# Patient Record
Sex: MALE | Race: WHITE | NOT HISPANIC OR LATINO | Employment: OTHER | URBAN - METROPOLITAN AREA
[De-identification: names, ages, dates, MRNs, and addresses within clinical notes are randomized per-mention and may not be internally consistent; named-entity substitution may affect disease eponyms.]

---

## 2017-07-24 ENCOUNTER — LAB (OUTPATIENT)
Dept: LAB | Facility: HOSPITAL | Age: 57
End: 2017-07-24
Attending: ORTHOPAEDIC SURGERY

## 2017-07-24 ENCOUNTER — TRANSCRIBE ORDERS (OUTPATIENT)
Dept: ADMINISTRATIVE | Facility: HOSPITAL | Age: 57
End: 2017-07-24

## 2017-07-24 ENCOUNTER — HOSPITAL ENCOUNTER (OUTPATIENT)
Dept: CARDIOLOGY | Facility: HOSPITAL | Age: 57
Discharge: HOME | End: 2017-07-24
Attending: ORTHOPAEDIC SURGERY | Admitting: ORTHOPAEDIC SURGERY

## 2017-07-24 ENCOUNTER — HOSPITAL ENCOUNTER (OUTPATIENT)
Dept: GENERAL RADIOLOGY | Facility: HOSPITAL | Age: 57
Discharge: HOME | End: 2017-07-24
Attending: ORTHOPAEDIC SURGERY

## 2017-07-24 DIAGNOSIS — Z01.818 PRE-OP TESTING: ICD-10-CM

## 2017-07-24 DIAGNOSIS — Z01.811 PRE-OP CHEST EXAM: ICD-10-CM

## 2017-07-24 DIAGNOSIS — Z01.818 PRE-OP TESTING: Primary | ICD-10-CM

## 2017-07-24 LAB
ALBUMIN SERPL-MCNC: 3.8 G/DL (ref 3.5–5.2)
ALBUMIN/GLOB SERPL: 1.2 G/DL
ALP SERPL-CCNC: 137 U/L (ref 39–117)
ALT SERPL W P-5'-P-CCNC: 28 U/L (ref 1–41)
ANION GAP SERPL CALCULATED.3IONS-SCNC: 11.4 MMOL/L
AST SERPL-CCNC: 20 U/L (ref 1–40)
BASOPHILS # BLD AUTO: 0.03 10*3/MM3 (ref 0–0.2)
BASOPHILS NFR BLD AUTO: 0.4 % (ref 0–1.5)
BILIRUB SERPL-MCNC: 0.3 MG/DL (ref 0.1–1.2)
BILIRUB UR QL STRIP: NEGATIVE
BUN BLD-MCNC: 24 MG/DL (ref 6–20)
BUN/CREAT SERPL: 22.6 (ref 7–25)
CALCIUM SPEC-SCNC: 9.4 MG/DL (ref 8.6–10.5)
CHLORIDE SERPL-SCNC: 103 MMOL/L (ref 98–107)
CLARITY UR: CLEAR
CO2 SERPL-SCNC: 25.6 MMOL/L (ref 22–29)
COLOR UR: YELLOW
CREAT BLD-MCNC: 1.06 MG/DL (ref 0.76–1.27)
DEPRECATED RDW RBC AUTO: 45.6 FL (ref 37–54)
EOSINOPHIL # BLD AUTO: 0.36 10*3/MM3 (ref 0–0.7)
EOSINOPHIL NFR BLD AUTO: 5 % (ref 0.3–6.2)
ERYTHROCYTE [DISTWIDTH] IN BLOOD BY AUTOMATED COUNT: 13.1 % (ref 11.5–14.5)
GFR SERPL CREATININE-BSD FRML MDRD: 73 ML/MIN/1.73
GLOBULIN UR ELPH-MCNC: 3.1 GM/DL
GLUCOSE BLD-MCNC: 182 MG/DL (ref 65–99)
GLUCOSE UR STRIP-MCNC: NEGATIVE MG/DL
HCT VFR BLD AUTO: 41.2 % (ref 40.4–52.2)
HGB BLD-MCNC: 13.9 G/DL (ref 13.7–17.6)
HGB UR QL STRIP.AUTO: NEGATIVE
IMM GRANULOCYTES # BLD: 0 10*3/MM3 (ref 0–0.03)
IMM GRANULOCYTES NFR BLD: 0 % (ref 0–0.5)
KETONES UR QL STRIP: NEGATIVE
LEUKOCYTE ESTERASE UR QL STRIP.AUTO: NEGATIVE
LYMPHOCYTES # BLD AUTO: 1.55 10*3/MM3 (ref 0.9–4.8)
LYMPHOCYTES NFR BLD AUTO: 21.7 % (ref 19.6–45.3)
MCH RBC QN AUTO: 32.6 PG (ref 27–32.7)
MCHC RBC AUTO-ENTMCNC: 33.7 G/DL (ref 32.6–36.4)
MCV RBC AUTO: 96.7 FL (ref 79.8–96.2)
MONOCYTES # BLD AUTO: 0.56 10*3/MM3 (ref 0.2–1.2)
MONOCYTES NFR BLD AUTO: 7.8 % (ref 5–12)
NEUTROPHILS # BLD AUTO: 4.65 10*3/MM3 (ref 1.9–8.1)
NEUTROPHILS NFR BLD AUTO: 65.1 % (ref 42.7–76)
NITRITE UR QL STRIP: NEGATIVE
PH UR STRIP.AUTO: 6.5 [PH] (ref 5–8)
PLATELET # BLD AUTO: 182 10*3/MM3 (ref 140–500)
PMV BLD AUTO: 10.4 FL (ref 6–12)
POTASSIUM BLD-SCNC: 4.4 MMOL/L (ref 3.5–5.2)
PROT SERPL-MCNC: 6.9 G/DL (ref 6–8.5)
PROT UR QL STRIP: ABNORMAL
RBC # BLD AUTO: 4.26 10*6/MM3 (ref 4.6–6)
SODIUM BLD-SCNC: 140 MMOL/L (ref 136–145)
SP GR UR STRIP: 1.01 (ref 1–1.03)
UROBILINOGEN UR QL STRIP: ABNORMAL
WBC NRBC COR # BLD: 7.15 10*3/MM3 (ref 4.5–10.7)

## 2017-07-24 PROCEDURE — 80053 COMPREHEN METABOLIC PANEL: CPT

## 2017-07-24 PROCEDURE — 93005 ELECTROCARDIOGRAM TRACING: CPT | Performed by: ORTHOPAEDIC SURGERY

## 2017-07-24 PROCEDURE — 93010 ELECTROCARDIOGRAM REPORT: CPT | Performed by: INTERNAL MEDICINE

## 2017-07-24 PROCEDURE — 81003 URINALYSIS AUTO W/O SCOPE: CPT

## 2017-07-24 PROCEDURE — 36415 COLL VENOUS BLD VENIPUNCTURE: CPT

## 2017-07-24 PROCEDURE — 85025 COMPLETE CBC W/AUTO DIFF WBC: CPT

## 2017-07-24 PROCEDURE — 71020 HC CHEST PA AND LATERAL: CPT

## 2018-02-27 ENCOUNTER — OFFICE VISIT CONVERTED (OUTPATIENT)
Dept: SURGERY | Facility: CLINIC | Age: 58
End: 2018-02-27
Attending: UROLOGY

## 2018-05-29 ENCOUNTER — OFFICE VISIT CONVERTED (OUTPATIENT)
Dept: SURGERY | Facility: CLINIC | Age: 58
End: 2018-05-29
Attending: UROLOGY

## 2018-07-06 ENCOUNTER — OFFICE VISIT CONVERTED (OUTPATIENT)
Dept: SURGERY | Facility: CLINIC | Age: 58
End: 2018-07-06
Attending: UROLOGY

## 2018-09-11 ENCOUNTER — CONVERSION ENCOUNTER (OUTPATIENT)
Dept: SURGERY | Facility: CLINIC | Age: 58
End: 2018-09-11

## 2018-09-11 ENCOUNTER — OFFICE VISIT CONVERTED (OUTPATIENT)
Dept: SURGERY | Facility: CLINIC | Age: 58
End: 2018-09-11
Attending: UROLOGY

## 2019-05-20 ENCOUNTER — OFFICE VISIT CONVERTED (OUTPATIENT)
Dept: GASTROENTEROLOGY | Facility: CLINIC | Age: 59
End: 2019-05-20
Attending: NURSE PRACTITIONER

## 2019-06-06 ENCOUNTER — HOSPITAL ENCOUNTER (OUTPATIENT)
Dept: GASTROENTEROLOGY | Facility: HOSPITAL | Age: 59
Setting detail: HOSPITAL OUTPATIENT SURGERY
Discharge: HOME | End: 2019-06-06
Attending: INTERNAL MEDICINE

## 2019-06-06 LAB — GLUCOSE BLD-MCNC: 154 MG/DL (ref 70–99)

## 2019-09-11 ENCOUNTER — HOSPITAL ENCOUNTER (OUTPATIENT)
Dept: OTHER | Facility: HOSPITAL | Age: 59
Discharge: HOME | End: 2019-09-11
Attending: NURSE PRACTITIONER

## 2019-12-31 ENCOUNTER — HOSPITAL ENCOUNTER (OUTPATIENT)
Dept: OTHER | Facility: HOSPITAL | Age: 59
Discharge: HOME | End: 2019-12-31
Attending: NURSE PRACTITIONER

## 2019-12-31 LAB
CREAT BLD-MCNC: 1.7 MG/DL (ref 0.6–1.4)
GFR SERPLBLD BASED ON 1.73 SQ M-ARVRAT: 43 ML/MIN/{1.73_M2}

## 2020-08-28 ENCOUNTER — HOSPITAL ENCOUNTER (OUTPATIENT)
Dept: OTHER | Facility: HOSPITAL | Age: 60
Discharge: HOME | End: 2020-08-28
Attending: NURSE PRACTITIONER

## 2020-09-11 ENCOUNTER — OFFICE VISIT CONVERTED (OUTPATIENT)
Dept: ORTHOPEDIC SURGERY | Facility: CLINIC | Age: 60
End: 2020-09-11
Attending: ORTHOPAEDIC SURGERY

## 2020-11-06 ENCOUNTER — OFFICE VISIT CONVERTED (OUTPATIENT)
Dept: ORTHOPEDIC SURGERY | Facility: CLINIC | Age: 60
End: 2020-11-06
Attending: PHYSICIAN ASSISTANT

## 2020-11-09 ENCOUNTER — HOSPITAL ENCOUNTER (OUTPATIENT)
Dept: LAB | Facility: HOSPITAL | Age: 60
Discharge: HOME | End: 2020-11-09
Attending: INTERNAL MEDICINE

## 2020-11-09 LAB
ANION GAP SERPL CALC-SCNC: 16 MMOL/L (ref 8–19)
APPEARANCE UR: CLEAR
BASOPHILS # BLD AUTO: 0.05 10*3/UL (ref 0–0.2)
BASOPHILS NFR BLD AUTO: 0.7 % (ref 0–3)
BILIRUB UR QL: NEGATIVE
BUN SERPL-MCNC: 32 MG/DL (ref 5–25)
BUN/CREAT SERPL: 16 {RATIO} (ref 6–20)
CALCIUM SERPL-MCNC: 9 MG/DL (ref 8.7–10.4)
CHLORIDE SERPL-SCNC: 102 MMOL/L (ref 99–111)
COLOR UR: YELLOW
CONV ABS IMM GRAN: 0.05 10*3/UL (ref 0–0.2)
CONV BACTERIA: NEGATIVE
CONV CO2: 23 MMOL/L (ref 22–32)
CONV COLLECTION SOURCE (UA): ABNORMAL
CONV CREATININE URINE, RANDOM: 74.1 MG/DL (ref 10–300)
CONV IMMATURE GRAN: 0.7 % (ref 0–1.8)
CONV UROBILINOGEN IN URINE BY AUTOMATED TEST STRIP: 0.2 {EHRLICHU}/DL (ref 0.1–1)
CREAT UR-MCNC: 2.06 MG/DL (ref 0.7–1.2)
DEPRECATED RDW RBC AUTO: 45.9 FL (ref 35.1–43.9)
EOSINOPHIL # BLD AUTO: 0.26 10*3/UL (ref 0–0.7)
EOSINOPHIL # BLD AUTO: 3.6 % (ref 0–7)
ERYTHROCYTE [DISTWIDTH] IN BLOOD BY AUTOMATED COUNT: 13.2 % (ref 11.6–14.4)
GFR SERPLBLD BASED ON 1.73 SQ M-ARVRAT: 34 ML/MIN/{1.73_M2}
GLUCOSE SERPL-MCNC: 204 MG/DL (ref 70–99)
GLUCOSE UR QL: NEGATIVE MG/DL
HCT VFR BLD AUTO: 39.9 % (ref 42–52)
HGB BLD-MCNC: 13.3 G/DL (ref 14–18)
HGB UR QL STRIP: NEGATIVE
KETONES UR QL STRIP: NEGATIVE MG/DL
LEUKOCYTE ESTERASE UR QL STRIP: NEGATIVE
LYMPHOCYTES # BLD AUTO: 1.59 10*3/UL (ref 1–5)
LYMPHOCYTES NFR BLD AUTO: 21.9 % (ref 20–45)
MCH RBC QN AUTO: 31.5 PG (ref 27–31)
MCHC RBC AUTO-ENTMCNC: 33.3 G/DL (ref 33–37)
MCV RBC AUTO: 94.5 FL (ref 80–96)
MONOCYTES # BLD AUTO: 0.77 10*3/UL (ref 0.2–1.2)
MONOCYTES NFR BLD AUTO: 10.6 % (ref 3–10)
NEUTROPHILS # BLD AUTO: 4.53 10*3/UL (ref 2–8)
NEUTROPHILS NFR BLD AUTO: 62.5 % (ref 30–85)
NITRITE UR QL STRIP: NEGATIVE
NRBC CBCN: 0 % (ref 0–0.7)
OSMOLALITY SERPL CALC.SUM OF ELEC: 295 MOSM/KG (ref 273–304)
PH UR STRIP.AUTO: 6 [PH] (ref 5–8)
PLATELET # BLD AUTO: 194 10*3/UL (ref 130–400)
PMV BLD AUTO: 10.5 FL (ref 9.4–12.4)
POTASSIUM SERPL-SCNC: 5 MMOL/L (ref 3.5–5.3)
PROT UR QL: 100 MG/DL
PROT UR-MCNC: 55.6 MG/DL
RBC # BLD AUTO: 4.22 10*6/UL (ref 4.7–6.1)
RBC #/AREA URNS HPF: ABNORMAL /[HPF]
SODIUM SERPL-SCNC: 136 MMOL/L (ref 135–147)
SP GR UR: 1.01 (ref 1–1.03)
WBC # BLD AUTO: 7.25 10*3/UL (ref 4.8–10.8)
WBC #/AREA URNS HPF: ABNORMAL /[HPF]

## 2020-11-10 ENCOUNTER — HOSPITAL ENCOUNTER (OUTPATIENT)
Dept: PREADMISSION TESTING | Facility: HOSPITAL | Age: 60
Discharge: HOME | End: 2020-11-10
Attending: ORTHOPAEDIC SURGERY

## 2020-11-10 LAB
ALBUMIN SERPL-MCNC: 4 G/DL (ref 3.5–5)
ALBUMIN/GLOB SERPL: 1.7 {RATIO} (ref 1.4–2.6)
ALP SERPL-CCNC: 165 U/L (ref 56–119)
ALT SERPL-CCNC: 25 U/L (ref 10–40)
ANION GAP SERPL CALC-SCNC: 14 MMOL/L (ref 8–19)
APTT BLD: 22.8 S (ref 22.2–34.2)
AST SERPL-CCNC: 20 U/L (ref 15–50)
BASOPHILS # BLD AUTO: 0.07 10*3/UL (ref 0–0.2)
BASOPHILS NFR BLD AUTO: 0.9 % (ref 0–3)
BILIRUB SERPL-MCNC: 0.23 MG/DL (ref 0.2–1.3)
BUN SERPL-MCNC: 43 MG/DL (ref 5–25)
BUN/CREAT SERPL: 20 {RATIO} (ref 6–20)
CALCIUM SERPL-MCNC: 9.6 MG/DL (ref 8.7–10.4)
CHLORIDE SERPL-SCNC: 103 MMOL/L (ref 99–111)
CONV ABS IMM GRAN: 0.05 10*3/UL (ref 0–0.2)
CONV CO2: 24 MMOL/L (ref 22–32)
CONV IMMATURE GRAN: 0.6 % (ref 0–1.8)
CONV TOTAL PROTEIN: 6.4 G/DL (ref 6.3–8.2)
CREAT UR-MCNC: 2.13 MG/DL (ref 0.7–1.2)
DEPRECATED RDW RBC AUTO: 45.4 FL (ref 35.1–43.9)
EOSINOPHIL # BLD AUTO: 0.3 10*3/UL (ref 0–0.7)
EOSINOPHIL # BLD AUTO: 3.7 % (ref 0–7)
ERYTHROCYTE [DISTWIDTH] IN BLOOD BY AUTOMATED COUNT: 13.3 % (ref 11.6–14.4)
EST. AVERAGE GLUCOSE BLD GHB EST-MCNC: 192 MG/DL
GFR SERPLBLD BASED ON 1.73 SQ M-ARVRAT: 33 ML/MIN/{1.73_M2}
GLOBULIN UR ELPH-MCNC: 2.4 G/DL (ref 2–3.5)
GLUCOSE SERPL-MCNC: 155 MG/DL (ref 70–99)
HBA1C MFR BLD: 8.3 % (ref 3.5–5.7)
HCT VFR BLD AUTO: 38.8 % (ref 42–52)
HGB BLD-MCNC: 13 G/DL (ref 14–18)
INR PPP: 0.92 (ref 2–3)
LYMPHOCYTES # BLD AUTO: 1.52 10*3/UL (ref 1–5)
LYMPHOCYTES NFR BLD AUTO: 18.9 % (ref 20–45)
MCH RBC QN AUTO: 31.6 PG (ref 27–31)
MCHC RBC AUTO-ENTMCNC: 33.5 G/DL (ref 33–37)
MCV RBC AUTO: 94.4 FL (ref 80–96)
MONOCYTES # BLD AUTO: 0.9 10*3/UL (ref 0.2–1.2)
MONOCYTES NFR BLD AUTO: 11.2 % (ref 3–10)
NEUTROPHILS # BLD AUTO: 5.19 10*3/UL (ref 2–8)
NEUTROPHILS NFR BLD AUTO: 64.7 % (ref 30–85)
NRBC CBCN: 0 % (ref 0–0.7)
OSMOLALITY SERPL CALC.SUM OF ELEC: 296 MOSM/KG (ref 273–304)
PLATELET # BLD AUTO: 192 10*3/UL (ref 130–400)
PMV BLD AUTO: 10.1 FL (ref 9.4–12.4)
POTASSIUM SERPL-SCNC: 5.2 MMOL/L (ref 3.5–5.3)
PROTHROMBIN TIME: 10.1 S (ref 9.4–12)
RBC # BLD AUTO: 4.11 10*6/UL (ref 4.7–6.1)
SODIUM SERPL-SCNC: 136 MMOL/L (ref 135–147)
WBC # BLD AUTO: 8.03 10*3/UL (ref 4.8–10.8)

## 2020-11-13 ENCOUNTER — HOSPITAL ENCOUNTER (OUTPATIENT)
Dept: PREADMISSION TESTING | Facility: HOSPITAL | Age: 60
Discharge: HOME | End: 2020-11-13
Attending: ORTHOPAEDIC SURGERY

## 2020-11-15 LAB — SARS-COV-2 RNA SPEC QL NAA+PROBE: NOT DETECTED

## 2020-12-03 ENCOUNTER — OFFICE VISIT CONVERTED (OUTPATIENT)
Dept: ORTHOPEDIC SURGERY | Facility: CLINIC | Age: 60
End: 2020-12-03
Attending: ORTHOPAEDIC SURGERY

## 2021-01-05 ENCOUNTER — OFFICE VISIT CONVERTED (OUTPATIENT)
Dept: ORTHOPEDIC SURGERY | Facility: CLINIC | Age: 61
End: 2021-01-05
Attending: ORTHOPAEDIC SURGERY

## 2021-01-05 ENCOUNTER — CONVERSION ENCOUNTER (OUTPATIENT)
Dept: ORTHOPEDIC SURGERY | Facility: CLINIC | Age: 61
End: 2021-01-05

## 2021-02-18 ENCOUNTER — OFFICE VISIT CONVERTED (OUTPATIENT)
Dept: ORTHOPEDIC SURGERY | Facility: CLINIC | Age: 61
End: 2021-02-18
Attending: ORTHOPAEDIC SURGERY

## 2021-03-12 ENCOUNTER — HOSPITAL ENCOUNTER (OUTPATIENT)
Dept: OTHER | Facility: HOSPITAL | Age: 61
Discharge: HOME | End: 2021-03-12
Attending: NURSE PRACTITIONER

## 2021-03-15 ENCOUNTER — OFFICE VISIT CONVERTED (OUTPATIENT)
Dept: NEUROLOGY | Facility: CLINIC | Age: 61
End: 2021-03-15
Attending: NURSE PRACTITIONER

## 2021-03-24 ENCOUNTER — HOSPITAL ENCOUNTER (OUTPATIENT)
Dept: OTHER | Facility: HOSPITAL | Age: 61
Discharge: HOME | End: 2021-03-24
Attending: NURSE PRACTITIONER

## 2021-05-10 NOTE — H&P
History and Physical      Patient Name: Carlos Murphy Jr.   Patient ID: [id number]   Sex: Male   YOB: 1960    Primary Care Provider: Felix Atkinson MD   Referring Provider: Tiana VALERIO    Visit Date: September 11, 2020    Provider: Tha Parry MD   Location: [facility]   Location Address: [address] [place] [address]  [id number]   Location Phone: [phone]          Chief Complaint  · Right knee pain      History Of Present Illness  Carlos Murphy Jr. is a 59 year old /White male who presents today to [facility].      The patient presents here today for evaluation of right knee pain. The Patient is here today wearing a brace on the right knee.  He reports he has been having pain since May 2020, with no known injury.  He reports the brace does help with stability. Patient is a diabetic. He recently had a MRI of the knee at [facility].       Past Medical History  Arthritis; Bladder Disorder; Bladder Neck Obstruction; BPH; Chest pain; Chronic Obstructive Pulmonary Disease; Diabetes; Erectile dysfunction; GERD; High blood pressure; High cholesterol; Hypothyroidism; Incomplete bladder emptying; Limb Pain; Limb Swelling; Mood disorder; Prostate cancer screening; Reflux; Shortness Of Air; Urinary Retention         Past Surgical History  Back; Colonoscopy; Cystoscopy; Cystoscopy with transurethral resection of prostate (TURP) using button electrode; Gallbladder; Rotator Cuff repair         Medication List  Ambien 10 mg oral tablet; amlodipine oral; aspirin 81 mg oral tablet,delayed release (DR/EC); atenolol 25 mg oral tablet; Breo Ellipta 200-25 mcg/dose inhalation blister with device; Lamictal 200 mg oral tablet; Lipitor 40 mg oral tablet; lisinopril 2.5 mg oral tablet; Lyrica 100 mg oral capsule; Novolin 70/30 100 unit/mL (70-30) subcutaneous suspension; Novolog Flexpen U-100 Insulin 100 unit/mL subcutaneous insulin pen; Ozempic 0.25 mg or 0.5 mg(2 mg/1.5  mL) subcutaneous pen injector; Protonix 40 mg oral granules DR for susp in packet; Prozac 40 mg oral capsule; Seroquel 400 mg oral tablet         Allergy List  NO KNOWN DRUG ALLERGIES       Allergies Reconciled  Family Medical History  Stroke; Family history of colon cancer; -Father's Family History Unknown; -Mother's Family History Unknown; Bladder calculus; Family history of Arthritis         Social History  Alcohol (Current some day); Alcohol Use (Current some day); Caffeine (Current every day); lives with other; Recreational Drug Use (Never); Retired.; Second hand smoke exposure (Current some day); Tobacco (Former)         Review of Systems  · Constitutional  o Denies  o : fever, chills, weight loss  · Cardiovascular  o Denies  o : chest pain, shortness of breath  · Gastrointestinal  o Denies  o : liver disease, heartburn, nausea, blood in stools  · Genitourinary  o Denies  o : painful urination, blood in urine  · Integument  o Denies  o : rash, itching  · Neurologic  o Denies  o : headache, weakness, loss of consciousness  · Musculoskeletal  o Denies  o : painful, swollen joints  · Psychiatric  o Denies  o : drug/alcohol addiction, anxiety, depression      Vitals  Date Time BP Position Site L\R Cuff Size HR RR TEMP (F) WT  HT  BMI kg/m2 BSA m2 O2 Sat        09/11/2020 10:57 AM         285lbs 0oz 6'   38.65 2.56           Physical Examination  · Constitutional  o Appearance  o : well developed, well-nourished, no obvious deformities present  · Head and Face  o Head  o :   § Inspection  § : normocephalic  o Face  o :   § Inspection  § : no facial lesions  · Eyes  o Conjunctivae  o : conjunctivae normal  o Sclerae  o : sclerae white  · Ears, Nose, Mouth and Throat  o Ears  o :   § External Ears  § : appearance within normal limits  § Hearing  § : intact  o Nose  o :   § External Nose  § : appearance normal  · Neck  o Inspection/Palpation  o : normal appearance  o Range of Motion  o : full range of  motion  · Respiratory  o Respiratory Effort  o : breathing unlabored  o Inspection of Chest  o : normal appearance  o Auscultation of Lungs  o : no audible wheezing or rales  · Cardiovascular  o Heart  o : regular rate  · Gastrointestinal  o Abdominal Examination  o : soft and non-tender  · Skin and Subcutaneous Tissue  o General Inspection  o : intact, no rashes  · Psychiatric  o General  o : Alert and oriented x3  o Judgement and Insight  o : judgment and insight intact  o Mood and Affect  o : mood normal, affect appropriate  · Right Knee  o Inspection  o : 15 valgus angulation to the knee. Flexion 90; Extension -5; Stable to varus and valgus stress. 1+ effusion stable to anterior and posterior drawer.   · Injection Note/Aspiration Note  o Site  o : Right knee  o Procedure  o : Procedure: After educating the patient, patient gave consent for procedure. After using Chloraprep, the joint space was injected. The patient tolerated the procedure well.  o Medication  o : right knee aspirated prior to injecting 32mg of Zilretta with 5ccs of 1% Lidocaine  · In Office Procedures  o View  o : LAT/SUNRISE/STANDING  o Site  o : right, knee  o Indication  o : right knee pain  o Study  o : X-rays ordered, taken in the office, and reviewed today.  o Xray  o : advance degenerative changes to lateral joint with loss of joint space. Positive joint effusion.   o Comparative Data  o : No comparative data found  · Imaging  o Imaging  o : MRI [facility] 08/2020: 1. Tricompartmental osteoarthritis, severe within the lateral compartment with a moderate amount of surrounding reactive edema. An osteochondral defect versus subchondral cyst is present within the lateral tibial plateau with additional subchondral cystic changes and cortical irregularity present within the lateral femoral condyle. Early insufficiency fractures cannot be excluded. 2. Complex tear of the entire lateral meniscus with significant extrusion in a macerated appearance. 3.  Horizontal tear of the body of the medial meniscus. 4. Moderate to large sized joint effusion with a moderate to large sized partially ruptured Baker's cyst..           Assessment  · Primary osteoarthritis of right knee     715.16/M17.11  · Right knee pain, unspecified chronicity     719.46/M25.561  · Medial meniscus tear     836.0/S83.249A      Plan  · Orders  o Zilretta- 1 mg. () () - 715.16/M17.11 - 09/11/2020   Lot [id number] Exp 06 2021 Flexion Administered by DORY LANCE MD  o Knee Intra-articular Injection without US Guidance [facility] (82311) - 715.16/M17.11 - 09/11/2020   Lot [id number] Exp 08 01 2021 Hospira Administered by DORY LANCE MD  o Knee (Right) [facility] Preferred View ([id number]-DS) - 719.46/M25.561 - 09/11/2020  · Medications  o Medications have been Reconciled  o Transition of Care or Provider Policy  · Instructions  o MRI reviewed by Dr. Lance.  o Reviewed the patient's Past Medical, Social, and Family history as well as the ROS at today's visit, no changes.  o Call or return if worsening symptoms.  o X-ray ordered, taken and reviewed at this visit.  o Follow up in 6 weeks.  o The above service was scribed by Samantha Head on my behalf and I attest to the accuracy of the note. jsb  o Discussed operative vs nonoperative treatment with the patient. Conservative treatment consists of aspiration and steroid injections, Physical Therapy and home exercises. Discussed the risks and benefits of an aspiration and Zilretta injection with the patient. The patient expressed understanding and wished to proceed with aspiration and Zilretta injection in the right knee. The patient tolerated this well. Plan to follow up in 6-8 weeks.   o Electronically Identified Patient Education Materials Provided Electronically            Electronically Signed by: Samantha Head MA -Author on September 14, 2020 01:24:25 PM  Electronically Co-signed by: Dory Lance MD -Reviewer on September  14, 2020 09:33:49 PM

## 2021-05-10 NOTE — H&P
History and Physical      Patient Name: Carlos Murphy Jr.   Patient ID: [id number]   Sex: Male   YOB: 1960    Primary Care Provider: Felix Atkinson MD   Referring Provider: Samia Rapp    Visit Date: March 15, 2021    Provider: IMAN Alvarado   Location: [facility]   Location Address: [address] [place] [address]  [id number]   Location Phone: [phone]          Chief Complaint     Memory issues.       History Of Present Illness  Carlos Murphy Jr. is a 60 year old /White male who presents today to [facility] today referred by Samia Rapp for evaluation of memory. States that memory loss was first noticed about 30 years ago. Family has noticed any memory difficulty. Is having difficulty with short term memory. Endorses difficulty remembering appointments. Endorses difficulty handling financial affairs, such as balancing checkbook and paying bills timely. Does not feel as if he could learn a new tool, appliance or gadget. Endorses decreased interest in hobbies or activities. Endorses depression. Denies homicidal ideation and suicidal ideation. Endorses anxiety. Does experience repetitive questioning. Endorses difficulty with judgment.   Does live alone. Does drive. Denies getting lost while driving, motor vehicle accidents, and traffic tickets.   Denies family history of Alzheimer's Disease.   Independently Reviewed: Prior PCP records      CT head: normal no atrophy    MoCA 17/30       Past Medical History  Arthritis; Bladder disorder; Bladder Neck Obstruction; BPH; Chest pain; Chronic Obstructive Pulmonary Disease; Diabetes; Erectile dysfunction; GERD; High blood pressure; High cholesterol; Hypothyroidism; Incomplete bladder emptying; Limb Pain; Limb Swelling; Mood disorder; Prostate cancer screening; Reflux; Shortness Of Air; Urinary retention         Past Surgical History  Back; Colonoscopy; Cystoscopy; Cystoscopy with  transurethral resection of prostate (TURP) using button electrode; EGD (Endoscopy); Gallbladder; Rotator Cuff repair         Medication List  Ambien 10 mg oral tablet; aspirin 81 mg oral tablet,delayed release (DR/EC); atenolol 25 mg oral tablet; atorvastatin 40 mg oral tablet; Breo Ellipta 200-25 mcg/dose inhalation blister with device; fluoxetine 40 mg oral capsule; glipizide 5 mg oral tablet; hydroxyzine HCl 50 mg oral tablet; Lamictal 200 mg oral tablet; Lipitor 40 mg oral tablet; lisinopril 2.5 mg oral tablet; Lyrica 100 mg oral capsule; Novolog Flexpen U-100 Insulin 100 unit/mL subcutaneous insulin pen; Ozempic 0.25 mg or 0.5 mg(2 mg/1.5 mL) subcutaneous pen injector; pantoprazole 40 mg oral tablet,delayed release (DR/EC); Protonix 40 mg oral granules DR for susp in packet; Prozac 40 mg oral capsule; quetiapine 400 mg oral tablet; Seroquel 400 mg oral tablet; zolpidem 10 mg oral tablet         Allergy List  NO KNOWN DRUG ALLERGIES         Family Medical History  Stroke; Family history of colon cancer; -Father's Family History Unknown; -Mother's Family History Unknown; Bladder calculus; Family history of Arthritis         Social History  Alcohol (Current some day); Alcohol Use (Current some day); Caffeine (Current every day); lives with other; Recreational Drug Use (Never); Retired.; Second hand smoke exposure (Current some day); Tobacco (Former)         Review of Systems  · Constitutional  o Denies  o : chills, excessive sweating, fatigue, fever, sycope/passing out, weight gain, weight loss  · Eyes  o Denies  o : changes in vision, blurry vision, double vision  · HENT  o Denies  o : loss of hearing, ringing in the ears, ear aches, sore throat, nasal congestion, sinus pain, nose bleeds, seasonal allergies  · Cardiovascular  o Denies  o : blood clots, swollen legs, anemia, easy burising or bleeding, transfusions  · Respiratory  o Denies  o : shortness of breath, dry cough, productive cough, pneumonia,  COPD  · Gastrointestinal  o Denies  o : difficulty swallowing, reflux  · Genitourinary  o Denies  o : incontinence  · Neurologic  o Denies  o : headache, seizure, stroke, tremor, loss of balance, falls, dizziness/vertigo, difficulty with sleep, numbness/tingling/paresthesia , difficulty with coordination, difficulty with dexterity, weakness  · Musculoskeletal  o Denies  o : neck stiffness/pain, swollen lymph nodes, muscle aches, joint pain, weakness, spasms, sciatica, pain radiating in arm, pain radiating in leg, low back pain  · Endocrine  o Denies  o : diabetes, thyroid disorder  · Psychiatric  o Denies  o : anxiety, depression      Vitals  Date Time BP Position Site L\R Cuff Size HR RR TEMP (F) WT  HT  BMI kg/m2 BSA m2 O2 Sat FR L/min FiO2 HC       03/15/2021 10:53 /74 Sitting    70 - R   296lbs 16oz 6'   40.28 2.62             Physical Examination  · Constitutional  o Appearance  o : well-nourished, well groomed, in no apparent distress  · Eyes  o Pupils and Irises  o : Pupils equal, round, and reactive to light and accommodation bilaterally  · Respiratory  o Auscultation of Lungs  o : Lungs were clear to ascultation bilaterally. No wheezes, rhonchi or rales were appreciated.  · Cardiovascular  o Heart  o :   § Auscultation of Heart  § : Regular rate and rhythm, no murmurs, gallops or rubs were appreciated.  o Peripheral Vascular System  o :   § Extremities  § : No peripheral edema was appreciated  · Musculoskeletal  o General  o : Normal bulk and normal tone.  · Neurologic  o Mental Status Examination  o :   § Orientation  § : Alert and oriented to person, place, and time,  § Speech/Language  § : Intact naming, comprehension, and repetition. No dysarthria.  § Memory  § : memory impaired   § Fund of Knowledge  § : Adequate fund of knowledge  o Cranial Nerves  o : Pupils are equal, round and reactive to light. Extraocular movements are intact. Visual fields are full. Fundoscopic examination reveals sharp  disc bilaterally. Sensation in the V1-V3 distribution is intact and symmetric. Muscles of mastication are strong and symmetric. Muscles of facial expression are strong and symmetric. Hearing is intact. Palatal raise is intact and symmetric. Uvula is midline. Shoulder shrug is strong. Tongue protrudes in the midline.  o Motor Examination  o :   § RUE Strength  § : strength normal  § LUE Strength  § : strength normal  § RLE Strength  § : strength normal  § LLE Strength  § : strength normal  o Reflexes  o : 2+ reflexes throughout and symmetric. Negative Tolliver. Negative Babinski.   o Sensation  o : Intact sensation to light touch, pinprick, vibration and proprioception throughout.  o Gait and Station  o :   § Gait Screening  § : Normal, narrow based gait, with a normal arm swing.  o Coordination  o : Intact finger to nose and heel to shin. Rapid alternating movements are intact in the upper and lower extremities.          Assessment  · Memory deficit     780.93/R41.3  Will order labs, neuropsych consult and MRI brain for further evaluation of memory loss.   · Polypharmacy     V58.69/Z79.899  Discussed that many of the medications he's on, most in high doses, can lead to memory loss. Such as ambien, fluoxetine, hydroxyzine, Lamictal, Lyrica, Prozac, Seroquel. Many of these carry memory loss as a side effect, or have a high anticholinergic burden.       Plan  · Orders  o MRI brain wo contrast (48736) - 780.93/R41.3, V58.69/Z79.899 - 03/15/2021  o CBC with Auto Diff [facility] (50583) - 780.93/R41.3, V58.69/Z79.899 - 03/15/2021  o CMP [facility] (69612) - 780.93/R41.3, V58.69/Z79.899 - 03/15/2021  o Folate level (98317) - 780.93/R41.3, V58.69/Z79.899 - 03/15/2021  o Homocysteine (14746) - 780.93/R41.3, V58.69/Z79.899 - 03/15/2021  o Methylmalonic acid (23700) - 780.93/R41.3, V58.69/Z79.899 - 03/15/2021  o Vitamin B1 level (40612) - 780.93/R41.3, V58.69/Z79.899 - 03/15/2021  o Vitamin B12 level (40636) - 780.93/R41.3, V58.69/Z79.899 -  03/15/2021  o Neuropsychological testing (70455) - - 03/15/2021  · Medications  o Medications have been Reconciled  o Transition of Care or Provider Policy  · Instructions  o Encouraged to follow-up with Primary Care Provider for preventative care.  o Call or Return if symptoms worsen or persist.   o Follow Up in 3 months.   o Total time spent with patient and coordinating patient care was 45 minutes  · Disposition  o Call or Return if symptoms worsen or persist.            Electronically Signed by: IMAN Alvarado -Author on March 18, 2021 08:43:31 AM

## 2021-05-13 NOTE — PROGRESS NOTES
Progress Note      Patient Name: Carlos Murphy Jr.   Patient ID: [id number]   Sex: Male   YOB: 1960    Primary Care Provider: Felix Atkinson MD   Referring Provider: Samia Rapp    Visit Date: December 3, 2020    Provider: Tha Parry MD   Location: [facility]   Location Address: [address] [place] [address]  [id number]   Location Phone: [phone]          Chief Complaint  · Right knee pain       History Of Present Illness  Carlos Murphy Jr. is a 60 year old /White male who presents today to [facility].      The patient presents today for follow-up of right knee replacement performed November 18th, 2020. He is here today for his two week postoperative appointment. He is using a walker today and wearing the compression stockings. He reports the knee feels sort-of loose and we explained this is normal. He reports the pain has been an issue but received a refill of pain medication on Tuesday. Patient has been in therapy and doing well.       Past Medical History  Arthritis; Bladder Disorder; Bladder Neck Obstruction; BPH; Chest pain; Chronic Obstructive Pulmonary Disease; Diabetes; Erectile dysfunction; GERD; High blood pressure; High cholesterol; Hypothyroidism; Incomplete bladder emptying; Limb Pain; Limb Swelling; Mood disorder; Prostate cancer screening; Reflux; Shortness Of Air; Urinary retention         Past Surgical History  Back; Colonoscopy; Cystoscopy; Cystoscopy with transurethral resection of prostate (TURP) using button electrode; Gallbladder; Rotator Cuff repair         Medication List  Ambien 10 mg oral tablet; amlodipine oral; aspirin 81 mg oral tablet,delayed release (DR/EC); atenolol 25 mg oral tablet; Breo Ellipta 200-25 mcg/dose inhalation blister with device; cyclobenzaprine 10 mg oral tablet; Lamictal 200 mg oral tablet; Lipitor 40 mg oral tablet; lisinopril 2.5 mg oral tablet; Lyrica 100 mg oral capsule; Novolin 70/30 100  unit/mL (70-30) subcutaneous suspension; Novolog Flexpen U-100 Insulin 100 unit/mL subcutaneous insulin pen; Ozempic 0.25 mg or 0.5 mg(2 mg/1.5 mL) subcutaneous pen injector; Percocet 7.5-325 mg oral tablet; Protonix 40 mg oral granules DR for susp in packet; Prozac 40 mg oral capsule; Seroquel 400 mg oral tablet         Allergy List  NO KNOWN DRUG ALLERGIES       Allergies Reconciled  Family Medical History  Stroke; Family history of colon cancer; -Father's Family History Unknown; -Mother's Family History Unknown; Bladder calculus; Family history of Arthritis         Social History  Alcohol (Current some day); Alcohol Use (Current some day); Caffeine (Current every day); lives with other; Recreational Drug Use (Never); Retired.; Second hand smoke exposure (Current some day); Tobacco (Former)         Review of Systems  · Constitutional  o Denies  o : fever, chills, weight loss  · Cardiovascular  o Denies  o : chest pain, shortness of breath  · Gastrointestinal  o Denies  o : liver disease, heartburn, nausea, blood in stools  · Genitourinary  o Denies  o : painful urination, blood in urine  · Integument  o Denies  o : rash, itching  · Neurologic  o Denies  o : headache, weakness, loss of consciousness  · Musculoskeletal  o Denies  o : painful, swollen joints  · Psychiatric  o Denies  o : drug/alcohol addiction, anxiety, depression      Vitals  Date Time BP Position Site L\R Cuff Size HR RR TEMP (F) WT  HT  BMI kg/m2 BSA m2 O2 Sat FR L/min FiO2        12/03/2020 09:12 AM      78 - R   287lbs 0oz 6'   38.92 2.57 98 %            Physical Examination  · Constitutional  o Appearance  o : well developed, well-nourished, no obvious deformities present  · Head and Face  o Head  o :   § Inspection  § : normocephalic  o Face  o :   § Inspection  § : no facial lesions  · Eyes  o Conjunctivae  o : conjunctivae normal  o Sclerae  o : sclerae white  · Ears, Nose, Mouth and Throat  o Ears  o :   § External Ears  § : appearance  within normal limits  § Hearing  § : intact  o Nose  o :   § External Nose  § : appearance normal  · Neck  o Inspection/Palpation  o : normal appearance  o Range of Motion  o : full range of motion  · Respiratory  o Respiratory Effort  o : breathing unlabored  o Inspection of Chest  o : normal appearance  o Auscultation of Lungs  o : no audible wheezing or rales  · Cardiovascular  o Heart  o : regular rate  · Gastrointestinal  o Abdominal Examination  o : soft and non-tender  · Skin and Subcutaneous Tissue  o General Inspection  o : intact, no rashes  · Psychiatric  o General  o : Alert and oriented x3  o Judgement and Insight  o : judgment and insight intact  o Mood and Affect  o : mood normal, affect appropriate  · Right Knee  o Inspection  o : -7 to 93 degrees of extension, Neurovascularly intact, sensation intact, incision well-healing, no signs of infection, no drainage, stable to varus and valgus stress, stable to anterior and posterior drawer, staples removed  · In Office Procedures  o View  o : AP/LATERAL/SUNRISE  o Site  o : right, knee  o Indication  o : Right knee pain  o Study  o : X-rays ordered, taken in the office, and reviewed today.  o Xray  o : reveals an intact appearing right knee replacement without signs of loosening, subsidence or periprosthetic fracture  o Comparative Data  o : No comparative data found          Assessment  · Aftercare;following joint replacement     V54.81/Z47.1  · Right knee pain, unspecified chronicity     719.46/M25.561      Plan  · Orders  o Knee (Right) [facility] Preferred View ([id number]) - 719.46/M25.561 - 12/03/2020  · Medications  o Medications have been Reconciled  o Transition of Care or Provider Policy  · Instructions  o Reviewed the patient's Past Medical, Social, and Family history as well as the ROS at today's visit, no changes.  o Call or return if worsening symptoms.  o X-ray ordered, taken and reviewed at this visit.  o The above service was scribed by Mickie Christianson  on my behalf and I attest to the accuracy of the note. jsb  o Patient will continue with therapy and per their guidance discontinue the walker. He will continue with pain medication and DVT prophylactic. He will follow-up in 4 weeks.             Electronically Signed by: Mickie Christianson-, Other -Author on December 3, 2020 09:45:27 AM  Electronically Co-signed by: Tha Parry MD -Reviewer on December 3, 2020 08:53:34 PM

## 2021-05-13 NOTE — PROGRESS NOTES
Progress Note      Patient Name: Carlos Murphy Jr.   Patient ID: [id number]   Sex: Male   YOB: 1960    Primary Care Provider: Felix Atkinson MD   Referring Provider: Samia Rapp    Visit Date: November 6, 2020    Provider: Mark Kulkarni PA-C   Location: [facility]   Location Address: [address] [place] [address]  [id number]   Location Phone: [phone]          Chief Complaint  · Right knee pain      History Of Present Illness  Carlos Murphy Jr. is a 60 year old /White male who presents today to [facility]. Patient presents for follow-up evaluation of right knee pain/osteoarthritis/medial meniscus tear. Patient was first evaluated by Dr. Parry on 9/11/2020. Patient states he has a history of knee pain since spring 2020, denies injury. He has been using a knee brace with some relief with stability, patient was evaluated by his primary care physician with MRI, patient states he is diabetic. At last visit patient received a aspiration and steroid injection which he states helped decrease some of the swelling but he states he still has pain worse on the lateral side of his knee, worse with range of motion, weightbearing ambulating and going up and down stairs. Patient states he has a history of COPD, diabetes and stage III kidney disease. Patient states he would like to discuss right knee replacement with Dr. Parry.       Past Medical History  Arthritis; Bladder Disorder; Bladder Neck Obstruction; BPH; Chest pain; Chronic Obstructive Pulmonary Disease; Diabetes; Erectile dysfunction; GERD; High blood pressure; High cholesterol; Hypothyroidism; Incomplete bladder emptying; Limb Pain; Limb Swelling; Mood disorder; Prostate cancer screening; Reflux; Shortness Of Air; Urinary retention         Past Surgical History  Back; Colonoscopy; Cystoscopy; Cystoscopy with transurethral resection of prostate (TURP) using button electrode; Gallbladder;  Rotator Cuff repair         Medication List  Ambien 10 mg oral tablet; amlodipine oral; aspirin 81 mg oral tablet,delayed release (DR/EC); atenolol 25 mg oral tablet; Breo Ellipta 200-25 mcg/dose inhalation blister with device; Lamictal 200 mg oral tablet; Lipitor 40 mg oral tablet; lisinopril 2.5 mg oral tablet; Lyrica 100 mg oral capsule; Novolin 70/30 100 unit/mL (70-30) subcutaneous suspension; Novolog Flexpen U-100 Insulin 100 unit/mL subcutaneous insulin pen; Ozempic 0.25 mg or 0.5 mg(2 mg/1.5 mL) subcutaneous pen injector; Protonix 40 mg oral granules DR for susp in packet; Prozac 40 mg oral capsule; Seroquel 400 mg oral tablet         Allergy List  NO KNOWN DRUG ALLERGIES       Allergies Reconciled  Family Medical History  Stroke; Family history of colon cancer; -Father's Family History Unknown; -Mother's Family History Unknown; Bladder calculus; Family history of Arthritis         Social History  Alcohol (Current some day); Alcohol Use (Current some day); Caffeine (Current every day); lives with other; Recreational Drug Use (Never); Retired.; Second hand smoke exposure (Current some day); Tobacco (Former)         Review of Systems  · Constitutional  o Denies  o : fever, chills, weight loss  · Cardiovascular  o Denies  o : chest pain, shortness of breath  · Gastrointestinal  o Denies  o : liver disease, heartburn, nausea, blood in stools  · Genitourinary  o Denies  o : painful urination, blood in urine  · Integument  o Denies  o : rash, itching  · Neurologic  o Denies  o : headache, weakness, loss of consciousness  · Musculoskeletal  o Denies  o : painful, swollen joints  · Psychiatric  o Denies  o : drug/alcohol addiction, anxiety, depression      Vitals  Date Time BP Position Site L\R Cuff Size HR RR TEMP (F) WT  HT  BMI kg/m2 BSA m2 O2 Sat FR L/min FiO2        11/06/2020 10:27 AM      76 - R   285lbs 0oz 6'   38.65 2.56 96 %            Physical Examination  · Constitutional  o Appearance  o : well  developed, well-nourished, no obvious deformities present  · Head and Face  o Head  o :   § Inspection  § : normocephalic  o Face  o :   § Inspection  § : no facial lesions  · Eyes  o Conjunctivae  o : conjunctivae normal  o Sclerae  o : sclerae white  · Ears, Nose, Mouth and Throat  o Ears  o :   § External Ears  § : appearance within normal limits  § Hearing  § : intact  o Nose  o :   § External Nose  § : appearance normal  · Neck  o Inspection/Palpation  o : normal appearance  o Range of Motion  o : full range of motion  · Respiratory  o Respiratory Effort  o : breathing unlabored  o Inspection of Chest  o : normal appearance  o Auscultation of Lungs  o : no audible wheezing or rales  · Cardiovascular  o Heart  o : regular rate  · Gastrointestinal  o Abdominal Examination  o : soft and non-tender  · Skin and Subcutaneous Tissue  o General Inspection  o : intact, no rashes  · Psychiatric  o General  o : Alert and oriented x3  o Judgement and Insight  o : judgment and insight intact  o Mood and Affect  o : mood normal, affect appropriate  · Right Knee  o Inspection  o : 15 valgus angulation to the knee. Flexion 90; Extension -5; Stable to varus and valgus stress. 1+ effusion stable to anterior and posterior drawer.   · Imaging  o Imaging  o : In Office ProceduresView : LAT/SUNRISE/STANDING Site : right, knee Indication : right knee pain Study : X-rays ordered, taken in the office, and reviewed today. Xray : advance degenerative changes to lateral joint with loss of joint space. Positive joint effusion. Comparative Data : No comparative data found MRI [facility] 08/2020: 1. Tricompartmental osteoarthritis, severe within the lateral compartment with a moderate amount of surrounding reactive edema. An osteochondral defect versus subchondral cyst is present within the lateral tibial plateau with additional subchondral cystic changes and cortical irregularity present within the lateral femoral condyle. Early insufficiency  fractures cannot be excluded. 2. Complex tear of the entire lateral meniscus with significant extrusion in a macerated appearance. 3. Horizontal tear of the body of the medial meniscus. 4. Moderate to large sized joint effusion with a moderate to large sized partially ruptured Baker's cyst..           Assessment  · Primary osteoarthritis of right knee     715.16/M17.11  · Right MMT (medial meniscus tear)     836.0/S83.249A  · Right knee pain, unspecified chronicity     719.46/M25.561      Plan  · Medications  o Medications have been Reconciled  o Transition of Care or Provider Policy  · Instructions  o Reviewed the patient's Past Medical, Social, and Family history as well as the ROS at today's visit, no changes.  o Call or return if worsening symptoms.  o Discussed surgery.  o Risks/benefits discussed with patient including, but not limited to: infection, bleeding, neurovascular damage, malunion, nonunion, aesthetic deformity, need for further surgery, and death.  o Discussed with patient the implant type being used during surgery and patient understands and desires to proceed.  o Surgery pamphlet given.  o Dr. Lance met with the patient, discussed diagnosis and treatment options, that the patient is candidate for right total knee arthroplasty, Dr. Lance reviewed risks, benefits, procedure and recovery with the patient and patient agreed. Patient was advised to get nephrology clearance, cardiac clearance. Follow-up 2 weeks postop. USE ANTIBIOTIC CEMENT IN SURGERY PER DR. LANCE REQUEST.  o Electronically Identified Patient Education Materials Provided Electronically            Electronically Signed by: SHYANNE Alarcon-C -Author on November 6, 2020 12:09:49 PM  Electronically Co-signed by: Tha Lance MD -Reviewer on November 8, 2020 07:24:14 PM

## 2021-05-14 VITALS — BODY MASS INDEX: 38.87 KG/M2 | WEIGHT: 287 LBS | HEART RATE: 78 BPM | OXYGEN SATURATION: 98 % | HEIGHT: 72 IN

## 2021-05-14 VITALS — WEIGHT: 293.31 LBS | HEIGHT: 72 IN | BODY MASS INDEX: 39.73 KG/M2

## 2021-05-14 VITALS — HEART RATE: 76 BPM | WEIGHT: 285 LBS | HEIGHT: 72 IN | OXYGEN SATURATION: 96 % | BODY MASS INDEX: 38.6 KG/M2

## 2021-05-14 VITALS — WEIGHT: 297.25 LBS | HEIGHT: 72 IN | BODY MASS INDEX: 40.26 KG/M2 | HEART RATE: 78 BPM | OXYGEN SATURATION: 98 %

## 2021-05-14 VITALS
WEIGHT: 297 LBS | HEIGHT: 72 IN | SYSTOLIC BLOOD PRESSURE: 179 MMHG | HEART RATE: 70 BPM | DIASTOLIC BLOOD PRESSURE: 74 MMHG | BODY MASS INDEX: 40.23 KG/M2

## 2021-05-14 VITALS — BODY MASS INDEX: 38.6 KG/M2 | HEIGHT: 72 IN | WEIGHT: 285 LBS

## 2021-05-14 NOTE — PROGRESS NOTES
"   Progress Note      Patient Name: Carlos Murphy Jr.   Patient ID: [id number]   Sex: Male   YOB: 1960    Primary Care Provider: Felix Atkinson MD   Referring Provider: Samia Rapp    Visit Date: January 5, 2021    Provider: Tha Parry MD   Location: [facility]   Location Address: [address] [place] [address]  [id number]   Location Phone: [phone]          Chief Complaint  · Right knee pain       History Of Present Illness  Carlos Murphy Jr. is a 60 year old /White male who presents today to [facility]. Patient presents for follow up evaluation of right total knee arthroplasty, 11/18/20. Patient states he has been doing physical therapy 3 times per week, he states it is going well. Patient states that he was stepping down a step a few days ago and had a \"pull/snap\" sensation of the lateral thigh along the IT band. Patient states that he feels a knot in that area with pain. Patient states the pain in the thigh is worse than the knee. Patient denies swelling, bruising or difficulty with range of motion of the knee or thigh. Patient continues to take pain medication with some relief. Patient states his knee is doing well, he continues to gain strength and range of motion.       Past Medical History  Arthritis; Bladder Disorder; Bladder Neck Obstruction; BPH; Chest pain; Chronic Obstructive Pulmonary Disease; Diabetes; Erectile dysfunction; GERD; High blood pressure; High cholesterol; Hypothyroidism; Incomplete bladder emptying; Limb Pain; Limb Swelling; Mood disorder; Prostate cancer screening; Reflux; Shortness Of Air; Urinary retention         Past Surgical History  Back; Colonoscopy; Cystoscopy; Cystoscopy with transurethral resection of prostate (TURP) using button electrode; Gallbladder; Rotator Cuff repair         Medication List  Ambien 10 mg oral tablet; amlodipine oral; aspirin 81 mg oral tablet,delayed release (DR/EC); atenolol 25 mg oral " tablet; Breo Ellipta 200-25 mcg/dose inhalation blister with device; cyclobenzaprine 10 mg oral tablet; diclofenac sodium 75 mg oral tablet,delayed release (DR/EC); Lamictal 200 mg oral tablet; Lipitor 40 mg oral tablet; lisinopril 2.5 mg oral tablet; Lyrica 100 mg oral capsule; Norco 7.5-325 mg oral tablet; Novolin 70/30 100 unit/mL (70-30) subcutaneous suspension; Novolog Flexpen U-100 Insulin 100 unit/mL subcutaneous insulin pen; Ozempic 0.25 mg or 0.5 mg(2 mg/1.5 mL) subcutaneous pen injector; Protonix 40 mg oral granules DR for susp in packet; Prozac 40 mg oral capsule; Seroquel 400 mg oral tablet         Allergy List  NO KNOWN DRUG ALLERGIES       Allergies Reconciled  Family Medical History  Stroke; Family history of colon cancer; -Father's Family History Unknown; -Mother's Family History Unknown; Bladder calculus; Family history of Arthritis         Social History  Alcohol (Current some day); Alcohol Use (Current some day); Caffeine (Current every day); lives with other; Recreational Drug Use (Never); Retired.; Second hand smoke exposure (Current some day); Tobacco (Former)         Review of Systems  · Constitutional  o Denies  o : fever, chills, weight loss  · Cardiovascular  o Denies  o : chest pain, shortness of breath  · Gastrointestinal  o Denies  o : liver disease, heartburn, nausea, blood in stools  · Genitourinary  o Denies  o : painful urination, blood in urine  · Integument  o Denies  o : rash, itching  · Neurologic  o Denies  o : headache, weakness, loss of consciousness  · Musculoskeletal  o Denies  o : painful, swollen joints  · Psychiatric  o Denies  o : drug/alcohol addiction, anxiety, depression      Vitals  Date Time BP Position Site L\R Cuff Size HR RR TEMP (F) WT  HT  BMI kg/m2 BSA m2 O2 Sat FR L/min FiO2 HC       01/05/2021 09:04 AM      78 - R   297lbs 4oz 6'   40.31 2.62 98 %            Physical Examination  · Constitutional  o Appearance  o : well developed, well-nourished, no obvious  deformities present  · Head and Face  o Head  o :   § Inspection  § : normocephalic  o Face  o :   § Inspection  § : no facial lesions  · Eyes  o Conjunctivae  o : conjunctivae normal  o Sclerae  o : sclerae white  · Ears, Nose, Mouth and Throat  o Ears  o :   § External Ears  § : appearance within normal limits  § Hearing  § : intact  o Nose  o :   § External Nose  § : appearance normal  · Neck  o Inspection/Palpation  o : normal appearance  o Range of Motion  o : full range of motion  · Respiratory  o Respiratory Effort  o : breathing unlabored  o Inspection of Chest  o : normal appearance  o Auscultation of Lungs  o : no audible wheezing or rales  · Cardiovascular  o Heart  o : regular rate  · Gastrointestinal  o Abdominal Examination  o : soft and non-tender  · Skin and Subcutaneous Tissue  o General Inspection  o : intact, no rashes  · Psychiatric  o General  o : Alert and oriented x3  o Judgement and Insight  o : judgment and insight intact  o Mood and Affect  o : mood normal, affect appropriate  · Right Knee  o Inspection  o : Incision is well healed, no erythema, no ecchymosis, no effusion, no signs of infection. Flexion 120, Extension -1, stable AtoP, stable varus/valgus stress. Tender to palpation of IT band. Patient able to hold straight leg raise. 5/5 strength.           Assessment  · Aftercare following surgery of right TKA, 11/18/20     V54.81  · Right knee pain, unspecified chronicity     719.46/M25.561      Plan  · Medications  o Medications have been Reconciled  o Transition of Care or Provider Policy  · Instructions  o Reviewed the patient's Past Medical, Social, and Family history as well as the ROS at today's visit, no changes.  o Call or return if worsening symptoms.  o Follow up in 6 weeks.  o The above service was scribed by Tristen Kulkarni PA-C on my behalf and I attest to the accuracy of the note. jsb  o Advised patient to continue physical therapy, patient was advised to take NSAID and  Diclofenac prescription was given. Follow up in 6 weeks with xrays.             Electronically Signed by: Mark Kulkarni PA-C -Author on January 5, 2021 11:26:27 AM  Electronically Co-signed by: Tha Parry MD -Reviewer on January 5, 2021 07:05:31 PM

## 2021-05-14 NOTE — PROGRESS NOTES
Progress Note      Patient Name: Carlos Murphy Jr.   Patient ID: [id number]   Sex: Male   YOB: 1960    Primary Care Provider: Felix Atkinson MD   Referring Provider: Samia Rapp    Visit Date: February 18, 2021    Provider: Tha Parry MD   Location: [facility]   Location Address: [address] [place] [address]  [id number]   Location Phone: [phone]          Chief Complaint  · Right knee pain      History Of Present Illness  Carlos Murphy Jr. is a 60 year old /White male who presents today to [facility].      The patient presents here today for follow up evaluation of his right knee. The patient is S/P Right Total Knee Arthroplasty, 11/18/2020. The patients main complaint of the knee is that it is knocking.  He says it sometimes the knee feels like it wants to give on him. He has had a few falls on the knee. However he has self continues physical therapy because he felt he could do the same therapy at home.       Past Medical History  Arthritis; Bladder Disorder; Bladder Neck Obstruction; BPH; Chest pain; Chronic Obstructive Pulmonary Disease; Diabetes; Erectile dysfunction; GERD; High blood pressure; High cholesterol; Hypothyroidism; Incomplete bladder emptying; Limb Pain; Limb Swelling; Mood disorder; Prostate cancer screening; Reflux; Shortness Of Air; Urinary retention         Past Surgical History  Back; Colonoscopy; Cystoscopy; Cystoscopy with transurethral resection of prostate (TURP) using button electrode; EGD (Endoscopy); Gallbladder; Rotator Cuff repair         Medication List  Ambien 10 mg oral tablet; amlodipine oral; aspirin 81 mg oral tablet,delayed release (DR/EC); atenolol 25 mg oral tablet; Breo Ellipta 200-25 mcg/dose inhalation blister with device; cyclobenzaprine 10 mg oral tablet; diclofenac sodium 75 mg oral tablet,delayed release (DR/EC); Lamictal 200 mg oral tablet; Lipitor 40 mg oral tablet; lisinopril 2.5 mg oral  tablet; Lyrica 100 mg oral capsule; Novolog Flexpen U-100 Insulin 100 unit/mL subcutaneous insulin pen; Ozempic 0.25 mg or 0.5 mg(2 mg/1.5 mL) subcutaneous pen injector; Protonix 40 mg oral granules DR for susp in packet; Prozac 40 mg oral capsule; Seroquel 400 mg oral tablet; Suprep Bowel Prep Kit 17.5-3.13-1.6 gram oral recon soln         Allergy List  NO KNOWN DRUG ALLERGIES       Allergies Reconciled  Family Medical History  Stroke; Family history of colon cancer; -Father's Family History Unknown; -Mother's Family History Unknown; Bladder calculus; Family history of Arthritis         Social History  Alcohol (Current some day); Alcohol Use (Current some day); Caffeine (Current every day); lives with other; Recreational Drug Use (Never); Retired.; Second hand smoke exposure (Current some day); Tobacco (Former)         Review of Systems  · Constitutional  o Denies  o : fever, chills, weight loss  · Cardiovascular  o Denies  o : chest pain, shortness of breath  · Gastrointestinal  o Denies  o : liver disease, heartburn, nausea, blood in stools  · Genitourinary  o Denies  o : painful urination, blood in urine  · Integument  o Denies  o : rash, itching  · Neurologic  o Denies  o : headache, weakness, loss of consciousness  · Musculoskeletal  o Denies  o : painful, swollen joints  · Psychiatric  o Denies  o : drug/alcohol addiction, anxiety, depression      Vitals  Date Time BP Position Site L\R Cuff Size HR RR TEMP (F) WT  HT  BMI kg/m2 BSA m2 O2 Sat FR L/min FiO2        02/18/2021 10:34 AM         293lbs 5oz 6'   39.78 2.6             Physical Examination  · Constitutional  o Appearance  o : well developed, well-nourished, no obvious deformities present  · Head and Face  o Head  o :   § Inspection  § : normocephalic  o Face  o :   § Inspection  § : no facial lesions  · Eyes  o Conjunctivae  o : conjunctivae normal  o Sclerae  o : sclerae white  · Ears, Nose, Mouth and Throat  o Ears  o :   § External Ears  § :  appearance within normal limits  § Hearing  § : intact  o Nose  o :   § External Nose  § : appearance normal  · Neck  o Inspection/Palpation  o : normal appearance  o Range of Motion  o : full range of motion  · Respiratory  o Respiratory Effort  o : breathing unlabored  o Inspection of Chest  o : normal appearance  o Auscultation of Lungs  o : no audible wheezing or rales  · Cardiovascular  o Heart  o : regular rate  · Gastrointestinal  o Abdominal Examination  o : soft and non-tender  · Skin and Subcutaneous Tissue  o General Inspection  o : intact, no rashes  · Psychiatric  o General  o : Alert and oriented x3  o Judgement and Insight  o : judgment and insight intact  o Mood and Affect  o : mood normal, affect appropriate  · Right Knee  o Inspection  o : Stable to varus and valgus stress. Stable to anterior and posterior drawer. 0 Extension 120 Flexion. Ambulates with mild antalgic gait. Neurovascularly intact. Small abrasion over the anterior knee. Mild swelling. No effusion noted. Scars well healed.   · In Office Procedures  o View  o : AP/LATERAL/SUNRISE  o Site  o : right, knee  o Indication  o : right knee pain  o Study  o : X-rays ordered, taken in the office, and reviewed today.  o Xray  o : well aligned right Total Knee Arthroplasty. No signs of loosening or hardware failure.  o Comparative Data  o : No comparative data found          Assessment  · Aftercare;following right Total Knee Arthroplasty     V54.81/Z47.1  · Right knee pain, unspecified chronicity     719.46/M25.561      Plan  · Orders  o Knee (Right) [facility] Preferred View ([id number]) - 719.46/M25.561 - 02/18/2021  · Medications  o Medications have been Reconciled  o Transition of Care or Provider Policy  · Instructions  o Reviewed the patient's Past Medical, Social, and Family history as well as the ROS at today's visit, no changes.  o Call or return if worsening symptoms.  o This note was transcribed by Samantha Head. italia.  o Discussed the  treatment plan with the patient. He overall has a well appearing knee replacement on x-ray and exam. I recommend continue strengthening lower extremity, knee braces given today for support as needed. Plan to follow up in 3 months to recheck with repeat x-rays.   o Electronically Identified Patient Education Materials Provided Electronically            Electronically Signed by: Samantha Head MA -Author on February 22, 2021 09:43:33 AM  Electronically Co-signed by: Tha Parry MD -Reviewer on February 22, 2021 09:55:09 PM

## 2021-05-15 VITALS
SYSTOLIC BLOOD PRESSURE: 126 MMHG | WEIGHT: 300.25 LBS | BODY MASS INDEX: 40.67 KG/M2 | DIASTOLIC BLOOD PRESSURE: 60 MMHG | HEIGHT: 72 IN

## 2021-05-16 VITALS — HEIGHT: 72 IN | BODY MASS INDEX: 40.63 KG/M2 | RESPIRATION RATE: 16 BRPM | WEIGHT: 300 LBS

## 2021-05-16 VITALS
DIASTOLIC BLOOD PRESSURE: 98 MMHG | WEIGHT: 300 LBS | SYSTOLIC BLOOD PRESSURE: 176 MMHG | BODY MASS INDEX: 40.63 KG/M2 | HEIGHT: 72 IN | RESPIRATION RATE: 18 BRPM

## 2021-05-16 VITALS — BODY MASS INDEX: 41.04 KG/M2 | WEIGHT: 303 LBS | RESPIRATION RATE: 15 BRPM | HEIGHT: 72 IN

## 2021-05-16 VITALS — BODY MASS INDEX: 41.58 KG/M2 | WEIGHT: 307 LBS | RESPIRATION RATE: 14 BRPM | HEIGHT: 72 IN

## 2021-07-08 ENCOUNTER — TELEPHONE (OUTPATIENT)
Dept: PULMONOLOGY | Facility: CLINIC | Age: 61
End: 2021-07-08

## 2021-07-13 ENCOUNTER — OFFICE VISIT (OUTPATIENT)
Dept: WOUND CARE | Facility: HOSPITAL | Age: 61
End: 2021-07-13

## 2021-07-13 VITALS
BODY MASS INDEX: 39.28 KG/M2 | WEIGHT: 290 LBS | TEMPERATURE: 98.7 F | OXYGEN SATURATION: 96 % | SYSTOLIC BLOOD PRESSURE: 128 MMHG | HEIGHT: 72 IN | DIASTOLIC BLOOD PRESSURE: 86 MMHG | HEART RATE: 60 BPM | RESPIRATION RATE: 18 BRPM

## 2021-07-13 DIAGNOSIS — E11.42 DIABETIC PERIPHERAL NEUROPATHY (HCC): ICD-10-CM

## 2021-07-13 DIAGNOSIS — E11.621 TYPE 2 DIABETES MELLITUS WITH FOOT ULCER, WITH LONG-TERM CURRENT USE OF INSULIN (HCC): ICD-10-CM

## 2021-07-13 DIAGNOSIS — L97.509 TYPE 2 DIABETES MELLITUS WITH FOOT ULCER, WITH LONG-TERM CURRENT USE OF INSULIN (HCC): ICD-10-CM

## 2021-07-13 DIAGNOSIS — E11.621 ULCER OF TOE DUE TO TYPE 2 DIABETES MELLITUS (HCC): Primary | ICD-10-CM

## 2021-07-13 DIAGNOSIS — Z79.4 TYPE 2 DIABETES MELLITUS WITH FOOT ULCER, WITH LONG-TERM CURRENT USE OF INSULIN (HCC): ICD-10-CM

## 2021-07-13 DIAGNOSIS — L97.509 ULCER OF TOE DUE TO TYPE 2 DIABETES MELLITUS (HCC): Primary | ICD-10-CM

## 2021-07-13 PROCEDURE — G0463 HOSPITAL OUTPT CLINIC VISIT: HCPCS | Performed by: EMERGENCY MEDICINE

## 2021-07-13 PROCEDURE — 99203 OFFICE O/P NEW LOW 30 MIN: CPT | Performed by: EMERGENCY MEDICINE

## 2021-07-13 RX ORDER — SEMAGLUTIDE 1.34 MG/ML
5 INJECTION, SOLUTION SUBCUTANEOUS WEEKLY
COMMUNITY

## 2021-07-13 RX ORDER — HYDROXYZINE 50 MG/1
50 TABLET, FILM COATED ORAL 3 TIMES DAILY
COMMUNITY
Start: 2021-06-13

## 2021-07-13 RX ORDER — ZOLPIDEM TARTRATE 10 MG/1
10 TABLET ORAL NIGHTLY
COMMUNITY
Start: 2021-06-18

## 2021-07-13 RX ORDER — GLIPIZIDE 5 MG/1
5 TABLET, FILM COATED, EXTENDED RELEASE ORAL 2 TIMES DAILY
COMMUNITY
Start: 2021-06-24

## 2021-07-13 RX ORDER — ATENOLOL 25 MG/1
25 TABLET ORAL DAILY
COMMUNITY

## 2021-07-13 RX ORDER — FLUOXETINE HYDROCHLORIDE 40 MG/1
30 CAPSULE ORAL 2 TIMES DAILY
COMMUNITY
Start: 2021-06-24 | End: 2022-04-13 | Stop reason: SDUPTHER

## 2021-07-13 RX ORDER — LEVETIRACETAM 500 MG/1
500 TABLET ORAL 2 TIMES DAILY
COMMUNITY
End: 2022-04-22

## 2021-07-13 RX ORDER — QUETIAPINE FUMARATE 300 MG/1
600 TABLET, FILM COATED ORAL NIGHTLY
COMMUNITY
Start: 2021-06-24

## 2021-07-13 RX ORDER — FUROSEMIDE 20 MG/1
20 TABLET ORAL DAILY
COMMUNITY
Start: 2021-06-01

## 2021-07-13 RX ORDER — CEPHALEXIN 500 MG/1
CAPSULE ORAL
COMMUNITY
Start: 2021-07-07 | End: 2022-04-22

## 2021-07-13 RX ORDER — LISINOPRIL 2.5 MG/1
30 TABLET ORAL DAILY
COMMUNITY
End: 2022-04-13 | Stop reason: SDUPTHER

## 2021-07-13 RX ORDER — INSULIN ASPART 100 [IU]/ML
INJECTION, SOLUTION INTRAVENOUS; SUBCUTANEOUS
COMMUNITY
End: 2022-05-31

## 2021-07-13 RX ORDER — PANTOPRAZOLE SODIUM 40 MG/1
40 TABLET, DELAYED RELEASE ORAL 2 TIMES DAILY
COMMUNITY

## 2021-07-13 RX ORDER — PREGABALIN 100 MG/1
100 CAPSULE ORAL 3 TIMES DAILY
COMMUNITY
Start: 2021-07-02

## 2021-07-13 RX ORDER — LAMOTRIGINE 200 MG/1
100 TABLET ORAL 2 TIMES DAILY
COMMUNITY
Start: 2021-06-24 | End: 2022-04-22

## 2021-07-13 RX ORDER — ASPIRIN 81 MG/1
81 TABLET ORAL DAILY
COMMUNITY
End: 2022-04-25 | Stop reason: HOSPADM

## 2021-07-13 RX ORDER — ATORVASTATIN CALCIUM 40 MG/1
40 TABLET, FILM COATED ORAL NIGHTLY
COMMUNITY

## 2021-07-13 NOTE — SIGNIFICANT NOTE
Epithelial %: 1-25%    Exposed Bone: no    Exposed Tendon: no    Impression:  FIRST VISIT    Short Term Goals: INCREASE GRANULATION, DECREASE SIZE

## 2021-07-13 NOTE — PROGRESS NOTES
Chief Complaint  Wound Check (LEFT GREAT TOE DM ULCER,  THIS AM)    Subjective        History of Present Illness    Patient is a very pleasant 60-year-old gentleman with a history of diabetes, COPD, seizures, bipolar disorder, and schizophrenia.  He presents to our office for evaluation of a wound on the distal left great toe.  He has type 2 diabetes that is controlled with both oral medication and insulin.  He says he has a lot of problems with his feet and just saw a podiatrist yesterday for the first time (Dr. Cox).     He states that about 16 days ago he was weed eating and wearing his boots.  He developed a blister on the distal end of his left great toe.  He says he did not realize it was there for couple days and then the blister ruptured and developed an open sore.  He does have neuropathy in his feet but says that this area is still painful as he has some sensation in certain areas.  He has been using triple antibiotic ointment and a gauze dressing over it and says he does this twice a day.  He denies any fevers, chills, sweats, or body aches.  He is scheduled to see podiatry again next week.    Patient has no known allergies.      Current Outpatient Medications:   •  levETIRAcetam (KEPPRA) 500 MG tablet, Take 500 mg by mouth 2 (Two) Times a Day., Disp: , Rfl:   •  aspirin (aspirin) 81 MG EC tablet, Take 81 mg by mouth Daily., Disp: , Rfl:   •  atenolol (TENORMIN) 25 MG tablet, Take 25 mg by mouth Daily., Disp: , Rfl:   •  atorvastatin (LIPITOR) 40 MG tablet, Take 40 mg by mouth Daily., Disp: , Rfl:   •  cephalexin (KEFLEX) 500 MG capsule, , Disp: , Rfl:   •  FLUoxetine (PROzac) 40 MG capsule, Take 30 mg by mouth 2 (two) times a day., Disp: , Rfl:   •  furosemide (LASIX) 20 MG tablet, Take 20 mg by mouth Daily., Disp: , Rfl:   •  glipizide (GLUCOTROL XL) 5 MG ER tablet, Take 5 mg by mouth Daily., Disp: , Rfl:   •  hydrOXYzine (ATARAX) 50 MG tablet, Take 50 mg by mouth 3 (Three) Times a Day., Disp: ,  Rfl:   •  insulin aspart (NovoLOG FlexPen) 100 UNIT/ML solution pen-injector sc pen, Novolog Flexpen U-100 Insulin 100 unit/mL subcutaneous insulin pen inject by subcutaneous route per prescriber's instructions. Insulin dosing requires individualization.   Active, Disp: , Rfl:   •  lamoTRIgine (LaMICtal) 200 MG tablet, Take 100 mg by mouth 2 (two) times a day., Disp: , Rfl:   •  Levalbuterol Tartrate (XOPENEX HFA IN), , Disp: , Rfl:   •  lisinopril (PRINIVIL,ZESTRIL) 2.5 MG tablet, Take 30 mg by mouth Daily., Disp: , Rfl:   •  pantoprazole (PROTONIX) 40 MG EC tablet, Take 40 mg by mouth Daily., Disp: , Rfl:   •  pregabalin (LYRICA) 100 MG capsule, , Disp: , Rfl:   •  QUEtiapine (SEROquel) 300 MG tablet, Take 600 mg by mouth Every Night., Disp: , Rfl:   •  Semaglutide,0.25 or 0.5MG/DOS, (Ozempic, 0.25 or 0.5 MG/DOSE,) 2 MG/1.5ML solution pen-injector, Inject 5 mg under the skin into the appropriate area as directed 1 (One) Time Per Week., Disp: , Rfl:   •  silver sulfadiazine (Silvadene) 1 % cream, Apply  topically to the appropriate area as directed 2 (Two) Times a Day., Disp: 400 g, Rfl: 3  •  zolpidem (AMBIEN) 10 MG tablet, Take 10 mg by mouth Every Night., Disp: , Rfl:     Past Medical History:   Diagnosis Date   • Bipolar disorder (CMS/HCC)    • COPD (chronic obstructive pulmonary disease) (CMS/HCC)    • Diabetes (CMS/HCC)    • Hx of schizophrenia    • Hyperlipidemia    • Hypertension    • Neuropathy    • Seizures (CMS/HCC)     3 MONTHS LAST SEIZURE (2021)   • Varicose vein of leg      Past Surgical History:   Procedure Laterality Date   • CHOLECYSTECTOMY     • LUMBAR DISC SURGERY       Social History     Socioeconomic History   • Marital status:      Spouse name: Not on file   • Number of children: Not on file   • Years of education: Not on file   • Highest education level: Not on file   Tobacco Use   • Smoking status: Former Smoker     Quit date:      Years since quittin.5   • Smokeless  "tobacco: Never Used   Substance and Sexual Activity   • Alcohol use: Yes     Comment: OCCASIONAL   • Drug use: Never     Family History   Problem Relation Age of Onset   • Kidney disease Mother    • Heart disease Father        Objective     Vitals:    07/13/21 1334   BP: 128/86   BP Location: Right arm   Patient Position: Sitting   Pulse: 60   Resp: 18   Temp: 98.7 °F (37.1 °C)   TempSrc: Temporal   SpO2: 96%   Weight: 132 kg (290 lb)   Height: 182.9 cm (72\")   PainSc:   6   PainLoc: Toe     Body mass index is 39.33 kg/m².    STEADI Fall Risk Assessment has not been completed.       Physical Exam     NAD, overweight, Vitals WNL  No respiratory distress  Pulse RRR, good palpable pulses BLE with brisk cap refill  Skin: warm, pink, see wound photos  Musculoskeletal: Wound distal left great toe and smaller wound on lateral great toe along nailbed (nail previously removed) suspected to be from pressure from his nail on the second toe, 2nd toenail discolored, likely from trauma at time of initial ulceration on great toe but he is not sure                Result Review :  The following data was reviewed by: Carol Kang MD on 07/13/2021:    Wound Avatar Documentation    Active Wound LDAs             Wound 07/13/21 1339 Left anterior great toe    Placement date:   07/13/21   - 07/13/21 1339       Placement time:   1339   - 07/13/21 1339 Days:  less than 1    Present on Hospital Admission:   Y   - 07/13/21 1339 Side:   Left   - 07/13/21 1339   Orientation:   anterior   - 07/13/21 1339 Location:   great toe   - 07/13/21 1339   Stage, Pressure Injury :   Stage 2   - 07/13/21 1339          Assessments         07/13/21 1400   07/13/21 1348                   (Flowsheet Note)         Wound Image   --   Images linked: 1   - 07/13/21 1351            Dressing Appearance   --    moist drainage;intact   - 07/13/21 1351         Closure   --    --          Base   --    --          Black (%), Wound Tissue Color   --    " --          Red (%), Wound Tissue Color   --    100   - 07/13/21 1351         Yellow (%), Wound Tissue Color   --    --          Periwound   --    --          Periwound Temperature   --    warm   - 07/13/21 1351         Periwound Skin Turgor   --    soft   - 07/13/21 1351         Edges   --    open   - 07/13/21 1351         Wound Length (cm)   --    1.3 cm   - 07/13/21 1351         Wound Width (cm)   --    1.4 cm   - 07/13/21 1351         Wound Depth (cm)   --    0.1 cm   - 07/13/21 1351         Tunneling [Depth (cm)/Location]   --    --          Undermining [Depth (cm)/Location]   --    --          Drainage Characteristics/Odor   --    serous   - 07/13/21 1351         Drainage Amount   --    small   - 07/13/21 1351         Care, Wound   --    cleansed with;sterile normal saline   - 07/13/21 1351         Dressing Care   dressing applied;antimicrobial agent applied;gauze   - 07/13/21 1418   --          Periwound Care   --    --          Adhesive Closure Strips   --    --          Number of Adhesive Closure Strips   --    --          Sutures Removed Intact   --    --          Number of Sutures Removed   --    --          Staples Removed Intact   --    --          Number of Staples Removed   --    --          Wound Output (mL)   --    --                            Wound 07/13/21 1347 Left lateral great toe    Placement date:   07/13/21   - 07/13/21 1347       Placement time:   1347   - 07/13/21 1347 Days:  less than 1    Present on Hospital Admission:   Y   - 07/13/21 1347 Side:   Left   - 07/13/21 1347   Orientation:   lateral   - 07/13/21 1347 Location:   great toe   - 07/13/21 1347       Assessments         07/13/21 1400   07/13/21 1351                   (Flowsheet Note)         Wound Image   --   Images linked: 1   - 07/13/21 1352            Dressing Appearance   --    intact;moist drainage   - 07/13/21 1352         Closure   --    --          Base   --    --          Black  (%), Wound Tissue Color   --    --          Red (%), Wound Tissue Color   --    100   - 07/13/21 1352         Yellow (%), Wound Tissue Color   --    --          Periwound   --    --          Periwound Temperature   --    warm   - 07/13/21 1352         Periwound Skin Turgor   --    soft   - 07/13/21 1352         Edges   --    open   - 07/13/21 1352         Wound Length (cm)   --    0.6 cm   - 07/13/21 1352         Wound Width (cm)   --    0.3 cm   - 07/13/21 1352         Wound Depth (cm)   --    0.1 cm   - 07/13/21 1352         Tunneling [Depth (cm)/Location]   --    --          Undermining [Depth (cm)/Location]   --    --          Drainage Characteristics/Odor   --    serous   - 07/13/21 1352         Drainage Amount   --    small   - 07/13/21 1352         Care, Wound   --    cleansed with;sterile normal saline   - 07/13/21 1352         Dressing Care   dressing applied;antimicrobial agent applied;gauze   - 07/13/21 1418   --          Periwound Care   --    --          Adhesive Closure Strips   --    --          Number of Adhesive Closure Strips   --    --          Sutures Removed Intact   --    --          Number of Sutures Removed   --    --          Staples Removed Intact   --    --          Number of Staples Removed   --    --          Wound Output (mL)   --    --                         User Key  (r) = Recorded By, (t) = Taken By, (c) = Cosigned By    Initials Name Effective Dates Provider Type Discipline    Shelly Will, RN 06/09/21 -  Registered Nurse --    Rubi Turner RN 06/09/21 -  Registered Nurse --                         Assessment and Plan   Diagnoses and all orders for this visit:    1. Ulcer of toe due to type 2 diabetes mellitus (CMS/[facility]) (Primary)  -     silver sulfadiazine (Silvadene) 1 % cream; Apply  topically to the appropriate area as directed 2 (Two) Times a Day.  Dispense: 400 g; Refill: 3    2. Type 2 diabetes mellitus with foot ulcer, with long-term current  use of insulin (CMS/HCC)    3. Diabetic peripheral neuropathy (CMS/HCC)    Due to the patient's wound and foot deformities I discussed the possibility of prescription diabetic footwear but he is not amenable to this at this time due to having to pay out of his pocket.  Insurance will not cover it for him.  Wound care as above and he is to follow-up with podiatry next week as scheduled and return here for a recheck in 2 weeks.  He should call our office for any new, returning, or worsening symptoms or concerns.        Follow Up   Return in about 2 weeks (around 7/27/2021) for Recheck.      Carol Kang MD

## 2021-07-13 NOTE — SIGNIFICANT NOTE
Epithelial %: 0%    Exposed Bone: no    Exposed Tendon: no    Impression:  FIRST VISIT    Short Term Goals: INCREASE GRANULATION, DECREASE SIZE    WOUND STARTED APPROXIMATELY 16 DAYS AGO, HAS SEEN DR. LAWRENCE (PODIATRY). CURRENTLY CLEANSING WITH PEROXIDE AND USING TOPICAL TRIPLE ANTIBIOTIC OINTMENT.

## 2021-11-24 ENCOUNTER — TREATMENT (OUTPATIENT)
Dept: PHYSICAL THERAPY | Facility: CLINIC | Age: 61
End: 2021-11-24

## 2021-11-24 ENCOUNTER — TRANSCRIBE ORDERS (OUTPATIENT)
Dept: PHYSICAL THERAPY | Facility: CLINIC | Age: 61
End: 2021-11-24

## 2021-11-24 DIAGNOSIS — M14.672 CHARCOT ANKLE, LEFT: Primary | ICD-10-CM

## 2021-11-24 DIAGNOSIS — M25.572 CHRONIC PAIN OF LEFT ANKLE: ICD-10-CM

## 2021-11-24 DIAGNOSIS — G89.29 CHRONIC PAIN OF LEFT ANKLE: ICD-10-CM

## 2021-11-24 DIAGNOSIS — E11.610 CHARCOT FOOT DUE TO DIABETES MELLITUS (HCC): Primary | ICD-10-CM

## 2021-11-24 PROCEDURE — 97161 PT EVAL LOW COMPLEX 20 MIN: CPT | Performed by: PHYSICAL THERAPIST

## 2021-11-24 PROCEDURE — 97110 THERAPEUTIC EXERCISES: CPT | Performed by: PHYSICAL THERAPIST

## 2021-11-24 NOTE — PROGRESS NOTES
Physical Therapy Initial Evaluation and Plan of Care    Patient: Carlos Murphy Jr.   : 1960  Diagnosis/ICD-10 Code:  Charcot ankle, left [M14.672]  Referring practitioner: Alvaro Pimentel MD  Date of Initial Visit: 2021  Today's Date: 2021  Patient seen for 1 sessions           Subjective Questionnaire: LEFS: 15/80 = 19%      Subjective Evaluation    History of Present Illness  Mechanism of injury: Pt reports he fell in 2021 and broke 4 bones in his foot, unsure of which bones he broke. States due to insurance issues, he was unable to get medical help therefore his bones healed in misalignment. Pt reports since then he has had difficulty walking due to pain.       Patient Occupation: Disabled Quality of life: excellent    Pain  Current pain ratin  At best pain rating: 10 (Walking)  At worst pain ratin (Sleeping)  Location: L foot  Quality: dull ache  Relieving factors: rest  Aggravating factors: ambulation and stairs    Patient Goals  Patient goals for therapy: decreased pain, improved balance, increased motion and increased strength             Objective          Palpation     Additional Palpation Details  Pt is tender to all palpation on L foot.    Joint Play     Additional Joint Play Details  Unable to assess joint play due to patient's sensitivity to light movement of L foot    Strength/Myotome Testing     Left Ankle/Foot   Dorsiflexion: 3  Plantar flexion: 3  Inversion: 3  Eversion: 3    Right Ankle/Foot   Dorsiflexion: 5  Plantar flexion: 5  Inversion: 5  Eversion: 5    Additional Strength Details  Unable to provide resistance to L foot due to pain    Tests     Additional Tests Details  Unable to perform special tests due to pain    Ambulation     Observational Gait   Decreased left stance time and left step length.   Base of support: increased    Additional Observational Gait Details  Pt has charcot foot, therefore patient has bilateral ER with ambulation. Pt has  no heel strike with gait bilaterally, decrease stance time on L LE, and bilateral inversion.    Functional Assessment     Single Leg Stance   Left: 0 seconds     General Comments     Ankle/Foot Comments   Pt is hypersensitive to L foot and unable to perform several special tests or palpation due to pain.       See Exercise, Manual, and Modality Logs for complete treatment.       Assessment & Plan     Assessment  Impairments: abnormal gait, abnormal or restricted ROM, activity intolerance, impaired balance, impaired physical strength, lacks appropriate home exercise program and pain with function  Functional Limitations: carrying objects, walking, uncomfortable because of pain and standing  Barriers to therapy: Pain, limited ROM, limited strength  Prognosis: good    Goals  Plan Goals: 1. The patient has limited ROM for the left ankle.    LTG 1: 12 weeks: In order to allow the patient greater ease with forward, lateral, and diagonal mobility the patient will demonstrate improved ROM of the left ankle as follows: 5 degrees of dorsiflexion, 45 degrees of plantarflexion, and 15 degrees of inversion.    STG 1a: 6 weeks: The patient will demonstrate improved ROM of the left ankle as follows: 3 degrees of dorsiflexion, 35 degrees of plantarflexion, and 10 degrees of inversion.  STATUS: New    2. The patient has limited strength of the left ankle.    LTG 2: 12 weeks: In order to provide greater joint stability of the left ankle the patient will demonstrate improved strength of the left ankle as follows: 5/5 dorsiflexion, 5/5 inversion, 5/5 eversion, and 5/5 plantar flexion.      STG 2a: 6 weeks: The patient will demonstrate improved strength of the left ankle as follows: 4/5 dorsiflexion, 4/5 inversion, 4/5 eversion, and 4/5 plantar flexion.    3. The patient has gait dysfunction.    LTG 3: 12 weeks: The patient will ambulate without assistive device, independently, for community distances with minimal limp and no loss of  balance to the left lower extremity in order to improve mobility and allow patient to perform activities such as grocery shopping with greater ease.    4. The patient complains of left ankle pain.    LTG 4: 12 weeks: The patient will report a pain rating of 4/10 or better in order to improve tolerance to activities of daily living and improve sleep quality.      STG 4a: 6 weeks: The patient will report a pain rating of 7/10 or better.        LTG 5b: 12 weeks: The patient will demonstrate 30% limitation on Lower Extremity Functional Scale.       STG 5a: 6 weeks: The patient will demonstrate 50% limitation on the Lower Extremity Functional Scale.      Plan  Therapy options: will be seen for skilled therapy services  Planned modality interventions: cryotherapy and TENS  Planned therapy interventions: manual therapy, ADL retraining, balance/weight-bearing training, neuromuscular re-education, therapeutic activities, stretching, strengthening, soft tissue mobilization, flexibility, functional ROM exercises, gait training, home exercise program, IADL retraining and joint mobilization  Frequency: 3x week  Duration in weeks: 12  Treatment plan discussed with: patient  Plan details: Will address patient's limited ROM, strength, balance deficits in order to improve patient's pain.        History # of Personal Factors and/or Comorbidities: LOW (0)  Examination of Body System(s): # of elements: LOW (1-2)  Clinical Presentation: STABLE   Clinical Decision Making: LOW       Timed:         Manual Therapy:    0     mins  46626;     Therapeutic Exercise:    8    mins  22317;     Neuromuscular Hallie:    0    mins  25811;    Therapeutic Activity:     0     mins  61409;     Gait Trainin     mins  26070;     Ultrasound:     0     mins  17865;    Ionto                               0    mins   74548  Self pay                         0     mins [email or web address]    Un-Timed:  Electrical Stimulation:    0     mins  80552 (  )  Canalith Repos    0     mins 19798  Dry Needling     0     mins self-pay  Traction     0     mins 57325  Low Eval     0     Mins  79133  Mod Eval     0     Mins  05075  High Eval                       0     Mins  85359  Self Pay Eval                 0     PTSP1   Re-Eval                           0    mins  15171        Timed Treatment:   8   mins   Total Treatment:     40   mins    PT SIGNATURE: Electronically signed by Kavita Gonzales, MINE  [place] LICENSE: [id number]    DATE TREATMENT INITIATED: 11/24/2021    Initial Certification  Certification Period: 11/24/2021 thru 2/21/2022  I certify that the therapy services are furnished while this patient is under my care.  The services outlined above are required by this patient, and will be reviewed every 90 days.     PHYSICIAN: Alvaro Pimentel MD      DATE:     Please sign and return via fax to [phone] Thank you, [facility].

## 2021-12-06 ENCOUNTER — TREATMENT (OUTPATIENT)
Dept: PHYSICAL THERAPY | Facility: CLINIC | Age: 61
End: 2021-12-06

## 2021-12-06 DIAGNOSIS — M14.672 CHARCOT ANKLE, LEFT: Primary | ICD-10-CM

## 2021-12-06 DIAGNOSIS — G89.29 CHRONIC PAIN OF LEFT ANKLE: ICD-10-CM

## 2021-12-06 DIAGNOSIS — M25.572 CHRONIC PAIN OF LEFT ANKLE: ICD-10-CM

## 2021-12-06 PROCEDURE — 97110 THERAPEUTIC EXERCISES: CPT | Performed by: PHYSICAL THERAPIST

## 2021-12-06 PROCEDURE — 97140 MANUAL THERAPY 1/> REGIONS: CPT | Performed by: PHYSICAL THERAPIST

## 2021-12-06 NOTE — PROGRESS NOTES
Physical Therapy Daily Treatment Note      Patient: Carlos Murphy Jr.   : 1960  Referring practitioner: Alvaro Pimetnel MD  Date of Initial Visit: Type: THERAPY  Noted: 2021  Today's Date: 2021  Patient seen for 2 sessions           Subjective.  Pt presents to clinic with antalgic gait and L foot eversion.  Pt reported L foot pain 9/10 and if he could afford it he would go to the hospital.           Objective   See Exercise, Manual, and Modality Logs for complete treatment.       Assessment & Plan     Assessment    Assessment details: Pt was observed performing HEP and has minimal ankle motion and was unable to perform towel scrunches.  Pt was encouraged to continue with HEP.  Pt is able to perform windshield wiper eversion to neutral.  Pt c/o pain with all inversion past current range.  P        Visit Diagnoses:    ICD-10-CM ICD-9-CM   1. Charcot ankle, left  M14.672 094.0     713.5   2. Chronic pain of left ankle  M25.572 719.47    G89.29 338.29       Progress per Plan of Care and Progress strengthening /stabilization /functional activity           Timed:  Manual Therapy:    10     mins  45617;  Therapeutic Exercise:    18     mins  18818;     Neuromuscular Hallie:        mins  75438;    Therapeutic Activity:          mins  16923;     Gait Training:           mins  25664;     Ultrasound:          mins  85506;    Electrical Stimulation:         mins  73934 ( );  Aquatic Therapy          mins  18581    Untimed:  Electrical Stimulation:         mins  19698 ( );  Mechanical Traction:         mins  43365;     Timed Treatment:   28   mins   Total Treatment:     28   mins    Electronically signed    Tamika Mcwilliams PTA  Physical Therapist Assistant    [place] license: [id number]

## 2021-12-13 ENCOUNTER — TREATMENT (OUTPATIENT)
Dept: PHYSICAL THERAPY | Facility: CLINIC | Age: 61
End: 2021-12-13

## 2021-12-13 DIAGNOSIS — M14.672 CHARCOT ANKLE, LEFT: Primary | ICD-10-CM

## 2021-12-13 DIAGNOSIS — M25.572 CHRONIC PAIN OF LEFT ANKLE: ICD-10-CM

## 2021-12-13 DIAGNOSIS — G89.29 CHRONIC PAIN OF LEFT ANKLE: ICD-10-CM

## 2021-12-13 PROCEDURE — 97110 THERAPEUTIC EXERCISES: CPT | Performed by: PHYSICAL THERAPIST

## 2021-12-13 PROCEDURE — 97140 MANUAL THERAPY 1/> REGIONS: CPT | Performed by: PHYSICAL THERAPIST

## 2021-12-13 NOTE — PROGRESS NOTES
Physical Therapy Daily Progress Note    VISIT#: 3    Subjective   Carlos Murphy reports 9/10 pain in L foot. Pt reports he was sore after last therapy session in the bottom of his foot.       Objective     See Exercise, Manual, and Modality Logs for complete treatment.     Assessment/Plan     Initiated ankle 4-way and patient able to perform but reported pain in the bottom of his foot with exercise. Performed gentle PROM of ankle and patient had difficulty tolerating PROM of ankle. Added tandem balance and patient tolerated well.      Progress strengthening /stabilization /functional activity            Timed:                 Manual Therapy:    12     mins  89914;     Therapeutic Exercise:    18     mins  44599;     Neuromuscular Hallie:    3    mins  87686;    Therapeutic Activity:     0     mins  76844;     Gait Trainin     mins  34375;     Ultrasound:     0     mins  81527;    Ionto                               0    mins   49058  Self pay                         0     mins [email or web address]    Un-Timed:  Electrical Stimulation:    0     mins  65386 ( )  Canalith Repos    0     mins 47079  Dry Needling     0     mins self-pay  Traction     0     mins 04726    Timed Treatment:   33   mins   Total Treatment:     33   mins    Kavita Gonzales PT  Physical Therapist    PT SIGNATURE: Electronically signed by Kavita Gonzales PT  [place] LICENSE: [id number]

## 2021-12-20 ENCOUNTER — TELEPHONE (OUTPATIENT)
Dept: SURGERY | Facility: CLINIC | Age: 61
End: 2021-12-20

## 2021-12-20 ENCOUNTER — TREATMENT (OUTPATIENT)
Dept: PHYSICAL THERAPY | Facility: CLINIC | Age: 61
End: 2021-12-20

## 2021-12-20 DIAGNOSIS — M14.672 CHARCOT ANKLE, LEFT: Primary | ICD-10-CM

## 2021-12-20 DIAGNOSIS — M25.572 CHRONIC PAIN OF LEFT ANKLE: ICD-10-CM

## 2021-12-20 DIAGNOSIS — G89.29 CHRONIC PAIN OF LEFT ANKLE: ICD-10-CM

## 2021-12-20 PROCEDURE — 97112 NEUROMUSCULAR REEDUCATION: CPT | Performed by: PHYSICAL THERAPIST

## 2021-12-20 PROCEDURE — 97110 THERAPEUTIC EXERCISES: CPT | Performed by: PHYSICAL THERAPIST

## 2021-12-20 NOTE — TELEPHONE ENCOUNTER
Attempted to call pt but call was declined with no option to leave a message.    Attempted to call [facility] office but got a busy tone multiple times

## 2022-04-08 ENCOUNTER — TRANSCRIBE ORDERS (OUTPATIENT)
Dept: ADMINISTRATIVE | Facility: HOSPITAL | Age: 62
End: 2022-04-08

## 2022-04-08 DIAGNOSIS — C34.11 MALIGNANT NEOPLASM OF UPPER LOBE OF RIGHT LUNG: Primary | ICD-10-CM

## 2022-04-13 ENCOUNTER — OFFICE VISIT (OUTPATIENT)
Dept: PULMONOLOGY | Facility: CLINIC | Age: 62
End: 2022-04-13

## 2022-04-13 VITALS
HEIGHT: 72 IN | TEMPERATURE: 97.7 F | OXYGEN SATURATION: 94 % | RESPIRATION RATE: 16 BRPM | SYSTOLIC BLOOD PRESSURE: 100 MMHG | WEIGHT: 276 LBS | HEART RATE: 71 BPM | DIASTOLIC BLOOD PRESSURE: 60 MMHG | BODY MASS INDEX: 37.38 KG/M2

## 2022-04-13 DIAGNOSIS — E66.9 OBESITY (BMI 30-39.9): ICD-10-CM

## 2022-04-13 DIAGNOSIS — F17.201 TOBACCO ABUSE, IN REMISSION: ICD-10-CM

## 2022-04-13 DIAGNOSIS — J43.2 CENTRILOBULAR EMPHYSEMA: Primary | ICD-10-CM

## 2022-04-13 DIAGNOSIS — R09.1 PLEURISY: ICD-10-CM

## 2022-04-13 DIAGNOSIS — R06.09 DOE (DYSPNEA ON EXERTION): ICD-10-CM

## 2022-04-13 LAB — EXHALED NITROUS OXIDE: 58

## 2022-04-13 PROCEDURE — 94664 DEMO&/EVAL PT USE INHALER: CPT | Performed by: INTERNAL MEDICINE

## 2022-04-13 PROCEDURE — 95012 NITRIC OXIDE EXP GAS DETER: CPT | Performed by: INTERNAL MEDICINE

## 2022-04-13 PROCEDURE — 99204 OFFICE O/P NEW MOD 45 MIN: CPT | Performed by: INTERNAL MEDICINE

## 2022-04-13 RX ORDER — PREDNISONE 10 MG/1
TABLET ORAL
Qty: 31 TABLET | Refills: 0 | Status: SHIPPED | OUTPATIENT
Start: 2022-04-13 | End: 2022-04-22

## 2022-04-13 RX ORDER — ASPIRIN 81 MG/1
TABLET ORAL
COMMUNITY
End: 2022-04-13 | Stop reason: SDUPTHER

## 2022-04-13 RX ORDER — AMLODIPINE BESYLATE 5 MG/1
5 TABLET ORAL DAILY
COMMUNITY

## 2022-04-13 RX ORDER — LISINOPRIL 40 MG/1
40 TABLET ORAL DAILY
COMMUNITY
Start: 2022-01-20

## 2022-04-13 RX ORDER — ALBUTEROL SULFATE 90 UG/1
2 AEROSOL, METERED RESPIRATORY (INHALATION) EVERY 4 HOURS PRN
COMMUNITY
Start: 2022-04-01

## 2022-04-13 RX ORDER — ESCITALOPRAM OXALATE 20 MG/1
20 TABLET ORAL DAILY
COMMUNITY
Start: 2022-03-29

## 2022-04-13 RX ORDER — BUDESONIDE AND FORMOTEROL FUMARATE DIHYDRATE 160; 4.5 UG/1; UG/1
2 AEROSOL RESPIRATORY (INHALATION)
Qty: 1 EACH | Refills: 11 | Status: SHIPPED | OUTPATIENT
Start: 2022-04-13

## 2022-04-13 RX ORDER — INSULIN ASPART 100 [IU]/ML
INJECTION, SUSPENSION SUBCUTANEOUS
COMMUNITY
Start: 2022-02-24

## 2022-04-13 NOTE — PROGRESS NOTES
Primary Care Provider  Felix Atkinson MD   Referring Provider  No ref. provider found      Patient Complaint  Establish Care (New Patient - COPD)      Subjective          Carlos Murphy Jr. presents to [facility]      History of Presenting Illness  Carlos Murphy Jr. is a 61 y.o. male here to establish care for dyspnea.  He is a former cigarette smoker and has previously been told he has COPD.  He states over the last 6 to 8 months he is a progressive worsening shortness of breath, hacking cough and wheezing.  No sputum production is noted.  He was started on albuterol by his primary care with no real benefit.  He gets short of breath very easily.  He can only walk about 5 to 600 feet without getting short of breath.  His dyspnea is moderate severity, worse with activity and relieved with rest.  He had a chest x-ray last month which showed possible pneumonia and received antibiotics without benefit.  He has a chest CT scheduled 2 weeks from now.  He is on diuretics and sees a cardiologist.  He denies any recent leg swelling, orthopnea or PND..  He denies any seasonal allergies including itchy/scratchy throat, watery eyes or nasal congestion.  He denies headaches, chest pain, weight loss or hemoptysis. Denies fevers, chills and night sweats.  He is able to perform ADLs without difficulties and denies any swollen glands/lymph nodes in the head or neck.    I have personally reviewed the review of systems, past family, social, medical and surgical histories; and agree with their findings.        Review of Systems  Constitutional symptoms:  Denied complaints   Ear, nose, throat: Denied complaints  Cardiovascular:  Denied complaints  Respiratory: Dyspnea, cough, wheeze, otherwise denied complaints  Gastrointestinal: Denied complaints  Musculoskeletal: Denied complaints  Genitourinary: Denied complaints  Allergy / Immunology: Denied complaints  Hematologic: Denied  complaints  Neurologic: Denied complaints  Skin: Denied complaints  Endocrine: Denied complaints  Psychiatric: Denied complaints      Family History   Problem Relation Age of Onset   • Kidney disease Mother    • COPD Mother    • COPD Father    • Heart disease Father         Social History     Socioeconomic History   • Marital status:    Tobacco Use   • Smoking status: Former Smoker     Packs/day: 0.50     Years: 35.00     Pack years: 17.50     Quit date: 2007     Years since quitting: 15.2   • Smokeless tobacco: Never Used   Vaping Use   • Vaping Use: Never used   Substance and Sexual Activity   • Alcohol use: Yes     Comment: OCCASIONAL   • Drug use: Never   • Sexual activity: Defer        Past Medical History:   Diagnosis Date   • Bipolar disorder (HCC)    • COPD (chronic obstructive pulmonary disease) (HCC)    • Diabetes (HCC)    • Hx of schizophrenia    • Hyperlipidemia    • Hypertension    • Neuropathy    • Seizures ([facility])     3 MONTHS LAST SEIZURE (2/2021)   • Varicose vein of leg         Immunization History   Administered Date(s) Administered   • COVID-19 (PFIZER) PURPLE CAP 01/13/2022         No Known Allergies       Current Outpatient Medications:   •  albuterol sulfate  (90 Base) MCG/ACT inhaler, , Disp: , Rfl:   •  amLODIPine (NORVASC) 5 MG tablet, amlodipine 5 mg tablet, Disp: , Rfl:   •  aspirin 81 MG EC tablet, Take 81 mg by mouth Daily., Disp: , Rfl:   •  atenolol (TENORMIN) 25 MG tablet, Take 25 mg by mouth Daily., Disp: , Rfl:   •  atorvastatin (LIPITOR) 40 MG tablet, Take 40 mg by mouth Daily., Disp: , Rfl:   •  cephalexin (KEFLEX) 500 MG capsule, , Disp: , Rfl:   •  escitalopram (LEXAPRO) 20 MG tablet, , Disp: , Rfl:   •  furosemide (LASIX) 20 MG tablet, Take 20 mg by mouth Daily., Disp: , Rfl:   •  glipizide (GLUCOTROL XL) 5 MG ER tablet, Take 5 mg by mouth Daily., Disp: , Rfl:   •  hydrOXYzine (ATARAX) 50 MG tablet, Take 50 mg by mouth 3 (Three) Times a Day., Disp: , Rfl:   •   "insulin aspart (NovoLOG FlexPen) 100 UNIT/ML solution pen-injector sc pen, Novolog Flexpen U-100 Insulin 100 unit/mL subcutaneous insulin pen inject by subcutaneous route per prescriber's instructions. Insulin dosing requires individualization.   Active, Disp: , Rfl:   •  lamoTRIgine (LaMICtal) 200 MG tablet, Take 100 mg by mouth 2 (two) times a day., Disp: , Rfl:   •  Levalbuterol Tartrate (XOPENEX HFA IN), , Disp: , Rfl:   •  levETIRAcetam (KEPPRA) 500 MG tablet, Take 500 mg by mouth 2 (Two) Times a Day., Disp: , Rfl:   •  lisinopril (PRINIVIL,ZESTRIL) 40 MG tablet, , Disp: , Rfl:   •  NovoLOG Mix 70/30 FlexPen (70-30) 100 UNIT/ML suspension pen-injector injection, , Disp: , Rfl:   •  pantoprazole (PROTONIX) 40 MG EC tablet, Take 40 mg by mouth Daily., Disp: , Rfl:   •  pregabalin (LYRICA) 100 MG capsule, , Disp: , Rfl:   •  QUEtiapine (SEROquel) 300 MG tablet, Take 600 mg by mouth Every Night., Disp: , Rfl:   •  Semaglutide,0.25 or 0.5MG/DOS, (Ozempic, 0.25 or 0.5 MG/DOSE,) 2 MG/1.5ML solution pen-injector, Inject 5 mg under the skin into the appropriate area as directed 1 (One) Time Per Week., Disp: , Rfl:   •  silver sulfadiazine (Silvadene) 1 % cream, Apply  topically to the appropriate area as directed 2 (Two) Times a Day., Disp: 400 g, Rfl: 3  •  zolpidem (AMBIEN) 10 MG tablet, Take 10 mg by mouth Every Night., Disp: , Rfl:   •  budesonide-formoterol (Symbicort) 160-4.5 MCG/ACT inhaler, Inhale 2 puffs 2 (Two) Times a Day., Disp: 1 each, Rfl: 11  •  predniSONE (DELTASONE) 10 MG tablet, Take 4 tabs daily x 3 days, then take 3 tabs daily x 3 days, then take 2 tabs daily x 3 days, then take 1 tab daily x 3 days, Disp: 31 tablet, Rfl: 0         Objective     Vital Signs:   /60 (BP Location: Right arm, Patient Position: Sitting, Cuff Size: Large Adult)   Pulse 71   Temp 97.7 °F (36.5 °C) (Tympanic)   Resp 16   Ht 182.9 cm (72\")   Wt 125 kg (276 lb)   SpO2 94% Comment: room air  BMI 37.43 kg/m² "     Physical Exam  Vital Signs Reviewed   WDWN, Alert, NAD.    HEENT:  PERRL, EOMI.  OP, nares clear, no sinus tenderness  Neck:  Supple, no JVD, no thyromegaly  Lymph: no axillary, cervical, supraclavicular lymphadenopathy noted bilaterally  Chest:  good aeration, rhonchi and wheezing bilaterally, tympanic to percussion bilaterally, no work of breathing noted  CV: RRR, no MGR, pulses 2+, equal.  Abd:  Soft, NT, ND, + BS, no HSM, obese  EXT:  no clubbing, no cyanosis, trace BLE edema, no joint tenderness  Neuro:  A&Ox3, CN grossly intact, no focal deficits.  Skin: No rashes or lesions noted       Result Review :   I have personally reviewed the office notes from primary care and cardiology.  Last echocardiogram did show some mild diastolic dysfunction.  Chest CT from 2021 did show some groundglass infiltrates and a pulmonary edema pattern.  Most recent chest x-ray showed possible airspace disease consistent with pneumonia.  Last CBC in our system does show peripheral eosinophilia.  There is no evidence of chronic hypercapnic respiratory failure.           Assessment and Plan      Patient Active Problem List   Diagnosis   • Ulcer of toe due to type 2 diabetes mellitus (HCC)   • Centrilobular emphysema (HCC)   • Tobacco abuse, in remission   • Obesity (BMI 30-39.9)       Impression:  Chronic dyspnea   chronic cough  Chronic wheeze  Emphysema incidentally seen on chest CT last year.  Question whether or not patient has component of asthma/COPD overlap syndrome  Pleurisy  Patient community-acquired pneumonia status post antibiotics  Tobacco use of cigarettes in remission  Obesity with BMI 37.4    Plan:  I did perform exhaled nitric oxide testing in the office today.  Level is 59 indicative of a significantly elevated degree of eosinophilic airway inflammation.  I do question if he has asthma/COPD overlap syndrome or just asthma alone  Will give 2-week prednisone taper  Start Symbicort 160/4.5, 2 puffs twice a day.   Inhaler education provided today.  Continue albuterol as needed  Encourage patient to have his chest CT done as already ordered in 2 weeks.  We will follow up results with him at his next visit  Check full pulmonary function test and 6-minute walk test to assess for airflow obstruction, bronchodilator response and exertional hypoxemia  Check CBC and IgE  Diet and exercise counseling provided today.  Recommended 30 minutes daily exercise well is an 1800 -calorie/day diet.  Patient verbalized understanding.  Total time counseling the patient today was 4 minutes  Smoking status: Former smoker.  Quit in 2017.  Outside of window for lung cancer screening  Vaccination status: Up-to-date with flu, Pneumovax and Covid vaccination.  We will give Prevnar vaccine if patient has emphysema or COPD is considered  Medications personally reviewed.      Follow Up   Return in about 4 weeks (around 5/11/2022).  Patient was given instructions and counseling regarding his condition or for health maintenance advice. Please see specific information pulled into the AVS if appropriate.     Electronically signed by Phillip Yanez MD, 04/13/22, 2:26 PM EDT.

## 2022-04-21 ENCOUNTER — APPOINTMENT (OUTPATIENT)
Dept: GENERAL RADIOLOGY | Facility: HOSPITAL | Age: 62
End: 2022-04-21

## 2022-04-21 ENCOUNTER — HOSPITAL ENCOUNTER (EMERGENCY)
Facility: HOSPITAL | Age: 62
Discharge: HOME | End: 2022-04-21
Attending: EMERGENCY MEDICINE | Admitting: EMERGENCY MEDICINE

## 2022-04-21 ENCOUNTER — TELEPHONE (OUTPATIENT)
Dept: ORTHOPEDIC SURGERY | Facility: CLINIC | Age: 62
End: 2022-04-21

## 2022-04-21 VITALS
OXYGEN SATURATION: 94 % | TEMPERATURE: 97.9 F | HEIGHT: 72 IN | SYSTOLIC BLOOD PRESSURE: 97 MMHG | DIASTOLIC BLOOD PRESSURE: 53 MMHG | BODY MASS INDEX: 38.55 KG/M2 | RESPIRATION RATE: 20 BRPM | WEIGHT: 284.61 LBS | HEART RATE: 61 BPM

## 2022-04-21 DIAGNOSIS — S82.432A CLOSED DISPLACED OBLIQUE FRACTURE OF SHAFT OF LEFT FIBULA, INITIAL ENCOUNTER: Primary | ICD-10-CM

## 2022-04-21 DIAGNOSIS — S82.55XA NONDISPLACED FRACTURE OF MEDIAL MALLEOLUS OF LEFT TIBIA, INITIAL ENCOUNTER FOR CLOSED FRACTURE: ICD-10-CM

## 2022-04-21 DIAGNOSIS — J44.9 CHRONIC OBSTRUCTIVE PULMONARY DISEASE, UNSPECIFIED COPD TYPE: ICD-10-CM

## 2022-04-21 PROCEDURE — 96375 TX/PRO/DX INJ NEW DRUG ADDON: CPT

## 2022-04-21 PROCEDURE — 25010000002 HYDROMORPHONE 1 MG/ML SOLUTION: Performed by: EMERGENCY MEDICINE

## 2022-04-21 PROCEDURE — 73610 X-RAY EXAM OF ANKLE: CPT

## 2022-04-21 PROCEDURE — 96374 THER/PROPH/DIAG INJ IV PUSH: CPT

## 2022-04-21 PROCEDURE — 25010000002 MORPHINE PER 10 MG: Performed by: EMERGENCY MEDICINE

## 2022-04-21 PROCEDURE — 73562 X-RAY EXAM OF KNEE 3: CPT

## 2022-04-21 PROCEDURE — 73590 X-RAY EXAM OF LOWER LEG: CPT

## 2022-04-21 PROCEDURE — 99283 EMERGENCY DEPT VISIT LOW MDM: CPT

## 2022-04-21 RX ORDER — HYDROCODONE BITARTRATE AND ACETAMINOPHEN 5; 325 MG/1; MG/1
1 TABLET ORAL EVERY 6 HOURS PRN
Qty: 12 TABLET | Refills: 0 | Status: SHIPPED | OUTPATIENT
Start: 2022-04-21 | End: 2022-04-22

## 2022-04-21 RX ORDER — LEVETIRACETAM 500 MG/1
500 TABLET ORAL 2 TIMES DAILY
Status: DISCONTINUED | OUTPATIENT
Start: 2022-04-21 | End: 2022-04-21 | Stop reason: HOSPADM

## 2022-04-21 RX ADMIN — MORPHINE SULFATE 4 MG: 4 INJECTION INTRAVENOUS at 09:57

## 2022-04-21 RX ADMIN — LEVETIRACETAM 500 MG: 500 TABLET, FILM COATED ORAL at 10:04

## 2022-04-21 RX ADMIN — HYDROMORPHONE HYDROCHLORIDE 1 MG: 1 INJECTION, SOLUTION INTRAMUSCULAR; INTRAVENOUS; SUBCUTANEOUS at 10:31

## 2022-04-21 NOTE — DISCHARGE INSTRUCTIONS
Call Dr. Parry and schedule appointment in the office for further fracture care.  Do not bear weight on the left leg.  Keep splint clean and dry.  Take pain medication as needed for moderate pain.

## 2022-04-21 NOTE — SIGNIFICANT NOTE
04/21/22 1415   Plan   Final Note JUAN notified by provider that pt was in need of a wheelchair. JUAN contacted [facility] to inquire about process to obtain wheelchair for pt. [facility] confirmed pt would qualify for wheelchair and JUAN updated provider regarding orders and notes needed. Pt was discharged in the interim of JUAN working  on obtaining the wheelchair. JUAN contacted pt via phone to provide phone number and address to [facility] to  wheelchair.

## 2022-04-21 NOTE — TELEPHONE ENCOUNTER
"Caller: Carlos Murphy Jr.    Relationship to patient: Self    Best call back number: [phone]     Patient is needing: CALLBACK; EST PATIENT OF DR LANCE SEEN [facility] E/R TODAY FOR Closed displaced oblique fracture of shaft of left fibula, initial encounter   EARLIEST APPT (FOR HUB) IS TUES,4.26    PATIENT WANTS TO BE SEEN ASAP, STATING THAT IT \"HURTS LIKE HE**\"      UNABLE TO WARM TRANSFER  "

## 2022-04-21 NOTE — ED PROVIDER NOTES
Subjective   Patient presents today complaining of left leg pain after seizure this morning.  Patient states he has a history of seizures, his last seizure was approximately 2 months ago.  Patient states he does not recall exactly what happened from the seizure this morning and he lives alone so was not witnessed.  Patient states after the seizure he recalls having to crawl to the bed to get up off the floor and called his daughter for assistance.  Patient is now complaining of left leg pain from the knee down to the ankle.  Patient states his pain is currently 8 out of 10 but if he tries to bear weight the pain increases.      History provided by:  Patient   used: No    Leg Injury  Location:  Left lower leg  Quality:  Sharp, aching  Severity:  Severe (8/10)  Onset quality:  Sudden  Timing:  Constant  Progression:  Waxing and waning  Chronicity:  New  Context:  Patient states he had a seizure at home this morning and since the seizure occurred he has had left lower leg pain  Associated symptoms: no chest pain, no congestion, no cough, no fatigue, no fever, no headaches, no nausea, no shortness of breath, no vomiting and no wheezing        Review of Systems   Constitutional: Negative for fatigue and fever.   HENT: Negative for congestion.    Respiratory: Negative for cough, shortness of breath and wheezing.    Cardiovascular: Negative for chest pain.   Gastrointestinal: Negative for nausea and vomiting.   Musculoskeletal:        Patient presenting with left lower leg pain after seizure this morning.  Patient states he cannot recall the scenario of the seizure and he does live alone so it was not witnessed.  Patient states the pain is located from the knee down to the ankle and is currently rated 8 out of 10.  Patient states she cannot bear weight.   Neurological: Positive for seizures. Negative for headaches.        Patient states he has a history of seizures as last seizure was approximately 2  months ago.  Patient states he cannot recall exactly his last dose of Keppra was.       Past Medical History:   Diagnosis Date   • Bipolar disorder (HCC)    • COPD (chronic obstructive pulmonary disease) (HCC)    • Diabetes (HCC)    • Hx of schizophrenia    • Hyperlipidemia    • Hypertension    • Neuropathy    • Seizures (HCC)     3 MONTHS LAST SEIZURE (2/2021)   • Varicose vein of leg        No Known Allergies    Past Surgical History:   Procedure Laterality Date   • CHOLECYSTECTOMY     • LUMBAR DISC SURGERY         Family History   Problem Relation Age of Onset   • Kidney disease Mother    • COPD Mother    • COPD Father    • Heart disease Father        Social History     Socioeconomic History   • Marital status:    Tobacco Use   • Smoking status: Former Smoker     Packs/day: 0.50     Years: 35.00     Pack years: 17.50     Quit date: 2007     Years since quitting: 15.3   • Smokeless tobacco: Never Used   Vaping Use   • Vaping Use: Never used   Substance and Sexual Activity   • Alcohol use: Yes     Comment: OCCASIONAL   • Drug use: Never   • Sexual activity: Defer           Objective   Physical Exam  Vitals and nursing note reviewed.   Constitutional:       Appearance: Normal appearance. He is obese.   HENT:      Head: Normocephalic and atraumatic.      Nose: Nose normal.   Eyes:      Extraocular Movements:      Right eye: Nystagmus present.      Left eye: Nystagmus present.      Conjunctiva/sclera: Conjunctivae normal.      Pupils: Pupils are equal, round, and reactive to light.      Comments: Patient has bilateral horizontal nystagmus.  Patient states this is his normal as he has astigmatism.   Cardiovascular:      Rate and Rhythm: Normal rate and regular rhythm.      Heart sounds: Normal heart sounds.   Pulmonary:      Effort: Pulmonary effort is normal.      Breath sounds: Normal breath sounds.   Abdominal:      General: Abdomen is flat.      Palpations: Abdomen is soft.   Musculoskeletal:      Cervical  back: Normal range of motion and neck supple.        Legs:       Comments: No obvious deformity noted of the left lower leg or the left ankle.   Skin:     General: Skin is warm and dry.   Neurological:      General: No focal deficit present.      Mental Status: He is alert and oriented to person, place, and time.   Psychiatric:         Mood and Affect: Mood normal.         Behavior: Behavior normal.         Thought Content: Thought content normal.         Judgment: Judgment normal.         Procedures           ED Course  ED Course as of 04/21/22 1410   Thu Apr 21, 2022   1052 Spoke with Dr. Parry and he suggested posterior splint with stirrup and follow-up in the office. [MD]   5925 Patient's mobility limitation impairs ability to precipitate in all ADLs.  Mobility limitation cannot be resolved by cane or walker.  Patient can safely and independently maneuver the wheelchair.  Caregiver is available patient does not have the strength to self propel wheelchair. [MD]      ED Course User Index  [MD] Jerome Wild PA-C                                                 MDM  Number of Diagnoses or Management Options  Diagnosis management comments: I have spoken with patient. I have explained the patient´s condition, diagnoses and treatment plan based on the information available to me at this time. I have answered the patient's questions and addressed any concerns. The patient has a good  understanding of the patient´s diagnosis, condition, and treatment plan as can be expected at this point. The vital signs have been stable. The patient´s condition is stable and appropriate for discharge from the emergency department.      The patient will pursue further outpatient evaluation with the primary care physician or other designated or consulting physician as outlined in the discharge instructions. They are agreeable to this plan of care and follow-up instructions have been explained in detail. The patient has received  these instructions in written format and have expressed an understanding of the discharge instructions. The patient is aware that any significant change in condition or worsening of symptoms should prompt an immediate return to this or the closest emergency department or call to 911.       Amount and/or Complexity of Data Reviewed  Tests in the radiology section of CPT®: reviewed and ordered    Risk of Complications, Morbidity, and/or Mortality  Presenting problems: moderate  Diagnostic procedures: low  Management options: low    Patient Progress  Patient progress: stable      Final diagnoses:   Closed displaced oblique fracture of shaft of left fibula, initial encounter   Nondisplaced fracture of medial malleolus of left tibia, initial encounter for closed fracture   Chronic obstructive pulmonary disease, unspecified COPD type ([facility])       ED Disposition  ED Disposition     ED Disposition   Discharge    Condition   Stable    Comment   --             Felix Atkinson MD  [address] [place] [address] [phone]          Tha Parry MD  [address] [facility] [place] [id number] [phone]    Call   Call and schedule an appointment with Dr. Parry for further fracture care.         Medication List      New Prescriptions    HYDROcodone-acetaminophen 5-325 MG per tablet  Commonly known as: NORCO  Take 1 tablet by mouth Every 6 (Six) Hours As Needed for Moderate Pain .           Where to Get Your Medications      These medications were sent to [address], [place] - [address] AT [facility] [address] - [phone] [facility] [phone]   [address] [facility] [address]    Phone: [phone]   · HYDROcodone-acetaminophen 5-325 MG per tablet          Jerome Wild PA-C  04/21/22 1220       Jerome Wild PA-C  04/21/22 1411

## 2022-04-22 ENCOUNTER — OFFICE VISIT (OUTPATIENT)
Dept: ORTHOPEDIC SURGERY | Facility: CLINIC | Age: 62
End: 2022-04-22

## 2022-04-22 ENCOUNTER — PREP FOR SURGERY (OUTPATIENT)
Dept: OTHER | Facility: HOSPITAL | Age: 62
End: 2022-04-22

## 2022-04-22 VITALS — HEIGHT: 72 IN | BODY MASS INDEX: 38.47 KG/M2 | WEIGHT: 284 LBS

## 2022-04-22 DIAGNOSIS — S82.55XA CLOSED NONDISPLACED FRACTURE OF MEDIAL MALLEOLUS OF LEFT TIBIA, INITIAL ENCOUNTER: Primary | ICD-10-CM

## 2022-04-22 DIAGNOSIS — S82.55XA CLOSED NONDISPLACED FRACTURE OF MEDIAL MALLEOLUS OF LEFT TIBIA, INITIAL ENCOUNTER: ICD-10-CM

## 2022-04-22 DIAGNOSIS — S82.402A CLOSED FRACTURE OF SHAFT OF LEFT FIBULA, UNSPECIFIED FRACTURE MORPHOLOGY, INITIAL ENCOUNTER: Primary | ICD-10-CM

## 2022-04-22 PROCEDURE — 99214 OFFICE O/P EST MOD 30 MIN: CPT | Performed by: ORTHOPAEDIC SURGERY

## 2022-04-22 RX ORDER — HYDROCODONE BITARTRATE AND ACETAMINOPHEN 7.5; 325 MG/1; MG/1
1 TABLET ORAL EVERY 4 HOURS PRN
Qty: 30 TABLET | Refills: 0 | Status: SHIPPED | OUTPATIENT
Start: 2022-04-22 | End: 2022-04-25 | Stop reason: HOSPADM

## 2022-04-22 RX ORDER — LAMOTRIGINE 200 MG/1
200 TABLET ORAL 2 TIMES DAILY
COMMUNITY

## 2022-04-22 RX ORDER — CEFAZOLIN SODIUM IN 0.9 % NACL 3 G/100 ML
3 INTRAVENOUS SOLUTION, PIGGYBACK (ML) INTRAVENOUS ONCE
Status: CANCELLED | OUTPATIENT
Start: 2022-04-22 | End: 2022-04-22

## 2022-04-22 NOTE — PROGRESS NOTES
"Chief Complaint  Initial Evaluation of the Left Ankle     Subjective      Carlos Murphy Jr. presents to [facility] for evaluation of the left ankle. The patient reports he had a seizure causing a fall to injure is left ankle. He was seen and evaluated with x-rays at the ER. He was placed into a splint. He has no other complaints.     No Known Allergies     Social History     Socioeconomic History   • Marital status:    Tobacco Use   • Smoking status: Former Smoker     Packs/day: 0.50     Years: 35.00     Pack years: 17.50     Quit date: 2007     Years since quitting: 15.3   • Smokeless tobacco: Never Used   Vaping Use   • Vaping Use: Never used   Substance and Sexual Activity   • Alcohol use: Yes     Comment: OCCASIONAL   • Drug use: Never   • Sexual activity: Defer        Review of Systems     Objective   Vital Signs:   Ht 182.9 cm (72\")   Wt 129 kg (284 lb)   BMI 38.52 kg/m²       Physical Exam  Constitutional:       Appearance: Normal appearance. The patient is well-developed and normal weight.   HENT:      Head: Normocephalic.      Right Ear: Hearing and external ear normal.      Left Ear: Hearing and external ear normal.      Nose: Nose normal.   Eyes:      Conjunctiva/sclera: Conjunctivae normal.   Cardiovascular:      Rate and Rhythm: Normal rate.   Pulmonary:      Effort: Pulmonary effort is normal.      Breath sounds: No wheezing or rales.   Abdominal:      Palpations: Abdomen is soft.      Tenderness: There is no abdominal tenderness.   Musculoskeletal:      Cervical back: Normal range of motion.   Skin:     Findings: No rash.   Neurological:      Mental Status: The patient is alert and oriented to person, place, and time.   Psychiatric:         Mood and Affect: Mood and affect normal.         Judgment: Judgment normal.       Ortho Exam      Left lower extremity- splint intact. Good capillary refill. Neurovascularly intact. Positive EHL, FHL, GS and TA. Sensation " intact to all 5 nerves of the foot. Positive pulses. Sensation grossly intact.     Procedures    Imaging Results (Most Recent)     None           Result Review :{Labs  Result Review  Imaging  Med Tab  Media  Procedures :23}       XR Knee 3 View Left    Result Date: 4/21/2022  Narrative: PROCEDURE: XR KNEE 3 VW LEFT  COMPARISON: [facility], CR, XR ANKLE 3+ VW LEFT, 4/21/2022, 9:01.  [facility], CR, KNEE 3 VIEWS LT, 4/07/2011, 19:55.  INDICATIONS: left leg pain after fall  FINDINGS:  Partially imaged mildly displaced comminuted fracture of the proximal to mid fibula.  Mild DJD at the patellofemoral compartment with a small subchondral lucency at the patella that likely represents degenerative cyst.  The medial and lateral compartment joint spaces are well maintained.  No significant joint effusion.  Mild vascular calcifications.      Impression:   1. Partially imaged mildly displaced comminuted fracture of the proximal to mid fibular diaphysis. 2. Mild DJD in the patellofemoral compartment.      GENA JOEL MD       Electronically Signed and Approved By: GENA JOEL MD on 4/21/2022 at 9:21             XR Tibia Fibula 2 View Left    Result Date: 4/21/2022  Narrative: PROCEDURE: XR TIBIA FIBULA 2 VW LEFT  COMPARISON: None  INDICATIONS: LEFT LOWER LEG PAIN POST FALL TODAY. LIMITED RANGE OF MOTION.  FINDINGS:  There is an oblique fracture of the mid fibular shaft with 0.5 cm medial and 0.7 cm anterior displacement.  There is a fracture of the medial malleolus which is in near anatomic alignment.  Calf and ankle soft tissue swelling is noted.  Mild to moderate knee and tibiotalar joint osteoarthritis is noted.      Impression:   1. Fractures of the medial malleolus and mid fibular shaft, as above      Murphy Oconnell M.D.       Electronically Signed and Approved By: Murphy Oconnell M.D. on 4/21/2022 at 10:38             XR Ankle 3+ View Left    Addendum Date: 4/21/2022 Addendum:   ADDENDUM:   Mentioned on the concurrent knee radiographs is a partially imaged fracture of the mid fibular diaphysis.    GENA JOEL MD       Electronically Signed and Approved By: GENA JOEL MD on 4/21/2022 at 9:22             Result Date: 4/21/2022  Narrative: PROCEDURE: XR ANKLE 3+ VW LEFT  COMPARISON: None available.  INDICATIONS: left ankle injury, inability to move left legs  FINDINGS:  The study is mildly limited by limitations with patient positioning.  Nondisplaced fracture through the base of the medial malleolus.  Mild DJD at the tibiotalar joint.  The ankle mortise is grossly intact.  The subtalar joint spaces are not well evaluated.  Hypertrophic changes at the lateral malleolus suggests hindfoot valgus and sequela of lateral hindfoot impingement.  Partially imaged DJD at the midfoot.  Diffuse soft tissue swelling.  Mild vascular calcifications.      Impression:   1. Nondisplaced fracture through the base of the medial malleolus. 2. Mild DJD at the tibiotalar joint. 3. Findings suggesting hindfoot valgus and sequela lateral hindfoot impingement.      GENA JOEL MD       Electronically Signed and Approved By: GENA JOEL MD on 4/21/2022 at 9:16                      Assessment and Plan     DX: Left medial malleolus fracture. Left fibula fracture.     Discussed the treatment options with the patient, operative vs non-operative. Discussed the risks and benefits of an ORIF Left ankle fracture. The patient expressed understanding and wished to proceed. Prescription for Norco given today.     Discussed surgery., Risks/benefits discussed with patient including, but not limited to: infection, bleeding, neurovascular damage, malunion, nonunion, aesthetic deformity, need for further surgery, and death., Discussed with patient the implant type being used during surgery and patient understands and desires to proceed., Surgery pamphlet given. and Call or return if worsening symptoms.    Follow Up     2 weeks  postoperatively.        Patient was given instructions and counseling regarding his condition or for health maintenance advice. Please see specific information pulled into the AVS if appropriate.     Scribed for Tha Parry MD by Samantha Head.  04/22/22   09:47 EDT    I have personally performed the services described in this document as scribed by the above individual and it is both accurate and complete. Tha Parry MD 04/22/22

## 2022-04-25 ENCOUNTER — HOSPITAL ENCOUNTER (OUTPATIENT)
Dept: GENERAL RADIOLOGY | Facility: HOSPITAL | Age: 62
Discharge: HOME | End: 2022-04-25

## 2022-04-25 ENCOUNTER — ANESTHESIA (OUTPATIENT)
Dept: PERIOP | Facility: HOSPITAL | Age: 62
End: 2022-04-25

## 2022-04-25 ENCOUNTER — HOSPITAL ENCOUNTER (OUTPATIENT)
Facility: HOSPITAL | Age: 62
Setting detail: HOSPITAL OUTPATIENT SURGERY
Discharge: HOME | End: 2022-04-25
Attending: ORTHOPAEDIC SURGERY | Admitting: ORTHOPAEDIC SURGERY

## 2022-04-25 ENCOUNTER — ANESTHESIA EVENT (OUTPATIENT)
Dept: PERIOP | Facility: HOSPITAL | Age: 62
End: 2022-04-25

## 2022-04-25 VITALS
HEART RATE: 75 BPM | OXYGEN SATURATION: 90 % | RESPIRATION RATE: 17 BRPM | BODY MASS INDEX: 34.78 KG/M2 | TEMPERATURE: 97.1 F | HEIGHT: 74 IN | DIASTOLIC BLOOD PRESSURE: 43 MMHG | SYSTOLIC BLOOD PRESSURE: 96 MMHG | WEIGHT: 271 LBS

## 2022-04-25 DIAGNOSIS — Z87.81 HISTORY OF ANKLE FRACTURE: ICD-10-CM

## 2022-04-25 DIAGNOSIS — S82.55XA CLOSED NONDISPLACED FRACTURE OF MEDIAL MALLEOLUS OF LEFT TIBIA, INITIAL ENCOUNTER: ICD-10-CM

## 2022-04-25 LAB
ANION GAP SERPL CALCULATED.3IONS-SCNC: 11.7 MMOL/L (ref 5–15)
BUN SERPL-MCNC: 60 MG/DL (ref 8–23)
BUN/CREAT SERPL: 21 (ref 7–25)
CALCIUM SPEC-SCNC: 9.4 MG/DL (ref 8.6–10.5)
CHLORIDE SERPL-SCNC: 98 MMOL/L (ref 98–107)
CO2 SERPL-SCNC: 22.3 MMOL/L (ref 22–29)
CREAT SERPL-MCNC: 2.86 MG/DL (ref 0.76–1.27)
EGFRCR SERPLBLD CKD-EPI 2021: 24.4 ML/MIN/1.73
GLUCOSE BLDC GLUCOMTR-MCNC: 74 MG/DL (ref 70–99)
GLUCOSE BLDC GLUCOMTR-MCNC: 96 MG/DL (ref 70–99)
GLUCOSE SERPL-MCNC: 79 MG/DL (ref 65–99)
POTASSIUM SERPL-SCNC: 5.2 MMOL/L (ref 3.5–5.2)
QT INTERVAL: 385 MS
SODIUM SERPL-SCNC: 132 MMOL/L (ref 136–145)

## 2022-04-25 PROCEDURE — C1713 ANCHOR/SCREW BN/BN,TIS/BN: HCPCS | Performed by: ORTHOPAEDIC SURGERY

## 2022-04-25 PROCEDURE — 25010000002 MIDAZOLAM PER 1 MG: Performed by: ANESTHESIOLOGY

## 2022-04-25 PROCEDURE — 73600 X-RAY EXAM OF ANKLE: CPT

## 2022-04-25 PROCEDURE — 80048 BASIC METABOLIC PNL TOTAL CA: CPT | Performed by: ANESTHESIOLOGY

## 2022-04-25 PROCEDURE — 93010 ELECTROCARDIOGRAM REPORT: CPT | Performed by: INTERNAL MEDICINE

## 2022-04-25 PROCEDURE — 25010000002 ONDANSETRON PER 1 MG

## 2022-04-25 PROCEDURE — 25010000002 CEFAZOLIN PER 500 MG: Performed by: ORTHOPAEDIC SURGERY

## 2022-04-25 PROCEDURE — 93005 ELECTROCARDIOGRAM TRACING: CPT | Performed by: ANESTHESIOLOGY

## 2022-04-25 PROCEDURE — 76000 FLUOROSCOPY <1 HR PHYS/QHP: CPT

## 2022-04-25 PROCEDURE — 25010000002 PROPOFOL 10 MG/ML EMULSION

## 2022-04-25 PROCEDURE — 82962 GLUCOSE BLOOD TEST: CPT

## 2022-04-25 PROCEDURE — 27766 OPTX MEDIAL ANKLE FX: CPT | Performed by: ORTHOPAEDIC SURGERY

## 2022-04-25 DEVICE — SCRW CANN SHRT THRD 1/3 4X40MM
Type: IMPLANTABLE DEVICE | Site: ANKLE | Status: NON-FUNCTIONAL
Removed: 2022-06-01

## 2022-04-25 DEVICE — SCRW CANN SHRT THRD 1/3 4X38MM: Type: IMPLANTABLE DEVICE | Site: ANKLE | Status: FUNCTIONAL

## 2022-04-25 RX ORDER — SODIUM CHLORIDE 9 MG/ML
50 INJECTION, SOLUTION INTRAVENOUS CONTINUOUS
Status: DISCONTINUED | OUTPATIENT
Start: 2022-04-25 | End: 2022-04-25 | Stop reason: HOSPADM

## 2022-04-25 RX ORDER — ONDANSETRON 2 MG/ML
4 INJECTION INTRAMUSCULAR; INTRAVENOUS ONCE AS NEEDED
Status: DISCONTINUED | OUTPATIENT
Start: 2022-04-25 | End: 2022-04-25 | Stop reason: HOSPADM

## 2022-04-25 RX ORDER — CEFAZOLIN SODIUM IN 0.9 % NACL 3 G/100 ML
3 INTRAVENOUS SOLUTION, PIGGYBACK (ML) INTRAVENOUS ONCE
Status: COMPLETED | OUTPATIENT
Start: 2022-04-25 | End: 2022-04-25

## 2022-04-25 RX ORDER — OXYCODONE HYDROCHLORIDE 5 MG/1
5 TABLET ORAL
Status: DISCONTINUED | OUTPATIENT
Start: 2022-04-25 | End: 2022-04-25 | Stop reason: HOSPADM

## 2022-04-25 RX ORDER — HYDROCODONE BITARTRATE AND ACETAMINOPHEN 7.5; 325 MG/1; MG/1
1-2 TABLET ORAL EVERY 4 HOURS PRN
Qty: 36 TABLET | Refills: 0 | Status: SHIPPED | OUTPATIENT
Start: 2022-04-25 | End: 2022-05-10 | Stop reason: SDUPTHER

## 2022-04-25 RX ORDER — ACETAMINOPHEN 500 MG
1000 TABLET ORAL ONCE
Status: COMPLETED | OUTPATIENT
Start: 2022-04-25 | End: 2022-04-25

## 2022-04-25 RX ORDER — EPHEDRINE SULFATE 50 MG/ML
INJECTION, SOLUTION INTRAVENOUS AS NEEDED
Status: DISCONTINUED | OUTPATIENT
Start: 2022-04-25 | End: 2022-04-25 | Stop reason: SURG

## 2022-04-25 RX ORDER — CEPHALEXIN 500 MG/1
500 CAPSULE ORAL EVERY 12 HOURS
Qty: 10 CAPSULE | Refills: 0 | Status: SHIPPED | OUTPATIENT
Start: 2022-04-25 | End: 2022-05-31

## 2022-04-25 RX ORDER — PROPOFOL 10 MG/ML
VIAL (ML) INTRAVENOUS AS NEEDED
Status: DISCONTINUED | OUTPATIENT
Start: 2022-04-25 | End: 2022-04-25 | Stop reason: SURG

## 2022-04-25 RX ORDER — MAGNESIUM HYDROXIDE 1200 MG/15ML
LIQUID ORAL AS NEEDED
Status: DISCONTINUED | OUTPATIENT
Start: 2022-04-25 | End: 2022-04-25 | Stop reason: HOSPADM

## 2022-04-25 RX ORDER — BUPIVACAINE HYDROCHLORIDE AND EPINEPHRINE 5; 5 MG/ML; UG/ML
INJECTION, SOLUTION EPIDURAL; INTRACAUDAL; PERINEURAL
Status: COMPLETED | OUTPATIENT
Start: 2022-04-25 | End: 2022-04-25

## 2022-04-25 RX ORDER — ROCURONIUM BROMIDE 10 MG/ML
INJECTION, SOLUTION INTRAVENOUS AS NEEDED
Status: DISCONTINUED | OUTPATIENT
Start: 2022-04-25 | End: 2022-04-25 | Stop reason: SURG

## 2022-04-25 RX ORDER — VASOPRESSIN 20 U/ML
INJECTION PARENTERAL AS NEEDED
Status: DISCONTINUED | OUTPATIENT
Start: 2022-04-25 | End: 2022-04-25 | Stop reason: SURG

## 2022-04-25 RX ORDER — ONDANSETRON 2 MG/ML
INJECTION INTRAMUSCULAR; INTRAVENOUS AS NEEDED
Status: DISCONTINUED | OUTPATIENT
Start: 2022-04-25 | End: 2022-04-25 | Stop reason: SURG

## 2022-04-25 RX ORDER — ASPIRIN 325 MG
325 TABLET, DELAYED RELEASE (ENTERIC COATED) ORAL DAILY
Qty: 14 TABLET | Refills: 0 | Status: ON HOLD | OUTPATIENT
Start: 2022-04-25 | End: 2022-06-01 | Stop reason: SDUPTHER

## 2022-04-25 RX ORDER — SODIUM CHLORIDE, SODIUM LACTATE, POTASSIUM CHLORIDE, CALCIUM CHLORIDE 600; 310; 30; 20 MG/100ML; MG/100ML; MG/100ML; MG/100ML
9 INJECTION, SOLUTION INTRAVENOUS CONTINUOUS PRN
Status: DISCONTINUED | OUTPATIENT
Start: 2022-04-25 | End: 2022-04-25

## 2022-04-25 RX ORDER — LIDOCAINE HYDROCHLORIDE 20 MG/ML
INJECTION, SOLUTION EPIDURAL; INFILTRATION; INTRACAUDAL; PERINEURAL AS NEEDED
Status: DISCONTINUED | OUTPATIENT
Start: 2022-04-25 | End: 2022-04-25 | Stop reason: SURG

## 2022-04-25 RX ORDER — MIDAZOLAM HYDROCHLORIDE 1 MG/ML
2 INJECTION INTRAMUSCULAR; INTRAVENOUS ONCE
Status: COMPLETED | OUTPATIENT
Start: 2022-04-25 | End: 2022-04-25

## 2022-04-25 RX ORDER — PHENYLEPHRINE HCL IN 0.9% NACL 1 MG/10 ML
SYRINGE (ML) INTRAVENOUS AS NEEDED
Status: DISCONTINUED | OUTPATIENT
Start: 2022-04-25 | End: 2022-04-25 | Stop reason: SURG

## 2022-04-25 RX ADMIN — EPHEDRINE SULFATE 10 MG: 50 INJECTION INTRAVENOUS at 10:34

## 2022-04-25 RX ADMIN — EPHEDRINE SULFATE 10 MG: 50 INJECTION INTRAVENOUS at 10:52

## 2022-04-25 RX ADMIN — Medication 100 MCG: at 10:42

## 2022-04-25 RX ADMIN — EPHEDRINE SULFATE 10 MG: 50 INJECTION INTRAVENOUS at 10:45

## 2022-04-25 RX ADMIN — Medication 100 MCG: at 10:34

## 2022-04-25 RX ADMIN — MIDAZOLAM HYDROCHLORIDE 2 MG: 1 INJECTION, SOLUTION INTRAMUSCULAR; INTRAVENOUS at 10:04

## 2022-04-25 RX ADMIN — Medication 100 MCG: at 10:47

## 2022-04-25 RX ADMIN — ONDANSETRON 4 MG: 2 INJECTION INTRAMUSCULAR; INTRAVENOUS at 11:01

## 2022-04-25 RX ADMIN — PROPOFOL 130 MG: 10 INJECTION, EMULSION INTRAVENOUS at 10:19

## 2022-04-25 RX ADMIN — Medication 3 G: at 10:25

## 2022-04-25 RX ADMIN — LIDOCAINE HYDROCHLORIDE 100 MG: 20 INJECTION, SOLUTION EPIDURAL; INFILTRATION; INTRACAUDAL; PERINEURAL at 10:19

## 2022-04-25 RX ADMIN — VASOPRESSIN 1 UNITS: 20 INJECTION INTRAVENOUS at 11:16

## 2022-04-25 RX ADMIN — Medication 200 MCG: at 10:52

## 2022-04-25 RX ADMIN — EPHEDRINE SULFATE 10 MG: 50 INJECTION INTRAVENOUS at 10:42

## 2022-04-25 RX ADMIN — SUGAMMADEX 200 MG: 100 INJECTION, SOLUTION INTRAVENOUS at 11:01

## 2022-04-25 RX ADMIN — ONDANSETRON 4 MG: 2 INJECTION INTRAMUSCULAR; INTRAVENOUS at 12:18

## 2022-04-25 RX ADMIN — ACETAMINOPHEN 1000 MG: 500 TABLET ORAL at 09:07

## 2022-04-25 RX ADMIN — SODIUM CHLORIDE: 9 INJECTION, SOLUTION INTRAVENOUS at 10:15

## 2022-04-25 RX ADMIN — Medication 100 MCG: at 10:36

## 2022-04-25 RX ADMIN — VASOPRESSIN 1 UNITS: 20 INJECTION INTRAVENOUS at 10:53

## 2022-04-25 RX ADMIN — Medication 200 MCG: at 10:38

## 2022-04-25 RX ADMIN — EPHEDRINE SULFATE 10 MG: 50 INJECTION INTRAVENOUS at 10:48

## 2022-04-25 RX ADMIN — ROCURONIUM BROMIDE 50 MG: 10 INJECTION INTRAVENOUS at 10:19

## 2022-04-25 RX ADMIN — OXYCODONE HYDROCHLORIDE 5 MG: 5 TABLET ORAL at 12:18

## 2022-04-25 RX ADMIN — Medication 100 MCG: at 10:32

## 2022-04-25 RX ADMIN — SODIUM CHLORIDE: 9 INJECTION, SOLUTION INTRAVENOUS at 10:40

## 2022-04-25 RX ADMIN — BUPIVACAINE HYDROCHLORIDE AND EPINEPHRINE BITARTRATE 30 ML: 5; .005 INJECTION, SOLUTION EPIDURAL; INTRACAUDAL; PERINEURAL at 10:11

## 2022-04-25 RX ADMIN — SODIUM CHLORIDE, POTASSIUM CHLORIDE, SODIUM LACTATE AND CALCIUM CHLORIDE 9 ML/HR: 600; 310; 30; 20 INJECTION, SOLUTION INTRAVENOUS at 09:07

## 2022-04-25 NOTE — ANESTHESIA POSTPROCEDURE EVALUATION
Patient: Carlos Murphy Jr.    Procedure Summary     Date: 04/25/22 Room / Location: [facility] OR  / [facility]    Anesthesia Start: 1015 Anesthesia Stop: 1120    Procedure: LEFT OPEN REDUCTION INTERNAL FIXATION DISTAL TIBIA FRACTURE  (Left Ankle) Diagnosis:       Closed nondisplaced fracture of medial malleolus of left tibia, initial encounter      (Closed nondisplaced fracture of medial malleolus of left tibia, initial encounter [S82.55XA])    Surgeons: Tha Parry MD Provider: Phillip Painter MD    Anesthesia Type: general with block ASA Status: 3          Anesthesia Type: general with block    Vitals  Vitals Value Taken Time   BP 94/42 04/25/22 1146   Temp     Pulse 73 04/25/22 1148   Resp     SpO2 95 % 04/25/22 1148   Vitals shown include unvalidated device data.        Post Anesthesia Care and Evaluation    Patient location during evaluation: bedside  Patient participation: complete - patient participated  Level of consciousness: awake and alert  Pain management: adequate  Airway patency: patent  Anesthetic complications: No anesthetic complications  PONV Status: none  Cardiovascular status: acceptable  Respiratory status: acceptable  Hydration status: acceptable    Comments: An Anesthesiologist personally participated in the most demanding procedures (including induction and emergence if applicable) in the anesthesia plan, monitored the course of anesthesia administration at frequent intervals and remained physically present and available for immediate diagnosis and treatment of emergencies.

## 2022-04-25 NOTE — ANESTHESIA PREPROCEDURE EVALUATION
Anesthesia Evaluation     Patient summary reviewed and Nursing notes reviewed   no history of anesthetic complications:  NPO Solid Status: > 8 hours  NPO Liquid Status: > 2 hours           Airway   Mallampati: II  TM distance: >3 FB  Neck ROM: full  No difficulty expected and Large neck circumference  Dental      Pulmonary - normal exam    breath sounds clear to auscultation  (+) COPD mild, sleep apnea on CPAP,   Cardiovascular - normal exam  Exercise tolerance: good (4-7 METS)    Rhythm: regular  Rate: normal    (+) hypertension, hyperlipidemia,       Neuro/Psych  (+) seizures,    GI/Hepatic/Renal/Endo    (+) morbid obesity,  diabetes mellitus,     Musculoskeletal     Abdominal    Substance History - negative use     OB/GYN negative ob/gyn ROS         Other   arthritis,                    Anesthesia Plan    ASA 3     general with block   (Patient understands anesthesia not responsible for dental damage.)  intravenous induction     Anesthetic plan, all risks, benefits, and alternatives have been provided, discussed and informed consent has been obtained with: patient.    Plan discussed with CRNA.        CODE STATUS:

## 2022-04-25 NOTE — ANESTHESIA PROCEDURE NOTES
Peripheral Block      Patient reassessed immediately prior to procedure    Patient location during procedure: pre-op  Reason for block: at surgeon's request and post-op pain management  Preanesthetic Checklist  Completed: patient identified, IV checked, site marked, risks and benefits discussed, surgical consent, monitors and equipment checked, pre-op evaluation and timeout performed  Prep:  Pt Position: supine  Sterile barriers:alcohol skin prep, partial drape, cap, washed/disinfected hands, mask and gloves  Prep: ChloraPrep  Patient monitoring: blood pressure monitoring, continuous pulse oximetry and EKG  Procedure    Sedation: yes  Performed under: local infiltration  Guidance:ultrasound guided and nerve stimulator    ULTRASOUND INTERPRETATION. Using ultrasound guidance a 20 G gauge needle was placed in close proximity to the nerve, at which point, under ultrasound guidance anesthetic was injected in the area of the nerve and spread of the anesthesia was seen on ultrasound in close proximity thereto.  There were no abnormalities seen on ultrasound; a digital image was taken; and the patient tolerated the procedure with no complications. Images:still images obtained, printed/placed on chart    Laterality:left  Block Type:adductor canal block  Injection Technique:single-shot  Needle Type:echogenic  Needle Gauge:20 G (4in)  Resistance on Injection: none    Medications Used: bupivacaine-EPINEPHrine PF (MARCAINE w/EPI) 0.5% -[id number] injection, 30 mL      Post Assessment  Injection Assessment: negative aspiration for heme, no paresthesia on injection and incremental injection  Patient Tolerance:comfortable throughout block  Complications:no  Additional Notes  The block or continuous infusion is requested by the referring physician for management of postoperative pain, or pain related to a procedure. Ultrasound guidance (deemed medically necessary). Painless injection, pt was awake and conversant during the procedure  without complications. Needle and surrounding structures visualized throughout procedure. No adverse reactions or complications seen during this period. Post-procedure image showed no signs of complication, and anatomy was consistent with an uncomplicated nerve blockade.

## 2022-04-26 ENCOUNTER — TELEPHONE (OUTPATIENT)
Dept: ORTHOPEDIC SURGERY | Facility: CLINIC | Age: 62
End: 2022-04-26

## 2022-04-26 DIAGNOSIS — S82.402A CLOSED FRACTURE OF SHAFT OF LEFT FIBULA, UNSPECIFIED FRACTURE MORPHOLOGY, INITIAL ENCOUNTER: Primary | ICD-10-CM

## 2022-04-26 RX ORDER — OXYCODONE AND ACETAMINOPHEN 7.5; 325 MG/1; MG/1
TABLET ORAL
Qty: 20 TABLET | Refills: 0 | Status: SHIPPED | OUTPATIENT
Start: 2022-04-26 | End: 2022-05-31

## 2022-04-26 NOTE — TELEPHONE ENCOUNTER
PATIENT CALLED AND STATES HE IS GETTING NO RELIEF FROM PAIN MEDS.  HE IS REQUESTING SOMETHING STRONGER --STATES THAT HYDROCODONE HAS NEVER WORKED FOR HIM.  JOSHUA AT [facility]

## 2022-04-26 NOTE — TELEPHONE ENCOUNTER
Patient called and I spoke to him regarding pain medicine. Informed him that we can send in a one time fill for percocet however, his insurance will not cover since he just picked up norco yesterday. Informed him that we will not refill this and that he will go back to [facility] once this is gone. Patient expressed understanding.

## 2022-04-28 ENCOUNTER — APPOINTMENT (OUTPATIENT)
Dept: CT IMAGING | Facility: HOSPITAL | Age: 62
End: 2022-04-28

## 2022-05-09 PROBLEM — Z47.89 AFTERCARE FOLLOWING SURGERY OF THE MUSCULOSKELETAL SYSTEM: Status: ACTIVE | Noted: 2022-05-09

## 2022-05-10 ENCOUNTER — OFFICE VISIT (OUTPATIENT)
Dept: ORTHOPEDIC SURGERY | Facility: CLINIC | Age: 62
End: 2022-05-10

## 2022-05-10 VITALS — HEIGHT: 72 IN | OXYGEN SATURATION: 95 % | BODY MASS INDEX: 39.14 KG/M2 | HEART RATE: 64 BPM | WEIGHT: 289 LBS

## 2022-05-10 DIAGNOSIS — S82.402A CLOSED FRACTURE OF SHAFT OF LEFT FIBULA, UNSPECIFIED FRACTURE MORPHOLOGY, INITIAL ENCOUNTER: ICD-10-CM

## 2022-05-10 DIAGNOSIS — Z47.89 AFTERCARE FOLLOWING SURGERY OF THE MUSCULOSKELETAL SYSTEM: ICD-10-CM

## 2022-05-10 DIAGNOSIS — S82.55XA CLOSED NONDISPLACED FRACTURE OF MEDIAL MALLEOLUS OF LEFT TIBIA, INITIAL ENCOUNTER: ICD-10-CM

## 2022-05-10 DIAGNOSIS — Z47.89 AFTERCARE FOLLOWING SURGERY OF THE MUSCULOSKELETAL SYSTEM: Primary | ICD-10-CM

## 2022-05-10 PROCEDURE — 99024 POSTOP FOLLOW-UP VISIT: CPT | Performed by: PHYSICIAN ASSISTANT

## 2022-05-10 PROCEDURE — 29345 APPLICATION OF LONG LEG CAST: CPT | Performed by: PHYSICIAN ASSISTANT

## 2022-05-10 RX ORDER — HYDROCODONE BITARTRATE AND ACETAMINOPHEN 7.5; 325 MG/1; MG/1
1-2 TABLET ORAL EVERY 4 HOURS PRN
Qty: 36 TABLET | Refills: 0 | Status: SHIPPED | OUTPATIENT
Start: 2022-05-10 | End: 2022-05-16 | Stop reason: SDUPTHER

## 2022-05-10 RX ORDER — OXYCODONE AND ACETAMINOPHEN 7.5; 325 MG/1; MG/1
TABLET ORAL
Qty: 36 TABLET | Refills: 0 | Status: CANCELLED | OUTPATIENT
Start: 2022-05-10

## 2022-05-10 NOTE — PROGRESS NOTES
"Chief Complaint  Pain and Follow-up of the Left Ankle and Suture / Staple Removal    Subjective          Carlos Murphy Jr. is a 61 y.o. male  presents to [facility] for   History of Present Illness      Patient presents for 2-week postoperative evaluation of ORIF left distal tibia fracture, 4/25/2022.  Patient states that he has been doing well he states he is taking the pain medication, they help he is requesting a refill.  Patient presented in his splint he has been nonweightbearing he presents in a wheelchair which he states he rented.  Patient states he has a history of neuropathy to the left lower extremity, he states he has previous injuries/fractures to his foot/ankle.  Patient states his pain is worse than his proximal fibula fracture, he states the ankle pain is less compared to the proximal fibula.  He denies calf pain.      No Known Allergies     Social History     Socioeconomic History   • Marital status:    Tobacco Use   • Smoking status: Former Smoker     Packs/day: 0.50     Years: 35.00     Pack years: 17.50     Quit date: 2007     Years since quitting: 15.3   • Smokeless tobacco: Never Used   Vaping Use   • Vaping Use: Never used   Substance and Sexual Activity   • Alcohol use: Yes     Comment: OCCASIONAL   • Drug use: Never   • Sexual activity: Defer        REVIEW OF SYSTEMS    Constitutional: Denies fevers, chills, weight loss  Cardiovascular: Denies chest pain, shortness of breath  Skin: Denies rashes, acute skin changes  Neurologic: Denies headache, loss of consciousness  MSK: Left lower extremity pain      Objective   Vital Signs:   Pulse 64   Ht 182.9 cm (72\")   Wt 131 kg (289 lb)   SpO2 95%   BMI 39.20 kg/m²     Body mass index is 39.2 kg/m².    Physical Exam    Left lower extremity: Nontender calf, mild tenderness palpation of proximal fibula fracture site.  Mild generalized swelling to the ankle, incisions are healing well, no erythema, no drainage.  " Patient will wiggle toes, range of motion of the ankle limited, sensation intact light touch.    Orthopedic Injury Treatment    Date/Time: 5/10/2022 2:36 PM  Performed by: Mark Kulkarni PA  Authorized by: Mark Kulkarni PA   Injury location: lower leg  Location details: left lower leg  Pre-procedure neurovascular assessment: neurovascularly intact    Anesthesia:  Local anesthesia used: no    Sedation:  Patient sedated: no    Immobilization: cast  Supplies used: cotton padding (FIBERGLASS)  Post-procedure neurovascular assessment: post-procedure neurovascularly intact  Patient tolerance: patient tolerated the procedure well with no immediate complications  Comments: Patient was placed in fiberglass cast today.  The patient tolerated the procedure without any complications. APPLIED BY ETHEL CHAPIN MA            Imaging Results (Most Recent)     Procedure Component Value Units Date/Time    XR Ankle 2 View Left [[id number]] Resulted: 05/10/22 1415     Updated: 05/10/22 1415    Narrative:      • View:AP and Lateral view(s)  • Site: Left ankle  • Indication: Left ankle pain  • Study: X-rays ordered, taken in the office, and reviewed today  • X-ray: Near anatomic alignment of medial malleolar fracture with intact   screws, no signs of hardware failure or loosening, ankle mortise is   intact.  Good healing of proximal fibula fracture, no increased   displacement or angulation  • Comparative data: No comparative data found             Result Review :   The following data was reviewed by: SHYANNE Dumont on 05/10/2022:  Data reviewed: Radiologic studies Reviewed by me with the patient             Assessment and Plan    Diagnoses and all orders for this visit:    1. Aftercare following surgery of ORIF left distal tibia fracture 4/25/2022 (Primary)  -     XR Ankle 2 View Left  -     Cancel: Ambulatory Referral to Home Health    2. Aftercare following surgery of the musculoskeletal system  -      XR Ankle 2 View Left  -     Cancel: Ambulatory Referral to Home Health    Other orders  -     Orthopedic Injury Treatment        Discussed diagnosis and treatment options with the patient, patient leg is not able to fit into a tall boot he was placed into a tall cast, cast care instructions were discussed, remain nonweightbearing, he was given a pain medication refill, follow-up in 2 weeks for recheck.    Call or return if worsening symptoms.    Follow Up   Return in about 2 weeks (around 5/24/2022) for Recheck.  Patient was given instructions and counseling regarding his condition or for health maintenance advice. Please see specific information pulled into the AVS if appropriate.

## 2022-05-16 ENCOUNTER — TELEPHONE (OUTPATIENT)
Dept: ORTHOPEDIC SURGERY | Facility: CLINIC | Age: 62
End: 2022-05-16

## 2022-05-16 DIAGNOSIS — S82.55XA CLOSED NONDISPLACED FRACTURE OF MEDIAL MALLEOLUS OF LEFT TIBIA, INITIAL ENCOUNTER: ICD-10-CM

## 2022-05-16 RX ORDER — HYDROCODONE BITARTRATE AND ACETAMINOPHEN 7.5; 325 MG/1; MG/1
1 TABLET ORAL EVERY 4 HOURS PRN
Qty: 30 TABLET | Refills: 0 | Status: SHIPPED | OUTPATIENT
Start: 2022-05-16 | End: 2022-05-24 | Stop reason: SDUPTHER

## 2022-05-24 ENCOUNTER — OFFICE VISIT (OUTPATIENT)
Dept: ORTHOPEDIC SURGERY | Facility: CLINIC | Age: 62
End: 2022-05-24

## 2022-05-24 VITALS — WEIGHT: 289 LBS | HEIGHT: 72 IN | HEART RATE: 58 BPM | OXYGEN SATURATION: 96 % | BODY MASS INDEX: 39.14 KG/M2

## 2022-05-24 DIAGNOSIS — Z47.89 AFTERCARE FOLLOWING SURGERY OF THE MUSCULOSKELETAL SYSTEM: ICD-10-CM

## 2022-05-24 DIAGNOSIS — Z47.89 AFTERCARE FOLLOWING SURGERY OF THE MUSCULOSKELETAL SYSTEM: Primary | ICD-10-CM

## 2022-05-24 DIAGNOSIS — S82.55XA CLOSED NONDISPLACED FRACTURE OF MEDIAL MALLEOLUS OF LEFT TIBIA, INITIAL ENCOUNTER: ICD-10-CM

## 2022-05-24 PROCEDURE — 99024 POSTOP FOLLOW-UP VISIT: CPT | Performed by: PHYSICIAN ASSISTANT

## 2022-05-24 RX ORDER — HYDROCODONE BITARTRATE AND ACETAMINOPHEN 7.5; 325 MG/1; MG/1
TABLET ORAL
Qty: 36 TABLET | Refills: 0 | Status: SHIPPED | OUTPATIENT
Start: 2022-05-24 | End: 2022-06-14 | Stop reason: SDUPTHER

## 2022-05-26 NOTE — PROGRESS NOTES
"Chief Complaint  Pain of the Left Ankle    Subjective          Carlos Murphy Jr. is a 61 y.o. male  presents to [facility] for   History of Present Illness      Patient presents for 4-week postoperative evaluation of ORIF left distal tibia fracture, 4/25/2022.  At his 2-week postop visit patient was unable to fit into a boot, he had cast placed and was advised to be nonweightbearing.  Patient presents today in a wheelchair he states that there has been some times where he has had to walk on his foot/ankle in the cast, he admits to some cracking of the cast.  Patient has a history of neuropathy to the left lower extremity he has a history of prior injuries/fractures to his foot/ankle.  He states he has had pain in his proximal fibular fracture site and ankle pain in the cast.  Patient has been taking pain medication which he states has helped his pain.  He is requesting a refill of pain medication.      No Known Allergies     Social History     Socioeconomic History   • Marital status:    Tobacco Use   • Smoking status: Former Smoker     Packs/day: 0.50     Years: 35.00     Pack years: 17.50     Quit date: 2007     Years since quitting: 15.4   • Smokeless tobacco: Never Used   Vaping Use   • Vaping Use: Never used   Substance and Sexual Activity   • Alcohol use: Yes     Comment: OCCASIONAL   • Drug use: Never   • Sexual activity: Defer        REVIEW OF SYSTEMS    Constitutional: Denies fevers, chills, weight loss  Cardiovascular: Denies chest pain, shortness of breath  Skin: Denies rashes, acute skin changes  Neurologic: Denies headache, loss of consciousness  MSK: Left ankle pain      Objective   Vital Signs:   Pulse 58   Ht 182.9 cm (72\")   Wt 131 kg (289 lb)   SpO2 96%   BMI 39.20 kg/m²     Body mass index is 39.2 kg/m².    Physical Exam    Left ankle: On initial exam cast was examined and found to have wearing/cracking of the plantar surface of the cast in the heel and " toes specifically.  Otherwise the remaining parts of the cast were intact/no cracking.  On examination of patient x-rays, cast was removed and on exam patient has mild generalized swelling to the foot and ankle, incisions are well-healed, no erythema, no drainage, no signs of infection.  Tenderness to palpation of site of fibular shaft fracture.  Ankle range of motion limited secondary to stiffness/pain.  Well-perfused foot, 3+ dorsalis pedis, posterior tibialis pulses,.    Procedures    Imaging Results (Most Recent)     Procedure Component Value Units Date/Time    XR Tibia Fibula 2 View Left [[id number]] Resulted: 05/26/22 1647     Updated: 05/26/22 1654    Narrative:      • View:AP and Lateral view(s)  • Site: Left tib-fib  • Indication: Left tib-fib pain  • Study: X-rays ordered, taken in the office, and reviewed today  • X-ray: Healing, oblique mid fibular shaft fracture, healing medial   malleoli fracture with screw placement, lateral screw on the AP view   appears to be coming out.  • Comparative data: No comparative data found             Result Review :   The following data was reviewed by: SHYANNE Dumont on 05/24/2022:  Data reviewed: Radiologic studies Reviewed by me with the patient, Dr. Parry also reviewed remotely             Assessment and Plan    Diagnoses and all orders for this visit:    1. Aftercare following surgery of ORIF left distal tibia fracture 4/25/2022 (Primary)  -     Cancel: XR Ankle 3+ View Left  -     XR Tibia Fibula 2 View Left    2. Aftercare following surgery of the musculoskeletal system  -     Cancel: XR Ankle 3+ View Left  -     XR Tibia Fibula 2 View Left        Reviewed x-rays with the patient, discussed diagnosis and treatment options, based on x-ray findings consistent with screw loosening patient was advised we recommend following up with Dr. Parry this Friday for further evaluation/consultation, patient agreed.  He was placed into a tall walking  boot, advised to avoid weightbearing, given a pain medication refill.    Call or return if worsening symptoms.    Follow Up   Return in about 3 days (around 5/27/2022) for Recheck.  Patient was given instructions and counseling regarding his condition or for health maintenance advice. Please see specific information pulled into the AVS if appropriate.

## 2022-05-27 ENCOUNTER — PREP FOR SURGERY (OUTPATIENT)
Dept: OTHER | Facility: HOSPITAL | Age: 62
End: 2022-05-27

## 2022-05-27 ENCOUNTER — OFFICE VISIT (OUTPATIENT)
Dept: ORTHOPEDIC SURGERY | Facility: CLINIC | Age: 62
End: 2022-05-27

## 2022-05-27 VITALS — HEIGHT: 72 IN | BODY MASS INDEX: 39.28 KG/M2 | WEIGHT: 290 LBS

## 2022-05-27 DIAGNOSIS — Z47.89 AFTERCARE FOLLOWING SURGERY OF THE MUSCULOSKELETAL SYSTEM: Primary | ICD-10-CM

## 2022-05-27 DIAGNOSIS — S82.402A CLOSED FRACTURE OF SHAFT OF LEFT FIBULA, UNSPECIFIED FRACTURE MORPHOLOGY, INITIAL ENCOUNTER: ICD-10-CM

## 2022-05-27 PROCEDURE — 99024 POSTOP FOLLOW-UP VISIT: CPT | Performed by: ORTHOPAEDIC SURGERY

## 2022-05-27 RX ORDER — CEFAZOLIN SODIUM 2 G/100ML
2 INJECTION, SOLUTION INTRAVENOUS ONCE
Status: CANCELLED | OUTPATIENT
Start: 2022-05-27 | End: 2022-05-27

## 2022-05-27 NOTE — PROGRESS NOTES
"Chief Complaint  Follow-up of the Left Lower Leg     Subjective      Carlos Murphy JrAngel presents to [facility] for follow up evaluation of the left lower leg. He is S/P ORIF left distal tibia fracture, 4/25/2022. The patient has had to walk on his left ankle. He seen my PA-C earlier this week and his cast was removed and placed into a CAM walker boot. He has some loosening of a screw on x-rays.     No Known Allergies     Social History     Socioeconomic History   • Marital status:    Tobacco Use   • Smoking status: Former Smoker     Packs/day: 0.50     Years: 35.00     Pack years: 17.50     Quit date: 2007     Years since quitting: 15.4   • Smokeless tobacco: Never Used   Vaping Use   • Vaping Use: Never used   Substance and Sexual Activity   • Alcohol use: Yes     Comment: OCCASIONAL   • Drug use: Never   • Sexual activity: Defer        Review of Systems     Objective   Vital Signs:   Ht 182.9 cm (72\")   Wt 132 kg (290 lb)   BMI 39.33 kg/m²       Physical Exam  Constitutional:       Appearance: Normal appearance. The patient is well-developed and normal weight.   HENT:      Head: Normocephalic.      Right Ear: Hearing and external ear normal.      Left Ear: Hearing and external ear normal.      Nose: Nose normal.   Eyes:      Conjunctiva/sclera: Conjunctivae normal.   Cardiovascular:      Rate and Rhythm: Normal rate.   Pulmonary:      Effort: Pulmonary effort is normal.      Breath sounds: No wheezing or rales.   Abdominal:      Palpations: Abdomen is soft.      Tenderness: There is no abdominal tenderness.   Musculoskeletal:      Cervical back: Normal range of motion.   Skin:     Findings: No rash.   Neurological:      Mental Status: The patient is alert and oriented to person, place, and time.   Psychiatric:         Mood and Affect: Mood and affect normal.         Judgment: Judgment normal.       Ortho Exam      Left lower leg- patient has mild generalized swelling to the " foot and ankle, incisions are well-healed, no erythema, no drainage, no signs of infection.  Tenderness to palpation of site of fibular shaft fracture.  Ankle range of motion limited secondary to stiffness/pain.  Well-perfused foot, 3+ dorsalis pedis, posterior tibialis pulses,.    Procedures    Imaging Results (Most Recent)     None           Result Review :       XR Tibia Fibula 2 View Left    Result Date: 5/27/2022  Narrative: • View:AP and Lateral view(s) • Site: Left tib-fib • Indication: Left tib-fib pain • Study: X-rays ordered, taken in the office, and reviewed today • X-ray: Healing, oblique mid fibular shaft fracture, healing medial malleoli fracture with screw placement, lateral screw on the AP view appears to be coming out. • Comparative data: No comparative data found     XR Ankle 2 View Left    Result Date: 5/12/2022  Narrative: • View:AP and Lateral view(s) • Site: Left ankle • Indication: Left ankle pain • Study: X-rays ordered, taken in the office, and reviewed today • X-ray: Near anatomic alignment of medial malleolar fracture with intact screws, no signs of hardware failure or loosening, ankle mortise is intact.  Good healing of proximal fibula fracture, no increased displacement or angulation • Comparative data: No comparative data found              Assessment and Plan     There are no diagnoses linked to this encounter.    Discussed the treatment options with the patient, operative vs non-operative. Discussed the risks and benefits of a revision ORIF left distal tibia fracture. The patient expressed understanding and wished to proceed.     Call or return if worsening symptoms.    Follow Up     2 weeks postoperatively.        Patient was given instructions and counseling regarding his condition or for health maintenance advice. Please see specific information pulled into the AVS if appropriate.     Scribed for Tha Parry MD by Samantha Head.  05/27/22   10:04 EDT    I have  personally performed the services described in this document as scribed by the above individual and it is both accurate and complete. Tha Parry MD 05/29/22

## 2022-05-31 ENCOUNTER — ANESTHESIA EVENT (OUTPATIENT)
Dept: PERIOP | Facility: HOSPITAL | Age: 62
End: 2022-05-31

## 2022-06-01 ENCOUNTER — APPOINTMENT (OUTPATIENT)
Dept: GENERAL RADIOLOGY | Facility: HOSPITAL | Age: 62
End: 2022-06-01

## 2022-06-01 ENCOUNTER — HOSPITAL ENCOUNTER (OUTPATIENT)
Facility: HOSPITAL | Age: 62
Setting detail: HOSPITAL OUTPATIENT SURGERY
Discharge: HOME | End: 2022-06-01
Attending: ORTHOPAEDIC SURGERY | Admitting: ORTHOPAEDIC SURGERY

## 2022-06-01 ENCOUNTER — ANESTHESIA (OUTPATIENT)
Dept: PERIOP | Facility: HOSPITAL | Age: 62
End: 2022-06-01

## 2022-06-01 VITALS
OXYGEN SATURATION: 96 % | RESPIRATION RATE: 16 BRPM | WEIGHT: 299.16 LBS | HEIGHT: 72 IN | SYSTOLIC BLOOD PRESSURE: 151 MMHG | HEART RATE: 71 BPM | TEMPERATURE: 98.1 F | BODY MASS INDEX: 40.52 KG/M2 | DIASTOLIC BLOOD PRESSURE: 71 MMHG

## 2022-06-01 DIAGNOSIS — Z47.89 AFTERCARE FOLLOWING SURGERY OF THE MUSCULOSKELETAL SYSTEM: ICD-10-CM

## 2022-06-01 DIAGNOSIS — Z47.89 AFTERCARE FOLLOWING SURGERY OF THE MUSCULOSKELETAL SYSTEM: Primary | ICD-10-CM

## 2022-06-01 LAB
ANION GAP SERPL CALCULATED.3IONS-SCNC: 10.1 MMOL/L (ref 5–15)
BUN SERPL-MCNC: 29 MG/DL (ref 8–23)
BUN/CREAT SERPL: 14.1 (ref 7–25)
CALCIUM SPEC-SCNC: 9.5 MG/DL (ref 8.6–10.5)
CHLORIDE SERPL-SCNC: 107 MMOL/L (ref 98–107)
CO2 SERPL-SCNC: 22.9 MMOL/L (ref 22–29)
CREAT SERPL-MCNC: 2.05 MG/DL (ref 0.76–1.27)
EGFRCR SERPLBLD CKD-EPI 2021: 36.4 ML/MIN/1.73
GLUCOSE BLDC GLUCOMTR-MCNC: 174 MG/DL (ref 70–99)
GLUCOSE SERPL-MCNC: 165 MG/DL (ref 65–99)
POTASSIUM SERPL-SCNC: 5.2 MMOL/L (ref 3.5–5.2)
SODIUM SERPL-SCNC: 140 MMOL/L (ref 136–145)

## 2022-06-01 PROCEDURE — C1713 ANCHOR/SCREW BN/BN,TIS/BN: HCPCS | Performed by: ORTHOPAEDIC SURGERY

## 2022-06-01 PROCEDURE — 25010000002 CEFAZOLIN IN DEXTROSE 2-4 GM/100ML-% SOLUTION: Performed by: ORTHOPAEDIC SURGERY

## 2022-06-01 PROCEDURE — 80048 BASIC METABOLIC PNL TOTAL CA: CPT | Performed by: STUDENT IN AN ORGANIZED HEALTH CARE EDUCATION/TRAINING PROGRAM

## 2022-06-01 PROCEDURE — 25010000002 FENTANYL CITRATE (PF) 50 MCG/ML SOLUTION: Performed by: NURSE ANESTHETIST, CERTIFIED REGISTERED

## 2022-06-01 PROCEDURE — 25010000002 HYDROMORPHONE 1 MG/ML SOLUTION: Performed by: NURSE ANESTHETIST, CERTIFIED REGISTERED

## 2022-06-01 PROCEDURE — 87070 CULTURE OTHR SPECIMN AEROBIC: CPT | Performed by: ORTHOPAEDIC SURGERY

## 2022-06-01 PROCEDURE — 25010000002 MIDAZOLAM PER 1 MG: Performed by: ANESTHESIOLOGY

## 2022-06-01 PROCEDURE — 27829 TREAT LOWER LEG JOINT: CPT | Performed by: ORTHOPAEDIC SURGERY

## 2022-06-01 PROCEDURE — 76000 FLUOROSCOPY <1 HR PHYS/QHP: CPT

## 2022-06-01 PROCEDURE — 73600 X-RAY EXAM OF ANKLE: CPT

## 2022-06-01 PROCEDURE — 25010000002 PROPOFOL 10 MG/ML EMULSION: Performed by: NURSE ANESTHETIST, CERTIFIED REGISTERED

## 2022-06-01 PROCEDURE — 25010000002 ONDANSETRON PER 1 MG: Performed by: NURSE ANESTHETIST, CERTIFIED REGISTERED

## 2022-06-01 PROCEDURE — 25010000002 DEXAMETHASONE PER 1 MG: Performed by: NURSE ANESTHETIST, CERTIFIED REGISTERED

## 2022-06-01 PROCEDURE — 82962 GLUCOSE BLOOD TEST: CPT

## 2022-06-01 PROCEDURE — 87205 SMEAR GRAM STAIN: CPT | Performed by: ORTHOPAEDIC SURGERY

## 2022-06-01 PROCEDURE — 25010000002 ROPIVACAINE PER 1 MG: Performed by: STUDENT IN AN ORGANIZED HEALTH CARE EDUCATION/TRAINING PROGRAM

## 2022-06-01 PROCEDURE — 27766 OPTX MEDIAL ANKLE FX: CPT | Performed by: ORTHOPAEDIC SURGERY

## 2022-06-01 PROCEDURE — 76942 ECHO GUIDE FOR BIOPSY: CPT | Performed by: ORTHOPAEDIC SURGERY

## 2022-06-01 DEVICE — GLL ARTHROSCOPIC REAMER
Type: IMPLANTABLE DEVICE | Site: ANKLE | Status: FUNCTIONAL
Brand: GII

## 2022-06-01 DEVICE — SCRW CORT S/TAP 3.5X60MM: Type: IMPLANTABLE DEVICE | Site: ANKLE | Status: FUNCTIONAL

## 2022-06-01 DEVICE — KWIRE TROC/TP SS 1.25X150MM NS: Type: IMPLANTABLE DEVICE | Site: ANKLE | Status: FUNCTIONAL

## 2022-06-01 DEVICE — SCRW CORT S/TAP 3.5X55MM: Type: IMPLANTABLE DEVICE | Site: ANKLE | Status: FUNCTIONAL

## 2022-06-01 RX ORDER — SUCCINYLCHOLINE/SOD CL,ISO/PF 100 MG/5ML
SYRINGE (ML) INTRAVENOUS AS NEEDED
Status: DISCONTINUED | OUTPATIENT
Start: 2022-06-01 | End: 2022-06-01 | Stop reason: SURG

## 2022-06-01 RX ORDER — MIDAZOLAM HYDROCHLORIDE 1 MG/ML
2 INJECTION INTRAMUSCULAR; INTRAVENOUS ONCE
Status: COMPLETED | OUTPATIENT
Start: 2022-06-01 | End: 2022-06-01

## 2022-06-01 RX ORDER — ONDANSETRON 2 MG/ML
4 INJECTION INTRAMUSCULAR; INTRAVENOUS ONCE AS NEEDED
Status: DISCONTINUED | OUTPATIENT
Start: 2022-06-01 | End: 2022-06-01 | Stop reason: HOSPADM

## 2022-06-01 RX ORDER — ONDANSETRON 2 MG/ML
INJECTION INTRAMUSCULAR; INTRAVENOUS AS NEEDED
Status: DISCONTINUED | OUTPATIENT
Start: 2022-06-01 | End: 2022-06-01 | Stop reason: SURG

## 2022-06-01 RX ORDER — DEXAMETHASONE SODIUM PHOSPHATE 4 MG/ML
INJECTION, SOLUTION INTRA-ARTICULAR; INTRALESIONAL; INTRAMUSCULAR; INTRAVENOUS; SOFT TISSUE AS NEEDED
Status: DISCONTINUED | OUTPATIENT
Start: 2022-06-01 | End: 2022-06-01 | Stop reason: SURG

## 2022-06-01 RX ORDER — ASPIRIN 325 MG
325 TABLET, DELAYED RELEASE (ENTERIC COATED) ORAL DAILY
Qty: 14 TABLET | Refills: 0 | Status: SHIPPED | OUTPATIENT
Start: 2022-06-01

## 2022-06-01 RX ORDER — FENTANYL CITRATE 50 UG/ML
INJECTION, SOLUTION INTRAMUSCULAR; INTRAVENOUS AS NEEDED
Status: DISCONTINUED | OUTPATIENT
Start: 2022-06-01 | End: 2022-06-01 | Stop reason: SURG

## 2022-06-01 RX ORDER — SODIUM CHLORIDE, SODIUM LACTATE, POTASSIUM CHLORIDE, CALCIUM CHLORIDE 600; 310; 30; 20 MG/100ML; MG/100ML; MG/100ML; MG/100ML
9 INJECTION, SOLUTION INTRAVENOUS CONTINUOUS PRN
Status: DISCONTINUED | OUTPATIENT
Start: 2022-06-01 | End: 2022-06-01 | Stop reason: HOSPADM

## 2022-06-01 RX ORDER — GLYCOPYRROLATE 0.2 MG/ML
0.2 INJECTION INTRAMUSCULAR; INTRAVENOUS
Status: COMPLETED | OUTPATIENT
Start: 2022-06-01 | End: 2022-06-01

## 2022-06-01 RX ORDER — PROPOFOL 10 MG/ML
VIAL (ML) INTRAVENOUS AS NEEDED
Status: DISCONTINUED | OUTPATIENT
Start: 2022-06-01 | End: 2022-06-01 | Stop reason: SURG

## 2022-06-01 RX ORDER — MAGNESIUM HYDROXIDE 1200 MG/15ML
LIQUID ORAL AS NEEDED
Status: DISCONTINUED | OUTPATIENT
Start: 2022-06-01 | End: 2022-06-01 | Stop reason: HOSPADM

## 2022-06-01 RX ORDER — ROPIVACAINE HYDROCHLORIDE 5 MG/ML
INJECTION, SOLUTION EPIDURAL; INFILTRATION; PERINEURAL
Status: COMPLETED | OUTPATIENT
Start: 2022-06-01 | End: 2022-06-01

## 2022-06-01 RX ORDER — CEFAZOLIN SODIUM 2 G/100ML
2 INJECTION, SOLUTION INTRAVENOUS ONCE
Status: COMPLETED | OUTPATIENT
Start: 2022-06-01 | End: 2022-06-01

## 2022-06-01 RX ORDER — LIDOCAINE HYDROCHLORIDE 20 MG/ML
INJECTION, SOLUTION EPIDURAL; INFILTRATION; INTRACAUDAL; PERINEURAL AS NEEDED
Status: DISCONTINUED | OUTPATIENT
Start: 2022-06-01 | End: 2022-06-01 | Stop reason: SURG

## 2022-06-01 RX ORDER — CEPHALEXIN 500 MG/1
500 CAPSULE ORAL EVERY 12 HOURS
Qty: 10 CAPSULE | Refills: 0 | Status: SHIPPED | OUTPATIENT
Start: 2022-06-01 | End: 2023-03-23

## 2022-06-01 RX ORDER — ACETAMINOPHEN 500 MG
1000 TABLET ORAL ONCE
Status: COMPLETED | OUTPATIENT
Start: 2022-06-01 | End: 2022-06-01

## 2022-06-01 RX ORDER — OXYCODONE HYDROCHLORIDE 5 MG/1
5 TABLET ORAL
Status: DISCONTINUED | OUTPATIENT
Start: 2022-06-01 | End: 2022-06-01 | Stop reason: HOSPADM

## 2022-06-01 RX ORDER — PROMETHAZINE HYDROCHLORIDE 25 MG/1
25 SUPPOSITORY RECTAL ONCE AS NEEDED
Status: DISCONTINUED | OUTPATIENT
Start: 2022-06-01 | End: 2022-06-01 | Stop reason: HOSPADM

## 2022-06-01 RX ORDER — OXYCODONE AND ACETAMINOPHEN 7.5; 325 MG/1; MG/1
1-2 TABLET ORAL EVERY 4 HOURS PRN
Qty: 36 TABLET | Refills: 0 | Status: SHIPPED | OUTPATIENT
Start: 2022-06-01

## 2022-06-01 RX ORDER — PROMETHAZINE HYDROCHLORIDE 12.5 MG/1
25 TABLET ORAL ONCE AS NEEDED
Status: DISCONTINUED | OUTPATIENT
Start: 2022-06-01 | End: 2022-06-01 | Stop reason: HOSPADM

## 2022-06-01 RX ORDER — ROCURONIUM BROMIDE 10 MG/ML
INJECTION, SOLUTION INTRAVENOUS AS NEEDED
Status: DISCONTINUED | OUTPATIENT
Start: 2022-06-01 | End: 2022-06-01 | Stop reason: SURG

## 2022-06-01 RX ADMIN — ROCURONIUM BROMIDE 40 MG: 10 INJECTION INTRAVENOUS at 11:42

## 2022-06-01 RX ADMIN — PROPOFOL 200 MG: 10 INJECTION, EMULSION INTRAVENOUS at 11:38

## 2022-06-01 RX ADMIN — LIDOCAINE HYDROCHLORIDE 100 MG: 20 INJECTION, SOLUTION EPIDURAL; INFILTRATION; INTRACAUDAL; PERINEURAL at 11:38

## 2022-06-01 RX ADMIN — HYDROMORPHONE HYDROCHLORIDE 0.5 MG: 1 INJECTION, SOLUTION INTRAMUSCULAR; INTRAVENOUS; SUBCUTANEOUS at 13:15

## 2022-06-01 RX ADMIN — GLYCOPYRROLATE 0.2 MG: 0.2 INJECTION INTRAMUSCULAR; INTRAVENOUS at 10:40

## 2022-06-01 RX ADMIN — CEFAZOLIN SODIUM 2 G: 2 INJECTION, SOLUTION INTRAVENOUS at 11:35

## 2022-06-01 RX ADMIN — FENTANYL CITRATE 50 MCG: 50 INJECTION, SOLUTION INTRAMUSCULAR; INTRAVENOUS at 12:18

## 2022-06-01 RX ADMIN — SODIUM CHLORIDE, POTASSIUM CHLORIDE, SODIUM LACTATE AND CALCIUM CHLORIDE 9 ML/HR: 600; 310; 30; 20 INJECTION, SOLUTION INTRAVENOUS at 10:39

## 2022-06-01 RX ADMIN — FENTANYL CITRATE 50 MCG: 50 INJECTION, SOLUTION INTRAMUSCULAR; INTRAVENOUS at 11:38

## 2022-06-01 RX ADMIN — Medication 100 MG: at 11:38

## 2022-06-01 RX ADMIN — SUGAMMADEX 200 MG: 100 INJECTION, SOLUTION INTRAVENOUS at 12:38

## 2022-06-01 RX ADMIN — ROPIVACAINE HYDROCHLORIDE 40 ML: 5 INJECTION, SOLUTION EPIDURAL; INFILTRATION; PERINEURAL at 11:17

## 2022-06-01 RX ADMIN — HYDROMORPHONE HYDROCHLORIDE 0.5 MG: 1 INJECTION, SOLUTION INTRAMUSCULAR; INTRAVENOUS; SUBCUTANEOUS at 13:41

## 2022-06-01 RX ADMIN — OXYCODONE HYDROCHLORIDE 5 MG: 5 TABLET ORAL at 13:56

## 2022-06-01 RX ADMIN — MIDAZOLAM HYDROCHLORIDE 2 MG: 1 INJECTION, SOLUTION INTRAMUSCULAR; INTRAVENOUS at 10:40

## 2022-06-01 RX ADMIN — DEXAMETHASONE SODIUM PHOSPHATE 4 MG: 4 INJECTION, SOLUTION INTRA-ARTICULAR; INTRALESIONAL; INTRAMUSCULAR; INTRAVENOUS; SOFT TISSUE at 11:42

## 2022-06-01 RX ADMIN — ONDANSETRON 4 MG: 2 INJECTION INTRAMUSCULAR; INTRAVENOUS at 11:42

## 2022-06-01 RX ADMIN — ROCURONIUM BROMIDE 10 MG: 10 INJECTION INTRAVENOUS at 11:38

## 2022-06-01 RX ADMIN — HYDROMORPHONE HYDROCHLORIDE 0.5 MG: 1 INJECTION, SOLUTION INTRAMUSCULAR; INTRAVENOUS; SUBCUTANEOUS at 13:26

## 2022-06-01 RX ADMIN — HYDROMORPHONE HYDROCHLORIDE 0.5 MG: 1 INJECTION, SOLUTION INTRAMUSCULAR; INTRAVENOUS; SUBCUTANEOUS at 13:08

## 2022-06-01 RX ADMIN — ACETAMINOPHEN 1000 MG: 500 TABLET ORAL at 10:40

## 2022-06-01 NOTE — ANESTHESIA PREPROCEDURE EVALUATION
Anesthesia Evaluation     Patient summary reviewed and Nursing notes reviewed   NPO Solid Status: > 6 hours  NPO Liquid Status: > 6 hours           Airway   Mallampati: II  TM distance: >3 FB  Dental      Pulmonary - normal exam   (+) COPD, sleep apnea,   Cardiovascular - normal exam  Exercise tolerance: good (4-7 METS)    ECG reviewed    (+) hypertension, hyperlipidemia,       Neuro/Psych  (+) seizures (recent- not on meds), numbness (pre-existing numbness and tingling top of left foot), psychiatric history,    GI/Hepatic/Renal/Endo    (+) obesity, morbid obesity,  renal disease, diabetes mellitus,     Musculoskeletal     Abdominal   (+) obese,    Substance History      OB/GYN          Other   arthritis,                    Anesthesia Plan    ASA 3     general with block   (Patient understands anesthesia not responsible for dental damage. Regional anesthesia options discussed with patient. Patient understands risk associated with pre-existing nerve damage. Pt accepts regional block. )  intravenous induction     Anesthetic plan, all risks, benefits, and alternatives have been provided, discussed and informed consent has been obtained with: patient.    Plan discussed with CRNA.        CODE STATUS:

## 2022-06-01 NOTE — DISCHARGE INSTRUCTIONS
DISCHARGE INSTRUCTIONS  ORTHOPEDICS      For your surgery you had:  General anesthesia (you may have a sore throat for the first 24 hours)  You may experience dizziness, drowsiness, or light-headedness for several hours following surgery  Do not stay alone today or tonight.  Limit your activity for 24 hours.  Resume your diet slowly.  Follow whatever special dietary instructions you may have been given by the doctor.  You should not drive or operate machinery or drink alcohol for 24 hours or while you are taking pain medication.  You should not sign any legally binding documents.  If you had an axillary or regional block, you will not have control of the involved limb for up to 12 hours.  Protect the arm with a sling or follow your physician's specific instructions.  You may remove dressing:  [] in 24 hours  [] in 48 hours  [x] Other: Do not touch dressing.  You may shower or bathe: Keep dressing dry.  Sleep with the injured part elevated on a pillow.  Follow verbal instructions of your doctor.  Sit with the lower leg propped up on a footstool or chair with pillows.  Exercise toes for 10 minutes every hour while awake.  Strict non-weightbearing on left extremity; use walker or wheelchair. Ice bag to injured area for 72 hours.  Apply 20 minutes on - 20 minutes off.  Never place ice directly on skin or cast.    Avoid getting cast or dressing wet.    In addition to these instructions, follow the discharge instructions on postoperative arthroscopic surgery.    SPECIAL MD INSTRUCTIONS:  Patient to maintain strict nonweightbearing status to the extremity   Walker/wheelchair to ambulate   Call with any problems   Ice and elevate   Keep splint intact   Follow-up 2 weeks  Restart Asprin tomorrow.    Last dose of pain medication was given at:   Oxy 5 mg @ 1:56 pm     NOTIFY THE PHYSICIAN IF YOU EXPERIENCE:  Numbness of fingers or toes.  Inability to move fingers or toes.  Extreme coldness, paleness or blue dis-coloration of  fingers or toes.  Excessive swelling of affected surgical site or swelling that causes the cast to rub or cut into skin.  Pain unrelieved by pain medication  Nausea/vomiting not relieved by prescribed medication  Unable to urinate in 6 hours after surgery  Temperature greater than 101 degree Fahrenheit or chills  If unable to reach your doctor, please go to the closest emergency room

## 2022-06-01 NOTE — ANESTHESIA PROCEDURE NOTES
Peripheral Block      Patient reassessed immediately prior to procedure    Patient location during procedure: pre-op  Start time: 6/1/2022 11:15 AM  Stop time: 6/1/2022 11:30 AM  Reason for block: at surgeon's request and post-op pain management  Performed by  Anesthesiologist: Carly Tran DO  Preanesthetic Checklist  Completed: patient identified, IV checked, site marked, risks and benefits discussed, surgical consent, monitors and equipment checked, pre-op evaluation and timeout performed  Prep:  Pt Position: right lateral decubitus (RLD positioning for popliteal, supine for adductor canal)  Sterile barriers:alcohol skin prep, cap, gloves, mask, partial drape and washed/disinfected hands  Prep: ChloraPrep  Patient monitoring: blood pressure monitoring, continuous pulse oximetry and EKG  Procedure    Sedation: yes  Performed under: local infiltration  Guidance:ultrasound guided and nerve stimulator    ULTRASOUND INTERPRETATION. Using ultrasound guidance a 20 G gauge needle was placed in close proximity to the nerve, at which point, under ultrasound guidance anesthetic was injected in the area of the nerve and spread of the anesthesia was seen on ultrasound in close proximity thereto.  There were no abnormalities seen on ultrasound; a digital image was taken; and the patient tolerated the procedure with no complications. Images:still images obtained, printed/placed on chart    Block Type:popliteal and adductor canal block  Injection Technique:single-shot  Needle Type:echogenic  Needle Gauge:20 G (4in)  Resistance on Injection: none    Medications Used: ropivacaine (NAROPIN) 0.5 % injection, 40 mL  Med administered at 6/1/2022 11:17 AM      Post Assessment  Injection Assessment: negative aspiration for heme, no paresthesia on injection and incremental injection  Patient Tolerance:comfortable throughout block  Complications:no  Additional Notes  The block or continuous infusion is requested by the referring  physician for management of postoperative pain, or pain related to a procedure. Ultrasound guidance (deemed medically necessary). Painless injection, pt was awake and conversant during the procedure without complications. Needle and surrounding structures visualized throughout procedure. No adverse reactions or complications seen during this period. Post-procedure image showed no signs of complication, and anatomy was consistent with an uncomplicated nerve blockade. 20cc 0.5% Ropivicaine injected for popliteal and 20cc 0.5% Ropivicaine injected for adductor canal.

## 2022-06-01 NOTE — OP NOTE
After Visit Summary   11/20/2018    Emanuel Landeros    MRN: [id number]           Patient Information     Date Of Birth          1966        Visit Information        Provider Department      11/20/2018 4:00 PM Hema Weaver MD [facility]        Today's Diagnoses     Great toe pain, right    -  1    Pain of right hand           Follow-ups after your visit        Additional Services     SPORTS MEDICINE REFERRAL       Your provider has referred you to:  [facility]: [facility] [phone]   [email or web address]    Please be aware that coverage of these services is subject to the terms and limitations of your health insurance plan.  Call member services at your health plan with any benefit or coverage questions.      Please bring the following to your appointment:    >>   Any x-rays, CTs or MRIs which have been performed.  Contact the facility where they were done to arrange for  prior to your scheduled appointment.    >>   List of current medications   >>   This referral request   >>   Any documents/labs given to you for this referral                  Who to contact     If you have questions or need follow up information about today's clinic visit or your schedule please contact [facility] directly at [phone].  Normal or non-critical lab and imaging results will be communicated to you by MyChart, letter or phone within 4 business days after the clinic has received the results. If you do not hear from us within 7 days, please contact the clinic through MyChart or phone. If you have a critical or abnormal lab result, we will notify you by phone as soon as possible.  Submit refill requests through [employer] or call your pharmacy and they will forward the refill request to us. Please allow 3 business days for your refill to be completed.          Additional Information About Your Visit        Care EveryWhere ID     This is your  ANKLE OPEN REDUCTION INTERNAL FIXATION  Procedure Report    Patient Name:  Carlos Murphy Jr.  YOB: 1960    Date of Surgery:  6/1/2022     Indications:  Patient sustained an ankle fracture after injury.  He underwent previous internal fixation of medial malleolus fracture.  The patient was noncompliant with weightbearing restrictions and was walking on his lower extremity.  There was backing out of the medial malleolus screw and some widening of the syndesmosis.  The patient wishes to undergo operative treatment. Risks and benefits of operative treatment including bleeding, infection, damage to neurovascular structures, continued pain and disability, maulnion, non-union, need for additional procedures including hardware removal, among others. Informed consent was obtained and they wished to proceed.  We have expressed the importance of maintaining nonweightbearing status after the surgery as the patient is at increased risk for hardware failure if he is noncompliant with weightbearing restrictions.    Pre-op Diagnosis:   Aftercare following surgery of the musculoskeletal system [Z47.89]   Left trimalleolar ankle fracture       Post-Op Diagnosis Codes:     * Aftercare following surgery of the musculoskeletal system [Z47.89]   Left trimalleolar ankle fracture    Procedure/CPT® Codes:      Procedure(s):  LEFT ANKLE OPEN REDUCTION INTERNAL FIXATION WITH SYNDESMOSIS FIXATION, and revision internal fixation of medial malleolus fracture    Staff:  Surgeon(s):  Tha Parry MD         Anesthesia: Choice    Estimated Blood Loss: 10 mL    Implants:    3.5 cortical screws x2 and 4.5 cannulated screw x1    Specimen:          Specimens     ID Source Type Tests Collected By Collected At Frozen?    1 Ankle, Left Hardware / Foreign Body · HARDWARE / FOREIGN BODY CULTURE   Tha Parry MD 6/1/22 5117     Description: LEFT TIBIA  HARDWARE    This specimen was not marked as sent.              Findings:  "Care EveryWhere ID. This could be used by other organizations to access your [place] medical records  [id number]        Your Vitals Were     Pulse Temperature Respirations Height Pulse Oximetry BMI (Body Mass Index)    56 98.6  F (37  C) (Tympanic) 12 5' 8\" (1.727 m) 97% 31.93 kg/m2       Blood Pressure from Last 3 Encounters:   11/20/18 120/72   08/13/18 124/77   05/22/18 129/72    Weight from Last 3 Encounters:   11/20/18 210 lb (95.3 kg)   08/13/18 200 lb (90.7 kg)   05/22/18 200 lb (90.7 kg)              We Performed the Following     Lyme Disease Negin with reflex to WB Serum     SPORTS MEDICINE REFERRAL     TSH     Uric acid          Today's Medication Changes          These changes are accurate as of 11/20/18  4:31 PM.  If you have any questions, ask your nurse or doctor.               Start taking these medicines.        Dose/Directions    naproxen 500 MG tablet   Commonly known as:  NAPROSYN   Used for:  Great toe pain, right, Pain of right hand   Started by:  Hema Weaver MD        Dose:  500 mg   Take 1 tablet (500 mg) by mouth 2 times daily as needed for moderate pain   Quantity:  30 tablet   Refills:  1            Where to get your medicines      These medications were sent to [facility] [address] [place] [address] [address]  [address] [id number]     Phone:  [phone]     naproxen 500 MG tablet                Primary Care Provider Office Phone # Fax #    Hema Weaver [phone]       [address] [facility] [address]        Equal Access to Services     [facility] District[facility]RAD[facility] AH: Hadii matt cox hadasho Soomaali, waaxda luqadaha, qaybta kaalmada adeegyaidania, ella javed. So [facility] [phone].    ATENCIÓN: Si habla español, tiene a green disposición servicios gratuitos de asistencia lingüística. Llame al [phone].    We comply with applicable federal civil rights laws and [place] laws. We do not discriminate on the basis of race, " Ankle fx    Complications: None    Description of Procedure: The operative site was marked the preoperative holding area.  The patient was brought the operating room placed upon on the OR table.  Preoperatively a nerve block was performed.  The patient was positioned supine and all bony prominences were padded.  The affected extremity had a tourniquet placed on the thigh and was placed on an elevated foam ramp.  The down leg had an SCD boot placed and was padded.  Formal timeout was held and preoperative antibiotic was given.  The limb was exsanguinated and tourniquet was inflated.  A stab incision was made over the anterior of the 2 percutaneous medial malleolus screws.  The K wire was then placed through the cannulated screw.  This was the screw that had backed out about a centimeter and a half from the medial malleolus.  We were unable to remove the screw through the percutaneous incision as it was spinning but not able to be removed.  Therefore we enlarged the incision and grabbed the screw with a hemostat and was able to extract the screw.  We then inserted a larger 4.5 partially-threaded cannulated screw that was 2 mm longer at a size 42 mm.  This had good fixation and the medial malleolus fracture.  The medial malleolus fracture remains well aligned.  At this point we turned attention to the syndesmosis.  A percutaneous reduction clamp was placed across the tibia and fibula at the level of the ankle.  There is syndesmosis was reduced with the ankle in dorsiflexion.  We then made percutaneous stab incisions and 2.8 mm drill bit was used to drill 2 parallel holes in the fibula and tibia.  A 3.5 cortical screw was then placed across the fibula and tibia exiting the medial cortex of the tibia.  This had good purchase in the bone on each screw.  The syndesmosis was well reduced.  We then imaged the proximal fibula showing a well aligned proximal fibula fracture.  Final AP and lateral fluoroscopic views were taken.   The wounds were irrigated with Irrisept and saline.  They were closed with 2-0 Vicryl and 3-0 nylon suture.  Xeroform 4 x 4's and ABD pad and a well-padded splint were placed.  The patient woke from anesthesia in stable condition there were no complications.  Patient was stable to recovery.  We expressed the importance of maintaining nonweightbearing status after surgery.          Tha Parry MD     Date: 6/1/2022  Time: 12:49 EDT       color, national origin, age, disability, sex, sexual orientation, or gender identity.            Thank you!     Thank you for choosing [facility]  for your care. Our goal is always to provide you with excellent care. Hearing back from our patients is one way we can continue to improve our services. Please take a few minutes to complete the written survey that you may receive in the mail after your visit with us. Thank you!             Your Updated Medication List - Protect others around you: Learn how to safely use, store and throw away your medicines at www.disposemymeds.org.          This list is accurate as of 11/20/18  4:31 PM.  Always use your most recent med list.                   Brand Name Dispense Instructions for use Diagnosis    fluticasone 50 MCG/ACT spray    FLONASE    1 Bottle    USE ONE SPRAY(S) IN EACH NOSTRIL ONCE DAILY *  NEEDS  OFFICE  VISIT  PRIOR  TO  FURTHER  REFILL  *    Seasonal allergic rhinitis       naproxen 500 MG tablet    NAPROSYN    30 tablet    Take 1 tablet (500 mg) by mouth 2 times daily as needed for moderate pain    Great toe pain, right, Pain of right hand       order for DME     1 each    CPAP: 8 cm H2O  Lifetime need and heated humidity.    TERESA (obstructive sleep apnea)       ZYRTEC PO      OTC AS NEEDED

## 2022-06-01 NOTE — ANESTHESIA POSTPROCEDURE EVALUATION
Patient: Carlos Murphy Jr.    Procedure Summary     Date: 06/01/22 Room / Location: [facility] OR 06 / [facility] MAIN OR    Anesthesia Start: 1135 Anesthesia Stop: 1256    Procedure: LEFT ANKLE OPEN REDUCTION INTERNAL FIXATION WITH SYNDESMOSIS FIXATION (Left Ankle) Diagnosis:       Aftercare following surgery of the musculoskeletal system      (Aftercare following surgery of the musculoskeletal system [Z47.89])    Surgeons: Tha Parry MD Provider: Scott Lawson MD    Anesthesia Type: general with block ASA Status: 3          Anesthesia Type: general with block    Vitals  Vitals Value Taken Time   /64 06/01/22 1300   Temp     Pulse 76 06/01/22 1306   Resp     SpO2 91 % 06/01/22 1306   Vitals shown include unvalidated device data.        Post Anesthesia Care and Evaluation    Patient location during evaluation: bedside  Patient participation: complete - patient participated  Level of consciousness: awake  Pain management: adequate  Airway patency: patent  Anesthetic complications: No anesthetic complications  PONV Status: none  Cardiovascular status: acceptable and stable  Respiratory status: acceptable  Hydration status: acceptable    Comments: An Anesthesiologist personally participated in the most demanding procedures (including induction and emergence if applicable) in the anesthesia plan, monitored the course of anesthesia administration at frequent intervals and remained physically present and available for immediate diagnosis and treatment of emergencies.

## 2022-06-04 LAB
BACTERIA SPEC AEROBE CULT: NORMAL
GRAM STN SPEC: NORMAL

## 2022-06-06 ENCOUNTER — TELEPHONE (OUTPATIENT)
Dept: ORTHOPEDIC SURGERY | Facility: CLINIC | Age: 62
End: 2022-06-06

## 2022-06-06 DIAGNOSIS — Z47.89 AFTERCARE FOLLOWING SURGERY OF THE MUSCULOSKELETAL SYSTEM: Primary | ICD-10-CM

## 2022-06-06 RX ORDER — HYDROCODONE BITARTRATE AND ACETAMINOPHEN 7.5; 325 MG/1; MG/1
1 TABLET ORAL EVERY 6 HOURS PRN
Qty: 30 TABLET | Refills: 0 | Status: SHIPPED | OUTPATIENT
Start: 2022-06-06 | End: 2022-06-14 | Stop reason: SDUPTHER

## 2022-06-06 NOTE — TELEPHONE ENCOUNTER
PATIENT CALLED AND REQUESTING A REFILL OF PAIN MEDS.  JOSHUA CALL [facility]

## 2022-06-13 DIAGNOSIS — Z47.89 AFTERCARE FOLLOWING SURGERY OF THE MUSCULOSKELETAL SYSTEM: ICD-10-CM

## 2022-06-13 RX ORDER — HYDROCODONE BITARTRATE AND ACETAMINOPHEN 7.5; 325 MG/1; MG/1
1 TABLET ORAL EVERY 6 HOURS PRN
Qty: 30 TABLET | Refills: 0 | OUTPATIENT
Start: 2022-06-13

## 2022-06-14 ENCOUNTER — OFFICE VISIT (OUTPATIENT)
Dept: ORTHOPEDIC SURGERY | Facility: CLINIC | Age: 62
End: 2022-06-14

## 2022-06-14 VITALS — OXYGEN SATURATION: 95 % | WEIGHT: 288 LBS | HEIGHT: 72 IN | BODY MASS INDEX: 39.01 KG/M2 | HEART RATE: 70 BPM

## 2022-06-14 DIAGNOSIS — S82.55XA CLOSED NONDISPLACED FRACTURE OF MEDIAL MALLEOLUS OF LEFT TIBIA, INITIAL ENCOUNTER: ICD-10-CM

## 2022-06-14 DIAGNOSIS — Z47.89 AFTERCARE FOLLOWING SURGERY OF THE MUSCULOSKELETAL SYSTEM: Primary | ICD-10-CM

## 2022-06-14 DIAGNOSIS — Z47.89 AFTERCARE FOLLOWING SURGERY OF THE MUSCULOSKELETAL SYSTEM: ICD-10-CM

## 2022-06-14 PROCEDURE — 99024 POSTOP FOLLOW-UP VISIT: CPT | Performed by: PHYSICIAN ASSISTANT

## 2022-06-14 RX ORDER — HYDROCODONE BITARTRATE AND ACETAMINOPHEN 7.5; 325 MG/1; MG/1
TABLET ORAL
Qty: 40 TABLET | Refills: 0 | Status: SHIPPED | OUTPATIENT
Start: 2022-06-14 | End: 2022-06-23 | Stop reason: SDUPTHER

## 2022-06-15 NOTE — PROGRESS NOTES
"Chief Complaint  Pain and Follow-up of the Left Ankle and Suture / Staple Removal    Subjective          Carlos Murphy Jr. is a 61 y.o. male  presents to [facility] for   History of Present Illness      Patient presents for 2-week postoperative evaluation of left ankle open reduction internal fixation with syndesmosis fixation and revision internal fixation of medial malleolus fracture, 6/1/2022.  Patient presents in his splint splint was removed for x-rays and physical exam.  Patient states he has been nonweightbearing he states he has been wiggling his toes for movement but otherwise states he has been compliant with nonweightbearing.  He presents in a wheelchair.  Patient states his daughter is helping get him around to places.  Patient states pain medication is helping his pain he states pain is controlled with pain medication he is requesting a refill.  Patient states he has pain in his ankle at the operative site, states he also has some pain in the shin region of his left lower extremity.  He denies calf pain.      No Known Allergies     Social History     Socioeconomic History   • Marital status:    Tobacco Use   • Smoking status: Former Smoker     Packs/day: 0.50     Years: 35.00     Pack years: 17.50     Types: Cigarettes     Quit date: 2007     Years since quitting: 15.4   • Smokeless tobacco: Never Used   Vaping Use   • Vaping Use: Never used   Substance and Sexual Activity   • Alcohol use: Yes     Comment: OCCASIONAL   • Drug use: Never   • Sexual activity: Defer        REVIEW OF SYSTEMS    Constitutional: Denies fevers, chills, weight loss  Cardiovascular: Denies chest pain, shortness of breath  Skin: Denies rashes, acute skin changes  Neurologic: Denies headache, loss of consciousness  MSK: Left ankle pain/left lower extremity pain      Objective   Vital Signs:   Pulse 70   Ht 182.9 cm (72\")   Wt 131 kg (288 lb)   SpO2 95%   BMI 39.06 kg/m²     Body mass index " is 39.06 kg/m².    Physical Exam    Left lower extremity: Staples were removed today, incisions are healing well, no erythema, no ecchymosis, no swelling, no drainage, no signs of infection, mild tenderness to palpation of the proximal fibula fracture site and tenderness palpation of the shin area of the patient, nontender calf, negative Kalyani testing, capillary fill less than 3 seconds, 3+ dorsalis pedis/posterior tibialis pulses.    Procedures    Imaging Results (Most Recent)     Procedure Component Value Units Date/Time    XR Tibia Fibula 2 View Left [[id number]] Resulted: 06/15/22 1555     Updated: 06/15/22 1558    Narrative:      • View:AP and Lateral view(s)  • Site: Left tib-fib  • Indication: Left tib-fib pain  • Study: X-rays ordered, taken in the office, and reviewed today  • X-ray: Good healing with callus formation of proximal fibular fracture,   intact syndesmosis fixation, healing medial malleolus fracture with   lateral screw backing out in comparison to operative imaging.  • Comparative data: Compared to operative images             Result Review :   The following data was reviewed by: SHYANNE Dumont on 06/14/2022:  Data reviewed: Radiologic studies Reviewed by me with the patient, Dr. Parry also reviewed.             Assessment and Plan    Diagnoses and all orders for this visit:    1. Aftercare following surgery of left ankle ORIF with syndesmosis fixation and revision internal fixation of medial malleolus fracture 6/1/2022 (Primary)  -     XR Tibia Fibula 2 View Left    2. Aftercare following surgery of the musculoskeletal system  -     XR Tibia Fibula 2 View Left        Reviewed x-rays with the patient discussed diagnosis and treatment options with the patient patient was advised of appearance of screw backing out for medial malleolus fracture fixation, Dr. Parry is also aware of this and per Dr. Parry advised we will continue conservative treatment at this time,  patient was placed into a cast, advised to avoid weightbearing, he expresses understanding and states he will do so, follow-up in 2 weeks for close evaluation with x-rays in the cast, if worsening symptoms or worsening appearance on x-rays occur we will discuss future treatment options at this time Dr. Parry and the patient are in agreement that patient will continue healing with cast placement and nonweightbearing, pain medication refill was given, patient was advised to call for another pain medication refill if he needs it.  Call or return if worsening symptoms.    Follow Up   Return in about 2 weeks (around 6/28/2022).  Patient was given instructions and counseling regarding his condition or for health maintenance advice. Please see specific information pulled into the AVS if appropriate.

## 2022-06-23 ENCOUNTER — TELEPHONE (OUTPATIENT)
Dept: ORTHOPEDIC SURGERY | Facility: CLINIC | Age: 62
End: 2022-06-23

## 2022-06-23 DIAGNOSIS — S82.55XA CLOSED NONDISPLACED FRACTURE OF MEDIAL MALLEOLUS OF LEFT TIBIA, INITIAL ENCOUNTER: ICD-10-CM

## 2022-06-23 RX ORDER — HYDROCODONE BITARTRATE AND ACETAMINOPHEN 7.5; 325 MG/1; MG/1
TABLET ORAL
Qty: 40 TABLET | Refills: 0 | Status: SHIPPED | OUTPATIENT
Start: 2022-06-23 | End: 2023-03-23

## 2022-06-30 ENCOUNTER — OFFICE VISIT (OUTPATIENT)
Dept: ORTHOPEDIC SURGERY | Facility: CLINIC | Age: 62
End: 2022-06-30

## 2022-06-30 VITALS — BODY MASS INDEX: 39.01 KG/M2 | OXYGEN SATURATION: 96 % | HEIGHT: 72 IN | HEART RATE: 62 BPM | WEIGHT: 288 LBS

## 2022-06-30 DIAGNOSIS — Z47.89 AFTERCARE FOLLOWING SURGERY OF THE MUSCULOSKELETAL SYSTEM: Primary | ICD-10-CM

## 2022-06-30 DIAGNOSIS — Z47.89 AFTERCARE FOLLOWING SURGERY OF THE MUSCULOSKELETAL SYSTEM: ICD-10-CM

## 2022-06-30 DIAGNOSIS — S82.402A CLOSED FRACTURE OF SHAFT OF LEFT FIBULA, UNSPECIFIED FRACTURE MORPHOLOGY, INITIAL ENCOUNTER: ICD-10-CM

## 2022-06-30 PROCEDURE — 99024 POSTOP FOLLOW-UP VISIT: CPT | Performed by: PHYSICIAN ASSISTANT

## 2022-06-30 RX ORDER — EMPAGLIFLOZIN 10 MG/1
TABLET, FILM COATED ORAL
COMMUNITY
Start: 2022-06-24

## 2022-06-30 NOTE — PROGRESS NOTES
"Chief Complaint  Pain and Follow-up of the Left Ankle    Subjective          Carlos Murphy Jr. is a 61 y.o. male  presents to [facility] for   History of Present Illness      Patient presents with his daughter April for follow-up evaluation of left ankle ORIF with syndesmosis fixation and revision internal fixation medial malleolus fracture, 6/1/2022.  Patient presents in his cast he states he is tolerating the cast well he states he still has some pain in the ankle he points to the anterior ankle on the lateral proximal fibula as his areas of pain.  Patient presents in a wheelchair he states he has remained nonweightbearing his daughter states he has been compliant with avoiding bearing weight.      No Known Allergies     Social History     Socioeconomic History   • Marital status:    Tobacco Use   • Smoking status: Former Smoker     Packs/day: 0.50     Years: 35.00     Pack years: 17.50     Types: Cigarettes     Quit date: 2007     Years since quitting: 15.5   • Smokeless tobacco: Never Used   Vaping Use   • Vaping Use: Never used   Substance and Sexual Activity   • Alcohol use: Yes     Comment: OCCASIONAL   • Drug use: Never   • Sexual activity: Defer        REVIEW OF SYSTEMS    Constitutional: Denies fevers, chills, weight loss  Cardiovascular: Denies chest pain, shortness of breath  Skin: Denies rashes, acute skin changes  Neurologic: Denies headache, loss of consciousness  MSK: Left ankle pain/left tib-fib pain      Objective   Vital Signs:   Pulse 62   Ht 182.9 cm (72\")   Wt 131 kg (288 lb)   SpO2 96%   BMI 39.06 kg/m²     Body mass index is 39.06 kg/m².    Physical Exam    Left lower extremity: Cast is intact, no signs of cracking or loosening, patient able to wiggle toes, sensation intact, capillary refill less than 3 seconds, skin surrounding the cast is intact.  No swelling.    Procedures    Imaging Results (Most Recent)     Procedure Component Value Units " Date/Time    XR Tibia Fibula 2 View Left [[id number]] Resulted: 06/30/22 1657     Updated: 06/30/22 1700    Narrative:      • View:AP and Lateral view(s)  • Site: Left ankle/left tib-fib  • Indication: Left ankle pain/left tib-fib pain  • Study: X-rays ordered, taken in the office, and reviewed today  • X-ray:Good healing of proximal fibula fracture with callus formation and   fracture alignment remains stable.  Intact syndesmosis fixation, healing   medial malleolus fracture, with no change in position of lateral screw   though in comparison lateral screw has appearance of backing out, fracture   alignment remains stable.  • Comparative data: No comparative data found      XR Ankle 2 View Left [[id number]] Resulted: 06/30/22 1657     Updated: 06/30/22 1700    Narrative:      • View:AP and Lateral view(s)  • Site: Left ankle/left tib-fib  • Indication: Left ankle pain/left tib-fib pain  • Study: X-rays ordered, taken in the office, and reviewed today  • X-ray:Good healing of proximal fibula fracture with callus formation and   fracture alignment remains stable.  Intact syndesmosis fixation, healing   medial malleolus fracture, with no change in position of lateral screw   though in comparison lateral screw has appearance of backing out, fracture   alignment remains stable.  • Comparative data: No comparative data found             Result Review :   The following data was reviewed by: SHYANNE Dumont on 06/30/2022:  Data reviewed: Radiologic studies Reviewed by me with the patient             Assessment and Plan    Diagnoses and all orders for this visit:    1. Aftercare following surgery of left ankle ORIF with syndesmosis fixation and revision internal fixation of medial malleolus fracture 6/1/2022 (Primary)  -     XR Ankle 2 View Left    2. Closed fracture of shaft of left fibula, unspecified fracture morphology, initial encounter  -     XR Tibia Fibula 2 View Left    3. Aftercare following surgery of  the musculoskeletal system  -     XR Ankle 2 View Left        Reviewed x-rays with the patient and his daughter, advised him recommend continuing cast use, follow-up in 2 weeks with cast removal and x-rays, if any new or concerning symptoms occur follow-up sooner, remain nonweightbearing.    Call or return if worsening symptoms.    Follow Up   Return in about 2 weeks (around 7/14/2022) for Recheck.  Patient was given instructions and counseling regarding his condition or for health maintenance advice. Please see specific information pulled into the AVS if appropriate.

## 2022-07-14 ENCOUNTER — OFFICE VISIT (OUTPATIENT)
Dept: ORTHOPEDIC SURGERY | Facility: CLINIC | Age: 62
End: 2022-07-14

## 2022-07-14 VITALS — HEART RATE: 60 BPM | BODY MASS INDEX: 39.06 KG/M2 | HEIGHT: 72 IN | OXYGEN SATURATION: 94 %

## 2022-07-14 DIAGNOSIS — Z47.89 AFTERCARE FOLLOWING SURGERY OF THE MUSCULOSKELETAL SYSTEM: ICD-10-CM

## 2022-07-14 DIAGNOSIS — M25.572 LEFT ANKLE PAIN, UNSPECIFIED CHRONICITY: Primary | ICD-10-CM

## 2022-07-14 PROCEDURE — 99024 POSTOP FOLLOW-UP VISIT: CPT | Performed by: ORTHOPAEDIC SURGERY

## 2022-07-14 RX ORDER — DOXYCYCLINE HYCLATE 100 MG/1
100 CAPSULE ORAL 2 TIMES DAILY
Qty: 28 CAPSULE | Refills: 0 | Status: SHIPPED | OUTPATIENT
Start: 2022-07-14 | End: 2022-07-28

## 2022-07-14 RX ORDER — BLOOD-GLUCOSE METER
EACH MISCELLANEOUS
COMMUNITY
Start: 2022-06-15

## 2022-07-14 RX ORDER — LISINOPRIL 30 MG/1
TABLET ORAL
COMMUNITY
Start: 2022-06-24

## 2022-07-14 RX ORDER — LANCETS
EACH MISCELLANEOUS
COMMUNITY
Start: 2022-06-15

## 2022-07-14 RX ORDER — SYRING-NEEDL,DISP,INSUL,0.3 ML 31 GX5/16"
SYRINGE, EMPTY DISPOSABLE MISCELLANEOUS
COMMUNITY
Start: 2022-06-24

## 2022-07-14 NOTE — PROGRESS NOTES
"Chief Complaint  Follow-up of the Left Ankle     Subjective      Carlos Murphy JrAngel presents to [facility] for follow-up evaluation of left ankle ORIF with syndesmosis fixation and revision internal fixation medial malleolus fracture, 6/1/2022. He had his cast removed and put on a CAM walker boot about 5 days ago. He states he fell and busted his toe.     No Known Allergies     Social History     Socioeconomic History   • Marital status:    Tobacco Use   • Smoking status: Former Smoker     Packs/day: 0.50     Years: 35.00     Pack years: 17.50     Types: Cigarettes     Quit date: 2007     Years since quitting: 15.5   • Smokeless tobacco: Never Used   Vaping Use   • Vaping Use: Never used   Substance and Sexual Activity   • Alcohol use: Yes     Comment: OCCASIONAL   • Drug use: Never   • Sexual activity: Defer        Review of Systems     Objective   Vital Signs:   Pulse 60   Ht 182.9 cm (72\")   SpO2 94%   BMI 39.06 kg/m²       Physical Exam  Constitutional:       Appearance: Normal appearance. The patient is well-developed and normal weight.   HENT:      Head: Normocephalic.      Right Ear: Hearing and external ear normal.      Left Ear: Hearing and external ear normal.      Nose: Nose normal.   Eyes:      Conjunctiva/sclera: Conjunctivae normal.   Cardiovascular:      Rate and Rhythm: Normal rate.   Pulmonary:      Effort: Pulmonary effort is normal.      Breath sounds: No wheezing or rales.   Abdominal:      Palpations: Abdomen is soft.      Tenderness: There is no abdominal tenderness.   Musculoskeletal:      Cervical back: Normal range of motion.   Skin:     Findings: No rash.   Neurological:      Mental Status: The patient is alert and oriented to person, place, and time.   Psychiatric:         Mood and Affect: Mood and affect normal.         Judgment: Judgment normal.       Ortho Exam      Left ankle- Left ankle- dorsiflexion neutral. Plantar flexion 30. Plantar valgus " deformity to boot. Healing incisions to lateral ankle with mild redness and tenderness. No current drainage. Well healed incisions to medial foot.     Procedures    X-Ray Report:  Left ankle(s) X-Ray  Indication: Evaluation of left ankle pain  AP and Lateral view(s)  Findings:  Healing midshaft fibula fracture with unchanged alignment. Healing at fracture site. Intact systemotic  screws. Medial distal tibial fracture with hardware in unchanged position.  Prior studies available for comparison: no     Imaging Results (Most Recent)     Procedure Component Value Units Date/Time    XR Tibia Fibula 2 View Left [[id number]] Resulted: 07/14/22 1336     Updated: 07/14/22 1341           Result Review :       XR Ankle 2 View Left, XR Tibia Fibula 2 View Left    Result Date: 7/5/2022  Narrative: • View:AP and Lateral view(s) • Site: Left ankle/left tib-fib • Indication: Left ankle pain/left tib-fib pain • Study: X-rays ordered, taken in the office, and reviewed today • X-ray:Good healing of proximal fibula fracture with callus formation and fracture alignment remains stable.  Intact syndesmosis fixation, healing medial malleolus fracture, with no change in position of lateral screw though in comparison lateral screw has appearance of backing out, fracture alignment remains stable. • Comparative data: No comparative data found              Assessment and Plan     Diagnoses and all orders for this visit:    1. Left ankle pain, unspecified chronicity (Primary)  -     Cancel: XR Ankle 2 View Left  -     XR Tibia Fibula 2 View Left    2. Aftercare following surgery of left ankle ORIF with syndesmosis fixation and revision internal fixation of medial malleolus fracture 6/1/2022        Discussed the treatment plan with the patient.  I reviewed the x-rays that were obtained today with the patient. Prescription for keflex given today. He can remove boot while sitting.     Call or return if worsening symptoms.    Follow Up     4  weeks      Patient was given instructions and counseling regarding his condition or for health maintenance advice. Please see specific information pulled into the AVS if appropriate.     Scribed for Tha Parry MD by Samantha Head.  07/14/22   13:39 EDT    I have personally performed the services described in this document as scribed by the above individual and it is both accurate and complete. Tha Parry MD 07/16/22

## 2022-08-01 ENCOUNTER — APPOINTMENT (OUTPATIENT)
Dept: CARDIAC REHAB | Facility: HOSPITAL | Age: 62
End: 2022-08-01

## 2022-08-18 ENCOUNTER — OFFICE VISIT (OUTPATIENT)
Dept: ORTHOPEDIC SURGERY | Facility: CLINIC | Age: 62
End: 2022-08-18

## 2022-08-18 VITALS — WEIGHT: 290 LBS | HEIGHT: 72 IN | OXYGEN SATURATION: 96 % | HEART RATE: 62 BPM | BODY MASS INDEX: 39.28 KG/M2

## 2022-08-18 DIAGNOSIS — Z47.89 AFTERCARE FOLLOWING SURGERY OF THE MUSCULOSKELETAL SYSTEM: Primary | ICD-10-CM

## 2022-08-18 DIAGNOSIS — Z47.89 AFTERCARE FOLLOWING SURGERY OF THE MUSCULOSKELETAL SYSTEM: ICD-10-CM

## 2022-08-18 PROCEDURE — 99024 POSTOP FOLLOW-UP VISIT: CPT | Performed by: PHYSICIAN ASSISTANT

## 2022-08-18 NOTE — PROGRESS NOTES
"Chief Complaint  Follow-up and Pain of the Left Ankle    Subjective          Carlos Murphy Jr. is a 61 y.o. male  presents to [facility] for   History of Present Illness      Patient presents for follow-up evaluation of left ankle ORIF with syndesmosis fixation and revision internal fixation medial malleolus fracture, 6/1/2022.  At last visit Dr. Parry saw the patient on 7/14/2022 his cast was removed and he was placed into a walking boot.  Dr. Parry evaluated the patient took x-rays, advised the patient to continue boot use, he was placed on Keflex patient states his incisions have healed well, he has mainly been working on gentle range of motion has not started physical therapy.  He states his pain in his shin/in the area of his fibular fracture has been feeling better he states he still has some pain in the ankle.      No Known Allergies     Social History     Socioeconomic History   • Marital status:    Tobacco Use   • Smoking status: Former Smoker     Packs/day: 0.50     Years: 35.00     Pack years: 17.50     Types: Cigarettes     Quit date: 2007     Years since quitting: 15.6   • Smokeless tobacco: Never Used   Vaping Use   • Vaping Use: Never used   Substance and Sexual Activity   • Alcohol use: Yes     Comment: OCCASIONAL   • Drug use: Never   • Sexual activity: Defer        REVIEW OF SYSTEMS    Constitutional: Denies fevers, chills, weight loss  Cardiovascular: Denies chest pain, shortness of breath  Skin: Denies rashes, acute skin changes  Neurologic: Denies headache, loss of consciousness  MSK: Left lower extremity pain      Objective   Vital Signs:   Pulse 62   Ht 182.9 cm (72\")   Wt 132 kg (290 lb)   SpO2 96%   BMI 39.33 kg/m²     Body mass index is 39.33 kg/m².    Physical Exam    Left lower extremity: Left ankle dorsiflexion neutral, plantarflexion 30, plantar valgus deformity, well-healed incisions to the lateral ankle, well-healed incisions to " medial foot.  Nontender fibular fracture site.    Procedures    Imaging Results (Most Recent)     Procedure Component Value Units Date/Time    XR Tibia Fibula 2 View Left [[id number]] Resulted: 08/18/22 1645     Updated: 08/18/22 1647    Narrative:      • View:AP and Lateral view(s)  • Site: Left tib-fib  • Indication: Left tib-fib pain  • Study: X-rays ordered, taken in the office, and reviewed today  • X-ray: Healing midshaft fibula fracture, fracture alignment remains   stable with callus formation.  Ankle reveals intact syndesmotic screws and   medial distal tibial fracture healing with hardware in unchanged position.  • Comparative data: Compared to previous studies.             Result Review :   The following data was reviewed by: SHYANNE Dumont on 08/18/2022:  Data reviewed: Radiologic studies Reviewed by me with the patient, compared to previous studies taken at last visit             Assessment and Plan {CC Problem List  Visit Diagnosis   ROS  Review (Popup)  Health Maintenance  Quality  BestPractice  Medications  SmartSets  SnapShot Encounters  Media :23}   Diagnoses and all orders for this visit:    1. Aftercare following surgery of left ankle ORIF with syndesmosis fixation and revision internal fixation of medial malleolus fracture 6/1/2022 (Primary)  -     XR Tibia Fibula 2 View Left  -     Ambulatory Referral to Home Health    2. Aftercare following surgery of the musculoskeletal system  -     XR Tibia Fibula 2 View Left  -     Ambulatory Referral to Home Health        Reviewed x-rays with the patient advised him recommend continued boot use, start home health physical therapy this was ordered for him, he may start 25 to 50% weightbearing with physical therapy help, a walker use.  Follow-up in 4 weeks with x-rays.    Call or return if worsening symptoms.    Follow Up   Return in about 4 weeks (around 9/15/2022) for Recheck.  Patient was given instructions and counseling  regarding his condition or for health maintenance advice. Please see specific information pulled into the AVS if appropriate.

## 2022-08-23 ENCOUNTER — TELEPHONE (OUTPATIENT)
Dept: ORTHOPEDIC SURGERY | Facility: CLINIC | Age: 62
End: 2022-08-23

## 2022-08-23 NOTE — TELEPHONE ENCOUNTER
RECEIVED A CALL FOR LANDON FROM HOME HEALTH ABOUT PATIENT. HOME HEALTH WILL CONTINUE CARE FOR 2 TIMES FOR 2 WEEKS AND 1 TIME FOR 4 ADDITIONAL WEEKS.  SHE ALSO REPORTS THAT PATIENT IS NOT FOLLOWING WEIGHT BEARING STATUS AS PRESCRIBED AND TAUGHT.

## 2022-08-29 ENCOUNTER — TELEPHONE (OUTPATIENT)
Dept: ORTHOPEDIC SURGERY | Facility: CLINIC | Age: 62
End: 2022-08-29

## 2022-08-29 NOTE — TELEPHONE ENCOUNTER
Caller: Carlos Murphy Jr.    Relationship to patient: Self    Best call back number: [phone]    Patient is needing: CALLBACK; PATIENT NEEDS A NEW BOOT        UNABLE TO WARM TRANSFER

## 2022-08-29 NOTE — TELEPHONE ENCOUNTER
Spoke with patient and he asked for the address for Tasha and gave him the telephone number also. Patient may wait til his appointment, but can come in anytime.

## 2022-09-08 NOTE — PROGRESS NOTES
Progress Assessment        Patient: Carlos Murphy Jr.   : 1960  Diagnosis/ICD-10 Code:  Charcot ankle, left [M14.672]  Referring practitioner: Alvaro Pimentel MD  Date of Initial Visit: Type: THERAPY  Noted: 2021  Today's Date: 2021  Patient seen for 4 sessions      Subjective:   Carlos Murphy reports: 9/10 pain in L foot today  Subjective Questionnaire: LEFS: 20%  Clinical Progress: unchanged  Home Program Compliance: Yes  Treatment has included: therapeutic exercise, neuromuscular re-education, manual therapy, therapeutic activity and gait training    Subjective   Objective   Assessment/Plan  Progress toward previous goals: Partially Met    See Exercise, Manual, and Modality Logs for complete treatment.         Recommendations: Discharge  Prognosis to achieve goals: fair     Plan Goals: 1. The patient has limited ROM for the left ankle.    LTG 1: 12 weeks: In order to allow the patient greater ease with forward, lateral, and diagonal mobility the patient will demonstrate improved ROM of the left ankle as follows: 5 degrees of dorsiflexion, 45 degrees of plantarflexion, and 15 degrees of inversion. PARTIALLY MET    STG 1a: 6 weeks: The patient will demonstrate improved ROM of the left ankle as follows: 3 degrees of dorsiflexion, 35 degrees of plantarflexion, and 10 degrees of inversion. PARTIALLY MET  STATUS: New    2. The patient has limited strength of the left ankle.    LTG 2: 12 weeks: In order to provide greater joint stability of the left ankle the patient will demonstrate improved strength of the left ankle as follows: 5/5 dorsiflexion, 5/5 inversion, 5/5 eversion, and 5/5 plantar flexion. NOT MET      STG 2a: 6 weeks: The patient will demonstrate improved strength of the left ankle as follows: 4/5 dorsiflexion, 4/5 inversion, 4/5 eversion, and 4/5 plantar flexion. NOT MET    3. The patient has gait dysfunction.    LTG 3: 12 weeks: The patient will ambulate without assistive  device, independently, for community distances with minimal limp and no loss of balance to the left lower extremity in order to improve mobility and allow patient to perform activities such as grocery shopping with greater ease. PARTIALLY MET    4. The patient complains of left ankle pain.    LTG 4: 12 weeks: The patient will report a pain rating of 4/10 or better in order to improve tolerance to activities of daily living and improve sleep quality. NOT MET      STG 4a: 6 weeks: The patient will report a pain rating of 7/10 or better. NOT MET        LTG 5b: 12 weeks: The patient will demonstrate 30% limitation on Lower Extremity Functional Scale. NOT MET       STG 5a: 6 weeks: The patient will demonstrate 50% limitation on the Lower Extremity Functional Scale. NOT MET      ASSESSMENT: Pt has 0 degrees of DF, 45 degrees of PF, 15 degrees of inversion and 25 degrees of eversion today on L foot. Pt overall has 3+/5 with MMT in all ankle planes in L foot. Pt is limited with MMT due to pain. Overall, patient has not made any significant progress with PT over the course of the last month. Pt is very limited with therapy due to pain. Due to this lack of progress, patient will be discharged from PT at this time. Educated patient to continue exercises at home.          PT SIGNATURE: Electronically signed by Kavita Gonzales PT  [place] LICENSE: [id number]    Based upon review of the patient's progress and continued therapy plan, it is my medical opinion that Carlos Murphy should be discharged physical therapy treatment at [facility]  [address] [facility] [address]  [place] [address] [phone].      Timed:                 Manual Therapy:    0     mins  34765;     Therapeutic Exercise:    14     mins  13836;     Neuromuscular Hallie:    8    mins  52362;    Therapeutic Activity:     0     mins  40317;     Gait Trainin     mins  40278;     Ultrasound:     0     mins  07801;    Ionto                                0    mins   14949  Self pay                         0     mins [id number]    Un-Timed:  Electrical Stimulation:    0     mins  77895 ( )  Canalith Repos    0     mins 16796  Dry Needling     0     mins self-pay  Traction     0     mins 01329    Timed Treatment:   24   mins   Total Treatment:     30   mins      I certify that the therapy services are furnished while this patient is under my care.  The services outlined above are required by this patient, and will be reviewed every 90 days.          19:28

## 2022-09-19 ENCOUNTER — OFFICE VISIT (OUTPATIENT)
Dept: ORTHOPEDIC SURGERY | Facility: CLINIC | Age: 62
End: 2022-09-19

## 2022-09-19 VITALS — WEIGHT: 299.4 LBS | HEIGHT: 72 IN | OXYGEN SATURATION: 94 % | HEART RATE: 81 BPM | BODY MASS INDEX: 40.55 KG/M2

## 2022-09-19 DIAGNOSIS — Z47.89 AFTERCARE FOLLOWING SURGERY OF THE MUSCULOSKELETAL SYSTEM: ICD-10-CM

## 2022-09-19 DIAGNOSIS — Z47.89 AFTERCARE FOLLOWING SURGERY OF THE MUSCULOSKELETAL SYSTEM: Primary | ICD-10-CM

## 2022-09-19 PROCEDURE — 99213 OFFICE O/P EST LOW 20 MIN: CPT | Performed by: PHYSICIAN ASSISTANT

## 2022-09-19 RX ORDER — BLOOD SUGAR DIAGNOSTIC
STRIP MISCELLANEOUS
COMMUNITY
Start: 2022-09-14

## 2022-09-19 NOTE — PROGRESS NOTES
"Chief Complaint  Pain and Follow-up of the Left Ankle    Subjective          Carlos Murphy Jr. is a 61 y.o. male  presents to [facility] for   History of Present Illness      Patient presents for follow-up evaluation of left ankle ORIF with syndesmosis fixation and revision internal fixation medial malleolus fracture, 6/1/2022.  Patient states he continues to do well he has had home health physical therapy since his last visit he states they are now coming once a week and are about to release him.  Patient states he has no pain in the ankle, he has Charcot foot and presents with a brace that was made for him prior to his ankle fracture, he started wearing the brace this last Thursday and states he has been doing well with ambulating with the brace.  He presents with crutches he use the crutches for support.  He states that he has no pain in the ankle he states everything he is experiencing at this point is due to his foot condition.  He sees Dr. Wing for his foot.  No new complaints of the ankle.  He states incisions have healed well.      No Known Allergies     Social History     Socioeconomic History   • Marital status:    Tobacco Use   • Smoking status: Former Smoker     Packs/day: 0.50     Years: 35.00     Pack years: 17.50     Types: Cigarettes     Quit date: 2007     Years since quitting: 15.7   • Smokeless tobacco: Never Used   Vaping Use   • Vaping Use: Never used   Substance and Sexual Activity   • Alcohol use: Yes     Comment: OCCASIONAL   • Drug use: Never   • Sexual activity: Defer        REVIEW OF SYSTEMS    Constitutional: Denies fevers, chills, weight loss  Cardiovascular: Denies chest pain, shortness of breath  Skin: Denies rashes, acute skin changes  Neurologic: Denies headache, loss of consciousness  MSK: Left ankle pain      Objective   Vital Signs:   Pulse 81   Ht 182.9 cm (72\")   Wt 136 kg (299 lb 6.4 oz)   SpO2 94%   BMI 40.61 kg/m²     Body mass index " is 40.61 kg/m².    Physical Exam    Left ankle: Incisions are well-healed, no erythema, no ecchymosis, no swelling, nontender to palpation of anterior/medial/lateral/posterior ankle, dorsiflexion neutral, plantarflexion 30, plantar valgus deformity, nontender at the proximal fibula fracture site.    Procedures    Imaging Results (Most Recent)     Procedure Component Value Units Date/Time    XR Ankle 2 View Left [[id number]] Resulted: 09/19/22 1608     Updated: 09/19/22 1609    Narrative:      • View:AP and Lateral view(s)  • Site: Left tib-fib  • Indication: Left tib-fib pain  • Study: X-rays ordered, taken in the office, and reviewed today  • X-ray: Well-healed midshaft fibular fracture with good alignment and   callus formation, intact syndesmotic screws and medial distal tibial   fracture healing well with hardware in unchanged position.  • Comparative data: No comparative data found             Result Review :   The following data was reviewed by: SHYANNE Dumont on 09/19/2022:  Data reviewed: Radiologic studies Reviewed by me with the patient             Assessment and Plan    Diagnoses and all orders for this visit:    1. Aftercare following surgery of left ankle ORIF with syndesmosis fixation and revision internal fixation of medial malleolus fracture 6/1/2022 (Primary)  -     XR Ankle 2 View Left    2. Aftercare following surgery of the musculoskeletal system  -     XR Ankle 2 View Left        Reviewed x-rays with the patient discussed diagnosis and treatment options he will continue foot care with Dr. Young and his brace that he is now wearing, weightbearing and activity as tolerated, patient was advised he could start outpatient physical therapy, he states he would like to continue home exercises, he will follow-up as needed per his request.    Call or return if worsening symptoms.    Follow Up   Return if symptoms worsen or fail to improve.  Patient was given instructions and counseling  regarding his condition or for health maintenance advice. Please see specific information pulled into the AVS if appropriate.

## 2023-03-23 ENCOUNTER — PREP FOR SURGERY (OUTPATIENT)
Dept: OTHER | Facility: HOSPITAL | Age: 63
End: 2023-03-23
Payer: MEDICARE

## 2023-03-23 ENCOUNTER — CLINICAL SUPPORT (OUTPATIENT)
Dept: GASTROENTEROLOGY | Facility: CLINIC | Age: 63
End: 2023-03-23
Payer: MEDICARE

## 2023-03-23 DIAGNOSIS — Z12.11 COLON CANCER SCREENING: Primary | ICD-10-CM

## 2023-03-23 RX ORDER — SODIUM, POTASSIUM,MAG SULFATES 17.5-3.13G
1 SOLUTION, RECONSTITUTED, ORAL ORAL EVERY 12 HOURS
Qty: 354 ML | Refills: 0 | Status: SHIPPED | OUTPATIENT
Start: 2023-03-23

## 2023-03-23 RX ORDER — AMLODIPINE BESYLATE 5 MG/1
1 TABLET ORAL
COMMUNITY

## 2023-03-23 RX ORDER — MULTIPLE VITAMINS W/ MINERALS TAB 9MG-400MCG
1 TAB ORAL
COMMUNITY

## 2023-03-23 RX ORDER — NEOMYCIN SULFATE, POLYMYXIN B SULFATE AND DEXAMETHASONE 3.5; 10000; 1 MG/ML; [USP'U]/ML; MG/ML
SUSPENSION/ DROPS OPHTHALMIC
COMMUNITY
Start: 2023-02-13

## 2023-03-23 RX ORDER — LUBIPROSTONE 24 UG/1
CAPSULE ORAL
COMMUNITY
Start: 2023-03-13

## 2023-03-23 NOTE — PROGRESS NOTES
Carlos Murphy Jr.  1960  62 y.o.    Reason for call: Screening Colonoscopy  Prep prescribed: Suprep  Prep instructions reviewed with patient and sent to patient via MyChart  Clearance needed? Yes  If yes, what clearance is needed? Blood thinner  Clearance has been requested from     The patient has been scheduled for: Colonoscopy  Family history of colon cancer? No  If yes, indicate relative: NA   Family History   Problem Relation Age of Onset   • Kidney disease Mother    • COPD Mother    • COPD Father    • Heart disease Father    • Malig Hyperthermia Neg Hx      Past Medical History:   Diagnosis Date   • Acute kidney injury (HCC)     2021 POST SEIZURES   • Astigmatism of both eyes     eyes twitch left and right   • Bipolar disorder (formerly [facility])    • Charcot ankle    • Closed nondisplaced fracture of medial malleolus of left tibia    • COPD (chronic obstructive pulmonary disease) (formerly [facility])     USES INHALERS   • Diabetes (formerly [facility])     BG RUNS AROUND 90'S IN AM   • Hx of schizophrenia    • Hyperlipidemia    • Hypertension     SEEN DR ROD IN THE PAST, HAD APPT WITH DR MICHAUD IN 5-2022 CX APPT, DENIED CP/SOB   • Neuropathy    • Seizures (formerly [facility])     LAST ONE 4/21/22   • Sleep apnea     DOES NOT USE CPAP   • Varicose vein of leg      No Known Allergies  Past Surgical History:   Procedure Laterality Date   • ANKLE OPEN REDUCTION INTERNAL FIXATION Left 04/25/2022    Procedure: LEFT OPEN REDUCTION INTERNAL FIXATION DISTAL TIBIA FRACTURE ;  Surgeon: Tha Parry MD;  [facility];  Service: Orthopedics;  Laterality: Left;   • ANKLE OPEN REDUCTION INTERNAL FIXATION Left 06/01/2022    Procedure: LEFT ANKLE OPEN REDUCTION INTERNAL FIXATION WITH SYNDESMOSIS FIXATION;  Surgeon: Tha Parry MD;  Location: [facility] MAIN OR;  Service: Orthopedics;  Laterality: Left;   • CHOLECYSTECTOMY     • COLONOSCOPY     • JOINT REPLACEMENT      RTKR   • LUMBAR DISC SURGERY     • SHOULDER SURGERY Right      Social History      Socioeconomic History   • Marital status:    Tobacco Use   • Smoking status: Former     Packs/day: 0.50     Years: 35.00     Pack years: 17.50     Types: Cigarettes     Quit date:      Years since quittin.2   • Smokeless tobacco: Never   Vaping Use   • Vaping Use: Never used   Substance and Sexual Activity   • Alcohol use: Yes     Comment: OCCASIONAL   • Drug use: Never   • Sexual activity: Defer       Current Outpatient Medications:   •  Accu-Chek Guide test strip, , Disp: , Rfl:   •  Accu-Chek Softclix Lancets lancets, , Disp: , Rfl:   •  albuterol sulfate  (90 Base) MCG/ACT inhaler, Inhale 2 puffs Every 4 (Four) Hours As Needed., Disp: , Rfl:   •  amLODIPine (NORVASC) 5 MG tablet, Take 1 tablet by mouth Daily., Disp: , Rfl:   •  amLODIPine (NORVASC) 5 MG tablet, Take 1 tablet by mouth., Disp: , Rfl:   •  aspirin EC (Ecotrin) 325 MG tablet, Take 1 tablet by mouth Daily., Disp: 14 tablet, Rfl: 0  •  atenolol (TENORMIN) 25 MG tablet, Take 1 tablet by mouth Daily., Disp: , Rfl:   •  atorvastatin (LIPITOR) 40 MG tablet, Take 1 tablet by mouth Every Night., Disp: , Rfl:   •  Blood Glucose Monitoring Suppl (Accu-Chek Guide Me) w/Device kit, , Disp: , Rfl:   •  budesonide-formoterol (Symbicort) 160-4.5 MCG/ACT inhaler, Inhale 2 puffs 2 (Two) Times a Day., Disp: 1 each, Rfl: 11  •  escitalopram (LEXAPRO) 20 MG tablet, Take 1 tablet by mouth Daily., Disp: , Rfl:   •  furosemide (LASIX) 20 MG tablet, Take 1 tablet by mouth Daily., Disp: , Rfl:   •  glipizide (GLUCOTROL XL) 5 MG ER tablet, Take 1 tablet by mouth 2 (Two) Times a Day. INSTRUCTED PER ANESTHESIA PROTOCOL, Disp: , Rfl:   •  hydrOXYzine (ATARAX) 50 MG tablet, Take 1 tablet by mouth 3 (Three) Times a Day., Disp: , Rfl:   •  Jardiance 10 MG tablet tablet, , Disp: , Rfl:   •  lamoTRIgine (LaMICtal) 200 MG tablet, Take 1 tablet by mouth 2 (Two) Times a Day., Disp: , Rfl:   •  lisinopril (PRINIVIL,ZESTRIL) 30 MG tablet, , Disp: , Rfl:   •   "lisinopril (PRINIVIL,ZESTRIL) 40 MG tablet, Take 1 tablet by mouth Daily., Disp: , Rfl:   •  lubiprostone (AMITIZA) 24 MCG capsule, , Disp: , Rfl:   •  multivitamin with minerals tablet tablet, Take 1 tablet by mouth., Disp: , Rfl:   •  neomycin-polymyxin-dexamethasone (MAXITROL) 3.5-64724-3.1 ophthalmic suspension, , Disp: , Rfl:   •  NovoLOG Mix 70/30 FlexPen (70-30) 100 UNIT/ML suspension pen-injector injection, REPORTS SLIDING SCALE [id number]. INSTRUCTED PER ANESTHESIA PROTOCOL IF BS GREATER THEN 140 CAN TAKE 1/2 OF THE DOSE, Disp: , Rfl:   •  oxyCODONE-acetaminophen (PERCOCET) 7.5-325 MG per tablet, Take 1-2 tablets by mouth Every 4 (Four) Hours As Needed for Moderate Pain ., Disp: 36 tablet, Rfl: 0  •  pantoprazole (PROTONIX) 40 MG EC tablet, Take 1 tablet by mouth 2 (Two) Times a Day., Disp: , Rfl:   •  pregabalin (LYRICA) 100 MG capsule, Take 1 capsule by mouth 3 (Three) Times a Day., Disp: , Rfl:   •  QUEtiapine (SEROquel) 300 MG tablet, Take 2 tablets by mouth Every Night., Disp: , Rfl:   •  Semaglutide,0.25 or 0.5MG/DOS, (Ozempic, 0.25 or 0.5 MG/DOSE,) 2 MG/1.5ML solution pen-injector, Inject 5 mg under the skin into the appropriate area as directed 1 (One) Time Per Week. INSTRUCTED PER ANESTHESIA STANDING ORDERS, Disp: , Rfl:   •  TRUEplus Insulin Syringe 31G X 5/16\" 0.3 ML misc, , Disp: , Rfl:   •  zolpidem (AMBIEN) 10 MG tablet, Take 1 tablet by mouth Every Night., Disp: , Rfl:     "

## 2023-04-30 ENCOUNTER — HOSPITAL ENCOUNTER (INPATIENT)
Facility: HOSPITAL | Age: 63
LOS: 3 days | Discharge: HOME | End: 2023-05-03
Attending: EMERGENCY MEDICINE | Admitting: INTERNAL MEDICINE
Payer: MEDICARE

## 2023-04-30 ENCOUNTER — APPOINTMENT (OUTPATIENT)
Dept: CT IMAGING | Facility: HOSPITAL | Age: 63
End: 2023-04-30
Payer: MEDICARE

## 2023-04-30 ENCOUNTER — APPOINTMENT (OUTPATIENT)
Dept: GENERAL RADIOLOGY | Facility: HOSPITAL | Age: 63
End: 2023-04-30
Payer: MEDICARE

## 2023-04-30 DIAGNOSIS — Z78.9 DECREASED ACTIVITIES OF DAILY LIVING (ADL): ICD-10-CM

## 2023-04-30 DIAGNOSIS — R26.2 DIFFICULTY WALKING: ICD-10-CM

## 2023-04-30 DIAGNOSIS — K52.9 COLITIS: Primary | ICD-10-CM

## 2023-04-30 LAB
ALBUMIN SERPL-MCNC: 3.8 G/DL (ref 3.5–5.2)
ALBUMIN/GLOB SERPL: 1.5 G/DL
ALP SERPL-CCNC: 136 U/L (ref 39–117)
ALT SERPL W P-5'-P-CCNC: 19 U/L (ref 1–41)
ANION GAP SERPL CALCULATED.3IONS-SCNC: 16.4 MMOL/L (ref 5–15)
AST SERPL-CCNC: 19 U/L (ref 1–40)
BACTERIA UR QL AUTO: ABNORMAL /HPF
BASOPHILS # BLD AUTO: 0.06 10*3/MM3 (ref 0–0.2)
BASOPHILS NFR BLD AUTO: 0.3 % (ref 0–1.5)
BILIRUB SERPL-MCNC: 0.4 MG/DL (ref 0–1.2)
BILIRUB UR QL STRIP: NEGATIVE
BUN SERPL-MCNC: 52 MG/DL (ref 8–23)
BUN/CREAT SERPL: 20.8 (ref 7–25)
CALCIUM SPEC-SCNC: 8.6 MG/DL (ref 8.6–10.5)
CHLORIDE SERPL-SCNC: 104 MMOL/L (ref 98–107)
CLARITY UR: CLEAR
CO2 SERPL-SCNC: 12.6 MMOL/L (ref 22–29)
COLOR UR: YELLOW
CREAT SERPL-MCNC: 2.5 MG/DL (ref 0.76–1.27)
D-LACTATE SERPL-SCNC: 1.8 MMOL/L (ref 0.5–2)
DEPRECATED RDW RBC AUTO: 42 FL (ref 37–54)
EGFRCR SERPLBLD CKD-EPI 2021: 28.5 ML/MIN/1.73
EOSINOPHIL # BLD AUTO: 0.14 10*3/MM3 (ref 0–0.4)
EOSINOPHIL NFR BLD AUTO: 0.8 % (ref 0.3–6.2)
ERYTHROCYTE [DISTWIDTH] IN BLOOD BY AUTOMATED COUNT: 13.2 % (ref 12.3–15.4)
GLOBULIN UR ELPH-MCNC: 2.5 GM/DL
GLUCOSE BLDC GLUCOMTR-MCNC: 273 MG/DL (ref 70–99)
GLUCOSE BLDC GLUCOMTR-MCNC: 303 MG/DL (ref 70–99)
GLUCOSE SERPL-MCNC: 325 MG/DL (ref 65–99)
GLUCOSE UR STRIP-MCNC: ABNORMAL MG/DL
HCT VFR BLD AUTO: 44.8 % (ref 37.5–51)
HEMOCCULT STL QL IA: POSITIVE
HGB BLD-MCNC: 15.2 G/DL (ref 13–17.7)
HGB UR QL STRIP.AUTO: NEGATIVE
HOLD SPECIMEN: NORMAL
HOLD SPECIMEN: NORMAL
HYALINE CASTS UR QL AUTO: ABNORMAL /LPF
IMM GRANULOCYTES # BLD AUTO: 0.08 10*3/MM3 (ref 0–0.05)
IMM GRANULOCYTES NFR BLD AUTO: 0.4 % (ref 0–0.5)
KETONES UR QL STRIP: NEGATIVE
LEUKOCYTE ESTERASE UR QL STRIP.AUTO: NEGATIVE
LYMPHOCYTES # BLD AUTO: 0.87 10*3/MM3 (ref 0.7–3.1)
LYMPHOCYTES NFR BLD AUTO: 4.8 % (ref 19.6–45.3)
MAGNESIUM SERPL-MCNC: 2.7 MG/DL (ref 1.6–2.4)
MCH RBC QN AUTO: 29.5 PG (ref 26.6–33)
MCHC RBC AUTO-ENTMCNC: 33.9 G/DL (ref 31.5–35.7)
MCV RBC AUTO: 86.8 FL (ref 79–97)
MONOCYTES # BLD AUTO: 1.03 10*3/MM3 (ref 0.1–0.9)
MONOCYTES NFR BLD AUTO: 5.7 % (ref 5–12)
NEUTROPHILS NFR BLD AUTO: 16.03 10*3/MM3 (ref 1.7–7)
NEUTROPHILS NFR BLD AUTO: 88 % (ref 42.7–76)
NITRITE UR QL STRIP: NEGATIVE
NRBC BLD AUTO-RTO: 0 /100 WBC (ref 0–0.2)
PH UR STRIP.AUTO: 5.5 [PH] (ref 5–8)
PLATELET # BLD AUTO: 176 10*3/MM3 (ref 140–450)
PMV BLD AUTO: 9.9 FL (ref 6–12)
POTASSIUM SERPL-SCNC: 5.2 MMOL/L (ref 3.5–5.2)
PROT SERPL-MCNC: 6.3 G/DL (ref 6–8.5)
PROT UR QL STRIP: ABNORMAL
RBC # BLD AUTO: 5.16 10*6/MM3 (ref 4.14–5.8)
RBC # UR STRIP: ABNORMAL /HPF
REF LAB TEST METHOD: ABNORMAL
SODIUM SERPL-SCNC: 133 MMOL/L (ref 136–145)
SP GR UR STRIP: 1.02 (ref 1–1.03)
SQUAMOUS #/AREA URNS HPF: ABNORMAL /HPF
TROPONIN T SERPL HS-MCNC: 16 NG/L
UROBILINOGEN UR QL STRIP: ABNORMAL
WBC # UR STRIP: ABNORMAL /HPF
WBC NRBC COR # BLD: 18.21 10*3/MM3 (ref 3.4–10.8)
WHOLE BLOOD HOLD COAG: NORMAL
WHOLE BLOOD HOLD SPECIMEN: NORMAL

## 2023-04-30 PROCEDURE — 70450 CT HEAD/BRAIN W/O DYE: CPT

## 2023-04-30 PROCEDURE — 94640 AIRWAY INHALATION TREATMENT: CPT

## 2023-04-30 PROCEDURE — 74176 CT ABD & PELVIS W/O CONTRAST: CPT

## 2023-04-30 PROCEDURE — 81001 URINALYSIS AUTO W/SCOPE: CPT | Performed by: INTERNAL MEDICINE

## 2023-04-30 PROCEDURE — 94799 UNLISTED PULMONARY SVC/PX: CPT

## 2023-04-30 PROCEDURE — 87040 BLOOD CULTURE FOR BACTERIA: CPT | Performed by: EMERGENCY MEDICINE

## 2023-04-30 PROCEDURE — 84484 ASSAY OF TROPONIN QUANT: CPT | Performed by: EMERGENCY MEDICINE

## 2023-04-30 PROCEDURE — 71045 X-RAY EXAM CHEST 1 VIEW: CPT

## 2023-04-30 PROCEDURE — 93005 ELECTROCARDIOGRAM TRACING: CPT

## 2023-04-30 PROCEDURE — 99223 1ST HOSP IP/OBS HIGH 75: CPT | Performed by: INTERNAL MEDICINE

## 2023-04-30 PROCEDURE — 93005 ELECTROCARDIOGRAM TRACING: CPT | Performed by: EMERGENCY MEDICINE

## 2023-04-30 PROCEDURE — 83735 ASSAY OF MAGNESIUM: CPT | Performed by: EMERGENCY MEDICINE

## 2023-04-30 PROCEDURE — 80053 COMPREHEN METABOLIC PANEL: CPT | Performed by: EMERGENCY MEDICINE

## 2023-04-30 PROCEDURE — 63710000001 INSULIN DETEMIR PER 5 UNITS: Performed by: INTERNAL MEDICINE

## 2023-04-30 PROCEDURE — 85025 COMPLETE CBC W/AUTO DIFF WBC: CPT | Performed by: EMERGENCY MEDICINE

## 2023-04-30 PROCEDURE — 83605 ASSAY OF LACTIC ACID: CPT | Performed by: EMERGENCY MEDICINE

## 2023-04-30 PROCEDURE — 82948 REAGENT STRIP/BLOOD GLUCOSE: CPT

## 2023-04-30 PROCEDURE — 36415 COLL VENOUS BLD VENIPUNCTURE: CPT | Performed by: EMERGENCY MEDICINE

## 2023-04-30 PROCEDURE — 63710000001 INSULIN LISPRO (HUMAN) PER 5 UNITS: Performed by: INTERNAL MEDICINE

## 2023-04-30 PROCEDURE — 99285 EMERGENCY DEPT VISIT HI MDM: CPT

## 2023-04-30 PROCEDURE — 82274 ASSAY TEST FOR BLOOD FECAL: CPT | Performed by: EMERGENCY MEDICINE

## 2023-04-30 PROCEDURE — 25010000002 LEVOFLOXACIN PER 250 MG: Performed by: EMERGENCY MEDICINE

## 2023-04-30 RX ORDER — POLYETHYLENE GLYCOL 3350 17 G/17G
17 POWDER, FOR SOLUTION ORAL DAILY
Status: DISCONTINUED | OUTPATIENT
Start: 2023-04-30 | End: 2023-05-03 | Stop reason: HOSPADM

## 2023-04-30 RX ORDER — AMOXICILLIN 250 MG
2 CAPSULE ORAL 2 TIMES DAILY
Status: DISCONTINUED | OUTPATIENT
Start: 2023-04-30 | End: 2023-05-03 | Stop reason: HOSPADM

## 2023-04-30 RX ORDER — QUETIAPINE FUMARATE 200 MG/1
600 TABLET, FILM COATED ORAL NIGHTLY
Status: DISCONTINUED | OUTPATIENT
Start: 2023-04-30 | End: 2023-05-03 | Stop reason: HOSPADM

## 2023-04-30 RX ORDER — ZOLPIDEM TARTRATE 5 MG/1
10 TABLET ORAL NIGHTLY PRN
Status: DISCONTINUED | OUTPATIENT
Start: 2023-04-30 | End: 2023-05-03 | Stop reason: HOSPADM

## 2023-04-30 RX ORDER — PANTOPRAZOLE SODIUM 40 MG/10ML
40 INJECTION, POWDER, LYOPHILIZED, FOR SOLUTION INTRAVENOUS EVERY 12 HOURS SCHEDULED
Status: DISCONTINUED | OUTPATIENT
Start: 2023-04-30 | End: 2023-05-03 | Stop reason: HOSPADM

## 2023-04-30 RX ORDER — CHOLECALCIFEROL (VITAMIN D3) 125 MCG
5 CAPSULE ORAL NIGHTLY PRN
Status: DISCONTINUED | OUTPATIENT
Start: 2023-04-30 | End: 2023-05-03 | Stop reason: HOSPADM

## 2023-04-30 RX ORDER — SODIUM CHLORIDE 9 MG/ML
125 INJECTION, SOLUTION INTRAVENOUS CONTINUOUS
Status: DISCONTINUED | OUTPATIENT
Start: 2023-04-30 | End: 2023-05-03 | Stop reason: HOSPADM

## 2023-04-30 RX ORDER — LEVOFLOXACIN 5 MG/ML
750 INJECTION, SOLUTION INTRAVENOUS ONCE
Status: COMPLETED | OUTPATIENT
Start: 2023-04-30 | End: 2023-04-30

## 2023-04-30 RX ORDER — LEVOFLOXACIN 5 MG/ML
750 INJECTION, SOLUTION INTRAVENOUS
Status: DISCONTINUED | OUTPATIENT
Start: 2023-05-02 | End: 2023-05-02

## 2023-04-30 RX ORDER — DEXTROSE MONOHYDRATE 25 G/50ML
25 INJECTION, SOLUTION INTRAVENOUS
Status: DISCONTINUED | OUTPATIENT
Start: 2023-04-30 | End: 2023-05-03 | Stop reason: HOSPADM

## 2023-04-30 RX ORDER — METRONIDAZOLE 500 MG/100ML
500 INJECTION, SOLUTION INTRAVENOUS EVERY 8 HOURS
Status: DISCONTINUED | OUTPATIENT
Start: 2023-05-01 | End: 2023-05-03 | Stop reason: HOSPADM

## 2023-04-30 RX ORDER — ONDANSETRON 2 MG/ML
4 INJECTION INTRAMUSCULAR; INTRAVENOUS EVERY 4 HOURS PRN
Status: DISCONTINUED | OUTPATIENT
Start: 2023-04-30 | End: 2023-05-03 | Stop reason: HOSPADM

## 2023-04-30 RX ORDER — ESCITALOPRAM OXALATE 10 MG/1
20 TABLET ORAL DAILY
Status: DISCONTINUED | OUTPATIENT
Start: 2023-04-30 | End: 2023-05-03 | Stop reason: HOSPADM

## 2023-04-30 RX ORDER — SODIUM BICARBONATE 650 MG/1
1300 TABLET ORAL 3 TIMES DAILY
Status: DISCONTINUED | OUTPATIENT
Start: 2023-04-30 | End: 2023-05-03 | Stop reason: HOSPADM

## 2023-04-30 RX ORDER — NICOTINE POLACRILEX 4 MG
15 LOZENGE BUCCAL
Status: DISCONTINUED | OUTPATIENT
Start: 2023-04-30 | End: 2023-05-03 | Stop reason: HOSPADM

## 2023-04-30 RX ORDER — BISACODYL 10 MG
10 SUPPOSITORY, RECTAL RECTAL DAILY PRN
Status: DISCONTINUED | OUTPATIENT
Start: 2023-04-30 | End: 2023-04-30

## 2023-04-30 RX ORDER — BISACODYL 10 MG
10 SUPPOSITORY, RECTAL RECTAL DAILY PRN
Status: DISCONTINUED | OUTPATIENT
Start: 2023-04-30 | End: 2023-05-03 | Stop reason: HOSPADM

## 2023-04-30 RX ORDER — SODIUM CHLORIDE 0.9 % (FLUSH) 0.9 %
10 SYRINGE (ML) INJECTION AS NEEDED
Status: DISCONTINUED | OUTPATIENT
Start: 2023-04-30 | End: 2023-05-03 | Stop reason: HOSPADM

## 2023-04-30 RX ORDER — BUDESONIDE AND FORMOTEROL FUMARATE DIHYDRATE 160; 4.5 UG/1; UG/1
2 AEROSOL RESPIRATORY (INHALATION)
Status: DISCONTINUED | OUTPATIENT
Start: 2023-04-30 | End: 2023-05-03 | Stop reason: HOSPADM

## 2023-04-30 RX ORDER — METRONIDAZOLE 500 MG/100ML
500 INJECTION, SOLUTION INTRAVENOUS ONCE
Status: COMPLETED | OUTPATIENT
Start: 2023-04-30 | End: 2023-04-30

## 2023-04-30 RX ORDER — CALCIUM CARBONATE 200(500)MG
2 TABLET,CHEWABLE ORAL 2 TIMES DAILY PRN
Status: DISCONTINUED | OUTPATIENT
Start: 2023-04-30 | End: 2023-05-03 | Stop reason: HOSPADM

## 2023-04-30 RX ORDER — AMOXICILLIN 250 MG
2 CAPSULE ORAL 2 TIMES DAILY
Status: DISCONTINUED | OUTPATIENT
Start: 2023-04-30 | End: 2023-04-30

## 2023-04-30 RX ORDER — BISACODYL 5 MG/1
5 TABLET, DELAYED RELEASE ORAL DAILY PRN
Status: DISCONTINUED | OUTPATIENT
Start: 2023-04-30 | End: 2023-05-03 | Stop reason: HOSPADM

## 2023-04-30 RX ORDER — BISACODYL 5 MG/1
5 TABLET, DELAYED RELEASE ORAL DAILY PRN
Status: DISCONTINUED | OUTPATIENT
Start: 2023-04-30 | End: 2023-04-30

## 2023-04-30 RX ORDER — LACTULOSE 10 G/15ML
20 SOLUTION ORAL DAILY
Status: DISCONTINUED | OUTPATIENT
Start: 2023-04-30 | End: 2023-05-03 | Stop reason: HOSPADM

## 2023-04-30 RX ORDER — INSULIN LISPRO 100 [IU]/ML
4-24 INJECTION, SOLUTION INTRAVENOUS; SUBCUTANEOUS
Status: DISCONTINUED | OUTPATIENT
Start: 2023-04-30 | End: 2023-05-03 | Stop reason: HOSPADM

## 2023-04-30 RX ORDER — ACETAMINOPHEN 325 MG/1
650 TABLET ORAL EVERY 4 HOURS PRN
Status: DISCONTINUED | OUTPATIENT
Start: 2023-04-30 | End: 2023-05-03 | Stop reason: HOSPADM

## 2023-04-30 RX ORDER — ALBUTEROL SULFATE 2.5 MG/3ML
2.5 SOLUTION RESPIRATORY (INHALATION) EVERY 4 HOURS PRN
Status: DISCONTINUED | OUTPATIENT
Start: 2023-04-30 | End: 2023-05-03 | Stop reason: HOSPADM

## 2023-04-30 RX ORDER — OXYCODONE AND ACETAMINOPHEN 7.5; 325 MG/1; MG/1
1 TABLET ORAL EVERY 4 HOURS PRN
Status: DISCONTINUED | OUTPATIENT
Start: 2023-04-30 | End: 2023-05-03 | Stop reason: HOSPADM

## 2023-04-30 RX ORDER — PREGABALIN 100 MG/1
100 CAPSULE ORAL 3 TIMES DAILY
Status: DISCONTINUED | OUTPATIENT
Start: 2023-04-30 | End: 2023-05-03 | Stop reason: HOSPADM

## 2023-04-30 RX ORDER — POLYETHYLENE GLYCOL 3350 17 G/17G
17 POWDER, FOR SOLUTION ORAL DAILY PRN
Status: DISCONTINUED | OUTPATIENT
Start: 2023-04-30 | End: 2023-04-30

## 2023-04-30 RX ORDER — LAMOTRIGINE 100 MG/1
200 TABLET ORAL 2 TIMES DAILY
Status: DISCONTINUED | OUTPATIENT
Start: 2023-04-30 | End: 2023-05-03 | Stop reason: HOSPADM

## 2023-04-30 RX ADMIN — ZOLPIDEM TARTRATE 10 MG: 5 TABLET ORAL at 22:30

## 2023-04-30 RX ADMIN — ESCITALOPRAM 20 MG: 10 TABLET, FILM COATED ORAL at 19:47

## 2023-04-30 RX ADMIN — QUETIAPINE FUMARATE 600 MG: 200 TABLET ORAL at 22:30

## 2023-04-30 RX ADMIN — SODIUM BICARBONATE 650 MG TABLET 1300 MG: at 19:47

## 2023-04-30 RX ADMIN — LACTULOSE 20 G: 20 SOLUTION ORAL at 19:47

## 2023-04-30 RX ADMIN — BUDESONIDE AND FORMOTEROL FUMARATE DIHYDRATE 2 PUFF: 160; 4.5 AEROSOL RESPIRATORY (INHALATION) at 20:10

## 2023-04-30 RX ADMIN — INSULIN DETEMIR 25 UNITS: 100 INJECTION, SOLUTION SUBCUTANEOUS at 22:31

## 2023-04-30 RX ADMIN — METRONIDAZOLE 500 MG: 5 INJECTION, SOLUTION INTRAVENOUS at 22:33

## 2023-04-30 RX ADMIN — LAMOTRIGINE 200 MG: 100 TABLET ORAL at 22:30

## 2023-04-30 RX ADMIN — PANTOPRAZOLE SODIUM 40 MG: 40 INJECTION, POWDER, FOR SOLUTION INTRAVENOUS at 19:47

## 2023-04-30 RX ADMIN — LEVOFLOXACIN 750 MG: 750 INJECTION, SOLUTION INTRAVENOUS at 17:45

## 2023-04-30 RX ADMIN — SODIUM CHLORIDE 1000 ML: 9 INJECTION, SOLUTION INTRAVENOUS at 14:39

## 2023-04-30 RX ADMIN — SODIUM CHLORIDE 1000 ML: 9 INJECTION, SOLUTION INTRAVENOUS at 19:29

## 2023-04-30 RX ADMIN — PREGABALIN 100 MG: 100 CAPSULE ORAL at 22:30

## 2023-04-30 RX ADMIN — SODIUM CHLORIDE 125 ML/HR: 9 INJECTION, SOLUTION INTRAVENOUS at 18:37

## 2023-04-30 RX ADMIN — INSULIN LISPRO 16 UNITS: 100 INJECTION, SOLUTION INTRAVENOUS; SUBCUTANEOUS at 19:47

## 2023-04-30 NOTE — ED PROVIDER NOTES
Time: 2:00 PM EDT  Date of encounter:  4/30/2023  Independent Historian/Clinical History and Information was obtained by: Patient, family, EMS and chart  Chief Complaint: generalized weakness  History is limited by: N/A  Room number: 526/1     History of Present Illness:  HPI  Patient is a 62 y.o. year old male who presents to the Emergency Department via EMS for evaluation of generalized weakness. Patient has family at bedside on initial evaluation. Patient has a medical history of obesity with sleep apnea, bipolar disorder, chronic obstructive pulmonary disease (COPD), diabetes mellitus (DM), hyperlipidemia (HLD), hypertension (HTN), schizophrenia and seizure disorder. Patient has a surgical history of cholecystectomy, back surgery. Patient has a social history of former smoker, current alcohol use.    Per EMS, patients blood sugar was 300mg/dL PTA and patient reported constipation. Patient was given half of Narcan due to patients lethargy.     Patient is experiencing symptoms of generalized weakness with syncopal episode, shortness of breath with that started approximately a couple hours ago while in the restroom. Patient denies head injury or trauma. Patient denies symptoms of chest pain, numbness, paresthesia. All other review of systems are negative.    Patients medication Ozempic was increased recently but states he has not started the increased dose. Patient states he did not eat breakfast and did not take insulin before going to the restroom this morning (04/30/2023).    Patient Care Team  Primary Care Provider: Felix Atkinson MD    Past Medical History:  No Known Allergies  Past Medical History:   Diagnosis Date   • Acute kidney injury     2021 POST SEIZURES   • Astigmatism of both eyes     eyes twitch left and right   • Bipolar disorder    • Charcot ankle    • Closed nondisplaced fracture of medial malleolus of left tibia    • COPD (chronic obstructive pulmonary disease)     USES INHALERS   • Diabetes      BG RUNS AROUND 90'S IN AM   • Hx of schizophrenia    • Hyperlipidemia    • Hypertension     SEEN DR ROD IN THE PAST, HAD APPT WITH DR MICHAUD IN 5-2022 CX APPT, DENIED CP/SOB   • Neuropathy    • Seizures     LAST ONE 4/21/22   • Sleep apnea     DOES NOT USE CPAP   • Varicose vein of leg      Past Surgical History:   Procedure Laterality Date   • ANKLE OPEN REDUCTION INTERNAL FIXATION Left 04/25/2022    Procedure: LEFT OPEN REDUCTION INTERNAL FIXATION DISTAL TIBIA FRACTURE ;  Surgeon: Tha Parry MD;  Location: [facility] OR [facility] [place];  Service: Orthopedics;  Laterality: Left;   • ANKLE OPEN REDUCTION INTERNAL FIXATION Left 06/01/2022    Procedure: LEFT ANKLE OPEN REDUCTION INTERNAL FIXATION WITH SYNDESMOSIS FIXATION;  Surgeon: Tha Parry MD;  Location: [facility] MAIN OR;  Service: Orthopedics;  Laterality: Left;   • CHOLECYSTECTOMY     • COLONOSCOPY     • JOINT REPLACEMENT      RTKR   • LUMBAR DISC SURGERY     • SHOULDER SURGERY Right      Family History   Problem Relation Age of Onset   • Kidney disease Mother    • COPD Mother    • COPD Father    • Heart disease Father    • Malig Hyperthermia Neg Hx        Home Medications:  Prior to Admission medications    Medication Sig Start Date End Date Taking? Authorizing Provider   Accu-Chek Guide test strip  9/14/22   Enrique Cortes MD   Accu-Chek Softclix Lancets lancets  6/15/22   Enrique Cortes MD   albuterol sulfate  (90 Base) MCG/ACT inhaler Inhale 2 puffs Every 4 (Four) Hours As Needed. 4/1/22   Enrique Cortes MD   amLODIPine (NORVASC) 5 MG tablet Take 1 tablet by mouth Daily.    Enrique Cortes MD   amLODIPine (NORVASC) 5 MG tablet Take 1 tablet by mouth.    Enrique Cortes MD   aspirin EC (Ecotrin) 325 MG tablet Take 1 tablet by mouth Daily. 6/1/22   Tha Parry MD   atenolol (TENORMIN) 25 MG tablet Take 1 tablet by mouth Daily.    Enrique Cortes MD   atorvastatin (LIPITOR) 40 MG tablet Take 1 tablet  by mouth Every Night.    Enrique Cortes MD   Blood Glucose Monitoring Suppl (Accu-Chek Guide Me) w/Device kit  6/15/22   Enrique Cortes MD   budesonide-formoterol (Symbicort) 160-4.5 MCG/ACT inhaler Inhale 2 puffs 2 (Two) Times a Day. 4/13/22   Phillip Yanez MD   escitalopram (LEXAPRO) 20 MG tablet Take 1 tablet by mouth Daily. 3/29/22   Enrique Cortes MD   furosemide (LASIX) 20 MG tablet Take 1 tablet by mouth Daily. 6/1/21   Enrique Cortes MD   glipizide (GLUCOTROL XL) 5 MG ER tablet Take 1 tablet by mouth 2 (Two) Times a Day. INSTRUCTED PER ANESTHESIA PROTOCOL 6/24/21   Enrique Cortes MD   hydrOXYzine (ATARAX) 50 MG tablet Take 1 tablet by mouth 3 (Three) Times a Day. 6/13/21   Enrique Cortes MD   Jardiance 10 MG tablet tablet  6/24/22   Enrique Cortes MD   lamoTRIgine (LaMICtal) 200 MG tablet Take 1 tablet by mouth 2 (Two) Times a Day.    Enrique Cortes MD   lisinopril (PRINIVIL,ZESTRIL) 30 MG tablet  6/24/22   Enrique Cortes MD   lisinopril (PRINIVIL,ZESTRIL) 40 MG tablet Take 1 tablet by mouth Daily. 1/20/22   Enrique Cortes MD   lubiprostone (AMITIZA) 24 MCG capsule  3/13/23   Enrique Cortes MD   multivitamin with minerals tablet tablet Take 1 tablet by mouth.    Enrique Cortes MD   neomycin-polymyxin-dexamethasone (MAXITROL) 3.5-16271-5.1 ophthalmic suspension  2/13/23   Enrique Cortes MD   NovoLOG Mix 70/30 FlexPen (70-30) 100 UNIT/ML suspension pen-injector injection REPORTS SLIDING SCALE [id number]. INSTRUCTED PER ANESTHESIA PROTOCOL IF BS GREATER THEN 140 CAN TAKE 1/2 OF THE DOSE 2/24/22   Enrique Cortes MD   oxyCODONE-acetaminophen (PERCOCET) 7.5-325 MG per tablet Take 1-2 tablets by mouth Every 4 (Four) Hours As Needed for Moderate Pain . 6/1/22   Tha Parry MD   pantoprazole (PROTONIX) 40 MG EC tablet Take 1 tablet by mouth 2 (Two) Times a Day.    Provider, MD Enrique  "  pregabalin (LYRICA) 100 MG capsule Take 1 capsule by mouth 3 (Three) Times a Day. 21   Enrique Cortes MD   QUEtiapine (SEROquel) 300 MG tablet Take 2 tablets by mouth Every Night. 21   Enrique Cortes MD   Semaglutide,0.25 or 0.5MG/DOS, (Ozempic, 0.25 or 0.5 MG/DOSE,) 2 MG/1.5ML solution pen-injector Inject 5 mg under the skin into the appropriate area as directed 1 (One) Time Per Week. INSTRUCTED PER ANESTHESIA STANDING ORDERS    Enriuqe Cortes MD   sodium-potassium-magnesium sulfates (Suprep Bowel Prep Kit) 17.5-3.13-1.6 GM/177ML solution oral solution Take 1 bottle by mouth Every 12 (Twelve) Hours. 3/23/23   Joyce Mccain APRN   TRUEplus Insulin Syringe 31G X 5/16\" 0.3 ML misc  22   Enrique Cortes MD   zolpidem (AMBIEN) 10 MG tablet Take 1 tablet by mouth Every Night. 21   Enrique Cortes MD        Social History:  Social History     Tobacco Use   • Smoking status: Former     Packs/day: 0.50     Years: 35.00     Pack years: 17.50     Types: Cigarettes     Quit date:      Years since quittin.3   • Smokeless tobacco: Never   Vaping Use   • Vaping Use: Never used   Substance Use Topics   • Alcohol use: Yes     Comment: OCCASIONAL   • Drug use: Never       Review of Systems:  Review of Systems   HENT: Negative.    Eyes: Negative.    Respiratory: Positive for shortness of breath.    Cardiovascular: Negative for chest pain.   Gastrointestinal: Negative.    Endocrine: Negative.    Genitourinary: Negative.    Musculoskeletal: Negative.    Skin: Negative.    Allergic/Immunologic: Negative.    Neurological: Positive for syncope and weakness. Negative for numbness (and negative paresthesia) and headaches (and negative head trauma or injury).   Hematological: Negative.    Psychiatric/Behavioral: Negative.    All other systems reviewed and are negative.         Physical Exam:  /58 (Patient Position: Lying)   Pulse 72   Temp 97.7 °F (36.5 °C) " "(Oral)   Resp 18   Ht 182.9 cm (72\")   Wt 134 kg (296 lb 1.2 oz)   SpO2 95%   BMI 40.16 kg/m²     Physical Exam  Vitals and nursing note reviewed.   Constitutional:       General: He is not in acute distress.     Appearance: Normal appearance. He is not toxic-appearing.   HENT:      Head: Normocephalic and atraumatic.      Jaw: There is normal jaw occlusion.   Eyes:      General: Lids are normal.      Extraocular Movements: Extraocular movements intact.      Conjunctiva/sclera: Conjunctivae normal.      Pupils: Pupils are equal, round, and reactive to light.   Cardiovascular:      Rate and Rhythm: Normal rate and regular rhythm.      Pulses: Normal pulses.      Heart sounds: Normal heart sounds.   Pulmonary:      Effort: Pulmonary effort is normal. No respiratory distress.      Breath sounds: Normal breath sounds. No wheezing or rhonchi.   Abdominal:      General: Abdomen is flat.      Palpations: Abdomen is soft.      Tenderness: There is no abdominal tenderness. There is no guarding or rebound.   Musculoskeletal:         General: Normal range of motion.      Cervical back: Normal range of motion and neck supple.      Right lower leg: No edema.      Left lower leg: No edema.   Skin:     General: Skin is warm and dry.   Neurological:      Mental Status: He is alert and oriented to person, place, and time. Mental status is at baseline.   Psychiatric:         Mood and Affect: Mood normal.                Procedures:  Procedures    Medical Decision Making:    Comorbidities that affect care:    obesity with sleep apnea, bipolar disorder, chronic obstructive pulmonary disease (COPD), diabetes mellitus (DM), hyperlipidemia (HLD), hypertension (HTN), schizophrenia and seizure disorder, former smoker    External Notes reviewed:    Previous Clinic Note: Patient was last seen for evaluation of ankle pain.    The following orders were placed and all results were independently analyzed by me:  Orders Placed This Encounter "   Procedures   • Blood Culture - Blood,   • Blood Culture - Blood,   • XR Chest 1 View   • CT Head Without Contrast   • CT Abdomen Pelvis Without Contrast   • XR Shoulder 2+ View Left   • [place] Draw   • Comprehensive Metabolic Panel   • Single High Sensitivity Troponin T   • Magnesium   • CBC Auto Differential   • Lactic Acid, Plasma   • Occult Blood, Fecal By Immunoassay - Stool, Per Rectum   • Urinalysis With Culture If Indicated -   • Basic Metabolic Panel   • CBC Auto Differential   • Magnesium   • Urinalysis, Microscopic Only - Urine, Clean Catch   • Basic Metabolic Panel   • Magnesium   • Phosphorus   • CBC Auto Differential   • Diet: Regular/House Diet, Diabetic Diets; Consistent Carbohydrate; Texture: Regular Texture (IDDSI 7); Fluid Consistency: Thin (IDDSI 0)   • Undress & Gown   • Continuous Pulse Oximetry   • Vital Signs   • Orthostatic Blood Pressure   • Orthostatic Blood Pressure   • Vital Signs   • Notify Provider (With Default Parameters)   • Intake & Output   • Weigh Patient   • Oral Care   • Place Sequential Compression Device   • Maintain Sequential Compression Device   • Cardiac Monitoring   • Activity - Ad Lindsey   • Strict Intake & Output   • Straight cath   • Code Status and Medical Interventions:   • Inpatient Hospitalist Consult   • Gastroenterology (on-call MD unless specified)   • Inpatient Gastroenterology Consult   • Inpatient Diabetes Educator Consult   • Inpatient Gastroenterology Consult   • OT Consult: Eval & Treat   • PT Consult: Eval & Treat Functional Mobility Below Baseline   • Oxygen Therapy- Nasal Cannula; Titrate for SPO2: 90% - 95%   • Pulse Oximetry,  Spot   • POC Glucose Once   • POC Glucose Once   • POC Glucose TID AC   • POC Glucose Once   • POC Glucose Once   • POC Glucose Once   • POC Glucose Once   • POC Glucose Once   • ECG 12 Lead ED Triage Standing Order; Weak / Dizzy / AMS   • Adult Transthoracic Echo Complete W/ Cont if Necessary Per Protocol   • Insert Peripheral  IV   • Inpatient Admission   • Fall Precautions   • CBC & Differential   • Green Top (Gel)   • Lavender Top   • Gold Top - SST   • Light Blue Top   • CBC & Differential       Medications Given in the Emergency Department:  Medications   sodium chloride 0.9 % flush 10 mL (has no administration in time range)   acetaminophen (TYLENOL) tablet 650 mg (has no administration in time range)   calcium carbonate (TUMS) chewable tablet 500 mg (200 mg elemental) (has no administration in time range)   ondansetron (ZOFRAN) injection 4 mg (has no administration in time range)   melatonin tablet 5 mg (has no administration in time range)   sennosides-docusate (PERICOLACE) 8.6-50 MG per tablet 2 tablet (2 tablets Oral Given 5/1/23 2137)     And   polyethylene glycol (MIRALAX) packet 17 g (17 g Oral Not Given 5/1/23 0817)     And   bisacodyl (DULCOLAX) EC tablet 5 mg (has no administration in time range)     And   bisacodyl (DULCOLAX) suppository 10 mg (has no administration in time range)   lactulose (CHRONULAC) 10 GM/15ML solution 20 g (20 g Oral Not Given 5/1/23 1000)   dextrose (GLUTOSE) oral gel 15 g (has no administration in time range)   dextrose (D50W) (25 g/50 mL) IV injection 25 g (has no administration in time range)   glucagon (GLUCAGEN) injection 1 mg (has no administration in time range)   Insulin Lispro (humaLOG) injection 4-24 Units (4 Units Subcutaneous Given 5/1/23 1746)   insulin detemir (LEVEMIR) injection 25 Units (25 Units Subcutaneous Given 5/1/23 2137)   escitalopram (LEXAPRO) tablet 20 mg (20 mg Oral Given 5/1/23 0919)   lamoTRIgine (LaMICtal) tablet 200 mg (200 mg Oral Given 5/1/23 2138)   oxyCODONE-acetaminophen (PERCOCET) 7.5-325 MG per tablet 1 tablet (1 tablet Oral Given 5/1/23 1925)   pregabalin (LYRICA) capsule 100 mg (100 mg Oral Given 5/1/23 2137)   QUEtiapine (SEROquel) tablet 600 mg (600 mg Oral Given 5/1/23 2137)   zolpidem (AMBIEN) tablet 10 mg (10 mg Oral Given 5/1/23 2138)   sodium chloride  0.9 % infusion (125 mL/hr Intravenous New Bag 5/1/23 1915)   sodium bicarbonate tablet 1,300 mg (1,300 mg Oral Given 5/1/23 2138)   pantoprazole (PROTONIX) injection 40 mg (40 mg Intravenous Given 5/1/23 2137)   budesonide-formoterol (SYMBICORT) 160-4.5 MCG/ACT inhaler 2 puff ( Inhalation Canceled Entry 5/1/23 2149)   albuterol (PROVENTIL) nebulizer solution 0.083% 2.5 mg/3mL (has no administration in time range)   levoFLOXacin (LEVAQUIN) 750 mg/150 mL D5W (premix) (LEVAQUIN) 750 mg (has no administration in time range)   metroNIDAZOLE (FLAGYL) IVPB 500 mg (500 mg Intravenous New Bag 5/1/23 1913)   atorvastatin (LIPITOR) tablet 40 mg (40 mg Oral Given 5/1/23 2137)   hydrOXYzine (ATARAX) tablet 50 mg (50 mg Oral Given 5/1/23 2137)   morphine injection 4 mg (4 mg Intravenous Given 5/1/23 2137)   Pharmacy to Dose LevoFLOXacin (LEVAQUIN) (has no administration in time range)   Pharmacy Consult - Pharmacy to dose (has no administration in time range)   sodium chloride 0.9 % bolus 1,000 mL (0 mL Intravenous Stopped 4/30/23 1539)   levoFLOXacin (LEVAQUIN) 750 mg/150 mL D5W (premix) (LEVAQUIN) 750 mg (750 mg Intravenous New Bag 4/30/23 1745)   metroNIDAZOLE (FLAGYL) IVPB 500 mg (500 mg Intravenous New Bag 4/30/23 2233)   sodium chloride 0.9 % bolus 1,000 mL (1,000 mL Intravenous New Bag 4/30/23 1929)   morphine injection 4 mg (4 mg Intravenous Given 5/1/23 0920)       ED Course:       Labs:  Lab Results (last 24 hours)     Procedure Component Value Units Date/Time    Basic Metabolic Panel [[id number]]  (Abnormal) Collected: 05/01/23 0402    Specimen: Blood Updated: 05/01/23 0500     Glucose 117 mg/dL      BUN 54 mg/dL      Creatinine 2.40 mg/dL      Sodium 137 mmol/L      Potassium 4.6 mmol/L      Chloride 108 mmol/L      CO2 16.9 mmol/L      Calcium 8.5 mg/dL      BUN/Creatinine Ratio 22.5     Anion Gap 12.1 mmol/L      eGFR 29.9 mL/min/1.73     Narrative:      GFR Normal >60  Chronic Kidney Disease <60  Kidney Failure  <15      CBC Auto Differential [[id number]]  (Abnormal) Collected: 05/01/23 0402    Specimen: Blood Updated: 05/01/23 0459     WBC 11.58 10*3/mm3      RBC 3.98 10*6/mm3      Hemoglobin 11.7 g/dL      Hematocrit 34.2 %      MCV 85.9 fL      MCH 29.4 pg      MCHC 34.2 g/dL      RDW 13.3 %      RDW-SD 41.5 fl      MPV 10.1 fL      Platelets 149 10*3/mm3      Neutrophil % 74.8 %      Lymphocyte % 12.0 %      Monocyte % 12.2 %      Eosinophil % 0.5 %      Basophil % 0.3 %      Immature Grans % 0.2 %      Neutrophils, Absolute 8.67 10*3/mm3      Lymphocytes, Absolute 1.39 10*3/mm3      Monocytes, Absolute 1.41 10*3/mm3      Eosinophils, Absolute 0.06 10*3/mm3      Basophils, Absolute 0.03 10*3/mm3      Immature Grans, Absolute 0.02 10*3/mm3      nRBC 0.0 /100 WBC     Magnesium [[id number]]  (Normal) Collected: 05/01/23 0402    Specimen: Blood Updated: 05/01/23 0500     Magnesium 2.3 mg/dL     POC Glucose Once [[id number]]  (Abnormal) Collected: 05/01/23 0706    Specimen: Blood Updated: 05/01/23 0736     Glucose 110 mg/dL      Comment: Serial Number: [id number]:  [id number]       POC Glucose Once [[id number]]  (Abnormal) Collected: 05/01/23 1204    Specimen: Blood Updated: 05/01/23 1206     Glucose 165 mg/dL      Comment: Serial Number: [id number]:  [id number]       POC Glucose Once [[id number]]  (Abnormal) Collected: 05/01/23 1703    Specimen: Blood Updated: 05/01/23 1705     Glucose 164 mg/dL      Comment: Serial Number: [id number]:  [id number]       POC Glucose Once [[id number]]  (Abnormal) Collected: 05/01/23 1928    Specimen: Blood Updated: 05/01/23 1933     Glucose 178 mg/dL      Comment: Serial Number: [id number]:  [id number]              Imaging:  Adult Transthoracic Echo Complete W/ Cont if Necessary Per Protocol    Result Date: 5/1/2023  •  Left ventricular systolic function is normal. Left ventricular ejection fraction appears to be 61 - 65%. •  Left ventricular diastolic function was normal.      XR Shoulder 2+ View Left    Result Date: 5/1/2023  PROCEDURE: XR SHOULDER 2+ VW LEFT  COMPARISON: None  INDICATIONS: LEFT SHOULDER pain, POST fall  FINDINGS:  No fractures are visualized.  There are no findings of dislocation.  Mild degenerative change consistent with osteoarthritis is seen in the glenohumeral joint.  Mild AC arthrosis is evident.  No lytic or sclerotic bone lesions are seen.  No abnormality is seen at the lung apices.        Left shoulder series demonstrating mild degenerative change consistent with osteoarthritis.      JON LOPEZ MD       Electronically Signed and Approved By: JON LOPEZ MD on 5/01/2023 at 12:15               Differential Diagnosis and Discussion:    Syncope: Differential diagnosis includes but is not limited to TIA, hyperventilation, aortic stenosis, pulmonary emboli, myocardial disease, bradycardia arrhythmia, heart block, tachyarrhythmia, vasovagal, orthostatic hypotension, ruptured AAA, aortic dissection, subarachnoid hemorrhage, seizure, hypoglycemia.  Weakness: Based on the patient's history, signs, and symptoms, the diffential diagnosis includes but is not limited to meningitis, stroke, sepsis, subarachnoid hemorrhage, intracranial bleeding, encephalitis, acute uti, dehydration, MS, myasthenia gravis, Guillan [place], migraine variant, neuromuscular disorders vertigo, electrolyte imbalance, and metabolic disorders.    All labs were reviewed and interpreted by me.  All X-rays impressions were independently interpreted by me.  EKG was interpreted by me.  CT scan radiology impression was interpreted by me.    MDM     Decision making regarding admission:    A patient with colitis should be admitted to the hospital for several important reasons:    Severity of the condition: Colitis, which is inflammation of the colon, can be caused by various factors such as infection, inflammatory bowel disease (IBD), or ischemia. Depending on the severity and underlying cause,  colitis can lead to significant complications, including severe dehydration, electrolyte imbalances, severe bleeding, or even perforation of the colon.    Need for specialized care: Hospital admission ensures that the patient receives appropriate care from a multidisciplinary team, including gastroenterologists, surgeons, and other healthcare providers. This collaborative approach is essential for the accurate diagnosis, treatment planning, and management of the patient's condition.    Diagnostic evaluation: In the hospital setting, the patient can undergo comprehensive diagnostic testing, such as blood tests, stool studies, imaging studies, and endoscopic evaluation (e.g., colonoscopy or sigmoidoscopy) to determine the underlying cause of colitis and tailor the treatment accordingly.    Intravenous hydration and electrolyte management: Patients with colitis often experience diarrhea, which can lead to dehydration and electrolyte imbalances. Hospitalization allows for the administration of intravenous fluids and electrolytes, as well as close monitoring of the patient's hydration status and electrolyte levels.    Medication administration: Depending on the cause of colitis, the patient may require intravenous antibiotics, corticosteroids, or other medications to manage inflammation and infection. Hospital admission ensures that the appropriate medications are administered and their effectiveness is closely monitored.    Nutritional support: Patients with colitis may require dietary modifications or even temporary bowel rest to promote healing. In the hospital, the patient can receive personalized nutritional counseling and, if necessary, enteral or parenteral nutrition support.    Monitoring for complications: Hospitalization allows for the early detection and management of potential complications, such as severe bleeding, toxic megacolon, bowel perforation, or the development of secondary infections.        Patient  Care Considerations:    CT ABDOMEN AND PELVIS: I considered ordering a CT scan of the abdomen and pelvis however Patient has a soft and nontender abdomen.    Consultants/Shared Management Plan:    Hospitalist: I have discussed the case with Dr. Juarez who agrees to accept the patient for admission.    Social Determinants of Health:    Patient is independent, reliable, and has access to care.     Disposition and Care Coordination:    Admit:   Through independent evaluation of the patient's history, physical, and imperical data, the patient meets criteria for observation/admission to the hospital.        Final diagnoses:   Colitis        ED Disposition     ED Disposition   Decision to Admit    Condition   --    Comment   Level of Care: Telemetry [5]   Diagnosis: Colitis [271090]   Admitting Physician: KAMILLE HERNANDEZ [[id number]]   Attending Physician: KAMILLE HERNANDEZ [[id number]]   Certification: I Certify That Inpatient Hospital Services Are Medically Necessary For Greater Than 2 Midnights               This medical record created using voice recognition software.    Documentation assistance provided by Samantha Alas acting a scribe for Ata Power MD. Information recorded by the scribe was verified and validated at my direction.       Samantha Alas  04/30/23 [id number]       Ata Power MD  05/01/23 9943

## 2023-04-30 NOTE — H&P
[facility]  Date: 2023   Patient Name: Carlos Murphy Jr.  : 1960  MRN: [id number]  Primary Care Physician:  Felix Atkinson MD  Date of admission: 2023    Subjective   Subjective     Chief Complaint: Syncope and collapse    HPI:    Carlos Murphy Jr. is a 62 y.o. male PMH DM, CKD, schizophrenia, HTN, HLD, seizure disorder who presented to the ED on  due to syncopal episode at home.  Patient states he has had chronic issues with constipation.  He attempted to have a bowel movement and while he was straining he passed out.  It is unclear how long he was out but when he awoke, he felt confused.  He is bowel movement was hard but he did not notice blood at the time.  He had a crawled to the bed and called 911.  In the ED, he was found to have an MK with a creatinine of 2.5, acidosis with bicarb 12, glucose 325, WBC 18.  CT head was obtained due to his complaint of confusion and negative, patient states he is back to his baseline mentation.  He states he has a history of seizures but can tell this was not a seizure.  He has had 1 prior syncopal episode in a very similar fashion where he was straining during a bowel movement and passed out.  In the ED, chest x-ray was negative.  No abdominal imaging was obtained to the ED, nor was gastroenterology consulted. However, he was started on Levaquin and Flagyl due to concern for an intra-abdominal infection.  The patient does admit he has had some abdominal pain over the last month, states it is worse after his bowel movement, especially in the right lower quadrant.  Lactic acid level is pending at the time of admission.      Personal History     Past Medical History:  Past Medical History:   Diagnosis Date   • Acute kidney injury      POST SEIZURES   • Astigmatism of both eyes     eyes twitch left and right   • Bipolar disorder    • Charcot ankle    • Closed nondisplaced fracture of medial malleolus of left  tibia    • COPD (chronic obstructive pulmonary disease)     USES INHALERS   • Diabetes     BG RUNS AROUND 90'S IN AM   • Hx of schizophrenia    • Hyperlipidemia    • Hypertension     SEEN DR ROD IN THE PAST, HAD APPT WITH DR MICHAUD IN  CX APPT, DENIED CP/SOB   • Neuropathy    • Seizures     LAST ONE 22   • Sleep apnea     DOES NOT USE CPAP   • Varicose vein of leg        Past Surgical History:  Past Surgical History:   Procedure Laterality Date   • ANKLE OPEN REDUCTION INTERNAL FIXATION Left 2022    Procedure: LEFT OPEN REDUCTION INTERNAL FIXATION DISTAL TIBIA FRACTURE ;  Surgeon: Tha Parry MD;  Location: [facility] OR [facility];  Service: Orthopedics;  Laterality: Left;   • ANKLE OPEN REDUCTION INTERNAL FIXATION Left 2022    Procedure: LEFT ANKLE OPEN REDUCTION INTERNAL FIXATION WITH SYNDESMOSIS FIXATION;  Surgeon: Tha Parry MD;  Location: [facility] MAIN OR;  Service: Orthopedics;  Laterality: Left;   • CHOLECYSTECTOMY     • COLONOSCOPY     • JOINT REPLACEMENT      RTKR   • LUMBAR DISC SURGERY     • SHOULDER SURGERY Right        Family History:   Family History   Problem Relation Age of Onset   • Kidney disease Mother    • COPD Mother    • COPD Father    • Heart disease Father    • Malig Hyperthermia Neg Hx         Social History:   Social History     Socioeconomic History   • Marital status:    Tobacco Use   • Smoking status: Former     Packs/day: 0.50     Years: 35.00     Pack years: 17.50     Types: Cigarettes     Quit date:      Years since quittin.3   • Smokeless tobacco: Never   Vaping Use   • Vaping Use: Never used   Substance and Sexual Activity   • Alcohol use: Yes     Comment: OCCASIONAL   • Drug use: Never   • Sexual activity: Defer        Home Medications:  Accu-Chek Guide Me, Accu-Chek Softclix Lancets, Insulin Syringe-Needle U-100, QUEtiapine, Semaglutide(0.25 or 0.5MG/DOS), albuterol sulfate HFA, amLODIPine, aspirin EC, atenolol, atorvastatin,  budesonide-formoterol, empagliflozin, escitalopram, furosemide, glipizide, glucose blood, hydrOXYzine, insulin aspart prot & aspart, lamoTRIgine, lisinopril, lubiprostone, multivitamin with minerals, neomycin-polymyxin-dexamethasone, oxyCODONE-acetaminophen, pantoprazole, pregabalin, sodium-potassium-magnesium sulfates, and zolpidem    Allergies:  No Known Allergies    Review of Systems   A 14 point review of systems was obtained and otherwise negative unless stated in the HPI    Objective   Objective     Vitals:   Temp:  [97.7 °F (36.5 °C)] 97.7 °F (36.5 °C)  Heart Rate:  [64-71] 71  Resp:  [18] 18  BP: ()/(53-61) 84/61    Physical Exam    Constitutional: Awake, alert, no acute distress   HENT: NCAT, mucous membranes moist   Respiratory: Clear to auscultation bilaterally, nonlabored respirations    Cardiovascular: RRR, no MRG   Gastrointestinal: Positive bowel sounds, soft, nontender, nondistended   Musculoskeletal: No bilateral ankle edema, no clubbing or cyanosis to extremities   Psychiatric: Appropriate affect, cooperative   Neurologic: Oriented x 3, strength symmetric in all extremities, Cranial Nerves grossly intact to confrontation, speech clear   Skin: Left Charcot foot deformity noted, no rashes     Result Review    Result Review:  I have personally reviewed the results from the time of this admission to 4/30/2023 17:47 EDT and agree with these findings:  []  Laboratory personally reviewed CMP, troponin, CBC, fecal occult  CBC        4/30/2023    13:52   CBC   WBC 18.21     RBC 5.16     Hemoglobin 15.2     Hematocrit 44.8     MCV 86.8     MCH 29.5     MCHC 33.9     RDW 13.2     Platelets 176       CMP        6/1/2022    09:10 4/30/2023    16:02   CMP   Glucose 165   325     BUN 29   52     Creatinine 2.05   2.50     EGFR 36.4   28.5     Sodium 140   133     Potassium 5.2   5.2     Chloride 107   104     Calcium 9.5   8.6     Total Protein  6.3     Albumin  3.8     Globulin  2.5     Total Bilirubin   0.4     Alkaline Phosphatase  136     AST (SGOT)  19     ALT (SGPT)  19     Albumin/Globulin Ratio  1.5     BUN/Creatinine Ratio 14.1   20.8     Anion Gap 10.1   16.4       []  Microbiology  [x]  Radiology chest x-ray personally reviewed, no cardiopulmonary abnormality noted  CT head personally reviewed without evidence of CVA  [x]  EKG/Telemetry Telemetry personally reviewed  []  Cardiology/Vascular   []  Pathology  []  Old records  []  Other:      Assessment & Plan   Assessment / Plan     Assessment/Plan:   Bright red blood per rectum  Syncope and collapse  Likely vasovagal syncope neck  Chronic kidney disease, unknown stage  Orthostatic hypotension  Clinical dehydration  Metabolic acidosis  Lactic acidosis  Concern for intra-abdominal infection  Chronic constipation  Leukocytosis  Type 2 diabetes mellitus  Schizophrenia  Insomnia  Bipolar disorder  Tobacco abuse of cigarettes, in remission     Admit to the hospital on telemetry for work-up and management of the above   Obtain CT abdomen pelvis  Consult gastroenterology, patient was due for colonoscopy next month.  It seems that he may have severe constipation that after he was resolved may have a mucosal tear but will follow-up CT abdomen pelvis  Start IV PPI twice daily  Continue Levaquin and Flagyl for now, de-escalate based on imaging and cultures  Blood cultures obtained and pending, urinalysis not ordered by ED.  Will order urinalysis with culture  Obtain procalcitonin and trend lactic acid levels until clear  Bolus 1 L normal saline, start NS at 125 cc/h  Start oral bicarb and trend bicarb levels closely  Hold all diuretics at this time  Obtain 2D echo for further work-up of syncope although it does seem consistent with vasovagal syncope from excessive straining  Start scheduled Iveth-Colace, MiraLAX, lactulose daily  Carb consistent diet for now, n.p.o. after midnight  Hold all antihypertensives as patient is orthostatic  Start scheduled detemir 25 units  daily, high-dose sliding scale insulin.  Will adjust based on blood sugars  SCDs for DVT prophylaxis in setting of bright red blood per rectum  Continue appropriate home medications including home Percocet, Seroquel, Lyrica, Lexapro  Trend renal function and electrolytes with a.m. BMP, magnesium   Trend Hgb and WBC with a.m. CBC    Discussed case with: Bedside RN, ED physician, gastroenterology    DVT prophylaxis:  Mechanical DVT prophylaxis orders are present.    CODE STATUS:    Level Of Support Discussed With: Patient  Code Status (Patient has no pulse and is not breathing): CPR (Attempt to Resuscitate)  Medical Interventions (Patient has pulse or is breathing): Full Support      Electronically signed by Deepak Al MD, 04/30/23, 5:47 PM EDT.

## 2023-05-01 ENCOUNTER — APPOINTMENT (OUTPATIENT)
Dept: CARDIOLOGY | Facility: HOSPITAL | Age: 63
End: 2023-05-01
Payer: MEDICARE

## 2023-05-01 ENCOUNTER — APPOINTMENT (OUTPATIENT)
Dept: GENERAL RADIOLOGY | Facility: HOSPITAL | Age: 63
End: 2023-05-01
Payer: MEDICARE

## 2023-05-01 LAB
ANION GAP SERPL CALCULATED.3IONS-SCNC: 12.1 MMOL/L (ref 5–15)
ASCENDING AORTA: 4.2 CM
BASOPHILS # BLD AUTO: 0.03 10*3/MM3 (ref 0–0.2)
BASOPHILS NFR BLD AUTO: 0.3 % (ref 0–1.5)
BH CV ECHO MEAS - AO MEAN PG: 7 MMHG
BH CV ECHO MEAS - AO ROOT DIAM: 3.5 CM
BH CV ECHO MEAS - AO V2 VTI: 39.1 CM
BH CV ECHO MEAS - AVA(I,D): 1.64 CM2
BH CV ECHO MEAS - EDV(CUBED): 155.7 ML
BH CV ECHO MEAS - EDV(MOD-SP2): 72.8 ML
BH CV ECHO MEAS - EDV(MOD-SP4): 92.6 ML
BH CV ECHO MEAS - EF(MOD-BP): 61.7 %
BH CV ECHO MEAS - EF(MOD-SP2): 60.6 %
BH CV ECHO MEAS - EF(MOD-SP4): 61.2 %
BH CV ECHO MEAS - ESV(CUBED): 43.2 ML
BH CV ECHO MEAS - ESV(MOD-SP2): 28.7 ML
BH CV ECHO MEAS - ESV(MOD-SP4): 35.9 ML
BH CV ECHO MEAS - FS: 34.8 %
BH CV ECHO MEAS - IVS/LVPW: 1.09 CM
BH CV ECHO MEAS - IVSD: 1.39 CM
BH CV ECHO MEAS - LA DIMENSION: 4.4 CM
BH CV ECHO MEAS - LAT PEAK E' VEL: 8.6 CM/SEC
BH CV ECHO MEAS - LV DIASTOLIC VOL/BSA (35-75): 36.8 CM2
BH CV ECHO MEAS - LV MASS(C)D: 303.4 GRAMS
BH CV ECHO MEAS - LV MAX PG: 4.2 MMHG
BH CV ECHO MEAS - LV MEAN PG: 2 MMHG
BH CV ECHO MEAS - LV SYSTOLIC VOL/BSA (12-30): 14.3 CM2
BH CV ECHO MEAS - LV V1 MAX: 102 CM/SEC
BH CV ECHO MEAS - LV V1 VTI: 20.4 CM
BH CV ECHO MEAS - LVIDD: 5.4 CM
BH CV ECHO MEAS - LVIDS: 3.5 CM
BH CV ECHO MEAS - LVOT AREA: 3.1 CM2
BH CV ECHO MEAS - LVOT DIAM: 2 CM
BH CV ECHO MEAS - LVPWD: 1.27 CM
BH CV ECHO MEAS - MED PEAK E' VEL: 6.2 CM/SEC
BH CV ECHO MEAS - MV A MAX VEL: 162 CM/SEC
BH CV ECHO MEAS - MV DEC SLOPE: 633 CM/SEC2
BH CV ECHO MEAS - MV DEC TIME: 0.23 MSEC
BH CV ECHO MEAS - MV E MAX VEL: 139 CM/SEC
BH CV ECHO MEAS - MV E/A: 0.86
BH CV ECHO MEAS - MV MEAN PG: 6 MMHG
BH CV ECHO MEAS - MV P1/2T: 77.3 MSEC
BH CV ECHO MEAS - MV V2 VTI: 55.4 CM
BH CV ECHO MEAS - MVA(P1/2T): 2.8 CM2
BH CV ECHO MEAS - MVA(VTI): 1.16 CM2
BH CV ECHO MEAS - RVDD: 2.41 CM
BH CV ECHO MEAS - SI(MOD-SP2): 17.5 ML/M2
BH CV ECHO MEAS - SI(MOD-SP4): 22.5 ML/M2
BH CV ECHO MEAS - SV(LVOT): 64.1 ML
BH CV ECHO MEAS - SV(MOD-SP2): 44.1 ML
BH CV ECHO MEAS - SV(MOD-SP4): 56.7 ML
BH CV ECHO MEASUREMENTS AVERAGE E/E' RATIO: 18.78
BUN SERPL-MCNC: 54 MG/DL (ref 8–23)
BUN/CREAT SERPL: 22.5 (ref 7–25)
CALCIUM SPEC-SCNC: 8.5 MG/DL (ref 8.6–10.5)
CHLORIDE SERPL-SCNC: 108 MMOL/L (ref 98–107)
CO2 SERPL-SCNC: 16.9 MMOL/L (ref 22–29)
CREAT SERPL-MCNC: 2.4 MG/DL (ref 0.76–1.27)
DEPRECATED RDW RBC AUTO: 41.5 FL (ref 37–54)
EGFRCR SERPLBLD CKD-EPI 2021: 29.9 ML/MIN/1.73
EOSINOPHIL # BLD AUTO: 0.06 10*3/MM3 (ref 0–0.4)
EOSINOPHIL NFR BLD AUTO: 0.5 % (ref 0.3–6.2)
ERYTHROCYTE [DISTWIDTH] IN BLOOD BY AUTOMATED COUNT: 13.3 % (ref 12.3–15.4)
GLUCOSE BLDC GLUCOMTR-MCNC: 110 MG/DL (ref 70–99)
GLUCOSE BLDC GLUCOMTR-MCNC: 164 MG/DL (ref 70–99)
GLUCOSE BLDC GLUCOMTR-MCNC: 165 MG/DL (ref 70–99)
GLUCOSE BLDC GLUCOMTR-MCNC: 178 MG/DL (ref 70–99)
GLUCOSE SERPL-MCNC: 117 MG/DL (ref 65–99)
HCT VFR BLD AUTO: 34.2 % (ref 37.5–51)
HGB BLD-MCNC: 11.7 G/DL (ref 13–17.7)
IMM GRANULOCYTES # BLD AUTO: 0.02 10*3/MM3 (ref 0–0.05)
IMM GRANULOCYTES NFR BLD AUTO: 0.2 % (ref 0–0.5)
IVRT: 71 MSEC
LEFT ATRIUM VOLUME INDEX: 29.9 ML/M2
LYMPHOCYTES # BLD AUTO: 1.39 10*3/MM3 (ref 0.7–3.1)
LYMPHOCYTES NFR BLD AUTO: 12 % (ref 19.6–45.3)
MAGNESIUM SERPL-MCNC: 2.3 MG/DL (ref 1.6–2.4)
MAXIMAL PREDICTED HEART RATE: 159 BPM
MCH RBC QN AUTO: 29.4 PG (ref 26.6–33)
MCHC RBC AUTO-ENTMCNC: 34.2 G/DL (ref 31.5–35.7)
MCV RBC AUTO: 85.9 FL (ref 79–97)
MONOCYTES # BLD AUTO: 1.41 10*3/MM3 (ref 0.1–0.9)
MONOCYTES NFR BLD AUTO: 12.2 % (ref 5–12)
NEUTROPHILS NFR BLD AUTO: 74.8 % (ref 42.7–76)
NEUTROPHILS NFR BLD AUTO: 8.67 10*3/MM3 (ref 1.7–7)
NRBC BLD AUTO-RTO: 0 /100 WBC (ref 0–0.2)
PLATELET # BLD AUTO: 149 10*3/MM3 (ref 140–450)
PMV BLD AUTO: 10.1 FL (ref 6–12)
POTASSIUM SERPL-SCNC: 4.6 MMOL/L (ref 3.5–5.2)
RBC # BLD AUTO: 3.98 10*6/MM3 (ref 4.14–5.8)
SODIUM SERPL-SCNC: 137 MMOL/L (ref 136–145)
STRESS TARGET HR: 135 BPM
WBC NRBC COR # BLD: 11.58 10*3/MM3 (ref 3.4–10.8)

## 2023-05-01 PROCEDURE — 99222 1ST HOSP IP/OBS MODERATE 55: CPT | Performed by: INTERNAL MEDICINE

## 2023-05-01 PROCEDURE — 83735 ASSAY OF MAGNESIUM: CPT | Performed by: INTERNAL MEDICINE

## 2023-05-01 PROCEDURE — 93306 TTE W/DOPPLER COMPLETE: CPT | Performed by: INTERNAL MEDICINE

## 2023-05-01 PROCEDURE — 85025 COMPLETE CBC W/AUTO DIFF WBC: CPT | Performed by: INTERNAL MEDICINE

## 2023-05-01 PROCEDURE — 63710000001 INSULIN LISPRO (HUMAN) PER 5 UNITS: Performed by: INTERNAL MEDICINE

## 2023-05-01 PROCEDURE — 94799 UNLISTED PULMONARY SVC/PX: CPT

## 2023-05-01 PROCEDURE — 99233 SBSQ HOSP IP/OBS HIGH 50: CPT | Performed by: INTERNAL MEDICINE

## 2023-05-01 PROCEDURE — 63710000001 INSULIN DETEMIR PER 5 UNITS: Performed by: INTERNAL MEDICINE

## 2023-05-01 PROCEDURE — 93306 TTE W/DOPPLER COMPLETE: CPT

## 2023-05-01 PROCEDURE — 25010000002 MORPHINE PER 10 MG: Performed by: INTERNAL MEDICINE

## 2023-05-01 PROCEDURE — 82948 REAGENT STRIP/BLOOD GLUCOSE: CPT

## 2023-05-01 PROCEDURE — 73030 X-RAY EXAM OF SHOULDER: CPT

## 2023-05-01 PROCEDURE — 80048 BASIC METABOLIC PNL TOTAL CA: CPT | Performed by: INTERNAL MEDICINE

## 2023-05-01 RX ORDER — HYDROXYZINE HYDROCHLORIDE 25 MG/1
50 TABLET, FILM COATED ORAL 3 TIMES DAILY
Status: DISCONTINUED | OUTPATIENT
Start: 2023-05-01 | End: 2023-05-03 | Stop reason: HOSPADM

## 2023-05-01 RX ORDER — ATORVASTATIN CALCIUM 40 MG/1
40 TABLET, FILM COATED ORAL NIGHTLY
Status: DISCONTINUED | OUTPATIENT
Start: 2023-05-01 | End: 2023-05-03 | Stop reason: HOSPADM

## 2023-05-01 RX ADMIN — HYDROXYZINE HYDROCHLORIDE 50 MG: 25 TABLET, FILM COATED ORAL at 17:00

## 2023-05-01 RX ADMIN — SENNOSIDES AND DOCUSATE SODIUM 2 TABLET: 8.6; 5 TABLET ORAL at 21:37

## 2023-05-01 RX ADMIN — HYDROXYZINE HYDROCHLORIDE 50 MG: 25 TABLET, FILM COATED ORAL at 21:37

## 2023-05-01 RX ADMIN — SODIUM BICARBONATE 650 MG TABLET 1300 MG: at 17:00

## 2023-05-01 RX ADMIN — METRONIDAZOLE 500 MG: 5 INJECTION, SOLUTION INTRAVENOUS at 19:13

## 2023-05-01 RX ADMIN — SODIUM CHLORIDE 125 ML/HR: 9 INJECTION, SOLUTION INTRAVENOUS at 11:55

## 2023-05-01 RX ADMIN — MORPHINE SULFATE 4 MG: 4 INJECTION, SOLUTION INTRAMUSCULAR; INTRAVENOUS at 09:20

## 2023-05-01 RX ADMIN — LAMOTRIGINE 200 MG: 100 TABLET ORAL at 09:19

## 2023-05-01 RX ADMIN — ZOLPIDEM TARTRATE 10 MG: 5 TABLET ORAL at 21:38

## 2023-05-01 RX ADMIN — PREGABALIN 100 MG: 100 CAPSULE ORAL at 21:37

## 2023-05-01 RX ADMIN — HYDROXYZINE HYDROCHLORIDE 50 MG: 25 TABLET, FILM COATED ORAL at 09:19

## 2023-05-01 RX ADMIN — PANTOPRAZOLE SODIUM 40 MG: 40 INJECTION, POWDER, FOR SOLUTION INTRAVENOUS at 21:37

## 2023-05-01 RX ADMIN — OXYCODONE HYDROCHLORIDE AND ACETAMINOPHEN 1 TABLET: 7.5; 325 TABLET ORAL at 19:25

## 2023-05-01 RX ADMIN — PREGABALIN 100 MG: 100 CAPSULE ORAL at 17:00

## 2023-05-01 RX ADMIN — SODIUM CHLORIDE 125 ML/HR: 9 INJECTION, SOLUTION INTRAVENOUS at 19:15

## 2023-05-01 RX ADMIN — QUETIAPINE FUMARATE 600 MG: 200 TABLET ORAL at 21:37

## 2023-05-01 RX ADMIN — MORPHINE SULFATE 4 MG: 4 INJECTION, SOLUTION INTRAMUSCULAR; INTRAVENOUS at 11:04

## 2023-05-01 RX ADMIN — METRONIDAZOLE 500 MG: 5 INJECTION, SOLUTION INTRAVENOUS at 04:06

## 2023-05-01 RX ADMIN — SODIUM CHLORIDE 125 ML/HR: 9 INJECTION, SOLUTION INTRAVENOUS at 04:06

## 2023-05-01 RX ADMIN — ESCITALOPRAM 20 MG: 10 TABLET, FILM COATED ORAL at 09:19

## 2023-05-01 RX ADMIN — INSULIN LISPRO 4 UNITS: 100 INJECTION, SOLUTION INTRAVENOUS; SUBCUTANEOUS at 12:16

## 2023-05-01 RX ADMIN — MORPHINE SULFATE 4 MG: 4 INJECTION, SOLUTION INTRAMUSCULAR; INTRAVENOUS at 21:37

## 2023-05-01 RX ADMIN — PANTOPRAZOLE SODIUM 40 MG: 40 INJECTION, POWDER, FOR SOLUTION INTRAVENOUS at 09:19

## 2023-05-01 RX ADMIN — SODIUM BICARBONATE 650 MG TABLET 1300 MG: at 09:19

## 2023-05-01 RX ADMIN — METRONIDAZOLE 500 MG: 5 INJECTION, SOLUTION INTRAVENOUS at 12:16

## 2023-05-01 RX ADMIN — SODIUM BICARBONATE 650 MG TABLET 1300 MG: at 21:38

## 2023-05-01 RX ADMIN — BUDESONIDE AND FORMOTEROL FUMARATE DIHYDRATE 2 PUFF: 160; 4.5 AEROSOL RESPIRATORY (INHALATION) at 10:51

## 2023-05-01 RX ADMIN — OXYCODONE HYDROCHLORIDE AND ACETAMINOPHEN 1 TABLET: 7.5; 325 TABLET ORAL at 13:27

## 2023-05-01 RX ADMIN — BUDESONIDE AND FORMOTEROL FUMARATE DIHYDRATE 2 PUFF: 160; 4.5 AEROSOL RESPIRATORY (INHALATION) at 20:26

## 2023-05-01 RX ADMIN — LAMOTRIGINE 200 MG: 100 TABLET ORAL at 21:38

## 2023-05-01 RX ADMIN — MORPHINE SULFATE 4 MG: 4 INJECTION, SOLUTION INTRAMUSCULAR; INTRAVENOUS at 17:46

## 2023-05-01 RX ADMIN — INSULIN DETEMIR 25 UNITS: 100 INJECTION, SOLUTION SUBCUTANEOUS at 21:37

## 2023-05-01 RX ADMIN — PREGABALIN 100 MG: 100 CAPSULE ORAL at 09:19

## 2023-05-01 RX ADMIN — INSULIN LISPRO 4 UNITS: 100 INJECTION, SOLUTION INTRAVENOUS; SUBCUTANEOUS at 17:46

## 2023-05-01 RX ADMIN — ATORVASTATIN CALCIUM 40 MG: 40 TABLET, FILM COATED ORAL at 21:37

## 2023-05-01 NOTE — SIGNIFICANT NOTE
05/01/23 1454   Plan   Plan Patient reports lives by himself.  CM provided patient with [employer] due to limited income.  Patient reports drives or health care plan provides transportation.  Patient is able to provide own IADL's.  Reports daughter can assist if needed.  Facesheet verified.  Plans to return home with no needs at this time.

## 2023-05-01 NOTE — PLAN OF CARE
Goal Outcome Evaluation:           Progress: no change  Outcome Evaluation: Patient had multiple bloody bowel movements during shift. Falls precautions in place. Devon Kim RN

## 2023-05-01 NOTE — PLAN OF CARE
Goal Outcome Evaluation:  Plan of Care Reviewed With: patient        Progress: improving  Outcome Evaluation: PT is AAOx4, VSS. Tolerating IVF and IV antibiotics. No bloody stools, N/V reported this shift. Pain in left shoulder from fall at home, xray complete, no fx, pain control with PRN Morphine/Percocet per patient. No report of dizziness and no syncope episodes this shift. Remains on SSI with insulin given as ordered. Up in chair this shift. No acute events or new concerns voiced this shift. Continue to monitor with current POC.

## 2023-05-01 NOTE — PROGRESS NOTES
[facility]   Hospitalist Progress Note  Date: 2023  Patient Name: Carlos Murphy Jr.  : 1960  MRN: [id number]  Date of admission: 2023      Subjective   Subjective     Chief Complaint: Syncope    Summary: 62 y.o. male PMH DM, CKD, schizophrenia, HTN, HLD, seizure disorder who presented to the ED on  due to syncopal episode at home.  Patient states he has had chronic issues with constipation.  He attempted to have a bowel movement and while he was straining he passed out.  It is unclear how long he was out but when he awoke, he felt confused.  He is bowel movement was hard but he did not notice blood at the time.  He had a crawled to the bed and called 911.  In the ED, he was found to have an MK with a creatinine of 2.5, acidosis with bicarb 12, glucose 325, WBC 18.   CT abdomen pelvis concerning for colitis.  He began having bright red blood per rectum and was admitted for further care.  Gastroenterology following.  Treating colitis with Levaquin and Flagyl.    Interval Followup: No acute events overnight.  Continues to have bright red bloody bowel movements.  Abdominal pain is still persistent.  Having significant left shoulder pain which is the side he fell on after he collapsed.  Tolerating oral intake.    Review of Systems   Denies fevers or chest pain     Objective   Objective     Vitals:   Temp:  [97.3 °F (36.3 °C)-98.6 °F (37 °C)] 98.6 °F (37 °C)  Heart Rate:  [64-93] 73  Resp:  [16-20] 18  BP: ()/() 131/54  Physical Exam    Constitutional: Awake, alert, no acute distress   Respiratory: Clear to auscultation bilaterally, nonlabored respirations    Cardiovascular: RRR, no MRG   Gastrointestinal: Positive bowel sounds, soft, nontender, nondistended   Neurologic: Oriented x 3, strength symmetric in all extremities, Cranial Nerves grossly intact to confrontation, speech clear    Result Review    Result Review:  I have personally reviewed the results below:  [x]  Laboratory  personally reviewed blood sugars, BMP, CBC, magnesium  []  Microbiology  [x]  Radiology CT abdomen pelvis personally reviewed with evidence of colitis  [x]  EKG/Telemetry telemetry reviewed showed normal sinus rhythm  []  Cardiology/Vascular   []  Pathology  []  Old records  []  Other:  CBC        4/30/2023    13:52 5/1/2023    04:02   CBC   WBC 18.21   11.58     RBC 5.16   3.98     Hemoglobin 15.2   11.7     Hematocrit 44.8   34.2     MCV 86.8   85.9     MCH 29.5   29.4     MCHC 33.9   34.2     RDW 13.2   13.3     Platelets 176   149       CMP        6/1/2022    09:10 4/30/2023    16:02 5/1/2023    04:02   CMP   Glucose 165   325   117     BUN 29   52   54     Creatinine 2.05   2.50   2.40     EGFR 36.4   28.5   29.9     Sodium 140   133   137     Potassium 5.2   5.2   4.6     Chloride 107   104   108     Calcium 9.5   8.6   8.5     Total Protein  6.3      Albumin  3.8      Globulin  2.5      Total Bilirubin  0.4      Alkaline Phosphatase  136      AST (SGOT)  19      ALT (SGPT)  19      Albumin/Globulin Ratio  1.5      BUN/Creatinine Ratio 14.1   20.8   22.5     Anion Gap 10.1   16.4   12.1         Assessment & Plan   Assessment / Plan     Assessment/Plan:  Acute infectious colitis  Bright red blood per rectum  Syncope and collapse  Likely vasovagal syncope  Chronic kidney disease, unknown stage  Orthostatic hypotension  Clinical dehydration  Metabolic acidosis  Chronic constipation  Leukocytosis  Type 2 diabetes mellitus  Schizophrenia  Insomnia  Bipolar disorder  Tobacco abuse of cigarettes, in remission      Continue to monitor in the hospital for management of the above  Gastroenterology following, appreciate assistance  Still with significant bright red blood per rectum, notable hemoglobin drop although patient was likely dry on admission  Defer endoscopy at this time, will continue to monitor H&H closely and transfuse if less than 7  Continue PPI twice daily  Continue Levaquin and Flagyl for total of 7-10  days  Blood cultures pending, urinalysis negative  Due to persistent left shoulder pain, will obtain x-ray  Norco and morphine as needed for pain.  Currently requiring both of these for pain control  Continue NS at 125 cc/h  Continue oral bicarb and trend bicarb levels daily  Hold all diuretics at this time  2D echo ordered and pending  Continue scheduled Iveth-Colace, MiraLAX, lactulose daily  Restart carb consistent diet  Continue to hold antihypertensives for now  Continue detemir 25 units daily, high-dose sliding scale insulin.  Will adjust based on blood sugars  SCDs for DVT prophylaxis in setting of bright red blood per rectum  Continue appropriate home medications including home Percocet, Seroquel, Lyrica, Lexapro  Trend renal function and electrolytes with a.m. BMP, magnesium   Trend Hgb and WBC with a.m. CBC     Discussed case with: Bedside RN, gastroenterology    DVT prophylaxis:  Mechanical DVT prophylaxis orders are present.    CODE STATUS:   Level Of Support Discussed With: Patient  Code Status (Patient has no pulse and is not breathing): CPR (Attempt to Resuscitate)  Medical Interventions (Patient has pulse or is breathing): Full Support        Electronically signed by Deepak Al MD, 05/01/23, 1:33 PM EDT.

## 2023-05-01 NOTE — CONSULTS
[facility]  Initial Inpatient Consult Note    Referring Provider: ER    Reason for Consultation: Rectal bleeding    Subjective     History of present illness:    62 y.o. male with history of CKD, DM, HTN, HLD, seizure d/o, and schizophrenia who presents after episode of syncope.  Pt reports constipation prior to presentation.  Pt passed out while trying to have bowel movement.  While in the ER, pt developed rectal bleeding and had multiple episodes over night.  No nausea, vomiting.  Hgb 11.7 today and 15.2 yesterday.  WBC count 18.2 on presentation.  CT abd/pelvis with questionable wall thickening in the descending colon.    Past Medical History:  Past Medical History:   Diagnosis Date   • Acute kidney injury     2021 POST SEIZURES   • Astigmatism of both eyes     eyes twitch left and right   • Bipolar disorder    • Charcot ankle    • Closed nondisplaced fracture of medial malleolus of left tibia    • COPD (chronic obstructive pulmonary disease)     USES INHALERS   • Diabetes     BG RUNS AROUND 90'S IN AM   • Hx of schizophrenia    • Hyperlipidemia    • Hypertension     SEEN DR ROD IN THE PAST, HAD APPT WITH DR MICHAUD IN 5-2022 CX APPT, DENIED CP/SOB   • Neuropathy    • Seizures     LAST ONE 4/21/22   • Sleep apnea     DOES NOT USE CPAP   • Varicose vein of leg      Past Surgical History:  Past Surgical History:   Procedure Laterality Date   • ANKLE OPEN REDUCTION INTERNAL FIXATION Left 04/25/2022    Procedure: LEFT OPEN REDUCTION INTERNAL FIXATION DISTAL TIBIA FRACTURE ;  Surgeon: Tha Parry MD;  [facility];  Service: Orthopedics;  Laterality: Left;   • ANKLE OPEN REDUCTION INTERNAL FIXATION Left 06/01/2022    Procedure: LEFT ANKLE OPEN REDUCTION INTERNAL FIXATION WITH SYNDESMOSIS FIXATION;  Surgeon: Tha Parry MD;  Location: [facility] MAIN OR;  Service: Orthopedics;  Laterality: Left;   • CHOLECYSTECTOMY     • COLONOSCOPY     • JOINT REPLACEMENT      RTKR   •  LUMBAR DISC SURGERY     • SHOULDER SURGERY Right       Social History:   Social History     Tobacco Use   • Smoking status: Former     Packs/day: 0.50     Years: 35.00     Pack years: 17.50     Types: Cigarettes     Quit date:      Years since quittin.3   • Smokeless tobacco: Never   Substance Use Topics   • Alcohol use: Yes     Comment: OCCASIONAL      Family History:  Family History   Problem Relation Age of Onset   • Kidney disease Mother    • COPD Mother    • COPD Father    • Heart disease Father    • Malig Hyperthermia Neg Hx        Home Meds:  Medications Prior to Admission   Medication Sig Dispense Refill Last Dose   • albuterol sulfate  (90 Base) MCG/ACT inhaler Inhale 2 puffs Every 4 (Four) Hours As Needed.   Past Week   • amLODIPine (NORVASC) 5 MG tablet Take 1 tablet by mouth Daily.   2023   • aspirin EC (Ecotrin) 325 MG tablet Take 1 tablet by mouth Daily. 14 tablet 0 2023   • atenolol (TENORMIN) 25 MG tablet Take 1 tablet by mouth Daily.   2023   • atorvastatin (LIPITOR) 40 MG tablet Take 1 tablet by mouth Every Night.   2023   • budesonide-formoterol (Symbicort) 160-4.5 MCG/ACT inhaler Inhale 2 puffs 2 (Two) Times a Day. 1 each 11 Past Week   • empagliflozin (JARDIANCE) 25 MG tablet tablet Take 1 tablet by mouth Daily.   2023   • escitalopram (LEXAPRO) 20 MG tablet Take 1 tablet by mouth Daily.   2023   • furosemide (LASIX) 20 MG tablet Take 1 tablet by mouth Daily.   2023   • glipizide (GLUCOTROL XL) 10 MG 24 hr tablet Take 1 tablet by mouth 2 (Two) Times a Day. INSTRUCTED PER ANESTHESIA PROTOCOL   2023   • hydrOXYzine (ATARAX) 50 MG tablet Take 1 tablet by mouth 3 (Three) Times a Day.   2023   • lamoTRIgine (LaMICtal) 200 MG tablet Take 1 tablet by mouth 2 (Two) Times a Day.   2023   • lisinopril (PRINIVIL,ZESTRIL) 40 MG tablet Take 1 tablet by mouth Daily.   2023   • lubiprostone (AMITIZA) 24 MCG capsule Take 1 capsule by mouth 2  "(Two) Times a Day With Meals.   4/29/2023   • multivitamin with minerals tablet tablet Take 1 tablet by mouth Daily.   4/29/2023   • NovoLOG Mix 70/30 FlexPen (70-30) 100 UNIT/ML suspension pen-injector injection REPORTS SLIDING SCALE [id number]. INSTRUCTED PER ANESTHESIA PROTOCOL IF BS GREATER THEN 140 CAN TAKE 1/2 OF THE DOSE   4/29/2023   • oxyCODONE-acetaminophen (PERCOCET) 7.5-325 MG per tablet Take 1-2 tablets by mouth Every 4 (Four) Hours As Needed for Moderate Pain . (Patient taking differently: Take 1 tablet by mouth Every 8 (Eight) Hours As Needed for Moderate Pain.) 36 tablet 0 4/29/2023   • pantoprazole (PROTONIX) 40 MG EC tablet Take 1 tablet by mouth 2 (Two) Times a Day.   4/29/2023   • pregabalin (LYRICA) 100 MG capsule Take 1 capsule by mouth 4 (Four) Times a Day.   4/29/2023   • QUEtiapine (SEROquel) 300 MG tablet Take 2 tablets by mouth Every Night.   4/29/2023   • Semaglutide,0.25 or 0.5MG/DOS, (Ozempic, 0.25 or 0.5 MG/DOSE,) 2 MG/1.5ML solution pen-injector Inject 5 mg under the skin into the appropriate area as directed 1 (One) Time Per Week. INSTRUCTED PER ANESTHESIA STANDING ORDERS   4/29/2023   • sodium-potassium-magnesium sulfates (Suprep Bowel Prep Kit) 17.5-3.13-1.6 GM/177ML solution oral solution Take 1 bottle by mouth Every 12 (Twelve) Hours. 354 mL 0 4/29/2023   • zolpidem (AMBIEN) 10 MG tablet Take 1 tablet by mouth Every Night.   4/29/2023   • Accu-Chek Guide test strip       • Accu-Chek Softclix Lancets lancets       • Blood Glucose Monitoring Suppl (Accu-Chek Guide Me) w/Device kit       • TRUEplus Insulin Syringe 31G X 5/16\" 0.3 ML misc         Current Meds:   budesonide-formoterol, 2 puff, Inhalation, BID - RT  escitalopram, 20 mg, Oral, Daily  insulin detemir, 25 Units, Subcutaneous, Nightly  insulin lispro, 4-24 Units, Subcutaneous, TID With Meals  lactulose, 20 g, Oral, Daily  lamoTRIgine, 200 mg, Oral, BID  [START ON 5/2/2023] levoFLOXacin, 750 mg, Intravenous, " Q48H  metroNIDAZOLE, 500 mg, Intravenous, Q8H  pantoprazole, 40 mg, Intravenous, Q12H  senna-docusate sodium, 2 tablet, Oral, BID   And  polyethylene glycol, 17 g, Oral, Daily  pregabalin, 100 mg, Oral, TID  QUEtiapine, 600 mg, Oral, Nightly  sodium bicarbonate, 1,300 mg, Oral, TID      Allergies:  No Known Allergies  Review of Systems  Pertinent items are noted in HPI, all other systems reviewed and negative     Objective     Vital Signs  Temp:  [97.3 °F (36.3 °C)-98.6 °F (37 °C)] 98.6 °F (37 °C)  Heart Rate:  [64-93] 76  Resp:  [16-20] 16  BP: ()/() 131/54  Physical Exam:  General Appearance:    Alert, cooperative, in no acute distress   Head:    Normocephalic, without obvious abnormality, atraumatic   Eyes:          conjunctivae and sclerae normal, no icterus   Throat:   no thrush, oral mucosa moist   Neck:   Supple, no adenopathy   Lungs:     Breathing unlabored    Heart:    No chest tenderness   Chest Wall:    No abnormalities observed   Abdomen:     Soft, nondistended, nontender   Extremities:   no edema, no redness   Skin:   No bruising or rash   Psychiatric:  normal mood and insight     Results Review:   I reviewed the patient's new clinical results.    Results from last 7 days   Lab Units 05/01/23  0402 04/30/23  1352   WBC 10*3/mm3 11.58* 18.21*   HEMOGLOBIN g/dL 11.7* 15.2   HEMATOCRIT % 34.2* 44.8   PLATELETS 10*3/mm3 149 176     Results from last 7 days   Lab Units 05/01/23  0402 04/30/23  1602   SODIUM mmol/L 137 133*   POTASSIUM mmol/L 4.6 5.2   CHLORIDE mmol/L 108* 104   CO2 mmol/L 16.9* 12.6*   BUN mg/dL 54* 52*   CREATININE mg/dL 2.40* 2.50*   CALCIUM mg/dL 8.5* 8.6   BILIRUBIN mg/dL  --  0.4   ALK PHOS U/L  --  136*   ALT (SGPT) U/L  --  19   AST (SGOT) U/L  --  19   GLUCOSE mg/dL 117* 325*         No results found for: LIPASE    Radiology:  CT Abdomen Pelvis Without Contrast   Final Result      CT Head Without Contrast   Final Result      XR Chest 1 View   Final Result           Assessment & Plan     Colitis       Assessment:  1. Colitis  2. Rectal bleeding    Plan:  · Given acute onset and leukocytosis, findings suggestive of infectious etiology.  · If develops diarrhea, recommend stool studies for further evaluation  · Another consideration given patient's syncopal episode and location of noted colitis would be ischemic colitis  · rec to continue to monitor Hgb and transfuse prn  · Would complete course of abx for possible infectious colitis  · Would tentatively plan for outpatient f/u with colonoscopy in 4 - 6 weeks to document resolution  · Will restart diet      I discussed the patients findings and my recommendations with patient and primary care team.    Yeni Kiran MD

## 2023-05-02 LAB
ANION GAP SERPL CALCULATED.3IONS-SCNC: 9.7 MMOL/L (ref 5–15)
BASOPHILS # BLD AUTO: 0.05 10*3/MM3 (ref 0–0.2)
BASOPHILS NFR BLD AUTO: 0.4 % (ref 0–1.5)
BUN SERPL-MCNC: 42 MG/DL (ref 8–23)
BUN/CREAT SERPL: 16.9 (ref 7–25)
CALCIUM SPEC-SCNC: 8.5 MG/DL (ref 8.6–10.5)
CHLORIDE SERPL-SCNC: 106 MMOL/L (ref 98–107)
CO2 SERPL-SCNC: 17.3 MMOL/L (ref 22–29)
CREAT SERPL-MCNC: 2.48 MG/DL (ref 0.76–1.27)
DEPRECATED RDW RBC AUTO: 45.1 FL (ref 37–54)
EGFRCR SERPLBLD CKD-EPI 2021: 28.8 ML/MIN/1.73
EOSINOPHIL # BLD AUTO: 0.2 10*3/MM3 (ref 0–0.4)
EOSINOPHIL NFR BLD AUTO: 1.6 % (ref 0.3–6.2)
ERYTHROCYTE [DISTWIDTH] IN BLOOD BY AUTOMATED COUNT: 13.9 % (ref 12.3–15.4)
GLUCOSE BLDC GLUCOMTR-MCNC: 157 MG/DL (ref 70–99)
GLUCOSE BLDC GLUCOMTR-MCNC: 162 MG/DL (ref 70–99)
GLUCOSE BLDC GLUCOMTR-MCNC: 193 MG/DL (ref 70–99)
GLUCOSE BLDC GLUCOMTR-MCNC: 224 MG/DL (ref 70–99)
GLUCOSE SERPL-MCNC: 209 MG/DL (ref 65–99)
HCT VFR BLD AUTO: 35 % (ref 37.5–51)
HGB BLD-MCNC: 11.5 G/DL (ref 13–17.7)
IMM GRANULOCYTES # BLD AUTO: 0.04 10*3/MM3 (ref 0–0.05)
IMM GRANULOCYTES NFR BLD AUTO: 0.3 % (ref 0–0.5)
LYMPHOCYTES # BLD AUTO: 1.67 10*3/MM3 (ref 0.7–3.1)
LYMPHOCYTES NFR BLD AUTO: 13.6 % (ref 19.6–45.3)
MAGNESIUM SERPL-MCNC: 2.2 MG/DL (ref 1.6–2.4)
MCH RBC QN AUTO: 29.1 PG (ref 26.6–33)
MCHC RBC AUTO-ENTMCNC: 32.9 G/DL (ref 31.5–35.7)
MCV RBC AUTO: 88.6 FL (ref 79–97)
MONOCYTES # BLD AUTO: 1.16 10*3/MM3 (ref 0.1–0.9)
MONOCYTES NFR BLD AUTO: 9.4 % (ref 5–12)
NEUTROPHILS NFR BLD AUTO: 74.7 % (ref 42.7–76)
NEUTROPHILS NFR BLD AUTO: 9.2 10*3/MM3 (ref 1.7–7)
NRBC BLD AUTO-RTO: 0 /100 WBC (ref 0–0.2)
PHOSPHATE SERPL-MCNC: 3.8 MG/DL (ref 2.5–4.5)
PLATELET # BLD AUTO: 143 10*3/MM3 (ref 140–450)
PMV BLD AUTO: 10.1 FL (ref 6–12)
POTASSIUM SERPL-SCNC: 4.4 MMOL/L (ref 3.5–5.2)
RBC # BLD AUTO: 3.95 10*6/MM3 (ref 4.14–5.8)
SODIUM SERPL-SCNC: 133 MMOL/L (ref 136–145)
WBC NRBC COR # BLD: 12.32 10*3/MM3 (ref 3.4–10.8)

## 2023-05-02 PROCEDURE — 94664 DEMO&/EVAL PT USE INHALER: CPT

## 2023-05-02 PROCEDURE — 0 CEFTRIAXONE PER 250 MG: Performed by: INTERNAL MEDICINE

## 2023-05-02 PROCEDURE — 80048 BASIC METABOLIC PNL TOTAL CA: CPT | Performed by: INTERNAL MEDICINE

## 2023-05-02 PROCEDURE — 84100 ASSAY OF PHOSPHORUS: CPT | Performed by: INTERNAL MEDICINE

## 2023-05-02 PROCEDURE — 82948 REAGENT STRIP/BLOOD GLUCOSE: CPT

## 2023-05-02 PROCEDURE — 94760 N-INVAS EAR/PLS OXIMETRY 1: CPT

## 2023-05-02 PROCEDURE — 97161 PT EVAL LOW COMPLEX 20 MIN: CPT

## 2023-05-02 PROCEDURE — 99233 SBSQ HOSP IP/OBS HIGH 50: CPT | Performed by: INTERNAL MEDICINE

## 2023-05-02 PROCEDURE — 63710000001 INSULIN DETEMIR PER 5 UNITS: Performed by: INTERNAL MEDICINE

## 2023-05-02 PROCEDURE — 83735 ASSAY OF MAGNESIUM: CPT | Performed by: INTERNAL MEDICINE

## 2023-05-02 PROCEDURE — 94799 UNLISTED PULMONARY SVC/PX: CPT

## 2023-05-02 PROCEDURE — 85025 COMPLETE CBC W/AUTO DIFF WBC: CPT | Performed by: INTERNAL MEDICINE

## 2023-05-02 PROCEDURE — 63710000001 INSULIN LISPRO (HUMAN) PER 5 UNITS: Performed by: INTERNAL MEDICINE

## 2023-05-02 PROCEDURE — 25010000002 MORPHINE PER 10 MG: Performed by: INTERNAL MEDICINE

## 2023-05-02 RX ORDER — CEFTRIAXONE SODIUM 2 G/50ML
2 INJECTION, SOLUTION INTRAVENOUS EVERY 24 HOURS
Status: DISCONTINUED | OUTPATIENT
Start: 2023-05-02 | End: 2023-05-03 | Stop reason: HOSPADM

## 2023-05-02 RX ADMIN — ESCITALOPRAM 20 MG: 10 TABLET, FILM COATED ORAL at 08:43

## 2023-05-02 RX ADMIN — INSULIN DETEMIR 25 UNITS: 100 INJECTION, SOLUTION SUBCUTANEOUS at 21:25

## 2023-05-02 RX ADMIN — LAMOTRIGINE 200 MG: 100 TABLET ORAL at 08:44

## 2023-05-02 RX ADMIN — PREGABALIN 100 MG: 100 CAPSULE ORAL at 21:13

## 2023-05-02 RX ADMIN — HYDROXYZINE HYDROCHLORIDE 50 MG: 25 TABLET, FILM COATED ORAL at 08:43

## 2023-05-02 RX ADMIN — HYDROXYZINE HYDROCHLORIDE 50 MG: 25 TABLET, FILM COATED ORAL at 16:00

## 2023-05-02 RX ADMIN — BUDESONIDE AND FORMOTEROL FUMARATE DIHYDRATE 2 PUFF: 160; 4.5 AEROSOL RESPIRATORY (INHALATION) at 08:05

## 2023-05-02 RX ADMIN — INSULIN LISPRO 8 UNITS: 100 INJECTION, SOLUTION INTRAVENOUS; SUBCUTANEOUS at 07:49

## 2023-05-02 RX ADMIN — MORPHINE SULFATE 4 MG: 4 INJECTION, SOLUTION INTRAMUSCULAR; INTRAVENOUS at 21:12

## 2023-05-02 RX ADMIN — Medication 10 ML: at 21:12

## 2023-05-02 RX ADMIN — SENNOSIDES AND DOCUSATE SODIUM 2 TABLET: 8.6; 5 TABLET ORAL at 21:12

## 2023-05-02 RX ADMIN — QUETIAPINE FUMARATE 600 MG: 200 TABLET ORAL at 21:12

## 2023-05-02 RX ADMIN — ATORVASTATIN CALCIUM 40 MG: 40 TABLET, FILM COATED ORAL at 21:13

## 2023-05-02 RX ADMIN — SODIUM BICARBONATE 650 MG TABLET 1300 MG: at 08:42

## 2023-05-02 RX ADMIN — MORPHINE SULFATE 4 MG: 4 INJECTION, SOLUTION INTRAMUSCULAR; INTRAVENOUS at 03:47

## 2023-05-02 RX ADMIN — BUDESONIDE AND FORMOTEROL FUMARATE DIHYDRATE 2 PUFF: 160; 4.5 AEROSOL RESPIRATORY (INHALATION) at 19:49

## 2023-05-02 RX ADMIN — CEFTRIAXONE SODIUM 2 G: 2 INJECTION, SOLUTION INTRAVENOUS at 16:00

## 2023-05-02 RX ADMIN — POLYETHYLENE GLYCOL 3350 17 G: 17 POWDER, FOR SOLUTION ORAL at 08:44

## 2023-05-02 RX ADMIN — MORPHINE SULFATE 4 MG: 4 INJECTION, SOLUTION INTRAMUSCULAR; INTRAVENOUS at 16:08

## 2023-05-02 RX ADMIN — PREGABALIN 100 MG: 100 CAPSULE ORAL at 08:43

## 2023-05-02 RX ADMIN — SODIUM BICARBONATE 650 MG TABLET 1300 MG: at 16:00

## 2023-05-02 RX ADMIN — SODIUM CHLORIDE 125 ML/HR: 9 INJECTION, SOLUTION INTRAVENOUS at 12:50

## 2023-05-02 RX ADMIN — PANTOPRAZOLE SODIUM 40 MG: 40 INJECTION, POWDER, FOR SOLUTION INTRAVENOUS at 08:42

## 2023-05-02 RX ADMIN — INSULIN LISPRO 4 UNITS: 100 INJECTION, SOLUTION INTRAVENOUS; SUBCUTANEOUS at 12:05

## 2023-05-02 RX ADMIN — SODIUM BICARBONATE 650 MG TABLET 1300 MG: at 21:12

## 2023-05-02 RX ADMIN — METRONIDAZOLE 500 MG: 5 INJECTION, SOLUTION INTRAVENOUS at 12:11

## 2023-05-02 RX ADMIN — INSULIN LISPRO 4 UNITS: 100 INJECTION, SOLUTION INTRAVENOUS; SUBCUTANEOUS at 17:13

## 2023-05-02 RX ADMIN — SENNOSIDES AND DOCUSATE SODIUM 2 TABLET: 8.6; 5 TABLET ORAL at 08:43

## 2023-05-02 RX ADMIN — METRONIDAZOLE 500 MG: 5 INJECTION, SOLUTION INTRAVENOUS at 20:59

## 2023-05-02 RX ADMIN — PREGABALIN 100 MG: 100 CAPSULE ORAL at 15:59

## 2023-05-02 RX ADMIN — HYDROXYZINE HYDROCHLORIDE 50 MG: 25 TABLET, FILM COATED ORAL at 21:13

## 2023-05-02 RX ADMIN — ZOLPIDEM TARTRATE 10 MG: 5 TABLET ORAL at 21:25

## 2023-05-02 RX ADMIN — MORPHINE SULFATE 4 MG: 4 INJECTION, SOLUTION INTRAMUSCULAR; INTRAVENOUS at 12:10

## 2023-05-02 RX ADMIN — OXYCODONE HYDROCHLORIDE AND ACETAMINOPHEN 1 TABLET: 7.5; 325 TABLET ORAL at 22:20

## 2023-05-02 RX ADMIN — OXYCODONE HYDROCHLORIDE AND ACETAMINOPHEN 1 TABLET: 7.5; 325 TABLET ORAL at 17:18

## 2023-05-02 RX ADMIN — OXYCODONE HYDROCHLORIDE AND ACETAMINOPHEN 1 TABLET: 7.5; 325 TABLET ORAL at 07:50

## 2023-05-02 RX ADMIN — LACTULOSE 20 G: 20 SOLUTION ORAL at 08:42

## 2023-05-02 RX ADMIN — METRONIDAZOLE 500 MG: 5 INJECTION, SOLUTION INTRAVENOUS at 03:47

## 2023-05-02 RX ADMIN — LAMOTRIGINE 200 MG: 100 TABLET ORAL at 21:25

## 2023-05-02 RX ADMIN — SODIUM CHLORIDE 125 ML/HR: 9 INJECTION, SOLUTION INTRAVENOUS at 03:47

## 2023-05-02 RX ADMIN — PANTOPRAZOLE SODIUM 40 MG: 40 INJECTION, POWDER, FOR SOLUTION INTRAVENOUS at 21:12

## 2023-05-02 NOTE — PROGRESS NOTES
[facility]   Hospitalist Progress Note  Date: 2023  Patient Name: Carlos Murphy Jr.  : 1960  MRN: [id number]  Date of admission: 2023      Subjective   Subjective     Chief Complaint: Syncope    Summary: 62 y.o. male PMH DM, CKD, schizophrenia, HTN, HLD, seizure disorder who presented to the ED on  due to syncopal episode at home.  Patient states he has had chronic issues with constipation.  He attempted to have a bowel movement and while he was straining he passed out.  It is unclear how long he was out but when he awoke, he felt confused.  He is bowel movement was hard but he did not notice blood at the time.  He had a crawled to the bed and called 911.  In the ED, he was found to have an MK with a creatinine of 2.5, acidosis with bicarb 12, glucose 325, WBC 18.   CT abdomen pelvis concerning for colitis.  He began having bright red blood per rectum and was admitted for further care.  Gastroenterology following.  Treating colitis with Levaquin and Flagyl.    Interval Followup: No acute events overnight, no further bowel movements, still complaining of left shoulder pain    Objective   Objective     Vitals:   Temp:  [97.7 °F (36.5 °C)-98 °F (36.7 °C)] 97.7 °F (36.5 °C)  Heart Rate:  [68-74] 68  Resp:  [16-18] 18  BP: (115-135)/(47-67) 115/67  Physical Exam    Constitutional: No acute distress   Respiratory: Equal chest rise bilaterally   Cardiovascular: RRR, no MRG   Gastrointestinal: Positive bowel sounds, soft, nontender, nondistended   Neurologic: No focal deficits    Result Review    Result Review:  I have personally reviewed the results below:  [x]  Laboratory personally reviewed blood sugars, BMP, CBC, magnesium  []  Microbiology  [x]  Radiology CT abdomen pelvis personally reviewed with evidence of colitis  [x]  EKG/Telemetry telemetry reviewed showed normal sinus rhythm  []  Cardiology/Vascular   []  Pathology  []  Old records  []  Other:  CBC        2023    13:52 2023     04:02 5/2/2023    05:25   CBC   WBC 18.21   11.58   12.32     RBC 5.16   3.98   3.95     Hemoglobin 15.2   11.7   11.5     Hematocrit 44.8   34.2   35.0     MCV 86.8   85.9   88.6     MCH 29.5   29.4   29.1     MCHC 33.9   34.2   32.9     RDW 13.2   13.3   13.9     Platelets 176   149   143       CMP        4/30/2023    16:02 5/1/2023    04:02 5/2/2023    05:25   CMP   Glucose 325   117   209     BUN 52   54   42     Creatinine 2.50   2.40   2.48     EGFR 28.5   29.9   28.8     Sodium 133   137   133     Potassium 5.2   4.6   4.4     Chloride 104   108   106     Calcium 8.6   8.5   8.5     Total Protein 6.3       Albumin 3.8       Globulin 2.5       Total Bilirubin 0.4       Alkaline Phosphatase 136       AST (SGOT) 19       ALT (SGPT) 19       Albumin/Globulin Ratio 1.5       BUN/Creatinine Ratio 20.8   22.5   16.9     Anion Gap 16.4   12.1   9.7         Assessment & Plan   Assessment / Plan     Assessment/Plan:  Acute infectious colitis  Bright red blood per rectum  Syncope and collapse  Likely vasovagal syncope  Chronic kidney disease, unknown stage  Orthostatic hypotension  Clinical dehydration  Metabolic acidosis  Chronic constipation  Leukocytosis  Type 2 diabetes mellitus  Schizophrenia  Insomnia  Bipolar disorder  Tobacco abuse of cigarettes, in remission      Continue to monitor in the hospital for management of the above  Gastroenterology following, appreciate assistance  Bowel movements seem to have slowed down,  Continue IV antibiotics, will need outpatient colonoscopy following discharge  Continue PPI twice daily  Continue Levaquin and Flagyl for total of 7-10 days  Blood cultures pending, urinalysis negative  Due to persistent left shoulder pain, x-ray personally reviewed, arthritic changes, no fractures  Continue Norco and morphine as needed for pain.  Currently requiring both of these for pain control  Continue gentle IV hydration  Continue oral bicarb and trend bicarb levels daily  Continue to  hold all diuretics at this time  2D echo ordered and pending  Continue scheduled Iveth-Colace, MiraLAX, lactulose daily  Continue carb consistent diet  Continue to hold antihypertensives for now  Continue detemir 25 units daily, high-dose sliding scale insulin.  Will adjust based on blood sugars  SCDs for DVT prophylaxis in setting of bright red blood per rectum  Continue appropriate home medications including home Percocet, Seroquel, Lyrica, Lexapro  CBC, CMP reviewed  Follow-up CBC, CMP, mag and Phos in a.m.  Discussed management plan with gastroenterology     Discussed case with: Bedside RN, gastroenterology    DVT prophylaxis:  Mechanical DVT prophylaxis orders are present.    CODE STATUS:   Level Of Support Discussed With: Patient  Code Status (Patient has no pulse and is not breathing): CPR (Attempt to Resuscitate)  Medical Interventions (Patient has pulse or is breathing): Full Support          Electronically signed by Jose Palmer MD, 05/02/23, 10:29 AM EDT.

## 2023-05-02 NOTE — PLAN OF CARE
Goal Outcome Evaluation:  Plan of Care Reviewed With: patient           Outcome Evaluation: Pt presents with minimal deficits with ambulation and balance. He can return home with home health services and a Rolling walker if daughter can assist him upon return home.

## 2023-05-02 NOTE — THERAPY EVALUATION
Acute Care - Physical Therapy Initial Evaluation   [facility]     Patient Name: Carlos Murphy Jr.  : 1960  MRN: [id number]  Today's Date: 2023      Visit Dx:     ICD-10-CM ICD-9-CM   1. Colitis  K52.9 558.9   2. Difficulty walking  R26.2 719.7     Patient Active Problem List   Diagnosis   • Ulcer of toe due to type 2 diabetes mellitus (HCC)   • Centrilobular emphysema   • Tobacco abuse, in remission   • Obesity (BMI 30-39.9)   • Closed fracture of shaft of left fibula   • Closed nondisplaced fracture of medial malleolus of left tibia   • Aftercare following surgery of ORIF left distal tibia fracture 2022   • Colon cancer screening   • Colitis     Past Medical History:   Diagnosis Date   • Acute kidney injury      POST SEIZURES   • Astigmatism of both eyes     eyes twitch left and right   • Bipolar disorder    • Charcot ankle    • Closed nondisplaced fracture of medial malleolus of left tibia    • COPD (chronic obstructive pulmonary disease)     USES INHALERS   • Diabetes     BG RUNS AROUND 90'S IN AM   • Hx of schizophrenia    • Hyperlipidemia    • Hypertension     SEEN DR ROD IN THE PAST, HAD APPT WITH DR MICHAUD IN  [facility] APPT, DENIED CP/SOB   • Neuropathy    • Seizures     LAST ONE 22   • Sleep apnea     DOES NOT USE CPAP   • Varicose vein of leg      Past Surgical History:   Procedure Laterality Date   • ANKLE OPEN REDUCTION INTERNAL FIXATION Left 2022    Procedure: LEFT OPEN REDUCTION INTERNAL FIXATION DISTAL TIBIA FRACTURE ;  Surgeon: Tha Parry MD;  Location: [facility];  Service: Orthopedics;  Laterality: Left;   • ANKLE OPEN REDUCTION INTERNAL FIXATION Left 2022    Procedure: LEFT ANKLE OPEN REDUCTION INTERNAL FIXATION WITH SYNDESMOSIS FIXATION;  Surgeon: Tha Parry MD;  Location: [facility] MAIN OR;  Service: Orthopedics;  Laterality: Left;   • CHOLECYSTECTOMY     • COLONOSCOPY     • JOINT REPLACEMENT      RTKR   • LUMBAR DISC SURGERY     •  SHOULDER SURGERY Right      PT Assessment (last 12 hours)     PT Evaluation and Treatment     Row Name 05/02/23 0900          Physical Therapy Time and Intention    Subjective Information no complaints  -DP     Document Type evaluation  -DP     Mode of Treatment individual therapy;physical therapy  -DP     Patient Effort good  -DP     Row Name 05/02/23 0900          General Information    Patient Profile Reviewed yes  -DP     Patient Observations alert;cooperative;agree to therapy  -DP     Prior Level of Function independent:;gait;transfer;ADL's;bed mobility  -DP     Equipment Currently Used at Home cane, straight  -DP     Existing Precautions/Restrictions fall  -DP     Barriers to Rehab none identified  -DP     Row Name 05/02/23 0900          Living Environment    Current Living Arrangements home  -DP     People in Home alone  -DP     Row Name 05/02/23 0900          Range of Motion (ROM)    Range of Motion bilateral lower extremities;ROM is WFL  -DP     Row Name 05/02/23 0900          Strength (Manual Muscle Testing)    Strength (Manual Muscle Testing) bilateral lower extremities;strength is WFL  -DP     Row Name 05/02/23 0900          Bed Mobility    Comment, (Bed Mobility) not tested- pt was already in the recliner  -DP     Row Name 05/02/23 0900          Transfers    Transfers sit-stand transfer  -DP     Row Name 05/02/23 0900          Sit-Stand Transfer    Sit-Stand [place] (Transfers) contact guard  -DP     Assistive Device (Sit-Stand Transfers) walker, front-wheeled  -DP     Row Name 05/02/23 0900          Gait/Stairs (Locomotion)    Gait/Stairs Locomotion gait/ambulation assistive device  -DP     [place] Level (Gait) contact guard  -DP     Assistive Device (Gait) walker, front-wheeled  -DP     Distance in Feet (Gait) 20  -DP     Row Name 05/02/23 0900          Balance    Balance Assessment standing dynamic balance  -DP     Dynamic Standing Balance contact guard  -DP     Position/Device Used,  Standing Balance supported;walker, rolling  -DP     Row Name 05/02/23 0900          Plan of Care Review    Plan of Care Reviewed With patient  -DP     Outcome Evaluation Pt presents with minimal deficits with ambulation and balance. He can return home with home health services and a Rolling walker if daughter can assist him upon return home.  -DP     Row Name 05/02/23 0900          Therapy Assessment/Plan (PT)    Rehab Potential (PT) good, to achieve stated therapy goals  -DP     Criteria for Skilled Interventions Met (PT) yes;meets criteria  -DP     Therapy Frequency (PT) daily  -DP     Predicted Duration of Therapy Intervention (PT) 10 days  -DP     Problem List (PT) problems related to;balance;mobility  -DP     Activity Limitations Related to Problem List (PT) unable to ambulate safely  -DP     Row Name 05/02/23 0900          PT Evaluation Complexity    History, PT Evaluation Complexity no personal factors and/or comorbidities  -DP     Examination of Body Systems (PT Eval Complexity) total of 4 or more elements  -DP     Clinical Presentation (PT Evaluation Complexity) stable  -DP     Clinical Decision Making (PT Evaluation Complexity) low complexity  -DP     Overall Complexity (PT Evaluation Complexity) low complexity  -DP     Row Name 05/02/23 0900          Physical Therapy Goals    Gait Training Goal Selection (PT) gait training, PT goal 1  -DP     Row Name 05/02/23 0900          Gait Training Goal 1 (PT)    Activity/Assistive Device (Gait Training Goal 1, PT) assistive device use;walker, rolling  -DP     [place] Level (Gait Training Goal 1, PT) supervision required  -DP     Distance (Gait Training Goal 1, PT) 300  -DP     Time Frame (Gait Training Goal 1, PT) 10 days  -DP           User Key  (r) = Recorded By, (t) = Taken By, (c) = Cosigned By    Initials Name Provider Type    Gunjan Ramirez, PT Physical Therapist                  PT Recommendation and Plan  Anticipated Discharge Disposition (PT):  home with home health  Planned Therapy Interventions (PT): gait training, bed mobility training, balance training, strengthening, transfer training  Therapy Frequency (PT): daily  Plan of Care Reviewed With: patient  Outcome Evaluation: Pt presents with minimal deficits with ambulation and balance. He can return home with home health services and a Rolling walker if daughter can assist him upon return home.   Outcome Measures     Row Name 05/02/23 0900             How much help from another person do you currently need...    Turning from your back to your side while in flat bed without using bedrails? 4  -DP      Moving from lying on back to sitting on the side of a flat bed without bedrails? 4  -DP      Moving to and from a bed to a chair (including a wheelchair)? 3  -DP      Standing up from a chair using your arms (e.g., wheelchair, bedside chair)? 3  -DP      Climbing 3-5 steps with a railing? 3  -DP      To walk in hospital room? 3  -DP      AM-PAC 6 Clicks Score (PT) 20  -DP         Functional Assessment    Outcome Measure Options AM-PAC 6 Clicks Basic Mobility (PT)  -DP            User Key  (r) = Recorded By, (t) = Taken By, (c) = Cosigned By    Initials Name Provider Type    Gunjan Ramirez PT Physical Therapist                 Time Calculation:    PT Charges     Row Name 05/02/23 0948             Time Calculation    PT Received On 05/02/23  -DP      PT Goal Re-Cert Due Date 05/11/23  -DP         Untimed Charges    PT Eval/Re-eval Minutes 40  -DP         Total Minutes    Untimed Charges Total Minutes 40  -DP       Total Minutes 40  -DP            User Key  (r) = Recorded By, (t) = Taken By, (c) = Cosigned By    Initials Name Provider Type    Gunjan Ramirez PT Physical Therapist              Therapy Charges for Today     Code Description Service Date Service Provider Modifiers Qty    [id number] HC PT EVAL LOW COMPLEXITY 3 5/2/2023 Gunjan Abdul PT GP 1          PT G-Codes  Outcome Measure  Options: AM-PAC 6 Clicks Basic Mobility (PT)  AM-PAC 6 Clicks Score (PT): 20    Gunjan Abdul, PT  5/2/2023

## 2023-05-02 NOTE — PLAN OF CARE
Goal Outcome Evaluation:               VSS, A&O X4, tolerating diet and medication, medicated for pain, see MAR. Patient up to chair x2 with assist

## 2023-05-02 NOTE — PLAN OF CARE
Goal Outcome Evaluation:  Plan of Care Reviewed With: patient        Progress: improving  Outcome Evaluation: VSS. Medicated x3 for pain with adequate pain relief. No bowel movement tonight. Voided in urinal. IV abx given. BGL monitored.

## 2023-05-03 ENCOUNTER — TELEPHONE (OUTPATIENT)
Dept: GASTROENTEROLOGY | Facility: CLINIC | Age: 63
End: 2023-05-03
Payer: MEDICARE

## 2023-05-03 ENCOUNTER — READMISSION MANAGEMENT (OUTPATIENT)
Dept: CALL CENTER | Facility: HOSPITAL | Age: 63
End: 2023-05-03
Payer: MEDICARE

## 2023-05-03 VITALS
SYSTOLIC BLOOD PRESSURE: 124 MMHG | RESPIRATION RATE: 16 BRPM | WEIGHT: 296.08 LBS | HEART RATE: 67 BPM | DIASTOLIC BLOOD PRESSURE: 67 MMHG | TEMPERATURE: 97.3 F | OXYGEN SATURATION: 96 % | BODY MASS INDEX: 40.1 KG/M2 | HEIGHT: 72 IN

## 2023-05-03 LAB
ALBUMIN SERPL-MCNC: 3.4 G/DL (ref 3.5–5.2)
ALBUMIN/GLOB SERPL: 1.5 G/DL
ALP SERPL-CCNC: 96 U/L (ref 39–117)
ALT SERPL W P-5'-P-CCNC: 13 U/L (ref 1–41)
ANION GAP SERPL CALCULATED.3IONS-SCNC: 10.7 MMOL/L (ref 5–15)
AST SERPL-CCNC: 11 U/L (ref 1–40)
BASOPHILS # BLD AUTO: 0.04 10*3/MM3 (ref 0–0.2)
BASOPHILS NFR BLD AUTO: 0.4 % (ref 0–1.5)
BILIRUB SERPL-MCNC: 0.3 MG/DL (ref 0–1.2)
BUN SERPL-MCNC: 33 MG/DL (ref 8–23)
BUN/CREAT SERPL: 17.3 (ref 7–25)
CALCIUM SPEC-SCNC: 8.5 MG/DL (ref 8.6–10.5)
CHLORIDE SERPL-SCNC: 107 MMOL/L (ref 98–107)
CO2 SERPL-SCNC: 18.3 MMOL/L (ref 22–29)
CREAT SERPL-MCNC: 1.91 MG/DL (ref 0.76–1.27)
DEPRECATED RDW RBC AUTO: 44.8 FL (ref 37–54)
EGFRCR SERPLBLD CKD-EPI 2021: 39.4 ML/MIN/1.73
EOSINOPHIL # BLD AUTO: 0.3 10*3/MM3 (ref 0–0.4)
EOSINOPHIL NFR BLD AUTO: 3.2 % (ref 0.3–6.2)
ERYTHROCYTE [DISTWIDTH] IN BLOOD BY AUTOMATED COUNT: 13.7 % (ref 12.3–15.4)
GLOBULIN UR ELPH-MCNC: 2.3 GM/DL
GLUCOSE BLDC GLUCOMTR-MCNC: 175 MG/DL (ref 70–99)
GLUCOSE BLDC GLUCOMTR-MCNC: 194 MG/DL (ref 70–99)
GLUCOSE SERPL-MCNC: 176 MG/DL (ref 65–99)
HCT VFR BLD AUTO: 33.5 % (ref 37.5–51)
HGB BLD-MCNC: 11.1 G/DL (ref 13–17.7)
IMM GRANULOCYTES # BLD AUTO: 0.05 10*3/MM3 (ref 0–0.05)
IMM GRANULOCYTES NFR BLD AUTO: 0.5 % (ref 0–0.5)
LYMPHOCYTES # BLD AUTO: 1.63 10*3/MM3 (ref 0.7–3.1)
LYMPHOCYTES NFR BLD AUTO: 17.5 % (ref 19.6–45.3)
MAGNESIUM SERPL-MCNC: 1.9 MG/DL (ref 1.6–2.4)
MCH RBC QN AUTO: 29.3 PG (ref 26.6–33)
MCHC RBC AUTO-ENTMCNC: 33.1 G/DL (ref 31.5–35.7)
MCV RBC AUTO: 88.4 FL (ref 79–97)
MONOCYTES # BLD AUTO: 1.04 10*3/MM3 (ref 0.1–0.9)
MONOCYTES NFR BLD AUTO: 11.2 % (ref 5–12)
NEUTROPHILS NFR BLD AUTO: 6.26 10*3/MM3 (ref 1.7–7)
NEUTROPHILS NFR BLD AUTO: 67.2 % (ref 42.7–76)
NRBC BLD AUTO-RTO: 0 /100 WBC (ref 0–0.2)
PHOSPHATE SERPL-MCNC: 4 MG/DL (ref 2.5–4.5)
PLATELET # BLD AUTO: 130 10*3/MM3 (ref 140–450)
PMV BLD AUTO: 10.2 FL (ref 6–12)
POTASSIUM SERPL-SCNC: 4.4 MMOL/L (ref 3.5–5.2)
PROT SERPL-MCNC: 5.7 G/DL (ref 6–8.5)
QT INTERVAL: 434 MS
RBC # BLD AUTO: 3.79 10*6/MM3 (ref 4.14–5.8)
SODIUM SERPL-SCNC: 136 MMOL/L (ref 136–145)
WBC NRBC COR # BLD: 9.32 10*3/MM3 (ref 3.4–10.8)

## 2023-05-03 PROCEDURE — 82948 REAGENT STRIP/BLOOD GLUCOSE: CPT

## 2023-05-03 PROCEDURE — 63710000001 INSULIN LISPRO (HUMAN) PER 5 UNITS: Performed by: INTERNAL MEDICINE

## 2023-05-03 PROCEDURE — 99239 HOSP IP/OBS DSCHRG MGMT >30: CPT | Performed by: INTERNAL MEDICINE

## 2023-05-03 PROCEDURE — 25010000002 MORPHINE PER 10 MG: Performed by: INTERNAL MEDICINE

## 2023-05-03 PROCEDURE — 80053 COMPREHEN METABOLIC PANEL: CPT | Performed by: INTERNAL MEDICINE

## 2023-05-03 PROCEDURE — 83735 ASSAY OF MAGNESIUM: CPT | Performed by: INTERNAL MEDICINE

## 2023-05-03 PROCEDURE — 85025 COMPLETE CBC W/AUTO DIFF WBC: CPT | Performed by: INTERNAL MEDICINE

## 2023-05-03 PROCEDURE — 97165 OT EVAL LOW COMPLEX 30 MIN: CPT

## 2023-05-03 PROCEDURE — 94799 UNLISTED PULMONARY SVC/PX: CPT

## 2023-05-03 PROCEDURE — 84100 ASSAY OF PHOSPHORUS: CPT | Performed by: INTERNAL MEDICINE

## 2023-05-03 PROCEDURE — 99231 SBSQ HOSP IP/OBS SF/LOW 25: CPT | Performed by: INTERNAL MEDICINE

## 2023-05-03 PROCEDURE — 97110 THERAPEUTIC EXERCISES: CPT

## 2023-05-03 RX ORDER — AMOXICILLIN AND CLAVULANATE POTASSIUM 875; 125 MG/1; MG/1
1 TABLET, FILM COATED ORAL 2 TIMES DAILY
Qty: 20 TABLET | Refills: 0 | Status: SHIPPED | OUTPATIENT
Start: 2023-05-03 | End: 2023-05-13

## 2023-05-03 RX ADMIN — POLYETHYLENE GLYCOL 3350 17 G: 17 POWDER, FOR SOLUTION ORAL at 08:25

## 2023-05-03 RX ADMIN — HYDROXYZINE HYDROCHLORIDE 50 MG: 25 TABLET, FILM COATED ORAL at 08:25

## 2023-05-03 RX ADMIN — BUDESONIDE AND FORMOTEROL FUMARATE DIHYDRATE 2 PUFF: 160; 4.5 AEROSOL RESPIRATORY (INHALATION) at 08:41

## 2023-05-03 RX ADMIN — SODIUM BICARBONATE 650 MG TABLET 1300 MG: at 08:25

## 2023-05-03 RX ADMIN — INSULIN LISPRO 4 UNITS: 100 INJECTION, SOLUTION INTRAVENOUS; SUBCUTANEOUS at 11:37

## 2023-05-03 RX ADMIN — INSULIN LISPRO 4 UNITS: 100 INJECTION, SOLUTION INTRAVENOUS; SUBCUTANEOUS at 07:32

## 2023-05-03 RX ADMIN — LACTULOSE 20 G: 20 SOLUTION ORAL at 08:25

## 2023-05-03 RX ADMIN — METRONIDAZOLE 500 MG: 5 INJECTION, SOLUTION INTRAVENOUS at 03:30

## 2023-05-03 RX ADMIN — SENNOSIDES AND DOCUSATE SODIUM 2 TABLET: 8.6; 5 TABLET ORAL at 08:25

## 2023-05-03 RX ADMIN — MORPHINE SULFATE 4 MG: 4 INJECTION, SOLUTION INTRAMUSCULAR; INTRAVENOUS at 06:22

## 2023-05-03 RX ADMIN — PANTOPRAZOLE SODIUM 40 MG: 40 INJECTION, POWDER, FOR SOLUTION INTRAVENOUS at 08:25

## 2023-05-03 RX ADMIN — LAMOTRIGINE 200 MG: 100 TABLET ORAL at 08:26

## 2023-05-03 RX ADMIN — OXYCODONE HYDROCHLORIDE AND ACETAMINOPHEN 1 TABLET: 7.5; 325 TABLET ORAL at 13:33

## 2023-05-03 RX ADMIN — SODIUM CHLORIDE 125 ML/HR: 9 INJECTION, SOLUTION INTRAVENOUS at 06:21

## 2023-05-03 RX ADMIN — OXYCODONE HYDROCHLORIDE AND ACETAMINOPHEN 1 TABLET: 7.5; 325 TABLET ORAL at 03:15

## 2023-05-03 RX ADMIN — OXYCODONE HYDROCHLORIDE AND ACETAMINOPHEN 1 TABLET: 7.5; 325 TABLET ORAL at 07:32

## 2023-05-03 RX ADMIN — ESCITALOPRAM 20 MG: 10 TABLET, FILM COATED ORAL at 08:25

## 2023-05-03 RX ADMIN — MORPHINE SULFATE 4 MG: 4 INJECTION, SOLUTION INTRAMUSCULAR; INTRAVENOUS at 11:16

## 2023-05-03 RX ADMIN — PREGABALIN 100 MG: 100 CAPSULE ORAL at 08:26

## 2023-05-03 NOTE — DISCHARGE SUMMARY
[facility]         HOSPITALIST  DISCHARGE SUMMARY    Patient Name: Carlos Murphy Jr.  : 1960  MRN: [id number]    Date of Admission: 2023  Date of Discharge:  5/3/2023  Primary Care Physician: Felix Atkinson MD    Consults     Date and Time Order Name Status Description    2023  7:12 AM Inpatient Gastroenterology Consult Completed     2023  5:43 PM Inpatient Gastroenterology Consult Completed     2023  5:22 PM Gastroenterology (on-call MD unless specified)      2023  5:05 PM Inpatient Hospitalist Consult            Active and Resolved Hospital Problems:  Acute infectious colitis  Bright red blood per rectum  Syncope and collapse  Likely vasovagal syncope  Chronic kidney disease, unknown stage  Orthostatic hypotension  Clinical dehydration  Metabolic acidosis  Chronic constipation  Leukocytosis  Type 2 diabetes mellitus  Schizophrenia  Insomnia  Bipolar disorder  Tobacco abuse of cigarettes, in remission     Hospital Course     Hospital Course:  62 y.o. male PMH DM, CKD, schizophrenia, HTN, HLD, seizure disorder who presented to the ED on  due to syncopal episode at home.  Patient states he has had chronic issues with constipation.  He attempted to have a bowel movement and while he was straining he passed out.  It is unclear how long he was out but when he awoke, he felt confused.  He is bowel movement was hard but he did not notice blood at the time.  He had a crawled to the bed and called 911.  In the ED, he was found to have an MK with a creatinine of 2.5, acidosis with bicarb 12, glucose 325, WBC 18.   CT abdomen pelvis concerning for colitis.  He began having bright red blood per rectum and was admitted for further care.  Gastroenterology following.  Treating colitis with Levaquin and Flagyl.  Patient's cholesterol was treated with IV antibiotics with improvement.  Patient had no further bloody bowel movements.  Patient was complaining of shoulder pain  from his fall, this was x-rayed, there is no obvious injury identified.  Patient is being sent home with Augmentin for continued treatment of his colitis.  Patient should follow-up with gastroenterology in 2 weeks to schedule colonoscopy.  If patient continues to have issues with shoulder further treatment options should be pursued by patient's PCP.  The patient is discharged home today in stable condition.    Day of Discharge     Vital Signs:  Temp:  [97.6 °F (36.4 °C)-99.4 °F (37.4 °C)] 97.7 °F (36.5 °C)  Heart Rate:  [69-90] 71  Resp:  [16-18] 18  BP: (124-147)/(53-68) 142/68    Physical Exam:               Constitutional: No acute distress              Respiratory: Equal chest rise bilaterally              Cardiovascular: RRR, no MRG              Gastrointestinal: Positive bowel sounds, soft, nontender, nondistended              Neurologic: No focal deficits    Discharge Details        Discharge Medications      New Medications      Instructions Start Date   amoxicillin-clavulanate 875-125 MG per tablet  Commonly known as: Augmentin   1 tablet, Oral, 2 Times Daily         Changes to Medications      Instructions Start Date   oxyCODONE-acetaminophen 7.5-325 MG per tablet  Commonly known as: PERCOCET  What changed:   · how much to take  · when to take this   1-2 tablets, Oral, Every 4 Hours PRN         Continue These Medications      Instructions Start Date   Accu-Chek Guide Me w/Device kit   No dose, route, or frequency recorded.      Accu-Chek Guide test strip  Generic drug: glucose blood   No dose, route, or frequency recorded.      Accu-Chek Softclix Lancets lancets   No dose, route, or frequency recorded.      albuterol sulfate  (90 Base) MCG/ACT inhaler  Commonly known as: PROVENTIL HFA;VENTOLIN HFA;PROAIR HFA   2 puffs, Inhalation, Every 4 Hours PRN      amLODIPine 5 MG tablet  Commonly known as: NORVASC   5 mg, Oral, Daily      aspirin  MG tablet  Commonly known as: Ecotrin   325 mg, Oral,  Daily      atenolol 25 MG tablet  Commonly known as: TENORMIN   25 mg, Oral, Daily      atorvastatin 40 MG tablet  Commonly known as: LIPITOR   40 mg, Oral, Nightly      budesonide-formoterol 160-4.5 MCG/ACT inhaler  Commonly known as: Symbicort   2 puffs, Inhalation, 2 Times Daily - RT      empagliflozin 25 MG tablet tablet  Commonly known as: JARDIANCE   25 mg, Oral, Daily      escitalopram 20 MG tablet  Commonly known as: LEXAPRO   20 mg, Oral, Daily      furosemide 20 MG tablet  Commonly known as: LASIX   20 mg, Oral, Daily      glipizide 10 MG 24 hr tablet  Commonly known as: GLUCOTROL XL   10 mg, Oral, 2 Times Daily, INSTRUCTED PER ANESTHESIA PROTOCOL      hydrOXYzine 50 MG tablet  Commonly known as: ATARAX   50 mg, Oral, 3 Times Daily      lamoTRIgine 200 MG tablet  Commonly known as: LaMICtal   200 mg, Oral, 2 Times Daily      lisinopril 40 MG tablet  Commonly known as: PRINIVIL,ZESTRIL   40 mg, Oral, Daily      lubiprostone 24 MCG capsule  Commonly known as: AMITIZA   24 mcg, Oral, 2 Times Daily With Meals      multivitamin with minerals tablet tablet   1 tablet, Oral, Daily      NovoLOG Mix 70/30 FlexPen (70-30) 100 UNIT/ML suspension pen-injector injection  Generic drug: insulin aspart prot & aspart   REPORTS SLIDING SCALE [id number]. INSTRUCTED PER ANESTHESIA PROTOCOL IF BS GREATER THEN 140 CAN TAKE 1/2 OF THE DOSE      Ozempic (0.25 or 0.5 MG/DOSE) 2 MG/1.5ML solution pen-injector  Generic drug: Semaglutide(0.25 or 0.5MG/DOS)   5 mg, Subcutaneous, Weekly, INSTRUCTED PER ANESTHESIA STANDING ORDERS      pantoprazole 40 MG EC tablet  Commonly known as: PROTONIX   40 mg, Oral, 2 Times Daily      pregabalin 100 MG capsule  Commonly known as: LYRICA   100 mg, Oral, 4 Times Daily      QUEtiapine 300 MG tablet  Commonly known as: SEROquel   600 mg, Oral, Nightly      sodium-potassium-magnesium sulfates 17.5-3.13-1.6 GM/177ML solution oral solution  Commonly known as: Suprep Bowel Prep Kit   1  "bottle, Oral, Every 12 Hours      TRUEplus Insulin Syringe 31G X 5/16\" 0.3 ML misc  Generic drug: Insulin Syringe-Needle U-100   No dose, route, or frequency recorded.      zolpidem 10 MG tablet  Commonly known as: AMBIEN   10 mg, Oral, Nightly             No Known Allergies    Discharge Disposition:  Home or Self Care    Diet:  Hospital:  Diet Order   Procedures   • Diet: Regular/House Diet, Diabetic Diets; Consistent Carbohydrate; Texture: Regular Texture (IDDSI 7); Fluid Consistency: Thin (IDDSI 0)       Discharge Activity:       CODE STATUS:  Code Status and Medical Interventions:   Ordered at: 04/30/23 1743     Level Of Support Discussed With:    Patient     Code Status (Patient has no pulse and is not breathing):    CPR (Attempt to Resuscitate)     Medical Interventions (Patient has pulse or is breathing):    Full Support       No future appointments.    Additional Instructions for the Follow-ups that You Need to Schedule     Discharge Follow-up with PCP   As directed       Currently Documented PCP:    Felix Atkinson MD    PCP Phone Number:    [phone]     Follow Up Details: 3 to 7 days         Discharge Follow-up with Specified Provider: Lele Kiran; 2 Weeks   As directed      To: Lele Kiran    Follow Up: 2 Weeks               Pertinent  and/or Most Recent Results     IMAGING:  Adult Transthoracic Echo Complete W/ Cont if Necessary Per Protocol    Result Date: 5/1/2023  •  Left ventricular systolic function is normal. Left ventricular ejection fraction appears to be 61 - 65%. •  Left ventricular diastolic function was normal.     CT Abdomen Pelvis Without Contrast    Result Date: 4/30/2023  PROCEDURE: CT ABDOMEN PELVIS WO CONTRAST  COMPARISON: [facility], CT, CHEST W/O CONTRAST, 3/12/2021, 12:56.  INDICATIONS: LOWER ABDOMINAL PAIN  PROTOCOL:   Standard imaging protocol performed    RADIATION:   DLP: 2361.7mGy*cm   Automated exposure control was " utilized to minimize radiation dose.  TECHNIQUE: Axial images of the abdomen and pelvis without intravenous or oral contrast.  FINDINGS:  ABDOMEN: Evaluation of the solid abdominal organs and bowel is limited without intravenous and oral contrast.  There are calcified granulomas in the lung bases.  There is a small amount of free fluid in the right subphrenic space.  The unenhanced liver, spleen, pancreas and adrenal glands are normal.  Prior cholecystectomy.  No evidence of renal calcification.  No evidence of discrete renal mass or hydronephrosis.  PELVIS: No ureteral or urinary bladder calcifications are identified.  The abdominal aorta has a normal caliber.  Atherosclerotic disease is present.  There is questionable wall thickening in the descending colon.  Degenerative changes are present in the lumbar spine.  There are small fat containing inguinal hernias.  The appendix and terminal ileum are normal.  IMPRESSION:  Questionable wall thickening in the descending colon.  Suggest correlation with any history of colitis.   TERESITA LEGGETT MD       Electronically Signed and Approved By: TERESITA LEGGETT MD on 4/30/2023 at 18:17             XR Shoulder 2+ View Left    Result Date: 5/1/2023  PROCEDURE: XR SHOULDER 2+ VW LEFT  COMPARISON: None  INDICATIONS: LEFT SHOULDER pain, POST fall  FINDINGS:  No fractures are visualized.  There are no findings of dislocation.  Mild degenerative change consistent with osteoarthritis is seen in the glenohumeral joint.  Mild AC arthrosis is evident.  No lytic or sclerotic bone lesions are seen.  No abnormality is seen at the lung apices.        Left shoulder series demonstrating mild degenerative change consistent with osteoarthritis.      JON LOPEZ MD       Electronically Signed and Approved By: JON LOPEZ MD on 5/01/2023 at 12:15             CT Head Without Contrast    Result Date: 4/30/2023  PROCEDURE: CT HEAD WO CONTRAST  COMPARISON:  [facility], MR,  BRAIN W/O CONTRAST, 3/26/2021, 7:25.  [facility], CT, HEAD W/O CONTRAST, 3/25/2021, 17:34. INDICATIONS: headache/syncope  PROTOCOL:   Standard imaging protocol performed    RADIATION:   DLP: 955.1 mGy*cm   MA and/or KV was adjusted to minimize radiation dose.     TECHNIQUE: Axial images of the head without intravenous contrast.  FINDINGS:  The ventricles have a normal size and configuration. There is no evidence of acute intracranial hemorrhage, mass or midline shift. No extra-axial fluid collections are identified. There are no skull fractures.  Stable mucosal thickening in the ethmoid sinuses.  The visualized paranasal sinuses and mastoid air cells are otherwise grossly clear.  IMPRESSION: Normal unenhanced CT scan of the brain.  TERESITA LEGGETT MD       Electronically Signed and Approved By: TERESITA LEGGETT MD on 4/30/2023 at 15:21             XR Chest 1 View    Result Date: 4/30/2023  PROCEDURE: XR CHEST 1 VW  COMPARISON: [facility], CR, CHEST AP/PA 1 VIEW, 3/25/2021, 17:46.  INDICATIONS: Weak/Dizzy/AMS triage protocol  FINDINGS:   The lungs are well-expanded. The heart and pulmonary vasculature are within normal limits. No pleural effusions are identified. There are no active appearing infiltrates.  IMPRESSION: No active disease.  TERESITA LEGGETT MD       Electronically Signed and Approved By: TERESITA LEGGETT MD on 4/30/2023 at 14:07               LAB RESULTS:      Lab 05/03/23  0552 05/02/23  0525 05/01/23  0402 04/30/23  1716 04/30/23  1352   WBC 9.32 12.32* 11.58*  --  18.21*   HEMOGLOBIN 11.1* 11.5* 11.7*  --  15.2   HEMATOCRIT 33.5* 35.0* 34.2*  --  44.8   PLATELETS 130* 143 149  --  176   NEUTROS ABS 6.26 9.20* 8.67*  --  16.03*   IMMATURE GRANS (ABS) 0.05 0.04 0.02  --  0.08*   LYMPHS ABS 1.63 1.67 1.39  --  0.87   MONOS ABS 1.04* 1.16* 1.41*  --  1.03*   EOS ABS 0.30 0.20 0.06  --  0.14   MCV 88.4 88.6 85.9  --  86.8   LACTATE  --   --   --  1.8  --          Lab  05/03/23  0552 05/02/23  0525 05/01/23  0402 04/30/23  1602   SODIUM 136 133* 137 133*   POTASSIUM 4.4 4.4 4.6 5.2   CHLORIDE 107 106 108* 104   CO2 18.3* 17.3* 16.9* 12.6*   ANION GAP 10.7 9.7 12.1 16.4*   BUN 33* 42* 54* 52*   CREATININE 1.91* 2.48* 2.40* 2.50*   EGFR 39.4* 28.8* 29.9* 28.5*   GLUCOSE 176* 209* 117* 325*   CALCIUM 8.5* 8.5* 8.5* 8.6   MAGNESIUM 1.9 2.2 2.3 2.7*   PHOSPHORUS 4.0 3.8  --   --          Lab 05/03/23  0552 04/30/23  1602   TOTAL PROTEIN 5.7* 6.3   ALBUMIN 3.4* 3.8   GLOBULIN 2.3 2.5   ALT (SGPT) 13 19   AST (SGOT) 11 19   BILIRUBIN 0.3 0.4   ALK PHOS 96 136*         Lab 04/30/23  1352   HSTROP T 16*                 Brief Urine Lab Results  (Last result in the past 365 days)      Color   Clarity   Blood   Leuk Est   Nitrite   Protein   CREAT   Urine HCG        04/30/23 1745 Yellow   Clear   Negative   Negative   Negative   30 mg/dL (1+)               Microbiology Results (last 10 days)     Procedure Component Value - Date/Time    Blood Culture - Blood, Arm, Left [[id number]]  (Normal) Collected: 04/30/23 1716    Lab Status: Preliminary result Specimen: Blood from Arm, Left Updated: 05/02/23 1731     Blood Culture No growth at 2 days    Blood Culture - Blood, Blood, Arterial Line [[id number]]  (Normal) Collected: 04/30/23 1716    Lab Status: Preliminary result Specimen: Blood, Arterial Line Updated: 05/02/23 1731     Blood Culture No growth at 2 days            Results for orders placed during the hospital encounter of 04/30/23    Adult Transthoracic Echo Complete W/ Cont if Necessary Per Protocol    Interpretation Summary  •  Left ventricular systolic function is normal. Left ventricular ejection fraction appears to be 61 - 65%.  •  Left ventricular diastolic function was normal.      Time spent on Discharge including face to face service: Greater than 30 minutes      Electronically signed by Jose Palmer MD, 05/03/23, 11:09 AM EDT.

## 2023-05-03 NOTE — PLAN OF CARE
Goal Outcome Evaluation:  Plan of Care Reviewed With: patient        Progress: no change  Outcome Evaluation: Patient presents with limitations in self-care, functional transfers, balance, and endurance. He would benefit from continued skilled occupational therapy services to maximize independence with ADLs/functional transfers. Home with assist recommended upon discharge from hospital.

## 2023-05-03 NOTE — PLAN OF CARE
Goal Outcome Evaluation:  Plan of Care Reviewed With: patient        Progress: improving  Outcome Evaluation: Took over patient around 0232 this morning. Medicated x2 for pain with adequate pain relief. Voided in urinal. No bowel movement tonight. Iv abx given. Ambulated from bed to chair with assist x1. Denies dizziness.

## 2023-05-03 NOTE — PROGRESS NOTES
[facility]  Inpatient Progress Note    Reason for Follow Up:  colitis    Subjective     Interval History:   Pt reports no bowel movements.  No abd pain, nausea, vomiting.  Tolerating po.    Current Facility-Administered Medications:   •  acetaminophen (TYLENOL) tablet 650 mg, 650 mg, Oral, Q4H PRN, Deepak Diana MD  •  albuterol (PROVENTIL) nebulizer solution 0.083% 2.5 mg/3mL, 2.5 mg, Nebulization, Q4H PRN, Deepak Diana MD  •  atorvastatin (LIPITOR) tablet 40 mg, 40 mg, Oral, Nightly, Deepak Diana MD, 40 mg at 05/02/23 2113  •  sennosides-docusate (PERICOLACE) 8.6-50 MG per tablet 2 tablet, 2 tablet, Oral, BID, 2 tablet at 05/03/23 0825 **AND** polyethylene glycol (MIRALAX) packet 17 g, 17 g, Oral, Daily, 17 g at 05/03/23 0825 **AND** bisacodyl (DULCOLAX) EC tablet 5 mg, 5 mg, Oral, Daily PRN **AND** bisacodyl (DULCOLAX) suppository 10 mg, 10 mg, Rectal, Daily PRN, Deepak Diana MD  •  budesonide-formoterol (SYMBICORT) 160-4.5 MCG/ACT inhaler 2 puff, 2 puff, Inhalation, BID - RT, Deepak Diana MD, 2 puff at 05/03/23 0841  •  calcium carbonate (TUMS) chewable tablet 500 mg (200 mg elemental), 2 tablet, Oral, BID PRN, Deepak Diana MD  •  cefTRIAXone (ROCEPHIN) IVPB 2 g, 2 g, Intravenous, Q24H, Jose Palmer MD, Last Rate: 100 mL/hr at 05/02/23 1600, 2 g at 05/02/23 1600  •  dextrose (D50W) (25 g/50 mL) IV injection 25 g, 25 g, Intravenous, Q15 Min PRN, Deepak Diana MD  •  dextrose (GLUTOSE) oral gel 15 g, 15 g, Oral, Q15 Min PRN, Deepak Diana MD  •  escitalopram (LEXAPRO) tablet 20 mg, 20 mg, Oral, Daily, Deepak Diana MD, 20 mg at 05/03/23 0825  •  glucagon (GLUCAGEN) injection 1 mg, 1 mg, Intramuscular, Q15 Min PRN, Deepak Diana MD  •  hydrOXYzine (ATARAX) tablet 50 mg, 50 mg, Oral, TID, Deepak Diana MD, 50 mg at 05/03/23 0825  •  insulin detemir (LEVEMIR) injection 25 Units, 25 Units, Subcutaneous, Nightly,  Deepak Diana MD, 25 Units at 05/02/23 2125  •  Insulin Lispro (humaLOG) injection 4-24 Units, 4-24 Units, Subcutaneous, TID With Meals, Deepak Diana MD, 4 Units at 05/03/23 0732  •  lactulose (CHRONULAC) 10 GM/15ML solution 20 g, 20 g, Oral, Daily, Deepak Diana MD, 20 g at 05/03/23 0825  •  lamoTRIgine (LaMICtal) tablet 200 mg, 200 mg, Oral, BID, Deepak Diana MD, 200 mg at 05/03/23 0826  •  melatonin tablet 5 mg, 5 mg, Oral, Nightly PRN, Deepak Diana MD  •  metroNIDAZOLE (FLAGYL) IVPB 500 mg, 500 mg, Intravenous, Q8H, Deepak Diana MD, 500 mg at 05/03/23 0330  •  morphine injection 4 mg, 4 mg, Intravenous, Q4H PRN, Deepak Diana MD, 4 mg at 05/03/23 1116  •  ondansetron (ZOFRAN) injection 4 mg, 4 mg, Intravenous, Q4H PRN, Deepak Diana MD  •  oxyCODONE-acetaminophen (PERCOCET) 7.5-325 MG per tablet 1 tablet, 1 tablet, Oral, Q4H PRN, Deepak Diana MD, 1 tablet at 05/03/23 0732  •  pantoprazole (PROTONIX) injection 40 mg, 40 mg, Intravenous, Q12H, Deepak Diana MD, 40 mg at 05/03/23 0825  •  Pharmacy Consult - Pharmacy to dose, , Does not apply, Continuous PRN, Deepak Diana MD  •  pregabalin (LYRICA) capsule 100 mg, 100 mg, Oral, TID, Deepak Diana MD, 100 mg at 05/03/23 0826  •  QUEtiapine (SEROquel) tablet 600 mg, 600 mg, Oral, Nightly, Deepak Diana MD, 600 mg at 05/02/23 2112  •  sodium bicarbonate tablet 1,300 mg, 1,300 mg, Oral, TID, Deepak Diana MD, 1,300 mg at 05/03/23 0825  •  sodium chloride 0.9 % flush 10 mL, 10 mL, Intravenous, PRN, Ata Power MD, 10 mL at 05/02/23 2112  •  sodium chloride 0.9 % infusion, 125 mL/hr, Intravenous, Continuous, Deepak Diana MD, Last Rate: 125 mL/hr at 05/03/23 0621, 125 mL/hr at 05/03/23 0621  •  zolpidem (AMBIEN) tablet 10 mg, 10 mg, Oral, Nightly PRN, Deepak Diana MD, 10 mg at 05/02/23 2125  Review of Systems:    The following systems were reviewed and negative;   constitution, respiratory and cardiovascular    Objective     Vital Signs  Temp:  [97.6 °F (36.4 °C)-99.4 °F (37.4 °C)] 97.7 °F (36.5 °C)  Heart Rate:  [69-90] 71  Resp:  [16-18] 18  BP: (124-147)/(53-68) 142/68  Body mass index is 40.16 kg/m².    Intake/Output Summary (Last 24 hours) at 5/3/2023 1134  Last data filed at 5/3/2023 0621  Gross per 24 hour   Intake 4123.75 ml   Output 1800 ml   Net 2323.75 ml     No intake/output data recorded.     Physical Exam:   General: awake, alert and in no acute distress   Eyes: eyes move symmetrical in all directions, no scleral icterus   Skin: warm and dry, not jaundiced   Cardiovascular: no chest tenderness   Pulm: breathing unlabored   Abdomen: soft, nontender, nondistended   Rectal: deferred   Extremities: no rash or edema   Psychiatric: mental status within normal limits      Results Review:     I reviewed the patient's new clinical results.    Results from last 7 days   Lab Units 05/03/23  0552 05/02/23  0525 05/01/23  0402   WBC 10*3/mm3 9.32 12.32* 11.58*   HEMOGLOBIN g/dL 11.1* 11.5* 11.7*   HEMATOCRIT % 33.5* 35.0* 34.2*   PLATELETS 10*3/mm3 130* 143 149     Results from last 7 days   Lab Units 05/03/23  0552 05/02/23  0525 05/01/23  0402 04/30/23  1602   SODIUM mmol/L 136 133* 137 133*   POTASSIUM mmol/L 4.4 4.4 4.6 5.2   CHLORIDE mmol/L 107 106 108* 104   CO2 mmol/L 18.3* 17.3* 16.9* 12.6*   BUN mg/dL 33* 42* 54* 52*   CREATININE mg/dL 1.91* 2.48* 2.40* 2.50*   CALCIUM mg/dL 8.5* 8.5* 8.5* 8.6   BILIRUBIN mg/dL 0.3  --   --  0.4   ALK PHOS U/L 96  --   --  136*   ALT (SGPT) U/L 13  --   --  19   AST (SGOT) U/L 11  --   --  19   GLUCOSE mg/dL 176* 209* 117* 325*         No results found for: LIPASE    Radiology:              Assessment & Plan   Assessment:     1. Colitis  2. Rectal bleeding    Plan:     • Given acute onset and leukocytosis on presentation (now resolved), findings suggestive of infectious etiology.  • Another consideration given patient's syncopal  episode and location of noted colitis would be ischemic colitis  • Would complete course of abx for possible infectious colitis  • Will tentatively plan for outpatient f/u with colonoscopy in 4 - 6 weeks to document resolution  • Please call if can be of further assistance      I discussed the patients findings and my recommendations with patient and primary care team.         Yeni Kiran M.D.  [place] [address]  [place]  [address]  Office: [phone]

## 2023-05-03 NOTE — TELEPHONE ENCOUNTER
[facility] called the office this morning to schedule an er/ inpatient follow up for this patient. Please advise on if patient would need to be seen prior to his scheduled procedure on 06/21.

## 2023-05-03 NOTE — THERAPY TREATMENT NOTE
Acute Care - Physical Therapy Treatment Note   [place]     Patient Name: Carlos Murphy Jr.  : 1960  MRN: [id number]  Today's Date: 5/3/2023      Visit Dx:     ICD-10-CM ICD-9-CM   1. Colitis  K52.9 558.9   2. Difficulty walking  R26.2 719.7     Patient Active Problem List   Diagnosis   • Ulcer of toe due to type 2 diabetes mellitus (HCC)   • Centrilobular emphysema   • Tobacco abuse, in remission   • Obesity (BMI 30-39.9)   • Closed fracture of shaft of left fibula   • Closed nondisplaced fracture of medial malleolus of left tibia   • Aftercare following surgery of ORIF left distal tibia fracture 2022   • Colon cancer screening   • Colitis     Past Medical History:   Diagnosis Date   • Acute kidney injury      POST SEIZURES   • Astigmatism of both eyes     eyes twitch left and right   • Bipolar disorder    • Charcot ankle    • Closed nondisplaced fracture of medial malleolus of left tibia    • COPD (chronic obstructive pulmonary disease)     USES INHALERS   • Diabetes     BG RUNS AROUND 90'S IN AM   • Hx of schizophrenia    • Hyperlipidemia    • Hypertension     SEEN DR ROD IN THE PAST, HAD APPT WITH DR MICHAUD IN  [facility] APPT, DENIED CP/SOB   • Neuropathy    • Seizures     LAST ONE 22   • Sleep apnea     DOES NOT USE CPAP   • Varicose vein of leg      Past Surgical History:   Procedure Laterality Date   • ANKLE OPEN REDUCTION INTERNAL FIXATION Left 2022    Procedure: LEFT OPEN REDUCTION INTERNAL FIXATION DISTAL TIBIA FRACTURE ;  Surgeon: Tha Parry MD;  Location: [facility];  Service: Orthopedics;  Laterality: Left;   • ANKLE OPEN REDUCTION INTERNAL FIXATION Left 2022    Procedure: LEFT ANKLE OPEN REDUCTION INTERNAL FIXATION WITH SYNDESMOSIS FIXATION;  Surgeon: Tha Parry MD;  Location: [facility] MAIN OR;  Service: Orthopedics;  Laterality: Left;   • CHOLECYSTECTOMY     • COLONOSCOPY     • JOINT REPLACEMENT      RTKR   • LUMBAR DISC SURGERY     • SHOULDER  SURGERY Right      PT Assessment (last 12 hours)     PT Evaluation and Treatment     Row Name 05/03/23 1032          Physical Therapy Time and Intention    Subjective Information no complaints (P)   -     Document Type therapy note (daily note) (P)   -JF     Mode of Treatment individual therapy;physical therapy (P)   -JF     Patient Effort good (P)   -JF     Symptoms Noted During/After Treatment none (P)   -     Row Name 05/03/23 1032          General Information    Patient Profile Reviewed yes (P)   -     Patient Observations alert;cooperative;agree to therapy (P)   -JF     Equipment Currently Used at Home cane, straight (P)   -JF     Existing Precautions/Restrictions fall (P)   -JF     Barriers to Rehab none identified (P)   -[facility] Name 05/03/23 1032          Bed Mobility    Bed Mobility bed mobility (all) activities (P)   -     All Activities, [place] (Bed Mobility) not tested (P)   Patient was sitting upright in the recliner upon entering the room.  -[facility] Name 05/03/23 1032          Transfers    Transfers sit-stand transfer (P)   -[facility] Name 05/03/23 1032          Sit-Stand Transfer    Sit-Stand [place] (Transfers) contact guard (P)   -     Assistive Device (Sit-Stand Transfers) walker, front-wheeled (P)   -[facility] Name 05/03/23 1032          Gait/Stairs (Locomotion)    Gait/Stairs Locomotion gait/ambulation assistive device (P)   -     [place] Level (Gait) contact guard (P)   -     Assistive Device (Gait) walker, front-wheeled (P)   -     Distance in Feet (Gait) 40 (P)   -     Pattern (Gait) 4-point;step-through (P)   -JF     Deviations/Abnormal Patterns (Gait) bilateral deviations;base of support, wide;gait speed decreased;stride length decreased (P)   -     Row Name 05/03/23 1032          Safety Issues, Functional Mobility    Impairments Affecting Function (Mobility) balance;endurance/activity tolerance (P)   -[facility] Name 05/03/23 1032          Balance     Balance Assessment standing dynamic balance (P)   -     Dynamic Standing Balance contact guard (P)   -     Position/Device Used, Standing Balance supported;walker, front-wheeled (P)   -     Row Name 05/03/23 1032          Motor Skills    Therapeutic Exercise hip;knee;ankle (P)   -     Row Name 05/03/23 1032          Hip (Therapeutic Exercise)    Hip (Therapeutic Exercise) AROM (active range of motion);strengthening exercise (P)   -     Hip AROM (Therapeutic Exercise) bilateral;aBduction;aDduction;sitting;20 repititions;3 second hold (P)   -     Hip Strengthening (Therapeutic Exercise) bilateral;marching while seated;20 repititions (P)   -     Row Name 05/03/23 1032          Knee (Therapeutic Exercise)    Knee (Therapeutic Exercise) AROM (active range of motion) (P)   -     Knee AROM (Therapeutic Exercise) bilateral;LAQ (long arc quad);sitting;20 repititions (P)   -     Row Name 05/03/23 1032          Ankle (Therapeutic Exercise)    Ankle (Therapeutic Exercise) AROM (active range of motion) (P)   -     Ankle AROM (Therapeutic Exercise) bilateral;dorsiflexion;plantarflexion;sitting;20 repititions (P)   -     Row Name 05/03/23 1032          Progress Summary (PT)    Progress Toward Functional Goals (PT) progress toward functional goals is good (P)   -     Daily Progress Summary (PT) Patient tolerated ambulating and therapeutic exercise today. He will continue to benefit from physical therapy services while in the hospital. (P)   -           User Key  (r) = Recorded By, (t) = Taken By, (c) = Cosigned By    Initials Name Provider Type    Andres Matos, PT Student PT Student                  PT Recommendation and Plan     Progress Summary (PT)  Progress Toward Functional Goals (PT): (P) progress toward functional goals is good  Daily Progress Summary (PT): (P) Patient tolerated ambulating and therapeutic exercise today. He will continue to benefit from physical therapy services while in  the \[facility]\"".   Outcome Measures     Row Name 05/03/23 1000 05/02/23 0900          How much help from another person do you currently need...    Turning from your back to your side while in flat bed without using bedrails? 4 (P)   -JF 4  -DP     Moving from lying on back to sitting on the side of a flat bed without bedrails? 4 (P)   -JF 4  -DP     Moving to and from a bed to a chair (including a wheelchair)? 3 (P)   -JF 3  -DP     Standing up from a chair using your arms (e.g., wheelchair, bedside chair)? 3 (P)   -JF 3  -DP     Climbing 3-5 steps with a railing? 3 (P)   -JF 3  -DP     To walk in hospital room? 3 (P)   -JF 3  -DP     AM-PAC 6 Clicks Score (PT) 20 (P)   -JF 20  -DP        Functional Assessment    Outcome Measure Options AM-PAC 6 Clicks Basic Mobility (PT) (P)   -JF AM-PAC 6 Clicks Basic Mobility (PT)  -DP           User Key  (r) = Recorded By, (t) = Taken By, (c) = Cosigned By    Initials Name Provider Type    DP Gunjan Abdul, PT Physical Therapist    Andres Matos, PT Student PT Student                 Time Calculation:    PT Charges     Row Name 05/03/23 1037             Time Calculation    PT Received On 05/03/23 (P)   -JF         Timed Charges    99115 - PT Therapeutic Exercise Minutes 8 (P)   -JF      82576 - Gait Training Minutes  8 (P)   -JF         Total Minutes    Timed Charges Total Minutes 16 (P)   -JF       Total Minutes 16 (P)   -            User Key  (r) = Recorded By, (t) = Taken By, (c) = Cosigned By    Initials Name Provider Type    Andres Matos, PT Student PT Student              Therapy Charges for Today     Code Description Service Date Service Provider Modifiers Qty    99582377827 HC PT THER PROC EA 15 MIN 5/3/2023 Andres Hopson, PT Student GP 1          PT G-Codes  Outcome Measure Options: (P) AM-PAC 6 Clicks Basic Mobility (PT)  AM-PAC 6 Clicks Score (PT): (P) 20    Andres Hopson PT Student  5/3/2023

## 2023-05-03 NOTE — THERAPY EVALUATION
Patient Name: Carlos Murphy Jr.  : 1960    MRN: [id number]                              Today's Date: 5/3/2023       Admit Date: 2023    Visit Dx:     ICD-10-CM ICD-9-CM   1. Colitis  K52.9 558.9   2. Difficulty walking  R26.2 719.7   3. Decreased activities of daily living (ADL)  Z78.9 V49.89     Patient Active Problem List   Diagnosis   • Ulcer of toe due to type 2 diabetes mellitus (HCC)   • Centrilobular emphysema   • Tobacco abuse, in remission   • Obesity (BMI 30-39.9)   • Closed fracture of shaft of left fibula   • Closed nondisplaced fracture of medial malleolus of left tibia   • Aftercare following surgery of ORIF left distal tibia fracture 2022   • Colon cancer screening   • Colitis     Past Medical History:   Diagnosis Date   • Acute kidney injury      POST SEIZURES   • Astigmatism of both eyes     eyes twitch left and right   • Bipolar disorder    • Charcot ankle    • Closed nondisplaced fracture of medial malleolus of left tibia    • COPD (chronic obstructive pulmonary disease)     USES INHALERS   • Diabetes     BG RUNS AROUND 90'S IN AM   • Hx of schizophrenia    • Hyperlipidemia    • Hypertension     SEEN DR ROD IN THE PAST, HAD APPT WITH DR MICHAUD IN  [facility] APPT, DENIED CP/SOB   • Neuropathy    • Seizures     LAST ONE 22   • Sleep apnea     DOES NOT USE CPAP   • Varicose vein of leg      Past Surgical History:   Procedure Laterality Date   • ANKLE OPEN REDUCTION INTERNAL FIXATION Left 2022    Procedure: LEFT OPEN REDUCTION INTERNAL FIXATION DISTAL TIBIA FRACTURE ;  Surgeon: Tha Parry MD;  Location: [facility] OR [facility];  Service: Orthopedics;  Laterality: Left;   • ANKLE OPEN REDUCTION INTERNAL FIXATION Left 2022    Procedure: LEFT ANKLE OPEN REDUCTION INTERNAL FIXATION WITH SYNDESMOSIS FIXATION;  Surgeon: Tha Parry MD;  Location: [facility] MAIN OR;  Service: Orthopedics;  Laterality: Left;   • CHOLECYSTECTOMY     • COLONOSCOPY     • JOINT  REPLACEMENT      RTKR   • LUMBAR DISC SURGERY     • SHOULDER SURGERY Right       General Information     [facility] Name 05/03/23 1300          OT Time and Intention    Document Type evaluation  -     Mode of Treatment individual therapy;occupational therapy  -[facility] Name 05/03/23 1300          General Information    Patient Profile Reviewed yes  -     Prior Level of Function --  (I) with ADLs, ambulated with a STC, has a walk-in shower where he stands to shower, standard commode, stands to groom, and no home O2.  -     Existing Precautions/Restrictions fall  -     Barriers to Rehab none identified  -[facility] Name 05/03/23 1300          Occupational Profile    Reason for Services/Referral (Occupational Profile) Patient is a 62 year old male admitted to [facility] for syncope and after collapsing on April 30th, 2023. Occupational therapy consulted due to recent decline in ADLs/functional transfers. No previous occupational therapy services for current condition.  -[facility] Name 05/03/23 1300          Living Environment    People in Home alone  -LF     Row Name 05/03/23 1300          Cognition    Orientation Status (Cognition) oriented x 4  -[facility] Name 05/03/23 1300          Safety Issues, Functional Mobility    Impairments Affecting Function (Mobility) balance;endurance/activity tolerance;pain  -           User Key  (r) = Recorded By, (t) = Taken By, (c) = Cosigned By    Initials Name Provider Type     Claudia Holt OT Occupational Therapist                 Mobility/ADL's     [facility] Name 05/03/23 1302          Bed Mobility    Comment, (Bed Mobility) Patient upright and seated in recliner upon therapist arrival.  -[facility] Name 05/03/23 1302          Transfers    Transfers sit-stand transfer;stand-sit transfer  -[facility] Name 05/03/23 1302          Sit-Stand Transfer    Sit-Stand [place] (Transfers) standby assist  -[facility] Name 05/03/23 1302          Stand-Sit Transfer     Stand-Sit [place] (Transfers) standby assist  -     Row Name 05/03/23 1302          Activities of Daily Living    BADL Assessment/Intervention bathing;upper body dressing;lower body dressing;grooming;feeding;toileting  -[facility] Name 05/03/23 1302          Bathing Assessment/Intervention    [place] Level (Bathing) bathing skills;upper body;set up;lower body;standby assist  -[facility] Name 05/03/23 1302          Upper Body Dressing Assessment/Training    [place] Level (Upper Body Dressing) upper body dressing skills;set up  -     Row Name 05/03/23 1302          Lower Body Dressing Assessment/Training    [place] Level (Lower Body Dressing) lower body dressing skills;standby assist  -[facility] Name 05/03/23 1302          Grooming Assessment/Training    [place] Level (Grooming) grooming skills;set up  -[facility] Name 05/03/23 1302          Self-Feeding Assessment/Training    [place] Level (Feeding) feeding skills;set up  -[facility] Name 05/03/23 1302          Toileting Assessment/Training    [place] Level (Toileting) toileting skills;standby assist  -           User Key  (r) = Recorded By, (t) = Taken By, (c) = Cosigned By    Initials Name Provider Type     Claudia Holt OT Occupational Therapist               Obj/Interventions     [facility] Name 05/03/23 1303          Sensory Assessment (Somatosensory)    Sensory Assessment (Somatosensory) UE sensation intact  -[facility] Name 05/03/23 1303          Vision Assessment/Intervention    Visual Impairment/Limitations WFL;corrective lenses full-time  -[facility] Name 05/03/23 1303          Range of Motion Comprehensive    Comment, General Range of Motion Full AROM of BUEs, pain with left shoulder flexion due to recent fall, no fxs noted per imaging.  -     Row Name 05/03/23 1303          Strength Comprehensive (MMT)    Comment, General Manual Muscle Testing (MMT) Assessment 5/5 BUEs  -[facility] Name 05/03/23 1303           Motor Skills    Motor Skills coordination;functional endurance  -LF     Coordination WFL  -LF     Functional Endurance Fair/fair+  -LF     Row Name 05/03/23 1303          Balance    Balance Assessment sitting dynamic balance;standing dynamic balance  -LF     Dynamic Sitting Balance independent  -LF     Position, Sitting Balance unsupported  -LF     Dynamic Standing Balance standby assist  -LF     Position/Device Used, Standing Balance unsupported  -LF           User Key  (r) = Recorded By, (t) = Taken By, (c) = Cosigned By    Initials Name Provider Type    LF Claudia Holt OT Occupational Therapist               Goals/Plan     Row Name 05/03/23 1306          Bed Mobility Goal 1 (OT)    Activity/Assistive Device (Bed Mobility Goal 1, OT) bed mobility activities, all  -LF     [place] Level/Cues Needed (Bed Mobility Goal 1, OT) independent  -LF     Time Frame (Bed Mobility Goal 1, OT) long term goal (LTG);10 days  -     Row Name 05/03/23 1306          Transfer Goal 1 (OT)    Activity/Assistive Device (Transfer Goal 1, OT) transfers, all  -LF     [place] Level/Cues Needed (Transfer Goal 1, OT) independent  -LF     Time Frame (Transfer Goal 1, OT) long term goal (LTG);10 days  -LF     Row Name 05/03/23 1306          Bathing Goal 1 (OT)    Activity/Device (Bathing Goal 1, OT) bathing skills, all  -LF     [place] Level/Cues Needed (Bathing Goal 1, OT) independent  -LF     Time Frame (Bathing Goal 1, OT) long term goal (LTG);10 days  -     Row Name 05/03/23 1306          Dressing Goal 1 (OT)    Activity/Device (Dressing Goal 1, OT) dressing skills, all  -LF     [place]/Cues Needed (Dressing Goal 1, OT) independent  -LF     Time Frame (Dressing Goal 1, OT) long term goal (LTG);10 days  -     Row Name 05/03/23 1306          Toileting Goal 1 (OT)    Activity/Device (Toileting Goal 1, OT) toileting skills, all  -LF     [place] Level/Cues Needed (Toileting Goal 1, OT) independent  -LF     Time  Frame (Toileting Goal 1, OT) long term goal (LTG);10 days  -     Row Name 05/03/23 1306          Problem Specific Goal 1 (OT)    Problem Specific Goal 1 (OT) Patient will demonstrate good endurance to support ADLs/functional transfers.  -     Row Name 05/03/23 1306          Therapy Assessment/Plan (OT)    Planned Therapy Interventions (OT) activity tolerance training;patient/caregiver education/training;BADL retraining;functional balance retraining;occupation/activity based interventions;strengthening exercise;transfer/mobility retraining  -           User Key  (r) = Recorded By, (t) = Taken By, (c) = Cosigned By    Initials Name Provider Type     Claudia Holt, REGULO Occupational Therapist               Clinical Impression     Row Name 05/03/23 1305          Pain Assessment    Additional Documentation Pain Scale: FACES Pre/Post-Treatment (Group)  -     Row Name 05/03/23 1305          Pain Scale: FACES Pre/Post-Treatment    Pain: FACES Scale, Pretreatment 0-->no hurt  -LF     Posttreatment Pain Rating 2-->hurts little bit  -     Pain Location - Side/Orientation Left  -     Pain Location - shoulder  -LF     Row Name 05/03/23 1305          Plan of Care Review    Plan of Care Reviewed With patient  -     Progress no change  -     Outcome Evaluation Patient presents with limitations in self-care, functional transfers, balance, and endurance. He would benefit from continued skilled occupational therapy services to maximize independence with ADLs/functional transfers. Home with assist recommended upon discharge from hospital.  -     Row Name 05/03/23 1300          Therapy Assessment/Plan (OT)    Patient/Family Therapy Goal Statement (OT) To maximize independence.  -     Rehab Potential (OT) good, to achieve stated therapy goals  -     Criteria for Skilled Therapeutic Interventions Met (OT) yes;meets criteria;skilled treatment is necessary  -     Therapy Frequency (OT) 5 times/wk  -     Row Name  05/03/23 1305          Therapy Plan Review/Discharge Plan (OT)    Anticipated Discharge Disposition (OT) home with assist  -LF     Row Name 05/03/23 1305          Vital Signs    O2 Delivery Pre Treatment room air  -LF     O2 Delivery Intra Treatment room air  -LF     O2 Delivery Post Treatment room air  -LF           User Key  (r) = Recorded By, (t) = Taken By, (c) = Cosigned By    Initials Name Provider Type     Claudia Holt OT Occupational Therapist               Outcome Measures     Row Name 05/03/23 1309          How much help from another is currently needed...    Putting on and taking off regular lower body clothing? 3  -LF     Bathing (including washing, rinsing, and drying) 3  -LF     Toileting (which includes using toilet bed pan or urinal) 3  -LF     Putting on and taking off regular upper body clothing 4  -LF     Taking care of personal grooming (such as brushing teeth) 4  -LF     Eating meals 4  -LF     AM-PAC 6 Clicks Score (OT) 21  -LF     Row Name 05/03/23 1000 05/03/23 0720       How much help from another person do you currently need...    Turning from your back to your side while in flat bed without using bedrails? 4 (P)   -JF 4  -TL    Moving from lying on back to sitting on the side of a flat bed without bedrails? 4 (P)   -JF 4  -TL    Moving to and from a bed to a chair (including a wheelchair)? 3 (P)   -JF 3  -TL    Standing up from a chair using your arms (e.g., wheelchair, bedside chair)? 3 (P)   -JF 3  -TL    Climbing 3-5 steps with a railing? 3 (P)   -JF 3  -TL    To walk in hospital room? 3 (P)   -JF 3  -TL    AM-PAC 6 Clicks Score (PT) 20 (P)   -JF 20  -TL    Highest level of mobility 6 --> Walked 10 steps or more (P)   -JF 6 --> Walked 10 steps or more  -TL    Row Name 05/03/23 1309 05/03/23 1000       Functional Assessment    Outcome Measure Options AM-PAC 6 Clicks Daily Activity (OT);Optimal Instrument  -LF AM-PAC 6 Clicks Basic Mobility (PT) (P)   -JF    Row Name 05/03/23 1309           Optimal Instrument    Optimal Instrument Optimal - 3  -LF     Bending/Stooping 2  -LF     Standing 2  -LF     Reaching 1  -LF     From the list, choose the 3 activities you would most like to be able to do without any difficulty Bending/stooping;Standing;Reaching  -LF     Total Score Optimal - 3 5  -LF           User Key  (r) = Recorded By, (t) = Taken By, (c) = Cosigned By    Initials Name Provider Type     Claudia Holt OT Occupational Therapist    Jenny Ricketts, RN Registered Nurse    Andres Matos, PT Student PT Student                Occupational Therapy Education     Title: PT OT SLP Therapies (Done)     Topic: Occupational Therapy (Done)     Point: ADL training (Done)     Description:   Instruct learner(s) on proper safety adaptation and remediation techniques during self care or transfers.   Instruct in proper use of assistive devices.              Learning Progress Summary           Patient Acceptance, E,TB, VU by  at 5/3/2023 1309                   Point: Precautions (Done)     Description:   Instruct learner(s) on prescribed precautions during self-care and functional transfers.              Learning Progress Summary           Patient Acceptance, E,TB, VU by  at 5/3/2023 1309                   Point: Body mechanics (Done)     Description:   Instruct learner(s) on proper positioning and spine alignment during self-care, functional mobility activities and/or exercises.              Learning Progress Summary           Patient Acceptance, E,TB, VU by  at 5/3/2023 1309                               User Key     Initials Effective Dates Name Provider Type Discipline     06/16/21 -  Claudia Holt OT Occupational Therapist OT              OT Recommendation and Plan  Planned Therapy Interventions (OT): activity tolerance training, patient/caregiver education/training, BADL retraining, functional balance retraining, occupation/activity based interventions, strengthening exercise,  transfer/mobility retraining  Therapy Frequency (OT): 5 times/wk  Plan of Care Review  Plan of Care Reviewed With: patient  Progress: no change  Outcome Evaluation: Patient presents with limitations in self-care, functional transfers, balance, and endurance. He would benefit from continued skilled occupational therapy services to maximize independence with ADLs/functional transfers. Home with assist recommended upon discharge from hospital.     Time Calculation:    Time Calculation- OT     Row Name 05/03/23 1310 05/03/23 1037          Time Calculation- OT    OT Received On 05/03/23  -LF --     OT Goal Re-Cert Due Date 05/12/23  -LF --        Timed Charges    82962 - Gait Training Minutes  -- 8 (P)   -JF        Untimed Charges    OT Eval/Re-eval Minutes 34  -LF --        Total Minutes    Timed Charges Total Minutes -- 8 (P)   -JF     Untimed Charges Total Minutes 34  -LF --      Total Minutes 34  -LF 8 (P)   -JF           User Key  (r) = Recorded By, (t) = Taken By, (c) = Cosigned By    Initials Name Provider Type    LF Claudia Holt OT Occupational Therapist    Andres Matos, PT Student PT Student              Therapy Charges for Today     Code Description Service Date Service Provider Modifiers Qty    [id number] HC OT EVAL LOW COMPLEXITY 3 5/3/2023 Claudia Holt OT GO 1               Claudia Holt OT  5/3/2023

## 2023-05-04 NOTE — OUTREACH NOTE
Prep Survey    Flowsheet Row Responses   [facility] facility patient discharged from? [facility]   Is LACE score < 7 ? No   Eligibility Not Eligible   What are the reasons patient is not eligible? Other   Does the patient have one of the following disease processes/diagnoses(primary or secondary)? Other   Prep survey completed? Yes          Annalee ALVAREZ - Registered Nurse

## 2023-05-05 LAB
BACTERIA SPEC AEROBE CULT: NORMAL
BACTERIA SPEC AEROBE CULT: NORMAL

## 2023-06-12 ENCOUNTER — TELEPHONE (OUTPATIENT)
Dept: GASTROENTEROLOGY | Facility: CLINIC | Age: 63
End: 2023-06-12
Payer: MEDICARE

## 2023-06-16 RX ORDER — SODIUM, POTASSIUM,MAG SULFATES 17.5-3.13G
1 SOLUTION, RECONSTITUTED, ORAL ORAL EVERY 12 HOURS
Qty: 354 ML | Refills: 0 | Status: SHIPPED | OUTPATIENT
Start: 2023-06-16

## 2023-06-16 NOTE — TELEPHONE ENCOUNTER
Hub staff attempted to follow warm transfer process and was unsuccessful     Caller: KIRTI LINCOLN    Relationship to patient: SELF    Best call back number: [phone]    Patient is needing: PATIENT NEEDS C/S PREP SENT TO [facility] IN [facility]. @ [address] [place] [address]. HE ALSO NEEDS THE PREP INSTRUCTIONS MAILED TO HIM. PLEASE CALL HIM BACK [phone]. OK TO LEAVE VOICEMAIL.

## 2023-06-19 ENCOUNTER — TRANSCRIBE ORDERS (OUTPATIENT)
Dept: ADMINISTRATIVE | Facility: HOSPITAL | Age: 63
End: 2023-06-19
Payer: MEDICARE

## 2023-06-19 DIAGNOSIS — Z87.891 FORMER SMOKER: Primary | ICD-10-CM

## 2023-06-30 ENCOUNTER — APPOINTMENT (OUTPATIENT)
Dept: GENERAL RADIOLOGY | Facility: HOSPITAL | Age: 63
DRG: 917 | End: 2023-06-30
Payer: MEDICARE

## 2023-06-30 ENCOUNTER — APPOINTMENT (OUTPATIENT)
Dept: CT IMAGING | Facility: HOSPITAL | Age: 63
DRG: 917 | End: 2023-06-30
Payer: MEDICARE

## 2023-06-30 ENCOUNTER — HOSPITAL ENCOUNTER (INPATIENT)
Facility: HOSPITAL | Age: 63
LOS: 19 days | Discharge: HOME HEALTH | DRG: 917 | End: 2023-07-19
Attending: EMERGENCY MEDICINE | Admitting: INTERNAL MEDICINE
Payer: MEDICARE

## 2023-06-30 DIAGNOSIS — R26.2 DIFFICULTY WALKING: ICD-10-CM

## 2023-06-30 DIAGNOSIS — N17.9 ACUTE RENAL FAILURE, UNSPECIFIED ACUTE RENAL FAILURE TYPE: Primary | ICD-10-CM

## 2023-06-30 DIAGNOSIS — Z78.9 IMPAIRED MOBILITY AND ADLS: ICD-10-CM

## 2023-06-30 DIAGNOSIS — Z74.09 IMPAIRED MOBILITY AND ADLS: ICD-10-CM

## 2023-06-30 DIAGNOSIS — M62.82 NON-TRAUMATIC RHABDOMYOLYSIS: ICD-10-CM

## 2023-06-30 DIAGNOSIS — Z91.89 AT RISK FOR POLYPHARMACY: ICD-10-CM

## 2023-06-30 DIAGNOSIS — R13.12 OROPHARYNGEAL DYSPHAGIA: ICD-10-CM

## 2023-06-30 DIAGNOSIS — R41.82 ALTERED MENTAL STATUS, UNSPECIFIED ALTERED MENTAL STATUS TYPE: ICD-10-CM

## 2023-06-30 LAB
ALBUMIN SERPL-MCNC: 3.9 G/DL (ref 3.5–5.2)
ALBUMIN/GLOB SERPL: 1.3 G/DL
ALP SERPL-CCNC: 136 U/L (ref 39–117)
ALT SERPL W P-5'-P-CCNC: 35 U/L (ref 1–41)
ANION GAP SERPL CALCULATED.3IONS-SCNC: 17.8 MMOL/L (ref 5–15)
ANION GAP SERPL CALCULATED.3IONS-SCNC: 20.1 MMOL/L (ref 5–15)
APTT PPP: 20.5 SECONDS (ref 24.2–34.2)
AST SERPL-CCNC: 76 U/L (ref 1–40)
BASE EXCESS BLDV CALC-SCNC: -16.1 MMOL/L (ref -2–2)
BASOPHILS # BLD AUTO: 0.01 10*3/MM3 (ref 0–0.2)
BASOPHILS NFR BLD AUTO: 0.1 % (ref 0–1.5)
BDY SITE: ABNORMAL
BILIRUB SERPL-MCNC: 0.4 MG/DL (ref 0–1.2)
BUN SERPL-MCNC: 80 MG/DL (ref 8–23)
BUN SERPL-MCNC: 84 MG/DL (ref 8–23)
BUN/CREAT SERPL: 16.1 (ref 7–25)
BUN/CREAT SERPL: 18 (ref 7–25)
CA-I BLDA-SCNC: 1.09 MMOL/L (ref 1.13–1.32)
CALCIUM SPEC-SCNC: 8 MG/DL (ref 8.6–10.5)
CALCIUM SPEC-SCNC: 8.9 MG/DL (ref 8.6–10.5)
CHLORIDE BLDV-SCNC: 101 MMOL/L (ref 98–106)
CHLORIDE SERPL-SCNC: 96 MMOL/L (ref 98–107)
CHLORIDE SERPL-SCNC: 96 MMOL/L (ref 98–107)
CK SERPL-CCNC: 4203 U/L (ref 20–200)
CO2 SERPL-SCNC: 11.9 MMOL/L (ref 22–29)
CO2 SERPL-SCNC: 13.2 MMOL/L (ref 22–29)
COHGB MFR BLD: 1.7 % (ref 0–1.5)
CREAT SERPL-MCNC: 4.67 MG/DL (ref 0.76–1.27)
CREAT SERPL-MCNC: 4.97 MG/DL (ref 0.76–1.27)
DEPRECATED RDW RBC AUTO: 43.8 FL (ref 37–54)
EGFRCR SERPLBLD CKD-EPI 2021: 12.5 ML/MIN/1.73
EGFRCR SERPLBLD CKD-EPI 2021: 13.5 ML/MIN/1.73
EOSINOPHIL # BLD AUTO: 0.01 10*3/MM3 (ref 0–0.4)
EOSINOPHIL NFR BLD AUTO: 0.1 % (ref 0.3–6.2)
ERYTHROCYTE [DISTWIDTH] IN BLOOD BY AUTOMATED COUNT: 13.3 % (ref 12.3–15.4)
FHHB: 34.5 % (ref 0–5)
GAS FLOW AIRWAY: ABNORMAL L/MIN
GEN 5 2HR TROPONIN T REFLEX: 58 NG/L
GLOBULIN UR ELPH-MCNC: 3 GM/DL
GLUCOSE BLDA-MCNC: 218 MG/DL (ref 70–99)
GLUCOSE BLDC GLUCOMTR-MCNC: 106 MG/DL (ref 70–99)
GLUCOSE BLDC GLUCOMTR-MCNC: 212 MG/DL (ref 70–99)
GLUCOSE SERPL-MCNC: 166 MG/DL (ref 65–99)
GLUCOSE SERPL-MCNC: 256 MG/DL (ref 65–99)
HCO3 BLDV-SCNC: 11.1 MMOL/L (ref 22–26)
HCT VFR BLD AUTO: 32.6 % (ref 37.5–51)
HGB BLD-MCNC: 10.8 G/DL (ref 13–17.7)
HGB BLDA-MCNC: 9.9 G/DL (ref 13.8–16.4)
HOLD SPECIMEN: NORMAL
HOLD SPECIMEN: NORMAL
IMM GRANULOCYTES # BLD AUTO: 0.03 10*3/MM3 (ref 0–0.05)
IMM GRANULOCYTES NFR BLD AUTO: 0.2 % (ref 0–0.5)
INHALED O2 CONCENTRATION: 21 %
INR PPP: 1.14 (ref 0.86–1.15)
LACTATE BLDA-SCNC: 0.88 MMOL/L (ref 0.5–2)
LYMPHOCYTES # BLD AUTO: 0.67 10*3/MM3 (ref 0.7–3.1)
LYMPHOCYTES NFR BLD AUTO: 5.2 % (ref 19.6–45.3)
MAGNESIUM SERPL-MCNC: 3.2 MG/DL (ref 1.6–2.4)
MCH RBC QN AUTO: 29.5 PG (ref 26.6–33)
MCHC RBC AUTO-ENTMCNC: 33.1 G/DL (ref 31.5–35.7)
MCV RBC AUTO: 89.1 FL (ref 79–97)
METHGB BLD QL: 0.2 % (ref 0–1.5)
MODALITY: ABNORMAL
MONOCYTES # BLD AUTO: 0.88 10*3/MM3 (ref 0.1–0.9)
MONOCYTES NFR BLD AUTO: 6.9 % (ref 5–12)
NEUTROPHILS NFR BLD AUTO: 11.19 10*3/MM3 (ref 1.7–7)
NEUTROPHILS NFR BLD AUTO: 87.5 % (ref 42.7–76)
NOTE: ABNORMAL
NRBC BLD AUTO-RTO: 0 /100 WBC (ref 0–0.2)
NT-PROBNP SERPL-MCNC: 1532 PG/ML (ref 0–900)
OXYHGB MFR BLDV: 63.6 % (ref 94–99)
PCO2 BLDV: 31.1 MM HG (ref 41–51)
PH BLDV: 7.17 PH UNITS (ref 7.31–7.41)
PLATELET # BLD AUTO: 188 10*3/MM3 (ref 140–450)
PMV BLD AUTO: 10.8 FL (ref 6–12)
PO2 BLDV: 38.1 MM HG (ref 35–42)
POTASSIUM BLDV-SCNC: 4.9 MMOL/L (ref 3.5–5)
POTASSIUM SERPL-SCNC: 5.2 MMOL/L (ref 3.5–5.2)
POTASSIUM SERPL-SCNC: 6.6 MMOL/L (ref 3.5–5.2)
PROT SERPL-MCNC: 6.9 G/DL (ref 6–8.5)
PROTHROMBIN TIME: 14.6 SECONDS (ref 11.8–14.9)
RBC # BLD AUTO: 3.66 10*6/MM3 (ref 4.14–5.8)
RBC MORPH BLD: NORMAL
SAO2 % BLDCOV: 64.8 % (ref 45–75)
SMALL PLATELETS BLD QL SMEAR: ADEQUATE
SODIUM BLDV-SCNC: 131 MMOL/L (ref 136–146)
SODIUM SERPL-SCNC: 127 MMOL/L (ref 136–145)
SODIUM SERPL-SCNC: 128 MMOL/L (ref 136–145)
TROPONIN T DELTA: -2 NG/L
TROPONIN T SERPL HS-MCNC: 60 NG/L
WBC MORPH BLD: NORMAL
WBC NRBC COR # BLD: 12.79 10*3/MM3 (ref 3.4–10.8)
WHOLE BLOOD HOLD COAG: NORMAL
WHOLE BLOOD HOLD SPECIMEN: NORMAL

## 2023-06-30 PROCEDURE — 25510000001 IOPAMIDOL PER 1 ML

## 2023-06-30 PROCEDURE — 94799 UNLISTED PULMONARY SVC/PX: CPT

## 2023-06-30 PROCEDURE — 93005 ELECTROCARDIOGRAM TRACING: CPT

## 2023-06-30 PROCEDURE — 25010000002 CALCIUM GLUCONATE-NACL 1-0.675 GM/50ML-% SOLUTION: Performed by: EMERGENCY MEDICINE

## 2023-06-30 PROCEDURE — 25010000002 ONDANSETRON PER 1 MG: Performed by: EMERGENCY MEDICINE

## 2023-06-30 PROCEDURE — 0042T HC CT CEREBRAL PERFUSION W/WO CONTRAST: CPT

## 2023-06-30 PROCEDURE — 82948 REAGENT STRIP/BLOOD GLUCOSE: CPT

## 2023-06-30 PROCEDURE — 82550 ASSAY OF CK (CPK): CPT | Performed by: EMERGENCY MEDICINE

## 2023-06-30 PROCEDURE — 85730 THROMBOPLASTIN TIME PARTIAL: CPT

## 2023-06-30 PROCEDURE — 70450 CT HEAD/BRAIN W/O DYE: CPT

## 2023-06-30 PROCEDURE — 85025 COMPLETE CBC W/AUTO DIFF WBC: CPT

## 2023-06-30 PROCEDURE — 85007 BL SMEAR W/DIFF WBC COUNT: CPT

## 2023-06-30 PROCEDURE — 85610 PROTHROMBIN TIME: CPT

## 2023-06-30 PROCEDURE — 83880 ASSAY OF NATRIURETIC PEPTIDE: CPT | Performed by: EMERGENCY MEDICINE

## 2023-06-30 PROCEDURE — 80053 COMPREHEN METABOLIC PANEL: CPT | Performed by: EMERGENCY MEDICINE

## 2023-06-30 PROCEDURE — 82805 BLOOD GASES W/O2 SATURATION: CPT | Performed by: PHYSICIAN ASSISTANT

## 2023-06-30 PROCEDURE — 51702 INSERT TEMP BLADDER CATH: CPT

## 2023-06-30 PROCEDURE — 71045 X-RAY EXAM CHEST 1 VIEW: CPT

## 2023-06-30 PROCEDURE — 70498 CT ANGIOGRAPHY NECK: CPT

## 2023-06-30 PROCEDURE — 63710000001 INSULIN REGULAR HUMAN PER 5 UNITS: Performed by: EMERGENCY MEDICINE

## 2023-06-30 PROCEDURE — 99223 1ST HOSP IP/OBS HIGH 75: CPT | Performed by: STUDENT IN AN ORGANIZED HEALTH CARE EDUCATION/TRAINING PROGRAM

## 2023-06-30 PROCEDURE — 83735 ASSAY OF MAGNESIUM: CPT

## 2023-06-30 PROCEDURE — 25010000002 NALOXONE HCL 2 MG/2ML SOLUTION PREFILLED SYRINGE: Performed by: EMERGENCY MEDICINE

## 2023-06-30 PROCEDURE — 82820 HEMOGLOBIN-OXYGEN AFFINITY: CPT | Performed by: PHYSICIAN ASSISTANT

## 2023-06-30 PROCEDURE — 84484 ASSAY OF TROPONIN QUANT: CPT | Performed by: EMERGENCY MEDICINE

## 2023-06-30 PROCEDURE — 99284 EMERGENCY DEPT VISIT MOD MDM: CPT

## 2023-06-30 PROCEDURE — 93005 ELECTROCARDIOGRAM TRACING: CPT | Performed by: EMERGENCY MEDICINE

## 2023-06-30 PROCEDURE — 94640 AIRWAY INHALATION TREATMENT: CPT

## 2023-06-30 PROCEDURE — 70496 CT ANGIOGRAPHY HEAD: CPT

## 2023-06-30 RX ORDER — NALOXONE HYDROCHLORIDE 1 MG/ML
2 INJECTION INTRAMUSCULAR; INTRAVENOUS; SUBCUTANEOUS ONCE
Status: COMPLETED | OUTPATIENT
Start: 2023-06-30 | End: 2023-06-30

## 2023-06-30 RX ORDER — SODIUM CHLORIDE 0.9 % (FLUSH) 0.9 %
10 SYRINGE (ML) INJECTION AS NEEDED
Status: DISCONTINUED | OUTPATIENT
Start: 2023-06-30 | End: 2023-07-19 | Stop reason: HOSPADM

## 2023-06-30 RX ORDER — AMOXICILLIN 250 MG
2 CAPSULE ORAL 2 TIMES DAILY
Status: DISCONTINUED | OUTPATIENT
Start: 2023-07-01 | End: 2023-07-06

## 2023-06-30 RX ORDER — INSULIN LISPRO 100 [IU]/ML
2-7 INJECTION, SOLUTION INTRAVENOUS; SUBCUTANEOUS
Status: DISCONTINUED | OUTPATIENT
Start: 2023-07-01 | End: 2023-07-06

## 2023-06-30 RX ORDER — SODIUM CHLORIDE 0.9 % (FLUSH) 0.9 %
10 SYRINGE (ML) INJECTION EVERY 12 HOURS SCHEDULED
Status: DISCONTINUED | OUTPATIENT
Start: 2023-07-01 | End: 2023-07-19 | Stop reason: HOSPADM

## 2023-06-30 RX ORDER — NICOTINE POLACRILEX 4 MG
15 LOZENGE BUCCAL
Status: DISCONTINUED | OUTPATIENT
Start: 2023-06-30 | End: 2023-07-09

## 2023-06-30 RX ORDER — NITROGLYCERIN 0.4 MG/1
0.4 TABLET SUBLINGUAL
Status: DISCONTINUED | OUTPATIENT
Start: 2023-06-30 | End: 2023-07-01

## 2023-06-30 RX ORDER — CYCLOBENZAPRINE HCL 5 MG
5 TABLET ORAL 3 TIMES DAILY PRN
COMMUNITY
Start: 2023-06-27 | End: 2023-07-19 | Stop reason: HOSPADM

## 2023-06-30 RX ORDER — PLECANATIDE 3 MG/1
1 TABLET ORAL DAILY
COMMUNITY
End: 2023-07-19 | Stop reason: HOSPADM

## 2023-06-30 RX ORDER — ESCITALOPRAM OXALATE 10 MG/1
20 TABLET ORAL DAILY
Status: DISCONTINUED | OUTPATIENT
Start: 2023-07-01 | End: 2023-07-06

## 2023-06-30 RX ORDER — LAMOTRIGINE 100 MG/1
200 TABLET ORAL 2 TIMES DAILY
Status: DISCONTINUED | OUTPATIENT
Start: 2023-07-01 | End: 2023-07-01

## 2023-06-30 RX ORDER — BISACODYL 10 MG
10 SUPPOSITORY, RECTAL RECTAL DAILY PRN
Status: DISCONTINUED | OUTPATIENT
Start: 2023-06-30 | End: 2023-07-06

## 2023-06-30 RX ORDER — PANTOPRAZOLE SODIUM 40 MG/1
40 TABLET, DELAYED RELEASE ORAL 2 TIMES DAILY
Status: DISCONTINUED | OUTPATIENT
Start: 2023-07-01 | End: 2023-07-06

## 2023-06-30 RX ORDER — SODIUM CHLORIDE 9 MG/ML
40 INJECTION, SOLUTION INTRAVENOUS AS NEEDED
Status: DISCONTINUED | OUTPATIENT
Start: 2023-06-30 | End: 2023-07-09

## 2023-06-30 RX ORDER — CALCIUM GLUCONATE 20 MG/ML
1 INJECTION, SOLUTION INTRAVENOUS ONCE
Status: COMPLETED | OUTPATIENT
Start: 2023-06-30 | End: 2023-06-30

## 2023-06-30 RX ORDER — SODIUM CHLORIDE 0.9 % (FLUSH) 0.9 %
10 SYRINGE (ML) INJECTION AS NEEDED
Status: DISCONTINUED | OUTPATIENT
Start: 2023-06-30 | End: 2023-07-09

## 2023-06-30 RX ORDER — HEPARIN SODIUM 5000 [USP'U]/ML
5000 INJECTION, SOLUTION INTRAVENOUS; SUBCUTANEOUS EVERY 8 HOURS SCHEDULED
Status: DISCONTINUED | OUTPATIENT
Start: 2023-07-01 | End: 2023-07-05

## 2023-06-30 RX ORDER — ATENOLOL 25 MG/1
25 TABLET ORAL DAILY
Status: DISCONTINUED | OUTPATIENT
Start: 2023-07-01 | End: 2023-07-06

## 2023-06-30 RX ORDER — ONDANSETRON 2 MG/ML
4 INJECTION INTRAMUSCULAR; INTRAVENOUS ONCE
Status: COMPLETED | OUTPATIENT
Start: 2023-06-30 | End: 2023-06-30

## 2023-06-30 RX ORDER — FINERENONE 20 MG/1
20 TABLET, FILM COATED ORAL DAILY
COMMUNITY
Start: 2023-06-29 | End: 2023-07-19 | Stop reason: HOSPADM

## 2023-06-30 RX ORDER — TIOTROPIUM BROMIDE AND OLODATEROL 3.124; 2.736 UG/1; UG/1
1 SPRAY, METERED RESPIRATORY (INHALATION) DAILY
COMMUNITY
Start: 2023-06-14

## 2023-06-30 RX ORDER — POLYETHYLENE GLYCOL 3350 17 G/17G
17 POWDER, FOR SOLUTION ORAL DAILY PRN
Status: DISCONTINUED | OUTPATIENT
Start: 2023-06-30 | End: 2023-07-06

## 2023-06-30 RX ORDER — DEXTROSE MONOHYDRATE 25 G/50ML
25 INJECTION, SOLUTION INTRAVENOUS
Status: DISCONTINUED | OUTPATIENT
Start: 2023-06-30 | End: 2023-07-09

## 2023-06-30 RX ORDER — BISACODYL 5 MG/1
5 TABLET, DELAYED RELEASE ORAL DAILY PRN
Status: DISCONTINUED | OUTPATIENT
Start: 2023-06-30 | End: 2023-07-06

## 2023-06-30 RX ORDER — ALBUTEROL SULFATE 2.5 MG/3ML
10 SOLUTION RESPIRATORY (INHALATION) ONCE
Status: COMPLETED | OUTPATIENT
Start: 2023-06-30 | End: 2023-06-30

## 2023-06-30 RX ORDER — DEXTROSE MONOHYDRATE 25 G/50ML
50 INJECTION, SOLUTION INTRAVENOUS ONCE
Status: COMPLETED | OUTPATIENT
Start: 2023-06-30 | End: 2023-06-30

## 2023-06-30 RX ADMIN — NALOXONE HYDROCHLORIDE 2 MG: 1 INJECTION PARENTERAL at 22:40

## 2023-06-30 RX ADMIN — SODIUM BICARBONATE 150 MEQ: 84 INJECTION, SOLUTION INTRAVENOUS at 21:28

## 2023-06-30 RX ADMIN — DEXTROSE MONOHYDRATE 50 G: 25 INJECTION, SOLUTION INTRAVENOUS at 21:18

## 2023-06-30 RX ADMIN — SODIUM BICARBONATE 50 MEQ: 84 INJECTION INTRAVENOUS at 21:57

## 2023-06-30 RX ADMIN — NALOXONE HYDROCHLORIDE 2 MG: 1 INJECTION PARENTERAL at 20:05

## 2023-06-30 RX ADMIN — CALCIUM GLUCONATE 1 G: 20 INJECTION, SOLUTION INTRAVENOUS at 20:18

## 2023-06-30 RX ADMIN — SODIUM CHLORIDE 1000 ML: 9 INJECTION, SOLUTION INTRAVENOUS at 21:39

## 2023-06-30 RX ADMIN — ALBUTEROL SULFATE 10 MG: 2.5 SOLUTION RESPIRATORY (INHALATION) at 20:22

## 2023-06-30 RX ADMIN — INSULIN HUMAN 5 UNITS: 100 INJECTION, SOLUTION PARENTERAL at 21:21

## 2023-06-30 RX ADMIN — ONDANSETRON 4 MG: 2 INJECTION INTRAMUSCULAR; INTRAVENOUS at 20:04

## 2023-06-30 RX ADMIN — SODIUM CHLORIDE 500 ML: 9 INJECTION, SOLUTION INTRAVENOUS at 20:34

## 2023-06-30 RX ADMIN — IOPAMIDOL 100 ML: 755 INJECTION, SOLUTION INTRAVENOUS at 18:59

## 2023-06-30 NOTE — CONSULTS
[facility]  [facility]      Patient Name:   Carlos Murphy  YOB: 1960  Identification Number:   MRN - [id number]  Date of Service:   06/30/2023 18:31:33    Diagnosis:        G93.49 - Encephalopathy Multifactorial    Impression:       62 year old male who presents with altered mental status. Presentation may be due to stroke vs other metabolic or infectious encephalopathy.    Our recommendations are outlined below.    Recommendations:          Stroke/Telemetry Floor        Neuro Checks        Bedside Swallow Eval        DVT Prophylaxis        IV Fluids, Normal Saline        Head of Bed 30 Degrees        Euglycemia and Avoid Hyperthermia (PRN Acetaminophen)        Initiate or continue Aspirin 81 MG daily    Recommended Scan:       MRI Head Without Contrast       Echocardiogram - Transthoracic Echocardiogram    Lipid Panel to Be Obtained, if Not Done in the Last 30 Days    Therapies:        Physical Therapy, Occupational Therapy, Speech Therapy Assessment When Applicable    Dysphagia:        Swallow Evaluation, Bedside        NPO Until Swallow Evaluation    DVT prophylaxis:        Choice of Primary Team    Disposition:        Neurology Follow Up Recommended    Sign Out:        Discussed with Emergency Department Provider        ------------------------------------------------------------------------------    Advanced Imaging:  CTA Head and Neck Completed.    CTP Completed.    LVO:No    Patient doesn't meet criteria for emergent PAPITO consideration      Metrics:  Last Known Well: 06/29/2023 21:00:00  [facility] Notification Time: 06/30/2023 18:30:55  Arrival Time: 06/30/2023 17:16:00  Stamp Time: 06/30/2023 18:31:33  Initial Response Time: 06/30/2023 18:39:57  Symptoms: Slurred speech and altered mental status.  Initial patient interaction: 06/30/2023 18:45:00  NIHSS Assessment Completed: 06/30/2023 19:00:00  Patient is not a candidate for  Thrombolytic.  Thrombolytic Medical Decision: 06/30/2023 18:44:59  Patient was not deemed candidate for Thrombolytic because of following reasons:  Last Well Known Above 4.5 Hours.    CT head showed no acute hemorrhage or acute core infarct.    Primary Provider Notified of Diagnostic Impression and Management Plan on: 06/30/2023 19:18:55        ------------------------------------------------------------------------------    History of Present Illness:  Patient is a 62 year old Male.    Patient was brought by private transportation with symptoms of Slurred speech and altered mental status.  62 year old male with a history of DM II, HTN, and seizures who presents to the hospital because of altered mental status and slurred speech. Patient was last seen normal last night around 9pm and then all day today patient has had a staggering gait, slurred speech, and confusion.      Past Medical History:       Hypertension       Diabetes Mellitus       Seizures  Othere PMH:  COPD    Medications:    No Anticoagulant use   Antiplatelet use: Yes Aspirin  Reviewed EMR for current medications    Allergies:   Reviewed    Social History:  Smoking: No    Family History:    There is no family history of premature cerebrovascular disease pertinent to this consultation    ROS :  14 Points Review of Systems was performed and was negative except mentioned in HPI.    Past Surgical History:  There Is No Surgical History Contributory To Today’s Visit        Examination:  BP(133/110), Pulse(65),  1A: Level of Consciousness - Movements to Pain + 2  1B: Ask Month and Age - Aphasic + 2  1C: Blink Eyes & Squeeze Hands - Performs 0 Tasks + 2  2: Test Horizontal Extraocular Movements - Normal + 0  3: Test Visual Fields - Bilateral Hemianopia + 3  4: Test Facial Palsy (Use Grimace if Obtunded) - Normal symmetry + 0  5A: Test Left Arm Motor Drift - Some Effort Against Gravity + 2  5B: Test Right Arm Motor Drift - Some Effort Against Gravity + 2  6A:  Test Left Leg Motor Drift - No Drift for 5 Seconds + 0  6B: Test Right Leg Motor Drift - No Drift for 5 Seconds + 0  7: Test Limb Ataxia (FNF/Heel-Shin) - No Ataxia + 0  8: Test Sensation - Normal; No sensory loss + 0  9: Test Language/Aphasia - Normal; No aphasia + 0  10: Test Dysarthria - Normal + 0  11: Test Extinction/Inattention - No abnormality + 0    NIHSS Score: 13    Pre-Morbid Modified Ceredo Scale:  2 Points = Slight disability; unable to carry out all previous activities, but able to look after own affairs without assistance    I reviewed the available imaging via A.I. software Rapid and initiated discussion with the primary provider    Patient/Family was informed the Neurology Consult would occur via TeleHealth consult by way of interactive audio and video telecommunications and consented to receiving care in this manner.      Patient is being evaluated for possible acute neurologic impairment and high probability of imminent or life-threatening deterioration. I spent total of 35 minutes providing care to this patient, including time for face to face visit via telemedicine, review of medical records, imaging studies and discussion of findings with providers, the patient and/or family.      Dr Raquel Torres      [facility]  [phone]    Case [id number]

## 2023-06-30 NOTE — LETTER
[facility]  [address]  [place] [address]  [phone]        July 14, 2023      Patient: Carlos DILLARD Jeffrey Alfaro.  YOB: 1960  Date of Visit: 6/30/2023      Patient still has a safety sitter at this time. I can follow up on Monday to determine when he will be ready.     Phone: [phone]    Thank you!         JOY Calderon

## 2023-06-30 NOTE — ED PROVIDER NOTES
Time: 7:28 PM EDT  Date of encounter:  6/30/2023  Independent Historian/Clinical History and Information was obtained by:   Patient and sister    History is limited by: Altered Mental Status    Chief Complaint: Altered mental status.      History of Present Illness:  Patient is a 62 y.o. year old male who presents to the emergency department for evaluation of Patient's sister is the primary historian.  She states that patient called her while she was at work at 1530 and complained of difficulty walking and had slurred speech.  She cannot understand very well so told a family member to call EMS.  She states he has a history of seizures in the past.  As of last night he had been normal per her report.  Since she is arrived to the emergency department the patient has been essentially unresponsive.  No further history available.    HPI    Patient Care Team  Primary Care Provider: Felix Atkinson MD    Past Medical History:     No Known Allergies  Past Medical History:   Diagnosis Date    Acute kidney injury     2021 POST SEIZURES    Astigmatism of both eyes     eyes twitch left and right    Bipolar disorder     Charcot ankle     Closed nondisplaced fracture of medial malleolus of left tibia     COPD (chronic obstructive pulmonary disease)     USES INHALERS    Diabetes     BG RUNS AROUND 90'S IN AM    Hx of schizophrenia     Hyperlipidemia     Hypertension     SEEN DR ROD IN THE PAST, HAD APPT WITH DR MICHAUD IN 5-2022 CX APPT, DENIED CP/SOB    Neuropathy     Seizures     LAST ONE 4/21/22    Sleep apnea     DOES NOT USE CPAP    Varicose vein of leg      Past Surgical History:   Procedure Laterality Date    ANKLE OPEN REDUCTION INTERNAL FIXATION Left 04/25/2022    Procedure: LEFT OPEN REDUCTION INTERNAL FIXATION DISTAL TIBIA FRACTURE ;  Surgeon: Tha Parry MD;  Location: [facility] OR [facility];  Service: Orthopedics;  Laterality: Left;    ANKLE OPEN REDUCTION INTERNAL FIXATION Left 06/01/2022    Procedure: LEFT ANKLE  OPEN REDUCTION INTERNAL FIXATION WITH SYNDESMOSIS FIXATION;  Surgeon: Tha Parry MD;  Location: [facility] MAIN OR;  Service: Orthopedics;  Laterality: Left;    CHOLECYSTECTOMY      COLONOSCOPY      JOINT REPLACEMENT      RTKR    LUMBAR DISC SURGERY      SHOULDER SURGERY Right      Family History   Problem Relation Age of Onset    Kidney disease Mother     COPD Mother     COPD Father     Heart disease Father     Malig Hyperthermia Neg Hx        Home Medications:  Prior to Admission medications    Medication Sig Start Date End Date Taking? Authorizing Provider   Accu-Chek Guide test strip  9/14/22   Enrique Cortes MD   Accu-Chek Softclix Lancets lancets  6/15/22   Enrique Cortes MD   albuterol sulfate  (90 Base) MCG/ACT inhaler Inhale 2 puffs Every 4 (Four) Hours As Needed. 4/1/22   Enrique Cortes MD   amLODIPine (NORVASC) 5 MG tablet Take 1 tablet by mouth Daily.    Enrique Cortes MD   aspirin EC (Ecotrin) 325 MG tablet Take 1 tablet by mouth Daily. 6/1/22   Tha Parry MD   atenolol (TENORMIN) 25 MG tablet Take 1 tablet by mouth Daily.    Enrique Cortes MD   atorvastatin (LIPITOR) 40 MG tablet Take 1 tablet by mouth Every Night.    Enrique Cortes MD   Blood Glucose Monitoring Suppl (Accu-Chek Guide Me) w/Device kit  6/15/22   Enrique Cortes MD   budesonide-formoterol (Symbicort) 160-4.5 MCG/ACT inhaler Inhale 2 puffs 2 (Two) Times a Day. 4/13/22   Phillip Yanez MD   empagliflozin (JARDIANCE) 25 MG tablet tablet Take 1 tablet by mouth Daily. 6/24/22   Enrique Cortes MD   escitalopram (LEXAPRO) 20 MG tablet Take 1 tablet by mouth Daily. 3/29/22   Enrique Cortes MD   furosemide (LASIX) 20 MG tablet Take 1 tablet by mouth Daily. 6/1/21   Enrique Cortes MD   glipizide (GLUCOTROL XL) 10 MG 24 hr tablet Take 1 tablet by mouth 2 (Two) Times a Day. INSTRUCTED PER ANESTHESIA PROTOCOL 6/24/21   Enrique Cortes MD  "  hydrOXYzine (ATARAX) 50 MG tablet Take 1 tablet by mouth 3 (Three) Times a Day. 6/13/21   Enrique Cortes MD   lamoTRIgine (LaMICtal) 200 MG tablet Take 1 tablet by mouth 2 (Two) Times a Day.    Enrique Cortes MD   lisinopril (PRINIVIL,ZESTRIL) 40 MG tablet Take 1 tablet by mouth Daily. 6/24/22   Enrique Cortes MD   lubiprostone (AMITIZA) 24 MCG capsule Take 1 capsule by mouth 2 (Two) Times a Day With Meals. 3/13/23   Enrique Cortes MD   multivitamin with minerals tablet tablet Take 1 tablet by mouth Daily.    Enrique Cortes MD   NovoLOG Mix 70/30 FlexPen (70-30) 100 UNIT/ML suspension pen-injector injection REPORTS SLIDING SCALE [id number]. INSTRUCTED PER ANESTHESIA PROTOCOL IF BS GREATER THEN 140 CAN TAKE 1/2 OF THE DOSE 2/24/22   Enrique Cortes MD   oxyCODONE-acetaminophen (PERCOCET) 7.5-325 MG per tablet Take 1-2 tablets by mouth Every 4 (Four) Hours As Needed for Moderate Pain . 6/1/22   Tha Parry MD   pantoprazole (PROTONIX) 40 MG EC tablet Take 1 tablet by mouth 2 (Two) Times a Day.    Enrique Cortes MD   pregabalin (LYRICA) 100 MG capsule Take 1 capsule by mouth 4 (Four) Times a Day. 7/2/21   Enrique Cortes MD   QUEtiapine (SEROquel) 300 MG tablet Take 2 tablets by mouth Every Night. 6/24/21   Enrique Cortes MD   Semaglutide,0.25 or 0.5MG/DOS, (Ozempic, 0.25 or 0.5 MG/DOSE,) 2 MG/1.5ML solution pen-injector Inject 5 mg under the skin into the appropriate area as directed 1 (One) Time Per Week. INSTRUCTED PER ANESTHESIA STANDING ORDERS    Enrique Cortes MD   sodium-potassium-magnesium sulfates (Suprep Bowel Prep Kit) 17.5-3.13-1.6 GM/177ML solution oral solution Take 1 bottle by mouth Every 12 (Twelve) Hours. 6/16/23   Joyce Mccain APRN   TRUEplus Insulin Syringe 31G X 5/16\" 0.3 ML misc  6/24/22   Provider, MD Enrique   zolpidem (AMBIEN) 10 MG tablet Take 1 tablet by mouth Every Night. 6/18/21   " "Provider, Historical, MD        Social History:   Social History     Tobacco Use    Smoking status: Former     Packs/day: 0.50     Years: 35.00     Pack years: 17.50     Types: Cigarettes     Quit date:      Years since quittin.5    Smokeless tobacco: Never   Vaping Use    Vaping Use: Never used   Substance Use Topics    Alcohol use: Yes     Comment: OCCASIONAL    Drug use: Never         Review of Systems:  Review of Systems   Neurological:  Positive for speech difficulty and weakness.      Physical Exam:  BP (!) 82/38 (BP Location: Right arm, Patient Position: Sitting)   Pulse 63   Temp 98.6 °F (37 °C) (Oral)   Resp 20   Ht 182.9 cm (72\")   SpO2 95%   BMI 40.16 kg/m²     Physical Exam  Vitals and nursing note reviewed.   Constitutional:       General: He is awake.      Appearance: Normal appearance.      Comments: Somnolent   HENT:      Head: Normocephalic and atraumatic.      Nose: Nose normal.      Mouth/Throat:      Mouth: Mucous membranes are dry.   Eyes:      Extraocular Movements: Extraocular movements intact.      Comments: Pinpoint pupils bilaterally   Cardiovascular:      Rate and Rhythm: Normal rate and regular rhythm.      Heart sounds: Normal heart sounds.   Pulmonary:      Effort: Pulmonary effort is normal. No respiratory distress.      Breath sounds: Normal breath sounds. No wheezing, rhonchi or rales.   Abdominal:      General: Bowel sounds are normal.      Palpations: Abdomen is soft.      Tenderness: There is no abdominal tenderness. There is no guarding or rebound.      Comments: No rigidity   Musculoskeletal:         General: No tenderness. Normal range of motion.      Cervical back: Normal range of motion and neck supple.   Skin:     General: Skin is warm and dry.      Coloration: Skin is not jaundiced.   Neurological:      General: No focal deficit present.      Sensory: Sensation is intact.      Motor: Motor function is intact.      Coordination: Coordination is intact.      " "Comments: Somnolent.  Patient has intermittent minor twitching movements but no definite seizure activity.   Psychiatric:         Attention and Perception: Attention and perception normal.         Mood and Affect: Affect normal.         Speech: Speech normal.                Procedures:  Procedures      Medical Decision Making:      Comorbidities that affect care:    Diabetes, seizure disorders, schizophrenia, bipolar, COPD, neuropathy    External Notes reviewed:    Encounter review: Nephrology office visit yesterday for stage IIIb chronic kidney disease, hypertension:  \"Assessment and plan:  -Acute kidney injury in the setting of seizure disorder, peak creatinine 3.0 March 2021, resolved.  - Chronic kidney disease stage IIIb with previously bland UA and sub-nephrotic proteinuria most likely secondary to diabetic nephropathy. Renal function is worse today. Would like to maximize lisinopril at 80mg but due to complaints of orthostasis will wait to see what his pressures are running at home. Continue SGL2 inh. Start Kerendia and repeat BMP in 2 weeks to monitor potassium.. Continue to avoid nephrotoxins. Stressed importance of better diabetes control.  - Previous MK October 2018, etiology uncertain, peak creatinine 2.47, resolved.  - Proteinuria, Sub-nephrotic secondary to diabetic nephropathy. monitor periodically.  - Diabetes with complications. Goal A1c below 7%. He is on Jardiance. Last A1C 9.8% this month  - Hypertension, blood pressure is slightly higher today than it has been and he's had 12lb weight gain since last visit. C/O dizziness when changing positions but he describes this as his norm. Avoid high salt intake. Check blood pressure at home weekly and record.  - Lower extremity edema, trace on exam today. low sodium diet and small dose Lasix.  - Back and neck pain, status post steroid injections. Better.  - Bipolar disease with previous history of lithium use.\"        The following orders were placed and " all results were independently analyzed by me:  Orders Placed This Encounter   Procedures    CT Head Without Contrast Stroke Protocol    CT Angiogram Head w AI Analysis of LVO    CT Angiogram Neck    CT CEREBRAL PERFUSION WITH & WITHOUT CONTRAST    XR Chest 1 View    MRI Brain Without Contrast    [place] Draw    Comprehensive Metabolic Panel    Single High Sensitivity Troponin T    Magnesium    Urinalysis With Microscopic If Indicated (No Culture) - Urine, Clean Catch    CBC Auto Differential    Protime-INR    aPTT    Scan Slide    BNP    High Sensitivity Troponin T 2Hr    Basic Metabolic Panel    CK    Urine Drug Screen - Urine, Clean Catch    VBG with Co-Ox and Electrolytes    CBC Auto Differential    Comprehensive Metabolic Panel    Magnesium    Phosphorus    CK    Diet: Cardiac Diets, Diabetic Diets; Healthy Heart (2-3 Na+); Consistent Carbohydrate; Texture: Regular Texture (IDDSI 7); Fluid Consistency: Thin (IDDSI 0)    Undress & Gown    Vital Signs    Orthostatic Blood Pressure    Initiate Department's Acute Stroke Process (Team D, Code 19, etc)    Perform NIH Stroke Scale    Measure Actual Weight    Head of Bed 30 Degrees or Less    Undress and Gown    Continuous Pulse Oximetry    Vital Signs    Neuro Checks    Notify MD for SBP < 80 or > 200    Notify Provider for SBP greater than 140 if hemorrhagic Stroke    No Hypotonic Fluids    Nursing Dysphagia Screening (Complete Prior to Giving anything PO)    RN to Place Order SLP Consult (IF swallow screen failed) - Eval & Treat Choosing Reason of RN Dysphagia Screen Failed    Vital Signs    Intake & Output    Weigh Patient    Oral Care    Telemetry - Maintain IV Access    Telemetry - Place Orders & Notify Provider of Results When Patient Experiences Acute Chest Pain, Dysrhythmia or Respiratory Distress    Nursing Dysphagia Screening (Complete Prior to Giving Anything By Mouth)    Neuro Checks    Code Status and Medical Interventions:    IP General Consult (Use  specialty-specific consult if known)    Hospitalist (on-call MD unless specified)    Inpatient Nephrology Consult    Inpatient Neurology Consult General    Oxygen Therapy- Nasal Cannula; Titrate 1-6 LPM Per SpO2; 90 - 95%    Oxygen Therapy- Nasal Cannula; Titrate 1-6 LPM Per SpO2; 90 - 95%    POC Glucose Once    POC Glucose Once    POC Glucose Q1H    POC Glucose Once    POC Glucose Once    POC Glucose 4x Daily AC & at Bedtime    ECG 12 Lead ED Triage Standing Order; Weak / Dizzy / AMS    ECG 12 Lead ED Triage Standing Order; Acute Stroke (Onset <12 hrs)    Adult Transthoracic Echo Limited W/ Cont if Necessary Per Protocol    EEG    Consult to Wound / Ostomy Care    Insert Peripheral IV    Insert Large Bore Peripheral IV - Right AC Preferred    Insert Peripheral IV    Inpatient Admission    Fall Precautions    CBC & Differential    Green Top (Gel)    Lavender Top    Gold Top - SST    Light Blue Top       Medications Given in the Emergency Department:  Medications   sodium chloride 0.9 % flush 10 mL (has no administration in time range)   sodium chloride 0.9 % flush 10 mL (has no administration in time range)   sodium bicarbonate 150 mEq/1000 mL D5W infusion (150 mEq Intravenous New Bag 6/30/23 2128)   atenolol (TENORMIN) tablet 25 mg (has no administration in time range)   escitalopram (LEXAPRO) tablet 20 mg (has no administration in time range)   lamoTRIgine (LaMICtal) tablet 200 mg (has no administration in time range)   pantoprazole (PROTONIX) EC tablet 40 mg (has no administration in time range)   sodium chloride 0.9 % flush 10 mL (has no administration in time range)   sodium chloride 0.9 % flush 10 mL (has no administration in time range)   sodium chloride 0.9 % infusion 40 mL (has no administration in time range)   sennosides-docusate (PERICOLACE) 8.6-50 MG per tablet 2 tablet (has no administration in time range)     And   polyethylene glycol (MIRALAX) packet 17 g (has no administration in time range)     And    bisacodyl (DULCOLAX) EC tablet 5 mg (has no administration in time range)     And   bisacodyl (DULCOLAX) suppository 10 mg (has no administration in time range)   heparin (porcine) 5000 UNIT/ML injection 5,000 Units (has no administration in time range)   nitroglycerin (NITROSTAT) SL tablet 0.4 mg (has no administration in time range)   dextrose (GLUTOSE) oral gel 15 g (has no administration in time range)   dextrose (D50W) (25 g/50 mL) IV injection 25 g (has no administration in time range)   glucagon (GLUCAGEN) injection 1 mg (has no administration in time range)   Insulin Lispro (humaLOG) injection 2-7 Units (has no administration in time range)   iopamidol (ISOVUE-370) 76 % injection 100 mL (100 mL Intravenous Given 6/30/23 1859)   sodium chloride 0.9 % bolus 500 mL (0 mL Intravenous Stopped 6/30/23 2124)   Naloxone HCl (NARCAN) injection 2 mg (2 mg Intravenous Given 6/30/23 2005)   ondansetron (ZOFRAN) injection 4 mg (4 mg Intravenous Given 6/30/23 2004)   dextrose (D50W) (25 g/50 mL) IV injection 50 g (50 g Intravenous Given 6/30/23 2118)   insulin regular (humuLIN R,novoLIN R) injection 5 Units (5 Units Intravenous Given 6/30/23 2121)   calcium gluconate 1g/50ml 0.675% NaCl IV SOLN (0 g Intravenous Stopped 6/30/23 2247)   albuterol (PROVENTIL) nebulizer solution 0.083% 2.5 mg/3mL (10 mg Nebulization Given 6/30/23 2022)   sodium bicarbonate injection 8.4% 50 mEq (50 mEq Intravenous Given 6/30/23 2157)   sodium chloride 0.9 % bolus 1,000 mL (0 mL Intravenous Stopped 6/30/23 2247)   Naloxone HCl (NARCAN) injection 2 mg (2 mg Intravenous Given 6/30/23 2240)        ED Course:    ED Course as of 06/30/23 2352 Fri Jun 30, 2023 1941 I have personally interpreted the EKG today and it shows no evidence of any acute ischemia or heart arrhythmia.  First-degree AV block [RP]   1945 Sister voices concerns relating to patient having psychiatric illness and access to narcotics.  Chronic back pain.  Patient now lives  alone and she is uncertain how he is taking his medications. [RP]   1959 Discussed with nephrology on-call Dr. Lee.  He recommends bicarb drip at 3 A or 150 mill equivalents per liter and D5W, 100 mL/h [RP]   2016 Now wide-awake after Narcan. [RP]      ED Course User Index  [RP] Pete Holguin MD       Labs:    Lab Results (last 24 hours)       Procedure Component Value Units Date/Time    CBC & Differential [[id number]]  (Abnormal) Collected: 06/30/23 1836    Specimen: Blood from Arm, Left Updated: 06/30/23 1916    Narrative:      The following orders were created for panel order CBC & Differential.  Procedure                               Abnormality         Status                     ---------                               -----------         ------                     CBC Auto Differential[[id number]]        Abnormal            Final result               Scan Slide[[id number]]                                       Final result                 Please view results for these tests on the individual orders.    Comprehensive Metabolic Panel [[id number]]  (Abnormal) Collected: 06/30/23 1836    Specimen: Blood from Arm, Left Updated: 06/30/23 1948     Glucose 166 mg/dL      BUN 80 mg/dL      Creatinine 4.97 mg/dL      Sodium 128 mmol/L      Potassium 6.6 mmol/L      Comment: Slight hemolysis detected by analyzer. Results may be affected.Verified by repeat analysis.        Chloride 96 mmol/L      CO2 11.9 mmol/L      Calcium 8.9 mg/dL      Total Protein 6.9 g/dL      Albumin 3.9 g/dL      ALT (SGPT) 35 U/L      AST (SGOT) 76 U/L      Alkaline Phosphatase 136 U/L      Total Bilirubin 0.4 mg/dL      Globulin 3.0 gm/dL      A/G Ratio 1.3 g/dL      BUN/Creatinine Ratio 16.1     Anion Gap 20.1 mmol/L      eGFR 12.5 mL/min/1.73      Comment: <15 Indicative of kidney failure       Narrative:      GFR Normal >60  Chronic Kidney Disease <60  Kidney Failure <15      Single High Sensitivity Troponin T [[id number]]  (Abnormal)  Collected: 06/30/23 1836    Specimen: Blood from Arm, Left Updated: 06/30/23 1948     HS Troponin T 60 ng/L      Comment: Verified by repeat analysis.       Narrative:      High Sensitive Troponin T Reference Range:  <10.0 ng/L- Negative Female for AMI  <15.0 ng/L- Negative Male for AMI  >=10 - Abnormal Female indicating possible myocardial injury.  >=15 - Abnormal Male indicating possible myocardial injury.   Clinicians would have to utilize clinical acumen, EKG, Troponin, and serial changes to determine if it is an Acute Myocardial Infarction or myocardial injury due to an underlying chronic condition.         Magnesium [[id number]]  (Abnormal) Collected: 06/30/23 1836    Specimen: Blood from Arm, Left Updated: 06/30/23 1924     Magnesium 3.2 mg/dL     CBC Auto Differential [[id number]]  (Abnormal) Collected: 06/30/23 1836    Specimen: Blood from Arm, Left Updated: 06/30/23 1916     WBC 12.79 10*3/mm3      RBC 3.66 10*6/mm3      Hemoglobin 10.8 g/dL      Hematocrit 32.6 %      MCV 89.1 fL      MCH 29.5 pg      MCHC 33.1 g/dL      RDW 13.3 %      RDW-SD 43.8 fl      MPV 10.8 fL      Platelets 188 10*3/mm3      Neutrophil % 87.5 %      Lymphocyte % 5.2 %      Monocyte % 6.9 %      Eosinophil % 0.1 %      Basophil % 0.1 %      Immature Grans % 0.2 %      Neutrophils, Absolute 11.19 10*3/mm3      Lymphocytes, Absolute 0.67 10*3/mm3      Monocytes, Absolute 0.88 10*3/mm3      Eosinophils, Absolute 0.01 10*3/mm3      Basophils, Absolute 0.01 10*3/mm3      Immature Grans, Absolute 0.03 10*3/mm3      nRBC 0.0 /100 WBC     Protime-INR [[id number]]  (Normal) Collected: 06/30/23 1836    Specimen: Blood from Arm, Left Updated: 06/30/23 1908     Protime 14.6 Seconds      INR 1.14    Narrative:      Suggested Therapeutic Ranges For Oral Anticoagulant Therapy:  Level of Therapy                      INR Target Range  Standard Dose                            2.0-3.0  High Dose                                2.5-3.5  Patients not  receiving anticoagulant  Therapy Normal Range                     0.86-1.15    aPTT [[id number]]  (Abnormal) Collected: 06/30/23 1836    Specimen: Blood from Arm, Left Updated: 06/30/23 1908     PTT 20.5 seconds     Scan Slide [[id number]] Collected: 06/30/23 1836    Specimen: Blood from Arm, Left Updated: 06/30/23 1916     RBC Morphology Normal     WBC Morphology Normal     Platelet Estimate Adequate    BNP [[id number]]  (Abnormal) Collected: 06/30/23 1836    Specimen: Blood from Arm, Left Updated: 06/30/23 2004     proBNP 1,532.0 pg/mL     Narrative:      Among patients with dyspnea, NT-proBNP is highly sensitive for the detection of acute congestive heart failure. In addition NT-proBNP of <300 pg/ml effectively rules out acute congestive heart failure with 99% negative predictive value.    Results may be falsely decreased if patient taking Biotin.      CK [[id number]]  (Abnormal) Collected: 06/30/23 1836    Specimen: Blood from Arm, Left Updated: 06/30/23 2025     Creatine Kinase 4,203 U/L     POC Glucose Once [[id number]]  (Abnormal) Collected: 06/30/23 2117    Specimen: Blood Updated: 06/30/23 2151     Glucose 106 mg/dL      Comment: Serial Number: [id number]:  [id number]       VBG with Co-Ox and Electrolytes [[id number]]  (Abnormal) Collected: 06/30/23 2136    Specimen: Venous Blood Updated: 06/30/23 2141     pH, Venous 7.170 pH Units      pCO2, Venous 31.1 mm Hg      pO2, Venous 38.1 mm Hg      HCO3, Venous 11.1 mmol/L      Base Excess, Venous -16.1 mmol/L      O2 Saturation, Venous 64.8 %      Hemoglobin, Blood Gas 9.9 g/dL      Carboxyhemoglobin 1.7 %      Methemoglobin 0.20 %      Oxyhemoglobin 63.6 %      FHHB 34.5 %      Note --     Site Venous     Modality Room Air     FIO2 21 %      Flow Rate --     Sodium, Venous 131.0 mmol/L      Potassium, Venous 4.9 mmol/L      Ionized Calcium, Arterial 1.09 mmol/L      Chloride, Venous  101 mmol/L      Glucose, Arterial 218 mg/dL      Lactate, Arterial 0.88 mmol/L      High Sensitivity Troponin T 2Hr [[id number]]  (Abnormal) Collected: 06/30/23 2137    Specimen: Blood Updated: 06/30/23 2234     HS Troponin T 58 ng/L      Troponin T Delta -2 ng/L     Narrative:      High Sensitive Troponin T Reference Range:  <10.0 ng/L- Negative Female for AMI  <15.0 ng/L- Negative Male for AMI  >=10 - Abnormal Female indicating possible myocardial injury.  >=15 - Abnormal Male indicating possible myocardial injury.   Clinicians would have to utilize clinical acumen, EKG, Troponin, and serial changes to determine if it is an Acute Myocardial Infarction or myocardial injury due to an underlying chronic condition.         Basic Metabolic Panel [[id number]]  (Abnormal) Collected: 06/30/23 2137    Specimen: Blood Updated: 06/30/23 2209     Glucose 256 mg/dL      BUN 84 mg/dL      Creatinine 4.67 mg/dL      Sodium 127 mmol/L      Potassium 5.2 mmol/L      Chloride 96 mmol/L      CO2 13.2 mmol/L      Calcium 8.0 mg/dL      BUN/Creatinine Ratio 18.0     Anion Gap 17.8 mmol/L      eGFR 13.5 mL/min/1.73      Comment: <15 Indicative of kidney failure       Narrative:      GFR Normal >60  Chronic Kidney Disease <60  Kidney Failure <15      POC Glucose Once [[id number]]  (Abnormal) Collected: 06/30/23 2151    Specimen: Blood Updated: 06/30/23 2153     Glucose 212 mg/dL      Comment: Serial Number: [id number]:  [id number]                Imaging:    CT Angiogram Neck    Result Date: 6/30/2023  PROCEDURE: CT ANGIOGRAM NECK, 6/30/2023, 18:58 CT ANGIOGRAM HEAD W AI ANALYSIS OF LVO, 6/30/2023, 18:58  COMPARISON: None  INDICATIONS: Neuro deficit, acute, stroke suspected  PROTOCOL:   Standard imaging protocol performed    RADIATION:   DLP: 968.5 mGy*cm   Automated exposure control was utilized to minimize radiation dose. CONTRAST: 100 cc Isovue 370 I.V.  TECHNIQUE: After obtaining the patient's consent, CT images of the neck were obtained without and with non-ionic intravenous contrast material. Multi-planar  reformatted/3-D images were created to optimize visualization of vascular anatomy. Unless otherwise stated in this report, all vascular stenoses involving the internal carotid arteries reported for this examination are derived by dividing the lesion diameter by the diameter of the normal internal carotid artery more distally.  FINDINGS:  The aortic arch is unremarkable.  There is 2 vessel arch anatomy.  The right brachiocephalic and bilateral subclavian arteries appear widely patent.  There is some motion artifact.  Bilateral common carotid arteries, external carotid arteries, carotid bifurcations and cervical internal carotid arteries appear within normal limits.  Intracranial ICA segments appear normal.  There is significant venous contamination intracranially.  The A1 and M1 segments and distal SULTANA and MCA branches appear patent.  There is a patent anterior communicating artery.  There are patent bilateral posterior communicating arteries.  Vertebral arteries appear normal.  Vertebral arteries are codominant.  The basilar artery, superior cerebellar and posterior cerebral arteries appear patent.  There are no acute intracranial findings.  Orbits appear unremarkable.  There is mild multifocal paranasal sinus mucosal disease.  Mastoid air cells appear well-aerated.  There is no evidence of a neck mass or adenopathy.  The lungs are hypoinflated and there are scattered areas of AC lung density.  Superior mediastinal structures are unremarkable.  The thyroid is largely obscured by motion.  Salivary glands appear symmetric.  No focal soft tissue inflammation.  There are cervical degenerative changes.  No acute osseous abnormality.        1. No significant vascular abnormality in the head or the neck. 2. Cervical degenerative changes.     MARIZA KIRAN MD       Electronically Signed and Approved By: MARIZA KIRAN MD on 6/30/2023 at 20:22             XR Chest 1 View    Result Date: 6/30/2023  PROCEDURE: XR CHEST 1 VW   COMPARISON: [facility], CT, CT ANGIOGRAM NECK, 6/30/2023, 18:58.  [facility], CR, XR CHEST 1 VW, 4/30/2023, 14:04.  INDICATIONS: Acute Stroke Protocol (Onset < 12 hrs)  FINDINGS:  There are new hazy lung densities.  No focal consolidation.  No pneumothorax or pleural effusion.  Lung volumes are low.  Heart size is normal.  Pulmonary vasculature is partially obscured.       Hazy lung densities that could be related to hypoinflation or pneumonitis or edema.       MARIZA KIRAN MD       Electronically Signed and Approved By: MARIZA KIRAN MD on 6/30/2023 at 20:18             CT Head Without Contrast Stroke Protocol    Result Date: 6/30/2023  PROCEDURE: CT HEAD WO CONTRAST STROKE PROTOCOL  COMPARISON:  [facility], CT, CT HEAD WO CONTRAST, 4/30/2023, 15:12. INDICATIONS: Neuro deficit, acute, stroke suspected  PROTOCOL:   Standard imaging protocol performed    RADIATION:   DLP: 1145.2 mGy*cm   MA and/or KV was adjusted to minimize radiation dose.     TECHNIQUE: After obtaining the patient's consent, CT images were obtained without non-ionic intravenous contrast material.  FINDINGS:  Superficial soft tissues appear unremarkable.  The calvarium is intact.  There is a small amount of mucus in the right maxillary sinus.  The ventricles appear normal in size and configuration for patient's age. There is no evidence of herniation, hydrocephalus or mass effect. Gray-white differentiation appears intact. There are no focal areas of hypoattenuation within the brain parenchyma. There is no evidence of acute intracranial hemorrhage. The orbits appear unremarkable.        No acute intracranial abnormality.     MARIZA KIRAN MD       Electronically Signed and Approved By: MARIZA KIRAN MD on 6/30/2023 at 19:14             CT Angiogram Head w AI Analysis of LVO    Result Date: 6/30/2023  PROCEDURE: CT ANGIOGRAM NECK, 6/30/2023, 18:58 CT ANGIOGRAM HEAD W AI ANALYSIS OF LVO, 6/30/2023, 18:58   COMPARISON: None  INDICATIONS: Neuro deficit, acute, stroke suspected  PROTOCOL:   Standard imaging protocol performed    RADIATION:   DLP: 968.5 mGy*cm   Automated exposure control was utilized to minimize radiation dose. CONTRAST: 100 cc Isovue 370 I.V.  TECHNIQUE: After obtaining the patient's consent, CT images of the neck were obtained without and with non-ionic intravenous contrast material. Multi-planar reformatted/3-D images were created to optimize visualization of vascular anatomy. Unless otherwise stated in this report, all vascular stenoses involving the internal carotid arteries reported for this examination are derived by dividing the lesion diameter by the diameter of the normal internal carotid artery more distally.  FINDINGS:  The aortic arch is unremarkable.  There is 2 vessel arch anatomy.  The right brachiocephalic and bilateral subclavian arteries appear widely patent.  There is some motion artifact.  Bilateral common carotid arteries, external carotid arteries, carotid bifurcations and cervical internal carotid arteries appear within normal limits.  Intracranial ICA segments appear normal.  There is significant venous contamination intracranially.  The A1 and M1 segments and distal SULTANA and MCA branches appear patent.  There is a patent anterior communicating artery.  There are patent bilateral posterior communicating arteries.  Vertebral arteries appear normal.  Vertebral arteries are codominant.  The basilar artery, superior cerebellar and posterior cerebral arteries appear patent.  There are no acute intracranial findings.  Orbits appear unremarkable.  There is mild multifocal paranasal sinus mucosal disease.  Mastoid air cells appear well-aerated.  There is no evidence of a neck mass or adenopathy.  The lungs are hypoinflated and there are scattered areas of AC lung density.  Superior mediastinal structures are unremarkable.  The thyroid is largely obscured by motion.  Salivary glands appear  symmetric.  No focal soft tissue inflammation.  There are cervical degenerative changes.  No acute osseous abnormality.        1. No significant vascular abnormality in the head or the neck. 2. Cervical degenerative changes.     MARIZA KIRAN MD       Electronically Signed and Approved By: MARIZA KIRAN MD on 6/30/2023 at 20:22             CT CEREBRAL PERFUSION WITH & WITHOUT CONTRAST    Result Date: 6/30/2023  PROCEDURE: CT CEREBRAL PERFUSION W WO CONTRAST  COMPARISON: CT, CT ANGIOGRAM HEAD W AI ANALYSIS OF LVO, 6/30/2023, 18:58.  INDICATIONS: Neuro deficit, acute, stroke suspected  PROTOCOL:   Standard imaging protocol performed    RADIATION:   DLP: 1949 mGy*cm   Automated exposure control was utilized to minimize radiation dose. CONTRAST: 80 cc Isovue 370 I.V.   FINDINGS: Cerebral blood flow less than 30% is 0 mL and T-max greater than 6 seconds is 157 mL with a mismatch volume of 157 mm.  This appears to be artifactual.  There is also significant artifact on the inferior set of cerebral blood volume imaging.  There is no definite core infarct or definite brain at risk.       Artifactual perfusion abnormalities in the brain without definite core infarct or true brain at risk.      MARIZA KIRAN MD       Electronically Signed and Approved By: MARIZA KIRAN MD on 6/30/2023 at 19:29                Differential Diagnosis and Discussion:    Altered Mental Status: Based on the patient's signs and symptoms, differential diagnosis includes but is not limited to meningitis, stroke, sepsis, subarachnoid hemorrhage, intracranial bleeding, encephalitis, and metabolic encephalopathy.    All labs were reviewed and interpreted by me.  All X-rays impressions were independently interpreted by me.  EKG was interpreted by me.  CT scan radiology impression was interpreted by me.    MDM     Amount and/or Complexity of Data Reviewed  Decide to obtain previous medical records or to obtain history from someone other than the  patient: yes         Critical Care Note: Total Critical Care time of 45 minutes. Total critical care time documented does not include time spent on separately billed procedures for services of nurses or physician assistants. I personally saw and examined the patient. I have reviewed all diagnostic interpretations and treatment plans as written. I was present for the key portions of any procedures performed and the inclusive time noted in any critical care statement. Critical care time includes patient management by me, time spent at the patients bedside,  time to review lab and imaging results, discussing patient care, documentation in the medical record, and time spent with family or caregiver.    Patient Care Considerations:    MRI: I considered ordering an MRI however patient has resolution of neurologic symptoms after Narcan.  No focal deficits.      Consultants/Shared Management Plan:    Hospitalist: This case discussed with Dr. Stephens for admission.  He agrees to admit this patient to the hospital.  Consultant: I discussed this case with on-call nephrology, Dr. Lee.  He agrees to consult on this patient as an admission.      Social Determinants of Health:    Patient has presented with family members who are responsible, reliable and will ensure follow up care.      Disposition and Care Coordination:    Admit:   Through independent evaluation of the patient's history, physical, and imperical data, the patient meets criteria for observation/admission to the hospital.        Final diagnoses:   Acute renal failure, unspecified acute renal failure type   Altered mental status, unspecified altered mental status type   At risk for polypharmacy   Non-traumatic rhabdomyolysis        ED Disposition       ED Disposition   Decision to Admit    Condition   --    Comment   Level of Care: Telemetry [5]   Diagnosis: Acute renal failure, unspecified acute renal failure type [[id number]]   Certification: I Certify That Inpatient  Hospital Services Are Medically Necessary For Greater Than 2 Midnights                 This medical record created using voice recognition software.             Pete Holguin MD  06/30/23 [id number]

## 2023-07-01 ENCOUNTER — APPOINTMENT (OUTPATIENT)
Dept: GENERAL RADIOLOGY | Facility: HOSPITAL | Age: 63
DRG: 917 | End: 2023-07-01
Payer: MEDICARE

## 2023-07-01 ENCOUNTER — APPOINTMENT (OUTPATIENT)
Dept: MRI IMAGING | Facility: HOSPITAL | Age: 63
DRG: 917 | End: 2023-07-01
Payer: MEDICARE

## 2023-07-01 ENCOUNTER — APPOINTMENT (OUTPATIENT)
Dept: CARDIOLOGY | Facility: HOSPITAL | Age: 63
DRG: 917 | End: 2023-07-01
Payer: MEDICARE

## 2023-07-01 PROBLEM — E11.610 CHARCOT FOOT DUE TO DIABETES MELLITUS: Chronic | Status: ACTIVE | Noted: 2023-07-01

## 2023-07-01 PROBLEM — S90.852A FOREIGN BODY IN LEFT FOOT: Status: ACTIVE | Noted: 2023-07-01

## 2023-07-01 PROBLEM — M79.672 FOOT PAIN, BILATERAL: Status: ACTIVE | Noted: 2023-07-01

## 2023-07-01 PROBLEM — M79.671 FOOT PAIN, BILATERAL: Status: ACTIVE | Noted: 2023-07-01

## 2023-07-01 PROBLEM — G62.9 PERIPHERAL NEUROPATHY: Chronic | Status: ACTIVE | Noted: 2023-07-01

## 2023-07-01 PROBLEM — L60.0 ONYCHOCRYPTOSIS: Chronic | Status: ACTIVE | Noted: 2023-07-01

## 2023-07-01 PROBLEM — B35.1 ONYCHOMYCOSIS: Chronic | Status: ACTIVE | Noted: 2023-07-01

## 2023-07-01 LAB
ALBUMIN SERPL-MCNC: 3.3 G/DL (ref 3.5–5.2)
ALBUMIN/GLOB SERPL: 1.3 G/DL
ALP SERPL-CCNC: 117 U/L (ref 39–117)
ALT SERPL W P-5'-P-CCNC: 34 U/L (ref 1–41)
AMPHET+METHAMPHET UR QL: NEGATIVE
ANION GAP SERPL CALCULATED.3IONS-SCNC: 16 MMOL/L (ref 5–15)
ANION GAP SERPL CALCULATED.3IONS-SCNC: 18.5 MMOL/L (ref 5–15)
AST SERPL-CCNC: 69 U/L (ref 1–40)
BACTERIA UR QL AUTO: ABNORMAL /HPF
BARBITURATES UR QL SCN: NEGATIVE
BASOPHILS # BLD AUTO: 0.02 10*3/MM3 (ref 0–0.2)
BASOPHILS NFR BLD AUTO: 0.2 % (ref 0–1.5)
BENZODIAZ UR QL SCN: NEGATIVE
BH CV ECHO MEAS - AO ROOT DIAM: 2.8 CM
BH CV ECHO MEAS - EDV(CUBED): 174.7 ML
BH CV ECHO MEAS - EDV(MOD-SP2): 177 ML
BH CV ECHO MEAS - EDV(MOD-SP4): 106 ML
BH CV ECHO MEAS - EF(MOD-BP): 66.2 %
BH CV ECHO MEAS - EF(MOD-SP2): 71 %
BH CV ECHO MEAS - EF(MOD-SP4): 60.5 %
BH CV ECHO MEAS - ESV(CUBED): 28.4 ML
BH CV ECHO MEAS - ESV(MOD-SP2): 51.4 ML
BH CV ECHO MEAS - ESV(MOD-SP4): 41.9 ML
BH CV ECHO MEAS - FS: 45.4 %
BH CV ECHO MEAS - IVS/LVPW: 1.05 CM
BH CV ECHO MEAS - IVSD: 1.1 CM
BH CV ECHO MEAS - LA DIMENSION: 4 CM
BH CV ECHO MEAS - LAT PEAK E' VEL: 8.6 CM/SEC
BH CV ECHO MEAS - LV DIASTOLIC VOL/BSA (35-75): 41.9 CM2
BH CV ECHO MEAS - LV MASS(C)D: 241.1 GRAMS
BH CV ECHO MEAS - LV SYSTOLIC VOL/BSA (12-30): 16.6 CM2
BH CV ECHO MEAS - LVIDD: 5.6 CM
BH CV ECHO MEAS - LVIDS: 3.1 CM
BH CV ECHO MEAS - LVOT AREA: 3.1 CM2
BH CV ECHO MEAS - LVOT DIAM: 2 CM
BH CV ECHO MEAS - LVPWD: 1.05 CM
BH CV ECHO MEAS - MED PEAK E' VEL: 6.7 CM/SEC
BH CV ECHO MEAS - MV A MAX VEL: 124 CM/SEC
BH CV ECHO MEAS - MV DEC SLOPE: 885 CM/SEC2
BH CV ECHO MEAS - MV DEC TIME: 0.14 MSEC
BH CV ECHO MEAS - MV E MAX VEL: 126 CM/SEC
BH CV ECHO MEAS - MV E/A: 1.02
BH CV ECHO MEAS - RVDD: 2.6 CM
BH CV ECHO MEAS - SI(MOD-SP2): 49.6 ML/M2
BH CV ECHO MEAS - SI(MOD-SP4): 25.3 ML/M2
BH CV ECHO MEAS - SV(MOD-SP2): 125.6 ML
BH CV ECHO MEAS - SV(MOD-SP4): 64.1 ML
BH CV ECHO MEASUREMENTS AVERAGE E/E' RATIO: 16.47
BILIRUB SERPL-MCNC: 0.3 MG/DL (ref 0–1.2)
BILIRUB UR QL STRIP: NEGATIVE
BUN SERPL-MCNC: 82 MG/DL (ref 8–23)
BUN SERPL-MCNC: 84 MG/DL (ref 8–23)
BUN/CREAT SERPL: 17 (ref 7–25)
BUN/CREAT SERPL: 18.1 (ref 7–25)
CALCIUM SPEC-SCNC: 8.1 MG/DL (ref 8.6–10.5)
CALCIUM SPEC-SCNC: 8.2 MG/DL (ref 8.6–10.5)
CANNABINOIDS SERPL QL: NEGATIVE
CHLORIDE SERPL-SCNC: 97 MMOL/L (ref 98–107)
CHLORIDE SERPL-SCNC: 99 MMOL/L (ref 98–107)
CK SERPL-CCNC: 2515 U/L (ref 20–200)
CK SERPL-CCNC: ABNORMAL U/L (ref 20–200)
CLARITY UR: CLEAR
CO2 SERPL-SCNC: 13.5 MMOL/L (ref 22–29)
CO2 SERPL-SCNC: 19 MMOL/L (ref 22–29)
COCAINE UR QL: NEGATIVE
COLOR UR: YELLOW
CREAT SERPL-MCNC: 4.52 MG/DL (ref 0.76–1.27)
CREAT SERPL-MCNC: 4.95 MG/DL (ref 0.76–1.27)
D-LACTATE SERPL-SCNC: 0.8 MMOL/L (ref 0.5–2)
DEPRECATED RDW RBC AUTO: 43.8 FL (ref 37–54)
EGFRCR SERPLBLD CKD-EPI 2021: 12.6 ML/MIN/1.73
EGFRCR SERPLBLD CKD-EPI 2021: 14 ML/MIN/1.73
EOSINOPHIL # BLD AUTO: 0.04 10*3/MM3 (ref 0–0.4)
EOSINOPHIL NFR BLD AUTO: 0.4 % (ref 0.3–6.2)
ERYTHROCYTE [DISTWIDTH] IN BLOOD BY AUTOMATED COUNT: 13.4 % (ref 12.3–15.4)
FENTANYL UR-MCNC: NEGATIVE NG/ML
GLOBULIN UR ELPH-MCNC: 2.6 GM/DL
GLUCOSE BLDC GLUCOMTR-MCNC: 119 MG/DL (ref 70–99)
GLUCOSE BLDC GLUCOMTR-MCNC: 135 MG/DL (ref 70–99)
GLUCOSE BLDC GLUCOMTR-MCNC: 146 MG/DL (ref 70–99)
GLUCOSE BLDC GLUCOMTR-MCNC: 150 MG/DL (ref 70–99)
GLUCOSE BLDC GLUCOMTR-MCNC: 191 MG/DL (ref 70–99)
GLUCOSE BLDC GLUCOMTR-MCNC: 243 MG/DL (ref 70–99)
GLUCOSE BLDC GLUCOMTR-MCNC: 94 MG/DL (ref 70–99)
GLUCOSE SERPL-MCNC: 151 MG/DL (ref 65–99)
GLUCOSE SERPL-MCNC: 87 MG/DL (ref 65–99)
GLUCOSE UR STRIP-MCNC: ABNORMAL MG/DL
HCT VFR BLD AUTO: 29.5 % (ref 37.5–51)
HGB BLD-MCNC: 9.6 G/DL (ref 13–17.7)
HGB UR QL STRIP.AUTO: ABNORMAL
HYALINE CASTS UR QL AUTO: ABNORMAL /LPF
IMM GRANULOCYTES # BLD AUTO: 0.03 10*3/MM3 (ref 0–0.05)
IMM GRANULOCYTES NFR BLD AUTO: 0.3 % (ref 0–0.5)
KETONES UR QL STRIP: NEGATIVE
LEFT ATRIUM VOLUME INDEX: 19.1 ML/M2
LEUKOCYTE ESTERASE UR QL STRIP.AUTO: NEGATIVE
LYMPHOCYTES # BLD AUTO: 0.9 10*3/MM3 (ref 0.7–3.1)
LYMPHOCYTES NFR BLD AUTO: 8 % (ref 19.6–45.3)
MAGNESIUM SERPL-MCNC: 3.1 MG/DL (ref 1.6–2.4)
MCH RBC QN AUTO: 28.9 PG (ref 26.6–33)
MCHC RBC AUTO-ENTMCNC: 32.5 G/DL (ref 31.5–35.7)
MCV RBC AUTO: 88.9 FL (ref 79–97)
METHADONE UR QL SCN: NEGATIVE
MONOCYTES # BLD AUTO: 1.23 10*3/MM3 (ref 0.1–0.9)
MONOCYTES NFR BLD AUTO: 11 % (ref 5–12)
NEUTROPHILS NFR BLD AUTO: 8.99 10*3/MM3 (ref 1.7–7)
NEUTROPHILS NFR BLD AUTO: 80.1 % (ref 42.7–76)
NITRITE UR QL STRIP: NEGATIVE
NRBC BLD AUTO-RTO: 0 /100 WBC (ref 0–0.2)
OPIATES UR QL: NEGATIVE
OXYCODONE UR QL SCN: POSITIVE
PH UR STRIP.AUTO: <=5 [PH] (ref 5–8)
PHOSPHATE SERPL-MCNC: 7.9 MG/DL (ref 2.5–4.5)
PLATELET # BLD AUTO: 197 10*3/MM3 (ref 140–450)
PMV BLD AUTO: 10.5 FL (ref 6–12)
POTASSIUM SERPL-SCNC: 4.1 MMOL/L (ref 3.5–5.2)
POTASSIUM SERPL-SCNC: 5.4 MMOL/L (ref 3.5–5.2)
PROT SERPL-MCNC: 5.9 G/DL (ref 6–8.5)
PROT UR QL STRIP: ABNORMAL
RBC # BLD AUTO: 3.32 10*6/MM3 (ref 4.14–5.8)
RBC # UR STRIP: ABNORMAL /HPF
REF LAB TEST METHOD: ABNORMAL
SODIUM SERPL-SCNC: 129 MMOL/L (ref 136–145)
SODIUM SERPL-SCNC: 134 MMOL/L (ref 136–145)
SP GR UR STRIP: 1.02 (ref 1–1.03)
SQUAMOUS #/AREA URNS HPF: ABNORMAL /HPF
UROBILINOGEN UR QL STRIP: ABNORMAL
WBC # UR STRIP: ABNORMAL /HPF
WBC NRBC COR # BLD: 11.21 10*3/MM3 (ref 3.4–10.8)

## 2023-07-01 PROCEDURE — 73620 X-RAY EXAM OF FOOT: CPT

## 2023-07-01 PROCEDURE — 81001 URINALYSIS AUTO W/SCOPE: CPT | Performed by: STUDENT IN AN ORGANIZED HEALTH CARE EDUCATION/TRAINING PROGRAM

## 2023-07-01 PROCEDURE — 84100 ASSAY OF PHOSPHORUS: CPT | Performed by: STUDENT IN AN ORGANIZED HEALTH CARE EDUCATION/TRAINING PROGRAM

## 2023-07-01 PROCEDURE — 25010000002 SULFUR HEXAFLUORIDE MICROSPH 60.7-25 MG RECONSTITUTED SUSPENSION: Performed by: INTERNAL MEDICINE

## 2023-07-01 PROCEDURE — 80307 DRUG TEST PRSMV CHEM ANLYZR: CPT | Performed by: STUDENT IN AN ORGANIZED HEALTH CARE EDUCATION/TRAINING PROGRAM

## 2023-07-01 PROCEDURE — 80053 COMPREHEN METABOLIC PANEL: CPT | Performed by: STUDENT IN AN ORGANIZED HEALTH CARE EDUCATION/TRAINING PROGRAM

## 2023-07-01 PROCEDURE — 82550 ASSAY OF CK (CPK): CPT | Performed by: INTERNAL MEDICINE

## 2023-07-01 PROCEDURE — 73030 X-RAY EXAM OF SHOULDER: CPT

## 2023-07-01 PROCEDURE — 63710000001 INSULIN REGULAR HUMAN PER 5 UNITS: Performed by: STUDENT IN AN ORGANIZED HEALTH CARE EDUCATION/TRAINING PROGRAM

## 2023-07-01 PROCEDURE — 93321 DOPPLER ECHO F-UP/LMTD STD: CPT | Performed by: INTERNAL MEDICINE

## 2023-07-01 PROCEDURE — 82550 ASSAY OF CK (CPK): CPT | Performed by: STUDENT IN AN ORGANIZED HEALTH CARE EDUCATION/TRAINING PROGRAM

## 2023-07-01 PROCEDURE — 85025 COMPLETE CBC W/AUTO DIFF WBC: CPT | Performed by: STUDENT IN AN ORGANIZED HEALTH CARE EDUCATION/TRAINING PROGRAM

## 2023-07-01 PROCEDURE — 25010000002 CALCIUM GLUCONATE-NACL 1-0.675 GM/50ML-% SOLUTION: Performed by: STUDENT IN AN ORGANIZED HEALTH CARE EDUCATION/TRAINING PROGRAM

## 2023-07-01 PROCEDURE — 11721 DEBRIDE NAIL 6 OR MORE: CPT | Performed by: PODIATRIST

## 2023-07-01 PROCEDURE — 70551 MRI BRAIN STEM W/O DYE: CPT

## 2023-07-01 PROCEDURE — 99233 SBSQ HOSP IP/OBS HIGH 50: CPT | Performed by: INTERNAL MEDICINE

## 2023-07-01 PROCEDURE — 83735 ASSAY OF MAGNESIUM: CPT | Performed by: STUDENT IN AN ORGANIZED HEALTH CARE EDUCATION/TRAINING PROGRAM

## 2023-07-01 PROCEDURE — 82948 REAGENT STRIP/BLOOD GLUCOSE: CPT

## 2023-07-01 PROCEDURE — 94664 DEMO&/EVAL PT USE INHALER: CPT

## 2023-07-01 PROCEDURE — 94761 N-INVAS EAR/PLS OXIMETRY MLT: CPT

## 2023-07-01 PROCEDURE — 25010000002 FUROSEMIDE PER 20 MG: Performed by: INTERNAL MEDICINE

## 2023-07-01 PROCEDURE — 93321 DOPPLER ECHO F-UP/LMTD STD: CPT

## 2023-07-01 PROCEDURE — 99221 1ST HOSP IP/OBS SF/LOW 40: CPT | Performed by: PODIATRIST

## 2023-07-01 PROCEDURE — 93308 TTE F-UP OR LMTD: CPT

## 2023-07-01 PROCEDURE — 93308 TTE F-UP OR LMTD: CPT | Performed by: INTERNAL MEDICINE

## 2023-07-01 PROCEDURE — 92610 EVALUATE SWALLOWING FUNCTION: CPT

## 2023-07-01 PROCEDURE — 28190 REMOVAL OF FOOT FOREIGN BODY: CPT | Performed by: PODIATRIST

## 2023-07-01 PROCEDURE — 63710000001 INSULIN LISPRO (HUMAN) PER 5 UNITS: Performed by: STUDENT IN AN ORGANIZED HEALTH CARE EDUCATION/TRAINING PROGRAM

## 2023-07-01 PROCEDURE — 94799 UNLISTED PULMONARY SVC/PX: CPT

## 2023-07-01 PROCEDURE — 93005 ELECTROCARDIOGRAM TRACING: CPT | Performed by: STUDENT IN AN ORGANIZED HEALTH CARE EDUCATION/TRAINING PROGRAM

## 2023-07-01 PROCEDURE — 25010000002 HEPARIN (PORCINE) PER 1000 UNITS: Performed by: STUDENT IN AN ORGANIZED HEALTH CARE EDUCATION/TRAINING PROGRAM

## 2023-07-01 PROCEDURE — 93325 DOPPLER ECHO COLOR FLOW MAPG: CPT

## 2023-07-01 PROCEDURE — 25010000002 LEVETIRACETAM IN NACL 0.82% 500 MG/100ML SOLUTION: Performed by: STUDENT IN AN ORGANIZED HEALTH CARE EDUCATION/TRAINING PROGRAM

## 2023-07-01 PROCEDURE — 83605 ASSAY OF LACTIC ACID: CPT | Performed by: INTERNAL MEDICINE

## 2023-07-01 RX ORDER — NITROGLYCERIN 0.4 MG/1
0.4 TABLET SUBLINGUAL
Status: DISCONTINUED | OUTPATIENT
Start: 2023-07-01 | End: 2023-07-19 | Stop reason: HOSPADM

## 2023-07-01 RX ORDER — LEVETIRACETAM 5 MG/ML
500 INJECTION INTRAVASCULAR EVERY 12 HOURS SCHEDULED
Status: DISCONTINUED | OUTPATIENT
Start: 2023-07-01 | End: 2023-07-01

## 2023-07-01 RX ORDER — ALBUTEROL SULFATE 2.5 MG/3ML
10 SOLUTION RESPIRATORY (INHALATION) ONCE
Status: COMPLETED | OUTPATIENT
Start: 2023-07-01 | End: 2023-07-01

## 2023-07-01 RX ORDER — FUROSEMIDE 10 MG/ML
40 INJECTION INTRAMUSCULAR; INTRAVENOUS EVERY 12 HOURS
Status: DISCONTINUED | OUTPATIENT
Start: 2023-07-01 | End: 2023-07-05

## 2023-07-01 RX ORDER — LEVETIRACETAM 500 MG/1
500 TABLET ORAL EVERY 12 HOURS SCHEDULED
Status: DISCONTINUED | OUTPATIENT
Start: 2023-07-01 | End: 2023-07-04

## 2023-07-01 RX ORDER — FUROSEMIDE 10 MG/ML
40 INJECTION INTRAMUSCULAR; INTRAVENOUS ONCE
Status: COMPLETED | OUTPATIENT
Start: 2023-07-01 | End: 2023-07-01

## 2023-07-01 RX ORDER — CALCIUM GLUCONATE 20 MG/ML
1 INJECTION, SOLUTION INTRAVENOUS ONCE
Status: COMPLETED | OUTPATIENT
Start: 2023-07-01 | End: 2023-07-01

## 2023-07-01 RX ORDER — DEXTROSE MONOHYDRATE 25 G/50ML
25 INJECTION, SOLUTION INTRAVENOUS ONCE
Status: COMPLETED | OUTPATIENT
Start: 2023-07-01 | End: 2023-07-01

## 2023-07-01 RX ADMIN — SODIUM BICARBONATE 150 MEQ: 84 INJECTION, SOLUTION INTRAVENOUS at 11:58

## 2023-07-01 RX ADMIN — HEPARIN SODIUM 5000 UNITS: 5000 INJECTION INTRAVENOUS; SUBCUTANEOUS at 01:16

## 2023-07-01 RX ADMIN — ATENOLOL 25 MG: 25 TABLET ORAL at 11:30

## 2023-07-01 RX ADMIN — PANTOPRAZOLE SODIUM 40 MG: 40 TABLET, DELAYED RELEASE ORAL at 21:15

## 2023-07-01 RX ADMIN — SODIUM CHLORIDE 500 ML: 9 INJECTION, SOLUTION INTRAVENOUS at 08:22

## 2023-07-01 RX ADMIN — INSULIN LISPRO 2 UNITS: 100 INJECTION, SOLUTION INTRAVENOUS; SUBCUTANEOUS at 21:16

## 2023-07-01 RX ADMIN — HEPARIN SODIUM 5000 UNITS: 5000 INJECTION INTRAVENOUS; SUBCUTANEOUS at 06:05

## 2023-07-01 RX ADMIN — ALBUTEROL SULFATE 10 MG: 2.5 SOLUTION RESPIRATORY (INHALATION) at 07:29

## 2023-07-01 RX ADMIN — FUROSEMIDE 40 MG: 10 INJECTION, SOLUTION INTRAMUSCULAR; INTRAVENOUS at 11:28

## 2023-07-01 RX ADMIN — ESCITALOPRAM OXALATE 20 MG: 10 TABLET ORAL at 11:29

## 2023-07-01 RX ADMIN — HEPARIN SODIUM 5000 UNITS: 5000 INJECTION INTRAVENOUS; SUBCUTANEOUS at 13:56

## 2023-07-01 RX ADMIN — HEPARIN SODIUM 5000 UNITS: 5000 INJECTION INTRAVENOUS; SUBCUTANEOUS at 21:15

## 2023-07-01 RX ADMIN — DEXTROSE MONOHYDRATE 25 G: 25 INJECTION, SOLUTION INTRAVENOUS at 11:30

## 2023-07-01 RX ADMIN — FUROSEMIDE 40 MG: 10 INJECTION, SOLUTION INTRAMUSCULAR; INTRAVENOUS at 21:15

## 2023-07-01 RX ADMIN — LEVETIRACETAM 500 MG: 5 INJECTION, SOLUTION INTRAVENOUS at 09:51

## 2023-07-01 RX ADMIN — SENNOSIDES AND DOCUSATE SODIUM 2 TABLET: 50; 8.6 TABLET ORAL at 21:15

## 2023-07-01 RX ADMIN — Medication 10 ML: at 21:16

## 2023-07-01 RX ADMIN — LEVETIRACETAM 500 MG: 500 TABLET, FILM COATED ORAL at 21:18

## 2023-07-01 RX ADMIN — CALCIUM GLUCONATE 1 G: 20 INJECTION, SOLUTION INTRAVENOUS at 08:23

## 2023-07-01 RX ADMIN — Medication 10 ML: at 01:17

## 2023-07-01 RX ADMIN — PANTOPRAZOLE SODIUM 40 MG: 40 TABLET, DELAYED RELEASE ORAL at 11:29

## 2023-07-01 RX ADMIN — SODIUM BICARBONATE 50 MEQ: 84 INJECTION INTRAVENOUS at 08:23

## 2023-07-01 RX ADMIN — SULFUR HEXAFLUORIDE 3 ML: KIT at 09:06

## 2023-07-01 RX ADMIN — INSULIN LISPRO 3 UNITS: 100 INJECTION, SOLUTION INTRAVENOUS; SUBCUTANEOUS at 11:58

## 2023-07-01 RX ADMIN — INSULIN HUMAN 5 UNITS: 100 INJECTION, SOLUTION PARENTERAL at 11:28

## 2023-07-01 RX ADMIN — SODIUM BICARBONATE 75 MEQ: 84 INJECTION, SOLUTION INTRAVENOUS at 22:05

## 2023-07-01 NOTE — PROGRESS NOTES
[facility]    Routine Consult Follow-Up    Patient Name:   Carlos Murphy  YOB: 1960  Identification Number:   MRN - [id number]  Date of Service:   07/01/2023 10:39:55    Diagnosis        G93.49 - Encephalopathy Multifactorial    Impression  62 y.o. male PMH DM, CKD, schizophrenia, HTN, HLD, seizure disorder on lamotrigine 200 mg BID who presented to the ED on 06/28 altered and unresponsive.    Work up so far included a CTH which was negative for any acute findings. Multiple labs were abnormal such as CK > 44661 probably causing MK and Cr at 4.95, abnormal Electrolytes and elevated Troponin, suspecting metabolic encephalopathy vs possible seizure or stroke.    Today when he is awake he does report that there might be a possible compliance issues with his lamictal and he would have missed a dose of the medication. In hospital he was started on keppra 500 mg BID. MRI brain today showed no clear abnormality.    Plan:  - Resume lamotrigine 200 mg BID which is his home dose.  - Get the level of lamotrigine. Get collateral history from the sister regarding missing medications. In mean time continue Keppra  - Routine EEG on Monday   Echocardiogram - Transthoracic Echocardiogram   Continue Aspirin 81 MG daily      Our recommendations are outlined below    Diagnostic Studies :  Routine EEG  Please order    Seizure precautions :  Seizure precautions including no driving for state mandated time frame were discussed with patient with clear understanding.    DVT Prophylaxis :  Choice of Primary Team    Disposition :  Neurology will follow    Subjective  07/01/2023: Patient bit awake, conversational    Imaging  CTH with perfusion studies:  IMPRESSION:  1. No significant vascular abnormality in the head or the neck.  2. Cervical degenerative changes.    Chest XRay:  IMPRESSION: Hazy lung densities that could be related to hypoinflation or pneumonitis or  edema.    IMPRESSION:    MR examination of the brain without IV contrast demonstrating no acute intracranial abnormality.    Labs  WBC: 11.21  Cr 4.95  Na 129  AS 69  CK >82775  Troponin T 58      Examination  1A: Level of Consciousness - Arouses to minor stimulation + 1  1B: Ask Month and Age - Aphasic + 2  1C: Blink Eyes & Squeeze Hands - Performs Both Tasks + 0  2: Test Horizontal Extraocular Movements - Normal + 0  3: Test Visual Fields - No Visual Loss + 0  4: Test Facial Palsy (Use Grimace if Obtunded) - Normal symmetry + 0  5A: Test Left Arm Motor Drift - Drift, hits bed + 2  5B: Test Right Arm Motor Drift - Some Effort Against Gravity + 2  6A: Test Left Leg Motor Drift - Some Effort Against Gravity + 2  6B: Test Right Leg Motor Drift - Some Effort Against Gravity + 2  7: Test Limb Ataxia (FNF/Heel-Shin) - No Ataxia + 0  8: Test Sensation - Normal; No sensory loss + 0  9: Test Language/Aphasia - Normal; No aphasia + 0  10: Test Dysarthria - Normal + 0  11: Test Extinction/Inattention - No abnormality + 0    NIHSS Score: 11          Patient/Family was informed the Neurology Consult would happen via TeleHealth consult by way of interactive audio and video telecommunications and consented to receiving care in this manner.    Telehealth Neurology consultation was provided. I spent 35 minutes providing telehealth care. This includes time spent for face to face visit via telemedicine, review of medical records, imaging studies and discussion of findings with providers, the patient and/or family.      Dr Lillie Hodge      [facility]  [phone]    Case [id number]

## 2023-07-01 NOTE — CONSULTS
Patient Care Team:  Felix Atkinson MD as PCP - General  Felix Atkinson MD as PCP - Family Medicine    Chief complaint: MK/CKD3b    Subjective     History of Present Illness  63yo with h/o CKD followed with Dr. Mckenzie in our office.  He had been seen in clinic last week.  Had been in hospital with recurrent syncope and creatinine at that time 2.5.  This had improved to 1.9 at discharge last month.  Had been on lisinopril, jardiance, lasix with kerendia recently added.  He had weakness at home and family called EMS.  He was very somnolent in ER with constricted pupils and reported had taken more medication than usual.  He had noted creatinine of 4.9 but had initial concerns for CVA so had CTA done in ER.  He is awake and alert now, slightly somnolent but poor historian.  Marley in place.        Review of Systems   Constitutional:  Positive for fatigue. Negative for chills.   Respiratory:  Negative for cough, chest tightness and shortness of breath.    Cardiovascular:  Positive for leg swelling. Negative for chest pain.   Gastrointestinal:  Negative for constipation, diarrhea, nausea and vomiting.   Musculoskeletal:  Negative for back pain and neck pain.   Skin:  Negative for rash.   Neurological:  Positive for weakness. Negative for seizures and headaches.   Psychiatric/Behavioral:  Positive for confusion.       Past Medical History:   Diagnosis Date    Acute kidney injury     2021 POST SEIZURES    Astigmatism of both eyes     eyes twitch left and right    Bipolar disorder     Charcot ankle     Closed nondisplaced fracture of medial malleolus of left tibia     COPD (chronic obstructive pulmonary disease)     USES INHALERS    Diabetes     BG RUNS AROUND 90'S IN AM    Hx of schizophrenia     Hyperlipidemia     Hypertension     SEEN DR ROD IN THE PAST, HAD APPT WITH DR MICHAUD IN 5-2022 CX APPT, DENIED CP/SOB    Neuropathy     Seizures     LAST ONE 4/21/22    Sleep apnea     DOES NOT USE CPAP    Varicose vein of  leg    ,   Past Surgical History:   Procedure Laterality Date    ANKLE OPEN REDUCTION INTERNAL FIXATION Left 2022    Procedure: LEFT OPEN REDUCTION INTERNAL FIXATION DISTAL TIBIA FRACTURE ;  Surgeon: Tha Parry MD;  [place] [facility] [place] [facility] [place]  Service: [place]opedics;  Laterality: Left;    ANKLE OPEN REDUCTION INTERNAL FIXATION Left 2022    Procedure: LEFT ANKLE OPEN REDUCTION INTERNAL FIXATION WITH SYNDESMOSIS FIXATION;  Surgeon: Tha Parry MD;  Location: [facility] MAIN OR;  Service: Orthopedics;  Laterality: Left;    CHOLECYSTECTOMY      COLONOSCOPY      JOINT REPLACEMENT      RTKR    LUMBAR DISC SURGERY      SHOULDER SURGERY Right    ,   Family History   Problem Relation Age of Onset    Kidney disease Mother     COPD Mother     COPD Father     Heart disease Father     Malig Hyperthermia Neg Hx    ,   Social History     Socioeconomic History    Marital status:    Tobacco Use    Smoking status: Former     Packs/day: 0.50     Years: 35.00     Pack years: 17.50     Types: Cigarettes     Quit date:      Years since quittin.5    Smokeless tobacco: Never   Vaping Use    Vaping Use: Never used   Substance and Sexual Activity    Alcohol use: Yes     Comment: OCCASIONAL    Drug use: Never    Sexual activity: Defer     E-cigarette/Vaping    E-cigarette/Vaping Use Never User      E-cigarette/Vaping Substances    Nicotine No     THC No     CBD No     Flavoring No      E-cigarette/Vaping Devices    Disposable No     Pre-filled or Refillable Cartridge No     Refillable Tank No     Pre-filled Pod No          ,   Medications Prior to Admission   Medication Sig Dispense Refill Last Dose    amLODIPine (NORVASC) 5 MG tablet Take 1 tablet by mouth Daily.   2023    aspirin EC (Ecotrin) 325 MG tablet Take 1 tablet by mouth Daily. 14 tablet 0 2023    atenolol (TENORMIN) 25 MG tablet Take 1 tablet by mouth Daily.   2023    atorvastatin (LIPITOR) 40 MG tablet Take 1 tablet by mouth  Every Night.   6/29/2023    cyclobenzaprine (FLEXERIL) 5 MG tablet Take 1 tablet by mouth 3 (Three) Times a Day As Needed.   Past Week    empagliflozin (JARDIANCE) 25 MG tablet tablet Take 1 tablet by mouth Daily.   6/30/2023    escitalopram (LEXAPRO) 20 MG tablet Take 1 tablet by mouth Daily.   6/30/2023    furosemide (LASIX) 20 MG tablet Take 1 tablet by mouth Daily.   6/30/2023    glipizide (GLUCOTROL XL) 10 MG 24 hr tablet Take 1 tablet by mouth 2 (Two) Times a Day. INSTRUCTED PER ANESTHESIA PROTOCOL   6/30/2023    hydrOXYzine (ATARAX) 50 MG tablet Take 1 tablet by mouth 3 (Three) Times a Day.   6/30/2023    lamoTRIgine (LaMICtal) 200 MG tablet Take 1 tablet by mouth 2 (Two) Times a Day.   6/30/2023    lisinopril (PRINIVIL,ZESTRIL) 30 MG tablet Take 1 tablet by mouth Daily.   6/30/2023    multivitamin with minerals tablet tablet Take 1 tablet by mouth Daily.   6/30/2023    oxyCODONE-acetaminophen (PERCOCET) 7.5-325 MG per tablet Take 1-2 tablets by mouth Every 4 (Four) Hours As Needed for Moderate Pain . 36 tablet 0 6/30/2023    pantoprazole (PROTONIX) 40 MG EC tablet Take 1 tablet by mouth 2 (Two) Times a Day.   6/30/2023    pregabalin (LYRICA) 100 MG capsule Take 1 capsule by mouth 4 (Four) Times a Day.   6/30/2023    QUEtiapine (SEROquel) 300 MG tablet Take 2 tablets by mouth Every Night.   6/29/2023    Semaglutide,0.25 or 0.5MG/DOS, (Ozempic, 0.25 or 0.5 MG/DOSE,) 2 MG/1.5ML solution pen-injector Inject 5 mg under the skin into the appropriate area as directed 1 (One) Time Per Week. INSTRUCTED PER ANESTHESIA STANDING ORDERS   Past Week    Trulance 3 MG tablet Take 1 tablet by mouth Daily.   6/30/2023    zolpidem (AMBIEN) 10 MG tablet Take 1 tablet by mouth Every Night.   6/29/2023    albuterol sulfate  (90 Base) MCG/ACT inhaler Inhale 2 puffs Every 4 (Four) Hours As Needed.   Unknown    Kerendia 20 MG tablet Take 1 tablet by mouth Daily.       lubiprostone (AMITIZA) 24 MCG capsule Take 1 capsule by  mouth 2 (Two) Times a Day With Meals.   Unknown    NovoLOG Mix 70/30 FlexPen (70-30) 100 UNIT/ML suspension pen-injector injection REPORTS SLIDING SCALE [id number]. INSTRUCTED PER ANESTHESIA PROTOCOL IF BS GREATER THEN 140 CAN TAKE 1/2 OF THE DOSE   Unknown    Stiolto Respimat 2.5-2.5 MCG/ACT aerosol solution inhaler Inhale 1 puff Daily.   Unknown   , Scheduled Meds:  atenolol, 25 mg, Oral, Daily  calcium gluconate, 1 g, Intravenous, Once  dextrose, 25 g, Intravenous, Once  escitalopram, 20 mg, Oral, Daily  furosemide, 40 mg, Intravenous, Once  heparin (porcine), 5,000 Units, Subcutaneous, Q8H  insulin lispro, 2-7 Units, Subcutaneous, 4x Daily AC & at Bedtime  insulin regular, 5 Units, Intravenous, Once  levETIRAcetam, 500 mg, Intravenous, Q12H  pantoprazole, 40 mg, Oral, BID  senna-docusate sodium, 2 tablet, Oral, BID  sodium chloride, 500 mL, Intravenous, Once  sodium chloride, 10 mL, Intravenous, Q12H   , Continuous Infusions:  sodium bicarbonate, 150 mEq   , PRN Meds:    senna-docusate sodium **AND** polyethylene glycol **AND** bisacodyl **AND** bisacodyl    dextrose    dextrose    glucagon (human recombinant)    nitroglycerin    sodium chloride    sodium chloride    sodium chloride    sodium chloride, and Allergies:  Patient has no known allergies.    Objective     Vital Signs  Temp:  [97.6 °F (36.4 °C)-98.6 °F (37 °C)] 97.7 °F (36.5 °C)  Heart Rate:  [60-67] 67  Resp:  [16-21] 16  BP: ()/(38-56) 128/56    No intake/output data recorded.  I/O last 3 completed shifts:  In: 1550 [IV Piggyback:1550]  Out: 1775 [Urine:1775]    Physical Exam  Constitutional:       Appearance: He is obese.   HENT:      Head: Normocephalic.      Nose: Nose normal.      Mouth/Throat:      Mouth: Mucous membranes are moist.   Eyes:      General: No scleral icterus.     Pupils: Pupils are equal, round, and reactive to light.   Cardiovascular:      Rate and Rhythm: Normal rate and regular rhythm.      Pulses: Normal  pulses.   Pulmonary:      Effort: Pulmonary effort is normal.      Breath sounds: Rhonchi present.   Abdominal:      General: Abdomen is flat. Bowel sounds are normal.   Musculoskeletal:         General: Normal range of motion.      Cervical back: Normal range of motion.      Right lower leg: Edema present.      Left lower leg: Edema present.   Skin:     General: Skin is warm and dry.   Neurological:      General: No focal deficit present.      Mental Status: He is alert.       Results Review:    I reviewed the patient's new clinical results.    WBC WBC   Date Value Ref Range Status   07/01/2023 11.21 (H) 3.40 - 10.80 10*3/mm3 Final   06/30/2023 12.79 (H) 3.40 - 10.80 10*3/mm3 Final      HGB Hemoglobin   Date Value Ref Range Status   07/01/2023 9.6 (L) 13.0 - 17.7 g/dL Final   06/30/2023 10.8 (L) 13.0 - 17.7 g/dL Final      HCT Hematocrit   Date Value Ref Range Status   07/01/2023 29.5 (L) 37.5 - 51.0 % Final   06/30/2023 32.6 (L) 37.5 - 51.0 % Final      Platlets No results found for: LABPLAT   MCV MCV   Date Value Ref Range Status   07/01/2023 88.9 79.0 - 97.0 fL Final   06/30/2023 89.1 79.0 - 97.0 fL Final          Sodium Sodium   Date Value Ref Range Status   07/01/2023 129 (L) 136 - 145 mmol/L Final   06/30/2023 127 (L) 136 - 145 mmol/L Final   06/30/2023 128 (L) 136 - 145 mmol/L Final     Sodium, Venous   Date Value Ref Range Status   06/30/2023 131.0 (L) 136 - 146 mmol/L Final      Potassium Potassium   Date Value Ref Range Status   07/01/2023 5.4 (H) 3.5 - 5.2 mmol/L Final   06/30/2023 5.2 3.5 - 5.2 mmol/L Final   06/30/2023 6.6 (C) 3.5 - 5.2 mmol/L Final     Comment:     Slight hemolysis detected by analyzer. Results may be affected.Verified by repeat analysis.     Potassium, Venous   Date Value Ref Range Status   06/30/2023 4.9 3.5 - 5.0 mmol/L Final      Chloride Chloride   Date Value Ref Range Status   07/01/2023 97 (L) 98 - 107 mmol/L Final   06/30/2023 96 (L) 98 - 107 mmol/L Final   06/30/2023 96 (L)  98 - 107 mmol/L Final     Chloride, Venous    Date Value Ref Range Status   06/30/2023 101 98 - 106 mmol/L Final      CO2 CO2   Date Value Ref Range Status   07/01/2023 13.5 (L) 22.0 - 29.0 mmol/L Final   06/30/2023 13.2 (L) 22.0 - 29.0 mmol/L Final   06/30/2023 11.9 (L) 22.0 - 29.0 mmol/L Final      BUN BUN   Date Value Ref Range Status   07/01/2023 84 (H) 8 - 23 mg/dL Final   06/30/2023 84 (H) 8 - 23 mg/dL Final   06/30/2023 80 (H) 8 - 23 mg/dL Final      Creatinine Creatinine   Date Value Ref Range Status   07/01/2023 4.95 (H) 0.76 - 1.27 mg/dL Final   06/30/2023 4.67 (H) 0.76 - 1.27 mg/dL Final   06/30/2023 4.97 (H) 0.76 - 1.27 mg/dL Final      Calcium Calcium   Date Value Ref Range Status   07/01/2023 8.2 (L) 8.6 - 10.5 mg/dL Final   06/30/2023 8.0 (L) 8.6 - 10.5 mg/dL Final   06/30/2023 8.9 8.6 - 10.5 mg/dL Final      PO4 No results found for: CAPO4   Albumin Albumin   Date Value Ref Range Status   07/01/2023 3.3 (L) 3.5 - 5.2 g/dL Final   06/30/2023 3.9 3.5 - 5.2 g/dL Final      Magnesium Magnesium   Date Value Ref Range Status   07/01/2023 3.1 (H) 1.6 - 2.4 mg/dL Final   06/30/2023 3.2 (H) 1.6 - 2.4 mg/dL Final      Uric Acid No results found for: URICACID         Assessment & Plan       Acute renal failure, unspecified acute renal failure type      Assessment & Plan  MK/CKD3b-  Due to diabetic nephropathy.  He has had worsened renal function recently.  Had been on lisinopril and SGLT2-inh.  Looks like kerendia was added also but has had hyperkalemia now.  He had CTA done in ER so may have some worsening of renal function.  Increase CPK also.  On bicarb gtt now with IV lasix given.  Had 1.7L uop recorded since admission.  Will monitor for dialysis needs but will continue medical management at this time.  Marley catheter placed.  Had d/w primary earlier today with reported decreased uop but has large amount reported on nursing records.  Will monitor with lasix.  Would continue IVF's with diuretics with rhabdo  to improve tubular flow.  Likely contrast contributing with ATN from hypotension.  Hyponatremia-  improved with IVF's.  Some edema on exam and cxr with pna vs. Edema.  Will continue IV lasix as bp tolerates.  Hyperkalemia-  better, likely due to medication and cellular shift with acidosis.  Would avoid acei/arb and kerendia for now.  Added lasix also.  Met Acidosis-  will check lactate.  Likely related to hypotension and decrease tissue perfusion.    Proteinuria-  diabetic nephropathy  DMII-  treated.  On jardiance, hold with MK.  HTN-  BP on low side.  Hold home meds.  Improved with IVF's.  Chronic edema-  Amlodipine may be contributing.    H/o bipolar-  previous lithium use.  H/o sz disorder-neurology evaluating.  Anemia-  check iron stores.  Rhabdomyolysis-  had fall at home, statin on hold.  Continue bicarb gtt with IV lasix.  Repeat level later today.  Weakness-  would avoid muscle relaxer in CKD(flexeril).  Likely polypharmacy contributing.  On percocet, ambien and lyrica also.    I discussed the patients findings and my recommendations with patient    Eliud Lee MD  07/01/23  08:08 EDT

## 2023-07-01 NOTE — PAYOR COMM NOTE
"Kirti Lincoln Jr. (62 y.o. Male)        #[id number] pended case         Halle MARIE  [facility] ,[facility]  Ph [phone]  F [phone]       Date of Birth   1960    Social Security Number       Address   [address] [place] [address]    Home Phone   [phone]    MRN   [id number]       Christianity   None    Marital Status                               Admission Date   6/30/23    Admission Type   Emergency    Admitting Provider   Clif Oconnor DO    Attending Provider   Clif Oconnor DO    Department, Room/Bed   [facility] PROGRESSIVE CARE UNIT 2, 255/1       Discharge Date       Discharge Disposition       Discharge Destination                                 Attending Provider: Clif Oconnor DO    Allergies: No Known Allergies    Isolation: None   Infection: None   Code Status: CPR    Ht: 182.9 cm (72\")   Wt: 133 kg (294 lb 1.5 oz)    Admission Cmt: None   Principal Problem: Acute renal failure, unspecified acute renal failure type [N17.9]                   Active Insurance as of 6/30/2023       Primary Coverage       Payor Plan Insurance Group Employer/Plan Group    ANTHEM MEDICARE REPLACEMENT ANTHEM MEDICARE ADVANTAGE [id number]       Payor Plan Address Payor Plan Phone Number Payor Plan Fax Number Effective Dates    [id number] [phone]  1/1/2022 - None Entered    [facility] [id number]         Subscriber Name Subscriber Birth Date Member ID       KIRTI LINCOLN JR. 1960 [id number]                     Emergency Contacts        (Rel.) Home Phone Work Phone Mobile Phone    MariolaMihaela (Daughter) -- -- [phone]    IGNACIOJOSE FRANCISCO MCCORD (Daughter) -- -- [phone]             Renal Failure, Acute RRG Inpatient Care by Ann-Marie Oliva, RN       Indications Met: Reviewed on 7/1/2023 by Ann-Marie Oliva, RN       Created Using Review Status Review Entered   [facility] Primary Completed 7/1/2023 0908     "   Created By   Ann-Marie Oliva RN       Criteria Set Name - Subset   Renal Failure, Acute RRG Inpatient Care      Criteria Review   Renal Failure, Acute RRG Inpatient Care     Overall Determination: Indications Met     Criteria:  [×] Admission is indicated for  1 or more  of the following  [B] [C]:      [×] Acute [C] kidney injury [B] (eg, rise in serum creatinine, reduction in estimated glomerular filtration rate from baseline) requiring inpatient care, as indicated by  1 or more  of the following :          [×] Altered mental status              7/1/2023  9:08 AM                  -- 7/1/2023  9:08 AM by Ann-Marie Oliva RN --                                            (X) Altered mental status (ie, different from baseline), as indicated by  1 or more  of the following  (1) (2) (3) (4):                      (X) Confusional state (eg, disorientation, difficulty following commands, deficit in attention)              7/1/2023  9:08 AM                  -- 7/1/2023  9:08 AM by Ann-Marie Oliva RN --                      presented to the ED for evaluation of altered mental status. As per the patient's sister, she received a call from patient around 1530 hrs, and complaint of difficulty walking and has slurred speech, so she informed family member to call the EMS. A     Notes:  -- 7/1/2023  9:08 AM by nAn-Marie Oliva RN --      Upon arrival to the ED, vital signs temperature 98.6, pulse 64, respiratory 20, blood pressure 82/38 on room air saturating around 95%. Labs showed CK4 203, troponin 60, proBNP 1532, sodium 128, potassium 6.6, chloride 96, bicarb 11.9, anion gap 20.1, creatinine 4.97, 1-month ago creatinine was 1.91, magnesium 3.2, , AST 76, rest of the CMP within normal limits, WBC elevated 12.79, hemoglobin 10.8, rest of the CBC within normal limits, chest x-ray showed hazy lung densities that could be related to hyperinflation or pneumonitis or edema.        -- 7/1/2023  9:08 AM  by Ann-Marie Oliva RN --      Admit to inpatient, telemetry      Receiving treatment for hyperkalemia with calcium gluconate, insulin regular, dextrose      Ordered 1 amp of bicarb, continue on bicarb drip      Monitor urine output      Monitor electrolytes, kidney function with a.m. labs      MRI of the brain without contrast      TTE with bubble study      Neurochecks every 4 hours      Cerebell in the ED did not show any seizure burden      EEG      Neurology consult in the a.m.      Low-dose sliding scale insulin      Continue home Lamictal, citalopram, atenolol      Resume other home medications once reconciled and appropriate      Avoid nephrotoxic agents      Nephrology consult in the a.m. Dr. Lee is aware of the patient        -- 2023  9:08 AM by Ann-Marie Oliva RN --      LATE ADMISSION REVIEW FOR 23- 23                History & Physical        Hazel Stephens MD at 23           [facility] HISTORY AND PHYSICAL  Date: 2023   Patient Name: Carlos Murphy Jr.  : 1960  MRN: [id number]  Primary Care Physician:  Felix Atkinson MD  Date of admission: 2023    Subjective    Subjective     Chief Complaint: Altered mental status, confusion    HPI:    Carlos Murphy Jr. is a 62 y.o. male with a past medical history of COPD, seizure disorder, bipolar, hypertension, hyperlipidemia, type 2 diabetes mellitus presented to the ED for evaluation of altered mental status.  As per the patient's sister, she received a call from patient around 1530 hrs, and complaint of difficulty walking and has slurred speech, so she informed family member to call the EMS.  After coming to the ED, patient found to be very somnolent with pinpoint pupils, received Narcan with significant improvement in his mental status and he became very awake after receiving it.  As per the sister patient was normal yesterday night when she met him.  Patient is a poor historian  but said that he took more medications than usual, denies any intentions to harm himself.  Was not sure if she had any episode of seizure.  Did not answer when asked whether he is taking all his medications regularly at home or not.  Stays by himself.  Denies any fevers, chills, nausea, vomiting.  Unable to to do the complete review of systems as the patient is still not oriented completely and is a poor historian.    Upon arrival to the ED, vital signs temperature 98.6, pulse 64, respiratory 20, blood pressure 82/38 on room air saturating around 95%.  Labs showed CK4 203, troponin 60, proBNP 1532, sodium 128, potassium 6.6, chloride 96, bicarb 11.9, anion gap 20.1, creatinine 4.97, 1-month ago creatinine was 1.91, magnesium 3.2, , AST 76, rest of the CMP within normal limits, WBC elevated 12.79, hemoglobin 10.8, rest of the CBC within normal limits, chest x-ray showed hazy lung densities that could be related to hyperinflation or pneumonitis or edema.  CT head without contrast stroke protocol showed no acute intracranial abnormality.  CT cerebral perfusion with and without contrast showed artifactual perfusion abnormalities in the brain without definite core infarct or to brain at risk, CTA neck showed no significant vascular abnormality in the head and neck.  EKG showed no changes concerning for hyperkalemia.  Received 100 cc bolus in the ED, receiving treatment for hyperkalemia with dextrose, calcium gluconate, insulin regular IV.  Patient has been evaluated by tele neurologist concerning for altered mental status and stroke, recommended MRI head without contrast, TTE for further work-up for stroke and also recommended that this encephalopathy could be likely multifactorial.  Case has been discussed by the ED physician with nephrologist on-call Dr. Lee, recommended starting on bicarb drip.    Patient has been admitted for further evaluation and management of acute metabolic encephalopathy,  rhabdomyolysis, concern for seizure, MK on CKD, severe hyperkalemia, moderate hyponatremia, anion gap metabolic acidosis      Personal History     Past Medical History:   Diagnosis Date    Acute kidney injury      POST SEIZURES    Astigmatism of both eyes     eyes twitch left and right    Bipolar disorder     Charcot ankle     Closed nondisplaced fracture of medial malleolus of left tibia     COPD (chronic obstructive pulmonary disease)     USES INHALERS    Diabetes     BG RUNS AROUND 90'S IN AM    Hx of schizophrenia     Hyperlipidemia     Hypertension     SEEN DR ROD IN THE PAST, HAD APPT WITH DR MICHAUD IN  CX APPT, DENIED CP/SOB    Neuropathy     Seizures     LAST ONE 22    Sleep apnea     DOES NOT USE CPAP    Varicose vein of leg          Past Surgical History:   Procedure Laterality Date    ANKLE OPEN REDUCTION INTERNAL FIXATION Left 2022    Procedure: LEFT OPEN REDUCTION INTERNAL FIXATION DISTAL TIBIA FRACTURE ;  Surgeon: Tha Parry MD;  [place] [facility] [place] [facility] [place]: [place]opedics;  Laterality: Left;    ANKLE OPEN REDUCTION INTERNAL FIXATION Left 2022    Procedure: LEFT ANKLE OPEN REDUCTION INTERNAL FIXATION WITH SYNDESMOSIS FIXATION;  Surgeon: Tha Parry MD;  Location: [facility] MAIN OR;  Service: Orthopedics;  Laterality: Left;    CHOLECYSTECTOMY      COLONOSCOPY      JOINT REPLACEMENT      RTKR    LUMBAR DISC SURGERY      SHOULDER SURGERY Right          Family History   Problem Relation Age of Onset    Kidney disease Mother     COPD Mother     COPD Father     Heart disease Father     Malig Hyperthermia Neg Hx          Social History     Socioeconomic History    Marital status:    Tobacco Use    Smoking status: Former     Packs/day: 0.50     Years: 35.00     Pack years: 17.50     Types: Cigarettes     Quit date:      Years since quittin.5    Smokeless tobacco: Never   Vaping Use    Vaping Use: Never used   Substance and Sexual Activity     Alcohol use: Yes     Comment: OCCASIONAL    Drug use: Never    Sexual activity: Defer         Home Medications:  Finerenone, Plecanatide, QUEtiapine, Semaglutide(0.25 or 0.5MG/DOS), albuterol sulfate HFA, amLODIPine, aspirin, atenolol, atorvastatin, cyclobenzaprine, empagliflozin, escitalopram, furosemide, glipizide, hydrOXYzine, insulin aspart prot & aspart, lamoTRIgine, lisinopril, lubiprostone, multivitamin with minerals, oxyCODONE-acetaminophen, pantoprazole, pregabalin, tiotropium bromide-olodaterol, and zolpidem    Allergies:  No Known Allergies    Review of Systems   Unable to obtain complete review of systems    Objective    Objective     Vitals:   Temp:  [98.6 °F (37 °C)] 98.6 °F (37 °C)  Heart Rate:  [63-65] 63  Resp:  [20] 20  BP: (80-82)/(38-46) 82/38    Physical Exam    Constitutional: Awake, oriented x1 to self   Eyes: Pupils equal, sclerae anicteric, no conjunctival injection   HENT: NCAT, mucous membranes moist   Respiratory: Clear to auscultation bilaterally, nonlabored respirations    Cardiovascular: RRR, no murmurs, rubs, or gallops   Gastrointestinal: Positive bowel sounds, soft, nontender, nondistended   Musculoskeletal: Trace bilateral lower extremity edema, no clubbing or cyanosis to extremities   Psychiatric: Appropriate affect, cooperative   Neurologic: Oriented x 3, strength symmetric in all extremities, Cranial Nerves grossly intact to confrontation, speech clear   Skin: Noted to have small skin ulcers on his toes, skin on the feet had tried blood but does not have any wounds    Result Review    Result Review:  I have personally reviewed the results from the time of this admission to 6/30/2023 23:50 EDT and agree with these findings:  [x]  Laboratory  [x]  Microbiology  [x]  Radiology  [x]  EKG/Telemetry   []  Cardiology/Vascular   []  Pathology  []  Old records   []  Other:        Imaging Results (Last 24 Hours)       Procedure Component Value Units Date/Time    CT Angiogram Head w AI  Analysis of LVO [[id number]] Collected: 06/30/23 2022     Updated: 06/30/23 2025    Narrative:      PROCEDURE: CT ANGIOGRAM NECK, 6/30/2023, 18:58  CT ANGIOGRAM HEAD W AI ANALYSIS OF LVO, 6/30/2023, 18:58     COMPARISON: None     INDICATIONS: Neuro deficit, acute, stroke suspected     PROTOCOL:   Standard imaging protocol performed      RADIATION:   DLP: 968.5 mGy*cm    Automated exposure control was utilized to minimize radiation dose.   CONTRAST: 100 cc Isovue 370 I.V.     TECHNIQUE: After obtaining the patient's consent, CT images of the neck were obtained without and   with non-ionic intravenous contrast material. Multi-planar reformatted/3-D images were created to   optimize visualization of vascular anatomy. Unless otherwise stated in this report, all vascular   stenoses involving the internal carotid arteries reported for this examination are derived by   dividing the lesion diameter by the diameter of the normal internal carotid artery more distally.     FINDINGS:   The aortic arch is unremarkable.  There is 2 vessel arch anatomy.  The right brachiocephalic and   bilateral subclavian arteries appear widely patent.  There is some motion artifact.  Bilateral   common carotid arteries, external carotid arteries, carotid bifurcations and cervical internal   carotid arteries appear within normal limits.  Intracranial ICA segments appear normal.  There is   significant venous contamination intracranially.  The A1 and M1 segments and distal SULTANA and MCA   branches appear patent.  There is a patent anterior communicating artery.  There are patent   bilateral posterior communicating arteries.  Vertebral arteries appear normal.  Vertebral arteries   are codominant.  The basilar artery, superior cerebellar and posterior cerebral arteries appear   patent.     There are no acute intracranial findings.  Orbits appear unremarkable.  There is mild multifocal   paranasal sinus mucosal disease.  Mastoid air cells appear  well-aerated.  There is no evidence of a   neck mass or adenopathy.  The lungs are hypoinflated and there are scattered areas of AC lung   density.  Superior mediastinal structures are unremarkable.  The thyroid is largely obscured by   motion.  Salivary glands appear symmetric.  No focal soft tissue inflammation.  There are cervical   degenerative changes.  No acute osseous abnormality.       Impression:         1. No significant vascular abnormality in the head or the neck.  2. Cervical degenerative changes.            MARIZA KIRAN MD         Electronically Signed and Approved By: MARIZA KIRAN MD on 6/30/2023 at 20:22                     CT Angiogram Neck [[id number]] Collected: 06/30/23 2022     Updated: 06/30/23 2025    Narrative:      PROCEDURE: CT ANGIOGRAM NECK, 6/30/2023, 18:58  CT ANGIOGRAM HEAD W AI ANALYSIS OF LVO, 6/30/2023, 18:58     COMPARISON: None     INDICATIONS: Neuro deficit, acute, stroke suspected     PROTOCOL:   Standard imaging protocol performed      RADIATION:   DLP: 968.5 mGy*cm    Automated exposure control was utilized to minimize radiation dose.   CONTRAST: 100 cc Isovue 370 I.V.     TECHNIQUE: After obtaining the patient's consent, CT images of the neck were obtained without and   with non-ionic intravenous contrast material. Multi-planar reformatted/3-D images were created to   optimize visualization of vascular anatomy. Unless otherwise stated in this report, all vascular   stenoses involving the internal carotid arteries reported for this examination are derived by   dividing the lesion diameter by the diameter of the normal internal carotid artery more distally.     FINDINGS:   The aortic arch is unremarkable.  There is 2 vessel arch anatomy.  The right brachiocephalic and   bilateral subclavian arteries appear widely patent.  There is some motion artifact.  Bilateral   common carotid arteries, external carotid arteries, carotid bifurcations and cervical internal   carotid  arteries appear within normal limits.  Intracranial ICA segments appear normal.  There is   significant venous contamination intracranially.  The A1 and M1 segments and distal SULTANA and MCA   branches appear patent.  There is a patent anterior communicating artery.  There are patent   bilateral posterior communicating arteries.  Vertebral arteries appear normal.  Vertebral arteries   are codominant.  The basilar artery, superior cerebellar and posterior cerebral arteries appear   patent.     There are no acute intracranial findings.  Orbits appear unremarkable.  There is mild multifocal   paranasal sinus mucosal disease.  Mastoid air cells appear well-aerated.  There is no evidence of a   neck mass or adenopathy.  The lungs are hypoinflated and there are scattered areas of AC lung   density.  Superior mediastinal structures are unremarkable.  The thyroid is largely obscured by   motion.  Salivary glands appear symmetric.  No focal soft tissue inflammation.  There are cervical   degenerative changes.  No acute osseous abnormality.       Impression:         1. No significant vascular abnormality in the head or the neck.  2. Cervical degenerative changes.            MARIZA KIRAN MD         Electronically Signed and Approved By: MRAIZA KIRAN MD on 6/30/2023 at 20:22                     XR Chest 1 View [[id number]] Collected: 06/30/23 2018     Updated: 06/30/23 2022    Narrative:      PROCEDURE: XR CHEST 1 VW     COMPARISON: [facility], CT, CT ANGIOGRAM NECK, 6/30/2023, 18:58.  [facility], CR, XR CHEST 1 VW, 4/30/2023, 14:04.     INDICATIONS: Acute Stroke Protocol (Onset < 12 hrs)     FINDINGS:   There are new hazy lung densities.  No focal consolidation.  No pneumothorax or pleural effusion.    Lung volumes are low.  Heart size is normal.  Pulmonary vasculature is partially obscured.       Impression:       Hazy lung densities that could be related to hypoinflation or pneumonitis or  edema.                  MARIZA KIRAN MD         Electronically Signed and Approved By: MARIZA KIRAN MD on 6/30/2023 at 20:18                     CT CEREBRAL PERFUSION WITH & WITHOUT CONTRAST [[id number]] Collected: 06/30/23 1930     Updated: 06/30/23 1933    Narrative:      PROCEDURE: CT CEREBRAL PERFUSION W WO CONTRAST     COMPARISON: CT, CT ANGIOGRAM HEAD W AI ANALYSIS OF LVO, 6/30/2023, 18:58.     INDICATIONS: Neuro deficit, acute, stroke suspected     PROTOCOL:   Standard imaging protocol performed      RADIATION:   DLP: 1949 mGy*cm    Automated exposure control was utilized to minimize radiation dose.   CONTRAST: 80 cc Isovue 370 I.V.        FINDINGS: Cerebral blood flow less than 30% is 0 mL and T-max greater than 6 seconds is 157 mL with   a mismatch volume of 157 mm.  This appears to be artifactual.  There is also significant artifact   on the inferior set of cerebral blood volume imaging.  There is no definite core infarct or   definite brain at risk.       Impression:       Artifactual perfusion abnormalities in the brain without definite core infarct or true   brain at risk.               MARIZA KIRAN MD         Electronically Signed and Approved By: MARIZA KIRAN MD on 6/30/2023 at 19:29                     CT Head Without Contrast Stroke Protocol [[id number]] Collected: 06/30/23 1915     Updated: 06/30/23 1918    Narrative:      PROCEDURE: CT HEAD WO CONTRAST STROKE PROTOCOL     COMPARISON:  [facility], CT, CT HEAD WO CONTRAST, 4/30/2023, 15:12.  INDICATIONS: Neuro deficit, acute, stroke suspected     PROTOCOL:   Standard imaging protocol performed      RADIATION:   DLP: 1145.2 mGy*cm    MA and/or KV was adjusted to minimize radiation dose.          TECHNIQUE: After obtaining the patient's consent, CT images were obtained without non-ionic   intravenous contrast material.      FINDINGS:   Superficial soft tissues appear unremarkable.  The calvarium is intact.  There is a small amount  of   mucus in the right maxillary sinus.  The ventricles appear normal in size and configuration for   patient's age. There is no evidence of herniation, hydrocephalus or mass effect. Gray-white   differentiation appears intact. There are no focal areas of hypoattenuation within the brain   parenchyma. There is no evidence of acute intracranial hemorrhage. The orbits appear unremarkable.          Impression:       No acute intracranial abnormality.            MARIZA KIRAN MD         Electronically Signed and Approved By: MARIZA KIRAN MD on 6/30/2023 at 19:14                              [START ON 7/1/2023] atenolol, 25 mg, Oral, Daily  [START ON 7/1/2023] escitalopram, 20 mg, Oral, Daily  [START ON 7/1/2023] heparin (porcine), 5,000 Units, Subcutaneous, Q8H  [START ON 7/1/2023] insulin lispro, 2-7 Units, Subcutaneous, 4x Daily AC & at Bedtime  [START ON 7/1/2023] lamoTRIgine, 200 mg, Oral, BID  [START ON 7/1/2023] pantoprazole, 40 mg, Oral, BID  [START ON 7/1/2023] senna-docusate sodium, 2 tablet, Oral, BID  [START ON 7/1/2023] sodium chloride, 10 mL, Intravenous, Q12H         Assessment & Plan   Assessment / Plan     Assessment/Plan:   Acute metabolic encephalopathy  MK on CKD  Severe hyperkalemia-improving  Possible seizure episode  Rhabdomyolysis  Moderate hyponatremia-clinically significant  Anion gap metabolic acidosis  Concern for ischemic stroke  History of COPD  Type 2 diabetes mellitus  Seizure disorder  Bipolar disorder  Hyperlipidemia    Plan  Admit to inpatient, telemetry  Receiving treatment for hyperkalemia with calcium gluconate, insulin regular, dextrose  Ordered 1 amp of bicarb, continue on bicarb drip  Monitor urine output  Monitor electrolytes, kidney function with a.m. labs  MRI of the brain without contrast  TTE with bubble study  Neurochecks every 4 hours  Cerebell in the ED did not show any seizure burden  EEG  Neurology consult in the a.m.  Low-dose sliding scale insulin  Continue home  Lamictal, citalopram, atenolol  Resume other home medications once reconciled and appropriate  Avoid nephrotoxic agents  Nephrology consult in the a.m. Dr. Lee is aware of the patient    DVT prophylaxis:  Medical DVT prophylaxis orders are present.    CODE STATUS:    Level Of Support Discussed With: Patient  Code Status (Patient has no pulse and is not breathing): CPR (Attempt to Resuscitate)  Medical Interventions (Patient has pulse or is breathing): Full Support      Admission Status:  I believe this patient meets inpatient status.    Part of this note may be an electronic transcription/translation of spoken language to printed text using the Dragon Dictation System    Hazel Stephens MD               Electronically signed by Hazel Stephens MD at 06/30/23 2350          Emergency Department Notes        Radha Barnhart RN at 06/30/23 2150          Dr. Stephens requests to give the sodium bicarbonate injection due to pt PH level    Electronically signed by Radha Barnhart RN at 06/30/23 2213       Pete Holguin MD at 06/30/23 1928          Time: 7:28 PM EDT  Date of encounter:  6/30/2023  Independent Historian/Clinical History and Information was obtained by:   Patient and sister    History is limited by: Altered Mental Status    Chief Complaint: Altered mental status.      History of Present Illness:  Patient is a 62 y.o. year old male who presents to the emergency department for evaluation of Patient's sister is the primary historian.  She states that patient called her while she was at work at 1530 and complained of difficulty walking and had slurred speech.  She cannot understand very well so told a family member to call EMS.  She states he has a history of seizures in the past.  As of last night he had been normal per her report.  Since she is arrived to the emergency department the patient has been essentially unresponsive.  No further history available.    HPI    Patient Care Team  Primary Care  Provider: Felix Atkinson MD    Past Medical History:     No Known Allergies  Past Medical History:   Diagnosis Date    Acute kidney injury     2021 POST SEIZURES    Astigmatism of both eyes     eyes twitch left and right    Bipolar disorder     Charcot ankle     Closed nondisplaced fracture of medial malleolus of left tibia     COPD (chronic obstructive pulmonary disease)     USES INHALERS    Diabetes     BG RUNS AROUND 90'S IN AM    Hx of schizophrenia     Hyperlipidemia     Hypertension     SEEN DR ROD IN THE PAST, HAD APPT WITH DR MICHAUD IN 5-2022 CX APPT, DENIED CP/SOB    Neuropathy     Seizures     LAST ONE 4/21/22    Sleep apnea     DOES NOT USE CPAP    Varicose vein of leg      Past Surgical History:   Procedure Laterality Date    ANKLE OPEN REDUCTION INTERNAL FIXATION Left 04/25/2022    Procedure: LEFT OPEN REDUCTION INTERNAL FIXATION DISTAL TIBIA FRACTURE ;  Surgeon: Tha Parry MD;  Location: [facility] [place] [facility] [place];  Service: Orthopedics;  Laterality: Left;    ANKLE OPEN REDUCTION INTERNAL FIXATION Left 06/01/2022    Procedure: LEFT ANKLE OPEN REDUCTION INTERNAL FIXATION WITH SYNDESMOSIS FIXATION;  Surgeon: Tha Parry MD;  Location: [facility] MAIN OR;  Service: Orthopedics;  Laterality: Left;    CHOLECYSTECTOMY      COLONOSCOPY      JOINT REPLACEMENT      RTKR    LUMBAR DISC SURGERY      SHOULDER SURGERY Right      Family History   Problem Relation Age of Onset    Kidney disease Mother     COPD Mother     COPD Father     Heart disease Father     Malig Hyperthermia Neg Hx        Home Medications:  Prior to Admission medications    Medication Sig Start Date End Date Taking? Authorizing Provider   Accu-Chek Guide test strip  9/14/22   Enrique Cortes MD   Accu-Chek Softclix Lancets lancets  6/15/22   Enrique Cortes MD   albuterol sulfate  (90 Base) MCG/ACT inhaler Inhale 2 puffs Every 4 (Four) Hours As Needed. 4/1/22   Enrique Cortes MD   amLODIPine (NORVASC) 5 MG tablet  Take 1 tablet by mouth Daily.    Enrique Cortes MD   aspirin EC (Ecotrin) 325 MG tablet Take 1 tablet by mouth Daily. 6/1/22   Tha Parry MD   atenolol (TENORMIN) 25 MG tablet Take 1 tablet by mouth Daily.    Enrique Cortes MD   atorvastatin (LIPITOR) 40 MG tablet Take 1 tablet by mouth Every Night.    Enrique Cortes MD   Blood Glucose Monitoring Suppl (Accu-Chek Guide Me) w/Device kit  6/15/22   Enrique Cortes MD   budesonide-formoterol (Symbicort) 160-4.5 MCG/ACT inhaler Inhale 2 puffs 2 (Two) Times a Day. 4/13/22   Phillip Yanez MD   empagliflozin (JARDIANCE) 25 MG tablet tablet Take 1 tablet by mouth Daily. 6/24/22   Enrique Cortes MD   escitalopram (LEXAPRO) 20 MG tablet Take 1 tablet by mouth Daily. 3/29/22   Enrique Cortes MD   furosemide (LASIX) 20 MG tablet Take 1 tablet by mouth Daily. 6/1/21   Enrique Cortes MD   glipizide (GLUCOTROL XL) 10 MG 24 hr tablet Take 1 tablet by mouth 2 (Two) Times a Day. INSTRUCTED PER ANESTHESIA PROTOCOL 6/24/21   Enrique Cortes MD   hydrOXYzine (ATARAX) 50 MG tablet Take 1 tablet by mouth 3 (Three) Times a Day. 6/13/21   Enrique Cortes MD   lamoTRIgine (LaMICtal) 200 MG tablet Take 1 tablet by mouth 2 (Two) Times a Day.    Enrique Cortes MD   lisinopril (PRINIVIL,ZESTRIL) 40 MG tablet Take 1 tablet by mouth Daily. 6/24/22   Enrique Cortes MD   lubiprostone (AMITIZA) 24 MCG capsule Take 1 capsule by mouth 2 (Two) Times a Day With Meals. 3/13/23   Enrique Cortes MD   multivitamin with minerals tablet tablet Take 1 tablet by mouth Daily.    Enrique Cortes MD   NovoLOG Mix 70/30 FlexPen (70-30) 100 UNIT/ML suspension pen-injector injection REPORTS SLIDING SCALE [id number]. INSTRUCTED PER ANESTHESIA PROTOCOL IF BS GREATER THEN 140 CAN TAKE 1/2 OF THE DOSE 2/24/22   Enrique Cortes MD   oxyCODONE-acetaminophen (PERCOCET) 7.5-325 MG per tablet Take 1-2 tablets  "by mouth Every 4 (Four) Hours As Needed for Moderate Pain . 22   Tha Parry MD   pantoprazole (PROTONIX) 40 MG EC tablet Take 1 tablet by mouth 2 (Two) Times a Day.    Enrique Cortes MD   pregabalin (LYRICA) 100 MG capsule Take 1 capsule by mouth 4 (Four) Times a Day. 21   Enrique Cortes MD   QUEtiapine (SEROquel) 300 MG tablet Take 2 tablets by mouth Every Night. 21   Enrique Cortes MD   Semaglutide,0.25 or 0.5MG/DOS, (Ozempic, 0.25 or 0.5 MG/DOSE,) 2 MG/1.5ML solution pen-injector Inject 5 mg under the skin into the appropriate area as directed 1 (One) Time Per Week. INSTRUCTED PER ANESTHESIA STANDING ORDERS    Enrique Cortes MD   sodium-potassium-magnesium sulfates (Suprep Bowel Prep Kit) 17.5-3.13-1.6 GM/177ML solution oral solution Take 1 bottle by mouth Every 12 (Twelve) Hours. 23   Joyce Mccain APRN   TRUEplus Insulin Syringe 31G X 5/16\" 0.3 ML misc  22   Enrique Cortes MD   zolpidem (AMBIEN) 10 MG tablet Take 1 tablet by mouth Every Night. 21   Enrique Cortes MD        Social History:   Social History     Tobacco Use    Smoking status: Former     Packs/day: 0.50     Years: 35.00     Pack years: 17.50     Types: Cigarettes     Quit date:      Years since quittin.5    Smokeless tobacco: Never   Vaping Use    Vaping Use: Never used   Substance Use Topics    Alcohol use: Yes     Comment: OCCASIONAL    Drug use: Never         Review of Systems:  Review of Systems   Neurological:  Positive for speech difficulty and weakness.      Physical Exam:  BP (!) 82/38 (BP Location: Right arm, Patient Position: Sitting)   Pulse 63   Temp 98.6 °F (37 °C) (Oral)   Resp 20   Ht 182.9 cm (72\")   SpO2 95%   BMI 40.16 kg/m²     Physical Exam  Vitals and nursing note reviewed.   Constitutional:       General: He is awake.      Appearance: Normal appearance.      Comments: Somnolent   HENT:      Head: Normocephalic and " "atraumatic.      Nose: Nose normal.      Mouth/Throat:      Mouth: Mucous membranes are dry.   Eyes:      Extraocular Movements: Extraocular movements intact.      Comments: Pinpoint pupils bilaterally   Cardiovascular:      Rate and Rhythm: Normal rate and regular rhythm.      Heart sounds: Normal heart sounds.   Pulmonary:      Effort: Pulmonary effort is normal. No respiratory distress.      Breath sounds: Normal breath sounds. No wheezing, rhonchi or rales.   Abdominal:      General: Bowel sounds are normal.      Palpations: Abdomen is soft.      Tenderness: There is no abdominal tenderness. There is no guarding or rebound.      Comments: No rigidity   Musculoskeletal:         General: No tenderness. Normal range of motion.      Cervical back: Normal range of motion and neck supple.   Skin:     General: Skin is warm and dry.      Coloration: Skin is not jaundiced.   Neurological:      General: No focal deficit present.      Sensory: Sensation is intact.      Motor: Motor function is intact.      Coordination: Coordination is intact.      Comments: Somnolent.  Patient has intermittent minor twitching movements but no definite seizure activity.   Psychiatric:         Attention and Perception: Attention and perception normal.         Mood and Affect: Affect normal.         Speech: Speech normal.                Procedures:  Procedures      Medical Decision Making:      Comorbidities that affect care:    Diabetes, seizure disorders, schizophrenia, bipolar, COPD, neuropathy    External Notes reviewed:    Encounter review: Nephrology office visit yesterday for stage IIIb chronic kidney disease, hypertension:  \"Assessment and plan:  -Acute kidney injury in the setting of seizure disorder, peak creatinine 3.0 March 2021, resolved.  - Chronic kidney disease stage IIIb with previously bland UA and sub-nephrotic proteinuria most likely secondary to diabetic nephropathy. Renal function is worse today. Would like to maximize " "lisinopril at 80mg but due to complaints of orthostasis will wait to see what his pressures are running at home. Continue SGL2 inh. Start Kerendia and repeat BMP in 2 weeks to monitor potassium.. Continue to avoid nephrotoxins. Stressed importance of better diabetes control.  - Previous MK October 2018, etiology uncertain, peak creatinine 2.47, resolved.  - Proteinuria, Sub-nephrotic secondary to diabetic nephropathy. monitor periodically.  - Diabetes with complications. Goal A1c below 7%. He is on Jardiance. Last A1C 9.8% this month  - Hypertension, blood pressure is slightly higher today than it has been and he's had 12lb weight gain since last visit. C/O dizziness when changing positions but he describes this as his norm. Avoid high salt intake. Check blood pressure at home weekly and record.  - Lower extremity edema, trace on exam today. low sodium diet and small dose Lasix.  - Back and neck pain, status post steroid injections. Better.  - Bipolar disease with previous history of lithium use.\"        The following orders were placed and all results were independently analyzed by me:  Orders Placed This Encounter   Procedures    CT Head Without Contrast Stroke Protocol    CT Angiogram Head w AI Analysis of LVO    CT Angiogram Neck    CT CEREBRAL PERFUSION WITH & WITHOUT CONTRAST    XR Chest 1 View    MRI Brain Without Contrast    [place] Draw    Comprehensive Metabolic Panel    Single High Sensitivity Troponin T    Magnesium    Urinalysis With Microscopic If Indicated (No Culture) - Urine, Clean Catch    CBC Auto Differential    Protime-INR    aPTT    Scan Slide    BNP    High Sensitivity Troponin T 2Hr    Basic Metabolic Panel    CK    Urine Drug Screen - Urine, Clean Catch    VBG with Co-Ox and Electrolytes    CBC Auto Differential    Comprehensive Metabolic Panel    Magnesium    Phosphorus    CK    Diet: Cardiac Diets, Diabetic Diets; Healthy Heart (2-3 Na+); Consistent Carbohydrate; Texture: Regular Texture " (IDDSI 7); Fluid Consistency: Thin (IDDSI 0)    Undress & Gown    Vital Signs    Orthostatic Blood Pressure    Initiate Department's Acute Stroke Process (Team D, Code 19, etc)    Perform NIH Stroke Scale    Measure Actual Weight    Head of Bed 30 Degrees or Less    Undress and Gown    Continuous Pulse Oximetry    Vital Signs    Neuro Checks    Notify MD for SBP < 80 or > 200    Notify Provider for SBP greater than 140 if hemorrhagic Stroke    No Hypotonic Fluids    Nursing Dysphagia Screening (Complete Prior to Giving anything PO)    RN to Place Order SLP Consult (IF swallow screen failed) - Eval & Treat Choosing Reason of RN Dysphagia Screen Failed    Vital Signs    Intake & Output    Weigh Patient    Oral Care    Telemetry - Maintain IV Access    Telemetry - Place Orders & Notify Provider of Results When Patient Experiences Acute Chest Pain, Dysrhythmia or Respiratory Distress    Nursing Dysphagia Screening (Complete Prior to Giving Anything By Mouth)    Neuro Checks    Code Status and Medical Interventions:    IP General Consult (Use specialty-specific consult if known)    Hospitalist (on-call MD unless specified)    Inpatient Nephrology Consult    Inpatient Neurology Consult General    Oxygen Therapy- Nasal Cannula; Titrate 1-6 LPM Per SpO2; 90 - 95%    Oxygen Therapy- Nasal Cannula; Titrate 1-6 LPM Per SpO2; 90 - 95%    POC Glucose Once    POC Glucose Once    POC Glucose Q1H    POC Glucose Once    POC Glucose Once    POC Glucose 4x Daily AC & at Bedtime    ECG 12 Lead ED Triage Standing Order; Weak / Dizzy / AMS    ECG 12 Lead ED Triage Standing Order; Acute Stroke (Onset <12 hrs)    Adult Transthoracic Echo Limited W/ Cont if Necessary Per Protocol    EEG    Consult to Wound / Ostomy Care    Insert Peripheral IV    Insert Large Bore Peripheral IV - Right AC Preferred    Insert Peripheral IV    Inpatient Admission    Fall Precautions    CBC & Differential    Green Top (Gel)    Lavender Top    Gold Top - SST     Light Blue Top       Medications Given in the Emergency Department:  Medications   sodium chloride 0.9 % flush 10 mL (has no administration in time range)   sodium chloride 0.9 % flush 10 mL (has no administration in time range)   sodium bicarbonate 150 mEq/1000 mL D5W infusion (150 mEq Intravenous New Bag 6/30/23 2128)   atenolol (TENORMIN) tablet 25 mg (has no administration in time range)   escitalopram (LEXAPRO) tablet 20 mg (has no administration in time range)   lamoTRIgine (LaMICtal) tablet 200 mg (has no administration in time range)   pantoprazole (PROTONIX) EC tablet 40 mg (has no administration in time range)   sodium chloride 0.9 % flush 10 mL (has no administration in time range)   sodium chloride 0.9 % flush 10 mL (has no administration in time range)   sodium chloride 0.9 % infusion 40 mL (has no administration in time range)   sennosides-docusate (PERICOLACE) 8.6-50 MG per tablet 2 tablet (has no administration in time range)     And   polyethylene glycol (MIRALAX) packet 17 g (has no administration in time range)     And   bisacodyl (DULCOLAX) EC tablet 5 mg (has no administration in time range)     And   bisacodyl (DULCOLAX) suppository 10 mg (has no administration in time range)   heparin (porcine) 5000 UNIT/ML injection 5,000 Units (has no administration in time range)   nitroglycerin (NITROSTAT) SL tablet 0.4 mg (has no administration in time range)   dextrose (GLUTOSE) oral gel 15 g (has no administration in time range)   dextrose (D50W) (25 g/50 mL) IV injection 25 g (has no administration in time range)   glucagon (GLUCAGEN) injection 1 mg (has no administration in time range)   Insulin Lispro (humaLOG) injection 2-7 Units (has no administration in time range)   iopamidol (ISOVUE-370) 76 % injection 100 mL (100 mL Intravenous Given 6/30/23 1859)   sodium chloride 0.9 % bolus 500 mL (0 mL Intravenous Stopped 6/30/23 2124)   Naloxone HCl (NARCAN) injection 2 mg (2 mg Intravenous Given 6/30/23  2005)   ondansetron (ZOFRAN) injection 4 mg (4 mg Intravenous Given 6/30/23 2004)   dextrose (D50W) (25 g/50 mL) IV injection 50 g (50 g Intravenous Given 6/30/23 2118)   insulin regular (humuLIN R,novoLIN R) injection 5 Units (5 Units Intravenous Given 6/30/23 2121)   calcium gluconate 1g/50ml 0.675% NaCl IV SOLN (0 g Intravenous Stopped 6/30/23 2247)   albuterol (PROVENTIL) nebulizer solution 0.083% 2.5 mg/3mL (10 mg Nebulization Given 6/30/23 2022)   sodium bicarbonate injection 8.4% 50 mEq (50 mEq Intravenous Given 6/30/23 2157)   sodium chloride 0.9 % bolus 1,000 mL (0 mL Intravenous Stopped 6/30/23 2247)   Naloxone HCl (NARCAN) injection 2 mg (2 mg Intravenous Given 6/30/23 2240)        ED Course:    ED Course as of 06/30/23 2352 Fri Jun 30, 2023 1941 I have personally interpreted the EKG today and it shows no evidence of any acute ischemia or heart arrhythmia.  First-degree AV block [RP]   1945 Sister voices concerns relating to patient having psychiatric illness and access to narcotics.  Chronic back pain.  Patient now lives alone and she is uncertain how he is taking his medications. [RP]   1959 Discussed with nephrology on-call Dr. Lee.  He recommends bicarb drip at 3 A or 150 mill equivalents per liter and D5W, 100 mL/h [RP]   2016 Now wide-awake after Narcan. [RP]      ED Course User Index  [RP] Pete Holguin MD       Labs:    Lab Results (last 24 hours)       Procedure Component Value Units Date/Time    CBC & Differential [[id number]]  (Abnormal) Collected: 06/30/23 1836    Specimen: Blood from Arm, Left Updated: 06/30/23 1916    Narrative:      The following orders were created for panel order CBC & Differential.  Procedure                               Abnormality         Status                     ---------                               -----------         ------                     CBC Auto Differential[[id number]]        Abnormal            Final result               Scan Slide[[id number]]                                        Final result                 Please view results for these tests on the individual orders.    Comprehensive Metabolic Panel [[id number]]  (Abnormal) Collected: 06/30/23 1836    Specimen: Blood from Arm, Left Updated: 06/30/23 1948     Glucose 166 mg/dL      BUN 80 mg/dL      Creatinine 4.97 mg/dL      Sodium 128 mmol/L      Potassium 6.6 mmol/L      Comment: Slight hemolysis detected by analyzer. Results may be affected.Verified by repeat analysis.        Chloride 96 mmol/L      CO2 11.9 mmol/L      Calcium 8.9 mg/dL      Total Protein 6.9 g/dL      Albumin 3.9 g/dL      ALT (SGPT) 35 U/L      AST (SGOT) 76 U/L      Alkaline Phosphatase 136 U/L      Total Bilirubin 0.4 mg/dL      Globulin 3.0 gm/dL      A/G Ratio 1.3 g/dL      BUN/Creatinine Ratio 16.1     Anion Gap 20.1 mmol/L      eGFR 12.5 mL/min/1.73      Comment: <15 Indicative of kidney failure       Narrative:      GFR Normal >60  Chronic Kidney Disease <60  Kidney Failure <15      Single High Sensitivity Troponin T [[id number]]  (Abnormal) Collected: 06/30/23 1836    Specimen: Blood from Arm, Left Updated: 06/30/23 1948     HS Troponin T 60 ng/L      Comment: Verified by repeat analysis.       Narrative:      High Sensitive Troponin T Reference Range:  <10.0 ng/L- Negative Female for AMI  <15.0 ng/L- Negative Male for AMI  >=10 - Abnormal Female indicating possible myocardial injury.  >=15 - Abnormal Male indicating possible myocardial injury.   Clinicians would have to utilize clinical acumen, EKG, Troponin, and serial changes to determine if it is an Acute Myocardial Infarction or myocardial injury due to an underlying chronic condition.         Magnesium [[id number]]  (Abnormal) Collected: 06/30/23 1836    Specimen: Blood from Arm, Left Updated: 06/30/23 1924     Magnesium 3.2 mg/dL     CBC Auto Differential [[id number]]  (Abnormal) Collected: 06/30/23 1836    Specimen: Blood from Arm, Left Updated: 06/30/23 1916     WBC  12.79 10*3/mm3      RBC 3.66 10*6/mm3      Hemoglobin 10.8 g/dL      Hematocrit 32.6 %      MCV 89.1 fL      MCH 29.5 pg      MCHC 33.1 g/dL      RDW 13.3 %      RDW-SD 43.8 fl      MPV 10.8 fL      Platelets 188 10*3/mm3      Neutrophil % 87.5 %      Lymphocyte % 5.2 %      Monocyte % 6.9 %      Eosinophil % 0.1 %      Basophil % 0.1 %      Immature Grans % 0.2 %      Neutrophils, Absolute 11.19 10*3/mm3      Lymphocytes, Absolute 0.67 10*3/mm3      Monocytes, Absolute 0.88 10*3/mm3      Eosinophils, Absolute 0.01 10*3/mm3      Basophils, Absolute 0.01 10*3/mm3      Immature Grans, Absolute 0.03 10*3/mm3      nRBC 0.0 /100 WBC     Protime-INR [[id number]]  (Normal) Collected: 06/30/23 1836    Specimen: Blood from Arm, Left Updated: 06/30/23 1908     Protime 14.6 Seconds      INR 1.14    Narrative:      Suggested Therapeutic Ranges For Oral Anticoagulant Therapy:  Level of Therapy                      INR Target Range  Standard Dose                            2.0-3.0  High Dose                                2.5-3.5  Patients not receiving anticoagulant  Therapy Normal Range                     0.86-1.15    aPTT [[id number]]  (Abnormal) Collected: 06/30/23 1836    Specimen: Blood from Arm, Left Updated: 06/30/23 1908     PTT 20.5 seconds     Scan Slide [[id number]] Collected: 06/30/23 1836    Specimen: Blood from Arm, Left Updated: 06/30/23 1916     RBC Morphology Normal     WBC Morphology Normal     Platelet Estimate Adequate    BNP [[id number]]  (Abnormal) Collected: 06/30/23 1836    Specimen: Blood from Arm, Left Updated: 06/30/23 2004     proBNP 1,532.0 pg/mL     Narrative:      Among patients with dyspnea, NT-proBNP is highly sensitive for the detection of acute congestive heart failure. In addition NT-proBNP of <300 pg/ml effectively rules out acute congestive heart failure with 99% negative predictive value.    Results may be falsely decreased if patient taking Biotin.      CK [[id number]]  (Abnormal) Collected:  06/30/23 1836    Specimen: Blood from Arm, Left Updated: 06/30/23 2025     Creatine Kinase 4,203 U/L     POC Glucose Once [[id number]]  (Abnormal) Collected: 06/30/23 2117    Specimen: Blood Updated: 06/30/23 2151     Glucose 106 mg/dL      Comment: Serial Number: [id number]:  [id number]       VBG with Co-Ox and Electrolytes [[id number]]  (Abnormal) Collected: 06/30/23 2136    Specimen: Venous Blood Updated: 06/30/23 2141     pH, Venous 7.170 pH Units      pCO2, Venous 31.1 mm Hg      pO2, Venous 38.1 mm Hg      HCO3, Venous 11.1 mmol/L      Base Excess, Venous -16.1 mmol/L      O2 Saturation, Venous 64.8 %      Hemoglobin, Blood Gas 9.9 g/dL      Carboxyhemoglobin 1.7 %      Methemoglobin 0.20 %      Oxyhemoglobin 63.6 %      FHHB 34.5 %      Note --     Site Venous     Modality Room Air     FIO2 21 %      Flow Rate --     Sodium, Venous 131.0 mmol/L      Potassium, Venous 4.9 mmol/L      Ionized Calcium, Arterial 1.09 mmol/L      Chloride, Venous  101 mmol/L      Glucose, Arterial 218 mg/dL      Lactate, Arterial 0.88 mmol/L     High Sensitivity Troponin T 2Hr [[id number]]  (Abnormal) Collected: 06/30/23 2137    Specimen: Blood Updated: 06/30/23 2234     HS Troponin T 58 ng/L      Troponin T Delta -2 ng/L     Narrative:      High Sensitive Troponin T Reference Range:  <10.0 ng/L- Negative Female for AMI  <15.0 ng/L- Negative Male for AMI  >=10 - Abnormal Female indicating possible myocardial injury.  >=15 - Abnormal Male indicating possible myocardial injury.   Clinicians would have to utilize clinical acumen, EKG, Troponin, and serial changes to determine if it is an Acute Myocardial Infarction or myocardial injury due to an underlying chronic condition.         Basic Metabolic Panel [[id number]]  (Abnormal) Collected: 06/30/23 2137    Specimen: Blood Updated: 06/30/23 2209     Glucose 256 mg/dL      BUN 84 mg/dL      Creatinine 4.67 mg/dL      Sodium 127 mmol/L      Potassium 5.2 mmol/L      Chloride 96 mmol/L       CO2 13.2 mmol/L      Calcium 8.0 mg/dL      BUN/Creatinine Ratio 18.0     Anion Gap 17.8 mmol/L      eGFR 13.5 mL/min/1.73      Comment: <15 Indicative of kidney failure       Narrative:      GFR Normal >60  Chronic Kidney Disease <60  Kidney Failure <15      POC Glucose Once [[id number]]  (Abnormal) Collected: 06/30/23 2151    Specimen: Blood Updated: 06/30/23 2153     Glucose 212 mg/dL      Comment: Serial Number: [id number]:  [id number]                Imaging:    CT Angiogram Neck    Result Date: 6/30/2023  PROCEDURE: CT ANGIOGRAM NECK, 6/30/2023, 18:58 CT ANGIOGRAM HEAD W AI ANALYSIS OF LVO, 6/30/2023, 18:58  COMPARISON: None  INDICATIONS: Neuro deficit, acute, stroke suspected  PROTOCOL:   Standard imaging protocol performed    RADIATION:   DLP: 968.5 mGy*cm   Automated exposure control was utilized to minimize radiation dose. CONTRAST: 100 cc Isovue 370 I.V.  TECHNIQUE: After obtaining the patient's consent, CT images of the neck were obtained without and with non-ionic intravenous contrast material. Multi-planar reformatted/3-D images were created to optimize visualization of vascular anatomy. Unless otherwise stated in this report, all vascular stenoses involving the internal carotid arteries reported for this examination are derived by dividing the lesion diameter by the diameter of the normal internal carotid artery more distally.  FINDINGS:  The aortic arch is unremarkable.  There is 2 vessel arch anatomy.  The right brachiocephalic and bilateral subclavian arteries appear widely patent.  There is some motion artifact.  Bilateral common carotid arteries, external carotid arteries, carotid bifurcations and cervical internal carotid arteries appear within normal limits.  Intracranial ICA segments appear normal.  There is significant venous contamination intracranially.  The A1 and M1 segments and distal SULTANA and MCA branches appear patent.  There is a patent anterior communicating artery.  There  are patent bilateral posterior communicating arteries.  Vertebral arteries appear normal.  Vertebral arteries are codominant.  The basilar artery, superior cerebellar and posterior cerebral arteries appear patent.  There are no acute intracranial findings.  Orbits appear unremarkable.  There is mild multifocal paranasal sinus mucosal disease.  Mastoid air cells appear well-aerated.  There is no evidence of a neck mass or adenopathy.  The lungs are hypoinflated and there are scattered areas of AC lung density.  Superior mediastinal structures are unremarkable.  The thyroid is largely obscured by motion.  Salivary glands appear symmetric.  No focal soft tissue inflammation.  There are cervical degenerative changes.  No acute osseous abnormality.        1. No significant vascular abnormality in the head or the neck. 2. Cervical degenerative changes.     MARIZA KIRAN MD       Electronically Signed and Approved By: MARIZA KIRAN MD on 6/30/2023 at 20:22             XR Chest 1 View    Result Date: 6/30/2023  PROCEDURE: XR CHEST 1 VW  COMPARISON: [facility], CT, CT ANGIOGRAM NECK, 6/30/2023, 18:58.  [facility], CR, XR CHEST 1 VW, 4/30/2023, 14:04.  INDICATIONS: Acute Stroke Protocol (Onset < 12 hrs)  FINDINGS:  There are new hazy lung densities.  No focal consolidation.  No pneumothorax or pleural effusion.  Lung volumes are low.  Heart size is normal.  Pulmonary vasculature is partially obscured.       Hazy lung densities that could be related to hypoinflation or pneumonitis or edema.       MARIZA KIRAN MD       Electronically Signed and Approved By: MARIZA KIRAN MD on 6/30/2023 at 20:18             CT Head Without Contrast Stroke Protocol    Result Date: 6/30/2023  PROCEDURE: CT HEAD WO CONTRAST STROKE PROTOCOL  COMPARISON:  [facility], CT, CT HEAD WO CONTRAST, 4/30/2023, 15:12. INDICATIONS: Neuro deficit, acute, stroke suspected  PROTOCOL:   Standard imaging protocol  performed    RADIATION:   DLP: 1145.2 mGy*cm   MA and/or KV was adjusted to minimize radiation dose.     TECHNIQUE: After obtaining the patient's consent, CT images were obtained without non-ionic intravenous contrast material.  FINDINGS:  Superficial soft tissues appear unremarkable.  The calvarium is intact.  There is a small amount of mucus in the right maxillary sinus.  The ventricles appear normal in size and configuration for patient's age. There is no evidence of herniation, hydrocephalus or mass effect. Gray-white differentiation appears intact. There are no focal areas of hypoattenuation within the brain parenchyma. There is no evidence of acute intracranial hemorrhage. The orbits appear unremarkable.        No acute intracranial abnormality.     MARIZA KIRAN MD       Electronically Signed and Approved By: MARIZA KIRAN MD on 6/30/2023 at 19:14             CT Angiogram Head w AI Analysis of LVO    Result Date: 6/30/2023  PROCEDURE: CT ANGIOGRAM NECK, 6/30/2023, 18:58 CT ANGIOGRAM HEAD W AI ANALYSIS OF LVO, 6/30/2023, 18:58  COMPARISON: None  INDICATIONS: Neuro deficit, acute, stroke suspected  PROTOCOL:   Standard imaging protocol performed    RADIATION:   DLP: 968.5 mGy*cm   Automated exposure control was utilized to minimize radiation dose. CONTRAST: 100 cc Isovue 370 I.V.  TECHNIQUE: After obtaining the patient's consent, CT images of the neck were obtained without and with non-ionic intravenous contrast material. Multi-planar reformatted/3-D images were created to optimize visualization of vascular anatomy. Unless otherwise stated in this report, all vascular stenoses involving the internal carotid arteries reported for this examination are derived by dividing the lesion diameter by the diameter of the normal internal carotid artery more distally.  FINDINGS:  The aortic arch is unremarkable.  There is 2 vessel arch anatomy.  The right brachiocephalic and bilateral subclavian arteries appear widely  patent.  There is some motion artifact.  Bilateral common carotid arteries, external carotid arteries, carotid bifurcations and cervical internal carotid arteries appear within normal limits.  Intracranial ICA segments appear normal.  There is significant venous contamination intracranially.  The A1 and M1 segments and distal SULTANA and MCA branches appear patent.  There is a patent anterior communicating artery.  There are patent bilateral posterior communicating arteries.  Vertebral arteries appear normal.  Vertebral arteries are codominant.  The basilar artery, superior cerebellar and posterior cerebral arteries appear patent.  There are no acute intracranial findings.  Orbits appear unremarkable.  There is mild multifocal paranasal sinus mucosal disease.  Mastoid air cells appear well-aerated.  There is no evidence of a neck mass or adenopathy.  The lungs are hypoinflated and there are scattered areas of AC lung density.  Superior mediastinal structures are unremarkable.  The thyroid is largely obscured by motion.  Salivary glands appear symmetric.  No focal soft tissue inflammation.  There are cervical degenerative changes.  No acute osseous abnormality.        1. No significant vascular abnormality in the head or the neck. 2. Cervical degenerative changes.     MARIZA KIRAN MD       Electronically Signed and Approved By: MARIZA KIRAN MD on 6/30/2023 at 20:22             CT CEREBRAL PERFUSION WITH & WITHOUT CONTRAST    Result Date: 6/30/2023  PROCEDURE: CT CEREBRAL PERFUSION W WO CONTRAST  COMPARISON: CT, CT ANGIOGRAM HEAD W AI ANALYSIS OF LVO, 6/30/2023, 18:58.  INDICATIONS: Neuro deficit, acute, stroke suspected  PROTOCOL:   Standard imaging protocol performed    RADIATION:   DLP: 1949 mGy*cm   Automated exposure control was utilized to minimize radiation dose. CONTRAST: 80 cc Isovue 370 I.V.   FINDINGS: Cerebral blood flow less than 30% is 0 mL and T-max greater than 6 seconds is 157 mL with a mismatch  volume of 157 mm.  This appears to be artifactual.  There is also significant artifact on the inferior set of cerebral blood volume imaging.  There is no definite core infarct or definite brain at risk.       Artifactual perfusion abnormalities in the brain without definite core infarct or true brain at risk.      MARIZA KIRAN MD       Electronically Signed and Approved By: MARIZA KIRAN MD on 6/30/2023 at 19:29                Differential Diagnosis and Discussion:    Altered Mental Status: Based on the patient's signs and symptoms, differential diagnosis includes but is not limited to meningitis, stroke, sepsis, subarachnoid hemorrhage, intracranial bleeding, encephalitis, and metabolic encephalopathy.    All labs were reviewed and interpreted by me.  All X-rays impressions were independently interpreted by me.  EKG was interpreted by me.  CT scan radiology impression was interpreted by me.    MDM     Amount and/or Complexity of Data Reviewed  Decide to obtain previous medical records or to obtain history from someone other than the patient: yes         Critical Care Note: Total Critical Care time of 45 minutes. Total critical care time documented does not include time spent on separately billed procedures for services of nurses or physician assistants. I personally saw and examined the patient. I have reviewed all diagnostic interpretations and treatment plans as written. I was present for the key portions of any procedures performed and the inclusive time noted in any critical care statement. Critical care time includes patient management by me, time spent at the patients bedside,  time to review lab and imaging results, discussing patient care, documentation in the medical record, and time spent with family or caregiver.    Patient Care Considerations:    MRI: I considered ordering an MRI however patient has resolution of neurologic symptoms after Narcan.  No focal deficits.      Consultants/Shared Management  Plan:    Hospitalist: This case discussed with Dr. Stephens for admission.  He agrees to admit this patient to the hospital.  Consultant: I discussed this case with on-call nephrology, Dr. Lee.  He agrees to consult on this patient as an admission.      Social Determinants of Health:    Patient has presented with family members who are responsible, reliable and will ensure follow up care.      Disposition and Care Coordination:    Admit:   Through independent evaluation of the patient's history, physical, and imperical data, the patient meets criteria for observation/admission to the hospital.        Final diagnoses:   Acute renal failure, unspecified acute renal failure type   Altered mental status, unspecified altered mental status type   At risk for polypharmacy   Non-traumatic rhabdomyolysis        ED Disposition       ED Disposition   Decision to Admit    Condition   --    Comment   Level of Care: Telemetry [5]   Diagnosis: Acute renal failure, unspecified acute renal failure type [[id number]]   Certification: I Certify That Inpatient Hospital Services Are Medically Necessary For Greater Than 2 Midnights                 This medical record created using voice recognition software.             Pete Holguin MD  06/30/23 2352      Electronically signed by Pete Holguin MD at 06/30/23 2352       Physician Progress Notes (last 24 hours)  Notes from 06/30/23 0932 through 07/01/23 0932   No notes of this type exist for this encounter.          Consult Notes (last 24 hours)        Eliud Lee MD at 07/01/23 0808            Patient Care Team:  Felix Atkinson MD as PCP - General  Felix Atkinson MD as PCP - Family Medicine    Chief complaint: MK/CKD3b    Subjective     History of Present Illness  63yo with h/o CKD followed with Dr. Mckenzie in our office.  He had been seen in clinic last week.  Had been in hospital with recurrent syncope and creatinine at that time 2.5.  This had improved to 1.9 at discharge  last month.  Had been on lisinopril, jardiance, lasix with kerendia recently added.  He had weakness at home and family called EMS.  He was very somnolent in ER with constricted pupils and reported had taken more medication than usual.  He had noted creatinine of 4.9 but had initial concerns for CVA so had CTA done in ER.  He is awake and alert now, slightly somnolent but poor historian.  Marley in place.        Review of Systems   Constitutional:  Positive for fatigue. Negative for chills.   Respiratory:  Negative for cough, chest tightness and shortness of breath.    Cardiovascular:  Positive for leg swelling. Negative for chest pain.   Gastrointestinal:  Negative for constipation, diarrhea, nausea and vomiting.   Musculoskeletal:  Negative for back pain and neck pain.   Skin:  Negative for rash.   Neurological:  Positive for weakness. Negative for seizures and headaches.   Psychiatric/Behavioral:  Positive for confusion.       Past Medical History:   Diagnosis Date    Acute kidney injury     2021 POST SEIZURES    Astigmatism of both eyes     eyes twitch left and right    Bipolar disorder     Charcot ankle     Closed nondisplaced fracture of medial malleolus of left tibia     COPD (chronic obstructive pulmonary disease)     USES INHALERS    Diabetes     BG RUNS AROUND 90'S IN AM    Hx of schizophrenia     Hyperlipidemia     Hypertension     SEEN DR ROD IN THE PAST, HAD APPT WITH DR MICHAUD IN 5-2022 CX APPT, DENIED CP/SOB    Neuropathy     Seizures     LAST ONE 4/21/22    Sleep apnea     DOES NOT USE CPAP    Varicose vein of leg    ,   Past Surgical History:   Procedure Laterality Date    ANKLE OPEN REDUCTION INTERNAL FIXATION Left 04/25/2022    Procedure: LEFT OPEN REDUCTION INTERNAL FIXATION DISTAL TIBIA FRACTURE ;  Surgeon: Tha Parry MD;  Location: [facility] OR [facility];  Service: Orthopedics;  Laterality: Left;    ANKLE OPEN REDUCTION INTERNAL FIXATION Left 06/01/2022    Procedure: LEFT ANKLE OPEN REDUCTION  INTERNAL FIXATION WITH SYNDESMOSIS FIXATION;  Surgeon: Tha Parry MD;  Location: [facility] MAIN OR;  Service: Orthopedics;  Laterality: Left;    CHOLECYSTECTOMY      COLONOSCOPY      JOINT REPLACEMENT      RTKR    LUMBAR DISC SURGERY      SHOULDER SURGERY Right    ,   Family History   Problem Relation Age of Onset    Kidney disease Mother     COPD Mother     COPD Father     Heart disease Father     Malig Hyperthermia Neg Hx    ,   Social History     Socioeconomic History    Marital status:    Tobacco Use    Smoking status: Former     Packs/day: 0.50     Years: 35.00     Pack years: 17.50     Types: Cigarettes     Quit date:      Years since quittin.5    Smokeless tobacco: Never   Vaping Use    Vaping Use: Never used   Substance and Sexual Activity    Alcohol use: Yes     Comment: OCCASIONAL    Drug use: Never    Sexual activity: Defer     E-cigarette/Vaping    E-cigarette/Vaping Use Never User      E-cigarette/Vaping Substances    Nicotine No     THC No     CBD No     Flavoring No      E-cigarette/Vaping Devices    Disposable No     Pre-filled or Refillable Cartridge No     Refillable Tank No     Pre-filled Pod No          ,   Medications Prior to Admission   Medication Sig Dispense Refill Last Dose    amLODIPine (NORVASC) 5 MG tablet Take 1 tablet by mouth Daily.   2023    aspirin EC (Ecotrin) 325 MG tablet Take 1 tablet by mouth Daily. 14 tablet 0 2023    atenolol (TENORMIN) 25 MG tablet Take 1 tablet by mouth Daily.   2023    atorvastatin (LIPITOR) 40 MG tablet Take 1 tablet by mouth Every Night.   2023    cyclobenzaprine (FLEXERIL) 5 MG tablet Take 1 tablet by mouth 3 (Three) Times a Day As Needed.   Past Week    empagliflozin (JARDIANCE) 25 MG tablet tablet Take 1 tablet by mouth Daily.   2023    escitalopram (LEXAPRO) 20 MG tablet Take 1 tablet by mouth Daily.   2023    furosemide (LASIX) 20 MG tablet Take 1 tablet by mouth Daily.   2023    glipizide  (GLUCOTROL XL) 10 MG 24 hr tablet Take 1 tablet by mouth 2 (Two) Times a Day. INSTRUCTED PER ANESTHESIA PROTOCOL   6/30/2023    hydrOXYzine (ATARAX) 50 MG tablet Take 1 tablet by mouth 3 (Three) Times a Day.   6/30/2023    lamoTRIgine (LaMICtal) 200 MG tablet Take 1 tablet by mouth 2 (Two) Times a Day.   6/30/2023    lisinopril (PRINIVIL,ZESTRIL) 30 MG tablet Take 1 tablet by mouth Daily.   6/30/2023    multivitamin with minerals tablet tablet Take 1 tablet by mouth Daily.   6/30/2023    oxyCODONE-acetaminophen (PERCOCET) 7.5-325 MG per tablet Take 1-2 tablets by mouth Every 4 (Four) Hours As Needed for Moderate Pain . 36 tablet 0 6/30/2023    pantoprazole (PROTONIX) 40 MG EC tablet Take 1 tablet by mouth 2 (Two) Times a Day.   6/30/2023    pregabalin (LYRICA) 100 MG capsule Take 1 capsule by mouth 4 (Four) Times a Day.   6/30/2023    QUEtiapine (SEROquel) 300 MG tablet Take 2 tablets by mouth Every Night.   6/29/2023    Semaglutide,0.25 or 0.5MG/DOS, (Ozempic, 0.25 or 0.5 MG/DOSE,) 2 MG/1.5ML solution pen-injector Inject 5 mg under the skin into the appropriate area as directed 1 (One) Time Per Week. INSTRUCTED PER ANESTHESIA STANDING ORDERS   Past Week    Trulance 3 MG tablet Take 1 tablet by mouth Daily.   6/30/2023    zolpidem (AMBIEN) 10 MG tablet Take 1 tablet by mouth Every Night.   6/29/2023    albuterol sulfate  (90 Base) MCG/ACT inhaler Inhale 2 puffs Every 4 (Four) Hours As Needed.   Unknown    Kerendia 20 MG tablet Take 1 tablet by mouth Daily.       lubiprostone (AMITIZA) 24 MCG capsule Take 1 capsule by mouth 2 (Two) Times a Day With Meals.   Unknown    NovoLOG Mix 70/30 FlexPen (70-30) 100 UNIT/ML suspension pen-injector injection REPORTS SLIDING SCALE [id number]. INSTRUCTED PER ANESTHESIA PROTOCOL IF BS GREATER THEN 140 CAN TAKE 1/2 OF THE DOSE   Unknown    Stiolto Respimat 2.5-2.5 MCG/ACT aerosol solution inhaler Inhale 1 puff Daily.   Unknown   , Scheduled Meds:  atenolol, 25 mg,  Oral, Daily  calcium gluconate, 1 g, Intravenous, Once  dextrose, 25 g, Intravenous, Once  escitalopram, 20 mg, Oral, Daily  furosemide, 40 mg, Intravenous, Once  heparin (porcine), 5,000 Units, Subcutaneous, Q8H  insulin lispro, 2-7 Units, Subcutaneous, 4x Daily AC & at Bedtime  insulin regular, 5 Units, Intravenous, Once  levETIRAcetam, 500 mg, Intravenous, Q12H  pantoprazole, 40 mg, Oral, BID  senna-docusate sodium, 2 tablet, Oral, BID  sodium chloride, 500 mL, Intravenous, Once  sodium chloride, 10 mL, Intravenous, Q12H   , Continuous Infusions:  sodium bicarbonate, 150 mEq   , PRN Meds:    senna-docusate sodium **AND** polyethylene glycol **AND** bisacodyl **AND** bisacodyl    dextrose    dextrose    glucagon (human recombinant)    nitroglycerin    sodium chloride    sodium chloride    sodium chloride    sodium chloride, and Allergies:  Patient has no known allergies.    Objective     Vital Signs  Temp:  [97.6 °F (36.4 °C)-98.6 °F (37 °C)] 97.7 °F (36.5 °C)  Heart Rate:  [60-67] 67  Resp:  [16-21] 16  BP: ()/(38-56) 128/56    No intake/output data recorded.  I/O last 3 completed shifts:  In: 1550 [IV Piggyback:1550]  Out: 1775 [Urine:1775]    Physical Exam  Constitutional:       Appearance: He is obese.   HENT:      Head: Normocephalic.      Nose: Nose normal.      Mouth/Throat:      Mouth: Mucous membranes are moist.   Eyes:      General: No scleral icterus.     Pupils: Pupils are equal, round, and reactive to light.   Cardiovascular:      Rate and Rhythm: Normal rate and regular rhythm.      Pulses: Normal pulses.   Pulmonary:      Effort: Pulmonary effort is normal.      Breath sounds: Rhonchi present.   Abdominal:      General: Abdomen is flat. Bowel sounds are normal.   Musculoskeletal:         General: Normal range of motion.      Cervical back: Normal range of motion.      Right lower leg: Edema present.      Left lower leg: Edema present.   Skin:     General: Skin is warm and dry.   Neurological:       General: No focal deficit present.      Mental Status: He is alert.       Results Review:    I reviewed the patient's new clinical results.    WBC WBC   Date Value Ref Range Status   07/01/2023 11.21 (H) 3.40 - 10.80 10*3/mm3 Final   06/30/2023 12.79 (H) 3.40 - 10.80 10*3/mm3 Final      HGB Hemoglobin   Date Value Ref Range Status   07/01/2023 9.6 (L) 13.0 - 17.7 g/dL Final   06/30/2023 10.8 (L) 13.0 - 17.7 g/dL Final      HCT Hematocrit   Date Value Ref Range Status   07/01/2023 29.5 (L) 37.5 - 51.0 % Final   06/30/2023 32.6 (L) 37.5 - 51.0 % Final      Platlets No results found for: LABPLAT   MCV MCV   Date Value Ref Range Status   07/01/2023 88.9 79.0 - 97.0 fL Final   06/30/2023 89.1 79.0 - 97.0 fL Final          Sodium Sodium   Date Value Ref Range Status   07/01/2023 129 (L) 136 - 145 mmol/L Final   06/30/2023 127 (L) 136 - 145 mmol/L Final   06/30/2023 128 (L) 136 - 145 mmol/L Final     Sodium, Venous   Date Value Ref Range Status   06/30/2023 131.0 (L) 136 - 146 mmol/L Final      Potassium Potassium   Date Value Ref Range Status   07/01/2023 5.4 (H) 3.5 - 5.2 mmol/L Final   06/30/2023 5.2 3.5 - 5.2 mmol/L Final   06/30/2023 6.6 (C) 3.5 - 5.2 mmol/L Final     Comment:     Slight hemolysis detected by analyzer. Results may be affected.Verified by repeat analysis.     Potassium, Venous   Date Value Ref Range Status   06/30/2023 4.9 3.5 - 5.0 mmol/L Final      Chloride Chloride   Date Value Ref Range Status   07/01/2023 97 (L) 98 - 107 mmol/L Final   06/30/2023 96 (L) 98 - 107 mmol/L Final   06/30/2023 96 (L) 98 - 107 mmol/L Final     Chloride, Venous    Date Value Ref Range Status   06/30/2023 101 98 - 106 mmol/L Final      CO2 CO2   Date Value Ref Range Status   07/01/2023 13.5 (L) 22.0 - 29.0 mmol/L Final   06/30/2023 13.2 (L) 22.0 - 29.0 mmol/L Final   06/30/2023 11.9 (L) 22.0 - 29.0 mmol/L Final      BUN BUN   Date Value Ref Range Status   07/01/2023 84 (H) 8 - 23 mg/dL Final   06/30/2023 84 (H) 8 - 23  mg/dL Final   06/30/2023 80 (H) 8 - 23 mg/dL Final      Creatinine Creatinine   Date Value Ref Range Status   07/01/2023 4.95 (H) 0.76 - 1.27 mg/dL Final   06/30/2023 4.67 (H) 0.76 - 1.27 mg/dL Final   06/30/2023 4.97 (H) 0.76 - 1.27 mg/dL Final      Calcium Calcium   Date Value Ref Range Status   07/01/2023 8.2 (L) 8.6 - 10.5 mg/dL Final   06/30/2023 8.0 (L) 8.6 - 10.5 mg/dL Final   06/30/2023 8.9 8.6 - 10.5 mg/dL Final      PO4 No results found for: CAPO4   Albumin Albumin   Date Value Ref Range Status   07/01/2023 3.3 (L) 3.5 - 5.2 g/dL Final   06/30/2023 3.9 3.5 - 5.2 g/dL Final      Magnesium Magnesium   Date Value Ref Range Status   07/01/2023 3.1 (H) 1.6 - 2.4 mg/dL Final   06/30/2023 3.2 (H) 1.6 - 2.4 mg/dL Final      Uric Acid No results found for: URICACID         Assessment & Plan       Acute renal failure, unspecified acute renal failure type      Assessment & Plan  MK/CKD3b-  Due to diabetic nephropathy.  He has had worsened renal function recently.  Had been on lisinopril and SGLT2-inh.  Looks like kerendia was added also but has had hyperkalemia now.  He had CTA done in ER so may have some worsening of renal function.  Increase CPK also.  On bicarb gtt now with IV lasix given.  Had 1.7L uop recorded since admission.  Will monitor for dialysis needs but will continue medical management at this time.  Marley catheter placed.  Had d/w primary earlier today with reported decreased uop but has large amount reported on nursing records.  Will monitor with lasix.  Would continue IVF's with diuretics with rhabdo to improve tubular flow.  Likely contrast contributing with ATN from hypotension.  Hyponatremia-  improved with IVF's.  Some edema on exam and cxr with pna vs. Edema.  Will continue IV lasix as bp tolerates.  Hyperkalemia-  better, likely due to medication and cellular shift with acidosis.  Would avoid acei/arb and kerendia for now.  Added lasix also.  Met Acidosis-  will check lactate.  Likely related  to hypotension and decrease tissue perfusion.    Proteinuria-  diabetic nephropathy  DMII-  treated.  On jardiance, hold with MK.  HTN-  BP on low side.  Hold home meds.  Improved with IVF's.  Chronic edema-  Amlodipine may be contributing.    H/o bipolar-  previous lithium use.  H/o sz disorder-neurology evaluating.  Anemia-  check iron stores.  Rhabdomyolysis-  had fall at home, statin on hold.  Continue bicarb gtt with IV lasix.  Repeat level later today.  Weakness-  would avoid muscle relaxer in CKD(flexeril).  Likely polypharmacy contributing.  On percocet, ambien and lyrica also.    I discussed the patients findings and my recommendations with patient    Eliud Lee MD  07/01/23  08:08 EDT            Electronically signed by Eliud Lee MD at 07/01/23 0838       Raquel Torres MD at 06/30/23 1929          [facility]      Patient Name:   Carlos Murphy  YOB: 1960  Identification Number:   MRN - [id number]  Date of Service:   06/30/2023 18:31:33    Diagnosis:        G93.49 - Encephalopathy Multifactorial    Impression:       62 year old male who presents with altered mental status. Presentation may be due to stroke vs other metabolic or infectious encephalopathy.    Our recommendations are outlined below.    Recommendations:          Stroke/Telemetry Floor        Neuro Checks        Bedside Swallow Eval        DVT Prophylaxis        IV Fluids, Normal Saline        Head of Bed 30 Degrees        Euglycemia and Avoid Hyperthermia (PRN Acetaminophen)        Initiate or continue Aspirin 81 MG daily    Recommended Scan:       MRI Head Without Contrast       Echocardiogram - Transthoracic Echocardiogram    Lipid Panel to Be Obtained, if Not Done in the Last 30 Days    Therapies:        Physical Therapy, Occupational Therapy, Speech Therapy Assessment When Applicable    Dysphagia:        Swallow Evaluation, Bedside        NPO Until  Swallow Evaluation    DVT prophylaxis:        Choice of Primary Team    Disposition:        Neurology Follow Up Recommended    Sign Out:        Discussed with Emergency Department Provider        ------------------------------------------------------------------------------    Advanced Imaging:  CTA Head and Neck Completed.    CTP Completed.    LVO:No    Patient doesn't meet criteria for emergent PAPITO consideration      Metrics:  Last Known Well: 06/29/2023 21:00:00  [facility] Notification Time: 06/30/2023 18:30:55  Arrival Time: 06/30/2023 17:16:00  Stamp Time: 06/30/2023 18:31:33  Initial Response Time: 06/30/2023 18:39:57  Symptoms: Slurred speech and altered mental status.  Initial patient interaction: 06/30/2023 18:45:00  NIHSS Assessment Completed: 06/30/2023 19:00:00  Patient is not a candidate for Thrombolytic.  Thrombolytic Medical Decision: 06/30/2023 18:44:59  Patient was not deemed candidate for Thrombolytic because of following reasons:  Last Well Known Above 4.5 Hours.    CT head showed no acute hemorrhage or acute core infarct.    Primary Provider Notified of Diagnostic Impression and Management Plan on: 06/30/2023 19:18:55        ------------------------------------------------------------------------------    History of Present Illness:  Patient is a 62 year old Male.    Patient was brought by private transportation with symptoms of Slurred speech and altered mental status.  62 year old male with a history of DM II, HTN, and seizures who presents to the hospital because of altered mental status and slurred speech. Patient was last seen normal last night around 9pm and then all day today patient has had a staggering gait, slurred speech, and confusion.      Past Medical History:       Hypertension       Diabetes Mellitus       Seizures  Othere PMH:  COPD    Medications:    No Anticoagulant use   Antiplatelet use: Yes Aspirin  Reviewed EMR for current medications    Allergies:    Reviewed    Social History:  Smoking: No    Family History:    There is no family history of premature cerebrovascular disease pertinent to this consultation    ROS :  14 Points Review of Systems was performed and was negative except mentioned in HPI.    Past Surgical History:  There Is No Surgical History Contributory To Today’s Visit        Examination:  BP(133/110), Pulse(65),  1A: Level of Consciousness - Movements to Pain + 2  1B: Ask Month and Age - Aphasic + 2  1C: Blink Eyes & Squeeze Hands - Performs 0 Tasks + 2  2: Test Horizontal Extraocular Movements - Normal + 0  3: Test Visual Fields - Bilateral Hemianopia + 3  4: Test Facial Palsy (Use Grimace if Obtunded) - Normal symmetry + 0  5A: Test Left Arm Motor Drift - Some Effort Against Gravity + 2  5B: Test Right Arm Motor Drift - Some Effort Against Gravity + 2  6A: Test Left Leg Motor Drift - No Drift for 5 Seconds + 0  6B: Test Right Leg Motor Drift - No Drift for 5 Seconds + 0  7: Test Limb Ataxia (FNF/Heel-Shin) - No Ataxia + 0  8: Test Sensation - Normal; No sensory loss + 0  9: Test Language/Aphasia - Normal; No aphasia + 0  10: Test Dysarthria - Normal + 0  11: Test Extinction/Inattention - No abnormality + 0    NIHSS Score: 13    Pre-Morbid Modified Castalia Scale:  2 Points = Slight disability; unable to carry out all previous activities, but able to look after own affairs without assistance    I reviewed the available imaging via A.I. software Rapid and initiated discussion with the primary provider    Patient/Family was informed the Neurology Consult would occur via TeleHealth consult by way of interactive audio and video telecommunications and consented to receiving care in this manner.      Patient is being evaluated for possible acute neurologic impairment and high probability of imminent or life-threatening deterioration. I spent total of 35 minutes providing care to this patient, including time for face to face visit via telemedicine,  review of medical records, imaging studies and discussion of findings with providers, the patient and/or family.      Dr Raquel Torres      [facility]  [phone]    Case [id number]      Electronically signed by Raquel Torres MD at 06/30/23 1929

## 2023-07-01 NOTE — THERAPY EVALUATION
Acute Care - Speech Language Pathology   Swallow Initial Evaluation  [facility]     Patient Name: Carlos Murphy Jr.  : 1960  MRN: [id number]  Today's Date: 2023               Admit Date: 2023    Visit Dx:     ICD-10-CM ICD-9-CM   1. Acute renal failure, unspecified acute renal failure type  N17.9 584.9   2. Altered mental status, unspecified altered mental status type  R41.82 780.97   3. At risk for polypharmacy  Z91.89 V49.89   4. Non-traumatic rhabdomyolysis  M62.82 728.88   5. Dysphagia, unspecified type  R13.10 787.20     Patient Active Problem List   Diagnosis   • Ulcer of toe due to type 2 diabetes mellitus   • Centrilobular emphysema   • Tobacco abuse, in remission   • Obesity (BMI 30-39.9)   • Closed fracture of shaft of left fibula   • Closed nondisplaced fracture of medial malleolus of left tibia   • Aftercare following surgery of ORIF left distal tibia fracture 2022   • Colon cancer screening   • Colitis   • Acute renal failure, unspecified acute renal failure type     Past Medical History:   Diagnosis Date   • Acute kidney injury      POST SEIZURES   • Astigmatism of both eyes     eyes twitch left and right   • Bipolar disorder    • Charcot ankle    • Closed nondisplaced fracture of medial malleolus of left tibia    • COPD (chronic obstructive pulmonary disease)     USES INHALERS   • Diabetes     BG RUNS AROUND 90'S IN AM   • Hx of schizophrenia    • Hyperlipidemia    • Hypertension     SEEN DR ROD IN THE PAST, HAD APPT WITH DR MICHAUD IN  [facility] APPT, DENIED CP/SOB   • Neuropathy    • Seizures     LAST ONE 22   • Sleep apnea     DOES NOT USE CPAP   • Varicose vein of leg      Past Surgical History:   Procedure Laterality Date   • ANKLE OPEN REDUCTION INTERNAL FIXATION Left 2022    Procedure: LEFT OPEN REDUCTION INTERNAL FIXATION DISTAL TIBIA FRACTURE ;  Surgeon: Tha Parry MD;  Location: [facility] [place] [facility];  Service: Orthopedics;  Laterality: Left;   • ANKLE  OPEN REDUCTION INTERNAL FIXATION Left 06/01/2022    Procedure: LEFT ANKLE OPEN REDUCTION INTERNAL FIXATION WITH SYNDESMOSIS FIXATION;  Surgeon: Tha Parry MD;  Location: [facility] MAIN OR;  Service: Orthopedics;  Laterality: Left;   • CHOLECYSTECTOMY     • COLONOSCOPY     • JOINT REPLACEMENT      RTKR   • LUMBAR DISC SURGERY     • SHOULDER SURGERY Right        SLP Recommendation and Plan  SLP Swallowing Diagnosis: swallow WFL/no suspected pharyngeal impairment (07/01/23 0730)  SLP Diet Recommendation: regular textures, thin liquids (07/01/23 0730)  Recommended Precautions and Strategies: upright posture during/after eating, assist with feeding (07/01/23 0730)  SLP Rec. for Method of Medication Administration: meds whole, as tolerated (07/01/23 0730)     Monitor for Signs of Aspiration: notify SLP if any concerns (07/01/23 0730)     Swallow Criteria for Skilled Therapeutic Interventions Met: no problems identified which require skilled intervention (07/01/23 0730)  Anticipated Discharge Disposition (SLP): home with assist (07/01/23 1140)     Therapy Frequency (Swallow): evaluation only (07/01/23 0730)                                                  SWALLOW EVALUATION (last 72 hours)     SLP Adult Swallow Evaluation     Row Name 07/01/23 0730                   Rehab Evaluation    Document Type evaluation  -SN        Subjective Information no complaints  -SN        Patient Observations alert;cooperative  -SN        Patient Effort adequate  -SN           General Information    Current Method of Nutrition NPO  -SN        Prior Level of Function-Swallowing no diet consistency restrictions  -SN        Plans/Goals Discussed with patient  -SN        Patient's Goals for Discharge return home  -SN           Oral Motor Structure and Function    Dentition Assessment natural, present and adequate  -SN        Secretion Management WNL/WFL  -SN        Mucosal Quality dry  -SN        Gag Response WFL  -SN        Volitional  Swallow WFL  -SN        Volitional Cough WFL;non-productive  -SN           Oral Musculature and Cranial Nerve Assessment    Oral Motor General Assessment WFL  -SN           General Eating/Swallowing Observations    Respiratory Support Currently in Use room air  -SN        Eating/Swallowing Skills fed by SLP  -SN        Positioning During Eating upright 90 degree;upright in bed  -SN        Utensils Used spoon;cup;straw  -SN        Consistencies Trialed regular textures;pureed;thin liquids;nectar/syrup-thick liquids  -SN           Clinical Swallow Eval    Oral Prep Phase WFL  -SN        Oral Transit WFL  -SN        Oral Residue WFL  -SN        Pharyngeal Phase no overt signs/symptoms of pharyngeal impairment  -SN        Esophageal Phase unremarkable  -SN        Clinical Swallow Evaluation Summary Patient is a 62-year-old male admitted to [facility] on 6/30/2023 secondary to acute renal failure.  Patient has been n.p.o. secondary to decreased mental status.  Speech pathology services were consulted for evaluation of swallow function prior to initiation of p.o. diet.  During evaluation patient was alert and cooperative.  Oriented to self and environment.  Oral motor examination did not reveal any deficits.  Patient swallow consistencies at bedside within a timely manner.  Adequate manipulation of purées and regular solids.  Patient did not demonstrate any overt signs or symptoms of aspiration at bedside however cannot rule out silent aspiration.  -SN           SLP Evaluation Clinical Impression    SLP Swallowing Diagnosis swallow WFL/no suspected pharyngeal impairment  -SN        Swallow Criteria for Skilled Therapeutic Interventions Met no problems identified which require skilled intervention  -SN           Recommendations    Therapy Frequency (Swallow) evaluation only  -SN        SLP Diet Recommendation regular textures;thin liquids  -SN        Recommended Precautions and Strategies upright posture  during/after eating;assist with feeding  -SN        SLP Rec. for Method of Medication Administration meds whole;as tolerated  -SN        Monitor for Signs of Aspiration notify SLP if any concerns  -SN              User Key  (r) = Recorded By, (t) = Taken By, (c) = Cosigned By    Initials Name Effective Dates    Nidhi Preciado MS-CCC/SLP, TAVO 03/31/21 -             Patient exhibits level 7 of 7 on functional communication measures for swallowing, indicating a 0% limitation in function.    At this time, patient does not require any further skilled speech pathology services.  If patient should demonstrate a decline in status please reconsult speech pathology.    EDUCATION  The patient has been educated in the following areas:   Dysphagia (Swallowing Impairment).              Time Calculation:    Time Calculation- SLP     Row Name 07/01/23 1140             Time Calculation- SLP    SLP Start Time 0730  -SN      SLP Stop Time 0830  -SN      SLP Time Calculation (min) 60 min  -SN      SLP Received On 07/01/23  -SN         Untimed Charges    [id number] Eval Oral Pharyng Swallow Minutes 60  -SN         Total Minutes    Untimed Charges Total Minutes 60  -SN       Total Minutes 60  -SN            User Key  (r) = Recorded By, (t) = Taken By, (c) = Cosigned By    Initials Name Provider Type    Nidhi Preciado MS-CCC/SLP, TAVO Speech and Language Pathologist                Therapy Charges for Today     Code Description Service Date Service Provider Modifiers Qty    [id number] HC ST EVAL ORAL PHARYNG SWALLOW 4 7/1/2023 Nidhi Soliman MS-CCC/SLP, TAVO GN 1               OCTAVIO Vaughn/ALEXX, TAVO  7/1/2023

## 2023-07-01 NOTE — H&P
[facility]  Date: 2023   Patient Name: Carlos Murphy Jr.  : 1960  MRN: [id number]  Primary Care Physician:  Felix Atkinson MD  Date of admission: 2023    Subjective   Subjective     Chief Complaint: Altered mental status, confusion    HPI:    Carlos Murphy Jr. is a 62 y.o. male with a past medical history of COPD, seizure disorder, bipolar, hypertension, hyperlipidemia, type 2 diabetes mellitus presented to the ED for evaluation of altered mental status.  As per the patient's sister, she received a call from patient around 1530 hrs, and complaint of difficulty walking and has slurred speech, so she informed family member to call the EMS.  After coming to the ED, patient found to be very somnolent with pinpoint pupils, received Narcan with significant improvement in his mental status and he became very awake after receiving it.  As per the sister patient was normal yesterday night when she met him.  Patient is a poor historian but said that he took more medications than usual, denies any intentions to harm himself.  Was not sure if she had any episode of seizure.  Did not answer when asked whether he is taking all his medications regularly at home or not.  Stays by himself.  Denies any fevers, chills, nausea, vomiting.  Unable to to do the complete review of systems as the patient is still not oriented completely and is a poor historian.    Upon arrival to the ED, vital signs temperature 98.6, pulse 64, respiratory 20, blood pressure 82/38 on room air saturating around 95%.  Labs showed CK4 203, troponin 60, proBNP 1532, sodium 128, potassium 6.6, chloride 96, bicarb 11.9, anion gap 20.1, creatinine 4.97, 1-month ago creatinine was 1.91, magnesium 3.2, , AST 76, rest of the CMP within normal limits, WBC elevated 12.79, hemoglobin 10.8, rest of the CBC within normal limits, chest x-ray showed hazy lung densities that could be related to  hyperinflation or pneumonitis or edema.  CT head without contrast stroke protocol showed no acute intracranial abnormality.  CT cerebral perfusion with and without contrast showed artifactual perfusion abnormalities in the brain without definite core infarct or to brain at risk, CTA neck showed no significant vascular abnormality in the head and neck.  EKG showed no changes concerning for hyperkalemia.  Received 100 cc bolus in the ED, receiving treatment for hyperkalemia with dextrose, calcium gluconate, insulin regular IV.  Patient has been evaluated by tele neurologist concerning for altered mental status and stroke, recommended MRI head without contrast, TTE for further work-up for stroke and also recommended that this encephalopathy could be likely multifactorial.  Case has been discussed by the ED physician with nephrologist on-call Dr. Lee, recommended starting on bicarb drip.    Patient has been admitted for further evaluation and management of acute metabolic encephalopathy, rhabdomyolysis, concern for seizure, MK on CKD, severe hyperkalemia, moderate hyponatremia, anion gap metabolic acidosis      Personal History     Past Medical History:   Diagnosis Date   • Acute kidney injury     2021 POST SEIZURES   • Astigmatism of both eyes     eyes twitch left and right   • Bipolar disorder    • Charcot ankle    • Closed nondisplaced fracture of medial malleolus of left tibia    • COPD (chronic obstructive pulmonary disease)     USES INHALERS   • Diabetes     BG RUNS AROUND 90'S IN AM   • Hx of schizophrenia    • Hyperlipidemia    • Hypertension     SEEN DR ROD IN THE PAST, HAD APPT WITH DR MICHAUD IN 5-2022 CX APPT, DENIED CP/SOB   • Neuropathy    • Seizures     LAST ONE 4/21/22   • Sleep apnea     DOES NOT USE CPAP   • Varicose vein of leg          Past Surgical History:   Procedure Laterality Date   • ANKLE OPEN REDUCTION INTERNAL FIXATION Left 04/25/2022    Procedure: LEFT OPEN REDUCTION INTERNAL FIXATION  DISTAL TIBIA FRACTURE ;  Surgeon: Tha Parry MD;  Location: [facility];  Service: Orthopedics;  Laterality: Left;   • ANKLE OPEN REDUCTION INTERNAL FIXATION Left 2022    Procedure: LEFT ANKLE OPEN REDUCTION INTERNAL FIXATION WITH SYNDESMOSIS FIXATION;  Surgeon: Tha Parry MD;  Location: [facility] MAIN OR;  Service: Orthopedics;  Laterality: Left;   • CHOLECYSTECTOMY     • COLONOSCOPY     • JOINT REPLACEMENT      RTKR   • LUMBAR DISC SURGERY     • SHOULDER SURGERY Right          Family History   Problem Relation Age of Onset   • Kidney disease Mother    • COPD Mother    • COPD Father    • Heart disease Father    • Malig Hyperthermia Neg Hx          Social History     Socioeconomic History   • Marital status:    Tobacco Use   • Smoking status: Former     Packs/day: 0.50     Years: 35.00     Pack years: 17.50     Types: Cigarettes     Quit date:      Years since quittin.5   • Smokeless tobacco: Never   Vaping Use   • Vaping Use: Never used   Substance and Sexual Activity   • Alcohol use: Yes     Comment: OCCASIONAL   • Drug use: Never   • Sexual activity: Defer         Home Medications:  Finerenone, Plecanatide, QUEtiapine, Semaglutide(0.25 or 0.5MG/DOS), albuterol sulfate HFA, amLODIPine, aspirin, atenolol, atorvastatin, cyclobenzaprine, empagliflozin, escitalopram, furosemide, glipizide, hydrOXYzine, insulin aspart prot & aspart, lamoTRIgine, lisinopril, lubiprostone, multivitamin with minerals, oxyCODONE-acetaminophen, pantoprazole, pregabalin, tiotropium bromide-olodaterol, and zolpidem    Allergies:  No Known Allergies    Review of Systems   Unable to obtain complete review of systems    Objective   Objective     Vitals:   Temp:  [98.6 °F (37 °C)] 98.6 °F (37 °C)  Heart Rate:  [63-65] 63  Resp:  [20] 20  BP: (80-82)/(38-46) 82/38    Physical Exam    Constitutional: Awake, oriented x1 to self   Eyes: Pupils equal, sclerae anicteric, no conjunctival injection   HENT: NCAT,  mucous membranes moist   Respiratory: Clear to auscultation bilaterally, nonlabored respirations    Cardiovascular: RRR, no murmurs, rubs, or gallops   Gastrointestinal: Positive bowel sounds, soft, nontender, nondistended   Musculoskeletal: Trace bilateral lower extremity edema, no clubbing or cyanosis to extremities   Psychiatric: Appropriate affect, cooperative   Neurologic: Oriented x 3, strength symmetric in all extremities, Cranial Nerves grossly intact to confrontation, speech clear   Skin: Noted to have small skin ulcers on his toes, skin on the feet had tried blood but does not have any wounds    Result Review    Result Review:  I have personally reviewed the results from the time of this admission to 6/30/2023 23:50 EDT and agree with these findings:  [x]  Laboratory  [x]  Microbiology  [x]  Radiology  [x]  EKG/Telemetry   []  Cardiology/Vascular   []  Pathology  []  Old records   []  Other:        Imaging Results (Last 24 Hours)     Procedure Component Value Units Date/Time    CT Angiogram Head w AI Analysis of LVO [[id number]] Collected: 06/30/23 2022     Updated: 06/30/23 2025    Narrative:      PROCEDURE: CT ANGIOGRAM NECK, 6/30/2023, 18:58  CT ANGIOGRAM HEAD W AI ANALYSIS OF LVO, 6/30/2023, 18:58     COMPARISON: None     INDICATIONS: Neuro deficit, acute, stroke suspected     PROTOCOL:   Standard imaging protocol performed      RADIATION:   DLP: 968.5 mGy*cm    Automated exposure control was utilized to minimize radiation dose.   CONTRAST: 100 cc Isovue 370 I.V.     TECHNIQUE: After obtaining the patient's consent, CT images of the neck were obtained without and   with non-ionic intravenous contrast material. Multi-planar reformatted/3-D images were created to   optimize visualization of vascular anatomy. Unless otherwise stated in this report, all vascular   stenoses involving the internal carotid arteries reported for this examination are derived by   dividing the lesion diameter by the diameter of  the normal internal carotid artery more distally.     FINDINGS:   The aortic arch is unremarkable.  There is 2 vessel arch anatomy.  The right brachiocephalic and   bilateral subclavian arteries appear widely patent.  There is some motion artifact.  Bilateral   common carotid arteries, external carotid arteries, carotid bifurcations and cervical internal   carotid arteries appear within normal limits.  Intracranial ICA segments appear normal.  There is   significant venous contamination intracranially.  The A1 and M1 segments and distal SULTANA and MCA   branches appear patent.  There is a patent anterior communicating artery.  There are patent   bilateral posterior communicating arteries.  Vertebral arteries appear normal.  Vertebral arteries   are codominant.  The basilar artery, superior cerebellar and posterior cerebral arteries appear   patent.     There are no acute intracranial findings.  Orbits appear unremarkable.  There is mild multifocal   paranasal sinus mucosal disease.  Mastoid air cells appear well-aerated.  There is no evidence of a   neck mass or adenopathy.  The lungs are hypoinflated and there are scattered areas of AC lung   density.  Superior mediastinal structures are unremarkable.  The thyroid is largely obscured by   motion.  Salivary glands appear symmetric.  No focal soft tissue inflammation.  There are cervical   degenerative changes.  No acute osseous abnormality.       Impression:         1. No significant vascular abnormality in the head or the neck.  2. Cervical degenerative changes.            MARIZA KIRAN MD         Electronically Signed and Approved By: MARIZA KIRAN MD on 6/30/2023 at 20:22                     CT Angiogram Neck [[id number]] Collected: 06/30/23 2022     Updated: 06/30/23 2025    Narrative:      PROCEDURE: CT ANGIOGRAM NECK, 6/30/2023, 18:58  CT ANGIOGRAM HEAD W AI ANALYSIS OF LVO, 6/30/2023, 18:58     COMPARISON: None     INDICATIONS: Neuro deficit, acute, stroke  suspected     PROTOCOL:   Standard imaging protocol performed      RADIATION:   DLP: 968.5 mGy*cm    Automated exposure control was utilized to minimize radiation dose.   CONTRAST: 100 cc Isovue 370 I.V.     TECHNIQUE: After obtaining the patient's consent, CT images of the neck were obtained without and   with non-ionic intravenous contrast material. Multi-planar reformatted/3-D images were created to   optimize visualization of vascular anatomy. Unless otherwise stated in this report, all vascular   stenoses involving the internal carotid arteries reported for this examination are derived by   dividing the lesion diameter by the diameter of the normal internal carotid artery more distally.     FINDINGS:   The aortic arch is unremarkable.  There is 2 vessel arch anatomy.  The right brachiocephalic and   bilateral subclavian arteries appear widely patent.  There is some motion artifact.  Bilateral   common carotid arteries, external carotid arteries, carotid bifurcations and cervical internal   carotid arteries appear within normal limits.  Intracranial ICA segments appear normal.  There is   significant venous contamination intracranially.  The A1 and M1 segments and distal SULTANA and MCA   branches appear patent.  There is a patent anterior communicating artery.  There are patent   bilateral posterior communicating arteries.  Vertebral arteries appear normal.  Vertebral arteries   are codominant.  The basilar artery, superior cerebellar and posterior cerebral arteries appear   patent.     There are no acute intracranial findings.  Orbits appear unremarkable.  There is mild multifocal   paranasal sinus mucosal disease.  Mastoid air cells appear well-aerated.  There is no evidence of a   neck mass or adenopathy.  The lungs are hypoinflated and there are scattered areas of AC lung   density.  Superior mediastinal structures are unremarkable.  The thyroid is largely obscured by   motion.  Salivary glands appear  symmetric.  No focal soft tissue inflammation.  There are cervical   degenerative changes.  No acute osseous abnormality.       Impression:         1. No significant vascular abnormality in the head or the neck.  2. Cervical degenerative changes.            MARIZA KIRAN MD         Electronically Signed and Approved By: MARIZA KIRAN MD on 6/30/2023 at 20:22                     XR Chest 1 View [[id number]] Collected: 06/30/23 2018     Updated: 06/30/23 2022    Narrative:      PROCEDURE: XR CHEST 1 VW     COMPARISON: [facility], CT, CT ANGIOGRAM NECK, 6/30/2023, 18:58.  [facility], CR, XR CHEST 1 VW, 4/30/2023, 14:04.     INDICATIONS: Acute Stroke Protocol (Onset < 12 hrs)     FINDINGS:   There are new hazy lung densities.  No focal consolidation.  No pneumothorax or pleural effusion.    Lung volumes are low.  Heart size is normal.  Pulmonary vasculature is partially obscured.       Impression:       Hazy lung densities that could be related to hypoinflation or pneumonitis or edema.                  MARIZA KIRAN MD         Electronically Signed and Approved By: MARIZA KIRAN MD on 6/30/2023 at 20:18                     CT CEREBRAL PERFUSION WITH & WITHOUT CONTRAST [[id number]] Collected: 06/30/23 1930     Updated: 06/30/23 1933    Narrative:      PROCEDURE: CT CEREBRAL PERFUSION W WO CONTRAST     COMPARISON: CT, CT ANGIOGRAM HEAD W AI ANALYSIS OF LVO, 6/30/2023, 18:58.     INDICATIONS: Neuro deficit, acute, stroke suspected     PROTOCOL:   Standard imaging protocol performed      RADIATION:   DLP: 1949 mGy*cm    Automated exposure control was utilized to minimize radiation dose.   CONTRAST: 80 cc Isovue 370 I.V.        FINDINGS: Cerebral blood flow less than 30% is 0 mL and T-max greater than 6 seconds is 157 mL with   a mismatch volume of 157 mm.  This appears to be artifactual.  There is also significant artifact   on the inferior set of cerebral blood volume imaging.  There is no  definite core infarct or   definite brain at risk.       Impression:       Artifactual perfusion abnormalities in the brain without definite core infarct or true   brain at risk.               MARIZA KIRAN MD         Electronically Signed and Approved By: MARIZA KIRAN MD on 6/30/2023 at 19:29                     CT Head Without Contrast Stroke Protocol [[id number]] Collected: 06/30/23 1915     Updated: 06/30/23 1918    Narrative:      PROCEDURE: CT HEAD WO CONTRAST STROKE PROTOCOL     COMPARISON:  [facility], CT, CT HEAD WO CONTRAST, 4/30/2023, 15:12.  INDICATIONS: Neuro deficit, acute, stroke suspected     PROTOCOL:   Standard imaging protocol performed      RADIATION:   DLP: 1145.2 mGy*cm    MA and/or KV was adjusted to minimize radiation dose.          TECHNIQUE: After obtaining the patient's consent, CT images were obtained without non-ionic   intravenous contrast material.      FINDINGS:   Superficial soft tissues appear unremarkable.  The calvarium is intact.  There is a small amount of   mucus in the right maxillary sinus.  The ventricles appear normal in size and configuration for   patient's age. There is no evidence of herniation, hydrocephalus or mass effect. Gray-white   differentiation appears intact. There are no focal areas of hypoattenuation within the brain   parenchyma. There is no evidence of acute intracranial hemorrhage. The orbits appear unremarkable.          Impression:       No acute intracranial abnormality.            MARIZA KIRAN MD         Electronically Signed and Approved By: MARIZA KIRAN MD on 6/30/2023 at 19:14                            [START ON 7/1/2023] atenolol, 25 mg, Oral, Daily  [START ON 7/1/2023] escitalopram, 20 mg, Oral, Daily  [START ON 7/1/2023] heparin (porcine), 5,000 Units, Subcutaneous, Q8H  [START ON 7/1/2023] insulin lispro, 2-7 Units, Subcutaneous, 4x Daily AC & at Bedtime  [START ON 7/1/2023] lamoTRIgine, 200 mg, Oral, BID  [START ON  7/1/2023] pantoprazole, 40 mg, Oral, BID  [START ON 7/1/2023] senna-docusate sodium, 2 tablet, Oral, BID  [START ON 7/1/2023] sodium chloride, 10 mL, Intravenous, Q12H         Assessment & Plan   Assessment / Plan     Assessment/Plan:   Acute metabolic encephalopathy  MK on CKD  Severe hyperkalemia-improving  Possible seizure episode  Rhabdomyolysis  Moderate hyponatremia-clinically significant  Anion gap metabolic acidosis  Concern for ischemic stroke  History of COPD  Type 2 diabetes mellitus  Seizure disorder  Bipolar disorder  Hyperlipidemia    Plan  Admit to inpatient, telemetry  Receiving treatment for hyperkalemia with calcium gluconate, insulin regular, dextrose  Ordered 1 amp of bicarb, continue on bicarb drip  Monitor urine output  Monitor electrolytes, kidney function with a.m. labs  MRI of the brain without contrast  TTE with bubble study  Neurochecks every 4 hours  Cerebell in the ED did not show any seizure burden  EEG  Neurology consult in the a.m.  Low-dose sliding scale insulin  Continue home Lamictal, citalopram, atenolol  Resume other home medications once reconciled and appropriate  Avoid nephrotoxic agents  Nephrology consult in the a.m. Dr. Lee is aware of the patient    DVT prophylaxis:  Medical DVT prophylaxis orders are present.    CODE STATUS:    Level Of Support Discussed With: Patient  Code Status (Patient has no pulse and is not breathing): CPR (Attempt to Resuscitate)  Medical Interventions (Patient has pulse or is breathing): Full Support      Admission Status:  I believe this patient meets inpatient status.    Part of this note may be an electronic transcription/translation of spoken language to printed text using the Dragon Dictation System    Hazel Stephens MD

## 2023-07-01 NOTE — PLAN OF CARE
Goal Outcome Evaluation:      Pt is difficult to awaken.  Snoring while sleeping. Will awaken with vigorous shaking and name calling. Pt reported pain in his left shoulder and was unable to lift it up. Has multiple abrasions to his legs from falls. Difficult for Pt to stay awake long enough to answer questions. Frequently drifting off to sleep.                    Neonatologist

## 2023-07-01 NOTE — PROGRESS NOTES
[facility]   Hospitalist Progress Note  Date: 2023  Patient Name: Carlos Murphy Jr.  : 1960  MRN: [id number]  Date of admission: 2023      Subjective   Subjective     Chief Complaint: Confusion and Fall at home     Summary:   62 y.o. male with a past medical history of COPD, seizure disorder, bipolar, hypertension, hyperlipidemia, type 2 diabetes mellitus presented to the ED for evaluation of altered mental status.  As per the patient's sister, she received a call from patient around 1530 hrs, and complaint of difficulty walking and has slurred speech, so she informed family member to call the EMS.  After coming to the ED, patient found to be very somnolent with pinpoint pupils, received Narcan with significant improvement in his mental status and he became very awake after receiving it.  As per the sister patient was normal yesterday night when she met him.  Patient is a poor historian but said that he took more medications than usual, denies any intentions to harm himself.  Was not sure if she had any episode of seizure.  Did not answer when asked whether he is taking all his medications regularly at home or not.  Stays by himself.  Denies any fevers, chills, nausea, vomiting.  Unable to to do the complete review of systems as the patient is still not oriented completely and is a poor historian.     Upon arrival to the ED, vital signs temperature 98.6, pulse 64, respiratory 20, blood pressure 82/38 on room air saturating around 95%.  Labs showed CK4 203, troponin 60, proBNP 1532, sodium 128, potassium 6.6, chloride 96, bicarb 11.9, anion gap 20.1, creatinine 4.97, 1-month ago creatinine was 1.91, magnesium 3.2, , AST 76, rest of the CMP within normal limits, WBC elevated 12.79, hemoglobin 10.8, rest of the CBC within normal limits, chest x-ray showed hazy lung densities that could be related to hyperinflation or pneumonitis or edema.  CT head without contrast stroke protocol showed  no acute intracranial abnormality.  CT cerebral perfusion with and without contrast showed artifactual perfusion abnormalities in the brain without definite core infarct or to brain at risk, CTA neck showed no significant vascular abnormality in the head and neck.  EKG showed no changes concerning for hyperkalemia.  Received 100 cc bolus in the ED, receiving treatment for hyperkalemia with dextrose, calcium gluconate, insulin regular IV.  Patient has been evaluated by tele neurologist concerning for altered mental status and stroke, recommended MRI head without contrast, TTE for further work-up for stroke and also recommended that this encephalopathy could be likely multifactorial.  Case has been discussed by the ED physician with nephrologist on-call Dr. Lee, recommended starting on bicarb drip.     Patient has been admitted for further evaluation and management of acute metabolic encephalopathy, rhabdomyolysis, concern for seizure, MK on CKD, severe hyperkalemia, moderate hyponatremia, anion gap metabolic acidosis    Interval Followup:   Patient is alert and oriented today.  Patient does not exactly remember what happened at home other than he fell.  Patient's kidney function continues to be abnormal  Patient's potassium improving  Vitals stable   CK is very high     Review of Systems   All systems were reviewed and negative except for: all over joint pain especially the left shoulder     Objective   Objective     Vitals:   Temp:  [97.6 °F (36.4 °C)-98.6 °F (37 °C)] 97.7 °F (36.5 °C)  Heart Rate:  [60-67] 67  Resp:  [16-21] 16  BP: ()/(38-56) 128/56  Physical Exam    Constitutional: Awake, alert, no acute distress, resting in bed with family at the bedside    Eyes: Pupils equal, sclerae anicteric, no conjunctival injection   HENT: NCAT, mucous membranes moist   Neck: Supple, no thyromegaly, no lymphadenopathy, trachea midline   Respiratory: Clear to auscultation bilaterally, nonlabored respirations no  w/r/r    Cardiovascular: RRR, no murmurs, rubs, or gallops, palpable pedal pulses bilaterally   Gastrointestinal: Positive bowel sounds, soft, nontender, nondistended   Musculoskeletal: not able to lift his left arm due to shoulder pain, full rom in other extremities    Psychiatric: Appropriate affect, cooperative   Neurologic: Oriented x 3, strength symmetric in all extremities, Cranial Nerves grossly intact to confrontation, speech clear   Skin: bilateral feet have sores on them from stepping on glass?     Result Review    Result Review:  I have personally reviewed the results from the time of this admission to 7/1/2023 08:21 EDT and agree with these findings:  [x]  Laboratory  CBC          5/3/2023    05:52 6/30/2023    18:36 7/1/2023    04:14   CBC   WBC 9.32  12.79  11.21    RBC 3.79  3.66  3.32    Hemoglobin 11.1  10.8  9.6    Hematocrit 33.5  32.6  29.5    MCV 88.4  89.1  88.9    MCH 29.3  29.5  28.9    MCHC 33.1  33.1  32.5    RDW 13.7  13.3  13.4    Platelets 130  188  197      BMP          5/3/2023    05:52 6/30/2023    18:36 6/30/2023    21:36 6/30/2023    21:37 7/1/2023    04:14   BMP   BUN 33  80   84  84    Creatinine 1.91  4.97   4.67  4.95    Sodium 136  128  131.0  127  129    Potassium 4.4  6.6  4.9  5.2  5.4    Chloride 107  96  101  96  97    CO2 18.3  11.9   13.2  13.5    Calcium 8.5  8.9   8.0  8.2          7/1/2023    14:03   BMP   BUN 82    Creatinine 4.52    Sodium 134    Potassium 4.1    Chloride 99    CO2 19.0    Calcium 8.1        [x]  Microbiology  No results found for: ACANTHNAEG, AFBCX, BPERTUSSISCX, BLOODCX  No results found for: BCIDPCR, CXREFLEX, CSFCX, CULTURETIS  No results found for: CULTURES, HSVCX, URCX  No results found for: EYECULTURE, GCCX, HSVCULTURE, LABHSV  No results found for: LEGIONELLA, MRSACX, MUMPSCX, MYCOPLASCX  No results found for: NOCARDIACX, STOOLCX  No results found for: THROATCX, UNSTIMCULT, URINECX, CULTURE, VZVCULTUR  No results found for: VIRALCULTU,  WOUNDCX    [x]  Radiology  XR Chest 1 View    Result Date: 6/30/2023  PROCEDURE: XR CHEST 1 VW  COMPARISON: [facility], CT, CT ANGIOGRAM NECK, 6/30/2023, 18:58.  [facility], CR, XR CHEST 1 VW, 4/30/2023, 14:04.  INDICATIONS: Acute Stroke Protocol (Onset < 12 hrs)  FINDINGS:  There are new hazy lung densities.  No focal consolidation.  No pneumothorax or pleural effusion.  Lung volumes are low.  Heart size is normal.  Pulmonary vasculature is partially obscured.      Impression:  Hazy lung densities that could be related to hypoinflation or pneumonitis or edema.       MARIZA KIRAN MD       Electronically Signed and Approved By: MARIZA KIRAN MD on 6/30/2023 at 20:18              [x]  EKG/Telemetry   [x]  Cardiology/Vascular   []  Pathology  []  Old records  []  Other:    Assessment & Plan   Assessment / Plan     Assessment/Plan:  Acute metabolic encephalopathy  MK on CKD  Severe hyperkalemia-improving  Possible seizure episode  Rhabdomyolysis  Moderate hyponatremia-clinically significant  Anion gap metabolic acidosis  Concern for ischemic stroke  History of COPD  Type 2 diabetes mellitus  Seizure disorder  Bipolar disorder  Hyperlipidemia  Possible foreign body in left foot     Plan  Potassium is much better today    continue on bicarb drip  Monitor urine output  Monitor electrolytes, kidney function with a.m. labs  MRI of the brain without contrast was fine   TTE with bubble study  Neurochecks every 4 hours  Cerebell in the ED did not show any seizure burden  EEG  Continue Lasix   Hold statin due to rhabdo   Neurology consult in the a.m.  Low-dose sliding scale insulin  Continue home Lamictal, citalopram, atenolol  Avoid nephrotoxic agents  Nephrology and Neurology following   Nephrology consult in the a.m. Dr. Lee is aware of the patient  Concern for polypharmacy and contributing to fall and mental status (Ambien, Percocet, Flexeril, Lyrica)   We will consult podiatry for  evaluation of left foot  Obtain x-ray of left shoulder     Discussed plan with RN.    DVT prophylaxis:  Medical DVT prophylaxis orders are present.    CODE STATUS:   Level Of Support Discussed With: Patient  Code Status (Patient has no pulse and is not breathing): CPR (Attempt to Resuscitate)  Medical Interventions (Patient has pulse or is breathing): Full Support        Electronically signed by Clif Oconnor DO, 07/01/23, 8:21 AM EDT.

## 2023-07-01 NOTE — CONSULTS
[facility] - PODIATRY    Today's Date: 07/03/23    Patient Name: Carlos Murphy Jr.  MRN: [id number]  CSN: [id number]  PCP: Felix Atkinson MD  Referring Provider: Pete Holguin MD  Attending Provider: Clif Oconnor DO  Length of Stay: 3    SUBJECTIVE   Chief Complaint: Painful toenails    HPI: Carlos Murphy Jr., a 62 y.o.male, was admitted due to confusion and fall at home.    Patient was consulted for glass seen x-ray in the plantar aspect of left heel.  Patient also complained of painful elongated incurvated toenails.    Patient states he is regularly seen by Dr. Jose Cox for podiatric care in [place].    Patient states his feet are numb.    Patient denies any fevers, chills, nausea, vomiting, shortness of breath, nor any other constitutional signs nor symptoms.    Past Medical History:   Diagnosis Date   • Acute kidney injury     2021 POST SEIZURES   • Astigmatism of both eyes     eyes twitch left and right   • Bipolar disorder    • Charcot ankle    • Closed nondisplaced fracture of medial malleolus of left tibia    • COPD (chronic obstructive pulmonary disease)     USES INHALERS   • Diabetes     BG RUNS AROUND 90'S IN AM   • Hx of schizophrenia    • Hyperlipidemia    • Hypertension     SEEN DR ROD IN THE PAST, HAD APPT WITH DR MICHAUD IN 5-2022 CX APPT, DENIED CP/SOB   • Neuropathy    • Seizures     LAST ONE 4/21/22   • Sleep apnea     DOES NOT USE CPAP   • Varicose vein of leg      Past Surgical History:   Procedure Laterality Date   • ANKLE OPEN REDUCTION INTERNAL FIXATION Left 04/25/2022    Procedure: LEFT OPEN REDUCTION INTERNAL FIXATION DISTAL TIBIA FRACTURE ;  Surgeon: Tha Parry MD;  Location: [facility] [place] [facility] [place];  Service: Orthopedics;  Laterality: Left;   • ANKLE OPEN REDUCTION INTERNAL FIXATION Left 06/01/2022    Procedure: LEFT ANKLE OPEN REDUCTION INTERNAL FIXATION WITH SYNDESMOSIS FIXATION;  Surgeon: Tha Parry MD;  Location: [facility] MAIN OR;  Service:  Orthopedics;  Laterality: Left;   • CHOLECYSTECTOMY     • COLONOSCOPY     • JOINT REPLACEMENT      RTKR   • LUMBAR DISC SURGERY     • SHOULDER SURGERY Right      Family History   Problem Relation Age of Onset   • Kidney disease Mother    • COPD Mother    • COPD Father    • Heart disease Father    • Malig Hyperthermia Neg Hx      Social History     Socioeconomic History   • Marital status:    Tobacco Use   • Smoking status: Former     Packs/day: 0.50     Years: 35.00     Pack years: 17.50     Types: Cigarettes     Quit date:      Years since quittin.5   • Smokeless tobacco: Never   Vaping Use   • Vaping Use: Never used   Substance and Sexual Activity   • Alcohol use: Yes     Comment: OCCASIONAL   • Drug use: Never   • Sexual activity: Defer     No Known Allergies  Current Facility-Administered Medications   Medication Dose Route Frequency Provider Last Rate Last Admin   • acetaminophen (TYLENOL) tablet 650 mg  650 mg Oral Q4H PRN Trudy Washington PA   650 mg at 23 0117   • atenolol (TENORMIN) tablet 25 mg  25 mg Oral Daily Hazel Stephens MD   25 mg at 23   • sennosides-docusate (PERICOLACE) 8.6-50 MG per tablet 2 tablet  2 tablet Oral BID Hazel Stephens MD   2 tablet at 23    And   • polyethylene glycol (MIRALAX) packet 17 g  17 g Oral Daily PRN Hazel Stephens MD        And   • bisacodyl (DULCOLAX) EC tablet 5 mg  5 mg Oral Daily PRN Hazel Stephens MD        And   • bisacodyl (DULCOLAX) suppository 10 mg  10 mg Rectal Daily PRN Hazel Stephens MD       • dextrose (D50W) (25 g/50 mL) IV injection 25 g  25 g Intravenous Q15 Min PRN Hazel Stephens MD       • dextrose (GLUTOSE) oral gel 15 g  15 g Oral Q15 Min PRN Hazel Stephens MD       • escitalopram (LEXAPRO) tablet 20 mg  20 mg Oral Daily Hazel Stephens MD   20 mg at 23   • furosemide (LASIX) injection 40 mg  40 mg Intravenous Q12H Eliud Lee MD   40 mg at 23    • glucagon (GLUCAGEN) injection 1 mg  1 mg Intramuscular Q15 Min PRN Hazel Stephens MD       • heparin (porcine) 5000 UNIT/ML injection 5,000 Units  5,000 Units Subcutaneous Q8H Hazel Stephens MD   5,000 Units at 07/03/23 0615   • Insulin Lispro (humaLOG) injection 2-7 Units  2-7 Units Subcutaneous 4x Daily AC & at Bedtime Hazel Stephens MD   2 Units at 07/03/23 0917   • levETIRAcetam (KEPPRA) tablet 500 mg  500 mg Oral Q12H Clif Oconnor DO   500 mg at 07/03/23 0918   • nitroglycerin (NITROSTAT) SL tablet 0.4 mg  0.4 mg Sublingual Q5 Min PRN Hazel Stephens MD       • pantoprazole (PROTONIX) EC tablet 40 mg  40 mg Oral BID Hazel Stephens MD   40 mg at 07/03/23 0918   • sodium bicarbonate tablet 1,300 mg  1,300 mg Oral TID Eliud Lee MD   1,300 mg at 07/03/23 0917   • sodium chloride 0.9 % flush 10 mL  10 mL Intravenous PRN Hazel Stephens MD       • sodium chloride 0.9 % flush 10 mL  10 mL Intravenous PRN Hazel Stephens MD       • sodium chloride 0.9 % flush 10 mL  10 mL Intravenous Q12H Hazel Stephens MD   10 mL at 07/03/23 0918   • sodium chloride 0.9 % flush 10 mL  10 mL Intravenous PRN Hazel Stephens MD       • sodium chloride 0.9 % infusion 40 mL  40 mL Intravenous LUIS MIGUELN Hazel Stephens MD         Review of Systems   Constitutional: Negative.    Musculoskeletal:        Bilateral Charcot deformity   Skin:        Foreign body in plantar left heel.  Painful toenails.   Neurological: Positive for numbness.   All other systems reviewed and are negative.      OBJECTIVE     Vitals:    07/03/23 1105   BP: 139/72   Pulse: 63   Resp: 16   Temp: 98.1 °F (36.7 °C)   SpO2: 97%       PHYSICAL EXAM  GEN:   A&Ox3, NAD. Pt presents in hospital bed. Accompanied by his nurse.     Foot/Ankle Exam    GENERAL  Diabetic foot exam performed    Appearance:  obese  Orientation:  disoriented  Affect:  unfocused    VASCULAR     Right Foot Vascularity   Dorsalis pedis:  2+  Posterior  tibial:  2+  Skin temperature:  warm  Edema grading:  None  CFT:  < 3 seconds  Pedal hair growth:  Present  Varicosities:  mild varicosities     Left Foot Vascularity   Dorsalis pedis:  2+  Posterior tibial:  2+  Skin temperature:  warm  Edema grading:  None  CFT:  < 3 seconds  Pedal hair growth:  Present  Varicosities:  mild varicosities     NEUROLOGIC     Right Foot Neurologic   Light touch sensation: absent  Vibratory sensation: absent  Hot/Cold sensation: absent  Protective Sensation using [place]-Josh Monofilament:   Sites tested: 10     Left Foot Neurologic   Light touch sensation: absent  Vibratory sensation: absent  Hot/Cold sensation:  absent  Protective Sensation using [place]-Josh Monofilament:   Sites tested: 10    MUSCULOSKELETAL     Right Foot Musculoskeletal   Arch:  Charcot     Left Foot Musculoskeletal   Arch:  Charcot    MUSCLE STRENGTH     Right Foot Muscle Strength   Foot dorsiflexion:  4  Foot plantar flexion:  4  Foot inversion:  4  Foot eversion:  4     Left Foot Muscle Strength   Foot dorsiflexion:  4  Foot plantar flexion:  4  Foot inversion:  4  Foot eversion:  4    RANGE OF MOTION     Right Foot Range of Motion   Foot and ankle ROM within normal limits       Left Foot Range of Motion   Foot and ankle ROM within normal limits      DERMATOLOGIC      Right Foot Dermatologic   Skin  Right foot skin is intact.   Nails  2.  Positive for elongated, onychomycosis, abnormal thickness, subungual debris and ingrown toenail.  3.  Positive for elongated, onychomycosis, abnormal thickness, subungual debris and ingrown toenail.  4.  Positive for elongated, onychomycosis, abnormal thickness, subungual debris and ingrown toenail.  5.  Positive for elongated, onychomycosis, abnormal thickness, subungual debris and ingrown toenail.     Left Foot Dermatologic   Skin  Positive for wound.   Nails  2.  Positive for elongated, onychomycosis, abnormal thickness, subungual debris and ingrown toenail.  3.   Positive for elongated, onychomycosis, abnormal thickness, subungual debris and ingrown toenail.  4.  Positive for elongated, onychomycosis, abnormally thick, subungual debris and ingrown toenail.  5.  Positive for elongated, onychomycosis, abnormally thick, subungual debris and ingrown toenail.     Left foot additional comments: A puncture area on the plantar left heel.  Positive tenderness to palpation. No signs of edema, erythema, lymphangitis, nor signs of infection.  Upon incision with a #15 scalpel blade, a piece of glass was removed in toto from the subcutaneous layer.  This glass fragment correlated with x-ray findings.  The patient states immediate relief of painful symptoms.   A band-aid with bacitracin was applied to the excision area.       XR Shoulder 2+ View Left    Result Date: 7/1/2023  Narrative: PROCEDURE: XR SHOULDER 2+ VW LEFT  COMPARISON: [facility], CR, XR SHOULDER 2+ VW LEFT, 5/01/2023, 11:24.  INDICATIONS: fall and pain  FINDINGS:  No fractures are visualized.  There are no findings of dislocation.  No lytic or sclerotic bone lesions are seen.  Mild degenerative change consistent with osteoarthritis is seen in the glenohumeral joint.  Mild AC arthrosis is evident.  No abnormality is seen at the left lung apex.      Impression:   Left shoulder series demonstrating degenerative changes, as above.      JON LOPEZ MD       Electronically Signed and Approved By: JON LOPEZ MD on 7/01/2023 at 15:32             XR Foot 2 View Left    Result Date: 7/1/2023  Narrative: PROCEDURE: XR FOOT 2 VW LEFT  COMPARISON: [facility] , CR, XR ANKLE 2 VW LEFT, 9/19/2022, 15:58.  INDICATIONS: POSSIBLE GLASS IN BOTTOM OF LEFT FOOT, IN AREA OF THE ARCH  FINDINGS:  Moderate to marked degenerative changes seen in the tibiotalar joint and tarsal region.  Pes planus is evident.  Screws are seen in the ankle.  Tiny 3 mm density is seen in the plantar aspect of the heel on the lateral view,  which could represent a foreign body.  Plantar soft tissue swelling is seen at the level of the tarsal bones.  No foreign body is seen in this area.      Impression:   Left foot series demonstrating degenerative and postoperative changes, as above.  Pes planus.  Tiny 3 mm density in the plantar aspect of the heel on the lateral view, as above.      JON LOPEZ MD       Electronically Signed and Approved By: JON LOPEZ MD on 7/01/2023 at 10:58             CT Angiogram Neck    Result Date: 6/30/2023  Narrative: PROCEDURE: CT ANGIOGRAM NECK, 6/30/2023, 18:58 CT ANGIOGRAM HEAD W AI ANALYSIS OF LVO, 6/30/2023, 18:58  COMPARISON: None  INDICATIONS: Neuro deficit, acute, stroke suspected  PROTOCOL:   Standard imaging protocol performed    RADIATION:   DLP: 968.5 mGy*cm   Automated exposure control was utilized to minimize radiation dose. CONTRAST: 100 cc Isovue 370 I.V.  TECHNIQUE: After obtaining the patient's consent, CT images of the neck were obtained without and with non-ionic intravenous contrast material. Multi-planar reformatted/3-D images were created to optimize visualization of vascular anatomy. Unless otherwise stated in this report, all vascular stenoses involving the internal carotid arteries reported for this examination are derived by dividing the lesion diameter by the diameter of the normal internal carotid artery more distally.  FINDINGS:  The aortic arch is unremarkable.  There is 2 vessel arch anatomy.  The right brachiocephalic and bilateral subclavian arteries appear widely patent.  There is some motion artifact.  Bilateral common carotid arteries, external carotid arteries, carotid bifurcations and cervical internal carotid arteries appear within normal limits.  Intracranial ICA segments appear normal.  There is significant venous contamination intracranially.  The A1 and M1 segments and distal SULTANA and MCA branches appear patent.  There is a patent anterior communicating artery.  There are  patent bilateral posterior communicating arteries.  Vertebral arteries appear normal.  Vertebral arteries are codominant.  The basilar artery, superior cerebellar and posterior cerebral arteries appear patent.  There are no acute intracranial findings.  Orbits appear unremarkable.  There is mild multifocal paranasal sinus mucosal disease.  Mastoid air cells appear well-aerated.  There is no evidence of a neck mass or adenopathy.  The lungs are hypoinflated and there are scattered areas of AC lung density.  Superior mediastinal structures are unremarkable.  The thyroid is largely obscured by motion.  Salivary glands appear symmetric.  No focal soft tissue inflammation.  There are cervical degenerative changes.  No acute osseous abnormality.      Impression:   1. No significant vascular abnormality in the head or the neck. 2. Cervical degenerative changes.     MARIZA KIRAN MD       Electronically Signed and Approved By: MARIZA KIRAN MD on 6/30/2023 at 20:22             MRI Brain Without Contrast    Result Date: 7/1/2023  Narrative: PROCEDURE: MRI BRAIN WO CONTRAST  COMPARISON: [facility], CT, CT HEAD WO CONTRAST STROKE PROTOCOL, 6/30/2023, 18:33.  [facility], MR, BRAIN W/O CONTRAST, 3/26/2021, 7:25.  INDICATIONS: LEFT SIDED WEAKNESS  TECHNIQUE: Multiplanar images of the brain were obtained in a high field strength magnet.  FINDINGS:  The ventricles are normal in size, position, and configuration.  Sulci are not abnormally prominent.  Scattered tiny foci of nonspecific T2 bright signal in cerebral white matter appear stable.  No abnormal bright signal is seen on diffusion weighted images.  No restricted diffusion is evident.  No abnormal dark signal is seen on magnetic susceptibility sequence sensitive for blood products.  No abnormality of the pituitary or orbits is evident.  The paranasal sinuses are well aerated.  No abnormal mastoid signal is seen.      Impression:   MR examination  of the brain without IV contrast demonstrating no acute intracranial abnormality.      JON LOPEZ MD       Electronically Signed and Approved By: JON LOPEZ MD on 7/01/2023 at 11:03             XR Chest 1 View    Result Date: 6/30/2023  Narrative: PROCEDURE: XR CHEST 1 VW  COMPARISON: [facility], CT, CT ANGIOGRAM NECK, 6/30/2023, 18:58.  [facility], CR, XR CHEST 1 VW, 4/30/2023, 14:04.  INDICATIONS: Acute Stroke Protocol (Onset < 12 hrs)  FINDINGS:  There are new hazy lung densities.  No focal consolidation.  No pneumothorax or pleural effusion.  Lung volumes are low.  Heart size is normal.  Pulmonary vasculature is partially obscured.      Impression:  Hazy lung densities that could be related to hypoinflation or pneumonitis or edema.       MARIZA KIRAN MD       Electronically Signed and Approved By: MARIZA KIRAN MD on 6/30/2023 at 20:18             MRI Foot Left Without Contrast    Result Date: 7/3/2023  Narrative: PROCEDURE: MRI FOOT LEFT WO CONTRAST  COMPARISON:  [facility], CR, XR FOOT 2 VW LEFT, 7/01/2023, 10:23. INDICATIONS: Glass in  left hindfoot, plantar region.      TECHNIQUE: A complete multi-planar examination was performed without contrast.  FINDINGS:  There is mild limitation due to motion degradation despite attempts at optimal imaging.  Susceptibility artifact is also noted at the medial aspect of the ankle related to orthopedic hardware within the medial malleolus.  There is marked thickening and abnormal signal associated with the distal posterior tibialis tendon.  The findings indicate marked changes of tendinopathy.  There is evidence for superimposed partial thickness tear.  Increased fluid is also seen in the tendon sheath.  There is no complete disruption or proximal retraction.  The flexor digitorum longus and flexor hallucis longus tendons are intact.  There are likely mild changes of tendinopathy involving the peroneus longus and brevis  tendons.  There is no tendon tear or proximal retraction. The anterior extensor tendons of the ankle are intact.  Mild changes of distal Achilles tendinopathy are noted.  There is no Achilles tendon tear.  Mild changes of plantar fasciitis are also observed.  The visualization of the deltoid ligament complex is limited.  The complex appears to be grossly intact.  The spring ligament appears intact.  There is evidence for disruption of the anterior talofibular ligament.  There is also likely at least partial disruption of the calcaneofibular ligament.  The posterior talofibular ligament is not well visualized.  The anterior and posterior tibiofibular ligaments are not well visualized.  There is evidence for abnormally increased signal corresponding to the expected course of the Lisfranc ligament complex between the 1st and 2nd proximal metatarsals.  The findings suggest potential prior Lisfranc injury.  This region is only partially included in the field of view for this study.  The does appear to be widening between the 1st and 2nd metatarsals on the prior radiographs.  There is no significant ankle joint effusion. There is no evidence for osteochondral injury.  Changes of arthropathy are seen throughout the articulations of the ankle, hindfoot, and midfoot.  There is evidence for associated flattening of the arch of the foot.  These findings may be posttraumatic in nature given the evidence for prior trauma and orthopedic surgery.  Developing neuropathic arthropathy or Charcot foot could also have this appearance.  There is extensive edema within the subcutaneous soft tissues of the plantar medial aspect of the hindfoot.  These findings are noted in the region of the small foreign body as observed on the prior radiographs.  The foreign body is not well identified on MRI.  The findings suggest changes of soft tissue cellulitis in this region.  No well-defined fluid collection is seen to suggest definitive abscess.   No cortical erosion or destruction is identified.  Note is made of edema within the anterior process of the calcaneus and region of the middle subtalar facet.  This edema may be related to arthropathy or chronic stress related changes.  Developing osteomyelitis is felt less likely.      Impression:   1. Focal edema within the subcutaneous soft tissues at the plantar medial aspect of the hindfoot in the region of the small foreign body as observed on the prior radiographs.  Given the presence of a foreign body, the findings suggest changes of soft tissue cellulitis.  There is no definitive abscess formation. 2. Focal edema is noted within the anterior process of the calcaneus and region of the middle subtalar facet.  The edema is felt to most likely be related to chronic stress related changes and/or arthropathy.  Changes of developing osteomyelitis are felt less likely but are not entirely excluded.  No definitive cortical erosion or destruction is identified. 3. Marked changes of tendinopathy and tenosynovitis with superimposed partial thickness tear involving the posterior tibialis tendon.  4. There may be mild changes of tendinopathy involving the peroneus longus and brevis tendons. 5. Mild distal Achilles tendinopathy.  There is also mild-to-moderate plantar fasciitis. 6. Evidence for prior disruption of the anterior talofibular ligament with likely associated injury of the calcaneofibular ligament. 7. Moderate changes of arthropathy associated with the articulations of the ankle, hindfoot, and midfoot.  There is also flattening of the arch of the foot.  The findings may be related to posttraumatic osteoarthritis.  Changes secondary to developing neuropathic arthropathy or Charcot foot could also have this appearance. 8. Findings suggesting changes of remote Lisfranc injury.      RUDY BEAN MD       Electronically Signed and Approved By: RUDY BEAN MD on 7/03/2023 at 9:09             Adult Transthoracic  Echo Limited W/ Cont if Necessary Per Protocol    Result Date: 7/1/2023  Narrative: •  Left ventricular systolic function is normal. Left ventricular ejection fraction appears to be 61 - 65%. •  Left ventricular diastolic function was normal. •  No regional wall motion abnormality •  No obvious source of emboli     CT Head Without Contrast Stroke Protocol    Result Date: 6/30/2023  Narrative: PROCEDURE: CT HEAD WO CONTRAST STROKE PROTOCOL  COMPARISON:  [facility], CT, CT HEAD WO CONTRAST, 4/30/2023, 15:12. INDICATIONS: Neuro deficit, acute, stroke suspected  PROTOCOL:   Standard imaging protocol performed    RADIATION:   DLP: 1145.2 mGy*cm   MA and/or KV was adjusted to minimize radiation dose.     TECHNIQUE: After obtaining the patient's consent, CT images were obtained without non-ionic intravenous contrast material.  FINDINGS:  Superficial soft tissues appear unremarkable.  The calvarium is intact.  There is a small amount of mucus in the right maxillary sinus.  The ventricles appear normal in size and configuration for patient's age. There is no evidence of herniation, hydrocephalus or mass effect. Gray-white differentiation appears intact. There are no focal areas of hypoattenuation within the brain parenchyma. There is no evidence of acute intracranial hemorrhage. The orbits appear unremarkable.       Impression:  No acute intracranial abnormality.     MARIZA KIRAN MD       Electronically Signed and Approved By: MARIZA KIRAN MD on 6/30/2023 at 19:14             CT Angiogram Head w AI Analysis of LVO    Result Date: 6/30/2023  Narrative: PROCEDURE: CT ANGIOGRAM NECK, 6/30/2023, 18:58 CT ANGIOGRAM HEAD W AI ANALYSIS OF LVO, 6/30/2023, 18:58  COMPARISON: None  INDICATIONS: Neuro deficit, acute, stroke suspected  PROTOCOL:   Standard imaging protocol performed    RADIATION:   DLP: 968.5 mGy*cm   Automated exposure control was utilized to minimize radiation dose. CONTRAST: 100 cc Isovue 370 I.V.   TECHNIQUE: After obtaining the patient's consent, CT images of the neck were obtained without and with non-ionic intravenous contrast material. Multi-planar reformatted/3-D images were created to optimize visualization of vascular anatomy. Unless otherwise stated in this report, all vascular stenoses involving the internal carotid arteries reported for this examination are derived by dividing the lesion diameter by the diameter of the normal internal carotid artery more distally.  FINDINGS:  The aortic arch is unremarkable.  There is 2 vessel arch anatomy.  The right brachiocephalic and bilateral subclavian arteries appear widely patent.  There is some motion artifact.  Bilateral common carotid arteries, external carotid arteries, carotid bifurcations and cervical internal carotid arteries appear within normal limits.  Intracranial ICA segments appear normal.  There is significant venous contamination intracranially.  The A1 and M1 segments and distal SULTANA and MCA branches appear patent.  There is a patent anterior communicating artery.  There are patent bilateral posterior communicating arteries.  Vertebral arteries appear normal.  Vertebral arteries are codominant.  The basilar artery, superior cerebellar and posterior cerebral arteries appear patent.  There are no acute intracranial findings.  Orbits appear unremarkable.  There is mild multifocal paranasal sinus mucosal disease.  Mastoid air cells appear well-aerated.  There is no evidence of a neck mass or adenopathy.  The lungs are hypoinflated and there are scattered areas of AC lung density.  Superior mediastinal structures are unremarkable.  The thyroid is largely obscured by motion.  Salivary glands appear symmetric.  No focal soft tissue inflammation.  There are cervical degenerative changes.  No acute osseous abnormality.      Impression:   1. No significant vascular abnormality in the head or the neck. 2. Cervical degenerative changes.     MARIZA KIRAN  MD       Electronically Signed and Approved By: MARIZA KIRAN MD on 6/30/2023 at 20:22             CT CEREBRAL PERFUSION WITH & WITHOUT CONTRAST    Result Date: 6/30/2023  Narrative: PROCEDURE: CT CEREBRAL PERFUSION W WO CONTRAST  COMPARISON: CT, CT ANGIOGRAM HEAD W AI ANALYSIS OF LVO, 6/30/2023, 18:58.  INDICATIONS: Neuro deficit, acute, stroke suspected  PROTOCOL:   Standard imaging protocol performed    RADIATION:   DLP: 1949 mGy*cm   Automated exposure control was utilized to minimize radiation dose. CONTRAST: 80 cc Isovue 370 I.V.   FINDINGS: Cerebral blood flow less than 30% is 0 mL and T-max greater than 6 seconds is 157 mL with a mismatch volume of 157 mm.  This appears to be artifactual.  There is also significant artifact on the inferior set of cerebral blood volume imaging.  There is no definite core infarct or definite brain at risk.      Impression:  Artifactual perfusion abnormalities in the brain without definite core infarct or true brain at risk.      MARIZA KIRAN MD       Electronically Signed and Approved By: MARIZA KIRAN MD on 6/30/2023 at 19:29                 ASSESSMENT/PLAN     Active Hospital Problems:  Active Hospital Problems    Diagnosis    • **Acute renal failure, unspecified acute renal failure type    • Onychomycosis    • Onychocryptosis    • Foreign body in left foot    • Peripheral neuropathy    • Charcot foot due to diabetes mellitus    • Foot pain, bilateral        Comprehensive lower extremity examination and evaluation was performed.    Discussed findings and treatment plan including risks, benefits, and treatment options with patient in detail. Patient agreed with treatment plan.    Toenails 2, 3, 4, 5 on Right and 2, 3, 4, 5 on Left were debrided with nail nippers.  Patient tolerated procedure well without complications.    Diabetic foot exam performed and documented this date, compliant with CQM required standards. Detail of findings as noted in physical exam.  Lower  extremity Neurologic exam for diabetic patient performed and documented this date, compliant with PQRS required standards. Detail of findings as noted in physical exam.  Advised patient importance of good routine lower extremity hygiene. Advised patient importance of evaluating for intact skin and pain free nail borders.  Advised patient to use mirror to evaluate plantar/ soles of feet for better visualization. Advised patient monitor and phone office to be seen if any cracking to skin, open lesions, painful nail borders or if nails become elongated prior to next visit. Advised patient importance of daily cleansing of lower extremities, followed by good skin cream to maintain normal hydration of skin. Also advised patient importance of close daily monitoring of blood sugar. Advised to regulate diet and medications to maintain control of blood sugar in optimal range. Contact primary care provider if difficulties maintaining blood sugar levels.  Advised Patient of presence of Diabetes Mellitus condition.  Advised Patient risk of progression and worsening or improvement, then return of condition.  Will monitor condition for any change in future. Treat with most appropriate treatment pending status of condition.  Counseled and advised patient extensively on nature and ramifications of diabetes. Standard instructions given to patient for good diabetic foot care and maintenance. Advised importance of careful monitoring to avoid break down and complications secondary to diabetes. Advised patient importance of strict maintenance of blood sugar control. Advised patient of possible ominous results from neglect of condition, i.e.: amputation/ loss of digits, feet and legs, or even death.  Patient states understands counseling, will monitor closely, continue good hygiene and routine diabetic foot care. Patient will contact office is questions or problems.      Patient is to monitor for recurrence and any new symptoms and to  contact Dr. Cowan's office for a follow-up appointment.      The patient states understanding and agreement with this plan.    Verbal wound care orders were given to the patient's nurse.    Discussed with patient's hospitalist, Dr. Clif Christian.    Patient is to follow up on an as-needed basis as an outpatient.  Patient instructed to follow-up with Dr. Jose Cox for podiatric care upon discharge.  The patient states understanding and agreement with this plan.    Thank you for including me in the care of this patient.    This document has been electronically signed by Ricky Cowan DPM on July 3, 2023 12:19 EDT

## 2023-07-01 NOTE — NURSING NOTE
Pt unable to urinate. Bladder scanned for 160. NPO at this time.     Dr. Stephens stopped by the nurses station and said to place a sanches for retention. Pt has not urinated all shift.  MD wants to have a sanches placed.

## 2023-07-01 NOTE — PLAN OF CARE
Problem: Skin Injury Risk Increased  Goal: Skin Health and Integrity  7/1/2023 1644 by Shanique Morel, RN  Outcome: Ongoing, Progressing  7/1/2023 1644 by Shanique Morel, RN  Outcome: Ongoing, Progressing   Goal Outcome Evaluation:  Plan of Care Reviewed With: patient        Progress: no change  Outcome Evaluation: Patient's speech has improved since the begining of shift, still remains weak. Family came to visit and called pt on the phone. No questions or concerns at this time. Will continue to monitor, call light in reach.

## 2023-07-02 LAB
ALBUMIN SERPL-MCNC: 3.4 G/DL (ref 3.5–5.2)
ALBUMIN/GLOB SERPL: 1.2 G/DL
ALP SERPL-CCNC: 127 U/L (ref 39–117)
ALT SERPL W P-5'-P-CCNC: 36 U/L (ref 1–41)
ANION GAP SERPL CALCULATED.3IONS-SCNC: 15.2 MMOL/L (ref 5–15)
AST SERPL-CCNC: 47 U/L (ref 1–40)
BILIRUB SERPL-MCNC: 0.4 MG/DL (ref 0–1.2)
BUN SERPL-MCNC: 76 MG/DL (ref 8–23)
BUN/CREAT SERPL: 18.4 (ref 7–25)
CALCIUM SPEC-SCNC: 8.5 MG/DL (ref 8.6–10.5)
CHLORIDE SERPL-SCNC: 95 MMOL/L (ref 98–107)
CK SERPL-CCNC: 1566 U/L (ref 20–200)
CO2 SERPL-SCNC: 20.8 MMOL/L (ref 22–29)
CREAT SERPL-MCNC: 4.12 MG/DL (ref 0.76–1.27)
DEPRECATED RDW RBC AUTO: 41.4 FL (ref 37–54)
EGFRCR SERPLBLD CKD-EPI 2021: 15.7 ML/MIN/1.73
ERYTHROCYTE [DISTWIDTH] IN BLOOD BY AUTOMATED COUNT: 13.2 % (ref 12.3–15.4)
FERRITIN SERPL-MCNC: 425.1 NG/ML (ref 30–400)
GLOBULIN UR ELPH-MCNC: 2.8 GM/DL
GLUCOSE BLDC GLUCOMTR-MCNC: 127 MG/DL (ref 70–99)
GLUCOSE BLDC GLUCOMTR-MCNC: 162 MG/DL (ref 70–99)
GLUCOSE BLDC GLUCOMTR-MCNC: 176 MG/DL (ref 70–99)
GLUCOSE BLDC GLUCOMTR-MCNC: 255 MG/DL (ref 70–99)
GLUCOSE SERPL-MCNC: 232 MG/DL (ref 65–99)
HCT VFR BLD AUTO: 29.7 % (ref 37.5–51)
HGB BLD-MCNC: 10.2 G/DL (ref 13–17.7)
IRON 24H UR-MRATE: 34 MCG/DL (ref 59–158)
IRON SATN MFR SERPL: 14 % (ref 20–50)
MAGNESIUM SERPL-MCNC: 2.7 MG/DL (ref 1.6–2.4)
MCH RBC QN AUTO: 29.6 PG (ref 26.6–33)
MCHC RBC AUTO-ENTMCNC: 34.3 G/DL (ref 31.5–35.7)
MCV RBC AUTO: 86.1 FL (ref 79–97)
PHOSPHATE SERPL-MCNC: 4.9 MG/DL (ref 2.5–4.5)
PLATELET # BLD AUTO: 198 10*3/MM3 (ref 140–450)
PMV BLD AUTO: 10.1 FL (ref 6–12)
POTASSIUM SERPL-SCNC: 4.4 MMOL/L (ref 3.5–5.2)
PROT SERPL-MCNC: 6.2 G/DL (ref 6–8.5)
RBC # BLD AUTO: 3.45 10*6/MM3 (ref 4.14–5.8)
SODIUM SERPL-SCNC: 131 MMOL/L (ref 136–145)
TIBC SERPL-MCNC: 240 MCG/DL (ref 298–536)
TRANSFERRIN SERPL-MCNC: 161 MG/DL (ref 200–360)
WBC NRBC COR # BLD: 10.06 10*3/MM3 (ref 3.4–10.8)

## 2023-07-02 PROCEDURE — 99233 SBSQ HOSP IP/OBS HIGH 50: CPT | Performed by: INTERNAL MEDICINE

## 2023-07-02 PROCEDURE — 25010000002 HEPARIN (PORCINE) PER 1000 UNITS: Performed by: STUDENT IN AN ORGANIZED HEALTH CARE EDUCATION/TRAINING PROGRAM

## 2023-07-02 PROCEDURE — 25010000002 NA FERRIC GLUC CPLX PER 12.5 MG: Performed by: INTERNAL MEDICINE

## 2023-07-02 PROCEDURE — 83540 ASSAY OF IRON: CPT | Performed by: INTERNAL MEDICINE

## 2023-07-02 PROCEDURE — 63710000001 INSULIN LISPRO (HUMAN) PER 5 UNITS: Performed by: STUDENT IN AN ORGANIZED HEALTH CARE EDUCATION/TRAINING PROGRAM

## 2023-07-02 PROCEDURE — 94799 UNLISTED PULMONARY SVC/PX: CPT

## 2023-07-02 PROCEDURE — 80053 COMPREHEN METABOLIC PANEL: CPT | Performed by: INTERNAL MEDICINE

## 2023-07-02 PROCEDURE — 82948 REAGENT STRIP/BLOOD GLUCOSE: CPT

## 2023-07-02 PROCEDURE — 25010000002 FUROSEMIDE PER 20 MG: Performed by: INTERNAL MEDICINE

## 2023-07-02 PROCEDURE — 84100 ASSAY OF PHOSPHORUS: CPT | Performed by: INTERNAL MEDICINE

## 2023-07-02 PROCEDURE — 84466 ASSAY OF TRANSFERRIN: CPT | Performed by: INTERNAL MEDICINE

## 2023-07-02 PROCEDURE — 94761 N-INVAS EAR/PLS OXIMETRY MLT: CPT

## 2023-07-02 PROCEDURE — 85027 COMPLETE CBC AUTOMATED: CPT | Performed by: INTERNAL MEDICINE

## 2023-07-02 PROCEDURE — 82728 ASSAY OF FERRITIN: CPT | Performed by: INTERNAL MEDICINE

## 2023-07-02 PROCEDURE — 82550 ASSAY OF CK (CPK): CPT | Performed by: INTERNAL MEDICINE

## 2023-07-02 PROCEDURE — 83735 ASSAY OF MAGNESIUM: CPT | Performed by: INTERNAL MEDICINE

## 2023-07-02 RX ORDER — SODIUM BICARBONATE 650 MG/1
1300 TABLET ORAL 3 TIMES DAILY
Status: DISCONTINUED | OUTPATIENT
Start: 2023-07-02 | End: 2023-07-04

## 2023-07-02 RX ADMIN — SODIUM BICARBONATE 650 MG TABLET 1300 MG: at 20:54

## 2023-07-02 RX ADMIN — INSULIN LISPRO 2 UNITS: 100 INJECTION, SOLUTION INTRAVENOUS; SUBCUTANEOUS at 20:55

## 2023-07-02 RX ADMIN — ESCITALOPRAM OXALATE 20 MG: 10 TABLET ORAL at 08:37

## 2023-07-02 RX ADMIN — SODIUM BICARBONATE 650 MG TABLET 1300 MG: at 08:38

## 2023-07-02 RX ADMIN — Medication 10 ML: at 20:55

## 2023-07-02 RX ADMIN — SENNOSIDES AND DOCUSATE SODIUM 2 TABLET: 50; 8.6 TABLET ORAL at 08:37

## 2023-07-02 RX ADMIN — PANTOPRAZOLE SODIUM 40 MG: 40 TABLET, DELAYED RELEASE ORAL at 08:37

## 2023-07-02 RX ADMIN — FUROSEMIDE 40 MG: 10 INJECTION, SOLUTION INTRAMUSCULAR; INTRAVENOUS at 20:54

## 2023-07-02 RX ADMIN — INSULIN LISPRO 4 UNITS: 100 INJECTION, SOLUTION INTRAVENOUS; SUBCUTANEOUS at 08:36

## 2023-07-02 RX ADMIN — HEPARIN SODIUM 5000 UNITS: 5000 INJECTION INTRAVENOUS; SUBCUTANEOUS at 21:01

## 2023-07-02 RX ADMIN — HEPARIN SODIUM 5000 UNITS: 5000 INJECTION INTRAVENOUS; SUBCUTANEOUS at 13:26

## 2023-07-02 RX ADMIN — PANTOPRAZOLE SODIUM 40 MG: 40 TABLET, DELAYED RELEASE ORAL at 20:54

## 2023-07-02 RX ADMIN — SODIUM CHLORIDE 125 MG: 9 INJECTION, SOLUTION INTRAVENOUS at 08:36

## 2023-07-02 RX ADMIN — Medication 10 ML: at 08:39

## 2023-07-02 RX ADMIN — ATENOLOL 25 MG: 25 TABLET ORAL at 08:37

## 2023-07-02 RX ADMIN — INSULIN LISPRO 2 UNITS: 100 INJECTION, SOLUTION INTRAVENOUS; SUBCUTANEOUS at 17:35

## 2023-07-02 RX ADMIN — FUROSEMIDE 40 MG: 10 INJECTION, SOLUTION INTRAMUSCULAR; INTRAVENOUS at 08:37

## 2023-07-02 RX ADMIN — HEPARIN SODIUM 5000 UNITS: 5000 INJECTION INTRAVENOUS; SUBCUTANEOUS at 06:25

## 2023-07-02 RX ADMIN — SENNOSIDES AND DOCUSATE SODIUM 2 TABLET: 50; 8.6 TABLET ORAL at 20:54

## 2023-07-02 RX ADMIN — SODIUM BICARBONATE 650 MG TABLET 1300 MG: at 15:53

## 2023-07-02 RX ADMIN — LEVETIRACETAM 500 MG: 500 TABLET, FILM COATED ORAL at 08:37

## 2023-07-02 RX ADMIN — LEVETIRACETAM 500 MG: 500 TABLET, FILM COATED ORAL at 20:54

## 2023-07-02 RX ADMIN — SODIUM BICARBONATE 75 MEQ: 84 INJECTION, SOLUTION INTRAVENOUS at 04:22

## 2023-07-02 NOTE — PLAN OF CARE
Goal Outcome Evaluation:      Pt reported that he is feeling better this shift. Noted that he was have difficulty keeping his 02 sats in the 90's.  Elevated his head and placed him on 2L of 02. Sats increased.  Noted that Pt still has glass in his wounds that could be seen with a bright light. Will discuss with dayshift RN to have Podiatry to reeval wound.

## 2023-07-02 NOTE — PLAN OF CARE
Problem: Skin Injury Risk Increased  Goal: Skin Health and Integrity  Outcome: Ongoing, Progressing   Goal Outcome Evaluation:  Plan of Care Reviewed With: patient        Progress: no change  Outcome Evaluation: Patient had some increased confusion at the begining of shift. Daughters came in at sat at bedside, patient has improved some. No questions or concerns at this time. Will continue to monitor, call light in reach.

## 2023-07-02 NOTE — PROGRESS NOTES
[facility]  [facility]    Routine Consult Follow-Up    Patient Name:   Carlos Murphy  YOB: 1960  Identification Number:   MRN - [id number]  Date of Service:   07/02/2023 10:23:28    Diagnosis        G93.49 - Encephalopathy Multifactorial    Impression  62 y.o. male PMH DM, CKD, schizophrenia, HTN, HLD, seizure disorder on lamotrigine 200 mg BID who presented to the ED on 06/28 altered and unresponsive.    Work up so far included a CTH which was negative for any acute findings. Multiple labs were abnormal such as CK > 39380 probably causing MK and Cr at 4.95, abnormal Electrolytes and elevated Troponin, suspecting metabolic encephalopathy vs possible seizure or stroke.    Today when he is awake he does report that there might be a possible compliance issues with his lamictal and he would have missed a dose of the medication. In hospital he was started on keppra 500 mg BID. MRI brain today showed no clear abnormality.    Plan:  - Resume lamotrigine 200 mg BID which is his home dose.  - Get the level of lamotrigine. Get collateral history from the sister regarding missing medications. In mean time continue Keppra  - Routine EEG on Monday   Echocardiogram - Transthoracic Echocardiogram   Continue Aspirin 81 MG daily      Our recommendations are outlined below    Seizure precautions :  Seizure precautions including no driving for state mandated time frame were discussed with patient with clear understanding.    DVT Prophylaxis :  Choice of Primary Team    Disposition :  Neurology will follow    Subjective  07/02/2023: Patient delirious, received opioids overnight for pain.    Imaging  CTH with perfusion studies:  IMPRESSION:  1. No significant vascular abnormality in the head or the neck.  2. Cervical degenerative changes.    Chest XRay:  IMPRESSION: Hazy lung densities that could be related to hypoinflation or pneumonitis or edema.    IMPRESSION:      examination of the brain without IV contrast demonstrating no acute intracranial abnormality.    Labs  WBC: 11.21  Cr 4.95  Na 129  AS 69  CK >11761  Troponin T 58      Examination  1A: Level of Consciousness - Arouses to minor stimulation + 1  1B: Ask Month and Age - Aphasic + 0  1C: Blink Eyes & Squeeze Hands - Performs Both Tasks + 0  2: Test Horizontal Extraocular Movements - Normal + 0  3: Test Visual Fields - No Visual Loss + 0  4: Test Facial Palsy (Use Grimace if Obtunded) - Normal symmetry + 0  5A: Test Left Arm Motor Drift - Drift, hits bed + 2  5B: Test Right Arm Motor Drift - Some Effort Against Gravity + 2  6A: Test Left Leg Motor Drift - Some Effort Against Gravity + 1  6B: Test Right Leg Motor Drift - Some Effort Against Gravity + 1  7: Test Limb Ataxia (FNF/Heel-Shin) - No Ataxia + 0  8: Test Sensation - Normal; No sensory loss + 0  9: Test Language/Aphasia - Normal; No aphasia + 0  10: Test Dysarthria - Normal + 0  11: Test Extinction/Inattention - No abnormality + 0    NIHSS Score: 7          Patient/Family was informed the Neurology Consult would happen via TeleHealth consult by way of interactive audio and video telecommunications and consented to receiving care in this manner.    Telehealth Neurology consultation was provided. I spent 35 minutes providing telehealth care. This includes time spent for face to face visit via telemedicine, review of medical records, imaging studies and discussion of findings with providers, the patient and/or family.      Dr Lillie Hodge      [facility]  [phone]    Case [id number]

## 2023-07-02 NOTE — PROGRESS NOTES
" LOS: 2 days   Patient Care Team:  Felix Atkinson MD as PCP - General  Felix Atkinson MD as PCP - Family Medicine    Chief Complaint: MK/CKD    Subjective     History of Present Illness  Pt without any specific complaints  Appears more awake today  Tolerating po.    Subjective:  Symptoms:  No shortness of breath, chest pain, chest pressure or anxiety.      History taken from: patient chart    Objective     Vital Sign Min/Max for last 24 hours  Temp  Min: 97.3 °F (36.3 °C)  Max: 98.3 °F (36.8 °C)   BP  Min: 110/52  Max: 141/66   Pulse  Min: 66  Max: 110   Resp  Min: 16  Max: 19   SpO2  Min: 90 %  Max: 97 %   Flow (L/min)  Min: 4  Max: 4   No data recorded     Flowsheet Rows      Flowsheet Row First Filed Value   Admission Height 182.9 cm (72\") Documented at 06/30/2023 1800   Admission Weight 133 kg (294 lb 1.5 oz) Documented at 06/30/2023 2300            No intake/output data recorded.  I/O last 3 completed shifts:  In: 3016 [P.O.:816; I.V.:650; IV Piggyback:1550]  Out: 6025 [Urine:6025]    Objective:  General Appearance:  Comfortable.    Vital signs: (most recent): Blood pressure 137/96, pulse 110, temperature 98.3 °F (36.8 °C), temperature source Oral, resp. rate 19, height 182.9 cm (72\"), weight 133 kg (294 lb 1.5 oz), SpO2 91 %.  Vital signs are normal.    Output: Producing urine.    HEENT: Normal HEENT exam.    Lungs:  Normal effort and normal respiratory rate.    Heart: Normal rate.  Regular rhythm.    Abdomen: Abdomen is soft.  Bowel sounds are normal.   There is no abdominal tenderness.     Extremities: Normal range of motion.  There is dependent edema.    Pulses: Distal pulses are intact.    Neurological: Patient is alert and oriented to person, place and time.    Pupils:  Pupils are equal, round, and reactive to light.    Skin:  Warm and dry.              Results Review:     I reviewed the patient's new clinical results.    WBC WBC   Date Value Ref Range Status   07/02/2023 10.06 3.40 - 10.80 " 10*3/mm3 Final   07/01/2023 11.21 (H) 3.40 - 10.80 10*3/mm3 Final   06/30/2023 12.79 (H) 3.40 - 10.80 10*3/mm3 Final      HGB Hemoglobin   Date Value Ref Range Status   07/02/2023 10.2 (L) 13.0 - 17.7 g/dL Final   07/01/2023 9.6 (L) 13.0 - 17.7 g/dL Final   06/30/2023 10.8 (L) 13.0 - 17.7 g/dL Final      HCT Hematocrit   Date Value Ref Range Status   07/02/2023 29.7 (L) 37.5 - 51.0 % Final   07/01/2023 29.5 (L) 37.5 - 51.0 % Final   06/30/2023 32.6 (L) 37.5 - 51.0 % Final      Platlets No results found for: LABPLAT   MCV MCV   Date Value Ref Range Status   07/02/2023 86.1 79.0 - 97.0 fL Final   07/01/2023 88.9 79.0 - 97.0 fL Final   06/30/2023 89.1 79.0 - 97.0 fL Final          Sodium Sodium   Date Value Ref Range Status   07/02/2023 131 (L) 136 - 145 mmol/L Final   07/01/2023 134 (L) 136 - 145 mmol/L Final   07/01/2023 129 (L) 136 - 145 mmol/L Final   06/30/2023 127 (L) 136 - 145 mmol/L Final   06/30/2023 128 (L) 136 - 145 mmol/L Final     Sodium, Venous   Date Value Ref Range Status   06/30/2023 131.0 (L) 136 - 146 mmol/L Final      Potassium Potassium   Date Value Ref Range Status   07/02/2023 4.4 3.5 - 5.2 mmol/L Final   07/01/2023 4.1 3.5 - 5.2 mmol/L Final   07/01/2023 5.4 (H) 3.5 - 5.2 mmol/L Final   06/30/2023 5.2 3.5 - 5.2 mmol/L Final   06/30/2023 6.6 (C) 3.5 - 5.2 mmol/L Final     Comment:     Slight hemolysis detected by analyzer. Results may be affected.Verified by repeat analysis.     Potassium, Venous   Date Value Ref Range Status   06/30/2023 4.9 3.5 - 5.0 mmol/L Final      Chloride Chloride   Date Value Ref Range Status   07/02/2023 95 (L) 98 - 107 mmol/L Final   07/01/2023 99 98 - 107 mmol/L Final   07/01/2023 97 (L) 98 - 107 mmol/L Final   06/30/2023 96 (L) 98 - 107 mmol/L Final   06/30/2023 96 (L) 98 - 107 mmol/L Final     Chloride, Venous    Date Value Ref Range Status   06/30/2023 101 98 - 106 mmol/L Final      CO2 CO2   Date Value Ref Range Status   07/02/2023 20.8 (L) 22.0 - 29.0 mmol/L Final    07/01/2023 19.0 (L) 22.0 - 29.0 mmol/L Final   07/01/2023 13.5 (L) 22.0 - 29.0 mmol/L Final   06/30/2023 13.2 (L) 22.0 - 29.0 mmol/L Final   06/30/2023 11.9 (L) 22.0 - 29.0 mmol/L Final      BUN BUN   Date Value Ref Range Status   07/02/2023 76 (H) 8 - 23 mg/dL Final   07/01/2023 82 (H) 8 - 23 mg/dL Final   07/01/2023 84 (H) 8 - 23 mg/dL Final   06/30/2023 84 (H) 8 - 23 mg/dL Final   06/30/2023 80 (H) 8 - 23 mg/dL Final      Creatinine Creatinine   Date Value Ref Range Status   07/02/2023 4.12 (H) 0.76 - 1.27 mg/dL Final   07/01/2023 4.52 (H) 0.76 - 1.27 mg/dL Final   07/01/2023 4.95 (H) 0.76 - 1.27 mg/dL Final   06/30/2023 4.67 (H) 0.76 - 1.27 mg/dL Final   06/30/2023 4.97 (H) 0.76 - 1.27 mg/dL Final      Calcium Calcium   Date Value Ref Range Status   07/02/2023 8.5 (L) 8.6 - 10.5 mg/dL Final   07/01/2023 8.1 (L) 8.6 - 10.5 mg/dL Final   07/01/2023 8.2 (L) 8.6 - 10.5 mg/dL Final   06/30/2023 8.0 (L) 8.6 - 10.5 mg/dL Final   06/30/2023 8.9 8.6 - 10.5 mg/dL Final      PO4 No results found for: CAPO4   Albumin Albumin   Date Value Ref Range Status   07/02/2023 3.4 (L) 3.5 - 5.2 g/dL Final   07/01/2023 3.3 (L) 3.5 - 5.2 g/dL Final   06/30/2023 3.9 3.5 - 5.2 g/dL Final      Magnesium Magnesium   Date Value Ref Range Status   07/02/2023 2.7 (H) 1.6 - 2.4 mg/dL Final   07/01/2023 3.1 (H) 1.6 - 2.4 mg/dL Final   06/30/2023 3.2 (H) 1.6 - 2.4 mg/dL Final      Uric Acid No results found for: URICACID     Medication Review:   atenolol, 25 mg, Oral, Daily  escitalopram, 20 mg, Oral, Daily  furosemide, 40 mg, Intravenous, Q12H  heparin (porcine), 5,000 Units, Subcutaneous, Q8H  insulin lispro, 2-7 Units, Subcutaneous, 4x Daily AC & at Bedtime  levETIRAcetam, 500 mg, Oral, Q12H  pantoprazole, 40 mg, Oral, BID  senna-docusate sodium, 2 tablet, Oral, BID  sodium chloride, 10 mL, Intravenous, Q12H        Assessment & Plan       Acute renal failure, unspecified acute renal failure type    Onychomycosis    Onychocryptosis     Foreign body in left foot    Peripheral neuropathy    Charcot foot due to diabetes mellitus    Foot pain, bilateral      Assessment & Plan  MK/CKD3b-  Due to diabetic nephropathy.  He has had worsened renal function recently.  Had been on lisinopril and SGLT2-inh.  Looks like kerendia was added also but has had hyperkalemia now.  He had CTA done in ER so may have some worsening of renal function.  Increase CPK also.  On bicarb gtt now with IV lasix given.  Good uop, 4.2L in last 24hrs. Marley catheter placed.  creatinine improved to 4.1 now.  Hyponatremia-  improved with IVF's.  Some edema on exam and cxr with pna vs. Edema.  Will continue IV lasix as bp tolerates.  Hyperkalemia-  better, likely due to medication and cellular shift with acidosis.  Would avoid acei/arb and kerendia for now.  Added lasix also.  Met Acidosis-  improved, adding oral bicarb.  Hold bicarb gtt for now.  Proteinuria-  diabetic nephropathy  DMII-  treated.  On jardiance, hold with MK.  HTN-  BP much better now.  Likely related to home meds.  Chronic edema-  Amlodipine may be contributing.  on hold.  H/o bipolar-  previous lithium use.  H/o sz disorder-neurology evaluating.  Anemia-  tsat at 14%, ferritin 425.  Will give IV iron.  Rhabdomyolysis-  had fall at home, statin on hold.  Continue bicarb gtt with IV lasix.  improved now to 1566  Weakness-  would avoid muscle relaxer in CKD(flexeril).  Likely polypharmacy contributing.  On percocet, ambien and lyrica also.    Eliud Lee MD  07/02/23  08:03 EDT

## 2023-07-02 NOTE — PROGRESS NOTES
[facility]   Hospitalist Progress Note  Date: 2023  Patient Name: Carlos Murphy Jr.  : 1960  MRN: [id number]  Date of admission: 2023      Subjective   Subjective     Chief Complaint: Confusion and Fall at home     Summary:   62 y.o. male with a past medical history of COPD, seizure disorder, bipolar, hypertension, hyperlipidemia, type 2 diabetes mellitus presented to the ED for evaluation of altered mental status.  As per the patient's sister, she received a call from patient around 1530 hrs, and complaint of difficulty walking and has slurred speech, so she informed family member to call the EMS.  After coming to the ED, patient found to be very somnolent with pinpoint pupils, received Narcan with significant improvement in his mental status and he became very awake after receiving it.  As per the sister patient was normal yesterday night when she met him.  Patient is a poor historian but said that he took more medications than usual, denies any intentions to harm himself.  Was not sure if she had any episode of seizure.  Did not answer when asked whether he is taking all his medications regularly at home or not.  Stays by himself.  Denies any fevers, chills, nausea, vomiting.  Unable to to do the complete review of systems as the patient is still not oriented completely and is a poor historian.     Upon arrival to the ED, vital signs temperature 98.6, pulse 64, respiratory 20, blood pressure 82/38 on room air saturating around 95%.  Labs showed CK4 203, troponin 60, proBNP 1532, sodium 128, potassium 6.6, chloride 96, bicarb 11.9, anion gap 20.1, creatinine 4.97, 1-month ago creatinine was 1.91, magnesium 3.2, , AST 76, rest of the CMP within normal limits, WBC elevated 12.79, hemoglobin 10.8, rest of the CBC within normal limits, chest x-ray showed hazy lung densities that could be related to hyperinflation or pneumonitis or edema.  CT head without contrast stroke protocol showed  no acute intracranial abnormality.  CT cerebral perfusion with and without contrast showed artifactual perfusion abnormalities in the brain without definite core infarct or to brain at risk, CTA neck showed no significant vascular abnormality in the head and neck.  EKG showed no changes concerning for hyperkalemia.  Received 100 cc bolus in the ED, receiving treatment for hyperkalemia with dextrose, calcium gluconate, insulin regular IV.  Patient has been evaluated by tele neurologist concerning for altered mental status and stroke, recommended MRI head without contrast, TTE for further work-up for stroke and also recommended that this encephalopathy could be likely multifactorial.  Case has been discussed by the ED physician with nephrologist on-call Dr. Lee, recommended starting on bicarb drip.     Patient has been admitted for further evaluation and management of acute metabolic encephalopathy, rhabdomyolysis, concern for seizure, MK on CKD, severe hyperkalemia, moderate hyponatremia, anion gap metabolic acidosis    Interval Followup:   Patient has had some confusion. Had to have a sitter at the bedside  Podiatry removed glass from foot on 7-1 but family thinks might be more glass in foot. Mri ordered   Patient's kidney function continues to be abnormal. Not much improvement   Patient's potassium improving  Vitals stable   CK is better   Left shoulder xray show OA     Review of Systems   All systems were reviewed and negative except for: all over joint pain especially the left shoulder     Objective   Objective     Vitals:   Temp:  [97.3 °F (36.3 °C)-98.3 °F (36.8 °C)] 98.3 °F (36.8 °C)  Heart Rate:  [] 110  Resp:  [16-19] 19  BP: (110-141)/(52-96) 137/96  Flow (L/min):  [4] 4  Physical Exam    Constitutional: Awake, alert, no acute distress, resting in bed with daughter at the bedside    Eyes: Pupils equal, sclerae anicteric, no conjunctival injection   HENT: NCAT, mucous membranes moist   Neck: Supple,  no thyromegaly, no lymphadenopathy, trachea midline   Respiratory: Clear to auscultation bilaterally, nonlabored respirations no w/r/r    Cardiovascular: RRR, no murmurs, rubs, or gallops, palpable pedal pulses bilaterally   Gastrointestinal: Positive bowel sounds, soft, nontender, nondistended   Musculoskeletal: not able to lift his left arm due to shoulder pain, full rom in other extremities    Psychiatric: Appropriate affect, cooperative   Neurologic: Oriented x 3, strength symmetric in all extremities, Cranial Nerves grossly intact to confrontation, speech clear   Skin: bilateral feet have sores on them from stepping on glass?     Result Review    Result Review:  I have personally reviewed the results from the time of this admission to 7/2/2023 08:41 EDT and agree with these findings:  [x]  Laboratory  CBC          6/30/2023    18:36 7/1/2023    04:14 7/2/2023    06:19   CBC   WBC 12.79  11.21  10.06    RBC 3.66  3.32  3.45    Hemoglobin 10.8  9.6  10.2    Hematocrit 32.6  29.5  29.7    MCV 89.1  88.9  86.1    MCH 29.5  28.9  29.6    MCHC 33.1  32.5  34.3    RDW 13.3  13.4  13.2    Platelets 188  197  198      BMP          6/30/2023    18:36 6/30/2023    21:36 6/30/2023    21:37 7/1/2023    04:14 7/1/2023    14:03   BMP   BUN 80   84  84  82    Creatinine 4.97   4.67  4.95  4.52    Sodium 128  131.0  127  129  134    Potassium 6.6  4.9  5.2  5.4  4.1    Chloride 96  101  96  97  99    CO2 11.9   13.2  13.5  19.0    Calcium 8.9   8.0  8.2  8.1          7/2/2023    06:19   BMP   BUN 76    Creatinine 4.12    Sodium 131    Potassium 4.4    Chloride 95    CO2 20.8    Calcium 8.5        [x]  Microbiology  No results found for: ACANTHNAEG, AFBCX, BPERTUSSISCX, BLOODCX  No results found for: BCIDPCR, CXREFLEX, CSFCX, CULTURETIS  No results found for: CULTURES, HSVCX, URCX  No results found for: EYECULTURE, GCCX, HSVCULTURE, LABHSV  No results found for: LEGIONELLA, MRSACX, MUMPSCX, MYCOPLASCX  No results found for:  NOCARDIACX, STOOLCX  No results found for: THROATCX, UNSTIMCULT, URINECX, CULTURE, VZVCULTUR  No results found for: VIRALCULTU, WOUNDCX    [x]  Radiology  XR Chest 1 View    Result Date: 6/30/2023  PROCEDURE: XR CHEST 1 VW  COMPARISON: [facility], CT, CT ANGIOGRAM NECK, 6/30/2023, 18:58.  [facility], CR, XR CHEST 1 VW, 4/30/2023, 14:04.  INDICATIONS: Acute Stroke Protocol (Onset < 12 hrs)  FINDINGS:  There are new hazy lung densities.  No focal consolidation.  No pneumothorax or pleural effusion.  Lung volumes are low.  Heart size is normal.  Pulmonary vasculature is partially obscured.      Impression:  Hazy lung densities that could be related to hypoinflation or pneumonitis or edema.       MARIZA KIRAN MD       Electronically Signed and Approved By: MARIZA KIRAN MD on 6/30/2023 at 20:18              [x]  EKG/Telemetry   [x]  Cardiology/Vascular   []  Pathology  []  Old records  []  Other:    Assessment & Plan   Assessment / Plan     Assessment/Plan:  Acute metabolic encephalopathy  MK on CKD  Severe hyperkalemia-improving  Possible seizure episode  Rhabdomyolysis  Moderate hyponatremia-clinically significant  Anion gap metabolic acidosis  Concern for ischemic stroke  History of COPD  Type 2 diabetes mellitus  Seizure disorder  Bipolar disorder  Hyperlipidemia  Possible foreign body in left foot     Plan  Potassium is much better today    continue on bicarb drip  Monitor urine output  Monitor electrolytes, kidney function with a.m. labs  MRI of the brain without contrast was fine   Echo was fine   Neurochecks every 4 hours  Cerebell in the ED did not show any seizure burden  EEG order is pending   Continue Lasix   Hold statin due to rhabdo   Neurology consult in the a.m.  Low-dose sliding scale insulin  Continue home Lamictal, citalopram, atenolol  Avoid nephrotoxic agents  Nephrology and Neurology following   Nephrology consult in the a.m. Dr. Lee is aware of the  patient  Concern for polypharmacy and contributing to fall and mental status (Ambien, Percocet, Flexeril, Lyrica)   Podiatry removed glass from foot on left. Will obtain mri of foot to see if more   Xray of shoulder shows OA      Discussed plan with RN.    DVT prophylaxis:  Medical DVT prophylaxis orders are present.    CODE STATUS:   Level Of Support Discussed With: Patient  Code Status (Patient has no pulse and is not breathing): CPR (Attempt to Resuscitate)  Medical Interventions (Patient has pulse or is breathing): Full Support

## 2023-07-03 ENCOUNTER — APPOINTMENT (OUTPATIENT)
Dept: MRI IMAGING | Facility: HOSPITAL | Age: 63
DRG: 917 | End: 2023-07-03
Payer: MEDICARE

## 2023-07-03 ENCOUNTER — APPOINTMENT (OUTPATIENT)
Dept: NEUROLOGY | Facility: HOSPITAL | Age: 63
DRG: 917 | End: 2023-07-03
Payer: MEDICARE

## 2023-07-03 LAB
ALBUMIN SERPL-MCNC: 3.8 G/DL (ref 3.5–5.2)
ALP SERPL-CCNC: 144 U/L (ref 39–117)
ALT SERPL W P-5'-P-CCNC: 35 U/L (ref 1–41)
AMMONIA BLD-SCNC: 29 UMOL/L (ref 16–60)
ANION GAP SERPL CALCULATED.3IONS-SCNC: 16.2 MMOL/L (ref 5–15)
AST SERPL-CCNC: 36 U/L (ref 1–40)
BASOPHILS # BLD AUTO: 0.05 10*3/MM3 (ref 0–0.2)
BASOPHILS NFR BLD AUTO: 0.4 % (ref 0–1.5)
BILIRUB CONJ SERPL-MCNC: 0.2 MG/DL (ref 0–0.3)
BILIRUB INDIRECT SERPL-MCNC: 0.4 MG/DL
BILIRUB SERPL-MCNC: 0.6 MG/DL (ref 0–1.2)
BUN SERPL-MCNC: 72 MG/DL (ref 8–23)
BUN/CREAT SERPL: 21.3 (ref 7–25)
CALCIUM SPEC-SCNC: 9 MG/DL (ref 8.6–10.5)
CHLORIDE SERPL-SCNC: 95 MMOL/L (ref 98–107)
CK SERPL-CCNC: 982 U/L (ref 20–200)
CO2 SERPL-SCNC: 24.8 MMOL/L (ref 22–29)
CREAT SERPL-MCNC: 3.38 MG/DL (ref 0.76–1.27)
D-LACTATE SERPL-SCNC: 1.1 MMOL/L (ref 0.5–2)
DEPRECATED RDW RBC AUTO: 40.6 FL (ref 37–54)
EGFRCR SERPLBLD CKD-EPI 2021: 19.9 ML/MIN/1.73
EOSINOPHIL # BLD AUTO: 0.07 10*3/MM3 (ref 0–0.4)
EOSINOPHIL NFR BLD AUTO: 0.6 % (ref 0.3–6.2)
ERYTHROCYTE [DISTWIDTH] IN BLOOD BY AUTOMATED COUNT: 13 % (ref 12.3–15.4)
GEN 5 2HR TROPONIN T REFLEX: 52 NG/L
GLUCOSE BLDC GLUCOMTR-MCNC: 150 MG/DL (ref 70–99)
GLUCOSE BLDC GLUCOMTR-MCNC: 190 MG/DL (ref 70–99)
GLUCOSE BLDC GLUCOMTR-MCNC: 191 MG/DL (ref 70–99)
GLUCOSE BLDC GLUCOMTR-MCNC: 196 MG/DL (ref 70–99)
GLUCOSE BLDC GLUCOMTR-MCNC: 211 MG/DL (ref 70–99)
GLUCOSE SERPL-MCNC: 195 MG/DL (ref 65–99)
HCT VFR BLD AUTO: 32 % (ref 37.5–51)
HGB BLD-MCNC: 11.2 G/DL (ref 13–17.7)
IMM GRANULOCYTES # BLD AUTO: 0.04 10*3/MM3 (ref 0–0.05)
IMM GRANULOCYTES NFR BLD AUTO: 0.3 % (ref 0–0.5)
LYMPHOCYTES # BLD AUTO: 1.3 10*3/MM3 (ref 0.7–3.1)
LYMPHOCYTES NFR BLD AUTO: 11.4 % (ref 19.6–45.3)
MAGNESIUM SERPL-MCNC: 2.3 MG/DL (ref 1.6–2.4)
MAGNESIUM SERPL-MCNC: 2.4 MG/DL (ref 1.6–2.4)
MCH RBC QN AUTO: 30.1 PG (ref 26.6–33)
MCHC RBC AUTO-ENTMCNC: 35 G/DL (ref 31.5–35.7)
MCV RBC AUTO: 86 FL (ref 79–97)
MONOCYTES # BLD AUTO: 1.23 10*3/MM3 (ref 0.1–0.9)
MONOCYTES NFR BLD AUTO: 10.8 % (ref 5–12)
NEUTROPHILS NFR BLD AUTO: 76.5 % (ref 42.7–76)
NEUTROPHILS NFR BLD AUTO: 8.74 10*3/MM3 (ref 1.7–7)
NRBC BLD AUTO-RTO: 0 /100 WBC (ref 0–0.2)
PLATELET # BLD AUTO: 213 10*3/MM3 (ref 140–450)
PMV BLD AUTO: 9.8 FL (ref 6–12)
POTASSIUM SERPL-SCNC: 3.6 MMOL/L (ref 3.5–5.2)
PROCALCITONIN SERPL-MCNC: 0.39 NG/ML (ref 0–0.25)
PROT SERPL-MCNC: 6.6 G/DL (ref 6–8.5)
RBC # BLD AUTO: 3.72 10*6/MM3 (ref 4.14–5.8)
SODIUM SERPL-SCNC: 136 MMOL/L (ref 136–145)
TROPONIN T DELTA: -6 NG/L
TROPONIN T SERPL HS-MCNC: 58 NG/L
WBC NRBC COR # BLD: 11.43 10*3/MM3 (ref 3.4–10.8)

## 2023-07-03 PROCEDURE — 99233 SBSQ HOSP IP/OBS HIGH 50: CPT | Performed by: INTERNAL MEDICINE

## 2023-07-03 PROCEDURE — 25010000002 HEPARIN (PORCINE) PER 1000 UNITS: Performed by: STUDENT IN AN ORGANIZED HEALTH CARE EDUCATION/TRAINING PROGRAM

## 2023-07-03 PROCEDURE — 94761 N-INVAS EAR/PLS OXIMETRY MLT: CPT

## 2023-07-03 PROCEDURE — 93010 ELECTROCARDIOGRAM REPORT: CPT | Performed by: INTERNAL MEDICINE

## 2023-07-03 PROCEDURE — 83735 ASSAY OF MAGNESIUM: CPT | Performed by: PHYSICIAN ASSISTANT

## 2023-07-03 PROCEDURE — 80076 HEPATIC FUNCTION PANEL: CPT | Performed by: PHYSICIAN ASSISTANT

## 2023-07-03 PROCEDURE — 25010000002 FUROSEMIDE PER 20 MG: Performed by: INTERNAL MEDICINE

## 2023-07-03 PROCEDURE — 83735 ASSAY OF MAGNESIUM: CPT | Performed by: INTERNAL MEDICINE

## 2023-07-03 PROCEDURE — 63710000001 INSULIN LISPRO (HUMAN) PER 5 UNITS: Performed by: STUDENT IN AN ORGANIZED HEALTH CARE EDUCATION/TRAINING PROGRAM

## 2023-07-03 PROCEDURE — 82550 ASSAY OF CK (CPK): CPT | Performed by: PHYSICIAN ASSISTANT

## 2023-07-03 PROCEDURE — 80048 BASIC METABOLIC PNL TOTAL CA: CPT | Performed by: INTERNAL MEDICINE

## 2023-07-03 PROCEDURE — 83605 ASSAY OF LACTIC ACID: CPT | Performed by: PHYSICIAN ASSISTANT

## 2023-07-03 PROCEDURE — 82140 ASSAY OF AMMONIA: CPT | Performed by: PHYSICIAN ASSISTANT

## 2023-07-03 PROCEDURE — 95816 EEG AWAKE AND DROWSY: CPT

## 2023-07-03 PROCEDURE — 84145 PROCALCITONIN (PCT): CPT | Performed by: PHYSICIAN ASSISTANT

## 2023-07-03 PROCEDURE — 99232 SBSQ HOSP IP/OBS MODERATE 35: CPT | Performed by: PODIATRIST

## 2023-07-03 PROCEDURE — 82948 REAGENT STRIP/BLOOD GLUCOSE: CPT

## 2023-07-03 PROCEDURE — 84484 ASSAY OF TROPONIN QUANT: CPT | Performed by: PHYSICIAN ASSISTANT

## 2023-07-03 PROCEDURE — 25010000002 ONDANSETRON PER 1 MG: Performed by: INTERNAL MEDICINE

## 2023-07-03 PROCEDURE — 85025 COMPLETE CBC W/AUTO DIFF WBC: CPT | Performed by: PHYSICIAN ASSISTANT

## 2023-07-03 PROCEDURE — 97165 OT EVAL LOW COMPLEX 30 MIN: CPT

## 2023-07-03 PROCEDURE — 94799 UNLISTED PULMONARY SVC/PX: CPT

## 2023-07-03 PROCEDURE — 93010 ELECTROCARDIOGRAM REPORT: CPT | Performed by: SPECIALIST

## 2023-07-03 PROCEDURE — 99233 SBSQ HOSP IP/OBS HIGH 50: CPT | Performed by: STUDENT IN AN ORGANIZED HEALTH CARE EDUCATION/TRAINING PROGRAM

## 2023-07-03 PROCEDURE — 93005 ELECTROCARDIOGRAM TRACING: CPT | Performed by: PHYSICIAN ASSISTANT

## 2023-07-03 PROCEDURE — 73718 MRI LOWER EXTREMITY W/O DYE: CPT

## 2023-07-03 RX ORDER — ACETAMINOPHEN 325 MG/1
650 TABLET ORAL EVERY 4 HOURS PRN
Status: DISCONTINUED | OUTPATIENT
Start: 2023-07-03 | End: 2023-07-06

## 2023-07-03 RX ORDER — LORAZEPAM 2 MG/ML
1 INJECTION INTRAMUSCULAR ONCE
Status: COMPLETED | OUTPATIENT
Start: 2023-07-03 | End: 2023-07-04

## 2023-07-03 RX ORDER — HALOPERIDOL 5 MG/ML
2 INJECTION INTRAMUSCULAR ONCE
Status: DISCONTINUED | OUTPATIENT
Start: 2023-07-03 | End: 2023-07-03

## 2023-07-03 RX ORDER — ONDANSETRON 2 MG/ML
4 INJECTION INTRAMUSCULAR; INTRAVENOUS EVERY 6 HOURS PRN
Status: DISCONTINUED | OUTPATIENT
Start: 2023-07-03 | End: 2023-07-19 | Stop reason: HOSPADM

## 2023-07-03 RX ADMIN — HEPARIN SODIUM 5000 UNITS: 5000 INJECTION INTRAVENOUS; SUBCUTANEOUS at 06:15

## 2023-07-03 RX ADMIN — MUPIROCIN 1 APPLICATION: 20 OINTMENT TOPICAL at 21:57

## 2023-07-03 RX ADMIN — FUROSEMIDE 40 MG: 10 INJECTION, SOLUTION INTRAMUSCULAR; INTRAVENOUS at 09:17

## 2023-07-03 RX ADMIN — PANTOPRAZOLE SODIUM 40 MG: 40 TABLET, DELAYED RELEASE ORAL at 09:18

## 2023-07-03 RX ADMIN — SODIUM BICARBONATE 650 MG TABLET 1300 MG: at 09:17

## 2023-07-03 RX ADMIN — ACETAMINOPHEN 650 MG: 325 TABLET ORAL at 01:17

## 2023-07-03 RX ADMIN — SODIUM BICARBONATE 650 MG TABLET 1300 MG: at 16:05

## 2023-07-03 RX ADMIN — Medication 10 ML: at 09:18

## 2023-07-03 RX ADMIN — INSULIN LISPRO 2 UNITS: 100 INJECTION, SOLUTION INTRAVENOUS; SUBCUTANEOUS at 21:56

## 2023-07-03 RX ADMIN — Medication 10 ML: at 21:57

## 2023-07-03 RX ADMIN — PANTOPRAZOLE SODIUM 40 MG: 40 TABLET, DELAYED RELEASE ORAL at 21:56

## 2023-07-03 RX ADMIN — FUROSEMIDE 40 MG: 10 INJECTION, SOLUTION INTRAMUSCULAR; INTRAVENOUS at 21:56

## 2023-07-03 RX ADMIN — LEVETIRACETAM 500 MG: 500 TABLET, FILM COATED ORAL at 21:56

## 2023-07-03 RX ADMIN — LEVETIRACETAM 500 MG: 500 TABLET, FILM COATED ORAL at 09:18

## 2023-07-03 RX ADMIN — ATENOLOL 25 MG: 25 TABLET ORAL at 09:18

## 2023-07-03 RX ADMIN — INSULIN LISPRO 2 UNITS: 100 INJECTION, SOLUTION INTRAVENOUS; SUBCUTANEOUS at 09:17

## 2023-07-03 RX ADMIN — INSULIN LISPRO 2 UNITS: 100 INJECTION, SOLUTION INTRAVENOUS; SUBCUTANEOUS at 12:52

## 2023-07-03 RX ADMIN — ONDANSETRON 4 MG: 2 INJECTION INTRAMUSCULAR; INTRAVENOUS at 12:53

## 2023-07-03 RX ADMIN — ESCITALOPRAM OXALATE 20 MG: 10 TABLET ORAL at 09:17

## 2023-07-03 RX ADMIN — MUPIROCIN 1 APPLICATION: 20 OINTMENT TOPICAL at 16:05

## 2023-07-03 RX ADMIN — HEPARIN SODIUM 5000 UNITS: 5000 INJECTION INTRAVENOUS; SUBCUTANEOUS at 21:56

## 2023-07-03 RX ADMIN — HEPARIN SODIUM 5000 UNITS: 5000 INJECTION INTRAVENOUS; SUBCUTANEOUS at 14:32

## 2023-07-03 RX ADMIN — INSULIN LISPRO 3 UNITS: 100 INJECTION, SOLUTION INTRAVENOUS; SUBCUTANEOUS at 17:28

## 2023-07-03 RX ADMIN — SODIUM BICARBONATE 650 MG TABLET 1300 MG: at 21:55

## 2023-07-03 NOTE — PLAN OF CARE
Goal Outcome Evaluation:  Plan of Care Reviewed With: patient        Progress: no change  Outcome Evaluation: Patient presents with balance, endurance, strength limitations that impede his/her ability to perform ADLS. The skills of a therapist are necessary to maximize independence with ADLs.      Anticipated Discharge Disposition (OT): inpatient rehabilitation facility

## 2023-07-03 NOTE — PROGRESS NOTES
[facility]   Hospitalist Progress Note  Date: 7/3/2023  Patient Name: Carlos Murphy Jr.  : 1960  MRN: [id number]  Date of admission: 2023      Subjective   Subjective     Chief Complaint: Confusion and Fall at home     Summary:   62 y.o. male with a past medical history of COPD, seizure disorder, bipolar, hypertension, hyperlipidemia, type 2 diabetes mellitus presented to the ED for evaluation of altered mental status.  As per the patient's sister, she received a call from patient around 1530 hrs, and complaint of difficulty walking and has slurred speech, so she informed family member to call the EMS.  After coming to the ED, patient found to be very somnolent with pinpoint pupils, received Narcan with significant improvement in his mental status and he became very awake after receiving it.  As per the sister patient was normal yesterday night when she met him.  Patient is a poor historian but said that he took more medications than usual, denies any intentions to harm himself.  Was not sure if she had any episode of seizure.  Did not answer when asked whether he is taking all his medications regularly at home or not.  Stays by himself.  Denies any fevers, chills, nausea, vomiting.  Unable to to do the complete review of systems as the patient is still not oriented completely and is a poor historian.     Upon arrival to the ED, vital signs temperature 98.6, pulse 64, respiratory 20, blood pressure 82/38 on room air saturating around 95%.  Labs showed CK4 203, troponin 60, proBNP 1532, sodium 128, potassium 6.6, chloride 96, bicarb 11.9, anion gap 20.1, creatinine 4.97, 1-month ago creatinine was 1.91, magnesium 3.2, , AST 76, rest of the CMP within normal limits, WBC elevated 12.79, hemoglobin 10.8, rest of the CBC within normal limits, chest x-ray showed hazy lung densities that could be related to hyperinflation or pneumonitis or edema.  CT head without contrast stroke protocol showed  no acute intracranial abnormality.  CT cerebral perfusion with and without contrast showed artifactual perfusion abnormalities in the brain without definite core infarct or to brain at risk, CTA neck showed no significant vascular abnormality in the head and neck.  EKG showed no changes concerning for hyperkalemia.  Received 100 cc bolus in the ED, receiving treatment for hyperkalemia with dextrose, calcium gluconate, insulin regular IV.  Patient has been evaluated by tele neurologist concerning for altered mental status and stroke, recommended MRI head without contrast, TTE for further work-up for stroke and also recommended that this encephalopathy could be likely multifactorial.  Case has been discussed by the ED physician with nephrologist on-call Dr. Lee, recommended starting on bicarb drip.     Patient has been admitted for further evaluation and management of acute metabolic encephalopathy, rhabdomyolysis, concern for seizure, KM on CKD, severe hyperkalemia, moderate hyponatremia, anion gap metabolic acidosis    Interval Followup:   Mental status is better. Getting an EEG today   Podiatry removed glass from foot on 7-1 but family thinks might be more glass in foot. Mri ordered which showed   1. Focal edema within the subcutaneous soft tissues at the plantar medial aspect of the hindfoot in   the region of the small foreign body as observed on the prior radiographs.  Given the presence of a   foreign body, the findings suggest changes of soft tissue cellulitis.  There is no definitive   abscess formation.   2. Focal edema is noted within the anterior process of the calcaneus and region of the middle   subtalar facet.  The edema is felt to most likely be related to chronic stress related changes   and/or arthropathy.  Changes of developing osteomyelitis are felt less likely but are not entirely   excluded.  No definitive cortical erosion or destruction is identified.   3. Marked changes of tendinopathy and  tenosynovitis with superimposed partial thickness tear   involving the posterior tibialis tendon.    4. There may be mild changes of tendinopathy involving the peroneus longus and brevis tendons.   5. Mild distal Achilles tendinopathy.  There is also mild-to-moderate plantar fasciitis.   6. Evidence for prior disruption of the anterior talofibular ligament with likely associated injury   of the calcaneofibular ligament.   7. Moderate changes of arthropathy associated with the articulations of the ankle, hindfoot, and   midfoot.  There is also flattening of the arch of the foot.  The findings may be related to   posttraumatic osteoarthritis.  Changes secondary to developing neuropathic arthropathy or Charcot   foot could also have this appearance.   8. Findings suggesting changes of remote Lisfranc injury.        Patient's kidney function continues to improve.   Vitals stable   CK is better   Left shoulder xray show OA     Review of Systems   All systems were reviewed and negative except for: all over joint pain especially the left shoulder, generalized weakness     Objective   Objective     Vitals:   Temp:  [97.9 °F (36.6 °C)-98.6 °F (37 °C)] 98.4 °F (36.9 °C)  Heart Rate:  [65-76] 66  Resp:  [14-18] 18  BP: (138-162)/(49-74) 138/74  Flow (L/min):  [4] 4  Physical Exam    Constitutional: Awake, alert, no acute distress, resting in bed getting an eeg    Eyes: Pupils equal, sclerae anicteric, no conjunctival injection   HENT: NCAT, mucous membranes moist   Neck: Supple, no thyromegaly, no lymphadenopathy, trachea midline   Respiratory: Clear to auscultation bilaterally, nonlabored respirations no w/r/r    Cardiovascular: RRR, no murmurs, rubs, or gallops, palpable pedal pulses bilaterally   Gastrointestinal: Positive bowel sounds, soft, nontender, nondistended   Musculoskeletal: not able to lift his left arm due to shoulder pain, full rom in other extremities    Psychiatric: Appropriate affect,  cooperative   Neurologic: Oriented x 3, strength symmetric in all extremities, Cranial Nerves grossly intact to confrontation, speech clear   Skin: bilateral feet have sores on them from stepping on glass?     Result Review    Result Review:  I have personally reviewed the results from the time of this admission to 7/3/2023 07:56 EDT and agree with these findings:  [x]  Laboratory  CBC          7/1/2023    04:14 7/2/2023    06:19 7/3/2023    06:13   CBC   WBC 11.21  10.06  11.43    RBC 3.32  3.45  3.72    Hemoglobin 9.6  10.2  11.2    Hematocrit 29.5  29.7  32.0    MCV 88.9  86.1  86.0    MCH 28.9  29.6  30.1    MCHC 32.5  34.3  35.0    RDW 13.4  13.2  13.0    Platelets 197  198  213      BMP          7/1/2023    04:14 7/1/2023    14:03 7/2/2023    06:19 7/3/2023    04:33   BMP   BUN 84  82  76  72    Creatinine 4.95  4.52  4.12  3.38    Sodium 129  134  131  136    Potassium 5.4  4.1  4.4  3.6    Chloride 97  99  95  95    CO2 13.5  19.0  20.8  24.8    Calcium 8.2  8.1  8.5  9.0        [x]  Microbiology  No results found for: ACANTHNAEG, AFBCX, BPERTUSSISCX, BLOODCX  No results found for: BCIDPCR, CXREFLEX, CSFCX, CULTURETIS  No results found for: CULTURES, HSVCX, URCX  No results found for: EYECULTURE, GCCX, HSVCULTURE, LABHSV  No results found for: LEGIONELLA, MRSACX, MUMPSCX, MYCOPLASCX  No results found for: NOCARDIACX, STOOLCX  No results found for: THROATCX, UNSTIMCULT, URINECX, CULTURE, VZVCULTUR  No results found for: VIRALCULTU, WOUNDCX    [x]  Radiology  XR Chest 1 View    Result Date: 6/30/2023  PROCEDURE: XR CHEST 1 VW  COMPARISON: [facility], CT, CT ANGIOGRAM NECK, 6/30/2023, 18:58.  [facility], CR, XR CHEST 1 VW, 4/30/2023, 14:04.  INDICATIONS: Acute Stroke Protocol (Onset < 12 hrs)  FINDINGS:  There are new hazy lung densities.  No focal consolidation.  No pneumothorax or pleural effusion.  Lung volumes are low.  Heart size is normal.  Pulmonary vasculature is partially  obscured.      Impression:  Hazy lung densities that could be related to hypoinflation or pneumonitis or edema.       MARIZA KIRAN MD       Electronically Signed and Approved By: MARIZA KIRAN MD on 6/30/2023 at 20:18              [x]  EKG/Telemetry   [x]  Cardiology/Vascular   []  Pathology  []  Old records  []  Other:    Assessment & Plan   Assessment / Plan     Assessment/Plan:  Acute metabolic encephalopathy  MK on CKD  Severe hyperkalemia-improving  Possible seizure episode  Rhabdomyolysis  Moderate hyponatremia-clinically significant  Anion gap metabolic acidosis  Concern for ischemic stroke  History of COPD  Type 2 diabetes mellitus  Seizure disorder  Bipolar disorder  Hyperlipidemia  Possible foreign body in left foot     Plan  Potassium is back to normal    continue on bicarb drip  Monitor urine output  Monitor electrolytes, kidney function with a.m. labs  MRI of the brain without contrast was fine   Echo was fine   Neurochecks every 4 hours  Cerebell in the ED did not show any seizure burden  EEG currently being down   Continue Lasix   Hold statin due to rhabdo   Neurology following   Low-dose sliding scale insulin  Continue home Lamictal, citalopram, atenolol  Avoid nephrotoxic agents  Nephrology and Neurology following   Concern for polypharmacy and contributing to fall and mental status (Ambien, Percocet, Flexeril, Lyrica)   Podiatry removed glass from foot on left. MRI of left foot done as documented above. Continue local wound care    Xray of shoulder shows OA      Discussed plan with RN.    DVT prophylaxis:  Medical DVT prophylaxis orders are present.    CODE STATUS:   Level Of Support Discussed With: Patient  Code Status (Patient has no pulse and is not breathing): CPR (Attempt to Resuscitate)  Medical Interventions (Patient has pulse or is breathing): Full Support

## 2023-07-03 NOTE — PROGRESS NOTES
[facility] - PODIATRY    Today's Date: 07/03/23    Patient Name: Carlos Murphy Jr.  MRN: [id number]  CSN: [id number]  PCP: Felix Atkinson MD  Referring Provider: Pete Holguin MD  Attending Provider: Clif Oconnor DO  Length of Stay: 3    SUBJECTIVE   Chief Complaint: Follow-up from glass being in the patient's left foot    HPI: Carlos Murphy Jr., a 62 y.o.male,     Patient states he is feeling better today.    Patient denies any fevers, chills, nausea, vomiting, shortness of breathe, nor any other constitutional signs nor symptoms.    Past Medical History:   Diagnosis Date   • Acute kidney injury     2021 POST SEIZURES   • Astigmatism of both eyes     eyes twitch left and right   • Bipolar disorder    • Charcot ankle    • Closed nondisplaced fracture of medial malleolus of left tibia    • COPD (chronic obstructive pulmonary disease)     USES INHALERS   • Diabetes     BG RUNS AROUND 90'S IN AM   • Hx of schizophrenia    • Hyperlipidemia    • Hypertension     SEEN DR ROD IN THE PAST, HAD APPT WITH DR MICHAUD IN 5-2022 CX APPT, DENIED CP/SOB   • Neuropathy    • Seizures     LAST ONE 4/21/22   • Sleep apnea     DOES NOT USE CPAP   • Varicose vein of leg      Past Surgical History:   Procedure Laterality Date   • ANKLE OPEN REDUCTION INTERNAL FIXATION Left 04/25/2022    Procedure: LEFT OPEN REDUCTION INTERNAL FIXATION DISTAL TIBIA FRACTURE ;  Surgeon: Tha Parry MD;  Location: [facility];  Service: Orthopedics;  Laterality: Left;   • ANKLE OPEN REDUCTION INTERNAL FIXATION Left 06/01/2022    Procedure: LEFT ANKLE OPEN REDUCTION INTERNAL FIXATION WITH SYNDESMOSIS FIXATION;  Surgeon: Tha Parry MD;  Location: [facility] OR;  Service: Orthopedics;  Laterality: Left;   • CHOLECYSTECTOMY     • COLONOSCOPY     • JOINT REPLACEMENT      RTKR   • LUMBAR DISC SURGERY     • SHOULDER SURGERY Right      Family History   Problem Relation Age of Onset   • Kidney disease Mother    • COPD  Mother    • COPD Father    • Heart disease Father    • Malig Hyperthermia Neg Hx      Social History     Socioeconomic History   • Marital status:    Tobacco Use   • Smoking status: Former     Packs/day: 0.50     Years: 35.00     Pack years: 17.50     Types: Cigarettes     Quit date:      Years since quittin.5   • Smokeless tobacco: Never   Vaping Use   • Vaping Use: Never used   Substance and Sexual Activity   • Alcohol use: Yes     Comment: OCCASIONAL   • Drug use: Never   • Sexual activity: Defer     No Known Allergies  Current Facility-Administered Medications   Medication Dose Route Frequency Provider Last Rate Last Admin   • acetaminophen (TYLENOL) tablet 650 mg  650 mg Oral Q4H PRN Trudy Washington PA   650 mg at 23 0117   • atenolol (TENORMIN) tablet 25 mg  25 mg Oral Daily Hazel Stephens MD   25 mg at 23   • sennosides-docusate (PERICOLACE) 8.6-50 MG per tablet 2 tablet  2 tablet Oral BID Hazel Stephens MD   2 tablet at 23    And   • polyethylene glycol (MIRALAX) packet 17 g  17 g Oral Daily PRN Hazel Stephens MD        And   • bisacodyl (DULCOLAX) EC tablet 5 mg  5 mg Oral Daily PRN Hazel Stephens MD        And   • bisacodyl (DULCOLAX) suppository 10 mg  10 mg Rectal Daily PRN Hazel Stephens MD       • dextrose (D50W) (25 g/50 mL) IV injection 25 g  25 g Intravenous Q15 Min PRN Hazel Stephens MD       • dextrose (GLUTOSE) oral gel 15 g  15 g Oral Q15 Min PRN Hazel Stephens MD       • escitalopram (LEXAPRO) tablet 20 mg  20 mg Oral Daily Hazel Stephens MD   20 mg at 23   • furosemide (LASIX) injection 40 mg  40 mg Intravenous Q12H Eliud Lee MD   40 mg at 23   • glucagon (GLUCAGEN) injection 1 mg  1 mg Intramuscular Q15 Min PRN Hazel Stephens MD       • heparin (porcine) 5000 UNIT/ML injection 5,000 Units  5,000 Units Subcutaneous Q8H Hazel Stephens MD   5,000 Units at 23 0615   •  Insulin Lispro (humaLOG) injection 2-7 Units  2-7 Units Subcutaneous 4x Daily AC & at Bedtime Hazel Stephens MD   2 Units at 07/03/23 0917   • levETIRAcetam (KEPPRA) tablet 500 mg  500 mg Oral Q12H Clif Oconnor DO   500 mg at 07/03/23 0918   • nitroglycerin (NITROSTAT) SL tablet 0.4 mg  0.4 mg Sublingual Q5 Min PRN Hazel Stephens MD       • pantoprazole (PROTONIX) EC tablet 40 mg  40 mg Oral BID Hazel Stephens MD   40 mg at 07/03/23 0918   • sodium bicarbonate tablet 1,300 mg  1,300 mg Oral TID Eliud Lee MD   1,300 mg at 07/03/23 0917   • sodium chloride 0.9 % flush 10 mL  10 mL Intravenous PRN Hazel Stephens MD       • sodium chloride 0.9 % flush 10 mL  10 mL Intravenous PRN Hazel Stephens MD       • sodium chloride 0.9 % flush 10 mL  10 mL Intravenous Q12H Hazel Stephens MD   10 mL at 07/03/23 0918   • sodium chloride 0.9 % flush 10 mL  10 mL Intravenous PRN Hazel Stephens MD       • sodium chloride 0.9 % infusion 40 mL  40 mL Intravenous LUIS MIGUELN Hazel Stephens MD         Review of Systems   Constitutional: Negative.    Skin:        Previous glass fragment in left heel   All other systems reviewed and are negative.      OBJECTIVE     Vitals:    07/03/23 1105   BP: 139/72   Pulse: 63   Resp: 16   Temp: 98.1 °F (36.7 °C)   SpO2: 97%       PHYSICAL EXAM    GEN:   A&Ox3, NAD. Pt presents in hospital bed.     Foot/Ankle Exam    GENERAL  Appearance:  chronically ill  Orientation:  disoriented    VASCULAR     Right Foot Vascularity   Dorsalis pedis:  2+  Posterior tibial:  2+  Skin temperature:  warm  Edema grading:  None  CFT:  < 3 seconds  Pedal hair growth:  Present  Varicosities:  mild varicosities     Left Foot Vascularity   Dorsalis pedis:  2+  Posterior tibial:  2+  Skin temperature:  warm  Edema grading:  None  CFT:  < 3 seconds  Pedal hair growth:  Present  Varicosities:  mild varicosities     NEUROLOGIC     Right Foot Neurologic   Light touch sensation:  absent  Vibratory sensation: absent  Hot/Cold sensation: absent  Protective Sensation using [place] Monofilament:   Sites intact: 10  Sites tested: 10     Left Foot Neurologic   Light touch sensation: absent  Vibratory sensation: absent  Hot/Cold sensation:  absent  Protective Sensation using [place] Monofilament:   Sites tested: 10    MUSCULOSKELETAL     Right Foot Musculoskeletal   Arch:  Charcot     Left Foot Musculoskeletal   Arch:  Charcot    MUSCLE STRENGTH     Right Foot Muscle Strength   Foot dorsiflexion:  4  Foot plantar flexion:  4  Foot inversion:  4  Foot eversion:  4     Left Foot Muscle Strength   Foot dorsiflexion:  4  Foot plantar flexion:  4  Foot inversion:  4  Foot eversion:  4    RANGE OF MOTION     Right Foot Range of Motion   Foot and ankle ROM within normal limits       Left Foot Range of Motion   Foot and ankle ROM within normal limits      DERMATOLOGIC      Right Foot Dermatologic   Skin  Right foot skin is intact.      Left Foot Dermatologic   Skin  Left foot skin is intact.      Right foot additional comments: Plantar aspect of the right foot was palpated and no areas of foreign body were seen.    No signs of edema, erythema, lymphangitis, nor signs of infection.       Left foot additional comments: Previous area where a foreign body in plantar left heel was removed shows no signs of infection and healing without complications.  The plantar aspect of the foot was palpated and no foreign bodies were palpated.    No signs of edema, erythema, lymphangitis, nor signs of infection.        RADIOLOGY/NUCLEAR:    MRI Foot Left Without Contrast    Result Date: 7/3/2023  Narrative: PROCEDURE: MRI FOOT LEFT WO CONTRAST  COMPARISON:  [facility], CR, XR FOOT 2 VW LEFT, 7/01/2023, 10:23. INDICATIONS: Glass in  left hindfoot, plantar region.      TECHNIQUE: A complete multi-planar examination was performed without contrast.  FINDINGS:  There is mild limitation due to  motion degradation despite attempts at optimal imaging.  Susceptibility artifact is also noted at the medial aspect of the ankle related to orthopedic hardware within the medial malleolus.  There is marked thickening and abnormal signal associated with the distal posterior tibialis tendon.  The findings indicate marked changes of tendinopathy.  There is evidence for superimposed partial thickness tear.  Increased fluid is also seen in the tendon sheath.  There is no complete disruption or proximal retraction.  The flexor digitorum longus and flexor hallucis longus tendons are intact.  There are likely mild changes of tendinopathy involving the peroneus longus and brevis tendons.  There is no tendon tear or proximal retraction. The anterior extensor tendons of the ankle are intact.  Mild changes of distal Achilles tendinopathy are noted.  There is no Achilles tendon tear.  Mild changes of plantar fasciitis are also observed.  The visualization of the deltoid ligament complex is limited.  The complex appears to be grossly intact.  The spring ligament appears intact.  There is evidence for disruption of the anterior talofibular ligament.  There is also likely at least partial disruption of the calcaneofibular ligament.  The posterior talofibular ligament is not well visualized.  The anterior and posterior tibiofibular ligaments are not well visualized.  There is evidence for abnormally increased signal corresponding to the expected course of the Lisfranc ligament complex between the 1st and 2nd proximal metatarsals.  The findings suggest potential prior Lisfranc injury.  This region is only partially included in the field of view for this study.  The does appear to be widening between the 1st and 2nd metatarsals on the prior radiographs.  There is no significant ankle joint effusion. There is no evidence for osteochondral injury.  Changes of arthropathy are seen throughout the articulations of the ankle, hindfoot, and  midfoot.  There is evidence for associated flattening of the arch of the foot.  These findings may be posttraumatic in nature given the evidence for prior trauma and orthopedic surgery.  Developing neuropathic arthropathy or Charcot foot could also have this appearance.  There is extensive edema within the subcutaneous soft tissues of the plantar medial aspect of the hindfoot.  These findings are noted in the region of the small foreign body as observed on the prior radiographs.  The foreign body is not well identified on MRI.  The findings suggest changes of soft tissue cellulitis in this region.  No well-defined fluid collection is seen to suggest definitive abscess.  No cortical erosion or destruction is identified.  Note is made of edema within the anterior process of the calcaneus and region of the middle subtalar facet.  This edema may be related to arthropathy or chronic stress related changes.  Developing osteomyelitis is felt less likely.      Impression:   1. Focal edema within the subcutaneous soft tissues at the plantar medial aspect of the hindfoot in the region of the small foreign body as observed on the prior radiographs.  Given the presence of a foreign body, the findings suggest changes of soft tissue cellulitis.  There is no definitive abscess formation. 2. Focal edema is noted within the anterior process of the calcaneus and region of the middle subtalar facet.  The edema is felt to most likely be related to chronic stress related changes and/or arthropathy.  Changes of developing osteomyelitis are felt less likely but are not entirely excluded.  No definitive cortical erosion or destruction is identified. 3. Marked changes of tendinopathy and tenosynovitis with superimposed partial thickness tear involving the posterior tibialis tendon.  4. There may be mild changes of tendinopathy involving the peroneus longus and brevis tendons. 5. Mild distal Achilles tendinopathy.  There is also  mild-to-moderate plantar fasciitis. 6. Evidence for prior disruption of the anterior talofibular ligament with likely associated injury of the calcaneofibular ligament. 7. Moderate changes of arthropathy associated with the articulations of the ankle, hindfoot, and midfoot.  There is also flattening of the arch of the foot.  The findings may be related to posttraumatic osteoarthritis.  Changes secondary to developing neuropathic arthropathy or Charcot foot could also have this appearance. 8. Findings suggesting changes of remote Lisfranc injury.      RUDY BEAN MD       Electronically Signed and Approved By: RUDY BEAN MD on 7/03/2023 at 9:09             Dr. Cowan discussed with MRI results and clinical findings with Dr. Bean and he clarified that no foreign bodies were seen on the MRI.           LABORATORY/CULTURE RESULTS:  Results from last 7 days   Lab Units 07/03/23  0613 07/02/23  0619 07/01/23  0414   WBC 10*3/mm3 11.43* 10.06 11.21*   HEMOGLOBIN g/dL 11.2* 10.2* 9.6*   HEMATOCRIT % 32.0* 29.7* 29.5*   PLATELETS 10*3/mm3 213 198 197     Results from last 7 days   Lab Units 07/03/23  0433 07/02/23  0619 07/01/23  1403 07/01/23  0414   SODIUM mmol/L 136 131* 134* 129*   POTASSIUM mmol/L 3.6 4.4 4.1 5.4*   CHLORIDE mmol/L 95* 95* 99 97*   CO2 mmol/L 24.8 20.8* 19.0* 13.5*   BUN mg/dL 72* 76* 82* 84*   CREATININE mg/dL 3.38* 4.12* 4.52* 4.95*   CALCIUM mg/dL 9.0 8.5* 8.1* 8.2*   BILIRUBIN mg/dL 0.6 0.4  --  0.3   ALK PHOS U/L 144* 127*  --  117   ALT (SGPT) U/L 35 36  --  34   AST (SGOT) U/L 36 47*  --  69*   GLUCOSE mg/dL 195* 232* 87 151*     Results from last 7 days   Lab Units 06/30/23  1836   INR  1.14   APTT seconds 20.5*     Microbiology Results (last 10 days)     ** No results found for the last 240 hours. **           ASSESSMENT/PLAN     Active Hospital Problems:  Active Hospital Problems    Diagnosis    • **Acute renal failure, unspecified acute renal failure type    • Onychomycosis    •  Onychocryptosis    • Foreign body in left foot    • Peripheral neuropathy    • Charcot foot due to diabetes mellitus    • Foot pain, bilateral        Comprehensive lower extremity examination and evaluation was performed.    Discussed findings and treatment plan including risks, benefits, and treatment options with patient in detail. Patient agreed with treatment plan.    Discussed with patient's hospitalist.    Recommend rehabilitation placement.    Podiatry is signing off from inpatient follow-up.    Thank you for including me in the care of this patient.    Lab Frequency Next Occurrence   Follow Anesthesia Guidelines / Protocol Once 04/22/2022   Obtain Informed Consent Once 04/27/2022   Follow Anesthesia Guidelines / Protocol Once 05/27/2022   Follow Anesthesia Guidelines / Protocol Once 03/28/2023   Obtain Informed Consent Once 03/28/2023   US AAA Screen Limited Once 06/24/2023       This document has been electronically signed by Ricky Cowan DPM on July 3, 2023 12:25 EDT

## 2023-07-03 NOTE — THERAPY EVALUATION
Patient Name: Carlos Murphy Jr.  : 1960    MRN: [id number]                              Today's Date: 7/3/2023       Admit Date: 2023    Visit Dx:     ICD-10-CM ICD-9-CM   1. Acute renal failure, unspecified acute renal failure type  N17.9 584.9   2. Altered mental status, unspecified altered mental status type  R41.82 780.97   3. At risk for polypharmacy  Z91.89 V49.89   4. Non-traumatic rhabdomyolysis  M62.82 728.88   5. Dysphagia, unspecified type  R13.10 787.20   6. Impaired mobility and ADLs  Z74.09 V49.89    Z78.9      Patient Active Problem List   Diagnosis    Ulcer of toe due to type 2 diabetes mellitus    Centrilobular emphysema    Tobacco abuse, in remission    Obesity (BMI 30-39.9)    Closed fracture of shaft of left fibula    Closed nondisplaced fracture of medial malleolus of left tibia    Aftercare following surgery of ORIF left distal tibia fracture 2022    Colon cancer screening    Colitis    Acute renal failure, unspecified acute renal failure type    Onychomycosis    Onychocryptosis    Foreign body in left foot    Peripheral neuropathy    Charcot foot due to diabetes mellitus    Foot pain, bilateral     Past Medical History:   Diagnosis Date    Acute kidney injury      POST SEIZURES    Astigmatism of both eyes     eyes twitch left and right    Bipolar disorder     Charcot ankle     Closed nondisplaced fracture of medial malleolus of left tibia     COPD (chronic obstructive pulmonary disease)     USES INHALERS    Diabetes     BG RUNS AROUND 90'S IN AM    Hx of schizophrenia     Hyperlipidemia     Hypertension     SEEN DR ROD IN THE PAST, HAD APPT WITH DR MICHAUD IN  CX APPT, DENIED CP/SOB    Neuropathy     Seizures     LAST ONE 22    Sleep apnea     DOES NOT USE CPAP    Varicose vein of leg      Past Surgical History:   Procedure Laterality Date    ANKLE OPEN REDUCTION INTERNAL FIXATION Left 2022    Procedure: LEFT OPEN REDUCTION INTERNAL FIXATION DISTAL TIBIA  FRACTURE ;  Surgeon: Tha Parry MD;  Location: [facility] OR [facility];  Service: Orthopedics;  Laterality: Left;    ANKLE OPEN REDUCTION INTERNAL FIXATION Left 2022    Procedure: LEFT ANKLE OPEN REDUCTION INTERNAL FIXATION WITH SYNDESMOSIS FIXATION;  Surgeon: Tha Parry MD;  Location: [facility] MAIN OR;  Service: Orthopedics;  Laterality: Left;    CHOLECYSTECTOMY      COLONOSCOPY      JOINT REPLACEMENT      RTKR    LUMBAR DISC SURGERY      SHOULDER SURGERY Right       General Information       Row Name 23 [facility]          OT Time and Intention    Document Type evaluation  -     Mode of Treatment individual therapy;occupational therapy  -       Row Name 23 [facility]          General Information    Patient Profile Reviewed yes  -     Prior Level of Function --  patient seems to have some confusion, reports he lives with his dog uses a cane occasionally,  -     Existing Precautions/Restrictions fall  -     Barriers to Rehab none identified  -       Row Name 23 [facility]          Occupational Profile    Reason for Services/Referral (Occupational Profile) Pt. is a 62year old male admitted for the above diagnosis. Pt. referred to OT services to assess independence with ADLs and adl transfers/fx'l mobility. No previous OT services for current condition.  -       Row Name 23 112          Living Environment    People in Home alone  -       Row Name 23 112          Cognition    Orientation Status (Cognition) --  alert, able to state name and , responded appropriately mostly, able to follow directions with increased cues  -       Row Name 23 112          Safety Issues, Functional Mobility    Impairments Affecting Function (Mobility) balance;cognition;endurance/activity tolerance;pain;strength  -               User Key  (r) = Recorded By, (t) = Taken By, (c) = Cosigned By      Initials Name Provider Type    AC Kristi Renner OT Occupational Therapist                      Mobility/ADL's       [facility] 07/03/23 1135          Bed Mobility    Bed Mobility bed mobility (all) activities  -     All Activities, [place] (Bed Mobility) contact guard;minimum assist (75% patient effort);verbal cues;1 person assist  -     Bed Mobility, Safety Issues decreased use of arms for pushing/pulling  -     Assistive Device (Bed Mobility) head of bed elevated;bed rails  -AC       Row Name 07/03/23 1135          Transfers    Transfers sit-stand transfer  -AC       Row Name 07/03/23 1135          Sit-Stand Transfer    Sit-Stand [place] (Transfers) contact guard;1 person assist;verbal cues  -AC       Row Name 07/03/23 1135          Functional Mobility    Functional Mobility- Ind. Level contact guard assist;1 person;verbal cues required  -     Functional Mobility- Comment patient was CGA with hand held assist for approx 5 steps from/to EOB.  -[facility] Name 07/03/23 1135          Activities of Daily Living    BADL Assessment/Intervention --  patient is min A for self-feeding, min A for grooming, mod/max A for upper body bathing/dressing, max A for lower body dressing, mod A for lower body bathing, min A for toileting  -               User Key  (r) = Recorded By, (t) = Taken By, (c) = Cosigned By      Initials Name Provider Type     Kristi Renner OT Occupational Therapist                   Obj/Interventions       [facility] Name 07/03/23 1138          Sensory Assessment (Somatosensory)    Sensory Assessment (Somatosensory) sensation intact  -AC       Row Name 07/03/23 1138          Vision Assessment/Intervention    Visual Impairment/Limitations [facility]     Vision Assessment Comment nystagmus  -[facility] Name 07/03/23 1138          Range of Motion Comprehensive    Comment, General Range of Motion RUE WNL, LUE shoulder impaired following previous fall/distally wfl  -[facility] Name 07/03/23 1138          Strength Comprehensive (MMT)    Comment, General Manual Muscle Testing (MMT)  Assessment BUE  5/5  -AC       Row Name 07/03/23 1138          Motor Skills    Motor Skills coordination;functional endurance  -AC     Coordination left;upper extremity;minimal impairment  -AC     Functional Endurance fair minus  -AC       Row Name 07/03/23 1138          Balance    Balance Assessment standing dynamic balance  -AC     Dynamic Standing Balance contact guard  -AC               User Key  (r) = Recorded By, (t) = Taken By, (c) = Cosigned By      Initials Name Provider Type    AC Kristi Renner OT Occupational Therapist                   Goals/Plan       Row Name 07/03/23 1141          Bed Mobility Goal 1 (OT)    Activity/Assistive Device (Bed Mobility Goal 1, OT) bed mobility activities, all  -AC     [place] Level/Cues Needed (Bed Mobility Goal 1, OT) modified independence  -AC     Time Frame (Bed Mobility Goal 1, OT) long term goal (LTG);10 days  -[facility] Name 07/03/23 1141          Transfer Goal 1 (OT)    Activity/Assistive Device (Transfer Goal 1, OT) transfers, all  -AC     [place] Level/Cues Needed (Transfer Goal 1, OT) modified independence  -AC     Time Frame (Transfer Goal 1, OT) long term goal (LTG);10 days  -[facility] Name 07/03/23 1141          Bathing Goal 1 (OT)    Activity/Device (Bathing Goal 1, OT) bathing skills, all  -AC     [place] Level/Cues Needed (Bathing Goal 1, OT) modified independence  -AC     Time Frame (Bathing Goal 1, OT) long term goal (LTG);10 days  -       Row Name 07/03/23 1141          Dressing Goal 1 (OT)    Activity/Device (Dressing Goal 1, OT) dressing skills, all  -AC     [place]/Cues Needed (Dressing Goal 1, OT) modified independence  -AC     Time Frame (Dressing Goal 1, OT) long term goal (LTG);10 days  -       Row Name 07/03/23 1141          Toileting Goal 1 (OT)    Activity/Device (Toileting Goal 1, OT) toileting skills, all  -AC     [place] Level/Cues Needed (Toileting Goal 1, OT) modified independence  -AC     Time Frame  (Toileting Goal 1, OT) long term goal (LTG);10 days  -AC       Row Name 07/03/23 1141          Grooming Goal 1 (OT)    Activity/Device (Grooming Goal 1, OT) grooming skills, all  -AC     [place] (Grooming Goal 1, OT) modified independence  -AC     Time Frame (Grooming Goal 1, OT) long term goal (LTG);10 days  -AC       Row Name 07/03/23 1141          Strength Goal 1 (OT)    Strength Goal 1 (OT) patient will improve LUE shoulder strength to 3/5  -AC     Time Frame (Strength Goal 1, OT) long term goal (LTG);10 days  -AC       Row Name 07/03/23 1141          Problem Specific Goal 1 (OT)    Problem Specific Goal 1 (OT) patient will improve endurance to good for adls.  -AC     Time Frame (Problem Specific Goal 1, OT) long term goal (LTG);10 days  -AC       Row Name 07/03/23 1141          Therapy Assessment/Plan (OT)    Planned Therapy Interventions (OT) activity tolerance training;functional balance retraining;occupation/activity based interventions;ROM/therapeutic exercise;transfer/mobility retraining;patient/caregiver education/training;BADL retraining  -               User Key  (r) = Recorded By, (t) = Taken By, (c) = Cosigned By      Initials Name Provider Type    AC Kristi Renner OT Occupational Therapist                   Clinical Impression       Row Name 07/03/23 1140          Pain Assessment    Pretreatment Pain Rating 1/10  -     Posttreatment Pain Rating 1/10  -     Pain Location - Side/Orientation Left  -     Pain Location - shoulder  -       Row Name 07/03/23 1140          Plan of Care Review    Plan of Care Reviewed With patient  -AC     Progress no change  -     Outcome Evaluation Patient presents with balance, endurance, strength limitations that impede his/her ability to perform ADLS. The skills of a therapist are necessary to maximize independence with ADLs.  -AC       Row Name 07/03/23 1140          Therapy Assessment/Plan (OT)    Patient/Family Therapy Goal Statement (OT) Patient  would like to maximize independence with adls.  -     Rehab Potential (OT) good, to achieve stated therapy goals  -     Criteria for Skilled Therapeutic Interventions Met (OT) yes;meets criteria;skilled treatment is necessary  -     Therapy Frequency (OT) 5 times/wk  -       Row Name 07/03/23 1140          Therapy Plan Review/Discharge Plan (OT)    Anticipated Discharge Disposition (OT) inpatient rehabilitation facility  -       Row Name 07/03/23 1140          Positioning and Restraints    Pre-Treatment Position in bed  -     Post Treatment Position bed  -AC     In Bed supine;call light within reach;encouraged to call for assist;exit alarm on  -               User Key  (r) = Recorded By, (t) = Taken By, (c) = Cosigned By      Initials Name Provider Type     Kristi Renner, REGULO Occupational Therapist                   Outcome Measures       Row Name 07/03/23 1143          How much help from another is currently needed...    Putting on and taking off regular lower body clothing? 2  -AC     Bathing (including washing, rinsing, and drying) 3  -AC     Toileting (which includes using toilet bed pan or urinal) 3  -AC     Putting on and taking off regular upper body clothing 3  -AC     Taking care of personal grooming (such as brushing teeth) 3  -AC     Eating meals 3  -AC     AM-PAC 6 Clicks Score (OT) 17  -       Row Name 07/03/23 0800          How much help from another person do you currently need...    Turning from your back to your side while in flat bed without using bedrails? 2  -JH     Moving from lying on back to sitting on the side of a flat bed without bedrails? 2  -JH     Moving to and from a bed to a chair (including a wheelchair)? 1  -JH     Standing up from a chair using your arms (e.g., wheelchair, bedside chair)? 1  -JH     Climbing 3-5 steps with a railing? 1  -JH     To walk in hospital room? 1  -JH     AM-PAC 6 Clicks Score (PT) 8  -JH     Highest level of mobility 3 --> Sat at edge of  bed  -       Row Name 07/03/23 1143          Functional Assessment    Outcome Measure Options AM-PAC 6 Clicks Daily Activity (OT);Optimal Instrument  -AC       Row Name 07/03/23 1143          Optimal Instrument    Optimal Instrument Optimal - 3  -AC     Bending/Stooping 4  -AC     Standing 2  -AC     Reaching 2  -AC     From the list, choose the 3 activities you would most like to be able to do without any difficulty Bending/stooping;Standing;Reaching  -AC     Total Score Optimal - 3 8  -AC               User Key  (r) = Recorded By, (t) = Taken By, (c) = Cosigned By      Initials Name Provider Type    Laura Benoit, RN Registered Nurse    Kristi Levin OT Occupational Therapist                    Occupational Therapy Education       Title: PT OT SLP Therapies (Done)       Topic: Occupational Therapy (Done)       Point: ADL training (Done)       Description:   Instruct learner(s) on proper safety adaptation and remediation techniques during self care or transfers.   Instruct in proper use of assistive devices.                  Learning Progress Summary             Patient Acceptance, E, VU by  at 7/3/2023 1144                         Point: Home exercise program (Done)       Description:   Instruct learner(s) on appropriate technique for monitoring, assisting and/or progressing therapeutic exercises/activities.                  Learning Progress Summary             Patient Acceptance, E, VU by  at 7/3/2023 1144                         Point: Precautions (Done)       Description:   Instruct learner(s) on prescribed precautions during self-care and functional transfers.                  Learning Progress Summary             Patient Acceptance, E, VU by  at 7/3/2023 1144                         Point: Body mechanics (Done)       Description:   Instruct learner(s) on proper positioning and spine alignment during self-care, functional mobility activities and/or exercises.                  Learning Progress  Summary             Patient Acceptance, E, VU by  at 7/3/2023 1144                                         User Key       Initials Effective Dates Name Provider Type Discipline     06/16/21 -  Kristi Renner OT Occupational Therapist OT                  OT Recommendation and Plan  Planned Therapy Interventions (OT): activity tolerance training, functional balance retraining, occupation/activity based interventions, ROM/therapeutic exercise, transfer/mobility retraining, patient/caregiver education/training, BADL retraining  Therapy Frequency (OT): 5 times/wk  Plan of Care Review  Plan of Care Reviewed With: patient  Progress: no change  Outcome Evaluation: Patient presents with balance, endurance, strength limitations that impede his/her ability to perform ADLS. The skills of a therapist are necessary to maximize independence with ADLs.     Time Calculation:    Time Calculation- OT       Row Name 07/03/23 0813             Time Calculation- OT    OT Received On 07/03/23  -      OT Goal Re-Cert Due Date 07/12/23  -         Untimed Charges    OT Eval/Re-eval Minutes 31  -AC         Total Minutes    Untimed Charges Total Minutes 31  -AC       Total Minutes 31  -AC                User Key  (r) = Recorded By, (t) = Taken By, (c) = Cosigned By      Initials Name Provider Type     Kristi Renner OT Occupational Therapist                  Therapy Charges for Today       Code Description Service Date Service Provider Modifiers Qty    [id number] HC OT EVAL LOW COMPLEXITY 3 7/3/2023 Kristi Renner OT GO 1                 Kristi Renner OT  7/3/2023

## 2023-07-03 NOTE — CONSULTS
07/03/23 1437   Spiritual Care   Spiritual Care Source patient request (describe)   Spiritual Care Follow-Up follow-up planned regularly for general support   Response to Spiritual Care receptive of support   Spiritual Care Interventions supportive conversation provided   Spiritual Care Visit Type initial   Spiritual Care Request coping/stress of illness support;spiritual/moral support   Receptivity to Spiritual Care visit welcomed

## 2023-07-03 NOTE — PROGRESS NOTES
" LOS: 3 days   Patient Care Team:  Felix Atkinson MD as PCP - General  Felix Atkinson MD as PCP - Family Medicine    Chief Complaint: MK/CKD    Subjective     Weakness - Generalized  Pertinent negatives include no chest pain.     Pt without any specific complaints  Appears more awake today  Tolerating po.    Subjective:  Symptoms:  Stable.  No shortness of breath, chest pain, chest pressure or anxiety.    Diet:  Adequate intake.        History taken from: patient chart    Objective     Vital Sign Min/Max for last 24 hours  Temp  Min: 97.9 °F (36.6 °C)  Max: 98.4 °F (36.9 °C)   BP  Min: 138/74  Max: 162/69   Pulse  Min: 63  Max: 67   Resp  Min: 16  Max: 18   SpO2  Min: 92 %  Max: 97 %   No data recorded   No data recorded     Flowsheet Rows      Flowsheet Row First Filed Value   Admission Height 182.9 cm (72\") Documented at 06/30/2023 1800   Admission Weight 133 kg (294 lb 1.5 oz) Documented at 06/30/2023 2300            I/O this shift:  In: 240 [P.O.:240]  Out: 2800 [Urine:2800]  I/O last 3 completed shifts:  In: 2991 [P.O.:1296; I.V.:1695]  Out: 76127 [Urine:21283]    Objective:  General Appearance:  Comfortable.    Vital signs: (most recent): Blood pressure 139/72, pulse 63, temperature 98.1 °F (36.7 °C), temperature source Oral, resp. rate 16, height 182.9 cm (72\"), weight 133 kg (294 lb 1.5 oz), SpO2 97 %.  Vital signs are normal.    Output: Producing urine.    HEENT: Normal HEENT exam.    Lungs:  Normal effort and normal respiratory rate.    Heart: Normal rate.  Regular rhythm.    Abdomen: Abdomen is soft.  Bowel sounds are normal.   There is no abdominal tenderness.     Extremities: Normal range of motion.  There is dependent edema.    Pulses: Distal pulses are intact.    Neurological: Patient is alert and oriented to person, place and time.  (Slightly slow to respond.).    Pupils:  Pupils are equal, round, and reactive to light.    Skin:  Warm and dry.  There is ecchymosis.  (Scabs over knees and " toes.)            Results Review:     I reviewed the patient's new clinical results.    WBC WBC   Date Value Ref Range Status   07/03/2023 11.43 (H) 3.40 - 10.80 10*3/mm3 Final   07/02/2023 10.06 3.40 - 10.80 10*3/mm3 Final   07/01/2023 11.21 (H) 3.40 - 10.80 10*3/mm3 Final   06/30/2023 12.79 (H) 3.40 - 10.80 10*3/mm3 Final      HGB Hemoglobin   Date Value Ref Range Status   07/03/2023 11.2 (L) 13.0 - 17.7 g/dL Final   07/02/2023 10.2 (L) 13.0 - 17.7 g/dL Final   07/01/2023 9.6 (L) 13.0 - 17.7 g/dL Final   06/30/2023 10.8 (L) 13.0 - 17.7 g/dL Final      HCT Hematocrit   Date Value Ref Range Status   07/03/2023 32.0 (L) 37.5 - 51.0 % Final   07/02/2023 29.7 (L) 37.5 - 51.0 % Final   07/01/2023 29.5 (L) 37.5 - 51.0 % Final   06/30/2023 32.6 (L) 37.5 - 51.0 % Final      Platlets No results found for: LABPLAT   MCV MCV   Date Value Ref Range Status   07/03/2023 86.0 79.0 - 97.0 fL Final   07/02/2023 86.1 79.0 - 97.0 fL Final   07/01/2023 88.9 79.0 - 97.0 fL Final   06/30/2023 89.1 79.0 - 97.0 fL Final          Sodium Sodium   Date Value Ref Range Status   07/03/2023 136 136 - 145 mmol/L Final   07/02/2023 131 (L) 136 - 145 mmol/L Final   07/01/2023 134 (L) 136 - 145 mmol/L Final   07/01/2023 129 (L) 136 - 145 mmol/L Final   06/30/2023 127 (L) 136 - 145 mmol/L Final   06/30/2023 128 (L) 136 - 145 mmol/L Final     Sodium, Venous   Date Value Ref Range Status   06/30/2023 131.0 (L) 136 - 146 mmol/L Final      Potassium Potassium   Date Value Ref Range Status   07/03/2023 3.6 3.5 - 5.2 mmol/L Final   07/02/2023 4.4 3.5 - 5.2 mmol/L Final   07/01/2023 4.1 3.5 - 5.2 mmol/L Final   07/01/2023 5.4 (H) 3.5 - 5.2 mmol/L Final   06/30/2023 5.2 3.5 - 5.2 mmol/L Final   06/30/2023 6.6 (C) 3.5 - 5.2 mmol/L Final     Comment:     Slight hemolysis detected by analyzer. Results may be affected.Verified by repeat analysis.     Potassium, Venous   Date Value Ref Range Status   06/30/2023 4.9 3.5 - 5.0 mmol/L Final      Chloride Chloride    Date Value Ref Range Status   07/03/2023 95 (L) 98 - 107 mmol/L Final   07/02/2023 95 (L) 98 - 107 mmol/L Final   07/01/2023 99 98 - 107 mmol/L Final   07/01/2023 97 (L) 98 - 107 mmol/L Final   06/30/2023 96 (L) 98 - 107 mmol/L Final   06/30/2023 96 (L) 98 - 107 mmol/L Final     Chloride, Venous    Date Value Ref Range Status   06/30/2023 101 98 - 106 mmol/L Final      CO2 CO2   Date Value Ref Range Status   07/03/2023 24.8 22.0 - 29.0 mmol/L Final   07/02/2023 20.8 (L) 22.0 - 29.0 mmol/L Final   07/01/2023 19.0 (L) 22.0 - 29.0 mmol/L Final   07/01/2023 13.5 (L) 22.0 - 29.0 mmol/L Final   06/30/2023 13.2 (L) 22.0 - 29.0 mmol/L Final   06/30/2023 11.9 (L) 22.0 - 29.0 mmol/L Final      BUN BUN   Date Value Ref Range Status   07/03/2023 72 (H) 8 - 23 mg/dL Final   07/02/2023 76 (H) 8 - 23 mg/dL Final   07/01/2023 82 (H) 8 - 23 mg/dL Final   07/01/2023 84 (H) 8 - 23 mg/dL Final   06/30/2023 84 (H) 8 - 23 mg/dL Final   06/30/2023 80 (H) 8 - 23 mg/dL Final      Creatinine Creatinine   Date Value Ref Range Status   07/03/2023 3.38 (H) 0.76 - 1.27 mg/dL Final   07/02/2023 4.12 (H) 0.76 - 1.27 mg/dL Final   07/01/2023 4.52 (H) 0.76 - 1.27 mg/dL Final   07/01/2023 4.95 (H) 0.76 - 1.27 mg/dL Final   06/30/2023 4.67 (H) 0.76 - 1.27 mg/dL Final   06/30/2023 4.97 (H) 0.76 - 1.27 mg/dL Final      Calcium Calcium   Date Value Ref Range Status   07/03/2023 9.0 8.6 - 10.5 mg/dL Final   07/02/2023 8.5 (L) 8.6 - 10.5 mg/dL Final   07/01/2023 8.1 (L) 8.6 - 10.5 mg/dL Final   07/01/2023 8.2 (L) 8.6 - 10.5 mg/dL Final   06/30/2023 8.0 (L) 8.6 - 10.5 mg/dL Final   06/30/2023 8.9 8.6 - 10.5 mg/dL Final      PO4 No results found for: CAPO4   Albumin Albumin   Date Value Ref Range Status   07/03/2023 3.8 3.5 - 5.2 g/dL Final   07/02/2023 3.4 (L) 3.5 - 5.2 g/dL Final   07/01/2023 3.3 (L) 3.5 - 5.2 g/dL Final   06/30/2023 3.9 3.5 - 5.2 g/dL Final      Magnesium Magnesium   Date Value Ref Range Status   07/03/2023 2.3 1.6 - 2.4 mg/dL Final    07/03/2023 2.4 1.6 - 2.4 mg/dL Final   07/02/2023 2.7 (H) 1.6 - 2.4 mg/dL Final   07/01/2023 3.1 (H) 1.6 - 2.4 mg/dL Final   06/30/2023 3.2 (H) 1.6 - 2.4 mg/dL Final      Uric Acid No results found for: URICACID     Medication Review:   atenolol, 25 mg, Oral, Daily  escitalopram, 20 mg, Oral, Daily  furosemide, 40 mg, Intravenous, Q12H  heparin (porcine), 5,000 Units, Subcutaneous, Q8H  insulin lispro, 2-7 Units, Subcutaneous, 4x Daily AC & at Bedtime  levETIRAcetam, 500 mg, Oral, Q12H  mupirocin, 1 application, Topical, 2 times per day  pantoprazole, 40 mg, Oral, BID  senna-docusate sodium, 2 tablet, Oral, BID  sodium bicarbonate, 1,300 mg, Oral, TID  sodium chloride, 10 mL, Intravenous, Q12H        Assessment & Plan       Acute renal failure, unspecified acute renal failure type    Onychomycosis    Onychocryptosis    Foreign body in left foot    Peripheral neuropathy    Charcot foot due to diabetes mellitus    Foot pain, bilateral      Assessment & Plan  MK/CKD3b-  Due to diabetic nephropathy.  He has had worsened renal function recently.  Had been on lisinopril and SGLT2-inh.  Looks like kerendia was added also but has had hyperkalemia now.  He had CTA done in ER so may have some worsening of renal function.  Increase CPK also.    Creatinine improved, off bicarb drip, currently on Lasix 40 mg IV  twice daily.  Excellent urine output.  Baseline creatinine around 2-2.5.  Monitor.  Hyponatremia-  improved.  Will continue IV lasix as bp tolerates.  Hyperkalemia-  better, likely due to medication and cellular shift with acidosis.  Would avoid acei/arb and kerendia for now.  Added lasix also.  Met Acidosis-  improved,on oral bicarb.  Off bicarb drip.  Proteinuria-  diabetic nephropathy  DMII-  treated.  On jardiance, hold with MK.  HTN-  BP much better now.  Likely related to home meds.  Chronic edema-  Amlodipine may be contributing.  on hold.  H/o bipolar-  previous lithium use.  H/o sz disorder-neurology  evaluating.  Anemia-  tsat at 14%, ferritin 425.  Received some IV iron.   Rhabdomyolysis-  had fall at home, statin on hold. improved now to 980.  Weakness-  would avoid muscle relaxer in CKD(flexeril).  Likely polypharmacy contributing.  Will follow.    Caroline Mckenzie MD  07/03/23  16:05 EDT

## 2023-07-03 NOTE — PROGRESS NOTES
Stroke Progress Note       Chief Complaint:  AMS    Subjective    Subjective     Subjective:   Intermittent confusion  Intermittent following commands.     Review of Systems   uTO     Objective      Temp:  [97.9 °F (36.6 °C)-98.6 °F (37 °C)] 98.4 °F (36.9 °C)  Heart Rate:  [65-76] 66  Resp:  [14-18] 18  BP: (138-162)/(49-74) 138/74            Results Review:    I reviewed the patient's new clinical results.    Lab Results (last 24 hours)       Procedure Component Value Units Date/Time    POC Glucose Once [[id number]]  (Abnormal) Collected: 07/03/23 0847    Specimen: Blood Updated: 07/03/23 0849     Glucose 191 mg/dL      Comment: Serial Number: [id number]:  [id number]       High Sensitivity Troponin T 2Hr [[id number]]  (Abnormal) Collected: 07/03/23 0613    Specimen: Blood Updated: 07/03/23 0655     HS Troponin T 52 ng/L      Troponin T Delta -6 ng/L     Narrative:      High Sensitive Troponin T Reference Range:  <10.0 ng/L- Negative Female for AMI  <15.0 ng/L- Negative Male for AMI  >=10 - Abnormal Female indicating possible myocardial injury.  >=15 - Abnormal Male indicating possible myocardial injury.   Clinicians would have to utilize clinical acumen, EKG, Troponin, and serial changes to determine if it is an Acute Myocardial Infarction or myocardial injury due to an underlying chronic condition.         Ammonia [[id number]]  (Normal) Collected: 07/03/23 0613    Specimen: Blood Updated: 07/03/23 0643     Ammonia 29 umol/L     Lactic Acid, Plasma [[id number]]  (Normal) Collected: 07/03/23 0613    Specimen: Blood Updated: 07/03/23 0642     Lactate 1.1 mmol/L     Magnesium [[id number]]  (Normal) Collected: 07/03/23 0613    Specimen: Blood Updated: 07/03/23 0637     Magnesium 2.3 mg/dL     High Sensitivity Troponin T [[id number]]  (Abnormal) Collected: 07/03/23 0433    Specimen: Blood Updated: 07/03/23 0627     HS Troponin T 58 ng/L     Narrative:      High Sensitive Troponin T Reference Range:  <10.0 ng/L- Negative  Female for AMI  <15.0 ng/L- Negative Male for AMI  >=10 - Abnormal Female indicating possible myocardial injury.  >=15 - Abnormal Male indicating possible myocardial injury.   Clinicians would have to utilize clinical acumen, EKG, Troponin, and serial changes to determine if it is an Acute Myocardial Infarction or myocardial injury due to an underlying chronic condition.         POC Glucose Once [[id number]]  (Abnormal) Collected: 07/03/23 0526    Specimen: Blood Updated: 07/03/23 0625     Glucose 190 mg/dL      Comment: Serial Number: [id number]:  [id number]       CBC & Differential [[id number]]  (Abnormal) Collected: 07/03/23 0613    Specimen: Blood Updated: 07/03/23 0620    Narrative:      The following orders were created for panel order CBC & Differential.  Procedure                               Abnormality         Status                     ---------                               -----------         ------                     CBC Auto Differential[[id number]]        Abnormal            Final result                 Please view results for these tests on the individual orders.    CBC Auto Differential [[id number]]  (Abnormal) Collected: 07/03/23 0613    Specimen: Blood Updated: 07/03/23 0620     WBC 11.43 10*3/mm3      RBC 3.72 10*6/mm3      Hemoglobin 11.2 g/dL      Hematocrit 32.0 %      MCV 86.0 fL      MCH 30.1 pg      MCHC 35.0 g/dL      RDW 13.0 %      RDW-SD 40.6 fl      MPV 9.8 fL      Platelets 213 10*3/mm3      Neutrophil % 76.5 %      Lymphocyte % 11.4 %      Monocyte % 10.8 %      Eosinophil % 0.6 %      Basophil % 0.4 %      Immature Grans % 0.3 %      Neutrophils, Absolute 8.74 10*3/mm3      Lymphocytes, Absolute 1.30 10*3/mm3      Monocytes, Absolute 1.23 10*3/mm3      Eosinophils, Absolute 0.07 10*3/mm3      Basophils, Absolute 0.05 10*3/mm3      Immature Grans, Absolute 0.04 10*3/mm3      nRBC 0.0 /100 WBC     Procalcitonin [[id number]]  (Abnormal) Collected: 07/03/23 0433    Specimen: Blood  "Updated: 07/03/23 0616     Procalcitonin 0.39 ng/mL     Narrative:      As a Marker for Sepsis (Non-Neonates):    1. <0.5 ng/mL represents a low risk of severe sepsis and/or septic shock.  2. >2 ng/mL represents a high risk of severe sepsis and/or septic shock.    As a Marker for Lower Respiratory Tract Infections that require antibiotic therapy:    PCT on Admission    Antibiotic Therapy       6-12 Hrs later    >0.5                Strongly Recommended  >0.25 - <0.5        Recommended  0.1 - 0.25          Discouraged              Remeasure/reassess PCT  <0.1                Strongly Discouraged     Remeasure/reassess PCT    As 28 day mortality risk marker: \"Change in Procalcitonin Result\" (>80% or <=80%) if Day 0 (or Day 1) and Day 4 values are available. Refer to [email or web address]    Change in PCT <=80%  A decrease of PCT levels below or equal to 80% defines a positive change in PCT test result representing a higher risk for 28-day all-cause mortality of patients diagnosed with severe sepsis for septic shock.    Change in PCT >80%  A decrease of PCT levels of more than 80% defines a negative change in PCT result representing a lower risk for 28-day all-cause mortality of patients diagnosed with severe sepsis or septic shock.    This test is Prognostic not Diagnostic, if elevated correlate with clinical findings before administering antibiotic treatment.        CK [[id number]]  (Abnormal) Collected: 07/03/23 0433    Specimen: Blood Updated: 07/03/23 0610     Creatine Kinase 982 U/L     Hepatic Function Panel [[id number]]  (Abnormal) Collected: 07/03/23 0433    Specimen: Blood Updated: 07/03/23 0610     Total Protein 6.6 g/dL      Albumin 3.8 g/dL      ALT (SGPT) 35 U/L      AST (SGOT) 36 U/L      Alkaline Phosphatase 144 U/L      Total Bilirubin 0.6 mg/dL      Bilirubin, Direct 0.2 mg/dL      Bilirubin, Indirect 0.4 mg/dL     Magnesium [[id number]]  (Normal) Collected: 07/03/23 0433    Specimen: Blood " Updated: 07/03/23 0518     Magnesium 2.4 mg/dL     Basic Metabolic Panel [[id number]]  (Abnormal) Collected: 07/03/23 0433    Specimen: Blood Updated: 07/03/23 0518     Glucose 195 mg/dL      BUN 72 mg/dL      Creatinine 3.38 mg/dL      Sodium 136 mmol/L      Potassium 3.6 mmol/L      Chloride 95 mmol/L      CO2 24.8 mmol/L      Calcium 9.0 mg/dL      BUN/Creatinine Ratio 21.3     Anion Gap 16.2 mmol/L      eGFR 19.9 mL/min/1.73     Narrative:      GFR Normal >60  Chronic Kidney Disease <60  Kidney Failure <15      POC Glucose Once [[id number]]  (Abnormal) Collected: 07/02/23 2011    Specimen: Blood Updated: 07/02/23 2013     Glucose 162 mg/dL      Comment: Serial Number: [id number]:  [id number]       POC Glucose Once [[id number]]  (Abnormal) Collected: 07/02/23 1625    Specimen: Blood Updated: 07/02/23 1627     Glucose 176 mg/dL      Comment: Serial Number: [id number]:  [id number]       POC Glucose Once [[id number]]  (Abnormal) Collected: 07/02/23 1122    Specimen: Blood Updated: 07/02/23 1124     Glucose 127 mg/dL      Comment: Serial Number: [id number]:  [id number]             XR Shoulder 2+ View Left    Result Date: 7/1/2023    Left shoulder series demonstrating degenerative changes, as above.      JON LOPEZ MD       Electronically Signed and Approved By: JON LOPEZ MD on 7/01/2023 at 15:32             XR Foot 2 View Left    Result Date: 7/1/2023    Left foot series demonstrating degenerative and postoperative changes, as above.  Pes planus.  Tiny 3 mm density in the plantar aspect of the heel on the lateral view, as above.      JON LOPEZ MD       Electronically Signed and Approved By: JON LOPEZ MD on 7/01/2023 at 10:58             MRI Brain Without Contrast    Result Date: 7/1/2023    MR examination of the brain without IV contrast demonstrating no acute intracranial abnormality.      JON LOPEZ MD       Electronically Signed and Approved By: JON LOPEZ MD on 7/01/2023  at 11:03             MRI Foot Left Without Contrast    Result Date: 7/3/2023    1. Focal edema within the subcutaneous soft tissues at the plantar medial aspect of the hindfoot in the region of the small foreign body as observed on the prior radiographs.  Given the presence of a foreign body, the findings suggest changes of soft tissue cellulitis.  There is no definitive abscess formation. 2. Focal edema is noted within the anterior process of the calcaneus and region of the middle subtalar facet.  The edema is felt to most likely be related to chronic stress related changes and/or arthropathy.  Changes of developing osteomyelitis are felt less likely but are not entirely excluded.  No definitive cortical erosion or destruction is identified. 3. Marked changes of tendinopathy and tenosynovitis with superimposed partial thickness tear involving the posterior tibialis tendon.  4. There may be mild changes of tendinopathy involving the peroneus longus and brevis tendons. 5. Mild distal Achilles tendinopathy.  There is also mild-to-moderate plantar fasciitis. 6. Evidence for prior disruption of the anterior talofibular ligament with likely associated injury of the calcaneofibular ligament. 7. Moderate changes of arthropathy associated with the articulations of the ankle, hindfoot, and midfoot.  There is also flattening of the arch of the foot.  The findings may be related to posttraumatic osteoarthritis.  Changes secondary to developing neuropathic arthropathy or Charcot foot could also have this appearance. 8. Findings suggesting changes of remote Lisfranc injury.      RUDY BEAN MD       Electronically Signed and Approved By: RUDY BEAN MD on 7/03/2023 at 9:09            Results for orders placed during the hospital encounter of 06/30/23    Adult Transthoracic Echo Limited W/ Cont if Necessary Per Protocol    Interpretation Summary    Left ventricular systolic function is normal. Left ventricular ejection  fraction appears to be 61 - 65%.    Left ventricular diastolic function was normal.    No regional wall motion abnormality    No obvious source of emboli            Assessment/Plan     Assessment/Plan:  62 y.o. male PMH DM, CKD, schizophrenia, HTN, HLD, seizure disorder on lamotrigine 200 mg BID who presented to the ED on 06/28 altered and unresponsive.    On exam today, patient is more alert, follows commands.     Based on the presentation and symptoms likely this is metabolic encephalopathy.         Seizure disorder - EEG today. Resume lamictal  Hypertension- normal blood pressure goals   Hyperlipidemia   4.   H/o stroke- Continue Aspirin 81 MG daily        Roman Sorenson MD  07/03/23  10:00 EDT

## 2023-07-03 NOTE — SIGNIFICANT NOTE
Wound Eval / Progress Noted    RONNY Mcghee     Patient Name: Carlos Murphy Jr.  : 1960  MRN: [id number]  Today's Date: 7/3/2023                 Admit Date: 2023    Visit Dx:    ICD-10-CM ICD-9-CM   1. Acute renal failure, unspecified acute renal failure type  N17.9 584.9   2. Altered mental status, unspecified altered mental status type  R41.82 780.97   3. At risk for polypharmacy  Z91.89 V49.89   4. Non-traumatic rhabdomyolysis  M62.82 728.88   5. Dysphagia, unspecified type  R13.10 787.20   6. Impaired mobility and ADLs  Z74.09 V49.89    Z78.9        Patient Active Problem List   Diagnosis    Ulcer of toe due to type 2 diabetes mellitus    Centrilobular emphysema    Tobacco abuse, in remission    Obesity (BMI 30-39.9)    Closed fracture of shaft of left fibula    Closed nondisplaced fracture of medial malleolus of left tibia    Aftercare following surgery of ORIF left distal tibia fracture 2022    Colon cancer screening    Colitis    Acute renal failure, unspecified acute renal failure type    Onychomycosis    Onychocryptosis    Foreign body in left foot    Peripheral neuropathy    Charcot foot due to diabetes mellitus    Foot pain, bilateral        Past Medical History:   Diagnosis Date    Acute kidney injury      POST SEIZURES    Astigmatism of both eyes     eyes twitch left and right    Bipolar disorder     Charcot ankle     Closed nondisplaced fracture of medial malleolus of left tibia     COPD (chronic obstructive pulmonary disease)     USES INHALERS    Diabetes     BG RUNS AROUND 90'S IN AM    Hx of schizophrenia     Hyperlipidemia     Hypertension     SEEN DR ROD IN THE PAST, HAD APPT WITH DR MICHAUD IN  [facility] APPT, DENIED CP/SOB    Neuropathy     Seizures     LAST ONE 22    Sleep apnea     DOES NOT USE CPAP    Varicose vein of leg         Past Surgical History:   Procedure Laterality Date    ANKLE OPEN REDUCTION INTERNAL FIXATION Left 2022    Procedure: LEFT OPEN  REDUCTION INTERNAL FIXATION DISTAL TIBIA FRACTURE ;  Surgeon: Tha Parry MD;  Location: [facility] OR [facility];  Service: Orthopedics;  Laterality: Left;    ANKLE OPEN REDUCTION INTERNAL FIXATION Left 06/01/2022    Procedure: LEFT ANKLE OPEN REDUCTION INTERNAL FIXATION WITH SYNDESMOSIS FIXATION;  Surgeon: Tha Parry MD;  Location: [facility] MAIN OR;  Service: Orthopedics;  Laterality: Left;    CHOLECYSTECTOMY      COLONOSCOPY      JOINT REPLACEMENT      RTKR    LUMBAR DISC SURGERY      SHOULDER SURGERY Right          Physical Assessment:  Wound 06/30/23 2321 Right anterior knee Abrasion (Active)   Wound Image   07/03/23 1420   Dressing Appearance open to air 07/03/23 1420   Closure None 07/03/23 1420   Base dry;red;scab 07/03/23 1420   Periwound intact;pink 07/03/23 1420   Periwound Temperature warm 07/03/23 1420   Periwound Skin Turgor soft 07/03/23 1420   Edges rolled/closed 07/03/23 1420   Drainage Amount none 07/03/23 1420   Care, Wound cleansed with;soap and water 07/03/23 1600       Wound 06/30/23 2322 Left anterior knee Abrasion (Active)   Wound Image   07/03/23 1420   Dressing Appearance open to air 07/03/23 1420   Closure None 07/03/23 1420   Base scab;red;dry 07/03/23 1420   Periwound intact;pink 07/03/23 1420   Periwound Temperature warm 07/03/23 1420   Periwound Skin Turgor soft 07/03/23 1420   Edges rolled/closed 07/03/23 1420   Drainage Amount none 07/03/23 1420   Care, Wound cleansed with;soap and water 07/03/23 1600       Wound 06/30/23 2323 Bilateral anterior foot Abrasion (Active)   Wound Image    07/03/23 1420   Dressing Appearance open to air 07/03/23 1420   Closure None 07/03/23 1420   Base red;scab;dry 07/03/23 1420   Periwound intact;pink 07/03/23 1420   Periwound Temperature warm 07/03/23 1420   Periwound Skin Turgor soft 07/03/23 1420   Edges rolled/closed 07/03/23 1420   Drainage Amount none 07/03/23 1420   Care, Wound cleansed with;sterile normal saline 07/03/23 1420   Dressing  Care open to air 07/03/23 1420       Wound 07/03/23 1420 Bilateral posterior plantar Traumatic (Active)   Wound Image     07/03/23 1420   Dressing Appearance open to air 07/03/23 1420   Closure None 07/03/23 1420   Base dry;scab;purple 07/03/23 1420   Periwound pink;intact 07/03/23 1420   Periwound Temperature warm 07/03/23 1420   Periwound Skin Turgor soft 07/03/23 1420   Edges rolled/closed;open 07/03/23 1420   Drainage Amount none 07/03/23 1420   Care, Wound cleansed with;sterile normal saline 07/03/23 1420   Dressing Care open to air 07/03/23 1420        Wound Check / Follow-up:  Patient seen today for wound consult.Patient is poor historian to exact etiology of wounds on his feet and knees. Patient states he has had falls and with one of the falls he stepped on broken glass and ended up with glass in his left foot.   He has multiple superficial, now crusted over, abrasions to bilateral feet plantar aspects.   To left foot plantar aspect, glass was suspected to be present in foot and was removed by Podiatry. With cleansing to left foot, some shiny particles noted. With use of tweezers, was able to get two very small pieces of glass from foot. Foot was then cleansed and irrigated with NS again. Patient also with scattered toes on bilateral feet. After cleansing all with NS and gauze, painted with betadine.   Patient has traumatic injuries to bilateral knees with thickened crusted areas noted. He also has multiple abrasions to bilateral arms with crusting present, will recommend topical treatment.     Impression: Traumatic injuries to bilateral feet and knees. Ulcerations / wounds to toes on bilateral feet.     Short term goals:  Regain skin integrity. Topical treatment to bilateral feet and topical treatment to knees and arms. Skin protection, moisture prevention    Mojgan Gunn RN    7/3/2023    17:24 EDT P)a

## 2023-07-04 ENCOUNTER — APPOINTMENT (OUTPATIENT)
Dept: ULTRASOUND IMAGING | Facility: HOSPITAL | Age: 63
DRG: 917 | End: 2023-07-04
Payer: MEDICARE

## 2023-07-04 LAB
ALBUMIN SERPL-MCNC: 3.9 G/DL (ref 3.5–5.2)
ANION GAP SERPL CALCULATED.3IONS-SCNC: 18 MMOL/L (ref 5–15)
BUN SERPL-MCNC: 65 MG/DL (ref 8–23)
BUN/CREAT SERPL: 23.6 (ref 7–25)
CALCIUM SPEC-SCNC: 9.6 MG/DL (ref 8.6–10.5)
CHLORIDE SERPL-SCNC: 94 MMOL/L (ref 98–107)
CO2 SERPL-SCNC: 28 MMOL/L (ref 22–29)
CREAT SERPL-MCNC: 2.75 MG/DL (ref 0.76–1.27)
DEPRECATED RDW RBC AUTO: 39.7 FL (ref 37–54)
EGFRCR SERPLBLD CKD-EPI 2021: 25.4 ML/MIN/1.73
ERYTHROCYTE [DISTWIDTH] IN BLOOD BY AUTOMATED COUNT: 12.7 % (ref 12.3–15.4)
GLUCOSE BLDC GLUCOMTR-MCNC: 189 MG/DL (ref 70–99)
GLUCOSE BLDC GLUCOMTR-MCNC: 205 MG/DL (ref 70–99)
GLUCOSE BLDC GLUCOMTR-MCNC: 207 MG/DL (ref 70–99)
GLUCOSE BLDC GLUCOMTR-MCNC: 208 MG/DL (ref 70–99)
GLUCOSE SERPL-MCNC: 197 MG/DL (ref 65–99)
HCT VFR BLD AUTO: 33.5 % (ref 37.5–51)
HGB BLD-MCNC: 11.3 G/DL (ref 13–17.7)
MAGNESIUM SERPL-MCNC: 2.1 MG/DL (ref 1.6–2.4)
MCH RBC QN AUTO: 28.9 PG (ref 26.6–33)
MCHC RBC AUTO-ENTMCNC: 33.7 G/DL (ref 31.5–35.7)
MCV RBC AUTO: 85.7 FL (ref 79–97)
PHOSPHATE SERPL-MCNC: 4.3 MG/DL (ref 2.5–4.5)
PLATELET # BLD AUTO: 273 10*3/MM3 (ref 140–450)
PMV BLD AUTO: 10.3 FL (ref 6–12)
POTASSIUM SERPL-SCNC: 3.5 MMOL/L (ref 3.5–5.2)
QT INTERVAL: 480 MS
RBC # BLD AUTO: 3.91 10*6/MM3 (ref 4.14–5.8)
SODIUM SERPL-SCNC: 140 MMOL/L (ref 136–145)
WBC NRBC COR # BLD: 12.58 10*3/MM3 (ref 3.4–10.8)

## 2023-07-04 PROCEDURE — 25010000002 LORAZEPAM PER 2 MG: Performed by: STUDENT IN AN ORGANIZED HEALTH CARE EDUCATION/TRAINING PROGRAM

## 2023-07-04 PROCEDURE — 25010000002 HEPARIN (PORCINE) PER 1000 UNITS: Performed by: STUDENT IN AN ORGANIZED HEALTH CARE EDUCATION/TRAINING PROGRAM

## 2023-07-04 PROCEDURE — 99233 SBSQ HOSP IP/OBS HIGH 50: CPT | Performed by: INTERNAL MEDICINE

## 2023-07-04 PROCEDURE — 63710000001 INSULIN LISPRO (HUMAN) PER 5 UNITS: Performed by: STUDENT IN AN ORGANIZED HEALTH CARE EDUCATION/TRAINING PROGRAM

## 2023-07-04 PROCEDURE — 80069 RENAL FUNCTION PANEL: CPT | Performed by: INTERNAL MEDICINE

## 2023-07-04 PROCEDURE — 25010000002 CEFAZOLIN IN DEXTROSE 2-4 GM/100ML-% SOLUTION: Performed by: INTERNAL MEDICINE

## 2023-07-04 PROCEDURE — 25010000002 FUROSEMIDE PER 20 MG: Performed by: INTERNAL MEDICINE

## 2023-07-04 PROCEDURE — 85027 COMPLETE CBC AUTOMATED: CPT | Performed by: INTERNAL MEDICINE

## 2023-07-04 PROCEDURE — 76882 US LMTD JT/FCL EVL NVASC XTR: CPT

## 2023-07-04 PROCEDURE — 82948 REAGENT STRIP/BLOOD GLUCOSE: CPT

## 2023-07-04 PROCEDURE — 83735 ASSAY OF MAGNESIUM: CPT | Performed by: INTERNAL MEDICINE

## 2023-07-04 PROCEDURE — 99233 SBSQ HOSP IP/OBS HIGH 50: CPT | Performed by: STUDENT IN AN ORGANIZED HEALTH CARE EDUCATION/TRAINING PROGRAM

## 2023-07-04 PROCEDURE — 25010000002 LEVETIRACETAM IN NACL 0.82% 500 MG/100ML SOLUTION: Performed by: INTERNAL MEDICINE

## 2023-07-04 RX ORDER — LAMOTRIGINE 100 MG/1
200 TABLET ORAL EVERY 12 HOURS SCHEDULED
Status: DISCONTINUED | OUTPATIENT
Start: 2023-07-04 | End: 2023-07-05

## 2023-07-04 RX ORDER — AMOXICILLIN AND CLAVULANATE POTASSIUM 875; 125 MG/1; MG/1
1 TABLET, FILM COATED ORAL EVERY 12 HOURS SCHEDULED
Status: DISCONTINUED | OUTPATIENT
Start: 2023-07-04 | End: 2023-07-04

## 2023-07-04 RX ORDER — SACCHAROMYCES BOULARDII 250 MG
250 CAPSULE ORAL 2 TIMES DAILY
Status: DISCONTINUED | OUTPATIENT
Start: 2023-07-04 | End: 2023-07-06

## 2023-07-04 RX ORDER — CEFAZOLIN SODIUM 2 G/100ML
2 INJECTION, SOLUTION INTRAVENOUS EVERY 8 HOURS
Status: DISCONTINUED | OUTPATIENT
Start: 2023-07-04 | End: 2023-07-05

## 2023-07-04 RX ORDER — DOXYCYCLINE 100 MG/1
100 CAPSULE ORAL EVERY 12 HOURS SCHEDULED
Status: DISCONTINUED | OUTPATIENT
Start: 2023-07-04 | End: 2023-07-04

## 2023-07-04 RX ORDER — LOSARTAN POTASSIUM 25 MG/1
50 TABLET ORAL
Status: DISCONTINUED | OUTPATIENT
Start: 2023-07-04 | End: 2023-07-05

## 2023-07-04 RX ORDER — CEFAZOLIN SODIUM 1 G/3ML
2 INJECTION, POWDER, FOR SOLUTION INTRAMUSCULAR; INTRAVENOUS EVERY 8 HOURS
Status: DISCONTINUED | OUTPATIENT
Start: 2023-07-04 | End: 2023-07-04

## 2023-07-04 RX ORDER — QUETIAPINE FUMARATE 200 MG/1
400 TABLET, FILM COATED ORAL NIGHTLY
Status: DISCONTINUED | OUTPATIENT
Start: 2023-07-04 | End: 2023-07-05

## 2023-07-04 RX ORDER — ZOLPIDEM TARTRATE 5 MG/1
5 TABLET ORAL NIGHTLY
Status: DISCONTINUED | OUTPATIENT
Start: 2023-07-04 | End: 2023-07-05

## 2023-07-04 RX ORDER — LEVETIRACETAM 5 MG/ML
500 INJECTION INTRAVASCULAR EVERY 12 HOURS SCHEDULED
Status: DISCONTINUED | OUTPATIENT
Start: 2023-07-04 | End: 2023-07-05

## 2023-07-04 RX ADMIN — HEPARIN SODIUM 5000 UNITS: 5000 INJECTION INTRAVENOUS; SUBCUTANEOUS at 21:04

## 2023-07-04 RX ADMIN — Medication 250 MG: at 20:27

## 2023-07-04 RX ADMIN — HEPARIN SODIUM 5000 UNITS: 5000 INJECTION INTRAVENOUS; SUBCUTANEOUS at 14:10

## 2023-07-04 RX ADMIN — ZOLPIDEM TARTRATE 5 MG: 5 TABLET ORAL at 20:27

## 2023-07-04 RX ADMIN — PANTOPRAZOLE SODIUM 40 MG: 40 TABLET, DELAYED RELEASE ORAL at 20:27

## 2023-07-04 RX ADMIN — LEVETIRACETAM 500 MG: 5 INJECTION, SOLUTION INTRAVENOUS at 20:27

## 2023-07-04 RX ADMIN — FUROSEMIDE 40 MG: 10 INJECTION, SOLUTION INTRAMUSCULAR; INTRAVENOUS at 09:15

## 2023-07-04 RX ADMIN — LAMOTRIGINE 200 MG: 100 TABLET ORAL at 11:26

## 2023-07-04 RX ADMIN — LEVETIRACETAM 500 MG: 5 INJECTION, SOLUTION INTRAVENOUS at 11:29

## 2023-07-04 RX ADMIN — LAMOTRIGINE 200 MG: 100 TABLET ORAL at 20:27

## 2023-07-04 RX ADMIN — LORAZEPAM 1 MG: 2 INJECTION INTRAMUSCULAR; INTRAVENOUS at 02:52

## 2023-07-04 RX ADMIN — FUROSEMIDE 40 MG: 10 INJECTION, SOLUTION INTRAMUSCULAR; INTRAVENOUS at 21:04

## 2023-07-04 RX ADMIN — INSULIN LISPRO 3 UNITS: 100 INJECTION, SOLUTION INTRAVENOUS; SUBCUTANEOUS at 09:06

## 2023-07-04 RX ADMIN — INSULIN LISPRO 3 UNITS: 100 INJECTION, SOLUTION INTRAVENOUS; SUBCUTANEOUS at 17:18

## 2023-07-04 RX ADMIN — MUPIROCIN 1 APPLICATION: 20 OINTMENT TOPICAL at 21:04

## 2023-07-04 RX ADMIN — LOSARTAN POTASSIUM 50 MG: 25 TABLET, FILM COATED ORAL at 11:26

## 2023-07-04 RX ADMIN — HEPARIN SODIUM 5000 UNITS: 5000 INJECTION INTRAVENOUS; SUBCUTANEOUS at 06:15

## 2023-07-04 RX ADMIN — Medication 10 ML: at 20:28

## 2023-07-04 RX ADMIN — CEFAZOLIN SODIUM 2 G: 2 INJECTION, SOLUTION INTRAVENOUS at 20:27

## 2023-07-04 RX ADMIN — MUPIROCIN 1 APPLICATION: 20 OINTMENT TOPICAL at 11:34

## 2023-07-04 RX ADMIN — QUETIAPINE FUMARATE 400 MG: 200 TABLET ORAL at 20:27

## 2023-07-04 RX ADMIN — Medication 10 ML: at 09:15

## 2023-07-04 RX ADMIN — CEFAZOLIN SODIUM 2 G: 2 INJECTION, SOLUTION INTRAVENOUS at 12:10

## 2023-07-04 RX ADMIN — INSULIN LISPRO 3 UNITS: 100 INJECTION, SOLUTION INTRAVENOUS; SUBCUTANEOUS at 12:10

## 2023-07-04 RX ADMIN — INSULIN LISPRO 2 UNITS: 100 INJECTION, SOLUTION INTRAVENOUS; SUBCUTANEOUS at 21:04

## 2023-07-04 NOTE — PROGRESS NOTES
[facility]   Hospitalist Progress Note    Date of admission: 6/30/2023  Patient Name: Carlos Murphy Jr.  1960  Date: 7/4/2023      Subjective     Chief Complaint   Patient presents with    Weakness - Generalized       Summary: 62 y.o. with history of seizure disorder (on lamotrigine 200 bid), bipolar, schizophrenia, who presented with altered mentation/unresponsiveness on 6/28.  Patient apparently had called his sister initially had slurred speech/somnolent and received Narcan with notable initial improvement.  Patient apparently reported taking additional medications at home but was not trying to harm himself history is very limited given patient poor historian.  Patient underwent stroke work-up without acute findings suggest etiology of patient's AMS.  Did have hyperkalemia initially improved with fluids.  Nephrology assisting given MK and rhabdomyolysis.  Podiatry assisted given for infection/glasses feet and required several pieces being removed.    Interval Followup: Patient required restraints/Ativan overnight for worsening agitation.  Has intermittently been refusing pills.  Still able to answer few questions for me intermittently declines to respond nurses trying to feed him medications with applesauce and a spoon and he is occasionally refusing to open his mouth.      Objective     Vitals:   Temp:  [98.1 °F (36.7 °C)-98.8 °F (37.1 °C)] 98.6 °F (37 °C)  Heart Rate:  [57-93] 57  Resp:  [12-18] 16  BP: (150-176)/(68-89) 166/70    Physical Exam  Awake, altered, appears older than stated age in restraints, will only answer a few basic questions  Pupils equal round reactive to light, occasionally seems to have horizontal nystagmus but also is looking around the room frequently and difficult to get a good exam given lack of participation  Moves x4 extremities spontaneously  CTA B  RRR  Foot dressed no acute discharge    Result Review:  Vital signs, labs and recent relevant imaging reviewed.         acetaminophen    senna-docusate sodium **AND** polyethylene glycol **AND** bisacodyl **AND** bisacodyl    dextrose    dextrose    glucagon (human recombinant)    nitroglycerin    ondansetron    Pharmacy Consult - Pharmacy to dose    sodium chloride    sodium chloride    sodium chloride    sodium chloride    atenolol, 25 mg, Oral, Daily  ceFAZolin, 2 g, Intravenous, Q8H  escitalopram, 20 mg, Oral, Daily  furosemide, 40 mg, Intravenous, Q12H  heparin (porcine), 5,000 Units, Subcutaneous, Q8H  insulin lispro, 2-7 Units, Subcutaneous, 4x Daily AC & at Bedtime  lamoTRIgine, 200 mg, Oral, Q12H  levETIRAcetam, 500 mg, Intravenous, Q12H  losartan, 50 mg, Oral, Q24H  mupirocin, 1 application, Topical, 2 times per day  pantoprazole, 40 mg, Oral, BID  QUEtiapine, 400 mg, Oral, Nightly  saccharomyces boulardii, 250 mg, Oral, BID  senna-docusate sodium, 2 tablet, Oral, BID  sodium chloride, 10 mL, Intravenous, Q12H  zolpidem, 5 mg, Oral, Nightly    7/3 eeg  Impression:   Abnormal EEG because of:  1.  Intermittent medium voltage focal slow activity noted over the left frontotemporal region suggestive of neuronal dysfunction in this region.  This abnormalities have been reported in individuals with vascular insufficiency, migraine, or postictal state.  2.  Slow background activity mixed with slower waves compatible with moderate and diffuse encephalopathy.  3.  There were no epileptiform discharges noted today.    Assessment / Plan     Assessment/Plan:  Acute metabolic encephalopathy  MK on CKD  Severe hyperkalemia-improving  Possible seizure episode  Rhabdomyolysis  SSTI in setting of Foreign body in left foot/glass  Moderate hyponatremia-clinically significant  Anion gap metabolic acidosis  Concern for ischemic stroke  History of COPD  Type 2 diabetes mellitus  Seizure disorder  Bipolar disorder  Hyperlipidemia    Polypharmacy    Question of having component of withdrawal from home medications at this point  Resume home  Seroquel adjusting dose to 400 based on at bedtime renal function.    Patient also appears to be on chronic Ambien, will resume at a lower dose of 5 mg at night and monitor closely if will tolerate and hopefully avoid additional prn medications and try to get pt out of restraints  Continue to hold Lyrica, has notable polypharmacy likely contributing.  Continue on Keppra IV given intermittent difficulty with patient tolerating p.o. medications/refusal, Lamictal being resumed dose.  7/3 EEG results as noted above  Discussed with podiatry, additional imaging with lower extremity ultrasound being obtained as per Dr. Cowan to evaluate for any further retained foreign body/glass, additional was removed by wound care nurse  7/4 placed on cefazolin for SSTI for now, white count elevated, monitor  Continue IV Lasix, monitor I's and O's, appreciate nephrology assistance  Monitor and possibly resume/adjust additional home meds based on continued response to therapy   Continue local wound care  Continue  Precautions reorientation  Continue blood pressure medications as above  Continue SSI monitor blood glucose  Continue PPI  Check a.m. CBC, BMP, magnesium, phosphorus  Continue hospital monitoring and treatment at current level of care - does not need upgraded at this time.  Discuss with podiatry, consult recs reviewed    Cont close inpatient monitoring and tx as above     DVT prophylaxis:  Medical DVT prophylaxis orders are present.    Level Of Support Discussed With: Patient  Code Status (Patient has no pulse and is not breathing): CPR (Attempt to Resuscitate)  Medical Interventions (Patient has pulse or is breathing): Full Support        CBC          7/2/2023    06:19 7/3/2023    06:13 7/4/2023    04:01   CBC   WBC 10.06  11.43  12.58    RBC 3.45  3.72  3.91    Hemoglobin 10.2  11.2  11.3    Hematocrit 29.7  32.0  33.5    MCV 86.1  86.0  85.7    MCH 29.6  30.1  28.9    MCHC 34.3  35.0  33.7    RDW 13.2  13.0  12.7     Platelets 198  213  273        CMP          7/2/2023    06:19 7/3/2023    04:33 7/4/2023    04:01   CMP   Glucose 232  195  197    BUN 76  72  65    Creatinine 4.12  3.38  2.75    EGFR 15.7  19.9  25.4    Sodium 131  136  140    Potassium 4.4  3.6  3.5    Chloride 95  95  94    Calcium 8.5  9.0  9.6    Total Protein 6.2  6.6     Albumin 3.4  3.8  3.9    Globulin 2.8      Total Bilirubin 0.4  0.6     Alkaline Phosphatase 127  144     AST (SGOT) 47  36     ALT (SGPT) 36  35     Albumin/Globulin Ratio 1.2      BUN/Creatinine Ratio 18.4  21.3  23.6    Anion Gap 15.2  16.2  18.0

## 2023-07-04 NOTE — PLAN OF CARE
Goal Outcome Evaluation:  Plan of Care Reviewed With: patient, daughter        Progress: no change  Outcome Evaluation: Patient continues to be very confused; in 4 point restraints. Patient hasn't been getting his lamictal or seroquel. Daughter states that will make his confusion worse and cause hallucinations and withdrawal. Patient has refused meds today. Some meds switched to IV.

## 2023-07-04 NOTE — PROGRESS NOTES
" LOS: 4 days   Patient Care Team:  Felix Atkinson MD as PCP - General  Felix Atkinson MD as PCP - Family Medicine    Chief Complaint: MK/CKD    Subjective     Weakness - Generalized  Pertinent negatives include no chest pain.     Pt more confused, in restraints today  Opens eyes and looks at me but not answering questions.    Subjective:  Symptoms:  Stable.  No shortness of breath, chest pain, chest pressure or anxiety.    Diet:  Adequate intake.      History taken from: patient chart    Objective     Vital Sign Min/Max for last 24 hours  Temp  Min: 98.1 °F (36.7 °C)  Max: 98.8 °F (37.1 °C)   BP  Min: 139/72  Max: 176/89   Pulse  Min: 62  Max: 93   Resp  Min: 12  Max: 18   SpO2  Min: 93 %  Max: 98 %   No data recorded   No data recorded     Flowsheet Rows      Flowsheet Row First Filed Value   Admission Height 182.9 cm (72\") Documented at 06/30/2023 1800   Admission Weight 133 kg (294 lb 1.5 oz) Documented at 06/30/2023 2300            No intake/output data recorded.  I/O last 3 completed shifts:  In: 360 [P.O.:360]  Out: 9200 [Urine:9200]    Objective:  General Appearance:  Comfortable.    Vital signs: (most recent): Blood pressure 154/68, pulse 62, temperature 98.8 °F (37.1 °C), temperature source Oral, resp. rate 14, height 182.9 cm (72\"), weight 133 kg (294 lb 1.5 oz), SpO2 93 %.  Vital signs are normal.    Output: Producing urine.    HEENT: Normal HEENT exam.    Lungs:  Normal effort and normal respiratory rate.    Heart: Normal rate.  Regular rhythm.    Abdomen: Abdomen is soft.  Bowel sounds are normal.   There is no abdominal tenderness.     Extremities: Normal range of motion.  There is dependent edema.    Pulses: Distal pulses are intact.    Neurological: Patient is alert and oriented to person, place and time.  (Slightly slow to respond.).    Pupils:  Pupils are equal, round, and reactive to light.    Skin:  Warm and dry.  There is ecchymosis.  (Scabs over knees and toes.)            Results " Review:     I reviewed the patient's new clinical results.    WBC WBC   Date Value Ref Range Status   07/04/2023 12.58 (H) 3.40 - 10.80 10*3/mm3 Final   07/03/2023 11.43 (H) 3.40 - 10.80 10*3/mm3 Final   07/02/2023 10.06 3.40 - 10.80 10*3/mm3 Final      HGB Hemoglobin   Date Value Ref Range Status   07/04/2023 11.3 (L) 13.0 - 17.7 g/dL Final   07/03/2023 11.2 (L) 13.0 - 17.7 g/dL Final   07/02/2023 10.2 (L) 13.0 - 17.7 g/dL Final      HCT Hematocrit   Date Value Ref Range Status   07/04/2023 33.5 (L) 37.5 - 51.0 % Final   07/03/2023 32.0 (L) 37.5 - 51.0 % Final   07/02/2023 29.7 (L) 37.5 - 51.0 % Final      Platlets No results found for: LABPLAT   MCV MCV   Date Value Ref Range Status   07/04/2023 85.7 79.0 - 97.0 fL Final   07/03/2023 86.0 79.0 - 97.0 fL Final   07/02/2023 86.1 79.0 - 97.0 fL Final          Sodium Sodium   Date Value Ref Range Status   07/04/2023 140 136 - 145 mmol/L Final   07/03/2023 136 136 - 145 mmol/L Final   07/02/2023 131 (L) 136 - 145 mmol/L Final   07/01/2023 134 (L) 136 - 145 mmol/L Final      Potassium Potassium   Date Value Ref Range Status   07/04/2023 3.5 3.5 - 5.2 mmol/L Final   07/03/2023 3.6 3.5 - 5.2 mmol/L Final   07/02/2023 4.4 3.5 - 5.2 mmol/L Final   07/01/2023 4.1 3.5 - 5.2 mmol/L Final      Chloride Chloride   Date Value Ref Range Status   07/04/2023 94 (L) 98 - 107 mmol/L Final   07/03/2023 95 (L) 98 - 107 mmol/L Final   07/02/2023 95 (L) 98 - 107 mmol/L Final   07/01/2023 99 98 - 107 mmol/L Final      CO2 CO2   Date Value Ref Range Status   07/04/2023 28.0 22.0 - 29.0 mmol/L Final   07/03/2023 24.8 22.0 - 29.0 mmol/L Final   07/02/2023 20.8 (L) 22.0 - 29.0 mmol/L Final   07/01/2023 19.0 (L) 22.0 - 29.0 mmol/L Final      BUN BUN   Date Value Ref Range Status   07/04/2023 65 (H) 8 - 23 mg/dL Final   07/03/2023 72 (H) 8 - 23 mg/dL Final   07/02/2023 76 (H) 8 - 23 mg/dL Final   07/01/2023 82 (H) 8 - 23 mg/dL Final      Creatinine Creatinine   Date Value Ref Range Status    07/04/2023 2.75 (H) 0.76 - 1.27 mg/dL Final   07/03/2023 3.38 (H) 0.76 - 1.27 mg/dL Final   07/02/2023 4.12 (H) 0.76 - 1.27 mg/dL Final   07/01/2023 4.52 (H) 0.76 - 1.27 mg/dL Final      Calcium Calcium   Date Value Ref Range Status   07/04/2023 9.6 8.6 - 10.5 mg/dL Final   07/03/2023 9.0 8.6 - 10.5 mg/dL Final   07/02/2023 8.5 (L) 8.6 - 10.5 mg/dL Final   07/01/2023 8.1 (L) 8.6 - 10.5 mg/dL Final      PO4 No results found for: CAPO4   Albumin Albumin   Date Value Ref Range Status   07/04/2023 3.9 3.5 - 5.2 g/dL Final   07/03/2023 3.8 3.5 - 5.2 g/dL Final   07/02/2023 3.4 (L) 3.5 - 5.2 g/dL Final      Magnesium Magnesium   Date Value Ref Range Status   07/04/2023 2.1 1.6 - 2.4 mg/dL Final   07/03/2023 2.3 1.6 - 2.4 mg/dL Final   07/03/2023 2.4 1.6 - 2.4 mg/dL Final   07/02/2023 2.7 (H) 1.6 - 2.4 mg/dL Final      Uric Acid No results found for: URICACID     Medication Review:   amoxicillin-clavulanate, 1 tablet, Oral, Q12H  atenolol, 25 mg, Oral, Daily  doxycycline, 100 mg, Oral, Q12H  escitalopram, 20 mg, Oral, Daily  furosemide, 40 mg, Intravenous, Q12H  heparin (porcine), 5,000 Units, Subcutaneous, Q8H  insulin lispro, 2-7 Units, Subcutaneous, 4x Daily AC & at Bedtime  levETIRAcetam, 500 mg, Oral, Q12H  mupirocin, 1 application, Topical, 2 times per day  pantoprazole, 40 mg, Oral, BID  saccharomyces boulardii, 250 mg, Oral, BID  senna-docusate sodium, 2 tablet, Oral, BID  sodium bicarbonate, 1,300 mg, Oral, TID  sodium chloride, 10 mL, Intravenous, Q12H        Assessment & Plan       Acute renal failure, unspecified acute renal failure type    Onychomycosis    Onychocryptosis    Foreign body in left foot    Peripheral neuropathy    Charcot foot due to diabetes mellitus    Foot pain, bilateral      Assessment & Plan  MK/CKD3b-  Due to diabetic nephropathy.  He has had worsened renal function recently.  Had been on lisinopril and SGLT2-inh.  Looks like kerendia was added also but has had hyperkalemia now.   Creatinine improved, off bicarb drip, currently on Lasix 40 mg IV  twice daily.  Excellent urine output.  Baseline creatinine around 2-2.5.  Monitor.  Hyponatremia-  improved.  Will continue IV lasix as bp tolerates.  Hyperkalemia-  better, likely due to medication and cellular shift with acidosis. Will restart on ARB now and monitor.  Met Acidosis-  improved,on oral bicarb.  Off bicarb drip.  Serum bicarb elevated now, will hold oral replacement.  Proteinuria-  diabetic nephropathy  DMII-  treated.  On jardiance, hold with MK.  HTN-  BP trending back up, Adding losartan today.  Chronic edema-  Amlodipine may be contributing.  on hold.  H/o bipolar-  previous lithium use.  H/o sz disorder-neurology evaluating.  Anemia-  tsat at 14%, ferritin 425.  Received some IV iron.   Rhabdomyolysis-  had fall at home, statin on hold. improved now.  Weakness-  would avoid muscle relaxer in CKD(flexeril).  Likely polypharmacy contributing.    Eliud Lee MD  07/04/23  09:25 EDT

## 2023-07-04 NOTE — PROGRESS NOTES
Stroke Progress Note       Chief Complaint:  AMS    Subjective    Subjective     Subjective:  Confused last night       Review of Systems   uTO     Objective      Temp:  [98.1 °F (36.7 °C)-98.8 °F (37.1 °C)] 98.8 °F (37.1 °C)  Heart Rate:  [62-93] 62  Resp:  [12-18] 14  BP: (139-176)/(68-89) 154/68            Results Review:    I reviewed the patient's new clinical results.    Lab Results (last 24 hours)       Procedure Component Value Units Date/Time    POC Glucose Once [[id number]]  (Abnormal) Collected: 07/04/23 0800    Specimen: Blood Updated: 07/04/23 0802     Glucose 208 mg/dL      Comment: Serial Number: [id number]:  [id number]       Magnesium [[id number]]  (Normal) Collected: 07/04/23 0401    Specimen: Blood Updated: 07/04/23 0511     Magnesium 2.1 mg/dL     Renal Function Panel [[id number]]  (Abnormal) Collected: 07/04/23 0401    Specimen: Blood Updated: 07/04/23 0511     Glucose 197 mg/dL      BUN 65 mg/dL      Creatinine 2.75 mg/dL      Sodium 140 mmol/L      Potassium 3.5 mmol/L      Chloride 94 mmol/L      CO2 28.0 mmol/L      Calcium 9.6 mg/dL      Albumin 3.9 g/dL      Phosphorus 4.3 mg/dL      Anion Gap 18.0 mmol/L      BUN/Creatinine Ratio 23.6     eGFR 25.4 mL/min/1.73     Narrative:      GFR Normal >60  Chronic Kidney Disease <60  Kidney Failure <15      CBC (No Diff) [[id number]]  (Abnormal) Collected: 07/04/23 0401    Specimen: Blood Updated: 07/04/23 0448     WBC 12.58 10*3/mm3      RBC 3.91 10*6/mm3      Hemoglobin 11.3 g/dL      Hematocrit 33.5 %      MCV 85.7 fL      MCH 28.9 pg      MCHC 33.7 g/dL      RDW 12.7 %      RDW-SD 39.7 fl      MPV 10.3 fL      Platelets 273 10*3/mm3     POC Glucose Once [[id number]]  (Abnormal) Collected: 07/03/23 2019    Specimen: Blood Updated: 07/03/23 2021     Glucose 150 mg/dL      Comment: Serial Number: [id number]:  [id number]       POC Glucose Once [[id number]]  (Abnormal) Collected: 07/03/23 1647    Specimen: Blood Updated: 07/03/23 1653      Glucose 211 mg/dL      Comment: Serial Number: [id number]:  [id number]       POC Glucose Once [[id number]]  (Abnormal) Collected: 07/03/23 1217    Specimen: Blood Updated: 07/03/23 1218     Glucose 196 mg/dL      Comment: Serial Number: [id number]:  [id number]             MRI Foot Left Without Contrast    Result Date: 7/3/2023    1. Focal edema within the subcutaneous soft tissues at the plantar medial aspect of the hindfoot in the region of the small foreign body as observed on the prior radiographs.  Given the presence of a foreign body, the findings suggest changes of soft tissue cellulitis.  There is no definitive abscess formation. 2. Focal edema is noted within the anterior process of the calcaneus and region of the middle subtalar facet.  The edema is felt to most likely be related to chronic stress related changes and/or arthropathy.  Changes of developing osteomyelitis are felt less likely but are not entirely excluded.  No definitive cortical erosion or destruction is identified. 3. Marked changes of tendinopathy and tenosynovitis with superimposed partial thickness tear involving the posterior tibialis tendon.  4. There may be mild changes of tendinopathy involving the peroneus longus and brevis tendons. 5. Mild distal Achilles tendinopathy.  There is also mild-to-moderate plantar fasciitis. 6. Evidence for prior disruption of the anterior talofibular ligament with likely associated injury of the calcaneofibular ligament. 7. Moderate changes of arthropathy associated with the articulations of the ankle, hindfoot, and midfoot.  There is also flattening of the arch of the foot.  The findings may be related to posttraumatic osteoarthritis.  Changes secondary to developing neuropathic arthropathy or Charcot foot could also have this appearance. 8. Findings suggesting changes of remote Lisfranc injury.      RUDY BEAN MD       Electronically Signed and Approved By: RUDY BEAN MD on 7/03/2023  at 9:09            Results for orders placed during the hospital encounter of 06/30/23    Adult Transthoracic Echo Limited W/ Cont if Necessary Per Protocol    Interpretation Summary    Left ventricular systolic function is normal. Left ventricular ejection fraction appears to be 61 - 65%.    Left ventricular diastolic function was normal.    No regional wall motion abnormality    No obvious source of emboli            Assessment/Plan     Assessment/Plan:  62 y.o. male PMH DM, CKD, schizophrenia, HTN, HLD, seizure disorder on lamotrigine 200 mg BID who presented to the ED on 06/28 altered and unresponsive.    On exam today, patient is in four point restraints, hard to wake up. ( 2/2 ativan)   EEG : no electrographic seizures     Based on the presentation and symptoms likely this is metabolic encephalopathy.         Seizure disorder -  Resume Lamictal, it is likely patient could be in withdrawal from medications.   Hypertension- normal blood pressure goals   Hyperlipidemia   4.   H/o stroke- Continue Aspirin 81 MG daily    Neurology will continue to follow.     Visit- audio/video interface.     Roman Sorenson MD  07/04/23  09:34 EDT

## 2023-07-05 ENCOUNTER — APPOINTMENT (OUTPATIENT)
Dept: INTERVENTIONAL RADIOLOGY/VASCULAR | Facility: HOSPITAL | Age: 63
DRG: 917 | End: 2023-07-05
Payer: MEDICARE

## 2023-07-05 ENCOUNTER — APPOINTMENT (OUTPATIENT)
Dept: NEUROLOGY | Facility: HOSPITAL | Age: 63
DRG: 917 | End: 2023-07-05
Payer: MEDICARE

## 2023-07-05 ENCOUNTER — APPOINTMENT (OUTPATIENT)
Dept: CT IMAGING | Facility: HOSPITAL | Age: 63
DRG: 917 | End: 2023-07-05
Payer: MEDICARE

## 2023-07-05 LAB
ALBUMIN SERPL-MCNC: 3.8 G/DL (ref 3.5–5.2)
ALBUMIN/GLOB SERPL: 1.1 G/DL
ALP SERPL-CCNC: 150 U/L (ref 39–117)
ALT SERPL W P-5'-P-CCNC: 25 U/L (ref 1–41)
ANION GAP SERPL CALCULATED.3IONS-SCNC: 24.9 MMOL/L (ref 5–15)
AST SERPL-CCNC: 19 U/L (ref 1–40)
BASOPHILS # BLD AUTO: 0.03 10*3/MM3 (ref 0–0.2)
BASOPHILS NFR BLD AUTO: 0.2 % (ref 0–1.5)
BILIRUB SERPL-MCNC: 0.4 MG/DL (ref 0–1.2)
BUN SERPL-MCNC: 69 MG/DL (ref 8–23)
BUN/CREAT SERPL: 25.2 (ref 7–25)
CALCIUM SPEC-SCNC: 9.6 MG/DL (ref 8.6–10.5)
CHLORIDE SERPL-SCNC: 97 MMOL/L (ref 98–107)
CK SERPL-CCNC: 161 U/L (ref 20–200)
CO2 SERPL-SCNC: 22.1 MMOL/L (ref 22–29)
CREAT SERPL-MCNC: 2.74 MG/DL (ref 0.76–1.27)
DEPRECATED RDW RBC AUTO: 42.1 FL (ref 37–54)
EGFRCR SERPLBLD CKD-EPI 2021: 25.5 ML/MIN/1.73
EOSINOPHIL # BLD AUTO: 0 10*3/MM3 (ref 0–0.4)
EOSINOPHIL NFR BLD AUTO: 0 % (ref 0.3–6.2)
ERYTHROCYTE [DISTWIDTH] IN BLOOD BY AUTOMATED COUNT: 12.7 % (ref 12.3–15.4)
GLOBULIN UR ELPH-MCNC: 3.4 GM/DL
GLUCOSE BLDC GLUCOMTR-MCNC: 280 MG/DL (ref 70–99)
GLUCOSE BLDC GLUCOMTR-MCNC: 299 MG/DL (ref 70–99)
GLUCOSE BLDC GLUCOMTR-MCNC: 302 MG/DL (ref 70–99)
GLUCOSE BLDC GLUCOMTR-MCNC: 331 MG/DL (ref 70–99)
GLUCOSE SERPL-MCNC: 272 MG/DL (ref 65–99)
HCT VFR BLD AUTO: 38 % (ref 37.5–51)
HGB BLD-MCNC: 12.1 G/DL (ref 13–17.7)
IMM GRANULOCYTES # BLD AUTO: 0.09 10*3/MM3 (ref 0–0.05)
IMM GRANULOCYTES NFR BLD AUTO: 0.7 % (ref 0–0.5)
LYMPHOCYTES # BLD AUTO: 0.71 10*3/MM3 (ref 0.7–3.1)
LYMPHOCYTES NFR BLD AUTO: 5.7 % (ref 19.6–45.3)
MAGNESIUM SERPL-MCNC: 2.2 MG/DL (ref 1.6–2.4)
MCH RBC QN AUTO: 29.1 PG (ref 26.6–33)
MCHC RBC AUTO-ENTMCNC: 31.8 G/DL (ref 31.5–35.7)
MCV RBC AUTO: 91.3 FL (ref 79–97)
MONOCYTES # BLD AUTO: 0.66 10*3/MM3 (ref 0.1–0.9)
MONOCYTES NFR BLD AUTO: 5.3 % (ref 5–12)
NEUTROPHILS NFR BLD AUTO: 10.89 10*3/MM3 (ref 1.7–7)
NEUTROPHILS NFR BLD AUTO: 88.1 % (ref 42.7–76)
NRBC BLD AUTO-RTO: 0 /100 WBC (ref 0–0.2)
PHOSPHATE SERPL-MCNC: 4 MG/DL (ref 2.5–4.5)
PLATELET # BLD AUTO: 293 10*3/MM3 (ref 140–450)
PMV BLD AUTO: 9.7 FL (ref 6–12)
POTASSIUM SERPL-SCNC: 3.2 MMOL/L (ref 3.5–5.2)
PROT SERPL-MCNC: 7.2 G/DL (ref 6–8.5)
QT INTERVAL: 486 MS
RBC # BLD AUTO: 4.16 10*6/MM3 (ref 4.14–5.8)
SODIUM SERPL-SCNC: 144 MMOL/L (ref 136–145)
WBC NRBC COR # BLD: 12.38 10*3/MM3 (ref 3.4–10.8)

## 2023-07-05 PROCEDURE — 85025 COMPLETE CBC W/AUTO DIFF WBC: CPT | Performed by: INTERNAL MEDICINE

## 2023-07-05 PROCEDURE — 94761 N-INVAS EAR/PLS OXIMETRY MLT: CPT

## 2023-07-05 PROCEDURE — 25010000002 ACETAMINOPHEN 10 MG/ML SOLUTION

## 2023-07-05 PROCEDURE — 25010000002 FUROSEMIDE PER 20 MG: Performed by: INTERNAL MEDICINE

## 2023-07-05 PROCEDURE — 99233 SBSQ HOSP IP/OBS HIGH 50: CPT | Performed by: INTERNAL MEDICINE

## 2023-07-05 PROCEDURE — 25010000002 CEFAZOLIN IN DEXTROSE 2-4 GM/100ML-% SOLUTION: Performed by: INTERNAL MEDICINE

## 2023-07-05 PROCEDURE — 80053 COMPREHEN METABOLIC PANEL: CPT | Performed by: INTERNAL MEDICINE

## 2023-07-05 PROCEDURE — 95816 EEG AWAKE AND DROWSY: CPT

## 2023-07-05 PROCEDURE — 82550 ASSAY OF CK (CPK): CPT | Performed by: INTERNAL MEDICINE

## 2023-07-05 PROCEDURE — 86618 LYME DISEASE ANTIBODY: CPT

## 2023-07-05 PROCEDURE — 94799 UNLISTED PULMONARY SVC/PX: CPT

## 2023-07-05 PROCEDURE — 84100 ASSAY OF PHOSPHORUS: CPT | Performed by: INTERNAL MEDICINE

## 2023-07-05 PROCEDURE — 0 POTASSIUM CHLORIDE 10 MEQ/100ML SOLUTION: Performed by: INTERNAL MEDICINE

## 2023-07-05 PROCEDURE — 25010000002 THIAMINE PER 100 MG: Performed by: INTERNAL MEDICINE

## 2023-07-05 PROCEDURE — 25010000002 LORAZEPAM PER 2 MG: Performed by: INTERNAL MEDICINE

## 2023-07-05 PROCEDURE — 25010000002 AMPICILLIN PER 500 MG: Performed by: INTERNAL MEDICINE

## 2023-07-05 PROCEDURE — 82948 REAGENT STRIP/BLOOD GLUCOSE: CPT

## 2023-07-05 PROCEDURE — 83735 ASSAY OF MAGNESIUM: CPT | Performed by: INTERNAL MEDICINE

## 2023-07-05 PROCEDURE — 25010000002 VANCOMYCIN 5 G RECONSTITUTED SOLUTION: Performed by: INTERNAL MEDICINE

## 2023-07-05 PROCEDURE — 87484 EHRLICHA CHAFFEENSIS AMP PRB: CPT

## 2023-07-05 PROCEDURE — 25010000002 LEVETIRACETAM IN NACL 0.82% 500 MG/100ML SOLUTION: Performed by: INTERNAL MEDICINE

## 2023-07-05 PROCEDURE — 25010000002 CEFTRIAXONE PER 250 MG: Performed by: INTERNAL MEDICINE

## 2023-07-05 PROCEDURE — 87040 BLOOD CULTURE FOR BACTERIA: CPT | Performed by: INTERNAL MEDICINE

## 2023-07-05 PROCEDURE — 87798 DETECT AGENT NOS DNA AMP: CPT

## 2023-07-05 PROCEDURE — 99233 SBSQ HOSP IP/OBS HIGH 50: CPT | Performed by: STUDENT IN AN ORGANIZED HEALTH CARE EDUCATION/TRAINING PROGRAM

## 2023-07-05 PROCEDURE — 87468 ANAPLSMA PHGCYTOPHLM AMP PRB: CPT

## 2023-07-05 PROCEDURE — 70450 CT HEAD/BRAIN W/O DYE: CPT

## 2023-07-05 PROCEDURE — 63710000001 INSULIN LISPRO (HUMAN) PER 5 UNITS: Performed by: STUDENT IN AN ORGANIZED HEALTH CARE EDUCATION/TRAINING PROGRAM

## 2023-07-05 PROCEDURE — 25010000002 HEPARIN (PORCINE) PER 1000 UNITS: Performed by: STUDENT IN AN ORGANIZED HEALTH CARE EDUCATION/TRAINING PROGRAM

## 2023-07-05 PROCEDURE — 63710000001 INSULIN DETEMIR PER 5 UNITS: Performed by: INTERNAL MEDICINE

## 2023-07-05 RX ORDER — LORAZEPAM 0.5 MG/1
1 TABLET ORAL EVERY 6 HOURS
Status: DISCONTINUED | OUTPATIENT
Start: 2023-07-06 | End: 2023-07-06

## 2023-07-05 RX ORDER — FUROSEMIDE 10 MG/ML
40 INJECTION INTRAMUSCULAR; INTRAVENOUS EVERY 12 HOURS
Status: DISCONTINUED | OUTPATIENT
Start: 2023-07-05 | End: 2023-07-05

## 2023-07-05 RX ORDER — LORAZEPAM 0.5 MG/1
1 TABLET ORAL
Status: DISCONTINUED | OUTPATIENT
Start: 2023-07-05 | End: 2023-07-06

## 2023-07-05 RX ORDER — FUROSEMIDE 40 MG/1
80 TABLET ORAL DAILY
Status: DISCONTINUED | OUTPATIENT
Start: 2023-07-05 | End: 2023-07-05

## 2023-07-05 RX ORDER — LOSARTAN POTASSIUM 25 MG/1
25 TABLET ORAL 2 TIMES DAILY
Status: DISCONTINUED | OUTPATIENT
Start: 2023-07-05 | End: 2023-07-06

## 2023-07-05 RX ORDER — POTASSIUM CHLORIDE 750 MG/1
40 CAPSULE, EXTENDED RELEASE ORAL ONCE
Status: DISCONTINUED | OUTPATIENT
Start: 2023-07-05 | End: 2023-07-05

## 2023-07-05 RX ORDER — FUROSEMIDE 10 MG/ML
40 INJECTION INTRAMUSCULAR; INTRAVENOUS DAILY
Status: DISCONTINUED | OUTPATIENT
Start: 2023-07-06 | End: 2023-07-06

## 2023-07-05 RX ORDER — LORAZEPAM 2 MG/ML
2 INJECTION INTRAMUSCULAR
Status: DISCONTINUED | OUTPATIENT
Start: 2023-07-05 | End: 2023-07-06

## 2023-07-05 RX ORDER — LORAZEPAM 2 MG/ML
1 INJECTION INTRAMUSCULAR
Status: DISCONTINUED | OUTPATIENT
Start: 2023-07-05 | End: 2023-07-06

## 2023-07-05 RX ORDER — QUETIAPINE FUMARATE 100 MG/1
100 TABLET, FILM COATED ORAL NIGHTLY
Status: DISCONTINUED | OUTPATIENT
Start: 2023-07-05 | End: 2023-07-06

## 2023-07-05 RX ORDER — LIDOCAINE HYDROCHLORIDE 20 MG/ML
20 INJECTION, SOLUTION INFILTRATION; PERINEURAL ONCE
Status: DISCONTINUED | OUTPATIENT
Start: 2023-07-05 | End: 2023-07-05

## 2023-07-05 RX ORDER — POTASSIUM CHLORIDE 7.45 MG/ML
10 INJECTION INTRAVENOUS
Status: COMPLETED | OUTPATIENT
Start: 2023-07-05 | End: 2023-07-05

## 2023-07-05 RX ORDER — ACETAMINOPHEN 10 MG/ML
1000 INJECTION, SOLUTION INTRAVENOUS ONCE
Status: COMPLETED | OUTPATIENT
Start: 2023-07-05 | End: 2023-07-05

## 2023-07-05 RX ORDER — LORAZEPAM 0.5 MG/1
2 TABLET ORAL
Status: DISCONTINUED | OUTPATIENT
Start: 2023-07-05 | End: 2023-07-06

## 2023-07-05 RX ORDER — LORAZEPAM 0.5 MG/1
2 TABLET ORAL EVERY 6 HOURS
Status: DISCONTINUED | OUTPATIENT
Start: 2023-07-05 | End: 2023-07-06

## 2023-07-05 RX ORDER — ACETAMINOPHEN 650 MG/1
650 SUPPOSITORY RECTAL EVERY 6 HOURS PRN
Status: DISCONTINUED | OUTPATIENT
Start: 2023-07-05 | End: 2023-07-19 | Stop reason: HOSPADM

## 2023-07-05 RX ORDER — LABETALOL HYDROCHLORIDE 5 MG/ML
10 INJECTION, SOLUTION INTRAVENOUS EVERY 4 HOURS PRN
Status: DISCONTINUED | OUTPATIENT
Start: 2023-07-05 | End: 2023-07-16

## 2023-07-05 RX ADMIN — THIAMINE HYDROCHLORIDE 500 MG: 100 INJECTION, SOLUTION INTRAMUSCULAR; INTRAVENOUS at 20:18

## 2023-07-05 RX ADMIN — INSULIN LISPRO 5 UNITS: 100 INJECTION, SOLUTION INTRAVENOUS; SUBCUTANEOUS at 21:40

## 2023-07-05 RX ADMIN — MUPIROCIN 1 APPLICATION: 20 OINTMENT TOPICAL at 21:57

## 2023-07-05 RX ADMIN — MUPIROCIN 1 APPLICATION: 20 OINTMENT TOPICAL at 10:30

## 2023-07-05 RX ADMIN — POTASSIUM CHLORIDE 10 MEQ: 7.46 INJECTION, SOLUTION INTRAVENOUS at 11:44

## 2023-07-05 RX ADMIN — POTASSIUM CHLORIDE 10 MEQ: 7.46 INJECTION, SOLUTION INTRAVENOUS at 18:14

## 2023-07-05 RX ADMIN — CEFAZOLIN SODIUM 2 G: 2 INJECTION, SOLUTION INTRAVENOUS at 03:41

## 2023-07-05 RX ADMIN — AMPICILLIN 2 G: 2 INJECTION, POWDER, FOR SOLUTION INTRAMUSCULAR; INTRAVENOUS at 18:10

## 2023-07-05 RX ADMIN — FUROSEMIDE 40 MG: 10 INJECTION, SOLUTION INTRAMUSCULAR; INTRAVENOUS at 08:30

## 2023-07-05 RX ADMIN — ACETAMINOPHEN 650 MG: 650 SUPPOSITORY RECTAL at 11:44

## 2023-07-05 RX ADMIN — POTASSIUM CHLORIDE 10 MEQ: 7.46 INJECTION, SOLUTION INTRAVENOUS at 10:30

## 2023-07-05 RX ADMIN — LEVETIRACETAM 500 MG: 5 INJECTION, SOLUTION INTRAVENOUS at 08:31

## 2023-07-05 RX ADMIN — AMPICILLIN 2 G: 2 INJECTION, POWDER, FOR SOLUTION INTRAMUSCULAR; INTRAVENOUS at 15:15

## 2023-07-05 RX ADMIN — POTASSIUM CHLORIDE 10 MEQ: 7.46 INJECTION, SOLUTION INTRAVENOUS at 08:34

## 2023-07-05 RX ADMIN — CEFTRIAXONE SODIUM 2 G: 2 INJECTION, POWDER, FOR SOLUTION INTRAMUSCULAR; INTRAVENOUS at 13:27

## 2023-07-05 RX ADMIN — THIAMINE HYDROCHLORIDE 500 MG: 100 INJECTION, SOLUTION INTRAMUSCULAR; INTRAVENOUS at 11:51

## 2023-07-05 RX ADMIN — VALPROATE SODIUM 500 MG: 100 INJECTION, SOLUTION INTRAVENOUS at 22:04

## 2023-07-05 RX ADMIN — VALPROATE SODIUM 1000 MG: 100 INJECTION, SOLUTION INTRAVENOUS at 12:30

## 2023-07-05 RX ADMIN — CEFTRIAXONE SODIUM 2 G: 2 INJECTION, POWDER, FOR SOLUTION INTRAMUSCULAR; INTRAVENOUS at 23:44

## 2023-07-05 RX ADMIN — Medication 10 ML: at 20:19

## 2023-07-05 RX ADMIN — HEPARIN SODIUM 5000 UNITS: 5000 INJECTION INTRAVENOUS; SUBCUTANEOUS at 05:23

## 2023-07-05 RX ADMIN — INSULIN DETEMIR 7 UNITS: 100 INJECTION, SOLUTION SUBCUTANEOUS at 08:31

## 2023-07-05 RX ADMIN — ACETAMINOPHEN 1000 MG: 10 INJECTION INTRAVENOUS at 20:27

## 2023-07-05 RX ADMIN — INSULIN LISPRO 5 UNITS: 100 INJECTION, SOLUTION INTRAVENOUS; SUBCUTANEOUS at 08:30

## 2023-07-05 RX ADMIN — VANCOMYCIN HYDROCHLORIDE 2750 MG: 5 INJECTION, POWDER, LYOPHILIZED, FOR SOLUTION INTRAVENOUS at 13:27

## 2023-07-05 RX ADMIN — LORAZEPAM 2 MG: 2 INJECTION INTRAMUSCULAR; INTRAVENOUS at 19:50

## 2023-07-05 RX ADMIN — POTASSIUM CHLORIDE 10 MEQ: 7.46 INJECTION, SOLUTION INTRAVENOUS at 17:04

## 2023-07-05 RX ADMIN — Medication 10 ML: at 08:31

## 2023-07-05 RX ADMIN — DOXYCYCLINE 100 MG: 100 INJECTION, POWDER, LYOPHILIZED, FOR SOLUTION INTRAVENOUS at 21:33

## 2023-07-05 RX ADMIN — LORAZEPAM 1 MG: 2 INJECTION INTRAMUSCULAR; INTRAVENOUS at 12:29

## 2023-07-05 NOTE — PROGRESS NOTES
"[facility] Clinical Pharmacy Services: Vancomycin Pharmacokinetic Initial Consult Note    Carlos Murphy Jr. is a 62 y.o. male who is on day 1 of pharmacy to dose vancomycin for CNS Infection as empiric treatment for pt with fever and AMS.     Consult Information  Consulting Provider: Marley   Planned Duration of Therapy: BD   Was Patient Receiving Prior to Admission/Consult?: No  Loading Dose Ordered or Given: 2750 mg on  at 1300  PK/PD Target: -600 mg/L.hr     Other Antimicrobials: Ampicillin and Ceftriaxone  Previously on Kefzol for SSTI of foot with foreign body     Imaging Reviewed?: Yes    Microbiology Data  MRSA PCR performed: No; Result: Not ordered due to excluded indication or presence of suspected abscess  Culture/Source:    Blood cx   pending     Vitals/Labs  Ht: 182.9 cm (72\"); Wt: 133 kg (294 lb 1.5 oz)  Temp (24hrs), Av.2 °F (37.3 °C), Min:97.6 °F (36.4 °C), Max:101.2 °F (38.4 °C)   Estimated Creatinine Clearance: 40 mL/min (A) (by C-G formula based on SCr of 2.74 mg/dL (H)).       Results from last 7 days   Lab Units 23  0440 23  0401 23  0613 23  0433   CREATININE mg/dL 2.74* 2.75*  --  3.38*   WBC 10*3/mm3 12.38* 12.58* 11.43*  --      Assessment/Plan:    Vancomycin Dose: pulse dosing with 2750 mg IV once on  at 1300  Will wait for random tomorrow AM before scheduling daily dosing due to pts unstable renal function.   Vanc Random ordered for  at am labs   Patient has order for Basic Metabolic Panel    Pharmacy will follow patient's kidney function and will adjust doses and obtain levels as necessary. Thank you for involving pharmacy in this patient's care. Please contact pharmacy with any questions or concerns.                           Angela Cope [facility]  Clinical Pharmacist    "

## 2023-07-05 NOTE — PLAN OF CARE
Goal Outcome Evaluation:           Progress: no change       Pt is very confused and mistrustful of staff. Pt spit out medication and refused food. Pt did drink and was able to take some medication crushed in his tea. Pt tolerating restraints well. No seizure activities noted.

## 2023-07-05 NOTE — PLAN OF CARE
Goal Outcome Evaluation:  Plan of Care Reviewed With: patient, daughter   Patient is lethargic and hard to arouse with vigorous stimulation. Intermittently moans with illogical speech, MD notified. Received Ativan x1 per CIWA protocol. Failed bedside dysphagia screening, strict NPO. x5 K+ run given.      Progress: declining

## 2023-07-05 NOTE — PROGRESS NOTES
" LOS: 5 days   Patient Care Team:  Felix Atkinson MD as PCP - General  Felix Atkinson MD as PCP - Family Medicine    Chief Complaint: MK/CKD    Subjective     Weakness - Generalized  Pertinent negatives include no chest pain.       Pt more confused, drowsy.  Hypertensive.  Opens eyes and looks at me but not answering questions.    Subjective:  Symptoms:  Worsening.  No shortness of breath, chest pain, chest pressure or anxiety.    Diet:  Poor intake.        History taken from: patient chart    Objective     Vital Sign Min/Max for last 24 hours  Temp  Min: 97.6 °F (36.4 °C)  Max: 101.2 °F (38.4 °C)   BP  Min: 157/69  Max: 179/69   Pulse  Min: 60  Max: 84   Resp  Min: 16  Max: 20   SpO2  Min: 91 %  Max: 100 %   No data recorded   No data recorded     Flowsheet Rows      Flowsheet Row First Filed Value   Admission Height 182.9 cm (72\") Documented at 06/30/2023 1800   Admission Weight 133 kg (294 lb 1.5 oz) Documented at 06/30/2023 2300            I/O this shift:  In: 1000 [IV Piggyback:1000]  Out: 1300 [Urine:1300]  I/O last 3 completed shifts:  In: 360 [P.O.:360]  Out: 8250 [Urine:8250]    Objective:  General Appearance:  Comfortable.    Vital signs: (most recent): Blood pressure 175/82, pulse 64, temperature 99.9 °F (37.7 °C), temperature source Axillary, resp. rate 18, height 182.9 cm (72\"), weight 133 kg (294 lb 1.5 oz), SpO2 91 %.  Vital signs are normal.    Output: Producing urine.    HEENT: Normal HEENT exam.    Lungs:  Normal effort and normal respiratory rate.    Heart: Normal rate.  Regular rhythm.    Abdomen: Abdomen is soft.  Bowel sounds are normal.   There is no abdominal tenderness.     Extremities: Normal range of motion.  There is dependent edema.    Pulses: Distal pulses are intact.    Neurological: Patient is alert and oriented to person, place and time.  (  Lethargic.).    Pupils:  Pupils are equal, round, and reactive to light.    Skin:  Warm and dry.  There is ecchymosis.  (Scabs over knees " and toes.)        Unchanged.  Marley catheter in place.    Results Review:     I reviewed the patient's new clinical results.    WBC WBC   Date Value Ref Range Status   07/05/2023 12.38 (H) 3.40 - 10.80 10*3/mm3 Final   07/04/2023 12.58 (H) 3.40 - 10.80 10*3/mm3 Final   07/03/2023 11.43 (H) 3.40 - 10.80 10*3/mm3 Final      HGB Hemoglobin   Date Value Ref Range Status   07/05/2023 12.1 (L) 13.0 - 17.7 g/dL Final   07/04/2023 11.3 (L) 13.0 - 17.7 g/dL Final   07/03/2023 11.2 (L) 13.0 - 17.7 g/dL Final      HCT Hematocrit   Date Value Ref Range Status   07/05/2023 38.0 37.5 - 51.0 % Final   07/04/2023 33.5 (L) 37.5 - 51.0 % Final   07/03/2023 32.0 (L) 37.5 - 51.0 % Final      Platlets No results found for: LABPLAT   MCV MCV   Date Value Ref Range Status   07/05/2023 91.3 79.0 - 97.0 fL Final   07/04/2023 85.7 79.0 - 97.0 fL Final   07/03/2023 86.0 79.0 - 97.0 fL Final          Sodium Sodium   Date Value Ref Range Status   07/05/2023 144 136 - 145 mmol/L Final   07/04/2023 140 136 - 145 mmol/L Final   07/03/2023 136 136 - 145 mmol/L Final      Potassium Potassium   Date Value Ref Range Status   07/05/2023 3.2 (L) 3.5 - 5.2 mmol/L Final   07/04/2023 3.5 3.5 - 5.2 mmol/L Final   07/03/2023 3.6 3.5 - 5.2 mmol/L Final      Chloride Chloride   Date Value Ref Range Status   07/05/2023 97 (L) 98 - 107 mmol/L Final   07/04/2023 94 (L) 98 - 107 mmol/L Final   07/03/2023 95 (L) 98 - 107 mmol/L Final      CO2 CO2   Date Value Ref Range Status   07/05/2023 22.1 22.0 - 29.0 mmol/L Final   07/04/2023 28.0 22.0 - 29.0 mmol/L Final   07/03/2023 24.8 22.0 - 29.0 mmol/L Final      BUN BUN   Date Value Ref Range Status   07/05/2023 69 (H) 8 - 23 mg/dL Final   07/04/2023 65 (H) 8 - 23 mg/dL Final   07/03/2023 72 (H) 8 - 23 mg/dL Final      Creatinine Creatinine   Date Value Ref Range Status   07/05/2023 2.74 (H) 0.76 - 1.27 mg/dL Final   07/04/2023 2.75 (H) 0.76 - 1.27 mg/dL Final   07/03/2023 3.38 (H) 0.76 - 1.27 mg/dL Final      Calcium  Calcium   Date Value Ref Range Status   07/05/2023 9.6 8.6 - 10.5 mg/dL Final   07/04/2023 9.6 8.6 - 10.5 mg/dL Final   07/03/2023 9.0 8.6 - 10.5 mg/dL Final      PO4 No results found for: CAPO4   Albumin Albumin   Date Value Ref Range Status   07/05/2023 3.8 3.5 - 5.2 g/dL Final   07/04/2023 3.9 3.5 - 5.2 g/dL Final   07/03/2023 3.8 3.5 - 5.2 g/dL Final      Magnesium Magnesium   Date Value Ref Range Status   07/05/2023 2.2 1.6 - 2.4 mg/dL Final   07/04/2023 2.1 1.6 - 2.4 mg/dL Final   07/03/2023 2.3 1.6 - 2.4 mg/dL Final   07/03/2023 2.4 1.6 - 2.4 mg/dL Final      Uric Acid No results found for: URICACID     Medication Review:   ampicillin, 2 g, Intravenous, Q6H  atenolol, 25 mg, Oral, Daily  cefTRIAXone, 2 g, Intravenous, Q12H  escitalopram, 20 mg, Oral, Daily  furosemide, 80 mg, Oral, Daily  insulin detemir, 7 Units, Subcutaneous, Daily  insulin lispro, 2-7 Units, Subcutaneous, 4x Daily AC & at Bedtime  lidocaine, 20 mL, Injection, Once  LORazepam, 2 mg, Oral, Q6H   Followed by  [START ON 7/6/2023] LORazepam, 1 mg, Oral, Q6H  losartan, 25 mg, Oral, BID  mupirocin, 1 application , Topical, 2 times per day  pantoprazole, 40 mg, Oral, BID  potassium chloride, 10 mEq, Intravenous, Q1H  QUEtiapine, 100 mg, Oral, Nightly  saccharomyces boulardii, 250 mg, Oral, BID  senna-docusate sodium, 2 tablet, Oral, BID  sodium chloride, 10 mL, Intravenous, Q12H  thiamine (B-1) IV, 500 mg, Intravenous, Q8H  valproate sodium, 500 mg, Intravenous, Q12H        Assessment & Plan       Acute renal failure, unspecified acute renal failure type    Onychomycosis    Onychocryptosis    Foreign body in left foot    Peripheral neuropathy    Charcot foot due to diabetes mellitus    Foot pain, bilateral      Assessment & Plan  MK/CKD3b-  Due to diabetic nephropathy.  He has had worsened renal function recently.  Had been on lisinopril and SGLT2 and kerendia.  Creatinine improved, unchanged from yesterday.  Off bicarb drip, decrease Lasix to 40  mg IV daily. Excellent urine output.  Baseline creatinine around 2-2.5.  Monitor.  Hyponatremia-  improved.  Will continue IV lasix as bp tolerates.  Hyperkalemia-  better, likely due to medication and cellular shift with acidosis.  Resumed ARB yesterday.  Hypokalemia, potassium is being replaced IV.  Met Acidosis-  improved.  Off p.o. and IV bicarb.  Proteinuria-  diabetic nephropathy  DMII-  treated.  Was On jardiance, hold with MK.  HTN-  BP trending back up.  Losartan added yesterday.  Chronic edema-  Amlodipine may be contributing.  on hold.  H/o bipolar-  previous lithium use.  H/o sz disorder-neurology evaluating.  Anemia-  tsat at 14%, ferritin 425.  Received some IV iron.   Rhabdomyolysis-  had fall at home, statin on hold. improved now.  Weakness-  would avoid muscle relaxer in CKD(flexeril).  Likely polypharmacy contributing.    I did change his Lasix to oral and added potassium supplements but I was called by the nurse that his mental status has worsened and he is n.p.o. now.  I will switch back to IV.    Caroline Mckenzie MD  07/05/23  16:32 EDT

## 2023-07-05 NOTE — PROGRESS NOTES
[facility]   Hospitalist Progress Note    Date of admission: 6/30/2023  Patient Name: Carlos Murphy Jr.  1960  Date: 7/5/2023      Subjective     Chief Complaint   Patient presents with    Weakness - Generalized       Summary: 62 y.o. with history of seizure disorder (on lamotrigine 200 bid), bipolar, schizophrenia, who presented with altered mentation/unresponsiveness on 6/28.  Patient apparently had called his sister initially had slurred speech/somnolent and received Narcan with notable initial improvement.  Patient apparently reported taking additional medications at home but was not trying to harm himself history is very limited given patient poor historian.  Patient underwent stroke work-up without acute findings suggest etiology of patient's AMS.  Did have hyperkalemia initially improved with fluids.  Nephrology assisting given MK and rhabdomyolysis.  Podiatry assisted given for infection/glasses feet and required several pieces being removed.    Interval Followup: pt more altered this morning/lethargic per nursing.  During turns/roation earlier had reportedly pulled at lines and was more agitated.  Briefly says hello to me and then unable to get additional response and pt otherwise lethargic.       Objective     Vitals:   Temp:  [97.6 °F (36.4 °C)-100.6 °F (38.1 °C)] 100.6 °F (38.1 °C)  Heart Rate:  [57-84] 84  Resp:  [16-20] 20  BP: (157-179)/(69-78) 157/69    Physical Exam  Tired/lethargic, in bed  Not alert or oriented, not able to consistently get any response, perrl, occasionally mumbling, no over tremors/seizure activity appreciated  Ctab no wrr on ra  Rrr no le pitting edema  Abd sof eliza nd  Le wounds/foot dressings with betadine, decreasing erythema, no discharge/draiange      Result Review:  Vital signs, labs and recent relevant imaging reviewed.        acetaminophen    senna-docusate sodium **AND** polyethylene glycol **AND** bisacodyl **AND** bisacodyl    dextrose    dextrose     glucagon (human recombinant)    hold    labetalol    LORazepam    nitroglycerin    ondansetron    Pharmacy Consult - Pharmacy to dose    Pharmacy Consult - Pharmacy to dose    sodium chloride    sodium chloride    sodium chloride    sodium chloride    atenolol, 25 mg, Oral, Daily  ceFAZolin, 2 g, Intravenous, Q8H  escitalopram, 20 mg, Oral, Daily  furosemide, 40 mg, Intravenous, Q12H  insulin detemir, 7 Units, Subcutaneous, Daily  insulin lispro, 2-7 Units, Subcutaneous, 4x Daily AC & at Bedtime  losartan, 50 mg, Oral, Q24H  mupirocin, 1 application , Topical, 2 times per day  pantoprazole, 40 mg, Oral, BID  potassium chloride, 10 mEq, Intravenous, Q1H  QUEtiapine, 100 mg, Oral, Nightly  saccharomyces boulardii, 250 mg, Oral, BID  senna-docusate sodium, 2 tablet, Oral, BID  sodium chloride, 10 mL, Intravenous, Q12H  thiamine (B-1) IV, 500 mg, Intravenous, Q8H    7/3 eeg  Impression:   Abnormal EEG because of:  1.  Intermittent medium voltage focal slow activity noted over the left frontotemporal region suggestive of neuronal dysfunction in this region.  This abnormalities have been reported in individuals with vascular insufficiency, migraine, or postictal state.  2.  Slow background activity mixed with slower waves compatible with moderate and diffuse encephalopathy.  3.  There were no epileptiform discharges noted today.    Assessment / Plan     Assessment/Plan:  Acute metabolic encephalopathy  MK on CKD  Severe hyperkalemia-improving  Possible seizure episode  Rhabdomyolysis  SSTI in setting of Foreign body in left foot/glass  Moderate hyponatremia-clinically significant  Anion gap metabolic acidosis  Concern for ischemic stroke  History of COPD  Type 2 diabetes mellitus  Seizure disorder  Bipolar disorder  Hyperlipidemia    Polypharmacy    Stat repeat ct head ordered and negative   Borderline fever 100.2 this morning, question worsening confusion from resuming lower dose of home seroquel and gave half dose of  ambien at 5mg  Dc ambien, and will decrease seroquel to 100mg at night   Repeat eeg,   Add empiric 7/5 vanc/ceftriax/ampicillin while pending lp resutls  Check lumbar puncture, blood culture,   Discussed with neuro, question component of keppra (which is new) contributing - will dc and use depakote instead (given iv option); dc lamictal as unable to safely/consistently take po meds currently. Does not think need to initiate acyclovir for now (especially in setting of renal dysfunction)  Start high dose iv thiamine  Seizure precautions, prn ativan  Prn iv labetalol added for blood pressure  Replace potassium iv  Discussed with podiatry, additional US imaging negative for further retained bodies  7/4 iv cefazolin for SSTI changing abx as above for empiric meningitis coverage, will narrow further as able, wbc decreasing slightly, monitoring  Continue IV Lasix, monitor I's and O's, appreciate nephrology assistance  Continue local wound care  Delirium precautions/reoritentation  Continue blood pressure medications when able to tolerate po (prn iv meds in the meantime)  Continue PPI  Check a.m. CBC, BMP, magnesium, phosphorus and additional testing as above   Continue hospital monitoring and treatment at current level of care - does not need upgraded at this time.    Cont close inpatient monitoring and tx as above     DVT prophylaxis:  Mechanical DVT prophylaxis orders are present.    Level Of Support Discussed With: Patient  Code Status (Patient has no pulse and is not breathing): CPR (Attempt to Resuscitate)  Medical Interventions (Patient has pulse or is breathing): Full Support        CBC          7/3/2023    06:13 7/4/2023    04:01 7/5/2023    04:40   CBC   WBC 11.43  12.58  12.38    RBC 3.72  3.91  4.16    Hemoglobin 11.2  11.3  12.1    Hematocrit 32.0  33.5  38.0    MCV 86.0  85.7  91.3    MCH 30.1  28.9  29.1    MCHC 35.0  33.7  31.8    RDW 13.0  12.7  12.7    Platelets 213  273  293      CMP          7/3/2023     04:33 7/4/2023    04:01 7/5/2023    04:40   CMP   Glucose 195  197  272    BUN 72  65  69    Creatinine 3.38  2.75  2.74    EGFR 19.9  25.4  25.5    Sodium 136  140  144    Potassium 3.6  3.5  3.2    Chloride 95  94  97    Calcium 9.0  9.6  9.6    Total Protein 6.6   7.2    Albumin 3.8  3.9  3.8    Globulin   3.4    Total Bilirubin 0.6   0.4    Alkaline Phosphatase 144   150    AST (SGOT) 36   19    ALT (SGPT) 35   25    Albumin/Globulin Ratio   1.1    BUN/Creatinine Ratio 21.3  23.6  25.2    Anion Gap 16.2  18.0  24.9

## 2023-07-06 ENCOUNTER — APPOINTMENT (OUTPATIENT)
Dept: INTERVENTIONAL RADIOLOGY/VASCULAR | Facility: HOSPITAL | Age: 63
DRG: 917 | End: 2023-07-06
Payer: MEDICARE

## 2023-07-06 ENCOUNTER — APPOINTMENT (OUTPATIENT)
Dept: GENERAL RADIOLOGY | Facility: HOSPITAL | Age: 63
DRG: 917 | End: 2023-07-06
Payer: MEDICARE

## 2023-07-06 ENCOUNTER — APPOINTMENT (OUTPATIENT)
Dept: NEUROLOGY | Facility: HOSPITAL | Age: 63
DRG: 917 | End: 2023-07-06
Payer: MEDICARE

## 2023-07-06 LAB
AMMONIA BLD-SCNC: 20 UMOL/L (ref 16–60)
ANION GAP SERPL CALCULATED.3IONS-SCNC: 19.4 MMOL/L (ref 5–15)
ANION GAP SERPL CALCULATED.3IONS-SCNC: 19.4 MMOL/L (ref 5–15)
APPEARANCE CSF: ABNORMAL
APPEARANCE CSF: CLEAR
BASOPHILS # BLD AUTO: 0.04 10*3/MM3 (ref 0–0.2)
BASOPHILS NFR BLD AUTO: 0.3 % (ref 0–1.5)
BUN SERPL-MCNC: 76 MG/DL (ref 8–23)
BUN SERPL-MCNC: 82 MG/DL (ref 8–23)
BUN/CREAT SERPL: 25.9 (ref 7–25)
BUN/CREAT SERPL: 27.5 (ref 7–25)
C GATTII+NEOFOR DNA CSF QL NAA+NON-PROBE: NOT DETECTED
CALCIUM SPEC-SCNC: 9.6 MG/DL (ref 8.6–10.5)
CALCIUM SPEC-SCNC: 9.7 MG/DL (ref 8.6–10.5)
CHLORIDE SERPL-SCNC: 107 MMOL/L (ref 98–107)
CHLORIDE SERPL-SCNC: 111 MMOL/L (ref 98–107)
CMV DNA CSF QL NAA+PROBE: NOT DETECTED
CO2 SERPL-SCNC: 24.6 MMOL/L (ref 22–29)
CO2 SERPL-SCNC: 24.6 MMOL/L (ref 22–29)
COLOR CSF: ABNORMAL
COLOR CSF: ABNORMAL
CREAT SERPL-MCNC: 2.93 MG/DL (ref 0.76–1.27)
CREAT SERPL-MCNC: 2.98 MG/DL (ref 0.76–1.27)
CRYPTOC AG CSF QL LA: NEGATIVE
DEPRECATED RDW RBC AUTO: 39.8 FL (ref 37–54)
E COLI K1 DNA CSF QL NAA+NON-PROBE: NOT DETECTED
EGFRCR SERPLBLD CKD-EPI 2021: 23.1 ML/MIN/1.73
EGFRCR SERPLBLD CKD-EPI 2021: 23.6 ML/MIN/1.73
EOSINOPHIL # BLD AUTO: 0 10*3/MM3 (ref 0–0.4)
EOSINOPHIL NFR BLD AUTO: 0 % (ref 0.3–6.2)
ERYTHROCYTE [DISTWIDTH] IN BLOOD BY AUTOMATED COUNT: 12.7 % (ref 12.3–15.4)
EV RNA CSF QL NAA+PROBE: NOT DETECTED
GLUCOSE BLDC GLUCOMTR-MCNC: 140 MG/DL (ref 70–99)
GLUCOSE BLDC GLUCOMTR-MCNC: 143 MG/DL (ref 70–99)
GLUCOSE BLDC GLUCOMTR-MCNC: 155 MG/DL (ref 70–99)
GLUCOSE BLDC GLUCOMTR-MCNC: 158 MG/DL (ref 70–99)
GLUCOSE BLDC GLUCOMTR-MCNC: 160 MG/DL (ref 70–99)
GLUCOSE BLDC GLUCOMTR-MCNC: 165 MG/DL (ref 70–99)
GLUCOSE BLDC GLUCOMTR-MCNC: 194 MG/DL (ref 70–99)
GLUCOSE BLDC GLUCOMTR-MCNC: 226 MG/DL (ref 70–99)
GLUCOSE BLDC GLUCOMTR-MCNC: 300 MG/DL (ref 70–99)
GLUCOSE BLDC GLUCOMTR-MCNC: 332 MG/DL (ref 70–99)
GLUCOSE CSF-MCNC: 212 MG/DL (ref 40–70)
GLUCOSE SERPL-MCNC: 345 MG/DL (ref 65–99)
GLUCOSE SERPL-MCNC: 351 MG/DL (ref 65–99)
GP B STREP DNA SPEC QL NAA+PROBE: NOT DETECTED
HAEM INFLU SEROTYP DNA SPEC NAA+PROBE: NOT DETECTED
HCT VFR BLD AUTO: 36 % (ref 37.5–51)
HGB BLD-MCNC: 12.4 G/DL (ref 13–17.7)
HHV6 DNA CSF QL NAA+PROBE: NOT DETECTED
HOLD SPECIMEN: NORMAL
HSV1 DNA CSF QL NAA+PROBE: NOT DETECTED
HSV2 DNA CSF QL NAA+PROBE: NOT DETECTED
IMM GRANULOCYTES # BLD AUTO: 0.15 10*3/MM3 (ref 0–0.05)
IMM GRANULOCYTES NFR BLD AUTO: 1 % (ref 0–0.5)
L MONOCYTOG RRNA SPEC QL PROBE: NOT DETECTED
LYMPHOCYTES # BLD AUTO: 1.32 10*3/MM3 (ref 0.7–3.1)
LYMPHOCYTES NFR BLD AUTO: 8.9 % (ref 19.6–45.3)
LYMPHOCYTES NFR CSF MANUAL: 37 % (ref 40–80)
LYMPHOCYTES NFR CSF MANUAL: 58 % (ref 40–80)
MAGNESIUM SERPL-MCNC: 2.4 MG/DL (ref 1.6–2.4)
MCH RBC QN AUTO: 29.5 PG (ref 26.6–33)
MCHC RBC AUTO-ENTMCNC: 34.4 G/DL (ref 31.5–35.7)
MCV RBC AUTO: 85.7 FL (ref 79–97)
MONOCYTES # BLD AUTO: 1.61 10*3/MM3 (ref 0.1–0.9)
MONOCYTES NFR BLD AUTO: 10.9 % (ref 5–12)
MONOCYTES NFR CSF MANUAL: 32 % (ref 15–45)
MONOCYTES NFR CSF MANUAL: 9 % (ref 15–45)
N MEN DNA SPEC QL NAA+PROBE: NOT DETECTED
NEUTROPHILS NFR BLD AUTO: 11.65 10*3/MM3 (ref 1.7–7)
NEUTROPHILS NFR BLD AUTO: 78.9 % (ref 42.7–76)
NEUTROPHILS NFR CSF MICRO: 10 % (ref 0–5)
NEUTROPHILS NFR CSF MICRO: 54 % (ref 0–5)
NRBC BLD AUTO-RTO: 0 /100 WBC (ref 0–0.2)
NUC CELL # CSF MANUAL: 10 /MM3 (ref 0–5)
NUC CELL # CSF MANUAL: 10 /MM3 (ref 0–5)
PARECHOVIRUS A RNA CSF QL NAA+NON-PROBE: NOT DETECTED
PHOSPHATE SERPL-MCNC: 1.8 MG/DL (ref 2.5–4.5)
PLATELET # BLD AUTO: 325 10*3/MM3 (ref 140–450)
PMV BLD AUTO: 9.9 FL (ref 6–12)
POTASSIUM SERPL-SCNC: 3 MMOL/L (ref 3.5–5.2)
POTASSIUM SERPL-SCNC: 3 MMOL/L (ref 3.5–5.2)
PROT CSF-MCNC: 58.1 MG/DL (ref 15–45)
RBC # BLD AUTO: 4.2 10*6/MM3 (ref 4.14–5.8)
RBC # CSF MANUAL: 9150 /MM3
RBC # CSF MANUAL: ABNORMAL /MM3
S PNEUM DNA CSF QL NAA+NON-PROBE: NOT DETECTED
SODIUM SERPL-SCNC: 151 MMOL/L (ref 136–145)
SODIUM SERPL-SCNC: 155 MMOL/L (ref 136–145)
TUBE # CSF: 1
TUBE # CSF: 4
VANCOMYCIN SERPL-MCNC: 23.08 MCG/ML (ref 5–40)
VZV DNA CSF QL NAA+PROBE: NOT DETECTED
WBC NRBC COR # BLD: 14.77 10*3/MM3 (ref 3.4–10.8)
XANTHOCHROMIA FLD QL: ABNORMAL
XANTHOCHROMIA FLD QL: ABNORMAL

## 2023-07-06 PROCEDURE — 87798 DETECT AGENT NOS DNA AMP: CPT | Performed by: INTERNAL MEDICINE

## 2023-07-06 PROCEDURE — 82140 ASSAY OF AMMONIA: CPT | Performed by: STUDENT IN AN ORGANIZED HEALTH CARE EDUCATION/TRAINING PROGRAM

## 2023-07-06 PROCEDURE — 87469 BABESIA MICROTI AMP PRB: CPT | Performed by: INTERNAL MEDICINE

## 2023-07-06 PROCEDURE — 87015 SPECIMEN INFECT AGNT CONCNTJ: CPT | Performed by: INTERNAL MEDICINE

## 2023-07-06 PROCEDURE — 25010000002 ZIPRASIDONE MESYLATE PER 10 MG: Performed by: INTERNAL MEDICINE

## 2023-07-06 PROCEDURE — 80048 BASIC METABOLIC PNL TOTAL CA: CPT | Performed by: INTERNAL MEDICINE

## 2023-07-06 PROCEDURE — 99291 CRITICAL CARE FIRST HOUR: CPT | Performed by: INTERNAL MEDICINE

## 2023-07-06 PROCEDURE — 25010000002 MIDAZOLAM PER 1MG: Performed by: INTERNAL MEDICINE

## 2023-07-06 PROCEDURE — 83916 OLIGOCLONAL BANDS: CPT | Performed by: INTERNAL MEDICINE

## 2023-07-06 PROCEDURE — 62270 DX LMBR SPI PNXR: CPT | Performed by: INTERNAL MEDICINE

## 2023-07-06 PROCEDURE — 71045 X-RAY EXAM CHEST 1 VIEW: CPT

## 2023-07-06 PROCEDURE — 25010000002 HYDRALAZINE PER 20 MG

## 2023-07-06 PROCEDURE — 87070 CULTURE OTHR SPECIMN AEROBIC: CPT | Performed by: INTERNAL MEDICINE

## 2023-07-06 PROCEDURE — 87484 EHRLICHA CHAFFEENSIS AMP PRB: CPT | Performed by: INTERNAL MEDICINE

## 2023-07-06 PROCEDURE — 87075 CULTR BACTERIA EXCEPT BLOOD: CPT | Performed by: INTERNAL MEDICINE

## 2023-07-06 PROCEDURE — 63710000001 INSULIN LISPRO (HUMAN) PER 5 UNITS: Performed by: STUDENT IN AN ORGANIZED HEALTH CARE EDUCATION/TRAINING PROGRAM

## 2023-07-06 PROCEDURE — 80202 ASSAY OF VANCOMYCIN: CPT | Performed by: INTERNAL MEDICINE

## 2023-07-06 PROCEDURE — 84157 ASSAY OF PROTEIN OTHER: CPT | Performed by: INTERNAL MEDICINE

## 2023-07-06 PROCEDURE — 82945 GLUCOSE OTHER FLUID: CPT | Performed by: INTERNAL MEDICINE

## 2023-07-06 PROCEDURE — 25010000002 THIAMINE PER 100 MG: Performed by: INTERNAL MEDICINE

## 2023-07-06 PROCEDURE — 87205 SMEAR GRAM STAIN: CPT | Performed by: INTERNAL MEDICINE

## 2023-07-06 PROCEDURE — 88108 CYTOPATH CONCENTRATE TECH: CPT | Performed by: INTERNAL MEDICINE

## 2023-07-06 PROCEDURE — 25010000002 CEFTRIAXONE PER 250 MG: Performed by: INTERNAL MEDICINE

## 2023-07-06 PROCEDURE — 85025 COMPLETE CBC W/AUTO DIFF WBC: CPT | Performed by: INTERNAL MEDICINE

## 2023-07-06 PROCEDURE — 87102 FUNGUS ISOLATION CULTURE: CPT | Performed by: INTERNAL MEDICINE

## 2023-07-06 PROCEDURE — 87483 CNS DNA AMP PROBE TYPE 12-25: CPT | Performed by: INTERNAL MEDICINE

## 2023-07-06 PROCEDURE — 84100 ASSAY OF PHOSPHORUS: CPT | Performed by: INTERNAL MEDICINE

## 2023-07-06 PROCEDURE — 0 POTASSIUM CHLORIDE 10 MEQ/100ML SOLUTION: Performed by: INTERNAL MEDICINE

## 2023-07-06 PROCEDURE — 87327 CRYPTOCOCCUS NEOFORM AG IA: CPT | Performed by: INTERNAL MEDICINE

## 2023-07-06 PROCEDURE — 87116 MYCOBACTERIA CULTURE: CPT | Performed by: INTERNAL MEDICINE

## 2023-07-06 PROCEDURE — 63710000001 INSULIN DETEMIR PER 5 UNITS: Performed by: INTERNAL MEDICINE

## 2023-07-06 PROCEDURE — 99233 SBSQ HOSP IP/OBS HIGH 50: CPT | Performed by: STUDENT IN AN ORGANIZED HEALTH CARE EDUCATION/TRAINING PROGRAM

## 2023-07-06 PROCEDURE — 009U3ZX DRAINAGE OF SPINAL CANAL, PERCUTANEOUS APPROACH, DIAGNOSTIC: ICD-10-PCS | Performed by: INTERNAL MEDICINE

## 2023-07-06 PROCEDURE — 82948 REAGENT STRIP/BLOOD GLUCOSE: CPT

## 2023-07-06 PROCEDURE — 86592 SYPHILIS TEST NON-TREP QUAL: CPT | Performed by: INTERNAL MEDICINE

## 2023-07-06 PROCEDURE — 25010000002 VANCOMYCIN 5 G RECONSTITUTED SOLUTION: Performed by: INTERNAL MEDICINE

## 2023-07-06 PROCEDURE — 74018 RADEX ABDOMEN 1 VIEW: CPT

## 2023-07-06 PROCEDURE — 95816 EEG AWAKE AND DROWSY: CPT

## 2023-07-06 PROCEDURE — 87206 SMEAR FLUORESCENT/ACID STAI: CPT | Performed by: INTERNAL MEDICINE

## 2023-07-06 PROCEDURE — 25010000002 FUROSEMIDE PER 20 MG: Performed by: INTERNAL MEDICINE

## 2023-07-06 PROCEDURE — 87529 HSV DNA AMP PROBE: CPT | Performed by: INTERNAL MEDICINE

## 2023-07-06 PROCEDURE — 25010000002 LORAZEPAM PER 2 MG: Performed by: INTERNAL MEDICINE

## 2023-07-06 PROCEDURE — 25010000002 AMPICILLIN PER 500 MG: Performed by: INTERNAL MEDICINE

## 2023-07-06 PROCEDURE — 83735 ASSAY OF MAGNESIUM: CPT | Performed by: INTERNAL MEDICINE

## 2023-07-06 PROCEDURE — 86757 RICKETTSIA ANTIBODY: CPT | Performed by: INTERNAL MEDICINE

## 2023-07-06 PROCEDURE — 89051 BODY FLUID CELL COUNT: CPT | Performed by: INTERNAL MEDICINE

## 2023-07-06 RX ORDER — SODIUM CHLORIDE, SODIUM LACTATE, POTASSIUM CHLORIDE, CALCIUM CHLORIDE 600; 310; 30; 20 MG/100ML; MG/100ML; MG/100ML; MG/100ML
125 INJECTION, SOLUTION INTRAVENOUS CONTINUOUS
Status: DISCONTINUED | OUTPATIENT
Start: 2023-07-06 | End: 2023-07-06

## 2023-07-06 RX ORDER — AMOXICILLIN 250 MG
2 CAPSULE ORAL 2 TIMES DAILY
Status: DISCONTINUED | OUTPATIENT
Start: 2023-07-06 | End: 2023-07-13

## 2023-07-06 RX ORDER — ASPIRIN 325 MG
15 TABLET ORAL DAILY
Status: DISCONTINUED | OUTPATIENT
Start: 2023-07-06 | End: 2023-07-13

## 2023-07-06 RX ORDER — ESCITALOPRAM OXALATE 10 MG/1
20 TABLET ORAL DAILY
Status: DISCONTINUED | OUTPATIENT
Start: 2023-07-07 | End: 2023-07-10

## 2023-07-06 RX ORDER — LORAZEPAM 2 MG/ML
2 INJECTION INTRAMUSCULAR
Status: DISCONTINUED | OUTPATIENT
Start: 2023-07-06 | End: 2023-07-09

## 2023-07-06 RX ORDER — FENTANYL/ROPIVACAINE/NS/PF 2-625MCG/1
15 PLASTIC BAG, INJECTION (ML) EPIDURAL
Status: COMPLETED | OUTPATIENT
Start: 2023-07-06 | End: 2023-07-06

## 2023-07-06 RX ORDER — SODIUM CHLORIDE 0.9 % (FLUSH) 0.9 %
10 SYRINGE (ML) INJECTION AS NEEDED
Status: DISCONTINUED | OUTPATIENT
Start: 2023-07-06 | End: 2023-07-09

## 2023-07-06 RX ORDER — QUETIAPINE FUMARATE 100 MG/1
100 TABLET, FILM COATED ORAL NIGHTLY
Status: DISCONTINUED | OUTPATIENT
Start: 2023-07-06 | End: 2023-07-08

## 2023-07-06 RX ORDER — ACETAMINOPHEN 325 MG/1
650 TABLET ORAL EVERY 4 HOURS PRN
Status: DISCONTINUED | OUTPATIENT
Start: 2023-07-06 | End: 2023-07-13

## 2023-07-06 RX ORDER — SODIUM CHLORIDE 0.9 % (FLUSH) 0.9 %
10 SYRINGE (ML) INJECTION EVERY 12 HOURS SCHEDULED
Status: DISCONTINUED | OUTPATIENT
Start: 2023-07-06 | End: 2023-07-19 | Stop reason: HOSPADM

## 2023-07-06 RX ORDER — LORAZEPAM 0.5 MG/1
2 TABLET ORAL
Status: DISCONTINUED | OUTPATIENT
Start: 2023-07-06 | End: 2023-07-09

## 2023-07-06 RX ORDER — BISACODYL 5 MG/1
5 TABLET, DELAYED RELEASE ORAL DAILY PRN
Status: DISCONTINUED | OUTPATIENT
Start: 2023-07-06 | End: 2023-07-08

## 2023-07-06 RX ORDER — MIDAZOLAM HYDROCHLORIDE 2 MG/2ML
2 INJECTION, SOLUTION INTRAMUSCULAR; INTRAVENOUS ONCE
Status: COMPLETED | OUTPATIENT
Start: 2023-07-06 | End: 2023-07-06

## 2023-07-06 RX ORDER — LORAZEPAM 0.5 MG/1
1 TABLET ORAL EVERY 6 HOURS
Status: COMPLETED | OUTPATIENT
Start: 2023-07-06 | End: 2023-07-07

## 2023-07-06 RX ORDER — ATENOLOL 25 MG/1
25 TABLET ORAL DAILY
Status: DISCONTINUED | OUTPATIENT
Start: 2023-07-07 | End: 2023-07-13

## 2023-07-06 RX ORDER — PANTOPRAZOLE SODIUM 40 MG/10ML
40 INJECTION, POWDER, LYOPHILIZED, FOR SOLUTION INTRAVENOUS 2 TIMES DAILY
Status: DISCONTINUED | OUTPATIENT
Start: 2023-07-06 | End: 2023-07-18

## 2023-07-06 RX ORDER — HYDRALAZINE HYDROCHLORIDE 20 MG/ML
10 INJECTION INTRAMUSCULAR; INTRAVENOUS ONCE
Status: COMPLETED | OUTPATIENT
Start: 2023-07-06 | End: 2023-07-06

## 2023-07-06 RX ORDER — HYDRALAZINE HYDROCHLORIDE 20 MG/ML
10 INJECTION INTRAMUSCULAR; INTRAVENOUS ONCE
Status: CANCELLED | OUTPATIENT
Start: 2023-07-06

## 2023-07-06 RX ORDER — LORAZEPAM 2 MG/ML
1 INJECTION INTRAMUSCULAR
Status: DISCONTINUED | OUTPATIENT
Start: 2023-07-06 | End: 2023-07-09

## 2023-07-06 RX ORDER — LORAZEPAM 0.5 MG/1
1 TABLET ORAL
Status: DISCONTINUED | OUTPATIENT
Start: 2023-07-06 | End: 2023-07-09

## 2023-07-06 RX ORDER — POTASSIUM CHLORIDE 7.45 MG/ML
10 INJECTION INTRAVENOUS
Status: COMPLETED | OUTPATIENT
Start: 2023-07-06 | End: 2023-07-06

## 2023-07-06 RX ORDER — DEXTROSE MONOHYDRATE 50 MG/ML
100 INJECTION, SOLUTION INTRAVENOUS CONTINUOUS
Status: DISCONTINUED | OUTPATIENT
Start: 2023-07-06 | End: 2023-07-09

## 2023-07-06 RX ORDER — FOLIC ACID 1 MG/1
1 TABLET ORAL DAILY
Status: DISCONTINUED | OUTPATIENT
Start: 2023-07-06 | End: 2023-07-06

## 2023-07-06 RX ORDER — POLYETHYLENE GLYCOL 3350 17 G/17G
17 POWDER, FOR SOLUTION ORAL DAILY PRN
Status: DISCONTINUED | OUTPATIENT
Start: 2023-07-06 | End: 2023-07-13

## 2023-07-06 RX ORDER — FOLIC ACID 1 MG/1
1 TABLET ORAL DAILY
Status: DISCONTINUED | OUTPATIENT
Start: 2023-07-07 | End: 2023-07-13

## 2023-07-06 RX ORDER — ZIPRASIDONE MESYLATE 20 MG/ML
20 INJECTION, POWDER, LYOPHILIZED, FOR SOLUTION INTRAMUSCULAR ONCE
Status: COMPLETED | OUTPATIENT
Start: 2023-07-06 | End: 2023-07-06

## 2023-07-06 RX ORDER — LOSARTAN POTASSIUM 25 MG/1
25 TABLET ORAL 2 TIMES DAILY
Status: DISCONTINUED | OUTPATIENT
Start: 2023-07-06 | End: 2023-07-10

## 2023-07-06 RX ORDER — SODIUM CHLORIDE 9 MG/ML
40 INJECTION, SOLUTION INTRAVENOUS AS NEEDED
Status: DISCONTINUED | OUTPATIENT
Start: 2023-07-06 | End: 2023-07-19 | Stop reason: HOSPADM

## 2023-07-06 RX ORDER — BISACODYL 10 MG
10 SUPPOSITORY, RECTAL RECTAL DAILY PRN
Status: DISCONTINUED | OUTPATIENT
Start: 2023-07-06 | End: 2023-07-13

## 2023-07-06 RX ORDER — MULTIPLE VITAMINS W/ MINERALS TAB 9MG-400MCG
1 TAB ORAL DAILY
Status: DISCONTINUED | OUTPATIENT
Start: 2023-07-06 | End: 2023-07-06

## 2023-07-06 RX ORDER — SACCHAROMYCES BOULARDII 250 MG
250 CAPSULE ORAL 2 TIMES DAILY
Status: DISCONTINUED | OUTPATIENT
Start: 2023-07-06 | End: 2023-07-13

## 2023-07-06 RX ADMIN — QUETIAPINE FUMARATE 100 MG: 100 TABLET ORAL at 20:33

## 2023-07-06 RX ADMIN — LORAZEPAM 1 MG: 0.5 TABLET ORAL at 19:21

## 2023-07-06 RX ADMIN — AMPICILLIN 2 G: 2 INJECTION, POWDER, FOR SOLUTION INTRAMUSCULAR; INTRAVENOUS at 15:41

## 2023-07-06 RX ADMIN — LORAZEPAM 2 MG: 2 INJECTION INTRAMUSCULAR; INTRAVENOUS at 10:18

## 2023-07-06 RX ADMIN — MIDAZOLAM HYDROCHLORIDE 2 MG: 1 INJECTION, SOLUTION INTRAMUSCULAR; INTRAVENOUS at 12:17

## 2023-07-06 RX ADMIN — Medication 15 ML: at 16:18

## 2023-07-06 RX ADMIN — ATENOLOL 25 MG: 25 TABLET ORAL at 15:41

## 2023-07-06 RX ADMIN — SODIUM CHLORIDE, POTASSIUM CHLORIDE, SODIUM LACTATE AND CALCIUM CHLORIDE 125 ML/HR: 600; 310; 30; 20 INJECTION, SOLUTION INTRAVENOUS at 05:25

## 2023-07-06 RX ADMIN — LOSARTAN POTASSIUM 25 MG: 25 TABLET, FILM COATED ORAL at 20:33

## 2023-07-06 RX ADMIN — ESCITALOPRAM OXALATE 20 MG: 10 TABLET ORAL at 15:40

## 2023-07-06 RX ADMIN — ZIPRASIDONE MESYLATE 20 MG: 20 INJECTION, POWDER, LYOPHILIZED, FOR SOLUTION INTRAMUSCULAR at 11:21

## 2023-07-06 RX ADMIN — PANTOPRAZOLE SODIUM 40 MG: 40 INJECTION, POWDER, FOR SOLUTION INTRAVENOUS at 20:34

## 2023-07-06 RX ADMIN — LORAZEPAM 2 MG: 2 INJECTION INTRAMUSCULAR; INTRAVENOUS at 10:39

## 2023-07-06 RX ADMIN — LABETALOL HYDROCHLORIDE 10 MG: 5 INJECTION, SOLUTION INTRAVENOUS at 20:34

## 2023-07-06 RX ADMIN — SENNOSIDES AND DOCUSATE SODIUM 2 TABLET: 50; 8.6 TABLET ORAL at 20:33

## 2023-07-06 RX ADMIN — Medication 10 ML: at 20:35

## 2023-07-06 RX ADMIN — LOSARTAN POTASSIUM 25 MG: 25 TABLET, FILM COATED ORAL at 15:41

## 2023-07-06 RX ADMIN — INSULIN HUMAN 4.8 UNITS/HR: 1 INJECTION, SOLUTION INTRAVENOUS at 10:43

## 2023-07-06 RX ADMIN — FUROSEMIDE 40 MG: 10 INJECTION, SOLUTION INTRAMUSCULAR; INTRAVENOUS at 08:59

## 2023-07-06 RX ADMIN — VANCOMYCIN HYDROCHLORIDE 1000 MG: 5 INJECTION, POWDER, LYOPHILIZED, FOR SOLUTION INTRAVENOUS at 16:28

## 2023-07-06 RX ADMIN — DOXYCYCLINE 100 MG: 100 INJECTION, POWDER, LYOPHILIZED, FOR SOLUTION INTRAVENOUS at 20:34

## 2023-07-06 RX ADMIN — POTASSIUM CHLORIDE 10 MEQ: 7.46 INJECTION, SOLUTION INTRAVENOUS at 12:21

## 2023-07-06 RX ADMIN — POTASSIUM CHLORIDE 10 MEQ: 7.46 INJECTION, SOLUTION INTRAVENOUS at 12:22

## 2023-07-06 RX ADMIN — THIAMINE HYDROCHLORIDE 500 MG: 100 INJECTION, SOLUTION INTRAMUSCULAR; INTRAVENOUS at 19:21

## 2023-07-06 RX ADMIN — DEXTROSE MONOHYDRATE 50 ML/HR: 50 INJECTION, SOLUTION INTRAVENOUS at 11:07

## 2023-07-06 RX ADMIN — THIAMINE HYDROCHLORIDE 500 MG: 100 INJECTION, SOLUTION INTRAMUSCULAR; INTRAVENOUS at 04:25

## 2023-07-06 RX ADMIN — AMPICILLIN 2 G: 2 INJECTION, POWDER, FOR SOLUTION INTRAMUSCULAR; INTRAVENOUS at 00:42

## 2023-07-06 RX ADMIN — INSULIN DETEMIR 7 UNITS: 100 INJECTION, SOLUTION SUBCUTANEOUS at 08:59

## 2023-07-06 RX ADMIN — POTASSIUM PHOSPHATE, MONOBASIC AND POTASSIUM PHOSPHATE, DIBASIC 15 MMOL: 224; 236 INJECTION, SOLUTION, CONCENTRATE INTRAVENOUS at 08:59

## 2023-07-06 RX ADMIN — LORAZEPAM 2 MG: 2 INJECTION INTRAMUSCULAR; INTRAVENOUS at 03:35

## 2023-07-06 RX ADMIN — INSULIN LISPRO 5 UNITS: 100 INJECTION, SOLUTION INTRAVENOUS; SUBCUTANEOUS at 09:00

## 2023-07-06 RX ADMIN — AMPICILLIN 2 G: 2 INJECTION, POWDER, FOR SOLUTION INTRAMUSCULAR; INTRAVENOUS at 19:21

## 2023-07-06 RX ADMIN — HYDRALAZINE HYDROCHLORIDE 10 MG: 20 INJECTION, SOLUTION INTRAMUSCULAR; INTRAVENOUS at 02:40

## 2023-07-06 RX ADMIN — CEFTRIAXONE SODIUM 2 G: 2 INJECTION, POWDER, FOR SOLUTION INTRAMUSCULAR; INTRAVENOUS at 16:17

## 2023-07-06 RX ADMIN — Medication 10 ML: at 09:06

## 2023-07-06 RX ADMIN — POTASSIUM CHLORIDE 10 MEQ: 7.46 INJECTION, SOLUTION INTRAVENOUS at 13:21

## 2023-07-06 RX ADMIN — LORAZEPAM 2 MG: 2 INJECTION INTRAMUSCULAR; INTRAVENOUS at 00:47

## 2023-07-06 RX ADMIN — POTASSIUM CHLORIDE 10 MEQ: 7.46 INJECTION, SOLUTION INTRAVENOUS at 14:23

## 2023-07-06 RX ADMIN — LORAZEPAM 2 MG: 2 INJECTION INTRAMUSCULAR; INTRAVENOUS at 10:05

## 2023-07-06 RX ADMIN — VALPROATE SODIUM 500 MG: 100 INJECTION, SOLUTION INTRAVENOUS at 21:03

## 2023-07-06 RX ADMIN — POTASSIUM PHOSPHATE, MONOBASIC AND POTASSIUM PHOSPHATE, DIBASIC 15 MMOL: 224; 236 INJECTION, SOLUTION, CONCENTRATE INTRAVENOUS at 13:11

## 2023-07-06 RX ADMIN — Medication 250 MG: at 20:33

## 2023-07-06 RX ADMIN — CEFTRIAXONE SODIUM 2 G: 2 INJECTION, POWDER, FOR SOLUTION INTRAMUSCULAR; INTRAVENOUS at 23:42

## 2023-07-06 RX ADMIN — AMPICILLIN 2 G: 2 INJECTION, POWDER, FOR SOLUTION INTRAMUSCULAR; INTRAVENOUS at 06:07

## 2023-07-06 RX ADMIN — VALPROATE SODIUM 500 MG: 100 INJECTION, SOLUTION INTRAVENOUS at 10:11

## 2023-07-06 RX ADMIN — Medication 10 ML: at 20:34

## 2023-07-06 RX ADMIN — MUPIROCIN 1 APPLICATION: 20 OINTMENT TOPICAL at 21:03

## 2023-07-06 RX ADMIN — DOXYCYCLINE 100 MG: 100 INJECTION, POWDER, LYOPHILIZED, FOR SOLUTION INTRAVENOUS at 08:59

## 2023-07-06 NOTE — CONSULTS
07/06/23 0900   Spiritual Care   Spiritual Care Source  initiative   Spiritual Care Visit Type follow-up   Receptivity to Spiritual Care other (see comments)  (pt has now been moved from PCU2 to CCU. He is currently AMS.  is updated by clinical team during rounding)

## 2023-07-06 NOTE — PROGRESS NOTES
" LOS: 6 days   Patient Care Team:  Felix Atkinson MD as PCP - General  Felix Atkinson MD as PCP - Family Medicine    Chief Complaint: MK/CKD    Subjective     Weakness - Generalized      Pt more confused, drowsy.  Hypertensive.  Opens eyes and looks at me but not answering questions.  Moved to ICU in restraints.    Subjective:  Diet:  Poor intake.      History taken from: patient chart    Objective     Vital Sign Min/Max for last 24 hours  Temp  Min: 98.2 °F (36.8 °C)  Max: 102 °F (38.9 °C)   BP  Min: 117/80  Max: 188/72   Pulse  Min: 64  Max: 112   Resp  Min: 18  Max: 23   SpO2  Min: 91 %  Max: 100 %   No data recorded   No data recorded     Flowsheet Rows      Flowsheet Row First Filed Value   Admission Height 182.9 cm (72\") Documented at 06/30/2023 1800   Admission Weight 133 kg (294 lb 1.5 oz) Documented at 06/30/2023 2300            No intake/output data recorded.  I/O last 3 completed shifts:  In: 1540 [P.O.:240; I.V.:100; IV Piggyback:1200]  Out: 5225 [Urine:5225]    Objective:  General Appearance:  Comfortable.    Vital signs: (most recent): Blood pressure (!) 151/115, pulse 72, temperature 99.5 °F (37.5 °C), temperature source Axillary, resp. rate 18, height 182.9 cm (72\"), weight 133 kg (294 lb 1.5 oz), SpO2 93 %.  Vital signs are normal.    Output: Producing urine.    HEENT: Normal HEENT exam.    Lungs:  Normal effort and normal respiratory rate.    Heart: Normal rate.  Regular rhythm.    Abdomen: Abdomen is soft.  Bowel sounds are normal.   There is no abdominal tenderness.     Extremities: Normal range of motion.  There is dependent edema.    Pulses: Distal pulses are intact.    Neurological: Patient is alert and oriented to person, place and time.  (  Lethargic.).    Pupils:  Pupils are equal, round, and reactive to light.    Skin:  Warm and dry.  There is ecchymosis.  (Scabs over knees and toes.)        Unchanged.  Marley catheter in place.    Results Review:     I reviewed the patient's new " clinical results.    WBC WBC   Date Value Ref Range Status   07/06/2023 14.77 (H) 3.40 - 10.80 10*3/mm3 Final   07/05/2023 12.38 (H) 3.40 - 10.80 10*3/mm3 Final   07/04/2023 12.58 (H) 3.40 - 10.80 10*3/mm3 Final      HGB Hemoglobin   Date Value Ref Range Status   07/06/2023 12.4 (L) 13.0 - 17.7 g/dL Final   07/05/2023 12.1 (L) 13.0 - 17.7 g/dL Final   07/04/2023 11.3 (L) 13.0 - 17.7 g/dL Final      HCT Hematocrit   Date Value Ref Range Status   07/06/2023 36.0 (L) 37.5 - 51.0 % Final   07/05/2023 38.0 37.5 - 51.0 % Final   07/04/2023 33.5 (L) 37.5 - 51.0 % Final      Platlets No results found for: LABPLAT   MCV MCV   Date Value Ref Range Status   07/06/2023 85.7 79.0 - 97.0 fL Final   07/05/2023 91.3 79.0 - 97.0 fL Final   07/04/2023 85.7 79.0 - 97.0 fL Final          Sodium Sodium   Date Value Ref Range Status   07/06/2023 151 (H) 136 - 145 mmol/L Final   07/05/2023 144 136 - 145 mmol/L Final   07/04/2023 140 136 - 145 mmol/L Final      Potassium Potassium   Date Value Ref Range Status   07/06/2023 3.0 (L) 3.5 - 5.2 mmol/L Final   07/05/2023 3.2 (L) 3.5 - 5.2 mmol/L Final   07/04/2023 3.5 3.5 - 5.2 mmol/L Final      Chloride Chloride   Date Value Ref Range Status   07/06/2023 107 98 - 107 mmol/L Final   07/05/2023 97 (L) 98 - 107 mmol/L Final   07/04/2023 94 (L) 98 - 107 mmol/L Final      CO2 CO2   Date Value Ref Range Status   07/06/2023 24.6 22.0 - 29.0 mmol/L Final   07/05/2023 22.1 22.0 - 29.0 mmol/L Final   07/04/2023 28.0 22.0 - 29.0 mmol/L Final      BUN BUN   Date Value Ref Range Status   07/06/2023 82 (H) 8 - 23 mg/dL Final   07/05/2023 69 (H) 8 - 23 mg/dL Final   07/04/2023 65 (H) 8 - 23 mg/dL Final      Creatinine Creatinine   Date Value Ref Range Status   07/06/2023 2.98 (H) 0.76 - 1.27 mg/dL Final   07/05/2023 2.74 (H) 0.76 - 1.27 mg/dL Final   07/04/2023 2.75 (H) 0.76 - 1.27 mg/dL Final      Calcium Calcium   Date Value Ref Range Status   07/06/2023 9.6 8.6 - 10.5 mg/dL Final   07/05/2023 9.6 8.6 -  10.5 mg/dL Final   07/04/2023 9.6 8.6 - 10.5 mg/dL Final      PO4 No results found for: CAPO4   Albumin Albumin   Date Value Ref Range Status   07/05/2023 3.8 3.5 - 5.2 g/dL Final   07/04/2023 3.9 3.5 - 5.2 g/dL Final      Magnesium Magnesium   Date Value Ref Range Status   07/06/2023 2.4 1.6 - 2.4 mg/dL Final   07/05/2023 2.2 1.6 - 2.4 mg/dL Final   07/04/2023 2.1 1.6 - 2.4 mg/dL Final      Uric Acid No results found for: URICACID     Medication Review:   ampicillin, 2 g, Intravenous, Q6H  atenolol, 25 mg, Oral, Daily  cefTRIAXone, 2 g, Intravenous, Q12H  doxycycline, 100 mg, Intravenous, Q12H  escitalopram, 20 mg, Oral, Daily  furosemide, 40 mg, Intravenous, Daily  insulin detemir, 7 Units, Subcutaneous, Daily  insulin lispro, 2-7 Units, Subcutaneous, 4x Daily AC & at Bedtime  LORazepam, 2 mg, Oral, Q6H   Followed by  LORazepam, 1 mg, Oral, Q6H  losartan, 25 mg, Oral, BID  mupirocin, 1 application , Topical, 2 times per day  pantoprazole, 40 mg, Oral, BID  Potassium Phosphate-NaCl, 15 mmol, Intravenous, Q3H  QUEtiapine, 100 mg, Oral, Nightly  saccharomyces boulardii, 250 mg, Oral, BID  senna-docusate sodium, 2 tablet, Oral, BID  sodium chloride, 10 mL, Intravenous, Q12H  thiamine (B-1) IV, 500 mg, Intravenous, Q8H  valproate sodium, 500 mg, Intravenous, Q12H        Assessment & Plan       Acute renal failure, unspecified acute renal failure type    Onychomycosis    Onychocryptosis    Foreign body in left foot    Peripheral neuropathy    Charcot foot due to diabetes mellitus    Foot pain, bilateral      Assessment & Plan  MK/CKD3b-  Due to diabetic nephropathy.  He has had worsened renal function recently.  Had been on lisinopril and SGLT2 and kerendia.  Creatinine about same may be around new baseline.  Off bicarb drip, on Lasix to 40 mg IV daily. Excellent urine output.  Will hold lasix for now.  Baseline creatinine around 2-2.5.  Monitor.  Hyponatremia-  improved.  Now with hypernatremia  Hypokalemia, potassium  is being replaced IV.  Back on ARB after previous hyperkalemia  Met Acidosis-  improved.  Off p.o. and IV bicarb.  Proteinuria-  diabetic nephropathy  DMII-  treated.  Was On jardiance, hold with MK.  HTN-  BP trending back up.  Losartan added but not taking po now.  Chronic edema-  Amlodipine may be contributing.  on hold.  H/o bipolar-  previous lithium use.  H/o sz disorder-neurology evaluating.  Anemia-  tsat at 14%, ferritin 425.  Received some IV iron.   Rhabdomyolysis-  had fall at home, statin on hold. improved now.  Weakness-  would avoid muscle relaxer in CKD(flexeril).  Likely polypharmacy contributing.  Hypernatremia-  adding d5w at 50cc/hr.      Eliud Lee MD  07/06/23  09:06 EDT

## 2023-07-06 NOTE — SIGNIFICANT NOTE
Received call from RN that patient's daughter was complaining of significant tick bites around the family land recently. Tick panel ordered, prophylactically started on doxycycline.     Received call later in the shift that patient has had decreased LOC and intermittent increased work of breathing throughout the shift. Patient assessed and noted to be agitated. Eyes open to pain, no consistent verbal response, random movements seen in all limbs. Given patient's complex history of seizures, alcohol use, psychiatric conditions, and ongoing infectious workup, etiology of AMS appears to be multifactorial. At that time, the decision was made to move the patient to the ICU for closer monitoring where if needed he can be started on a precedex drip and intubated.    Pt  w HR/ low 60s. One time 10 mg IV hydralazine dose ordered.    Ivette Burnett PA-C

## 2023-07-06 NOTE — PROGRESS NOTES
[facility]   Hospitalist Progress Note    Date of admission: 6/30/2023  Patient Name: Carlos Murphy Jr.  1960  Date: 7/6/2023      Subjective     Chief Complaint   Patient presents with    Weakness - Generalized       Summary: 62 y.o. with history of seizure disorder (on lamotrigine 200 bid), bipolar, schizophrenia, who presented with altered mentation/unresponsiveness on 6/28.  Patient apparently had called his sister initially had slurred speech/somnolent and received Narcan with notable initial improvement.  Patient apparently reported taking additional medications at home but was not trying to harm himself history is very limited given patient poor historian.  Patient underwent stroke work-up without acute findings suggest etiology of patient's AMS.  Did have hyperkalemia initially improved with fluids.  Nephrology assisting given MK and rhabdomyolysis.  Podiatry assisted given for infection/glasses feet and required several pieces being removed.    Interval Followup: worsening mentation and concern for worsening resp distress - txf to icu overnight.  Remains altered/nonverbal.  Started on doxy given possible tick exposure (multiple family with ticks).  Pt altered unable to communicate on exam       Objective     Vitals:   Temp:  [98.2 °F (36.8 °C)-100.3 °F (37.9 °C)] 99.8 °F (37.7 °C)  Heart Rate:  [56-95] 62  Resp:  [18-23] 18  BP: (117-178)/() 165/78    Physical Exam  Tired/lethargic and remains altered, in restraints  No family currently at bedside  Not alert or oriented  No tremors noted  When lp attempted pt does become more agitated and moves extremitites but not able to communicate  On room air but ronchorous and shallow/labored breathing  Rrr no mrg  Abd s/nt/nd  Le wounds/foot dressings with betadine, no acute erythema, no discharge/draiange      Result Review:  Vital signs, labs and recent relevant imaging reviewed.        acetaminophen    acetaminophen    senna-docusate sodium  **AND** polyethylene glycol **AND** bisacodyl **AND** bisacodyl    dextrose    dextrose    glucagon (human recombinant)    hold    labetalol    LORazepam    LORazepam **OR** LORazepam **OR** LORazepam **OR** LORazepam **OR** LORazepam **OR** LORazepam    nitroglycerin    ondansetron    Pharmacy Consult - Pharmacy to dose    Pharmacy Consult - Pharmacy to dose    Pharmacy to dose vancomycin    sodium chloride    sodium chloride    sodium chloride    sodium chloride    sodium chloride    sodium chloride    ampicillin, 2 g, Intravenous, Q6H  [START ON 7/7/2023] atenolol, 25 mg, Nasogastric, Daily  cefTRIAXone, 2 g, Intravenous, Q12H  doxycycline, 100 mg, Intravenous, Q12H  [START ON 7/7/2023] escitalopram, 20 mg, Nasogastric, Daily  [START ON 7/7/2023] folic acid, 1 mg, Nasogastric, Daily  LORazepam, 1 mg, Nasogastric, Q6H  losartan, 25 mg, Nasogastric, BID  mupirocin, 1 application , Topical, 2 times per day  pantoprazole, 40 mg, Intravenous, BID  QUEtiapine, 100 mg, Nasogastric, Nightly  saccharomyces boulardii, 250 mg, Nasogastric, BID  senna-docusate sodium, 2 tablet, Nasogastric, BID  sodium chloride, 10 mL, Intravenous, Q12H  sodium chloride, 10 mL, Intravenous, Q12H  thiamine (B-1) IV, 500 mg, Intravenous, Q8H  Tropical Liquid Nutrition, 15 mL, Nasogastric, Daily  valproate sodium, 500 mg, Intravenous, Q12H    7/3 eeg  Impression:   Abnormal EEG because of:  1.  Intermittent medium voltage focal slow activity noted over the left frontotemporal region suggestive of neuronal dysfunction in this region.  This abnormalities have been reported in individuals with vascular insufficiency, migraine, or postictal state.  2.  Slow background activity mixed with slower waves compatible with moderate and diffuse encephalopathy.  3.  There were no epileptiform discharges noted today.    Assessment / Plan     Assessment/Plan:  Sepsis from undetermined etiology, possibly meningitis, vs tickborne ilness   Acute metabolic  encephalopathy  MK on CKD  Severe hyperkalemia  Possible seizure episode with underlying seizure disorder  Rhabdomyolysis  SSTI in setting of Foreign body in left foot/glass  Moderate hyponatremia-clinically significant  Anion gap metabolic acidosis  Concern for ischemic stroke  History of COPD  Type 2 diabetes mellitus  Seizure disorder  Bipolar disorder  Hyperlipidemia  Polypharmacy    Reattempting LP today, appreciate dr soto assistance with this  Continuing empiric coverage for possible bacterial meningitis  F/u meningitis panel/vdrl/and additional ordered csf studies  empiric 7/5 vanc/ceftriax/ampicillin while pending lp result (will also cover for SSTI which appears improving)  Doxycycline added, treating empirically for possible tickborne  F/u tick studies/pending/rmsf/babesia/lyme/ehrlichia   Monitor for withdrawal, was on outpt ambien   Start on load dose scheduled for withdrawal concerns  NG access - resuming bp medicines, monitor, Prn iv labetalol   Seizure precautions, repeat EEG, switched to depakote given iv option and previous lack of access, continue for now  d/w neurology appreciate assistance  Continue high dose iv thiamine  F/u cultures, continue icu monitoring  Worsening hypernatremia, free water with tf  Hold on further lasix, apprec nephrhology assistance, rec's reviewed   Replace phos, potassium, monitor electrolytes closely  Insulin drip for glucose/monitor with TF  Continue local wound care  Delirium precautions/reoritentation  Continue PPI  Check a.m. CBC, BMP, magnesium, phosphorus and additional testing as above     Remains critically ill in the icu    DVT prophylaxis:  Mechanical DVT prophylaxis orders are present.    Level Of Support Discussed With: Patient  Code Status (Patient has no pulse and is not breathing): CPR (Attempt to Resuscitate)  Medical Interventions (Patient has pulse or is breathing): Full Support        CBC          7/4/2023    04:01 7/5/2023    04:40 7/6/2023     05:16   CBC   WBC 12.58  12.38  14.77    RBC 3.91  4.16  4.20    Hemoglobin 11.3  12.1  12.4    Hematocrit 33.5  38.0  36.0    MCV 85.7  91.3  85.7    MCH 28.9  29.1  29.5    MCHC 33.7  31.8  34.4    RDW 12.7  12.7  12.7    Platelets 273  293  325      CMP          7/4/2023    04:01 7/5/2023    04:40 7/6/2023    05:16 7/6/2023    10:15   CMP   Glucose 197  272  351  345    BUN 65  69  82  76    Creatinine 2.75  2.74  2.98  2.93    EGFR 25.4  25.5  23.1  23.6    Sodium 140  144  151  155    Potassium 3.5  3.2  3.0  3.0    Chloride 94  97  107  111    Calcium 9.6  9.6  9.6  9.7    Total Protein  7.2      Albumin 3.9  3.8      Globulin  3.4      Total Bilirubin  0.4      Alkaline Phosphatase  150      AST (SGOT)  19      ALT (SGPT)  25      Albumin/Globulin Ratio  1.1      BUN/Creatinine Ratio 23.6  25.2  27.5  25.9    Anion Gap 18.0  24.9  19.4  19.4      Patient is critically ill due to sepsis/ams/possible meningitis and mult issues as above.  I have spent >38 minutes of critical care time reviewing documentation, pertinent labs, imaging studies, examining the patient, modifying care plan, and discussing patient's condition and care plan with the pulmcrit team/rn

## 2023-07-06 NOTE — CONSULTS
"Nutrition Services    Patient Name: Carlos Murphy Jr.  YOB: 1960  MRN: [id number]  Admission date: 6/30/2023      CLINICAL NUTRITION ASSESSMENT      Reason for Assessment  Physician consult, EN     H&P:    Past Medical History:   Diagnosis Date    Acute kidney injury     2021 POST SEIZURES    Astigmatism of both eyes     eyes twitch left and right    Bipolar disorder     Charcot ankle     Closed nondisplaced fracture of medial malleolus of left tibia     COPD (chronic obstructive pulmonary disease)     USES INHALERS    Diabetes     BG RUNS AROUND 90'S IN AM    Hx of schizophrenia     Hyperlipidemia     Hypertension     SEEN DR ROD IN THE PAST, HAD APPT WITH DR MICHAUD IN 5-2022 CX APPT, DENIED CP/SOB    Neuropathy     Seizures     LAST ONE 4/21/22    Sleep apnea     DOES NOT USE CPAP    Varicose vein of leg         Current Problems:   Active Hospital Problems    Diagnosis     **Acute renal failure, unspecified acute renal failure type     Onychomycosis     Onychocryptosis     Foreign body in left foot     Peripheral neuropathy     Charcot foot due to diabetes mellitus     Foot pain, bilateral         Nutrition/Diet History         Narrative     Patient admitted with ARF.  Has had worsening mental status and is not optimally responsive.  Plan to have Cortrak placed and start enteral nutrition and free water today.     Na+ 151 during multidisciplinary rounds.  D5W @ 50 ml/hr has been added.  Discussed with critical care PA.  Na+ increased to 155 on repeat labs at 10 am.  Current free water deficit 8.6 L.  Will provide 7.5L total volume over 24 hours.       Anthropometrics        Current Height, Weight Height: 182.9 cm (72\")  Weight: 133 kg (294 lb 1.5 oz)   Current BMI Body mass index is 39.89 kg/m².       Weight Hx  Wt Readings from Last 30 Encounters:   06/30/23 2300 133 kg (294 lb 1.5 oz)   04/30/23 1344 134 kg (296 lb 1.2 oz)   09/19/22 1532 136 kg (299 lb 6.4 oz)   08/18/22 1337 132 kg (290 lb) "   06/30/22 1407 131 kg (288 lb)   06/14/22 1309 131 kg (288 lb)   06/01/22 0920 136 kg (299 lb 2.6 oz)   05/31/22 0852 132 kg (290 lb)   05/27/22 0945 132 kg (290 lb)   05/24/22 1338 131 kg (289 lb)   05/10/22 1316 131 kg (289 lb)   04/25/22 0800 123 kg (271 lb)   04/22/22 1154 129 kg (284 lb 6.3 oz)   04/22/22 0941 129 kg (284 lb)   04/21/22 0917 129 kg (284 lb 9.8 oz)   04/13/22 1407 125 kg (276 lb)   07/13/21 1334 132 kg (290 lb)   03/15/21 0000 135 kg (297 lb)   02/18/21 0000 133 kg (293 lb 5 oz)   01/05/21 0000 135 kg (297 lb 4 oz)   12/03/20 0000 130 kg (287 lb)   11/06/20 0000 129 kg (285 lb)   09/11/20 0000 129 kg (285 lb)   05/20/19 0000 (!) 136 kg (300 lb 4 oz)   09/11/18 0000 136 kg (300 lb)   07/06/18 0000 (!) 137 kg (303 lb)   05/29/18 0000 (!) 139 kg (307 lb)   02/27/18 0000 136 kg (300 lb)            Wt Change Observation stable     Estimated/Assessed Needs       Energy Requirements 25-30 kcal/kg adj BW (91.4 kg)   EST Needs (kcal/day) 2602-1187 kcal        Protein Requirements 1.2-1.5 g/kg adj BW    EST Daily Needs (g/day) 110-137 g       Fluid Requirements 25 ml/kg adj BW    Estimated Needs (mL/day) 2285 ml   Current free water deficit of 6.3L     Labs/Medications         Pertinent Labs Reviewed.   Results from last 7 days   Lab Units 07/06/23  0516 07/05/23  0440 07/04/23  0401 07/03/23  0433 07/02/23  0619   SODIUM mmol/L 151* 144 140 136 131*   POTASSIUM mmol/L 3.0* 3.2* 3.5 3.6 4.4   CHLORIDE mmol/L 107 97* 94* 95* 95*   CO2 mmol/L 24.6 22.1 28.0 24.8 20.8*   BUN mg/dL 82* 69* 65* 72* 76*   CREATININE mg/dL 2.98* 2.74* 2.75* 3.38* 4.12*   CALCIUM mg/dL 9.6 9.6 9.6 9.0 8.5*   BILIRUBIN mg/dL  --  0.4  --  0.6 0.4   ALK PHOS U/L  --  150*  --  144* 127*   ALT (SGPT) U/L  --  25  --  35 36   AST (SGOT) U/L  --  19  --  36 47*   GLUCOSE mg/dL 351* 272* 197* 195* 232*     Results from last 7 days   Lab Units 07/06/23  0516 07/05/23  0440 07/04/23  0401   MAGNESIUM mg/dL 2.4 2.2 2.1   PHOSPHORUS mg/dL  1.8* 4.0 4.3   HEMOGLOBIN g/dL 12.4* 12.1* 11.3*   HEMATOCRIT % 36.0* 38.0 33.5*     Coronavirus (COVID-19)   Date Value Ref Range Status   03/25/2021 NOT DETECTED NA Final     Comment:     The SARS-CoV-2 assay is a real-time, RT-PCR test intended  for the qualitative detection of nucleic acid from the  SARS-CoV-2 in respiratory specimens from individuals,  testing performed at [facility].       Lab Results   Component Value Date    HGBA1C 8.3 (H) 11/10/2020         Pertinent Medications Reviewed.     Current Nutrition Orders & Evaluation of Intake       Oral Nutrition     Current PO Diet NPO Diet NPO Type: Strict NPO   Supplement Orders Placed This Encounter      Place Feeding Tube Per Kamida System      Diet, Tube Feeding Tube Feeding Formula: Diabetisource AC; Tube Feeding Type: Continuous; Continuous Tube Feeding Start Rate (mL/hr): 25; Then Advance Rate By (mL/hr): 25; Every __ Hours: 4; To Goal Rate of (mL/hr): 80       Malnutrition Severity Assessment                Nutrition Diagnosis         Nutrition Dx Problem 1 Inadequate energy Intake related to decreased ability to consume sufficient energy as evidenced by NPO       Nutrition Intervention         Diabetisource AC @ 80 ml/hr  Free water flushes 200 ml/hr  2304 kcal, 115 g pro, 1574 ml fluid     D5W 50 ml/hr  204 kcal, 1200 ml fluid     Provide 2509 kcal, 115 g pro, 7574 ml fluid      Medical Nutrition Therapy/Nutrition Education          Learner     Readiness Patient  Education not appropriate at this time     Method     Response N/A  N/A     Monitor/Evaluation        Monitor Per protocol, I&O, Pertinent labs, EN delivery/tolerance, Symptoms       Nutrition Discharge Plan         To be determined       Electronically signed by:  Yeni Pappas RD  07/06/23 09:44 EDT

## 2023-07-06 NOTE — PLAN OF CARE
Goal Outcome Evaluation:         Pt transferred from PCU to ICU and then to CCU after decreased LOC noted. Labs sent, LR initiated.

## 2023-07-06 NOTE — PROGRESS NOTES
"[facility] Clinical Pharmacy Services: Vancomycin Pharmacokinetic Consult Note    Carlos Murphy Jr. is a 62 y.o. male who is on day 2 of pharmacy to dose vancomycin for CNS Infection as empiric treatment for pt with fever and AMS.     Consult Information  Consulting Provider: Marley   Planned Duration of Therapy: To be determined   Was Patient Receiving Prior to Admission/Consult?: No  Loading Dose Ordered or Given: 2750 mg on  at 1300  PK/PD Target: Dose  by levels  Other Antimicrobials: Ampicillin and Ceftriaxone and doxycycline  Previously on Kefzol for SSTI of foot with foreign body     Imaging Reviewed?: Yes    Microbiology Data  MRSA PCR performed: No; Result: Not ordered due to excluded indication or presence of suspected abscess  Culture/Source:    Blood cx   pending     Vitals/Labs  Ht: 182.9 cm (72\"); Wt: 133 kg (294 lb 1.5 oz)  Temp (24hrs), Av.3 °F (37.9 °C), Min:98.2 °F (36.8 °C), Max:102 °F (38.9 °C)   Estimated Creatinine Clearance: 36.7 mL/min (A) (by C-G formula based on SCr of 2.98 mg/dL (H)).       Results from last 7 days   Lab Units 23  0516 23  0440 23  0401   VANCOMYCIN RM mcg/mL 23.08  --   --    CREATININE mg/dL 2.98* 2.74* 2.75*   WBC 10*3/mm3 14.77* 12.38* 12.58*     Assessment/Plan:    Random level this morning of 23.08 mcg/mL. Previously received loading dose yesterday. Slight increase in serum creatinine today. Will schedule 1000 mg dose for this afternoon.     Vancomycin Dose: pulse dosing with 1000 mg IV once on  at 1400  Vanc Random ordered for  at am labs   Patient has order for Basic Metabolic Panel    Pharmacy will follow patient's kidney function and will adjust doses and obtain levels as necessary. Thank you for involving pharmacy in this patient's care. Please contact pharmacy with any questions or concerns.                           Aurelio Pelaez [facility]  Clinical Pharmacist      "

## 2023-07-06 NOTE — PROCEDURES
Procedure name: Lumbar puncture    Indication: Altered mental status, fever    Consent: Obtained from family.    Timeout: Performed at bedside with nurse    Medications: Local lidocaine and Geodon 20 mg IM, Versed 2 mg IV    Under aseptic precautions, patient was placed in left lateral decubitus position, lower spinal level was prepped and draped.  Patient was placed in flexed position with the help of nursing staff.  Local site at L4-L5 spinous process was anesthetized with local lidocaine.  Lumbar puncture needle was inserted with slightly reddish fluid in return.  Successful spinal tap was done with 3 cc fluid on each of the 4 tubes collected and sent for test.  The reddish fluid was clearing with third and fourth tube.  Site was taped with Band-Aid.  All the fluid sample was sent for tests including cultures, chemistries and cell counts as well as cytology.    Opening pressure was 21 cm of water.    Complications: None.

## 2023-07-06 NOTE — CONSULTS
Pulmonary / Critical Care Consult Note      Patient Name: Carlos Murphy Jr.  : 1960  MRN: [id number]  Primary Care Physician:  Felix Atkinson MD  Referring Physician: Ye Roach MD  Date of admission: 2023    Subjective   Subjective     Reason for Consult/ Chief Complaint: AMS    HPI:  Carlos Murphy Jr. is a 62 y.o. male with history of seizure disorder, bipolar and schizophrenia disorder, CKD stage III, hypertension, diabetes initially presented to the hospital for altered mental status.  Stroke work-up was negative, patient noted to have acute kidney injury with rhabdomyolysis.  Interim patient's kidney function has been improving, he has had increasing confusion with agitation, overnight patient transferred to ICU required 6 mg IV Ativan overnight.     This morning patient is not optimally responsive, he is agitated restless, moves all 4 extremities, CT of head on  showed no acute abnormalities.  EEG  technically difficult study due to patient movement, however the background showed marked encephalopathy.  He has been initiated on antibiotics empirically for possible bacterial meningitis.  Tickborne panel sent per family request initiated on doxycycline.      Review of Systems  Unable to obtain due to pt status      Personal History     Past Medical History:   Diagnosis Date   • Acute kidney injury      POST SEIZURES   • Astigmatism of both eyes     eyes twitch left and right   • Bipolar disorder    • Charcot ankle    • Closed nondisplaced fracture of medial malleolus of left tibia    • COPD (chronic obstructive pulmonary disease)     USES INHALERS   • Diabetes     BG RUNS AROUND 90'S IN AM   • Hx of schizophrenia    • Hyperlipidemia    • Hypertension     SEEN DR ROD IN THE PAST, HAD APPT WITH DR MICHAUD IN  CX APPT, DENIED CP/SOB   • Neuropathy    • Seizures     LAST ONE 22   • Sleep apnea     DOES NOT USE CPAP   • Varicose vein of leg        Past Surgical History:    Procedure Laterality Date   • ANKLE OPEN REDUCTION INTERNAL FIXATION Left 04/25/2022    Procedure: LEFT OPEN REDUCTION INTERNAL FIXATION DISTAL TIBIA FRACTURE ;  Surgeon: Tha Parry MD;  Location: [facility] [place];  Service: Orthopedics;  Laterality: Left;   • ANKLE OPEN REDUCTION INTERNAL FIXATION Left 06/01/2022    Procedure: LEFT ANKLE OPEN REDUCTION INTERNAL FIXATION WITH SYNDESMOSIS FIXATION;  Surgeon: Tha Parry MD;  Location: [facility] MAIN OR;  Service: Orthopedics;  Laterality: Left;   • CHOLECYSTECTOMY     • COLONOSCOPY     • JOINT REPLACEMENT      RTKR   • LUMBAR DISC SURGERY     • SHOULDER SURGERY Right        Family History: family history includes COPD in his father and mother; Heart disease in his father; Kidney disease in his mother. Otherwise pertinent FHx was reviewed and not pertinent to current issue.    Social History:  reports that he quit smoking about 16 years ago. His smoking use included cigarettes. He has a 17.50 pack-year smoking history. He has never used smokeless tobacco. He reports current alcohol use. He reports that he does not use drugs.    Home Medications:  Finerenone, Plecanatide, QUEtiapine, Semaglutide(0.25 or 0.5MG/DOS), albuterol sulfate HFA, amLODIPine, aspirin, atenolol, atorvastatin, cyclobenzaprine, empagliflozin, escitalopram, furosemide, glipizide, hydrOXYzine, insulin aspart prot & aspart, lamoTRIgine, lisinopril, lubiprostone, multivitamin with minerals, oxyCODONE-acetaminophen, pantoprazole, pregabalin, tiotropium bromide-olodaterol, and zolpidem    Allergies:  No Known Allergies    Objective    Objective     Vitals:   Temp:  [98.2 °F (36.8 °C)-102 °F (38.9 °C)] 99.5 °F (37.5 °C)  Heart Rate:  [] 72  Resp:  [18-23] 18  BP: (117-188)/() 151/115    Physical Exam:    Vital Signs Reviewed   General: WDWN, alert, confused  HEENT:  PERRL, EOMI.  OP, nares clear  Neck:  Supple, no JVD, no thyromegaly  Chest:  good aeration, clear to auscultation  bilaterally, tympanic to percussion bilaterally, no work of breathing noted  CV: RRR, no MGR, pulses 2+, equal.  Abd:  Soft, NT, ND, + BS, no HSM  EXT:  no clubbing, no cyanosis, no edema  Neuro:  A&Ox0, no focal deficits, does not follow commands   Skin: No rashes or lesions noted      Result Review    Result Review:  I have personally reviewed the results from the time of this admission to 7/6/2023 10:13 EDT and agree with these findings:  [x]  Laboratory  []  Microbiology  [x]  Radiology  []  EKG/Telemetry   []  Cardiology/Vascular   []  Pathology  []  Old records  []  Other:  Most notable findings include:       Lab 07/06/23  0516 07/05/23  0440 07/04/23  0401 07/03/23  0613 07/03/23  0433 07/02/23  0619 07/01/23  1403 07/01/23  0414 06/30/23  2136 06/30/23  1836   WBC 14.77* 12.38* 12.58* 11.43*  --  10.06  --  11.21*  --  12.79*   HEMOGLOBIN 12.4* 12.1* 11.3* 11.2*  --  10.2*  --  9.6*  --  10.8*   HEMATOCRIT 36.0* 38.0 33.5* 32.0*  --  29.7*  --  29.5*  --  32.6*   PLATELETS 325 293 273 213  --  198  --  197  --  188   SODIUM 151* 144 140  --  136 131* 134* 129*   < > 128*   SODIUM, VENOUS  --   --   --   --   --   --   --   --    < >  --    POTASSIUM 3.0* 3.2* 3.5  --  3.6 4.4 4.1 5.4*   < > 6.6*   POTASSIUM, VENOUS  --   --   --   --   --   --   --   --    < >  --    CHLORIDE 107 97* 94*  --  95* 95* 99 97*   < > 96*   CHLORIDE, VENOUS  --   --   --   --   --   --   --   --    < >  --    CO2 24.6 22.1 28.0  --  24.8 20.8* 19.0* 13.5*   < > 11.9*   BUN 82* 69* 65*  --  72* 76* 82* 84*   < > 80*   CREATININE 2.98* 2.74* 2.75*  --  3.38* 4.12* 4.52* 4.95*   < > 4.97*   GLUCOSE 351* 272* 197*  --  195* 232* 87 151*   < > 166*   GLUCOSE, ARTERIAL  --   --   --   --   --   --   --   --    < >  --    CALCIUM 9.6 9.6 9.6  --  9.0 8.5* 8.1* 8.2*   < > 8.9   PHOSPHORUS 1.8* 4.0 4.3  --   --  4.9*  --  7.9*  --   --    TOTAL PROTEIN  --  7.2  --   --  6.6 6.2  --  5.9*  --  6.9   ALBUMIN  --  3.8 3.9  --  3.8 3.4*  --   3.3*  --  3.9   GLOBULIN  --  3.4  --   --   --  2.8  --  2.6  --  3.0    < > = values in this interval not displayed.           Assessment & Plan   Assessment / Plan     Active Hospital Problems:  Active Hospital Problems    Diagnosis    • **Acute renal failure, unspecified acute renal failure type    • Onychomycosis    • Onychocryptosis    • Foreign body in left foot    • Peripheral neuropathy    • Charcot foot due to diabetes mellitus    • Foot pain, bilateral        Impression:  Altered mental status  Metabolic encephalopathy  Seizure disorder  History of bipolar and schizo affective disorder  Acute kidney injury, on CKD stage III  Type 2 diabetes with hyperglycemia  Iron deficiency anemia  Rhabdomyolysis  Hypokalemia  Hypophosphatemia  Hypertension  Hypernatremia    Plan:    -Patient on room air  -Continue aspiration precautions  -We will place core track, initiate tube feeds and free water for hypernatremia  -Agree with D5 free water at 50 cc/h  -Renal function improving, nephrology following  -Replace potassium Phos IV  -Plan for lumbar puncture today to evaluate for possible meningitis  -Continue Rocephin, ampicillin, vancomycin  -Placed on doxycycline, tickborne panel pending, less likely, can DC once PCR has resulted  -Teleneurology is following, planning on starting acyclovir empirically for possible viral meningitis/encephalitis  -MRI has been ordered, EEG ordered  -Continue thiamine, and multivitamin and folic acid  -Continue CIWA protocol, as needed Ativan   -Continue Depakote  -Continue lexapro, Seroquel  -Start insulin drip for hyperglycemia    DVT prophylaxis:  Mechanical DVT prophylaxis orders are present.     Code Status and Medical Interventions:   Ordered at: 06/30/23 6711     Level Of Support Discussed With:    Patient     Code Status (Patient has no pulse and is not breathing):    CPR (Attempt to Resuscitate)     Medical Interventions (Patient has pulse or is breathing):    Full Support       I, IMAN Schwab, spent 15 minutes critical care time in accordance to split shared billing.    Electronically signed by IMAN Bullard, 07/06/23, 10:13 AM EDT.    Patient is critically ill in intensive care unit with altered mental status, metabolic encephalopathy, acute on chronic kidney disease, hyponatremia, rhabdomyolysis, history of seizure disorder, schizoaffective disorder, on psychiatric medications. Multidisciplinary bedside critical care rounds were performed with nursing staff, respiratory therapy, pharmacy, nutritional services, social work. I have personally reviewed the chart, labs and any pertinent imaging available. We have spent for minutes of critical care time, excluding procedures, in the care of this patient.  I, Dr. Benedict Goss, spent 35 mins of critical care time according to split shared billing guidelines.     Electronically signed by Benedict Goss MD, 07/06/23, 1:04 PM EDT.

## 2023-07-06 NOTE — PROGRESS NOTES
Stroke Progress Note       Chief Complaint:  AMS    Subjective    Subjective     Subjective:  4 point restraints   No purposeful movements.       Review of Systems   uTO     Objective      Temp:  [98.2 °F (36.8 °C)-102 °F (38.9 °C)] 99.5 °F (37.5 °C)  Heart Rate:  [] 72  Resp:  [18-23] 18  BP: (117-188)/() 151/115      Drowsy   Slow to respond   Pupils reactive   Left hand tremors.       Results Review:    I reviewed the patient's new clinical results.    Lab Results (last 24 hours)       Procedure Component Value Units Date/Time    POC Glucose Once [[id number]]  (Abnormal) Collected: 07/06/23 0749    Specimen: Blood Updated: 07/06/23 0751     Glucose 332 mg/dL      Comment: Serial Number: [id number]:  [id number]       Phosphorus [[id number]]  (Abnormal) Collected: 07/06/23 0516    Specimen: Blood Updated: 07/06/23 0623     Phosphorus 1.8 mg/dL     Magnesium [[id number]]  (Normal) Collected: 07/06/23 0516    Specimen: Blood Updated: 07/06/23 0616     Magnesium 2.4 mg/dL     Basic Metabolic Panel [[id number]]  (Abnormal) Collected: 07/06/23 0516    Specimen: Blood Updated: 07/06/23 0616     Glucose 351 mg/dL      BUN 82 mg/dL      Creatinine 2.98 mg/dL      Sodium 151 mmol/L      Potassium 3.0 mmol/L      Chloride 107 mmol/L      CO2 24.6 mmol/L      Calcium 9.6 mg/dL      BUN/Creatinine Ratio 27.5     Anion Gap 19.4 mmol/L      eGFR 23.1 mL/min/1.73     Narrative:      GFR Normal >60  Chronic Kidney Disease <60  Kidney Failure <15      Vancomycin, Random [[id number]]  (Normal) Collected: 07/06/23 0516    Specimen: Blood Updated: 07/06/23 0615     Vancomycin Random 23.08 mcg/mL     Narrative:      Therapeutic Ranges for Vancomycin    Vancomycin Random   5.0-40.0 mcg/mL  Vancomycin Trough   5.0-20.0 mcg/mL  Vancomycin Peak     20.0-40.0 mcg/mL    CBC & Differential [[id number]]  (Abnormal) Collected: 07/06/23 0516    Specimen: Blood Updated: 07/06/23 0549    Narrative:      The following orders were  created for panel order CBC & Differential.  Procedure                               Abnormality         Status                     ---------                               -----------         ------                     CBC Auto Differential[[id number]]        Abnormal            Final result                 Please view results for these tests on the individual orders.    CBC Auto Differential [[id number]]  (Abnormal) Collected: 07/06/23 0516    Specimen: Blood Updated: 07/06/23 0549     WBC 14.77 10*3/mm3      RBC 4.20 10*6/mm3      Hemoglobin 12.4 g/dL      Hematocrit 36.0 %      MCV 85.7 fL      MCH 29.5 pg      MCHC 34.4 g/dL      RDW 12.7 %      RDW-SD 39.8 fl      MPV 9.9 fL      Platelets 325 10*3/mm3      Neutrophil % 78.9 %      Lymphocyte % 8.9 %      Monocyte % 10.9 %      Eosinophil % 0.0 %      Basophil % 0.3 %      Immature Grans % 1.0 %      Neutrophils, Absolute 11.65 10*3/mm3      Lymphocytes, Absolute 1.32 10*3/mm3      Monocytes, Absolute 1.61 10*3/mm3      Eosinophils, Absolute 0.00 10*3/mm3      Basophils, Absolute 0.04 10*3/mm3      Immature Grans, Absolute 0.15 10*3/mm3      nRBC 0.0 /100 WBC     Lyme Disease Total Antibody With Reflex to Immunoassay [[id number]] Collected: 07/05/23 2046    Specimen: Blood Updated: 07/05/23 2050    Ehrlichia Profile DNA PCR [[id number]] Collected: 07/05/23 2046    Specimen: Blood Updated: 07/05/23 2050    Rickettsia Species DNA, Real-Time PCR [[id number]] Collected: 07/05/23 2046    Specimen: Blood Updated: 07/05/23 2050    POC Glucose Once [[id number]]  (Abnormal) Collected: 07/05/23 2023    Specimen: Blood Updated: 07/05/23 2025     Glucose 331 mg/dL      Comment: Serial Number: [id number]:  [id number]       Blood Culture - Blood, Hand, Left [[id number]] Collected: 07/05/23 1122    Specimen: Blood from Hand, Left Updated: 07/05/23 2015    POC Glucose Once [[id number]]  (Abnormal) Collected: 07/05/23 1707    Specimen: Blood Updated: 07/05/23 1708     Glucose 299  mg/dL      Comment: Serial Number: [id number]:  [id number]       POC Glucose Once [[id number]]  (Abnormal) Collected: 07/05/23 1148    Specimen: Blood Updated: 07/05/23 1255     Glucose 280 mg/dL      Comment: Serial Number: [id number]:  [id number]       Blood Culture - Blood, Hand, Right [[id number]] Collected: 07/05/23 1122    Specimen: Blood from Hand, Right Updated: 07/05/23 1151          CT Head Without Contrast    Result Date: 7/5/2023  No evidence for acute intracranial abnormality.     JOYCE ROSE MD       Electronically Signed and Approved By: JOYCE ROSE MD on 7/05/2023 at 8:20             US Nonvascular Extremity Limited    Result Date: 7/4/2023   Negative study.      AKUA DOOLEY MD       Electronically Signed and Approved By: AKUA DOOLEY MD on 7/04/2023 at 11:07            Results for orders placed during the hospital encounter of 06/30/23    Adult Transthoracic Echo Limited W/ Cont if Necessary Per Protocol    Interpretation Summary    Left ventricular systolic function is normal. Left ventricular ejection fraction appears to be 61 - 65%.    Left ventricular diastolic function was normal.    No regional wall motion abnormality    No obvious source of emboli            Assessment/Plan     Assessment/Plan:  62 y.o. male PMH DM, CKD, schizophrenia, HTN, HLD, seizure disorder on lamotrigine 200 mg BID who presented to the ED on 06/28 altered and unresponsive.    Based on the presentation and symptoms likely this is metabolic encephalopathy vs drug withdrawal.  Multiple EEGs were done which did not evidence any electrographic seizures.     Encephalopathy, unspecified  -Switched to IV depakote 1 gm IV followed by 500 BID  -check ammonia today  -Agree with LP to rule out CNS infectious process.   -empiric antibiotics for probably tick born illness was initiated  -Continue high dose of thiamine        Neurology will continue to follow.   Critically ill, spent more than 35 minutes having the scans,  discussing the plan with multidisciplinary team.  Visit- audio/video interface.     Roman Sorenson MD  07/06/23  09:14 EDT

## 2023-07-07 LAB
ALBUMIN SERPL-MCNC: 3.6 G/DL (ref 3.5–5.2)
ALBUMIN/GLOB SERPL: 1.2 G/DL
ALP SERPL-CCNC: 132 U/L (ref 39–117)
ALT SERPL W P-5'-P-CCNC: 12 U/L (ref 1–41)
ANION GAP SERPL CALCULATED.3IONS-SCNC: 12.2 MMOL/L (ref 5–15)
ANION GAP SERPL CALCULATED.3IONS-SCNC: 16.4 MMOL/L (ref 5–15)
AST SERPL-CCNC: 27 U/L (ref 1–40)
B BURGDOR IGG+IGM SER QL IA: NEGATIVE
BASOPHILS # BLD AUTO: 0.04 10*3/MM3 (ref 0–0.2)
BASOPHILS NFR BLD AUTO: 0.2 % (ref 0–1.5)
BILIRUB SERPL-MCNC: 0.3 MG/DL (ref 0–1.2)
BUN SERPL-MCNC: 63 MG/DL (ref 8–23)
BUN SERPL-MCNC: 75 MG/DL (ref 8–23)
BUN/CREAT SERPL: 24.6 (ref 7–25)
BUN/CREAT SERPL: 28.4 (ref 7–25)
CALCIUM SPEC-SCNC: 9 MG/DL (ref 8.6–10.5)
CALCIUM SPEC-SCNC: 9.4 MG/DL (ref 8.6–10.5)
CHLORIDE SERPL-SCNC: 116 MMOL/L (ref 98–107)
CHLORIDE SERPL-SCNC: 117 MMOL/L (ref 98–107)
CO2 SERPL-SCNC: 24.6 MMOL/L (ref 22–29)
CO2 SERPL-SCNC: 26.8 MMOL/L (ref 22–29)
CREAT SERPL-MCNC: 2.56 MG/DL (ref 0.76–1.27)
CREAT SERPL-MCNC: 2.64 MG/DL (ref 0.76–1.27)
DEPRECATED RDW RBC AUTO: 42 FL (ref 37–54)
EGFRCR SERPLBLD CKD-EPI 2021: 26.7 ML/MIN/1.73
EGFRCR SERPLBLD CKD-EPI 2021: 27.7 ML/MIN/1.73
EOSINOPHIL # BLD AUTO: 0.04 10*3/MM3 (ref 0–0.4)
EOSINOPHIL NFR BLD AUTO: 0.2 % (ref 0.3–6.2)
ERYTHROCYTE [DISTWIDTH] IN BLOOD BY AUTOMATED COUNT: 13 % (ref 12.3–15.4)
GLOBULIN UR ELPH-MCNC: 3.1 GM/DL
GLUCOSE BLDC GLUCOMTR-MCNC: 113 MG/DL (ref 70–99)
GLUCOSE BLDC GLUCOMTR-MCNC: 127 MG/DL (ref 70–99)
GLUCOSE BLDC GLUCOMTR-MCNC: 137 MG/DL (ref 70–99)
GLUCOSE BLDC GLUCOMTR-MCNC: 138 MG/DL (ref 70–99)
GLUCOSE BLDC GLUCOMTR-MCNC: 157 MG/DL (ref 70–99)
GLUCOSE BLDC GLUCOMTR-MCNC: 162 MG/DL (ref 70–99)
GLUCOSE BLDC GLUCOMTR-MCNC: 163 MG/DL (ref 70–99)
GLUCOSE BLDC GLUCOMTR-MCNC: 166 MG/DL (ref 70–99)
GLUCOSE BLDC GLUCOMTR-MCNC: 167 MG/DL (ref 70–99)
GLUCOSE BLDC GLUCOMTR-MCNC: 170 MG/DL (ref 70–99)
GLUCOSE BLDC GLUCOMTR-MCNC: 171 MG/DL (ref 70–99)
GLUCOSE BLDC GLUCOMTR-MCNC: 175 MG/DL (ref 70–99)
GLUCOSE BLDC GLUCOMTR-MCNC: 181 MG/DL (ref 70–99)
GLUCOSE BLDC GLUCOMTR-MCNC: 182 MG/DL (ref 70–99)
GLUCOSE BLDC GLUCOMTR-MCNC: 185 MG/DL (ref 70–99)
GLUCOSE BLDC GLUCOMTR-MCNC: 189 MG/DL (ref 70–99)
GLUCOSE BLDC GLUCOMTR-MCNC: 195 MG/DL (ref 70–99)
GLUCOSE BLDC GLUCOMTR-MCNC: 195 MG/DL (ref 70–99)
GLUCOSE BLDC GLUCOMTR-MCNC: 201 MG/DL (ref 70–99)
GLUCOSE BLDC GLUCOMTR-MCNC: 207 MG/DL (ref 70–99)
GLUCOSE SERPL-MCNC: 168 MG/DL (ref 65–99)
GLUCOSE SERPL-MCNC: 209 MG/DL (ref 65–99)
HCT VFR BLD AUTO: 39.1 % (ref 37.5–51)
HGB BLD-MCNC: 12.9 G/DL (ref 13–17.7)
IMM GRANULOCYTES # BLD AUTO: 0.16 10*3/MM3 (ref 0–0.05)
IMM GRANULOCYTES NFR BLD AUTO: 1 % (ref 0–0.5)
LYMPHOCYTES # BLD AUTO: 1.5 10*3/MM3 (ref 0.7–3.1)
LYMPHOCYTES NFR BLD AUTO: 9 % (ref 19.6–45.3)
MAGNESIUM SERPL-MCNC: 2.3 MG/DL (ref 1.6–2.4)
MCH RBC QN AUTO: 29.1 PG (ref 26.6–33)
MCHC RBC AUTO-ENTMCNC: 33 G/DL (ref 31.5–35.7)
MCV RBC AUTO: 88.1 FL (ref 79–97)
MONOCYTES # BLD AUTO: 1.51 10*3/MM3 (ref 0.1–0.9)
MONOCYTES NFR BLD AUTO: 9.1 % (ref 5–12)
NEUTROPHILS NFR BLD AUTO: 13.4 10*3/MM3 (ref 1.7–7)
NEUTROPHILS NFR BLD AUTO: 80.5 % (ref 42.7–76)
NRBC BLD AUTO-RTO: 0 /100 WBC (ref 0–0.2)
OLIGOCLONAL BANDS.IT SER+CSF QL: NORMAL
PHOSPHATE SERPL-MCNC: 3.6 MG/DL (ref 2.5–4.5)
PLATELET # BLD AUTO: 285 10*3/MM3 (ref 140–450)
PMV BLD AUTO: 9.8 FL (ref 6–12)
POTASSIUM SERPL-SCNC: 2.9 MMOL/L (ref 3.5–5.2)
POTASSIUM SERPL-SCNC: 3.1 MMOL/L (ref 3.5–5.2)
PROT SERPL-MCNC: 6.7 G/DL (ref 6–8.5)
RBC # BLD AUTO: 4.44 10*6/MM3 (ref 4.14–5.8)
SODIUM SERPL-SCNC: 155 MMOL/L (ref 136–145)
SODIUM SERPL-SCNC: 158 MMOL/L (ref 136–145)
VANCOMYCIN SERPL-MCNC: 25 MCG/ML (ref 5–40)
WBC NRBC COR # BLD: 16.65 10*3/MM3 (ref 3.4–10.8)

## 2023-07-07 PROCEDURE — 0 POTASSIUM CHLORIDE 10 MEQ/100ML SOLUTION: Performed by: INTERNAL MEDICINE

## 2023-07-07 PROCEDURE — 99291 CRITICAL CARE FIRST HOUR: CPT | Performed by: STUDENT IN AN ORGANIZED HEALTH CARE EDUCATION/TRAINING PROGRAM

## 2023-07-07 PROCEDURE — 25010000002 AMPICILLIN PER 500 MG: Performed by: INTERNAL MEDICINE

## 2023-07-07 PROCEDURE — 80053 COMPREHEN METABOLIC PANEL: CPT | Performed by: INTERNAL MEDICINE

## 2023-07-07 PROCEDURE — 82948 REAGENT STRIP/BLOOD GLUCOSE: CPT

## 2023-07-07 PROCEDURE — 25010000002 THIAMINE PER 100 MG: Performed by: INTERNAL MEDICINE

## 2023-07-07 PROCEDURE — 25010000002 CEFTRIAXONE PER 250 MG: Performed by: INTERNAL MEDICINE

## 2023-07-07 PROCEDURE — 99291 CRITICAL CARE FIRST HOUR: CPT | Performed by: INTERNAL MEDICINE

## 2023-07-07 PROCEDURE — 25010000002 LORAZEPAM PER 2 MG: Performed by: INTERNAL MEDICINE

## 2023-07-07 PROCEDURE — 84100 ASSAY OF PHOSPHORUS: CPT | Performed by: INTERNAL MEDICINE

## 2023-07-07 PROCEDURE — 85025 COMPLETE CBC W/AUTO DIFF WBC: CPT | Performed by: INTERNAL MEDICINE

## 2023-07-07 PROCEDURE — 83735 ASSAY OF MAGNESIUM: CPT | Performed by: INTERNAL MEDICINE

## 2023-07-07 PROCEDURE — 25010000002 HEPARIN (PORCINE) PER 1000 UNITS: Performed by: NURSE PRACTITIONER

## 2023-07-07 PROCEDURE — 25010000002 VANCOMYCIN 5 G RECONSTITUTED SOLUTION: Performed by: INTERNAL MEDICINE

## 2023-07-07 PROCEDURE — 80202 ASSAY OF VANCOMYCIN: CPT | Performed by: INTERNAL MEDICINE

## 2023-07-07 RX ORDER — POTASSIUM CHLORIDE 1.5 G/1.58G
40 POWDER, FOR SOLUTION ORAL ONCE
Status: COMPLETED | OUTPATIENT
Start: 2023-07-07 | End: 2023-07-07

## 2023-07-07 RX ORDER — AMLODIPINE BESYLATE 5 MG/1
5 TABLET ORAL
Status: DISCONTINUED | OUTPATIENT
Start: 2023-07-07 | End: 2023-07-08

## 2023-07-07 RX ORDER — POTASSIUM CHLORIDE 1.5 G/1.58G
40 POWDER, FOR SOLUTION ORAL 2 TIMES DAILY
Status: DISCONTINUED | OUTPATIENT
Start: 2023-07-07 | End: 2023-07-07

## 2023-07-07 RX ORDER — LAMOTRIGINE 100 MG/1
100 TABLET ORAL DAILY
Status: DISCONTINUED | OUTPATIENT
Start: 2023-07-07 | End: 2023-07-08

## 2023-07-07 RX ORDER — POTASSIUM CHLORIDE 7.45 MG/ML
10 INJECTION INTRAVENOUS
Status: COMPLETED | OUTPATIENT
Start: 2023-07-07 | End: 2023-07-07

## 2023-07-07 RX ORDER — HEPARIN SODIUM 5000 [USP'U]/ML
5000 INJECTION, SOLUTION INTRAVENOUS; SUBCUTANEOUS EVERY 8 HOURS SCHEDULED
Status: DISCONTINUED | OUTPATIENT
Start: 2023-07-07 | End: 2023-07-19 | Stop reason: HOSPADM

## 2023-07-07 RX ADMIN — POTASSIUM CHLORIDE 10 MEQ: 7.46 INJECTION, SOLUTION INTRAVENOUS at 18:05

## 2023-07-07 RX ADMIN — LORAZEPAM 1 MG: 0.5 TABLET ORAL at 07:34

## 2023-07-07 RX ADMIN — Medication 10 ML: at 20:20

## 2023-07-07 RX ADMIN — DOXYCYCLINE 100 MG: 100 INJECTION, POWDER, LYOPHILIZED, FOR SOLUTION INTRAVENOUS at 20:20

## 2023-07-07 RX ADMIN — PANTOPRAZOLE SODIUM 40 MG: 40 INJECTION, POWDER, FOR SOLUTION INTRAVENOUS at 20:19

## 2023-07-07 RX ADMIN — MUPIROCIN 1 APPLICATION: 20 OINTMENT TOPICAL at 10:52

## 2023-07-07 RX ADMIN — POTASSIUM CHLORIDE 40 MEQ: 1.5 POWDER, FOR SOLUTION ORAL at 07:34

## 2023-07-07 RX ADMIN — Medication 10 ML: at 10:54

## 2023-07-07 RX ADMIN — POTASSIUM CHLORIDE 10 MEQ: 7.46 INJECTION, SOLUTION INTRAVENOUS at 16:32

## 2023-07-07 RX ADMIN — Medication 250 MG: at 20:39

## 2023-07-07 RX ADMIN — AMPICILLIN 2 G: 2 INJECTION, POWDER, FOR SOLUTION INTRAMUSCULAR; INTRAVENOUS at 01:09

## 2023-07-07 RX ADMIN — LORAZEPAM 2 MG: 0.5 TABLET ORAL at 21:15

## 2023-07-07 RX ADMIN — POTASSIUM CHLORIDE 10 MEQ: 7.46 INJECTION, SOLUTION INTRAVENOUS at 10:51

## 2023-07-07 RX ADMIN — VALPROATE SODIUM 500 MG: 100 INJECTION, SOLUTION INTRAVENOUS at 21:58

## 2023-07-07 RX ADMIN — DOXYCYCLINE 100 MG: 100 INJECTION, POWDER, LYOPHILIZED, FOR SOLUTION INTRAVENOUS at 10:52

## 2023-07-07 RX ADMIN — CEFTRIAXONE SODIUM 2 G: 2 INJECTION, POWDER, FOR SOLUTION INTRAMUSCULAR; INTRAVENOUS at 11:55

## 2023-07-07 RX ADMIN — THIAMINE HYDROCHLORIDE 500 MG: 100 INJECTION, SOLUTION INTRAMUSCULAR; INTRAVENOUS at 11:55

## 2023-07-07 RX ADMIN — LORAZEPAM 1 MG: 0.5 TABLET ORAL at 01:09

## 2023-07-07 RX ADMIN — POTASSIUM CHLORIDE 10 MEQ: 7.46 INJECTION, SOLUTION INTRAVENOUS at 14:29

## 2023-07-07 RX ADMIN — MUPIROCIN 1 APPLICATION: 20 OINTMENT TOPICAL at 21:01

## 2023-07-07 RX ADMIN — CEFTRIAXONE SODIUM 2 G: 2 INJECTION, POWDER, FOR SOLUTION INTRAMUSCULAR; INTRAVENOUS at 23:12

## 2023-07-07 RX ADMIN — VANCOMYCIN HYDROCHLORIDE 1000 MG: 5 INJECTION, POWDER, LYOPHILIZED, FOR SOLUTION INTRAVENOUS at 16:32

## 2023-07-07 RX ADMIN — LORAZEPAM 2 MG: 2 INJECTION INTRAMUSCULAR; INTRAVENOUS at 20:17

## 2023-07-07 RX ADMIN — LAMOTRIGINE 100 MG: 100 TABLET ORAL at 10:53

## 2023-07-07 RX ADMIN — VALPROATE SODIUM 500 MG: 100 INJECTION, SOLUTION INTRAVENOUS at 10:51

## 2023-07-07 RX ADMIN — ATENOLOL 25 MG: 25 TABLET ORAL at 10:53

## 2023-07-07 RX ADMIN — QUETIAPINE FUMARATE 100 MG: 100 TABLET ORAL at 20:39

## 2023-07-07 RX ADMIN — LOSARTAN POTASSIUM 25 MG: 25 TABLET, FILM COATED ORAL at 10:53

## 2023-07-07 RX ADMIN — LORAZEPAM 2 MG: 2 INJECTION INTRAMUSCULAR; INTRAVENOUS at 20:55

## 2023-07-07 RX ADMIN — LABETALOL HYDROCHLORIDE 10 MG: 5 INJECTION, SOLUTION INTRAVENOUS at 18:05

## 2023-07-07 RX ADMIN — LOSARTAN POTASSIUM 25 MG: 25 TABLET, FILM COATED ORAL at 20:39

## 2023-07-07 RX ADMIN — THIAMINE HYDROCHLORIDE 500 MG: 100 INJECTION, SOLUTION INTRAMUSCULAR; INTRAVENOUS at 03:55

## 2023-07-07 RX ADMIN — POTASSIUM CHLORIDE 10 MEQ: 7.46 INJECTION, SOLUTION INTRAVENOUS at 15:50

## 2023-07-07 RX ADMIN — SENNOSIDES AND DOCUSATE SODIUM 2 TABLET: 50; 8.6 TABLET ORAL at 10:53

## 2023-07-07 RX ADMIN — INSULIN HUMAN 4.6 UNITS/HR: 1 INJECTION, SOLUTION INTRAVENOUS at 10:59

## 2023-07-07 RX ADMIN — ESCITALOPRAM OXALATE 20 MG: 10 TABLET ORAL at 10:53

## 2023-07-07 RX ADMIN — LORAZEPAM 2 MG: 0.5 TABLET ORAL at 23:15

## 2023-07-07 RX ADMIN — Medication 1 MG: at 10:53

## 2023-07-07 RX ADMIN — Medication 10 ML: at 10:53

## 2023-07-07 RX ADMIN — AMLODIPINE BESYLATE 5 MG: 5 TABLET ORAL at 20:19

## 2023-07-07 RX ADMIN — THIAMINE HYDROCHLORIDE 500 MG: 100 INJECTION, SOLUTION INTRAMUSCULAR; INTRAVENOUS at 22:25

## 2023-07-07 RX ADMIN — POTASSIUM CHLORIDE 10 MEQ: 7.46 INJECTION, SOLUTION INTRAVENOUS at 10:54

## 2023-07-07 RX ADMIN — Medication 15 ML: at 10:52

## 2023-07-07 RX ADMIN — POTASSIUM CHLORIDE 10 MEQ: 7.46 INJECTION, SOLUTION INTRAVENOUS at 11:55

## 2023-07-07 RX ADMIN — HEPARIN SODIUM 5000 UNITS: 5000 INJECTION INTRAVENOUS; SUBCUTANEOUS at 14:30

## 2023-07-07 RX ADMIN — HEPARIN SODIUM 5000 UNITS: 5000 INJECTION INTRAVENOUS; SUBCUTANEOUS at 21:01

## 2023-07-07 RX ADMIN — VALPROATE SODIUM 500 MG: 100 INJECTION, SOLUTION INTRAVENOUS at 21:15

## 2023-07-07 RX ADMIN — SENNOSIDES AND DOCUSATE SODIUM 2 TABLET: 50; 8.6 TABLET ORAL at 20:19

## 2023-07-07 RX ADMIN — LORAZEPAM 2 MG: 2 INJECTION INTRAMUSCULAR; INTRAVENOUS at 20:38

## 2023-07-07 RX ADMIN — Medication 250 MG: at 10:52

## 2023-07-07 RX ADMIN — PANTOPRAZOLE SODIUM 40 MG: 40 INJECTION, POWDER, FOR SOLUTION INTRAVENOUS at 10:52

## 2023-07-07 RX ADMIN — LORAZEPAM 2 MG: 2 INJECTION INTRAMUSCULAR; INTRAVENOUS at 19:23

## 2023-07-07 RX ADMIN — POTASSIUM CHLORIDE 40 MEQ: 1.5 POWDER, FOR SOLUTION ORAL at 14:30

## 2023-07-07 RX ADMIN — AMPICILLIN 2 G: 2 INJECTION, POWDER, FOR SOLUTION INTRAMUSCULAR; INTRAVENOUS at 07:33

## 2023-07-07 RX ADMIN — LORAZEPAM 1 MG: 0.5 TABLET ORAL at 22:19

## 2023-07-07 RX ADMIN — POTASSIUM CHLORIDE 10 MEQ: 7.46 INJECTION, SOLUTION INTRAVENOUS at 10:55

## 2023-07-07 NOTE — PROGRESS NOTES
"[facility] Clinical Pharmacy Services: Vancomycin Pharmacokinetic Consult Note    Carlos Murphy Jr. is a 62 y.o. male who is on day 3 of pharmacy to dose vancomycin for CNS Infection as empiric treatment for pt with fever and AMS.     Consult Information  Consulting Provider: Marley   Planned Duration of Therapy: To be determined   Was Patient Receiving Prior to Admission/Consult?: No  Loading Dose Ordered or Given: 2750 mg on  at 1300  PK/PD Target: AUC/JOSÉ  Other Antimicrobials: Ceftriaxone and doxycycline  Previously on Kefzol for SSTI of foot with foreign body     Imaging Reviewed?: Yes    Microbiology Data  MRSA PCR performed: No; Result: Not ordered due to excluded indication or presence of suspected abscess  Culture/Source:    Blood cx : No growth at 24 hours    Vitals/Labs  Ht: 182.9 cm (72\"); Wt: 133 kg (294 lb 1.5 oz)  Temp (24hrs), Av °F (37.2 °C), Min:98.6 °F (37 °C), Max:99.8 °F (37.7 °C)   Estimated Creatinine Clearance: 41.5 mL/min (A) (by C-G formula based on SCr of 2.64 mg/dL (H)).       Results from last 7 days   Lab Units 23  0308 23  1015 23  0516 23  0440   VANCOMYCIN RM mcg/mL 25.00  --  23.08  --    CREATININE mg/dL 2.64* 2.93* 2.98* 2.74*   WBC 10*3/mm3 16.65*  --  14.77* 12.38*     Assessment/Plan:    Random level this morning of 25.00 mcg/mL. Will schedule 1000 mg every 24 hours starting this evening based on below parameters:    AUC24,ss: 500 mg/L.hr  PAUC*: 87 %  Ctrough,ss: 15.8 mg/L  Pconc*: 19 %  Tox.: 11 %    Vancomycin Dose: 1000 mg every 24 hours  No level currently ordered. Will order as clinically indicated   Patient has order for Basic Metabolic Panel    Pharmacy will follow patient's kidney function and will adjust doses and obtain levels as necessary. Thank you for involving pharmacy in this patient's care. Please contact pharmacy with any questions or concerns.                           Aurelio Pelaez [facility]  Clinical " Pharmacist

## 2023-07-07 NOTE — CONSULTS
"Nutrition Services    Patient Name: Carlos Murphy Jr.  YOB: 1960  MRN: [id number]  Admission date: 6/30/2023      CLINICAL NUTRITION ASSESSMENT      Reason for Assessment  Physician consult, EN     H&P:    Past Medical History:   Diagnosis Date    Acute kidney injury     2021 POST SEIZURES    Astigmatism of both eyes     eyes twitch left and right    Bipolar disorder     Charcot ankle     Closed nondisplaced fracture of medial malleolus of left tibia     COPD (chronic obstructive pulmonary disease)     USES INHALERS    Diabetes     BG RUNS AROUND 90'S IN AM    Hx of schizophrenia     Hyperlipidemia     Hypertension     SEEN DR ROD IN THE PAST, HAD APPT WITH DR MICHAUD IN 5-2022 CX APPT, DENIED CP/SOB    Neuropathy     Seizures     LAST ONE 4/21/22    Sleep apnea     DOES NOT USE CPAP    Varicose vein of leg         Current Problems:   Active Hospital Problems    Diagnosis     **Acute renal failure, unspecified acute renal failure type     Onychomycosis     Onychocryptosis     Foreign body in left foot     Peripheral neuropathy     Charcot foot due to diabetes mellitus     Foot pain, bilateral         Nutrition/Diet History         Narrative     Patient admitted with ARF.  Has had worsening mental status and is not optimally responsive.  Tolerating enteral  nutrition well at this time.      Na+ 158 during rounds this  morning.  Repeat lab this afternoon was 155.    D5W increased to 100 ml/hr.  Continues to receive 200 ml/hr free water flushes via [employer]rak.       Anthropometrics        Current Height, Weight Height: 182.9 cm (72\")  Weight: 133 kg (294 lb 1.5 oz)   Current BMI Body mass index is 39.89 kg/m².       Weight Hx  Wt Readings from Last 30 Encounters:   06/30/23 2300 133 kg (294 lb 1.5 oz)   04/30/23 1344 134 kg (296 lb 1.2 oz)   09/19/22 1532 136 kg (299 lb 6.4 oz)   08/18/22 1337 132 kg (290 lb)   06/30/22 1407 131 kg (288 lb)   06/14/22 1309 131 kg (288 lb)   06/01/22 0920 136 kg (299 lb 2.6 " oz)   05/31/22 0852 132 kg (290 lb)   05/27/22 0945 132 kg (290 lb)   05/24/22 1338 131 kg (289 lb)   05/10/22 1316 131 kg (289 lb)   04/25/22 0800 123 kg (271 lb)   04/22/22 1154 129 kg (284 lb 6.3 oz)   04/22/22 0941 129 kg (284 lb)   04/21/22 0917 129 kg (284 lb 9.8 oz)   04/13/22 1407 125 kg (276 lb)   07/13/21 1334 132 kg (290 lb)   03/15/21 0000 135 kg (297 lb)   02/18/21 0000 133 kg (293 lb 5 oz)   01/05/21 0000 135 kg (297 lb 4 oz)   12/03/20 0000 130 kg (287 lb)   11/06/20 0000 129 kg (285 lb)   09/11/20 0000 129 kg (285 lb)   05/20/19 0000 (!) 136 kg (300 lb 4 oz)   09/11/18 0000 136 kg (300 lb)   07/06/18 0000 (!) 137 kg (303 lb)   05/29/18 0000 (!) 139 kg (307 lb)   02/27/18 0000 136 kg (300 lb)            Wt Change Observation stable     Estimated/Assessed Needs       Energy Requirements 25-30 kcal/kg adj BW (91.4 kg)   EST Needs (kcal/day) 7285-4390 kcal        Protein Requirements 1.2-1.5 g/kg adj BW    EST Daily Needs (g/day) 110-137 g       Fluid Requirements 25 ml/kg adj BW    Estimated Needs (mL/day) 2285 ml   Current free water deficit of 6.3L     Labs/Medications         Pertinent Labs Reviewed.   Results from last 7 days   Lab Units 07/07/23  1302 07/07/23  0308 07/06/23  1015 07/06/23  0516 07/05/23  0440 07/04/23  0401 07/03/23  0433   SODIUM mmol/L 155* 158* 155*   < > 144   < > 136   POTASSIUM mmol/L 3.1* 2.9* 3.0*   < > 3.2*   < > 3.6   CHLORIDE mmol/L 116* 117* 111*   < > 97*   < > 95*   CO2 mmol/L 26.8 24.6 24.6   < > 22.1   < > 24.8   BUN mg/dL 63* 75* 76*   < > 69*   < > 72*   CREATININE mg/dL 2.56* 2.64* 2.93*   < > 2.74*   < > 3.38*   CALCIUM mg/dL 9.0 9.4 9.7   < > 9.6   < > 9.0   BILIRUBIN mg/dL  --  0.3  --   --  0.4  --  0.6   ALK PHOS U/L  --  132*  --   --  150*  --  144*   ALT (SGPT) U/L  --  12  --   --  25  --  35   AST (SGOT) U/L  --  27  --   --  19  --  36   GLUCOSE mg/dL 168* 209* 345*   < > 272*   < > 195*    < > = values in this interval not displayed.       Results  from last 7 days   Lab Units 07/07/23  0308 07/06/23  0516 07/05/23  0440   MAGNESIUM mg/dL 2.3 2.4 2.2   PHOSPHORUS mg/dL 3.6 1.8* 4.0   HEMOGLOBIN g/dL 12.9* 12.4* 12.1*   HEMATOCRIT % 39.1 36.0* 38.0       Coronavirus (COVID-19)   Date Value Ref Range Status   03/25/2021 NOT DETECTED NA Final     Comment:     The SARS-CoV-2 assay is a real-time, RT-PCR test intended  for the qualitative detection of nucleic acid from the  SARS-CoV-2 in respiratory specimens from individuals,  testing performed at [facility].       Lab Results   Component Value Date    HGBA1C 8.3 (H) 11/10/2020         Pertinent Medications Reviewed.     Current Nutrition Orders & Evaluation of Intake       Oral Nutrition     Current PO Diet NPO Diet NPO Type: Strict NPO   Supplement Orders Placed This Encounter      Place Feeding Tube Per [employer] System      Diet, Tube Feeding Tube Feeding Formula: Diabetisource AC; Tube Feeding Type: Continuous; Continuous Tube Feeding Start Rate (mL/hr): 25; Then Advance Rate By (mL/hr): 25; Every __ Hours: 4; To Goal Rate of (mL/hr): 80       Malnutrition Severity Assessment                Nutrition Diagnosis         Nutrition Dx Problem 1 Inadequate energy Intake related to decreased ability to consume sufficient energy as evidenced by NPO       Nutrition Intervention         Diabetisource AC @ 80 ml/hr  Free water flushes 200 ml/hr  2304 kcal, 115 g pro, 1574 ml fluid     D5W 100 ml/hr  204 kcal, 2400 ml fluid     Provide 2509 kcal, 115 g pro, 8774 ml fluid      Medical Nutrition Therapy/Nutrition Education          Learner     Readiness Patient  Education not appropriate at this time     Method     Response N/A  N/A     Monitor/Evaluation        Monitor Per protocol, I&O, Pertinent labs, EN delivery/tolerance, Symptoms       Nutrition Discharge Plan         To be determined       Electronically signed by:  Yeni Pappas RD  07/07/23 15:06 EDT

## 2023-07-07 NOTE — PROGRESS NOTES
[facility]   Hospitalist Progress Note    Date of admission: 6/30/2023  Patient Name: Carlos Murphy Jr.  1960  Date: 7/7/2023      Subjective     Chief Complaint   Patient presents with    Weakness - Generalized       Summary: 62 y.o. with history of seizure disorder (on lamotrigine 200 bid), bipolar, schizophrenia, who presented with altered mentation/unresponsiveness on 6/28.  Patient apparently had called his sister initially had slurred speech/somnolent and received Narcan with notable initial improvement.  Patient apparently reported taking additional medications at home but was not trying to harm himself history is very limited given patient poor historian.  Patient underwent stroke work-up without acute findings suggest etiology of patient's AMS.  Did have hyperkalemia initially improved with fluids.  Nephrology assisting given MK and rhabdomyolysis.  Podiatry assisted given for infection/glasses feet and required several pieces being removed.    Interval Followup: Has altered mental abuse.  Has been communicating with staff does get agitated with repositioning.  Apparently did curse at staff briefly but otherwise has not been able to communicate her give any history.        Objective     Vitals:   Temp:  [98.6 °F (37 °C)-99.3 °F (37.4 °C)] 99.3 °F (37.4 °C)  Heart Rate:  [55-74] 69  Resp:  [16-18] 18  BP: (117-185)/() 185/85    Physical Exam  Altered lethargic in bed in restraints not alert oriented  Rhonchorous breathing, no acute wheezing, symmetric chest rise  RRR no lower extremity pitting edema  Abdomen soft nontender nondistended  Le wounds/foot dressings with betadine, no acute erythema, no discharge/draiange  Occasionally moving all extremities nonspecifically    Result Review:  Vital signs, labs and recent relevant imaging reviewed.        acetaminophen    acetaminophen    senna-docusate sodium **AND** polyethylene glycol **AND** bisacodyl **AND** bisacodyl    dextrose     dextrose    glucagon (human recombinant)    labetalol    LORazepam    LORazepam **OR** LORazepam **OR** LORazepam **OR** LORazepam **OR** LORazepam **OR** LORazepam    nitroglycerin    ondansetron    Pharmacy Consult - Pharmacy to dose    Pharmacy Consult - Pharmacy to dose    Pharmacy to dose vancomycin    sodium chloride    sodium chloride    sodium chloride    sodium chloride    sodium chloride    sodium chloride    atenolol, 25 mg, Nasogastric, Daily  cefTRIAXone, 2 g, Intravenous, Q12H  doxycycline, 100 mg, Intravenous, Q12H  escitalopram, 20 mg, Nasogastric, Daily  folic acid, 1 mg, Nasogastric, Daily  heparin (porcine), 5,000 Units, Subcutaneous, Q8H  lamoTRIgine, 100 mg, Nasogastric, Daily  losartan, 25 mg, Nasogastric, BID  mupirocin, 1 application , Topical, 2 times per day  pantoprazole, 40 mg, Intravenous, BID  potassium chloride, 10 mEq, Intravenous, Q1H  QUEtiapine, 100 mg, Nasogastric, Nightly  saccharomyces boulardii, 250 mg, Nasogastric, BID  senna-docusate sodium, 2 tablet, Nasogastric, BID  sodium chloride, 10 mL, Intravenous, Q12H  sodium chloride, 10 mL, Intravenous, Q12H  thiamine (B-1) IV, 500 mg, Intravenous, Q8H  Tropical Liquid Nutrition, 15 mL, Nasogastric, Daily  valproate sodium, 500 mg, Intravenous, Q12H  vancomycin, 1,000 mg, Intravenous, Q24H    7/3 eeg  Impression:   Abnormal EEG because of:  1.  Intermittent medium voltage focal slow activity noted over the left frontotemporal region suggestive of neuronal dysfunction in this region.  This abnormalities have been reported in individuals with vascular insufficiency, migraine, or postictal state.  2.  Slow background activity mixed with slower waves compatible with moderate and diffuse encephalopathy.  3.  There were no epileptiform discharges noted today.    7/6 EEG moderate encephalopathy and no acute epileptic discharges noted    Assessment / Plan     Assessment/Plan:  Sepsis from undetermined etiology, possibly meningitis, vs  tickborne ilness   Acute metabolic encephalopathy  MK on CKD  Severe hyperkalemia  Seizure Disorder  Rhabdomyolysis  SSTI in setting of Foreign body in left foot/glass  Initial hyponatremia  Significant Hypernatremia  Anion gap metabolic acidosis  Concern for ischemic stroke  History of COPD  Type 2 diabetes mellitus  HTN  Seizure disorder  Bipolar disorder  Hyperlipidemia  Polypharmacy      LP obtained, meningitis panel negative, tick studies still pending preliminary CSF cultures negative   Discontinue ampicillin   Vancomycin and ceftriaxone (will also cover for SSTI which is improving)  Continue doxycycline for possible tickborne etiology till additional studies result  Worsening hypernatremia  Increase free water flushes  Increase D5W rate  Trend BMP monitor closely  Continue CIWA, thiamine multivitamin as needed Ativan  Seizure precautions, appreciate neurology assistance switching back to lamotrigine now that we have NG access to give medications, continue on Depakote for now but need to monitor closely and possibly discontinue based on response  Continue tube feeds via NG appreciate dietitian assistance  F/u cultures, continue icu monitoring  Minimize polypharmacy as able, avoiding muscle relaxer  Holding on further Lasix currently, nephrology assisting appreciate help  Monitor replace electrolytes  Blood pressure elevated/variable   Continue atenolol losartan, amlodipine, additional blood pressure medications as needed  Continue insulin drip for hyperglycemia  Continue local wound care  Delirium precautions/reoritentation  Continue gi ppx   Check a.m. CBC, BMP, magnesium, phosphorus and additional testing as above     Remains critically ill in the icu    DVT prophylaxis:  Medical and mechanical DVT prophylaxis orders are present.    Level Of Support Discussed With: Patient  Code Status (Patient has no pulse and is not breathing): CPR (Attempt to Resuscitate)  Medical Interventions (Patient has pulse or is  breathing): Full Support        CBC          7/5/2023    04:40 7/6/2023    05:16 7/7/2023    03:08   CBC   WBC 12.38  14.77  16.65    RBC 4.16  4.20  4.44    Hemoglobin 12.1  12.4  12.9    Hematocrit 38.0  36.0  39.1    MCV 91.3  85.7  88.1    MCH 29.1  29.5  29.1    MCHC 31.8  34.4  33.0    RDW 12.7  12.7  13.0    Platelets 293  325  285      CMP          7/5/2023    04:40 7/6/2023    05:16 7/6/2023    10:15 7/7/2023    03:08 7/7/2023    13:02   CMP   Glucose 272  351  345  209  168    BUN 69  82  76  75  63    Creatinine 2.74  2.98  2.93  2.64  2.56    EGFR 25.5  23.1  23.6  26.7  27.7    Sodium 144  151  155  158  155    Potassium 3.2  3.0  3.0  2.9  3.1    Chloride 97  107  111  117  116    Calcium 9.6  9.6  9.7  9.4  9.0    Total Protein 7.2    6.7     Albumin 3.8    3.6     Globulin 3.4    3.1     Total Bilirubin 0.4    0.3     Alkaline Phosphatase 150    132     AST (SGOT) 19    27     ALT (SGPT) 25    12     Albumin/Globulin Ratio 1.1    1.2     BUN/Creatinine Ratio 25.2  27.5  25.9  28.4  24.6    Anion Gap 24.9  19.4  19.4  16.4  12.2    Patient is critically ill due to sepsis/ams/hypernatremia and mult issues as above.  I have spent >33 minutes of critical care time reviewing documentation, pertinent labs, imaging studies, examining the patient, modifying care plan, and discussing patient's condition and care plan with the pulmcrit team/rn, c/s rec's reviewed

## 2023-07-07 NOTE — PROGRESS NOTES
Pulmonary / Critical Care Progress Note      Patient Name: Carlos Murphy Jr.  : 1960  MRN: [id number]  Attending:  Ye Roach MD   Date of admission: 2023    Subjective   Subjective   Patient critically ill with altered mental status, hypernatremia, encephalopathy    Over past 24 hours: Transferred to ICU for severe agitation, received Ativan x2 overnight, had low-grade fever, lumbar puncture performed, sodium level trending up, placed on insulin drip and dextrose/free water, ultras negative thus far, remains on antibiotics, tickborne panel pending    This morning  Patient on room air  Encephalopathic  Tmax 99.1  Sodium trended up to 158  Urine output 3 L / 24 hours    Review of Systems  unable to obtain due to patient status      Objective   Objective     Vitals:   Vital signs for last 24 hours:  Temp:  [98.6 °F (37 °C)-99.8 °F (37.7 °C)] 99.1 °F (37.3 °C)  Heart Rate:  [55-95] 64  Resp:  [16-18] 18  BP: (117-181)/() 168/82    Intake/Output last 3 shifts:  I/O last 3 completed shifts:  In: 5234 [I.V.:2876; Other:1711; NG/GT:647]  Out: 4600 [Urine:4600]  Intake/Output this shift:  I/O this shift:  In: 57 [NG/GT:57]  Out: -     Vent settings for last 24 hours:       Hemodynamic parameters for last 24 hours:       Physical Exam     Vital Signs Reviewed   Patient restless, lying in bed, acute distress  HEENT:  PERRL, EOMI. core track in place  Chest:  good aeration, clear to auscultation bilaterally, tympanic to percussion bilaterally, no work of breathing noted  CV: RRR, no MGR, pulses 2+, equal.  Abd:  Soft, NT, ND, + BS, no HSM  EXT:  no clubbing, no cyanosis, no edema  Neuro:  A&Ox0, does not follow commands, no focal deficits  Skin: No rashes or lesions noted        Result Review    Result Review:  I have personally reviewed the results from the time of this admission to 2023 10:29 EDT and agree with these findings:  [x]  Laboratory  [x]  Microbiology  [x]  Radiology  []   EKG/Telemetry   [x]  Cardiology/Vascular   []  Pathology  []  Old records  []  Other:  Most notable findings include:       Lab 07/07/23  0308 07/06/23  1015 07/06/23  0516 07/05/23  0440 07/04/23  0401 07/03/23  0613 07/03/23  0433 07/02/23  0619 07/01/23  1403 07/01/23  0414 06/30/23  2136 06/30/23  1836   WBC 16.65*  --  14.77* 12.38* 12.58* 11.43*  --  10.06  --  11.21*  --  12.79*   HEMOGLOBIN 12.9*  --  12.4* 12.1* 11.3* 11.2*  --  10.2*  --  9.6*  --  10.8*   HEMATOCRIT 39.1  --  36.0* 38.0 33.5* 32.0*  --  29.7*  --  29.5*  --  32.6*   PLATELETS 285  --  325 293 273 213  --  198  --  197  --  188   SODIUM 158* 155* 151* 144 140  --  136 131*   < > 129*   < > 128*   SODIUM, VENOUS  --   --   --   --   --   --   --   --   --   --    < >  --    POTASSIUM 2.9* 3.0* 3.0* 3.2* 3.5  --  3.6 4.4   < > 5.4*   < > 6.6*   POTASSIUM, VENOUS  --   --   --   --   --   --   --   --   --   --    < >  --    CHLORIDE 117* 111* 107 97* 94*  --  95* 95*   < > 97*   < > 96*   CHLORIDE, VENOUS  --   --   --   --   --   --   --   --   --   --    < >  --    CO2 24.6 24.6 24.6 22.1 28.0  --  24.8 20.8*   < > 13.5*   < > 11.9*   BUN 75* 76* 82* 69* 65*  --  72* 76*   < > 84*   < > 80*   CREATININE 2.64* 2.93* 2.98* 2.74* 2.75*  --  3.38* 4.12*   < > 4.95*   < > 4.97*   GLUCOSE 209* 345* 351* 272* 197*  --  195* 232*   < > 151*   < > 166*   GLUCOSE, ARTERIAL  --   --   --   --   --   --   --   --   --   --    < >  --    CALCIUM 9.4 9.7 9.6 9.6 9.6  --  9.0 8.5*   < > 8.2*   < > 8.9   PHOSPHORUS 3.6  --  1.8* 4.0 4.3  --   --  4.9*  --  7.9*  --   --    TOTAL PROTEIN 6.7  --   --  7.2  --   --  6.6 6.2  --  5.9*  --  6.9   ALBUMIN 3.6  --   --  3.8 3.9  --  3.8 3.4*  --  3.3*  --  3.9   GLOBULIN 3.1  --   --  3.4  --   --   --  2.8  --  2.6  --  3.0    < > = values in this interval not displayed.         Assessment & Plan   Assessment / Plan     Active Hospital Problems:  Active Hospital Problems    Diagnosis     **Acute renal failure,  unspecified acute renal failure type     Onychomycosis     Onychocryptosis     Foreign body in left foot     Peripheral neuropathy     Charcot foot due to diabetes mellitus     Foot pain, bilateral          Impression:  Altered mental status  Metabolic encephalopathy  Seizure disorder  History of bipolar and schizo affective disorder  Acute kidney injury, on CKD stage III  Type 2 diabetes with hyperglycemia  Iron deficiency anemia  Rhabdomyolysis  Hypokalemia  Hypophosphatemia  Hypertension  Hypernatremia, clinically significant       Plan:    -Patient on room air  -Continue aspiration precautions  -Continue core track, tube feeds and free water for hypernatremia  -Agree with increasing D5 200 cc/h  -Check BMP at 1300 today, may need to increase free water per NG further  -Renal function improving, nephrology following  -Replace potassium today  -Studies from lumbar puncture, negative thus far, okay to discontinue ampicillin  -Continue Rocephin and vancomycin  -Placed on doxycycline, tickborne panel pending, less likely, can DC once PCR has resulted  -Teleneurology is following  -EEG showed moderate to diffuse encephalopathy 7/6  -Continue thiamine, and multivitamin and folic acid  -Continue CIWA protocol, as needed Ativan   -Continue Depakote  -Continue lexapro, Seroquel  -Start insulin drip for hyperglycemia    DVT prophylaxis:  Medical and mechanical DVT prophylaxis orders are present.    CODE STATUS:   Level Of Support Discussed With: Patient  Code Status (Patient has no pulse and is not breathing): CPR (Attempt to Resuscitate)  Medical Interventions (Patient has pulse or is breathing): Full Support    I, IMAN Schwab, spent 15 minutes critical care time in accordance to split shared billing.    Electronically signed by IMAN Bullard, 07/07/23, 10:29 AM EDT.    Patient is critically ill in intensive care unit with altered mental status, metabolic encephalopathy, acute on chronic kidney  disease, hyponatremia, rhabdomyolysis, history of seizure disorder, schizoaffective disorder, on psychiatric medications. Multidisciplinary bedside critical care rounds were performed with nursing staff, respiratory therapy, pharmacy, nutritional services, social work. I have personally reviewed the chart, labs and any pertinent imaging available. We have spent for minutes of critical care time, excluding procedures, in the care of this patient.  I, Dr. Benedict Goss, spent 34 mins of critical care time according to split shared billing guidelines.      Electronically signed by Benedict Goss MD, 07/07/23, 1:54 PM EDT.

## 2023-07-07 NOTE — PROGRESS NOTES
Stroke Progress Note       Chief Complaint:  AMS    Subjective    Subjective     Subjective:  4 point restraints   No purposeful movements.       Review of Systems   uTO     Objective      Temp:  [98.6 °F (37 °C)-99.8 °F (37.7 °C)] 99.1 °F (37.3 °C)  Heart Rate:  [55-95] 64  Resp:  [16-18] 18  BP: (117-181)/() 168/82      Drowsy   Slow to respond   Pupils reactive   Left hand tremors.       Results Review:    I reviewed the patient's new clinical results.    Lab Results (last 24 hours)       Procedure Component Value Units Date/Time    Non-gynecologic Cytology [[id number]] Collected: 07/06/23 1318    Specimen: Cerebrospinal Fluid from Lumbar Puncture Updated: 07/07/23 0929    POC Glucose Once [[id number]]  (Abnormal) Collected: 07/07/23 0750    Specimen: Blood Updated: 07/07/23 0752     Glucose 138 mg/dL      Comment: Serial Number: [id number]:  [id number]       POC Glucose Once [[id number]]  (Abnormal) Collected: 07/07/23 0701    Specimen: Blood Updated: 07/07/23 0703     Glucose 137 mg/dL      Comment: Serial Number: [id number]:  [id number]       POC Glucose Once [[id number]]  (Abnormal) Collected: 07/07/23 0544    Specimen: Blood Updated: 07/07/23 0545     Glucose 163 mg/dL      Comment: Serial Number: [id number]:  [id number]       POC Glucose Once [[id number]]  (Abnormal) Collected: 07/07/23 0444    Specimen: Blood Updated: 07/07/23 0445     Glucose 185 mg/dL      Comment: Serial Number: [id number]:  [id number]       Vancomycin, Random [[id number]]  (Normal) Collected: 07/07/23 0308    Specimen: Blood Updated: 07/07/23 0424     Vancomycin Random 25.00 mcg/mL     Narrative:      Therapeutic Ranges for Vancomycin    Vancomycin Random   5.0-40.0 mcg/mL  Vancomycin Trough   5.0-20.0 mcg/mL  Vancomycin Peak     20.0-40.0 mcg/mL    Phosphorus [[id number]]  (Normal) Collected: 07/07/23 0308    Specimen: Blood Updated: 07/07/23 0420     Phosphorus 3.6 mg/dL     Magnesium [[id number]]  (Normal)  Collected: 07/07/23 0308    Specimen: Blood Updated: 07/07/23 0408     Magnesium 2.3 mg/dL     Comprehensive Metabolic Panel [[id number]]  (Abnormal) Collected: 07/07/23 0308    Specimen: Blood Updated: 07/07/23 0408     Glucose 209 mg/dL      BUN 75 mg/dL      Creatinine 2.64 mg/dL      Sodium 158 mmol/L      Potassium 2.9 mmol/L      Chloride 117 mmol/L      CO2 24.6 mmol/L      Calcium 9.4 mg/dL      Total Protein 6.7 g/dL      Albumin 3.6 g/dL      ALT (SGPT) 12 U/L      AST (SGOT) 27 U/L      Alkaline Phosphatase 132 U/L      Total Bilirubin 0.3 mg/dL      Globulin 3.1 gm/dL      A/G Ratio 1.2 g/dL      BUN/Creatinine Ratio 28.4     Anion Gap 16.4 mmol/L      eGFR 26.7 mL/min/1.73     Narrative:      GFR Normal >60  Chronic Kidney Disease <60  Kidney Failure <15      CBC & Differential [[id number]]  (Abnormal) Collected: 07/07/23 0308    Specimen: Blood Updated: 07/07/23 0347    Narrative:      The following orders were created for panel order CBC & Differential.  Procedure                               Abnormality         Status                     ---------                               -----------         ------                     CBC Auto Differential[[id number]]        Abnormal            Final result                 Please view results for these tests on the individual orders.    CBC Auto Differential [[id number]]  (Abnormal) Collected: 07/07/23 0308    Specimen: Blood Updated: 07/07/23 0347     WBC 16.65 10*3/mm3      RBC 4.44 10*6/mm3      Hemoglobin 12.9 g/dL      Hematocrit 39.1 %      MCV 88.1 fL      MCH 29.1 pg      MCHC 33.0 g/dL      RDW 13.0 %      RDW-SD 42.0 fl      MPV 9.8 fL      Platelets 285 10*3/mm3      Neutrophil % 80.5 %      Lymphocyte % 9.0 %      Monocyte % 9.1 %      Eosinophil % 0.2 %      Basophil % 0.2 %      Immature Grans % 1.0 %      Neutrophils, Absolute 13.40 10*3/mm3      Lymphocytes, Absolute 1.50 10*3/mm3      Monocytes, Absolute 1.51 10*3/mm3      Eosinophils, Absolute  0.04 10*3/mm3      Basophils, Absolute 0.04 10*3/mm3      Immature Grans, Absolute 0.16 10*3/mm3      nRBC 0.0 /100 WBC     POC Glucose Once [[id number]]  (Abnormal) Collected: 07/07/23 0337    Specimen: Blood Updated: 07/07/23 0338     Glucose 195 mg/dL      Comment: Serial Number: [id number]:  [id number]       POC Glucose Once [[id number]]  (Abnormal) Collected: 07/07/23 0111    Specimen: Blood Updated: 07/07/23 0112     Glucose 167 mg/dL      Comment: Serial Number: [id number]:  [id number]       POC Glucose Once [[id number]]  (Abnormal) Collected: 07/06/23 2358    Specimen: Blood Updated: 07/07/23 0000     Glucose 157 mg/dL      Comment: Serial Number: [id number]:  [id number]       Meningitis / Encephalitis Panel, PCR - Cerebrospinal Fluid, Lumbar Puncture [[id number]]  (Normal) Collected: 07/06/23 1318    Specimen: Cerebrospinal Fluid from Lumbar Puncture Updated: 07/06/23 2348     ESCHERICHIA COLI K1, PCR Not Detected     HAEMOPHILUS INFLUENZAE, PCR Not Detected     LISTERIA MONOCYTOGENES, PCR Not Detected     NEISSERIA MENINGITIDIS, PCR Not Detected     STREPTOCOCCUS AGALACTIAE, PCR Not Detected     STREPTOCOCCUS PNEUMONIAE, PCR Not Detected     CYTOMEGALOVIRUS (CMV), PCR Not Detected     ENTEROVIRUS, PCR Not Detected     HERPES SIMPLEX VIRUS 1 (HSV-1), PCR Not Detected     HERPES SIMPLEX VIRUS 2 (HSV-2), PCR Not Detected     HUMAN PARECHOVIRUS, PCR Not Detected     VARICELLA ZOSTER VIRUS (VZV), PCR Not Detected     CRYPTOCOCCUS NEOFORMANS / GATTII, PCR Not Detected     HUMAN HERPES VIRUS 6 PCR Not Detected    POC Glucose Once [[id number]]  (Abnormal) Collected: 07/06/23 2248    Specimen: Blood Updated: 07/06/23 2249     Glucose 155 mg/dL      Comment: Serial Number: [id number]:  [id number]       POC Glucose Once [[id number]]  (Abnormal) Collected: 07/06/23 2150    Specimen: Blood Updated: 07/06/23 2151     Glucose 165 mg/dL      Comment: Serial Number: [id number]:  [id number]       POC  Glucose Once [[id number]]  (Abnormal) Collected: 07/06/23 2047    Specimen: Blood Updated: 07/06/23 2048     Glucose 194 mg/dL      Comment: Serial Number: [id number]:  [id number]       Blood Culture - Blood, Hand, Left [[id number]]  (Normal) Collected: 07/05/23 1122    Specimen: Blood from Hand, Left Updated: 07/06/23 2015     Blood Culture No growth at 24 hours    POC Glucose Once [[id number]]  (Abnormal) Collected: 07/06/23 1834    Specimen: Blood Updated: 07/06/23 1835     Glucose 160 mg/dL      Comment: Serial Number: [id number]:  [id number]       Cell Count With Differential, CSF Use CSF Tube: 1 [[id number]]  (Abnormal) Collected: 07/06/23 1318    Specimen: Cerebrospinal Fluid from Lumbar Puncture Updated: 07/06/23 1653    Narrative:      The following orders were created for panel order Cell Count With Differential, CSF Use CSF Tube: 1.  Procedure                               Abnormality         Status                     ---------                               -----------         ------                     Cell Count, CSF - Cerebr...[[id number]]  Abnormal            Final result               Spinal fluid differentia...[[id number]]  Abnormal            Final result                 Please view results for these tests on the individual orders.    Spinal fluid differential - Cerebrospinal Fluid, Lumbar Puncture [[id number]]  (Abnormal) Collected: 07/06/23 1318    Specimen: Cerebrospinal Fluid from Lumbar Puncture Updated: 07/06/23 1653     Neutrophils, CSF 54 %      Lymphocytes, CSF 37 %      Monocytes, CSF 9 %     Cell Count With Differential, CSF Use CSF Tube: 4 [[id number]]  (Abnormal) Collected: 07/06/23 1318    Specimen: Cerebrospinal Fluid from Lumbar Puncture Updated: 07/06/23 1624    Narrative:      The following orders were created for panel order Cell Count With Differential, CSF Use CSF Tube: 4.  Procedure                               Abnormality         Status                     ---------                                -----------         ------                     Cell Count, CSF - Cerebr...[[id number]]  Abnormal            Final result               Spinal fluid differentia...[[id number]]  Abnormal            Final result                 Please view results for these tests on the individual orders.    Spinal fluid differential - Cerebrospinal Fluid, Lumbar Puncture [[id number]]  (Abnormal) Collected: 07/06/23 1318    Specimen: Cerebrospinal Fluid from Lumbar Puncture Updated: 07/06/23 1624     Neutrophils, CSF 10 %      Lymphocytes, CSF 58 %      Monocytes, CSF 32 %     POC Glucose Once [[id number]]  (Abnormal) Collected: 07/06/23 1621    Specimen: Blood Updated: 07/06/23 1623     Glucose 140 mg/dL      Comment: Serial Number: [id number]:  [id number]       AFB Culture - Cerebrospinal Fluid, Lumbar Puncture [[id number]] Collected: 07/06/23 1318    Specimen: Cerebrospinal Fluid from Lumbar Puncture Updated: 07/06/23 1609     AFB Stain No acid fast bacilli seen on direct smear    Cell Count, CSF - Cerebrospinal Fluid, Lumbar Puncture [[id number]]  (Abnormal) Collected: 07/06/23 1318    Specimen: Cerebrospinal Fluid from Lumbar Puncture Updated: 07/06/23 1529     Color, CSF Pink     Appearance, CSF Slightly Hazy     Nucleated Cells, CSF 10.00 /mm3      RBC, CSF 17,250.00 /mm3      Tube Number, CSF 1     Xanthochromia Absent    Protein, CSF - Cerebrospinal Fluid, Lumbar Puncture [[id number]]  (Abnormal) Collected: 07/06/23 1318    Specimen: Cerebrospinal Fluid from Lumbar Puncture Updated: 07/06/23 1519     Protein, Total (CSF) 58.1 mg/dL     Glucose, CSF - Cerebrospinal Fluid, Lumbar Puncture [[id number]]  (Abnormal) Collected: 07/06/23 1318    Specimen: Cerebrospinal Fluid from Lumbar Puncture Updated: 07/06/23 1519     Glucose,  mg/dL     POC Glucose Once [[id number]]  (Abnormal) Collected: 07/06/23 1513    Specimen: Blood Updated: 07/06/23 1514     Glucose 143 mg/dL      Comment: Serial Number:  [id number]:  [id number]       Culture, CSF - Cerebrospinal Fluid, Lumbar Puncture [[id number]] Collected: 07/06/23 1318    Specimen: Cerebrospinal Fluid from Lumbar Puncture Updated: 07/06/23 1507     Gram Stain No WBCs or organisms seen    Cell Count, CSF - Cerebrospinal Fluid, Lumbar Puncture [[id number]]  (Abnormal) Collected: 07/06/23 1318    Specimen: Cerebrospinal Fluid from Lumbar Puncture Updated: 07/06/23 1457     Color, CSF Pink     Appearance, CSF Clear     Nucleated Cells, CSF 10.00 /mm3      RBC, CSF 9,150.00 /mm3      Tube Number, CSF 4     Xanthochromia Absent    Cryptococcal AG, CSF - Cerebrospinal Fluid, Lumbar Puncture [[id number]]  (Normal) Collected: 07/06/23 1318    Specimen: Cerebrospinal Fluid from Lumbar Puncture Updated: 07/06/23 1414     Cryptococcal Antigen, CSF Negative    POC Glucose Once [[id number]]  (Abnormal) Collected: 07/06/23 1334    Specimen: Blood Updated: 07/06/23 1336     Glucose 158 mg/dL      Comment: Serial Number: [id number]:  [id number]       Herpes Simplex Virus (HSV) Types 1/2, Cerebrospinal Fluid (CSF), DNA PCR - Cerebrospinal Fluid, Lumbar Puncture [[id number]] Collected: 07/06/23 1318    Specimen: Cerebrospinal Fluid from Lumbar Puncture Updated: 07/06/23 1332    Oligoclonal Banding - Cerebrospinal Fluid, Lumbar Puncture [[id number]] Collected: 07/06/23 1318    Specimen: Cerebrospinal Fluid from Lumbar Puncture Updated: 07/06/23 1332    Anaerobic Culture - Cerebrospinal Fluid, Spine, Lumbar [[id number]] Collected: 07/06/23 1318    Specimen: Cerebrospinal Fluid from Spine, Lumbar Updated: 07/06/23 1332    Fungus Culture - Cerebrospinal Fluid, Lumbar Puncture [[id number]] Collected: 07/06/23 1318    Specimen: Cerebrospinal Fluid from Lumbar Puncture Updated: 07/06/23 1332    VDRL, CSF - Cerebrospinal Fluid, Lumbar Puncture [[id number]] Collected: 07/06/23 1318    Specimen: Cerebrospinal Fluid from Lumbar Puncture Updated: 07/06/23 1332    Oligoclonal Banding  (COLLECT RED TUBE) [[id number]] Collected: 07/06/23 1315    Specimen: Cerebrospinal Fluid from Lumbar Puncture Updated: 07/06/23 1327    Narrative:      The following orders were created for panel order Oligoclonal Banding (COLLECT RED TUBE).  Procedure                               Abnormality         Status                     ---------                               -----------         ------                     Oligoclonal Banding - Ce...[[id number]]                      In process                 Red Top[[id number]]                                          Final result                 Please view results for these tests on the individual orders.    Red Top [[id number]] Collected: 07/06/23 1315    Specimen: Blood Updated: 07/06/23 1327     Extra Tube Hold for add-ons.     Comment: Auto resulted.       POC Glucose Once [[id number]]  (Abnormal) Collected: 07/06/23 1220    Specimen: Blood Updated: 07/06/23 1223     Glucose 226 mg/dL      Comment: Serial Number: [id number]:  [id number]       Blood Culture - Blood, Hand, Right [[id number]]  (Normal) Collected: 07/05/23 1122    Specimen: Blood from Hand, Right Updated: 07/06/23 1200     Blood Culture No growth at 24 hours    Ammonia [[id number]]  (Normal) Collected: 07/06/23 1015    Specimen: Blood Updated: 07/06/23 1045     Ammonia 20 umol/L     Basic Metabolic Panel [[id number]]  (Abnormal) Collected: 07/06/23 1015    Specimen: Blood Updated: 07/06/23 1044     Glucose 345 mg/dL      BUN 76 mg/dL      Creatinine 2.93 mg/dL      Sodium 155 mmol/L      Potassium 3.0 mmol/L      Chloride 111 mmol/L      CO2 24.6 mmol/L      Calcium 9.7 mg/dL      BUN/Creatinine Ratio 25.9     Anion Gap 19.4 mmol/L      eGFR 23.6 mL/min/1.73     Narrative:      GFR Normal >60  Chronic Kidney Disease <60  Kidney Failure <15      POC Glucose Once [[id number]]  (Abnormal) Collected: 07/06/23 1042    Specimen: Blood Updated: 07/06/23 1044     Glucose 300 mg/dL      Comment: Serial Number:  [id number]:  [id number]       [place] SF (IgG / M) [[id number]] Collected: 07/06/23 1024    Specimen: Blood Updated: 07/06/23 1027    Babeisa microtic+Ehrlichia/PCR [[id number]] Collected: 07/06/23 1024    Specimen: Blood Updated: 07/06/23 1027          XR Chest 1 View    Result Date: 7/6/2023    1. Enteric tube extends into the upper abdomen.  Please see separate KUB report. 2. Basilar predominant interstitial and airspace opacities, slightly decreased compared to the recent prior study.  Findings may indicate persistent but improved edema or pneumonia. 3. Stable cardiomegaly.       JOYCE ROSE MD       Electronically Signed and Approved By: JOYCE ROSE MD on 7/06/2023 at 13:49             XR Abdomen KUB    Result Date: 7/6/2023    Enteric tube terminates in the right mid abdomen, with tip likely in the distal 2nd portion of the duodenum.     JOYCE ROSE MD       Electronically Signed and Approved By: JOYCE ROSE MD on 7/06/2023 at 13:51            Results for orders placed during the hospital encounter of 06/30/23    Adult Transthoracic Echo Limited W/ Cont if Necessary Per Protocol    Interpretation Summary    Left ventricular systolic function is normal. Left ventricular ejection fraction appears to be 61 - 65%.    Left ventricular diastolic function was normal.    No regional wall motion abnormality    No obvious source of emboli            Assessment/Plan     Assessment/Plan:  62 y.o. male PMH DM, CKD, schizophrenia, HTN, HLD, seizure disorder on lamotrigine 200 mg BID who presented to the ED on 06/28 altered and unresponsive.    Based on the presentation and symptoms likely this is metabolic encephalopathy vs drug withdrawal.  Multiple EEGs were done which did not evidence any electrographic seizures.  CSF mild elevated protein- looks traumatic, rest of labs pending.      Encephalopathy, unspecified  -restarted lamotrigine( home medication) 100  mg BID, there is interaction with DPKT, as it  could increase plasma concentrations of Lamotrigine. we will continue to monitor and discontinue DPKT if patient shows improvement.   -high dose of thiamine initiated.   -on empiric antibiotics         Neurology will continue to follow.   Critically ill, spent more than 35 minutes having the scans, discussing the plan with multidisciplinary team.  Visit- audio/video interface.     Roman Sorenson MD  07/07/23  09:49 EDT

## 2023-07-07 NOTE — PLAN OF CARE
Goal Outcome Evaluation:            Mental status improving, vitals stable. Insulin gtt, IVF, sanches, restraints, tube feeds.

## 2023-07-08 LAB
ALBUMIN SERPL-MCNC: 3.1 G/DL (ref 3.5–5.2)
ANION GAP SERPL CALCULATED.3IONS-SCNC: 10.3 MMOL/L (ref 5–15)
BASOPHILS # BLD AUTO: 0.05 10*3/MM3 (ref 0–0.2)
BASOPHILS NFR BLD AUTO: 0.3 % (ref 0–1.5)
BUN SERPL-MCNC: 50 MG/DL (ref 8–23)
BUN/CREAT SERPL: 22.9 (ref 7–25)
CALCIUM SPEC-SCNC: 8.7 MG/DL (ref 8.6–10.5)
CHLORIDE SERPL-SCNC: 112 MMOL/L (ref 98–107)
CO2 SERPL-SCNC: 24.7 MMOL/L (ref 22–29)
CREAT SERPL-MCNC: 2.18 MG/DL (ref 0.76–1.27)
DEPRECATED RDW RBC AUTO: 42.4 FL (ref 37–54)
EGFRCR SERPLBLD CKD-EPI 2021: 33.6 ML/MIN/1.73
EOSINOPHIL # BLD AUTO: 0.87 10*3/MM3 (ref 0–0.4)
EOSINOPHIL NFR BLD AUTO: 4.7 % (ref 0.3–6.2)
ERYTHROCYTE [DISTWIDTH] IN BLOOD BY AUTOMATED COUNT: 12.9 % (ref 12.3–15.4)
GLUCOSE BLDC GLUCOMTR-MCNC: 137 MG/DL (ref 70–99)
GLUCOSE BLDC GLUCOMTR-MCNC: 138 MG/DL (ref 70–99)
GLUCOSE BLDC GLUCOMTR-MCNC: 141 MG/DL (ref 70–99)
GLUCOSE BLDC GLUCOMTR-MCNC: 141 MG/DL (ref 70–99)
GLUCOSE BLDC GLUCOMTR-MCNC: 144 MG/DL (ref 70–99)
GLUCOSE BLDC GLUCOMTR-MCNC: 145 MG/DL (ref 70–99)
GLUCOSE BLDC GLUCOMTR-MCNC: 145 MG/DL (ref 70–99)
GLUCOSE BLDC GLUCOMTR-MCNC: 147 MG/DL (ref 70–99)
GLUCOSE BLDC GLUCOMTR-MCNC: 148 MG/DL (ref 70–99)
GLUCOSE BLDC GLUCOMTR-MCNC: 151 MG/DL (ref 70–99)
GLUCOSE BLDC GLUCOMTR-MCNC: 153 MG/DL (ref 70–99)
GLUCOSE BLDC GLUCOMTR-MCNC: 154 MG/DL (ref 70–99)
GLUCOSE BLDC GLUCOMTR-MCNC: 162 MG/DL (ref 70–99)
GLUCOSE BLDC GLUCOMTR-MCNC: 165 MG/DL (ref 70–99)
GLUCOSE BLDC GLUCOMTR-MCNC: 172 MG/DL (ref 70–99)
GLUCOSE BLDC GLUCOMTR-MCNC: 177 MG/DL (ref 70–99)
GLUCOSE SERPL-MCNC: 157 MG/DL (ref 65–99)
HCT VFR BLD AUTO: 34.3 % (ref 37.5–51)
HGB BLD-MCNC: 11.2 G/DL (ref 13–17.7)
HSV1 DNA CSF QL NAA+PROBE: NEGATIVE
HSV2 DNA CSF QL NAA+PROBE: NEGATIVE
IMM GRANULOCYTES # BLD AUTO: 0.22 10*3/MM3 (ref 0–0.05)
IMM GRANULOCYTES NFR BLD AUTO: 1.2 % (ref 0–0.5)
LYMPHOCYTES # BLD AUTO: 2.34 10*3/MM3 (ref 0.7–3.1)
LYMPHOCYTES NFR BLD AUTO: 12.5 % (ref 19.6–45.3)
MAGNESIUM SERPL-MCNC: 2.1 MG/DL (ref 1.6–2.4)
MCH RBC QN AUTO: 29.3 PG (ref 26.6–33)
MCHC RBC AUTO-ENTMCNC: 32.7 G/DL (ref 31.5–35.7)
MCV RBC AUTO: 89.8 FL (ref 79–97)
MONOCYTES # BLD AUTO: 1.43 10*3/MM3 (ref 0.1–0.9)
MONOCYTES NFR BLD AUTO: 7.7 % (ref 5–12)
NEUTROPHILS NFR BLD AUTO: 13.77 10*3/MM3 (ref 1.7–7)
NEUTROPHILS NFR BLD AUTO: 73.6 % (ref 42.7–76)
NRBC BLD AUTO-RTO: 0 /100 WBC (ref 0–0.2)
PHOSPHATE SERPL-MCNC: 2.3 MG/DL (ref 2.5–4.5)
PLATELET # BLD AUTO: 220 10*3/MM3 (ref 140–450)
PMV BLD AUTO: 9.8 FL (ref 6–12)
POTASSIUM SERPL-SCNC: 3.4 MMOL/L (ref 3.5–5.2)
PROCALCITONIN SERPL-MCNC: 0.07 NG/ML (ref 0–0.25)
RBC # BLD AUTO: 3.82 10*6/MM3 (ref 4.14–5.8)
SODIUM SERPL-SCNC: 147 MMOL/L (ref 136–145)
WBC NRBC COR # BLD: 18.68 10*3/MM3 (ref 3.4–10.8)

## 2023-07-08 PROCEDURE — 25010000002 CEFTRIAXONE PER 250 MG: Performed by: INTERNAL MEDICINE

## 2023-07-08 PROCEDURE — 25010000002 THIAMINE PER 100 MG: Performed by: INTERNAL MEDICINE

## 2023-07-08 PROCEDURE — 80069 RENAL FUNCTION PANEL: CPT | Performed by: INTERNAL MEDICINE

## 2023-07-08 PROCEDURE — 25010000002 LORAZEPAM PER 2 MG: Performed by: INTERNAL MEDICINE

## 2023-07-08 PROCEDURE — 99291 CRITICAL CARE FIRST HOUR: CPT | Performed by: INTERNAL MEDICINE

## 2023-07-08 PROCEDURE — 83735 ASSAY OF MAGNESIUM: CPT | Performed by: INTERNAL MEDICINE

## 2023-07-08 PROCEDURE — 84145 PROCALCITONIN (PCT): CPT | Performed by: INTERNAL MEDICINE

## 2023-07-08 PROCEDURE — 82948 REAGENT STRIP/BLOOD GLUCOSE: CPT

## 2023-07-08 PROCEDURE — 85025 COMPLETE CBC W/AUTO DIFF WBC: CPT | Performed by: INTERNAL MEDICINE

## 2023-07-08 PROCEDURE — 25010000002 HEPARIN (PORCINE) PER 1000 UNITS: Performed by: NURSE PRACTITIONER

## 2023-07-08 RX ORDER — LAMOTRIGINE 100 MG/1
200 TABLET ORAL EVERY 12 HOURS SCHEDULED
Status: DISCONTINUED | OUTPATIENT
Start: 2023-07-08 | End: 2023-07-13

## 2023-07-08 RX ORDER — AMLODIPINE BESYLATE 5 MG/1
10 TABLET ORAL
Status: DISCONTINUED | OUTPATIENT
Start: 2023-07-09 | End: 2023-07-13

## 2023-07-08 RX ORDER — POTASSIUM CHLORIDE 1.5 G/1.58G
40 POWDER, FOR SOLUTION ORAL ONCE
Status: COMPLETED | OUTPATIENT
Start: 2023-07-08 | End: 2023-07-08

## 2023-07-08 RX ORDER — QUETIAPINE FUMARATE 200 MG/1
200 TABLET, FILM COATED ORAL NIGHTLY
Status: DISCONTINUED | OUTPATIENT
Start: 2023-07-08 | End: 2023-07-09

## 2023-07-08 RX ORDER — AMLODIPINE BESYLATE 5 MG/1
5 TABLET ORAL ONCE
Status: COMPLETED | OUTPATIENT
Start: 2023-07-08 | End: 2023-07-08

## 2023-07-08 RX ADMIN — Medication 15 ML: at 11:33

## 2023-07-08 RX ADMIN — PANTOPRAZOLE SODIUM 40 MG: 40 INJECTION, POWDER, FOR SOLUTION INTRAVENOUS at 09:01

## 2023-07-08 RX ADMIN — PANTOPRAZOLE SODIUM 40 MG: 40 INJECTION, POWDER, FOR SOLUTION INTRAVENOUS at 21:08

## 2023-07-08 RX ADMIN — AMLODIPINE BESYLATE 5 MG: 5 TABLET ORAL at 16:31

## 2023-07-08 RX ADMIN — LORAZEPAM 2 MG: 2 INJECTION INTRAMUSCULAR; INTRAVENOUS at 03:22

## 2023-07-08 RX ADMIN — QUETIAPINE FUMARATE 200 MG: 100 TABLET ORAL at 21:08

## 2023-07-08 RX ADMIN — MUPIROCIN 1 APPLICATION: 20 OINTMENT TOPICAL at 09:04

## 2023-07-08 RX ADMIN — Medication 10 ML: at 21:09

## 2023-07-08 RX ADMIN — ACETAMINOPHEN 650 MG: 325 TABLET ORAL at 21:08

## 2023-07-08 RX ADMIN — LORAZEPAM 2 MG: 2 INJECTION INTRAMUSCULAR; INTRAVENOUS at 02:23

## 2023-07-08 RX ADMIN — POTASSIUM CHLORIDE 40 MEQ: 1.5 POWDER, FOR SOLUTION ORAL at 09:01

## 2023-07-08 RX ADMIN — Medication 10 ML: at 09:10

## 2023-07-08 RX ADMIN — Medication 250 MG: at 21:08

## 2023-07-08 RX ADMIN — MUPIROCIN 1 APPLICATION: 20 OINTMENT TOPICAL at 21:08

## 2023-07-08 RX ADMIN — ESCITALOPRAM OXALATE 20 MG: 10 TABLET ORAL at 09:01

## 2023-07-08 RX ADMIN — DOXYCYCLINE 100 MG: 100 INJECTION, POWDER, LYOPHILIZED, FOR SOLUTION INTRAVENOUS at 21:08

## 2023-07-08 RX ADMIN — INSULIN HUMAN 5.6 UNITS/HR: 1 INJECTION, SOLUTION INTRAVENOUS at 05:22

## 2023-07-08 RX ADMIN — THIAMINE HYDROCHLORIDE 500 MG: 100 INJECTION, SOLUTION INTRAMUSCULAR; INTRAVENOUS at 05:45

## 2023-07-08 RX ADMIN — Medication 1 MG: at 09:01

## 2023-07-08 RX ADMIN — LOSARTAN POTASSIUM 25 MG: 25 TABLET, FILM COATED ORAL at 09:01

## 2023-07-08 RX ADMIN — LORAZEPAM 2 MG: 2 INJECTION INTRAMUSCULAR; INTRAVENOUS at 02:02

## 2023-07-08 RX ADMIN — SENNOSIDES AND DOCUSATE SODIUM 2 TABLET: 50; 8.6 TABLET ORAL at 09:01

## 2023-07-08 RX ADMIN — Medication 250 MG: at 09:01

## 2023-07-08 RX ADMIN — DEXTROSE MONOHYDRATE 100 ML/HR: 50 INJECTION, SOLUTION INTRAVENOUS at 13:17

## 2023-07-08 RX ADMIN — CEFTRIAXONE SODIUM 2 G: 2 INJECTION, POWDER, FOR SOLUTION INTRAMUSCULAR; INTRAVENOUS at 11:25

## 2023-07-08 RX ADMIN — THIAMINE HYDROCHLORIDE 500 MG: 100 INJECTION, SOLUTION INTRAMUSCULAR; INTRAVENOUS at 20:03

## 2023-07-08 RX ADMIN — LAMOTRIGINE 200 MG: 100 TABLET ORAL at 21:08

## 2023-07-08 RX ADMIN — LORAZEPAM 2 MG: 2 INJECTION INTRAMUSCULAR; INTRAVENOUS at 01:19

## 2023-07-08 RX ADMIN — CEFTRIAXONE SODIUM 2 G: 2 INJECTION, POWDER, FOR SOLUTION INTRAMUSCULAR; INTRAVENOUS at 23:26

## 2023-07-08 RX ADMIN — DEXTROSE MONOHYDRATE 100 ML/HR: 50 INJECTION, SOLUTION INTRAVENOUS at 03:26

## 2023-07-08 RX ADMIN — HEPARIN SODIUM 5000 UNITS: 5000 INJECTION INTRAVENOUS; SUBCUTANEOUS at 21:08

## 2023-07-08 RX ADMIN — LORAZEPAM 2 MG: 2 INJECTION INTRAMUSCULAR; INTRAVENOUS at 00:16

## 2023-07-08 RX ADMIN — DOXYCYCLINE 100 MG: 100 INJECTION, POWDER, LYOPHILIZED, FOR SOLUTION INTRAVENOUS at 09:01

## 2023-07-08 RX ADMIN — INSULIN HUMAN 5.1 UNITS/HR: 1 INJECTION, SOLUTION INTRAVENOUS at 18:32

## 2023-07-08 RX ADMIN — LABETALOL HYDROCHLORIDE 10 MG: 5 INJECTION, SOLUTION INTRAVENOUS at 10:42

## 2023-07-08 RX ADMIN — HEPARIN SODIUM 5000 UNITS: 5000 INJECTION INTRAVENOUS; SUBCUTANEOUS at 05:21

## 2023-07-08 RX ADMIN — HEPARIN SODIUM 5000 UNITS: 5000 INJECTION INTRAVENOUS; SUBCUTANEOUS at 13:15

## 2023-07-08 RX ADMIN — VALPROATE SODIUM 500 MG: 100 INJECTION, SOLUTION INTRAVENOUS at 09:20

## 2023-07-08 RX ADMIN — LOSARTAN POTASSIUM 25 MG: 25 TABLET, FILM COATED ORAL at 21:08

## 2023-07-08 RX ADMIN — ATENOLOL 25 MG: 25 TABLET ORAL at 09:01

## 2023-07-08 RX ADMIN — THIAMINE HYDROCHLORIDE 500 MG: 100 INJECTION, SOLUTION INTRAMUSCULAR; INTRAVENOUS at 11:25

## 2023-07-08 RX ADMIN — AMLODIPINE BESYLATE 5 MG: 5 TABLET ORAL at 09:01

## 2023-07-08 RX ADMIN — LAMOTRIGINE 100 MG: 100 TABLET ORAL at 09:01

## 2023-07-08 NOTE — PROGRESS NOTES
[facility]   Hospitalist Progress Note    Date of admission: 6/30/2023  Patient Name: Carlos Murphy Jr.  1960  Date: 7/8/2023      Subjective     Chief Complaint   Patient presents with    Weakness - Generalized       Summary: 62 y.o. with history of seizure disorder (on lamotrigine 200 bid), bipolar, schizophrenia, who presented with altered mentation/unresponsiveness on 6/28.  Patient apparently had called his sister initially had slurred speech/somnolent and received Narcan with notable initial improvement.  Patient apparently reported taking additional medications at home but was not trying to harm himself history is very limited given patient poor historian.  Patient underwent stroke work-up without acute findings suggest etiology of patient's AMS.  Did have hyperkalemia initially improved with fluids.  Nephrology assisting given MK and rhabdomyolysis.  Podiatry assisted given for infection/glasses feet and required several pieces being removed.  Hospital course completed by worsening mentation/agitation with concern initially for seizures multiple EEGs have been negative, neurology consulted for assistance, concern for possible medication withdrawal (outpt is on ambien)/metabolic encephalopathy. LP obtained negative for acute meningitis, tick panel pending and treating empirically for now.    Interval Followup: Worsening ciwa scores/agitation overnight and received several doses of iv ativan.  Patient has not was communicating some with brief responses earlier but largely remains intermittently lethargic and overall still confused.  Patient wakes on exam for me but not able to answer any questions.    Objective     Vitals:   Temp:  [98.4 °F (36.9 °C)-99.3 °F (37.4 °C)] 99.2 °F (37.3 °C)  Heart Rate:  [57-74] 57  Resp:  [24-30] 24  BP: (126-185)/() 171/83    Physical Exam  Tired/lethargic in bed in restraints not alert or oriented, wakes to physical stimulus briefly looks at me but not able  to follow commands or answer questions currently  Bilateral rhonchi, no acute wheezing, symmetric chest rise, on room air  RRR no lower extremity pitting edema  Abdomen soft nontender nondistended  Le wounds/foot dressings with betadine appear to be slowly improving, no acute erythema or discharge  Occasionally moving all extremities nonspecifically with no obvious focal deficit      Result Review:  Vital signs, labs and recent relevant imaging reviewed.        acetaminophen    acetaminophen    senna-docusate sodium **AND** polyethylene glycol **AND** [DISCONTINUED] bisacodyl **AND** bisacodyl    dextrose    dextrose    glucagon (human recombinant)    labetalol    LORazepam    LORazepam **OR** LORazepam **OR** LORazepam **OR** LORazepam **OR** LORazepam **OR** LORazepam    nitroglycerin    ondansetron    Pharmacy Consult - Pharmacy to dose    Pharmacy Consult - Pharmacy to dose    sodium chloride    sodium chloride    sodium chloride    sodium chloride    sodium chloride    sodium chloride    amLODIPine, 5 mg, Nasogastric, Q24H  atenolol, 25 mg, Nasogastric, Daily  cefTRIAXone, 2 g, Intravenous, Q12H  doxycycline, 100 mg, Intravenous, Q12H  escitalopram, 20 mg, Nasogastric, Daily  folic acid, 1 mg, Nasogastric, Daily  heparin (porcine), 5,000 Units, Subcutaneous, Q8H  lamoTRIgine, 100 mg, Nasogastric, Daily  losartan, 25 mg, Nasogastric, BID  mupirocin, 1 application , Topical, 2 times per day  pantoprazole, 40 mg, Intravenous, BID  QUEtiapine, 100 mg, Nasogastric, Nightly  saccharomyces boulardii, 250 mg, Nasogastric, BID  senna-docusate sodium, 2 tablet, Nasogastric, BID  sodium chloride, 10 mL, Intravenous, Q12H  sodium chloride, 10 mL, Intravenous, Q12H  thiamine (B-1) IV, 500 mg, Intravenous, Q8H  Tropical Liquid Nutrition, 15 mL, Nasogastric, Daily  valproate sodium, 500 mg, Intravenous, Q12H    7/3 eeg  Impression:   Abnormal EEG because of:  1.  Intermittent medium voltage focal slow activity noted over the  left frontotemporal region suggestive of neuronal dysfunction in this region.  This abnormalities have been reported in individuals with vascular insufficiency, migraine, or postictal state.  2.  Slow background activity mixed with slower waves compatible with moderate and diffuse encephalopathy.  3.  There were no epileptiform discharges noted today.    7/6 EEG moderate encephalopathy and no acute epileptic discharges noted    Assessment / Plan     Assessment/Plan:  Sepsis from undetermined etiology, possibly meningitis, vs tickborne ilness   Acute metabolic encephalopathy  MK on CKD  Severe hyperkalemia  Seizure disorder (on lamictal), with concern for subclinical seizure - negative on EEGs  Concern for withdrawal from outpatient ambien  Rhabdomyolysis  SSTI in setting of Foreign body in left foot/glass  Initial presentation with hyponatremia  Significant Hypernatremia  Anion gap metabolic acidosis  CVA ruled out  History of COPD  Type 2 diabetes mellitus  HTN  Bipolar disorder  Hyperlipidemia  Polypharmacy      LP obtained, meningitis panel negative, tick studies still pending (lyme negative others still pending), preliminary CSF cultures negative   Vancomycin and ceftriaxone (will also cover for SSTI of his feet which is improving)  Continue doxycycline for possible tickborne encephalopathy,   Wbc increased to 18 today, no fevers, cont tx and monitoring as above  Requiring frequent ativan overnight on ciwa score monitoring, scores up to 14 overnight, question withdrawal sx's from his chronic outpt ambien  Seizure precautions, appreciate neurology assistance, continuing lamotrigine - home medication - was on 200mg bid outpt.  Discussed with Dr Hodge - will adjust AED back to home regimen.   Increase lamotrigine back to 200mg bid via NG  Discontinue depakote   Try to minimize further benzo's as able (will slowly uptitrate seroquel as able)   Continue pt's seroquel, increased to 200mg at night, (was on 600 nightly  previously suspect potentially too high of a dose)  Continue fluid/free water flushes, hyponatremia starting to downtrend today, replace potassium and monitor electrolytes closely  Continue atenolol, losartan, amlodipine.  Blood pressures been consistently elevated has required some as needed IV blood pressure medications.  Increase amlodipine to 10 mg and monitor  Continue ng, tube feedings, free h2o flushes  Minimize polypharmacy as able, avoiding muscle relaxer  Holding on lasix currently, hypernatremia tx as above, nephrology assisting appreciate assistance   Continue insulin drip for hyperglycemia  Continue local wound care  Delirium precautions/reoritentation  Continue gi ppx   Check a.m. CBC, BMP, magnesium, phosphorus and additional testing as above     Remains critically ill in the icu    DVT prophylaxis:  Medical and mechanical DVT prophylaxis orders are present.    Level Of Support Discussed With: Patient  Code Status (Patient has no pulse and is not breathing): CPR (Attempt to Resuscitate)  Medical Interventions (Patient has pulse or is breathing): Full Support        CBC          7/6/2023    05:16 7/7/2023    03:08 7/8/2023    02:56   CBC   WBC 14.77  16.65  18.68    RBC 4.20  4.44  3.82    Hemoglobin 12.4  12.9  11.2    Hematocrit 36.0  39.1  34.3    MCV 85.7  88.1  89.8    MCH 29.5  29.1  29.3    MCHC 34.4  33.0  32.7    RDW 12.7  13.0  12.9    Platelets 325  285  220      CMP          7/6/2023    05:16 7/6/2023    10:15 7/7/2023    03:08 7/7/2023    13:02 7/8/2023    02:56   CMP   Glucose 351  345  209  168  157    BUN 82  76  75  63  50    Creatinine 2.98  2.93  2.64  2.56  2.18    EGFR 23.1  23.6  26.7  27.7  33.6    Sodium 151  155  158  155  147    Potassium 3.0  3.0  2.9  3.1  3.4    Chloride 107  111  117  116  112    Calcium 9.6  9.7  9.4  9.0  8.7    Total Protein   6.7      Albumin   3.6   3.1    Globulin   3.1      Total Bilirubin   0.3      Alkaline Phosphatase   132      AST (SGOT)   27       ALT (SGPT)   12      Albumin/Globulin Ratio   1.2      BUN/Creatinine Ratio 27.5  25.9  28.4  24.6  22.9    Anion Gap 19.4  19.4  16.4  12.2  10.3      Patient is critically ill due to sepsis/ams/hypernatremia, possible bzd withdrawl and mult issues as above.  I have spent >35 minutes of critical care time reviewing documentation, pertinent labs, imaging studies, examining the patient, modifying care plan, and discussing patient's condition and care plan with the pulmcrit team/rn, c/s rec's reviewed

## 2023-07-08 NOTE — PROGRESS NOTES
" LOS: 8 days   Patient Care Team:  Felix Atkinson MD as PCP - General  Felxi Atkinson MD as PCP - Family Medicine    Chief Complaint: MK/CKD    Subjective     Weakness - Generalized    Events noted.  Patient is more responsive, startled when I called his name in the room.  Opened his eyes but did not follow commands.    Subjective    History taken from: patient chart    Objective     Vital Sign Min/Max for last 24 hours  Temp  Min: 98.4 °F (36.9 °C)  Max: 99.3 °F (37.4 °C)   BP  Min: 126/72  Max: 185/85   Pulse  Min: 57  Max: 74   Resp  Min: 24  Max: 30   SpO2  Min: 89 %  Max: 100 %   No data recorded   No data recorded     Flowsheet Rows      Flowsheet Row First Filed Value   Admission Height 182.9 cm (72\") Documented at 06/30/2023 1800   Admission Weight 133 kg (294 lb 1.5 oz) Documented at 06/30/2023 2300            I/O this shift:  In: -   Out: 1450 [Urine:1450]  I/O last 3 completed shifts:  In: 60189.1 [I.V.:4809.1; Other:6313; NG/GT:2309]  Out: 3000 [Urine:3000]    Objective:  General Appearance:  Comfortable and not in pain.    Vital signs: (most recent): Blood pressure 155/100, pulse 64, temperature 99.2 °F (37.3 °C), temperature source Axillary, resp. rate 24, height 182.9 cm (72\"), weight 133 kg (294 lb 1.5 oz), SpO2 96 %.  Vital signs are normal.    Output: Producing urine.    HEENT: Normal HEENT exam.    Lungs:  Normal effort and normal respiratory rate.    Heart: Normal rate.  Regular rhythm.    Abdomen: Abdomen is soft.  Bowel sounds are normal.   There is no abdominal tenderness.     Extremities: Normal range of motion.  There is dependent edema.  (Marley in place.)  Pulses: Distal pulses are intact.    Neurological: Patient is alert.    Skin:  Warm and dry.  There is ecchymosis.  (Scabs over knees and toes.)        Results Review:     I reviewed the patient's new clinical results.    WBC WBC   Date Value Ref Range Status   07/08/2023 18.68 (H) 3.40 - 10.80 10*3/mm3 Final   07/07/2023 16.65 (H) " 3.40 - 10.80 10*3/mm3 Final   07/06/2023 14.77 (H) 3.40 - 10.80 10*3/mm3 Final      HGB Hemoglobin   Date Value Ref Range Status   07/08/2023 11.2 (L) 13.0 - 17.7 g/dL Final   07/07/2023 12.9 (L) 13.0 - 17.7 g/dL Final   07/06/2023 12.4 (L) 13.0 - 17.7 g/dL Final      HCT Hematocrit   Date Value Ref Range Status   07/08/2023 34.3 (L) 37.5 - 51.0 % Final   07/07/2023 39.1 37.5 - 51.0 % Final   07/06/2023 36.0 (L) 37.5 - 51.0 % Final      Platlets No results found for: LABPLAT   MCV MCV   Date Value Ref Range Status   07/08/2023 89.8 79.0 - 97.0 fL Final   07/07/2023 88.1 79.0 - 97.0 fL Final   07/06/2023 85.7 79.0 - 97.0 fL Final          Sodium Sodium   Date Value Ref Range Status   07/08/2023 147 (H) 136 - 145 mmol/L Final   07/07/2023 155 (H) 136 - 145 mmol/L Final   07/07/2023 158 (H) 136 - 145 mmol/L Final   07/06/2023 155 (H) 136 - 145 mmol/L Final   07/06/2023 151 (H) 136 - 145 mmol/L Final      Potassium Potassium   Date Value Ref Range Status   07/08/2023 3.4 (L) 3.5 - 5.2 mmol/L Final   07/07/2023 3.1 (L) 3.5 - 5.2 mmol/L Final   07/07/2023 2.9 (L) 3.5 - 5.2 mmol/L Final   07/06/2023 3.0 (L) 3.5 - 5.2 mmol/L Final   07/06/2023 3.0 (L) 3.5 - 5.2 mmol/L Final      Chloride Chloride   Date Value Ref Range Status   07/08/2023 112 (H) 98 - 107 mmol/L Final   07/07/2023 116 (H) 98 - 107 mmol/L Final   07/07/2023 117 (H) 98 - 107 mmol/L Final   07/06/2023 111 (H) 98 - 107 mmol/L Final   07/06/2023 107 98 - 107 mmol/L Final      CO2 CO2   Date Value Ref Range Status   07/08/2023 24.7 22.0 - 29.0 mmol/L Final   07/07/2023 26.8 22.0 - 29.0 mmol/L Final   07/07/2023 24.6 22.0 - 29.0 mmol/L Final   07/06/2023 24.6 22.0 - 29.0 mmol/L Final   07/06/2023 24.6 22.0 - 29.0 mmol/L Final      BUN BUN   Date Value Ref Range Status   07/08/2023 50 (H) 8 - 23 mg/dL Final   07/07/2023 63 (H) 8 - 23 mg/dL Final   07/07/2023 75 (H) 8 - 23 mg/dL Final   07/06/2023 76 (H) 8 - 23 mg/dL Final   07/06/2023 82 (H) 8 - 23 mg/dL Final       Creatinine Creatinine   Date Value Ref Range Status   07/08/2023 2.18 (H) 0.76 - 1.27 mg/dL Final   07/07/2023 2.56 (H) 0.76 - 1.27 mg/dL Final   07/07/2023 2.64 (H) 0.76 - 1.27 mg/dL Final   07/06/2023 2.93 (H) 0.76 - 1.27 mg/dL Final   07/06/2023 2.98 (H) 0.76 - 1.27 mg/dL Final      Calcium Calcium   Date Value Ref Range Status   07/08/2023 8.7 8.6 - 10.5 mg/dL Final   07/07/2023 9.0 8.6 - 10.5 mg/dL Final   07/07/2023 9.4 8.6 - 10.5 mg/dL Final   07/06/2023 9.7 8.6 - 10.5 mg/dL Final   07/06/2023 9.6 8.6 - 10.5 mg/dL Final      PO4 No results found for: CAPO4   Albumin Albumin   Date Value Ref Range Status   07/08/2023 3.1 (L) 3.5 - 5.2 g/dL Final   07/07/2023 3.6 3.5 - 5.2 g/dL Final      Magnesium Magnesium   Date Value Ref Range Status   07/08/2023 2.1 1.6 - 2.4 mg/dL Final   07/07/2023 2.3 1.6 - 2.4 mg/dL Final   07/06/2023 2.4 1.6 - 2.4 mg/dL Final      Uric Acid No results found for: URICACID     Medication Review:   [START ON 7/9/2023] amLODIPine, 10 mg, Nasogastric, Q24H  atenolol, 25 mg, Nasogastric, Daily  cefTRIAXone, 2 g, Intravenous, Q12H  doxycycline, 100 mg, Intravenous, Q12H  escitalopram, 20 mg, Nasogastric, Daily  folic acid, 1 mg, Nasogastric, Daily  heparin (porcine), 5,000 Units, Subcutaneous, Q8H  lamoTRIgine, 200 mg, Nasogastric, Q12H  losartan, 25 mg, Nasogastric, BID  mupirocin, 1 application , Topical, 2 times per day  pantoprazole, 40 mg, Intravenous, BID  QUEtiapine, 200 mg, Nasogastric, Nightly  saccharomyces boulardii, 250 mg, Nasogastric, BID  senna-docusate sodium, 2 tablet, Nasogastric, BID  sodium chloride, 10 mL, Intravenous, Q12H  sodium chloride, 10 mL, Intravenous, Q12H  thiamine (B-1) IV, 500 mg, Intravenous, Q8H  Tropical Liquid Nutrition, 15 mL, Nasogastric, Daily        Assessment & Plan       Acute renal failure, unspecified acute renal failure type    Onychomycosis    Onychocryptosis    Foreign body in left foot    Peripheral neuropathy    Charcot foot due to  diabetes mellitus    Foot pain, bilateral      Assessment & Plan  MK/CKD3b-  Due to diabetic nephropathy.  He has had worsened renal function recently.  Had been on lisinopril and SGLT2 and kerendia.  Creatinine is better today. Volume status adequate.  Off bicarb drip and Lasix.  Baseline creatinine around 2-2.5.  Monitor.  Hyponatremia-  improved.  Now with hypernatremia  Hypernatremia, severe 158 yesterday, down to 147 today, receiving free water 200 cc every 1 hour with tube feed and D5W 100 cc/h.  Continue the same and reevaluate in a.m.    Hypokalemia, potassium is being replaced IV and down NG tube..  Magnesium okay.   Met Acidosis-  improved.  Off  bicarb.  Proteinuria-  diabetic nephropathy  DMII-  treated.  Was On jardiance, hold with MK.  HTN-  BP is better, continue losartan.  Chronic edema-  Amlodipine may be contributing.  on hold.  H/o bipolar-  previous lithium use.  H/o sz disorder-neurology evaluating.  Anemia-  tsat at 14%, ferritin 425.  Received some IV iron.   Rhabdomyolysis-  had fall at home, statin on hold. improved now.  Weakness-  would avoid muscle relaxer in CKD(flexeril).  Likely polypharmacy contributing.  Discussed with ICU team.  We will follow.      Caroline Mckenzie MD  07/08/23  16:47 EDT

## 2023-07-08 NOTE — PROGRESS NOTES
Pulmonary / Critical Care Progress Note      Patient Name: Carlos Murphy Jr.  : 1960  MRN: [id number]  Attending:  Ye Roach MD   Date of admission: 2023    Subjective   Subjective   Patient critically ill with altered mental status, hypernatremia, encephalopathy    Over past 24 hours: Remained in ICU, on CIWA protocol, in restraints, still with agitation, received 11 mg of Ativan overnight, fever trend down, receiving free water and D5W for hypernatremia, recovering MK, on antibiotics, lumbar puncture studies unrevealing, tickborne panel still pending    This morning  Patient mental status appears somewhat improved  Sodium down to 147 this morning  Remains on dextrose, still receiving free water per NG  On room air  Tmax 99.3  Urine output 2 L / 24 hours    Review of Systems  unable to obtain due to patient status      Objective   Objective     Vitals:   Vital signs for last 24 hours:  Temp:  [98.4 °F (36.9 °C)-99.3 °F (37.4 °C)] 98.4 °F (36.9 °C)  Heart Rate:  [58-74] 63  Resp:  [24-30] 24  BP: (126-185)/() 169/78    Intake/Output last 3 shifts:  I/O last 3 completed shifts:  In: 80009.1 [I.V.:4809.1; Other:6313; NG/GT:2309]  Out: 3000 [Urine:3000]  Intake/Output this shift:  No intake/output data recorded.    Vent settings for last 24 hours:       Hemodynamic parameters for last 24 hours:       Physical Exam     Vital Signs Reviewed   Patient restless, lying in bed, no acute distress  HEENT:  PERRL, EOMI. core track in place  Chest:  good aeration, clear to auscultation bilaterally, tympanic to percussion bilaterally, no work of breathing noted  CV: RRR, no MGR, pulses 2+, equal.  Abd:  Soft, NT, ND, + BS, no HSM  EXT:  no clubbing, no cyanosis, no edema  Neuro:  A&Ox0, does not follow commands, no focal deficits  Skin: No rashes or lesions noted      Result Review    Result Review:  I have personally reviewed the results from the time of this admission to 2023 11:04 EDT and agree  with these findings:  [x]  Laboratory  [x]  Microbiology  [x]  Radiology  []  EKG/Telemetry   [x]  Cardiology/Vascular   []  Pathology  []  Old records  []  Other:  Most notable findings include:       Lab 07/08/23  0256 07/07/23  1302 07/07/23  0308 07/06/23  1015 07/06/23  0516 07/05/23  0440 07/04/23  0401 07/03/23  0613 07/03/23  0433 07/02/23  0619   WBC 18.68*  --  16.65*  --  14.77* 12.38* 12.58* 11.43*  --  10.06   HEMOGLOBIN 11.2*  --  12.9*  --  12.4* 12.1* 11.3* 11.2*  --  10.2*   HEMATOCRIT 34.3*  --  39.1  --  36.0* 38.0 33.5* 32.0*  --  29.7*   PLATELETS 220  --  285  --  325 293 273 213  --  198   SODIUM 147* 155* 158* 155* 151* 144 140  --  136 131*   POTASSIUM 3.4* 3.1* 2.9* 3.0* 3.0* 3.2* 3.5  --  3.6 4.4   CHLORIDE 112* 116* 117* 111* 107 97* 94*  --  95* 95*   CO2 24.7 26.8 24.6 24.6 24.6 22.1 28.0  --  24.8 20.8*   BUN 50* 63* 75* 76* 82* 69* 65*  --  72* 76*   CREATININE 2.18* 2.56* 2.64* 2.93* 2.98* 2.74* 2.75*  --  3.38* 4.12*   GLUCOSE 157* 168* 209* 345* 351* 272* 197*  --  195* 232*   CALCIUM 8.7 9.0 9.4 9.7 9.6 9.6 9.6  --  9.0 8.5*   PHOSPHORUS 2.3*  --  3.6  --  1.8* 4.0 4.3  --   --  4.9*   TOTAL PROTEIN  --   --  6.7  --   --  7.2  --   --  6.6 6.2   ALBUMIN 3.1*  --  3.6  --   --  3.8 3.9  --  3.8 3.4*   GLOBULIN  --   --  3.1  --   --  3.4  --   --   --  2.8           Assessment & Plan   Assessment / Plan     Active Hospital Problems:  Active Hospital Problems    Diagnosis     **Acute renal failure, unspecified acute renal failure type     Onychomycosis     Onychocryptosis     Foreign body in left foot     Peripheral neuropathy     Charcot foot due to diabetes mellitus     Foot pain, bilateral          Impression:  Altered mental status  Metabolic encephalopathy  Seizure disorder  History of bipolar and schizo affective disorder  Acute kidney injury, on CKD stage III  Type 2 diabetes with hyperglycemia  Iron deficiency  anemia  Rhabdomyolysis  Hypokalemia  Hypophosphatemia  Hypertension  Hypernatremia, clinically significant       Plan:    -Patient on room air  -Continue aspiration precautions  -Continue core track, tube feeds and free water for hypernatremia  -Can likely have dextrose infusion DC'd today, will defer to nephrology  -Renal function improving, nephrology following  -Replace potassium today  -Continue insulin drip today, can likely transition tomorrow  -Studies from lumbar puncture, negative thus far, okay to discontinue ampicillin  -Continue Rocephin and vancomycin  -Placed on doxycycline, tickborne panel pending, less likely, can DC once PCR has resulted  -Teleneurology is following  -EEG showed moderate to diffuse encephalopathy 7/6  -Continue thiamine, and multivitamin and folic acid  -Continue CIWA protocol, as needed Ativan   -Continue Depakote  -Continue lexapro, Seroquel      DVT prophylaxis:  Medical and mechanical DVT prophylaxis orders are present.    CODE STATUS:   Level Of Support Discussed With: Patient  Code Status (Patient has no pulse and is not breathing): CPR (Attempt to Resuscitate)  Medical Interventions (Patient has pulse or is breathing): Full Support    IDiana APRN, spent 16 minutes critical care time in accordance to split shared billing.  Electronically signed by IMAN Bullard, 07/08/23, 11:09 AM EDT.      Patient is critically ill in intensive care unit with altered mental status, metabolic encephalopathy, acute on chronic kidney disease, hyponatremia, rhabdomyolysis, history of seizure disorder, schizoaffective disorder, on psychiatric medications. Multidisciplinary bedside critical care rounds were performed with nursing staff, respiratory therapy, pharmacy, nutritional services, social work. I have personally reviewed the chart, labs and any pertinent imaging available. We have spent 35 minutes of critical care time, excluding procedures, in the care of this  patient.  I, Dr. Benedict Goss, spent 19 mins of critical care time according to split shared billing guidelines.      Electronically signed by Benedict Goss MD, 07/08/23, 4:28 PM EDT.

## 2023-07-08 NOTE — PROGRESS NOTES
[facility]  [facility] Tele[facility]urology Consult Services    Routine Consult Follow-Up    Patient Name:   Carlos Barber  YOB: 1960  Date of Service:   07/08/2023 11:33:35    Diagnosis        R56.9 - Seizures    Impression  62 y.o. male PMH DM, CKD, schizophrenia, HTN, HLD, seizure disorder on lamotrigine 200 mg BID who presented to the ED on 06/28 altered and unresponsive.    Work up so far included a CTH which was negative for any acute findings. Multiple labs were abnormal such as CK > 81395 probably causing MK and Cr at 4.95, abnormal Electrolytes and elevated Troponin, suspecting metabolic encephalopathy vs possible seizure vs possible drug withdrawal.    Work up done:  LP: R 85340, N 19 G 212 P58; Cultures -ve, crypto -ve, HSV, VDRL, AFB -ve, meningitis/encephalitis panel -ve    - Confirm and resume home dose of lamotrigine  - Multiple EEGs done on 07/03,07/05,07/06 were reporting diffuse background slowing  - Overnight received 11 mg of ativan and was sedated for the major part of the exam. Please consider switching to Seroquel scheduled or haldol if QTC ok on EKG        Our recommendations are outlined below    Nursing Recommendations :  Delirium precautions: Blinds open during the day, closed at night, frequent reorientation, minimize nighttime interruptionsWhen possible avoid benzodiazepines, opioid pain medications, and anticholinergic medications    DVT Prophylaxis :  Choice of Primary Team    Disposition :  Neurology will sign off. Reconsult if Needed.    Subjective  07/08/2023: Lethargic in am GOT 11 MG Ativan    Hospital Course  62 y.o. male PMH DM, CKD, schizophrenia, HTN, HLD, seizure disorder on lamotrigine 200 mg BID who presented to the ED on 06/28 altered and unresponsive.    Based on the presentation and symptoms likely this is metabolic encephalopathy vs drug withdrawal.  Multiple EEGs were done which did not evidence any electrographic seizures.  CSF mild elevated  protein      Examination  1A: Level of Consciousness - Arouses to minor stimulation + 1  1B: Ask Month and Age - Could Not Answer Either Question Correctly + 2  1C: Blink Eyes & Squeeze Hands - Performs 0 Tasks + 2  2: Test Horizontal Extraocular Movements - Normal + 0  3: Test Visual Fields - No Visual Loss + 0  4: Test Facial Palsy (Use Grimace if Obtunded) - Normal symmetry + 0  5A: Test Left Arm Motor Drift - Drift, hits bed + 2  5B: Test Right Arm Motor Drift - Drift, hits bed + 2  6A: Test Left Leg Motor Drift - Some Effort Against Gravity + 2  6B: Test Right Leg Motor Drift - Some Effort Against Gravity + 2  7: Test Limb Ataxia (FNF/Heel-Shin) - No Ataxia + 0  8: Test Sensation - Normal; No sensory loss + 0  9: Test Language/Aphasia - Normal; No aphasia + 0  10: Test Dysarthria - Normal + 0  11: Test Extinction/Inattention - No abnormality + 0    NIHSS Score: 13          Patient/Family was informed the Neurology Consult would happen via TeleHealth consult by way of interactive audio and video telecommunications and consented to receiving care in this manner.    Telehealth Neurology consultation was provided. I spent 35 minutes providing telehealth care. This includes time spent for face to face visit via telemedicine, review of medical records, imaging studies and discussion of findings with providers, the patient and/or family.      Dr Lillie Hodge      [facility]  For Inpatient follow-up with [facility] physician please call [facility] [phone]. This is not an outpatient service. Post hospital discharge, please contact hospital directly.

## 2023-07-08 NOTE — CONSULTS
"Nutrition Services    Patient Name: Carlos Murphy Jr.  YOB: 1960  MRN: [id number]  Admission date: 6/30/2023      CLINICAL NUTRITION ASSESSMENT      Reason for Assessment  Follow-up protocol, EN     H&P:    Past Medical History:   Diagnosis Date    Acute kidney injury     2021 POST SEIZURES    Astigmatism of both eyes     eyes twitch left and right    Bipolar disorder     Charcot ankle     Closed nondisplaced fracture of medial malleolus of left tibia     COPD (chronic obstructive pulmonary disease)     USES INHALERS    Diabetes     BG RUNS AROUND 90'S IN AM    Hx of schizophrenia     Hyperlipidemia     Hypertension     SEEN DR ROD IN THE PAST, HAD APPT WITH DR MICHAUD IN 5-2022 CX APPT, DENIED CP/SOB    Neuropathy     Seizures     LAST ONE 4/21/22    Sleep apnea     DOES NOT USE CPAP    Varicose vein of leg         Current Problems:   Active Hospital Problems    Diagnosis     **Acute renal failure, unspecified acute renal failure type     Onychomycosis     Onychocryptosis     Foreign body in left foot     Peripheral neuropathy     Charcot foot due to diabetes mellitus     Foot pain, bilateral         Nutrition/Diet History         Narrative     Patient admitted with ARF.  Has had worsening mental status and is not optimally responsive.  Tolerating enteral  nutrition well at this time.      Na+ 147 during this morning.  Down from 155 yesterday.  D5W continues to run at 100 ml/hr.  Continues to receive 200 ml/hr free water flushes via [employer].  Plan to continue current free water order, monitor sodium over the next 24 hours, and adjust accordingly.      Anthropometrics        Current Height, Weight Height: 182.9 cm (72\")  Weight: 133 kg (294 lb 1.5 oz)   Current BMI Body mass index is 39.89 kg/m².       Weight Hx  Wt Readings from Last 30 Encounters:   06/30/23 2300 133 kg (294 lb 1.5 oz)   04/30/23 1344 134 kg (296 lb 1.2 oz)   09/19/22 1532 136 kg (299 lb 6.4 oz)   08/18/22 1337 132 kg (290 lb) "   06/30/22 1407 131 kg (288 lb)   06/14/22 1309 131 kg (288 lb)   06/01/22 0920 136 kg (299 lb 2.6 oz)   05/31/22 0852 132 kg (290 lb)   05/27/22 0945 132 kg (290 lb)   05/24/22 1338 131 kg (289 lb)   05/10/22 1316 131 kg (289 lb)   04/25/22 0800 123 kg (271 lb)   04/22/22 1154 129 kg (284 lb 6.3 oz)   04/22/22 0941 129 kg (284 lb)   04/21/22 0917 129 kg (284 lb 9.8 oz)   04/13/22 1407 125 kg (276 lb)   07/13/21 1334 132 kg (290 lb)   03/15/21 0000 135 kg (297 lb)   02/18/21 0000 133 kg (293 lb 5 oz)   01/05/21 0000 135 kg (297 lb 4 oz)   12/03/20 0000 130 kg (287 lb)   11/06/20 0000 129 kg (285 lb)   09/11/20 0000 129 kg (285 lb)   05/20/19 0000 (!) 136 kg (300 lb 4 oz)   09/11/18 0000 136 kg (300 lb)   07/06/18 0000 (!) 137 kg (303 lb)   05/29/18 0000 (!) 139 kg (307 lb)   02/27/18 0000 136 kg (300 lb)            Wt Change Observation stable     Estimated/Assessed Needs       Energy Requirements 25-30 kcal/kg adj BW (91.4 kg)   EST Needs (kcal/day) 9710-9720 kcal        Protein Requirements 1.2-1.5 g/kg adj BW    EST Daily Needs (g/day) 110-137 g       Fluid Requirements 25 ml/kg adj BW    Estimated Needs (mL/day) 2285 ml   Current free water deficit of 6.3L     Labs/Medications         Pertinent Labs Reviewed.   Results from last 7 days   Lab Units 07/08/23  0256 07/07/23  1302 07/07/23  0308 07/06/23  0516 07/05/23  0440 07/04/23  0401 07/03/23  0433   SODIUM mmol/L 147* 155* 158*   < > 144   < > 136   POTASSIUM mmol/L 3.4* 3.1* 2.9*   < > 3.2*   < > 3.6   CHLORIDE mmol/L 112* 116* 117*   < > 97*   < > 95*   CO2 mmol/L 24.7 26.8 24.6   < > 22.1   < > 24.8   BUN mg/dL 50* 63* 75*   < > 69*   < > 72*   CREATININE mg/dL 2.18* 2.56* 2.64*   < > 2.74*   < > 3.38*   CALCIUM mg/dL 8.7 9.0 9.4   < > 9.6   < > 9.0   BILIRUBIN mg/dL  --   --  0.3  --  0.4  --  0.6   ALK PHOS U/L  --   --  132*  --  150*  --  144*   ALT (SGPT) U/L  --   --  12  --  25  --  35   AST (SGOT) U/L  --   --  27  --  19  --  36   GLUCOSE mg/dL  157* 168* 209*   < > 272*   < > 195*    < > = values in this interval not displayed.       Results from last 7 days   Lab Units 07/08/23  0256 07/07/23  0308 07/06/23  0516   MAGNESIUM mg/dL 2.1 2.3 2.4   PHOSPHORUS mg/dL 2.3* 3.6 1.8*   HEMOGLOBIN g/dL 11.2* 12.9* 12.4*   HEMATOCRIT % 34.3* 39.1 36.0*       Coronavirus (COVID-19)   Date Value Ref Range Status   03/25/2021 NOT DETECTED NA Final     Comment:     The SARS-CoV-2 assay is a real-time, RT-PCR test intended  for the qualitative detection of nucleic acid from the  SARS-CoV-2 in respiratory specimens from individuals,  testing performed at [facility].       Lab Results   Component Value Date    HGBA1C 8.3 (H) 11/10/2020         Pertinent Medications Reviewed.     Current Nutrition Orders & Evaluation of Intake       Oral Nutrition     Current PO Diet NPO Diet NPO Type: Strict NPO   Supplement Orders Placed This Encounter      Place Feeding Tube Per [employer] System      Diet, Tube Feeding Tube Feeding Formula: Diabetisource AC; Tube Feeding Type: Continuous; Continuous Tube Feeding Start Rate (mL/hr): 25; Then Advance Rate By (mL/hr): 25; Every __ Hours: 4; To Goal Rate of (mL/hr): 80       Malnutrition Severity Assessment                Nutrition Diagnosis         Nutrition Dx Problem 1 Inadequate energy Intake related to decreased ability to consume sufficient energy as evidenced by NPO       Nutrition Intervention         Diabetisource AC @ 80 ml/hr  Free water flushes 200 ml/hr  2304 kcal, 115 g pro, 1574 ml fluid     D5W 100 ml/hr  204 kcal, 2400 ml fluid     Provide 2509 kcal, 115 g pro, 8774 ml fluid      Medical Nutrition Therapy/Nutrition Education          Learner     Readiness Patient  Education not appropriate at this time     Method     Response N/A  N/A     Monitor/Evaluation        Monitor Per protocol, I&O, Pertinent labs, EN delivery/tolerance, Symptoms       Nutrition Discharge Plan         To be determined       Electronically  signed by:  Fransisco Stanley RD  07/08/23 09:49 EDT

## 2023-07-09 ENCOUNTER — APPOINTMENT (OUTPATIENT)
Dept: GENERAL RADIOLOGY | Facility: HOSPITAL | Age: 63
DRG: 917 | End: 2023-07-09
Payer: MEDICARE

## 2023-07-09 LAB
ANION GAP SERPL CALCULATED.3IONS-SCNC: 13.7 MMOL/L (ref 5–15)
BACTERIA SPEC AEROBE CULT: NORMAL
BASOPHILS # BLD AUTO: 0.15 10*3/MM3 (ref 0–0.2)
BASOPHILS NFR BLD AUTO: 1.1 % (ref 0–1.5)
BUN SERPL-MCNC: 36 MG/DL (ref 8–23)
BUN/CREAT SERPL: 19.5 (ref 7–25)
CALCIUM SPEC-SCNC: 9.3 MG/DL (ref 8.6–10.5)
CHLORIDE SERPL-SCNC: 105 MMOL/L (ref 98–107)
CO2 SERPL-SCNC: 21.3 MMOL/L (ref 22–29)
CREAT SERPL-MCNC: 1.85 MG/DL (ref 0.76–1.27)
DEPRECATED RDW RBC AUTO: 48.6 FL (ref 37–54)
EGFRCR SERPLBLD CKD-EPI 2021: 40.9 ML/MIN/1.73
EOSINOPHIL # BLD AUTO: 1.63 10*3/MM3 (ref 0–0.4)
EOSINOPHIL NFR BLD AUTO: 12 % (ref 0.3–6.2)
ERYTHROCYTE [DISTWIDTH] IN BLOOD BY AUTOMATED COUNT: 13.1 % (ref 12.3–15.4)
GLUCOSE BLDC GLUCOMTR-MCNC: 139 MG/DL (ref 70–99)
GLUCOSE BLDC GLUCOMTR-MCNC: 142 MG/DL (ref 70–99)
GLUCOSE BLDC GLUCOMTR-MCNC: 153 MG/DL (ref 70–99)
GLUCOSE BLDC GLUCOMTR-MCNC: 157 MG/DL (ref 70–99)
GLUCOSE BLDC GLUCOMTR-MCNC: 157 MG/DL (ref 70–99)
GLUCOSE BLDC GLUCOMTR-MCNC: 163 MG/DL (ref 70–99)
GLUCOSE BLDC GLUCOMTR-MCNC: 176 MG/DL (ref 70–99)
GLUCOSE BLDC GLUCOMTR-MCNC: 251 MG/DL (ref 70–99)
GLUCOSE BLDC GLUCOMTR-MCNC: 257 MG/DL (ref 70–99)
GLUCOSE BLDC GLUCOMTR-MCNC: 268 MG/DL (ref 70–99)
GLUCOSE BLDC GLUCOMTR-MCNC: 283 MG/DL (ref 70–99)
GLUCOSE SERPL-MCNC: 299 MG/DL (ref 65–99)
GRAM STN SPEC: NORMAL
HCT VFR BLD AUTO: 45.4 % (ref 37.5–51)
HGB BLD-MCNC: 13.3 G/DL (ref 13–17.7)
IMM GRANULOCYTES # BLD AUTO: 0.16 10*3/MM3 (ref 0–0.05)
IMM GRANULOCYTES NFR BLD AUTO: 1.2 % (ref 0–0.5)
LYMPHOCYTES # BLD AUTO: 2.09 10*3/MM3 (ref 0.7–3.1)
LYMPHOCYTES NFR BLD AUTO: 15.3 % (ref 19.6–45.3)
MAGNESIUM SERPL-MCNC: 2.2 MG/DL (ref 1.6–2.4)
MCH RBC QN AUTO: 29.4 PG (ref 26.6–33)
MCHC RBC AUTO-ENTMCNC: 29.3 G/DL (ref 31.5–35.7)
MCV RBC AUTO: 100.4 FL (ref 79–97)
MONOCYTES # BLD AUTO: 1.01 10*3/MM3 (ref 0.1–0.9)
MONOCYTES NFR BLD AUTO: 7.4 % (ref 5–12)
NEUTROPHILS NFR BLD AUTO: 63 % (ref 42.7–76)
NEUTROPHILS NFR BLD AUTO: 8.58 10*3/MM3 (ref 1.7–7)
NRBC BLD AUTO-RTO: 0.1 /100 WBC (ref 0–0.2)
PLATELET # BLD AUTO: 171 10*3/MM3 (ref 140–450)
PMV BLD AUTO: 10.3 FL (ref 6–12)
POTASSIUM SERPL-SCNC: 4.3 MMOL/L (ref 3.5–5.2)
QT INTERVAL: 448 MS
QT INTERVAL: 454 MS
QT INTERVAL: 454 MS
RBC # BLD AUTO: 4.52 10*6/MM3 (ref 4.14–5.8)
SODIUM SERPL-SCNC: 140 MMOL/L (ref 136–145)
WBC NRBC COR # BLD: 13.62 10*3/MM3 (ref 3.4–10.8)

## 2023-07-09 PROCEDURE — 25010000002 HEPARIN (PORCINE) PER 1000 UNITS: Performed by: NURSE PRACTITIONER

## 2023-07-09 PROCEDURE — 25010000002 HALOPERIDOL LACTATE PER 5 MG

## 2023-07-09 PROCEDURE — 99291 CRITICAL CARE FIRST HOUR: CPT | Performed by: INTERNAL MEDICINE

## 2023-07-09 PROCEDURE — 74018 RADEX ABDOMEN 1 VIEW: CPT

## 2023-07-09 PROCEDURE — 25010000002 CEFTRIAXONE PER 250 MG: Performed by: INTERNAL MEDICINE

## 2023-07-09 PROCEDURE — 63710000001 INSULIN LISPRO (HUMAN) PER 5 UNITS: Performed by: NURSE PRACTITIONER

## 2023-07-09 PROCEDURE — 82948 REAGENT STRIP/BLOOD GLUCOSE: CPT

## 2023-07-09 PROCEDURE — 63710000001 INSULIN DETEMIR PER 5 UNITS: Performed by: NURSE PRACTITIONER

## 2023-07-09 PROCEDURE — 25010000002 HALOPERIDOL LACTATE PER 5 MG: Performed by: FAMILY MEDICINE

## 2023-07-09 PROCEDURE — 83735 ASSAY OF MAGNESIUM: CPT | Performed by: INTERNAL MEDICINE

## 2023-07-09 PROCEDURE — 85025 COMPLETE CBC W/AUTO DIFF WBC: CPT | Performed by: INTERNAL MEDICINE

## 2023-07-09 PROCEDURE — 25010000002 THIAMINE PER 100 MG: Performed by: INTERNAL MEDICINE

## 2023-07-09 PROCEDURE — 71045 X-RAY EXAM CHEST 1 VIEW: CPT

## 2023-07-09 PROCEDURE — 80048 BASIC METABOLIC PNL TOTAL CA: CPT | Performed by: INTERNAL MEDICINE

## 2023-07-09 PROCEDURE — 25010000002 HALOPERIDOL LACTATE PER 5 MG: Performed by: NURSE PRACTITIONER

## 2023-07-09 RX ORDER — DEXTROSE MONOHYDRATE 25 G/50ML
25 INJECTION, SOLUTION INTRAVENOUS
Status: DISCONTINUED | OUTPATIENT
Start: 2023-07-09 | End: 2023-07-19 | Stop reason: HOSPADM

## 2023-07-09 RX ORDER — NICOTINE POLACRILEX 4 MG
15 LOZENGE BUCCAL
Status: DISCONTINUED | OUTPATIENT
Start: 2023-07-09 | End: 2023-07-19 | Stop reason: HOSPADM

## 2023-07-09 RX ORDER — INSULIN LISPRO 100 [IU]/ML
2-9 INJECTION, SOLUTION INTRAVENOUS; SUBCUTANEOUS
Status: DISCONTINUED | OUTPATIENT
Start: 2023-07-09 | End: 2023-07-19 | Stop reason: HOSPADM

## 2023-07-09 RX ORDER — HALOPERIDOL 5 MG/ML
4 INJECTION INTRAMUSCULAR EVERY 6 HOURS PRN
Status: DISCONTINUED | OUTPATIENT
Start: 2023-07-09 | End: 2023-07-16

## 2023-07-09 RX ORDER — HALOPERIDOL 5 MG/ML
5 INJECTION INTRAMUSCULAR ONCE
Status: COMPLETED | OUTPATIENT
Start: 2023-07-09 | End: 2023-07-09

## 2023-07-09 RX ORDER — QUETIAPINE FUMARATE 200 MG/1
400 TABLET, FILM COATED ORAL NIGHTLY
Status: DISCONTINUED | OUTPATIENT
Start: 2023-07-09 | End: 2023-07-10

## 2023-07-09 RX ORDER — HALOPERIDOL 5 MG/ML
INJECTION INTRAMUSCULAR
Status: COMPLETED
Start: 2023-07-09 | End: 2023-07-09

## 2023-07-09 RX ADMIN — LOSARTAN POTASSIUM 25 MG: 25 TABLET, FILM COATED ORAL at 12:09

## 2023-07-09 RX ADMIN — HEPARIN SODIUM 5000 UNITS: 5000 INJECTION INTRAVENOUS; SUBCUTANEOUS at 05:16

## 2023-07-09 RX ADMIN — PANTOPRAZOLE SODIUM 40 MG: 40 INJECTION, POWDER, FOR SOLUTION INTRAVENOUS at 22:36

## 2023-07-09 RX ADMIN — Medication 250 MG: at 21:15

## 2023-07-09 RX ADMIN — INSULIN DETEMIR 15 UNITS: 100 INJECTION, SOLUTION SUBCUTANEOUS at 11:07

## 2023-07-09 RX ADMIN — HALOPERIDOL LACTATE 4 MG: 5 INJECTION, SOLUTION INTRAMUSCULAR at 10:35

## 2023-07-09 RX ADMIN — MUPIROCIN 1 APPLICATION: 20 OINTMENT TOPICAL at 12:10

## 2023-07-09 RX ADMIN — DOXYCYCLINE 100 MG: 100 INJECTION, POWDER, LYOPHILIZED, FOR SOLUTION INTRAVENOUS at 22:36

## 2023-07-09 RX ADMIN — QUETIAPINE FUMARATE 400 MG: 200 TABLET ORAL at 21:14

## 2023-07-09 RX ADMIN — CEFTRIAXONE SODIUM 2 G: 2 INJECTION, POWDER, FOR SOLUTION INTRAMUSCULAR; INTRAVENOUS at 14:01

## 2023-07-09 RX ADMIN — THIAMINE HYDROCHLORIDE 500 MG: 100 INJECTION, SOLUTION INTRAMUSCULAR; INTRAVENOUS at 14:39

## 2023-07-09 RX ADMIN — THIAMINE HYDROCHLORIDE 500 MG: 100 INJECTION, SOLUTION INTRAMUSCULAR; INTRAVENOUS at 22:35

## 2023-07-09 RX ADMIN — Medication 1 MG: at 12:08

## 2023-07-09 RX ADMIN — MUPIROCIN 1 APPLICATION: 20 OINTMENT TOPICAL at 21:16

## 2023-07-09 RX ADMIN — LOSARTAN POTASSIUM 25 MG: 25 TABLET, FILM COATED ORAL at 21:15

## 2023-07-09 RX ADMIN — HALOPERIDOL LACTATE 5 MG: 5 INJECTION, SOLUTION INTRAMUSCULAR at 05:16

## 2023-07-09 RX ADMIN — HEPARIN SODIUM 5000 UNITS: 5000 INJECTION INTRAVENOUS; SUBCUTANEOUS at 14:09

## 2023-07-09 RX ADMIN — INSULIN DETEMIR 15 UNITS: 100 INJECTION, SOLUTION SUBCUTANEOUS at 21:14

## 2023-07-09 RX ADMIN — Medication 10 ML: at 12:06

## 2023-07-09 RX ADMIN — INSULIN LISPRO 2 UNITS: 100 INJECTION, SOLUTION INTRAVENOUS; SUBCUTANEOUS at 18:21

## 2023-07-09 RX ADMIN — HEPARIN SODIUM 5000 UNITS: 5000 INJECTION INTRAVENOUS; SUBCUTANEOUS at 21:14

## 2023-07-09 RX ADMIN — Medication 10 ML: at 12:13

## 2023-07-09 RX ADMIN — HALOPERIDOL LACTATE 4 MG: 5 INJECTION, SOLUTION INTRAMUSCULAR at 22:37

## 2023-07-09 RX ADMIN — DOXYCYCLINE 100 MG: 100 INJECTION, POWDER, LYOPHILIZED, FOR SOLUTION INTRAVENOUS at 11:37

## 2023-07-09 RX ADMIN — AMLODIPINE BESYLATE 10 MG: 10 TABLET ORAL at 12:08

## 2023-07-09 RX ADMIN — INSULIN LISPRO 2 UNITS: 100 INJECTION, SOLUTION INTRAVENOUS; SUBCUTANEOUS at 21:14

## 2023-07-09 RX ADMIN — INSULIN LISPRO 6 UNITS: 100 INJECTION, SOLUTION INTRAVENOUS; SUBCUTANEOUS at 14:09

## 2023-07-09 RX ADMIN — LAMOTRIGINE 200 MG: 100 TABLET ORAL at 12:08

## 2023-07-09 RX ADMIN — PANTOPRAZOLE SODIUM 40 MG: 40 INJECTION, POWDER, FOR SOLUTION INTRAVENOUS at 12:08

## 2023-07-09 RX ADMIN — ATENOLOL 25 MG: 25 TABLET ORAL at 12:10

## 2023-07-09 RX ADMIN — LAMOTRIGINE 200 MG: 100 TABLET ORAL at 21:15

## 2023-07-09 RX ADMIN — ESCITALOPRAM OXALATE 20 MG: 10 TABLET ORAL at 12:08

## 2023-07-09 RX ADMIN — Medication 15 ML: at 12:07

## 2023-07-09 RX ADMIN — Medication 250 MG: at 12:12

## 2023-07-09 RX ADMIN — THIAMINE HYDROCHLORIDE 500 MG: 100 INJECTION, SOLUTION INTRAMUSCULAR; INTRAVENOUS at 04:46

## 2023-07-09 NOTE — PROGRESS NOTES
Pulmonary / Critical Care Progress Note      Patient Name: Carlos Murphy Jr.  : 1960  MRN: [id number]  Attending:  Ye Roach MD   Date of admission: 2023    Subjective   Subjective   Patient critically ill with altered mental status, hypernatremia, encephalopathy    Over past 24 hours: Patient remained in ICU, less agitated, received Haldol x1 overnight, on insulin drip, core track in place receiving free water and tube feeds.  Cultures negative thus far, remains on antibiotics, tickborne panel pending    This morning  Patient more awake and alert today, answering some questions  Has horizontal nystagmus  Tmax 99.0  Laboratory values pending  Urine output 5200/24 hours  Pulled core track tube out this morning    Review of Systems  unable to obtain due to patient status      Objective   Objective     Vitals:   Vital signs for last 24 hours:  Temp:  [97.4 °F (36.3 °C)-99.2 °F (37.3 °C)] 97.4 °F (36.3 °C)  Heart Rate:  [56-71] 60  Resp:  [20-24] 22  BP: (112-184)/() 179/63    Intake/Output last 3 shifts:  I/O last 3 completed shifts:  In: 9996 [I.V.:3785; Other:4413; NG/GT:1798]  Out: 6000 [Urine:6000]  Intake/Output this shift:  I/O this shift:  In: -   Out: 1000 [Urine:1000]    Vent settings for last 24 hours:       Hemodynamic parameters for last 24 hours:       Physical Exam   Vital Signs Reviewed   Patient awake, no acute distress, on room air  HEENT:  PERRL, EOMI. core track in place  Chest:  good aeration, clear to auscultation bilaterally, tympanic to percussion bilaterally, no work of breathing noted  CV: RRR, no MGR, pulses 2+, equal.  Abd:  Soft, NT, ND, + BS, no HSM  EXT:  no clubbing, no cyanosis, no edema  Neuro:  A&Ox1, patient awake, does follow some simple commands, moves all 4 extremities  Skin: No rashes or lesions noted            Result Review    Result Review:  I have personally reviewed the results from the time of this admission to 2023 09:49 EDT and agree with  these findings:  [x]  Laboratory  [x]  Microbiology  [x]  Radiology  []  EKG/Telemetry   [x]  Cardiology/Vascular   []  Pathology  []  Old records  []  Other:  Most notable findings include:       Lab 07/09/23  0324 07/08/23  0256 07/07/23  1302 07/07/23  0308 07/06/23  1015 07/06/23  0516 07/05/23  0440 07/04/23  0401 07/03/23  0613 07/03/23  0433   WBC 13.62* 18.68*  --  16.65*  --  14.77* 12.38* 12.58* 11.43*  --    HEMOGLOBIN 13.3 11.2*  --  12.9*  --  12.4* 12.1* 11.3* 11.2*  --    HEMATOCRIT 45.4 34.3*  --  39.1  --  36.0* 38.0 33.5* 32.0*  --    PLATELETS 171 220  --  285  --  325 293 273 213  --    SODIUM  --  147* 155* 158* 155* 151* 144 140  --  136   POTASSIUM  --  3.4* 3.1* 2.9* 3.0* 3.0* 3.2* 3.5  --  3.6   CHLORIDE  --  112* 116* 117* 111* 107 97* 94*  --  95*   CO2  --  24.7 26.8 24.6 24.6 24.6 22.1 28.0  --  24.8   BUN  --  50* 63* 75* 76* 82* 69* 65*  --  72*   CREATININE  --  2.18* 2.56* 2.64* 2.93* 2.98* 2.74* 2.75*  --  3.38*   GLUCOSE  --  157* 168* 209* 345* 351* 272* 197*  --  195*   CALCIUM  --  8.7 9.0 9.4 9.7 9.6 9.6 9.6  --  9.0   PHOSPHORUS  --  2.3*  --  3.6  --  1.8* 4.0 4.3  --   --    TOTAL PROTEIN  --   --   --  6.7  --   --  7.2  --   --  6.6   ALBUMIN  --  3.1*  --  3.6  --   --  3.8 3.9  --  3.8   GLOBULIN  --   --   --  3.1  --   --  3.4  --   --   --          Assessment & Plan   Assessment / Plan     Active Hospital Problems:  Active Hospital Problems    Diagnosis     **Acute renal failure, unspecified acute renal failure type     Onychomycosis     Onychocryptosis     Foreign body in left foot     Peripheral neuropathy     Charcot foot due to diabetes mellitus     Foot pain, bilateral          Impression:  Altered mental status  Metabolic encephalopathy  Seizure disorder  History of bipolar and schizo affective disorder  Acute kidney injury, on CKD stage III  Type 2 diabetes with hyperglycemia  Iron deficiency  anemia  Rhabdomyolysis  Hypokalemia  Hypophosphatemia  Hypertension  Hypernatremia, clinically significant       Plan:  -Patient on room air  -Continue aspiration precautions  -Patient removed core track this morning, okay to have bedside speech evaluation, can advance diet if cleared  -Okay to continue dextrose 200 cc/h  -We will transition off insulin drip, start COLLADO 15 units twice daily, sliding scale  -Follow sodium level  -Renal function improving, nephrology following  -Labs currently pending, will follow  -Studies from lumbar puncture, negative thus far  -Continue Rocephin and vancomycin  -Placed on doxycycline, tickborne panel pending, less likely, can DC once PCR has resulted  -Teleneurology is following  -EEG showed moderate to diffuse encephalopathy 7/6  -Continue thiamine, and multivitamin and folic acid  -Continue CIWA protocol, as needed Ativan   -Continue Depakote  -Continue lexapro, Seroquel    DVT prophylaxis:  Medical and mechanical DVT prophylaxis orders are present.    CODE STATUS:   Level Of Support Discussed With: Patient  Code Status (Patient has no pulse and is not breathing): CPR (Attempt to Resuscitate)  Medical Interventions (Patient has pulse or is breathing): Full Support    IDiana APRN, spent 16 minutes critical care time in accordance to split shared billing.    Electronically signed by IMAN Bullard, 07/09/23, 9:56 AM EDT.    Patient is critically ill in intensive care unit with altered mental status, metabolic encephalopathy, acute on chronic kidney disease, hyponatremia, rhabdomyolysis, history of seizure disorder, schizoaffective disorder, on psychiatric medications. Multidisciplinary bedside critical care rounds were performed with nursing staff, respiratory therapy, pharmacy, nutritional services, social work. I have personally reviewed the chart, labs and any pertinent imaging available. We have spent 35 minutes of critical care time, excluding  procedures, in the care of this patient.  I, Dr. Benedict Goss, spent 19 mins of critical care time according to split shared billing guidelines.     Electronically signed by Benedict Goss MD, 07/09/23, 3:16 PM EDT.

## 2023-07-09 NOTE — PROGRESS NOTES
" LOS: 9 days   Patient Care Team:  Felix Atkinson MD as PCP - General  Felix Atkinson MD as PCP - Family Medicine    Chief Complaint: MK/CKD    Subjective     Weakness - Generalized    Events noted.  Patient is more responsive,   Fluctuating blood pressure, excellent urine output.    Not following commands .    Subjective    History taken from: patient chart    Objective     Vital Sign Min/Max for last 24 hours  Temp  Min: 97.4 °F (36.3 °C)  Max: 99.9 °F (37.7 °C)   BP  Min: 93/71  Max: 204/111   Pulse  Min: 50  Max: 83   Resp  Min: 22  Max: 24   SpO2  Min: 67 %  Max: 100 %   No data recorded   No data recorded     Flowsheet Rows      Flowsheet Row First Filed Value   Admission Height 182.9 cm (72\") Documented at 06/30/2023 1800   Admission Weight 133 kg (294 lb 1.5 oz) Documented at 06/30/2023 2300            No intake/output data recorded.  I/O last 3 completed shifts:  In: 5339 [I.V.:1927; Other:2412; NG/GT:1000]  Out: 7250 [Urine:7250]    Objective:  General Appearance:  Comfortable and not in pain.    Vital signs: (most recent): Blood pressure 173/92, pulse 75, temperature 99.9 °F (37.7 °C), temperature source Axillary, resp. rate 22, height 182.9 cm (72\"), weight 133 kg (294 lb 1.5 oz), SpO2 93 %.  Vital signs are normal.    Output: Producing urine.    HEENT: Normal HEENT exam.    Lungs:  Normal effort and normal respiratory rate.    Heart: Normal rate.  Regular rhythm.    Abdomen: Abdomen is soft.  Bowel sounds are normal.   There is no abdominal tenderness.     Extremities: Normal range of motion.  There is no dependent edema.  (Marley in place.)  Pulses: Distal pulses are intact.    Neurological: Patient is alert.    Skin:  Warm and dry.  There is ecchymosis.  (Scabs over knees and toes.)        Results Review:     I reviewed the patient's new clinical results.    WBC WBC   Date Value Ref Range Status   07/09/2023 13.62 (H) 3.40 - 10.80 10*3/mm3 Final   07/08/2023 18.68 (H) 3.40 - 10.80 10*3/mm3 Final "   07/07/2023 16.65 (H) 3.40 - 10.80 10*3/mm3 Final      HGB Hemoglobin   Date Value Ref Range Status   07/09/2023 13.3 13.0 - 17.7 g/dL Final   07/08/2023 11.2 (L) 13.0 - 17.7 g/dL Final   07/07/2023 12.9 (L) 13.0 - 17.7 g/dL Final      HCT Hematocrit   Date Value Ref Range Status   07/09/2023 45.4 37.5 - 51.0 % Final   07/08/2023 34.3 (L) 37.5 - 51.0 % Final   07/07/2023 39.1 37.5 - 51.0 % Final      Platlets No results found for: LABPLAT   MCV MCV   Date Value Ref Range Status   07/09/2023 100.4 (H) 79.0 - 97.0 fL Final   07/08/2023 89.8 79.0 - 97.0 fL Final   07/07/2023 88.1 79.0 - 97.0 fL Final          Sodium Sodium   Date Value Ref Range Status   07/09/2023 140 136 - 145 mmol/L Final   07/08/2023 147 (H) 136 - 145 mmol/L Final   07/07/2023 155 (H) 136 - 145 mmol/L Final   07/07/2023 158 (H) 136 - 145 mmol/L Final      Potassium Potassium   Date Value Ref Range Status   07/09/2023 4.3 3.5 - 5.2 mmol/L Final   07/08/2023 3.4 (L) 3.5 - 5.2 mmol/L Final   07/07/2023 3.1 (L) 3.5 - 5.2 mmol/L Final   07/07/2023 2.9 (L) 3.5 - 5.2 mmol/L Final      Chloride Chloride   Date Value Ref Range Status   07/09/2023 105 98 - 107 mmol/L Final   07/08/2023 112 (H) 98 - 107 mmol/L Final   07/07/2023 116 (H) 98 - 107 mmol/L Final   07/07/2023 117 (H) 98 - 107 mmol/L Final      CO2 CO2   Date Value Ref Range Status   07/09/2023 21.3 (L) 22.0 - 29.0 mmol/L Final   07/08/2023 24.7 22.0 - 29.0 mmol/L Final   07/07/2023 26.8 22.0 - 29.0 mmol/L Final   07/07/2023 24.6 22.0 - 29.0 mmol/L Final      BUN BUN   Date Value Ref Range Status   07/09/2023 36 (H) 8 - 23 mg/dL Final   07/08/2023 50 (H) 8 - 23 mg/dL Final   07/07/2023 63 (H) 8 - 23 mg/dL Final   07/07/2023 75 (H) 8 - 23 mg/dL Final      Creatinine Creatinine   Date Value Ref Range Status   07/09/2023 1.85 (H) 0.76 - 1.27 mg/dL Final   07/08/2023 2.18 (H) 0.76 - 1.27 mg/dL Final   07/07/2023 2.56 (H) 0.76 - 1.27 mg/dL Final   07/07/2023 2.64 (H) 0.76 - 1.27 mg/dL Final       Calcium Calcium   Date Value Ref Range Status   07/09/2023 9.3 8.6 - 10.5 mg/dL Final   07/08/2023 8.7 8.6 - 10.5 mg/dL Final   07/07/2023 9.0 8.6 - 10.5 mg/dL Final   07/07/2023 9.4 8.6 - 10.5 mg/dL Final      PO4 No results found for: CAPO4   Albumin Albumin   Date Value Ref Range Status   07/08/2023 3.1 (L) 3.5 - 5.2 g/dL Final   07/07/2023 3.6 3.5 - 5.2 g/dL Final      Magnesium Magnesium   Date Value Ref Range Status   07/09/2023 2.2 1.6 - 2.4 mg/dL Final   07/08/2023 2.1 1.6 - 2.4 mg/dL Final   07/07/2023 2.3 1.6 - 2.4 mg/dL Final      Uric Acid No results found for: URICACID     Medication Review:   amLODIPine, 10 mg, Nasogastric, Q24H  atenolol, 25 mg, Nasogastric, Daily  cefTRIAXone, 2 g, Intravenous, Q12H  doxycycline, 100 mg, Intravenous, Q12H  escitalopram, 20 mg, Nasogastric, Daily  folic acid, 1 mg, Nasogastric, Daily  heparin (porcine), 5,000 Units, Subcutaneous, Q8H  insulin detemir, 15 Units, Subcutaneous, Q12H  insulin lispro, 2-9 Units, Subcutaneous, 4x Daily AC & at Bedtime  lamoTRIgine, 200 mg, Nasogastric, Q12H  losartan, 25 mg, Nasogastric, BID  mupirocin, 1 application , Topical, 2 times per day  pantoprazole, 40 mg, Intravenous, BID  QUEtiapine, 400 mg, Nasogastric, Nightly  saccharomyces boulardii, 250 mg, Nasogastric, BID  senna-docusate sodium, 2 tablet, Nasogastric, BID  sodium chloride, 10 mL, Intravenous, Q12H  sodium chloride, 10 mL, Intravenous, Q12H  thiamine (B-1) IV, 500 mg, Intravenous, Q8H  Tropical Liquid Nutrition, 15 mL, Nasogastric, Daily        Assessment & Plan       Acute renal failure, unspecified acute renal failure type    Onychomycosis    Onychocryptosis    Foreign body in left foot    Peripheral neuropathy    Charcot foot due to diabetes mellitus    Foot pain, bilateral      Assessment & Plan  MK/CKD3b-  Due to diabetic nephropathy.  He has had worsened renal function recently.  Had been on lisinopril and SGLT2 and kerendia.  Creatinine is better than baseline.  Volume status adequate.  Off bicarb drip and Lasix.  Baseline creatinine around 2.0-2.5.  Monitor.  Hyponatremia-  improved.  Now with hypernatremia  Hypernatremia, severe 158 yesterday, down to 140 today, receiving free water 150 cc every 4 hours.  D5W discontinued. Continue the same and reevaluate in a.m.    Hypokalemia, replaced.  Met Acidosis-  improved.  Off  bicarb.  Proteinuria-  diabetic nephropathy  DMII-  treated.  Was On jardiance, hold with MK.  HTN-  BP is better, continue losartan.  Chronic edema-  Amlodipine may be contributing.  on hold.  H/o bipolar-  previous lithium use.  H/o sz disorder-neurology evaluating.  Anemia-  tsat at 14%, ferritin 425. Received some IV iron.   Rhabdomyolysis-  had fall at home, statin on hold. improved now.  Weakness-  would avoid muscle relaxer in CKD(flexeril).  Likely polypharmacy contributing.  Discussed with nursing.  We will follow.      Caroline Mckenzie MD  07/09/23  19:31 EDT

## 2023-07-09 NOTE — PROGRESS NOTES
[facility]   Hospitalist Progress Note    Date of admission: 6/30/2023  Patient Name: Carlos Murphy Jr.  1960  Date: 7/9/2023      Subjective     Chief Complaint   Patient presents with    Weakness - Generalized       Summary: 62 y.o. with history of seizure disorder (on lamotrigine 200 bid), bipolar, schizophrenia, who presented with altered mentation/unresponsiveness on 6/28.  Patient apparently had called his sister initially had slurred speech/somnolent and received Narcan with notable initial improvement.  Patient apparently reported taking additional medications at home but was not trying to harm himself history is very limited given patient poor historian.  Patient underwent stroke work-up without acute findings suggest etiology of patient's AMS.  Did have hyperkalemia initially improved with fluids.  Nephrology assisting given MK and rhabdomyolysis.  Podiatry assisted given for infection/glasses feet and required several pieces being removed.  Hospital course completed by worsening mentation/agitation with concern initially for seizures multiple EEGs have been negative, neurology consulted for assistance, concern for possible medication withdrawal (outpt is on ambien tox screen was positive oxycodone only)/metabolic encephalopathy and was placed on CIWA monitoring and required ativan. LP obtained negative for acute meningitis, tick panel pending and treating empirically for now.    Interval Followup: pt more awake in interactive today but having episodes of extreme agitation requiring a dose of haldol overnight and restraints for pulling at lines/ng tube.  Family at bedside and discussed tx plan and monitoring.  They shared their concerns about his outpatient pain medications /home medication regimen.  He lives alone and they check on him but he takes his own medications and they aren't sure if he takes the doses consistently/if he has been taking additional doses which contribute to his  symptoms.      Patient noting pain from his sanches catheter but otherwise is still tired/agitated and doesn't want to answer significant additional questions.      Objective     Vitals:   Temp:  [97.4 °F (36.3 °C)-99.9 °F (37.7 °C)] 99.9 °F (37.7 °C)  Heart Rate:  [50-83] 75  Resp:  [20-24] 22  BP: ()/() 173/92    Physical Exam  More awake, agitated pulling at restraints, clenching his teeth/restless, appears to recognize family in room but not interacting with them currently, not participating with exam much but does follow commands to squeeze my hands, moves x4 extremities spontaneously  Ctab no wrr  Rrr no mrg  Abd soft nt nd   Le wounds/foot dressings with betadine appear improving      Result Review:  Vital signs, labs and recent relevant imaging reviewed.        acetaminophen    acetaminophen    senna-docusate sodium **AND** polyethylene glycol **AND** [DISCONTINUED] bisacodyl **AND** bisacodyl    dextrose    dextrose    glucagon (human recombinant)    haloperidol lactate    labetalol    LORazepam    LORazepam **OR** LORazepam **OR** LORazepam **OR** LORazepam **OR** LORazepam **OR** LORazepam    nitroglycerin    ondansetron    Pharmacy Consult - Pharmacy to dose    Pharmacy Consult - Pharmacy to dose    sodium chloride    sodium chloride    sodium chloride    sodium chloride    sodium chloride    sodium chloride    amLODIPine, 10 mg, Nasogastric, Q24H  atenolol, 25 mg, Nasogastric, Daily  cefTRIAXone, 2 g, Intravenous, Q12H  doxycycline, 100 mg, Intravenous, Q12H  escitalopram, 20 mg, Nasogastric, Daily  folic acid, 1 mg, Nasogastric, Daily  heparin (porcine), 5,000 Units, Subcutaneous, Q8H  insulin detemir, 15 Units, Subcutaneous, Q12H  insulin lispro, 2-9 Units, Subcutaneous, 4x Daily AC & at Bedtime  lamoTRIgine, 200 mg, Nasogastric, Q12H  losartan, 25 mg, Nasogastric, BID  mupirocin, 1 application , Topical, 2 times per day  pantoprazole, 40 mg, Intravenous, BID  QUEtiapine, 400 mg,  Nasogastric, Nightly  saccharomyces boulardii, 250 mg, Nasogastric, BID  senna-docusate sodium, 2 tablet, Nasogastric, BID  sodium chloride, 10 mL, Intravenous, Q12H  sodium chloride, 10 mL, Intravenous, Q12H  thiamine (B-1) IV, 500 mg, Intravenous, Q8H  Tropical Liquid Nutrition, 15 mL, Nasogastric, Daily    7/3 eeg  Impression:   Abnormal EEG because of:  1.  Intermittent medium voltage focal slow activity noted over the left frontotemporal region suggestive of neuronal dysfunction in this region.  This abnormalities have been reported in individuals with vascular insufficiency, migraine, or postictal state.  2.  Slow background activity mixed with slower waves compatible with moderate and diffuse encephalopathy.  3.  There were no epileptiform discharges noted today.    7/6 EEG moderate encephalopathy and no acute epileptic discharges noted    Assessment / Plan     Assessment/Plan:  Sepsis from undetermined etiology, possibly meningitis, vs tickborne ilness   Acute metabolic encephalopathy  MK on CKD  Severe hyperkalemia  Seizure disorder (on lamictal), with concern for subclinical seizure - negative on EEGs  Concern for withdrawal from outpatient ambien (tox + oxycodone only on admission)  Rhabdomyolysis  SSTI in setting of Foreign body in left foot/glass  Initial presentation with hyponatremia  Significant Hypernatremia  Anion gap metabolic acidosis  CVA ruled out  History of COPD  Type 2 diabetes mellitus  HTN  Bipolar disorder  Hyperlipidemia  Polypharmacy      LP obtained, meningitis panel negative, tick studies still pending (lyme negative others still pending), preliminary CSF cultures negative   Vancomycin has been stopped, continue ceftriaxone, wbc improving  Continue doxycycline for possible tickborne encephalopathy, additional studies still pending  Ciwa scores downtrending, no recent ativan, will dc as suspect causing more harm at this point; try to avoid bzd's unless having seizure activity (tox  screen negative bzd on admission despite outpt ambien)  Increase seroquel to 400mg, cont citalopram at 20mg daily  Will ask psychiatry to see tomorrow/dr diaz consulted (will notify in am) to assist with further titration for home regimen.    Continue seizure precautions, home lamotrigine 200 bid, discussed with neuro  Continue ivf with free water, hypernatremia improving, creatinine improving, monitor I/o closely; continue sanches catheter for now  Patient pulled ng, unfortunately not safe enough to remove restraints currently and need NG access for medications, replace for now, cont TF/free water flushes  Cont atenolol, losartan, amlodipine.  BP variable, suspect agitation contributing   Home lasix held while monitoring hypernatremia   Appreciate nephrology assistance with ambar/ckd  Minimize polypharmacy as able for discharge, will not continue muscle relaxer, ambien  Continue insulin drip for hyperglycemia in setting of d5  Continue local wound care  Delirium precautions/reoritentation  Continue gi ppx   Check a.m. CBC, BMP, magnesium, phosphorus and additional testing as above     Remains critically ill in the icu    DVT prophylaxis:  Medical and mechanical DVT prophylaxis orders are present.    Level Of Support Discussed With: Patient  Code Status (Patient has no pulse and is not breathing): CPR (Attempt to Resuscitate)  Medical Interventions (Patient has pulse or is breathing): Full Support        CBC          7/7/2023    03:08 7/8/2023    02:56 7/9/2023    03:24   CBC   WBC 16.65  18.68  13.62    RBC 4.44  3.82  4.52    Hemoglobin 12.9  11.2  13.3    Hematocrit 39.1  34.3  45.4    MCV 88.1  89.8  100.4    MCH 29.1  29.3  29.4    MCHC 33.0  32.7  29.3    RDW 13.0  12.9  13.1    Platelets 285  220  171      CMP          7/7/2023    03:08 7/7/2023    13:02 7/8/2023    02:56 7/9/2023    11:51   CMP   Glucose 209  168  157  299    BUN 75  63  50  36    Creatinine 2.64  2.56  2.18  1.85    EGFR 26.7  27.7  33.6  40.9     Sodium 158  155  147  140    Potassium 2.9  3.1  3.4  4.3    Chloride 117  116  112  105    Calcium 9.4  9.0  8.7  9.3    Total Protein 6.7       Albumin 3.6   3.1     Globulin 3.1       Total Bilirubin 0.3       Alkaline Phosphatase 132       AST (SGOT) 27       ALT (SGPT) 12       Albumin/Globulin Ratio 1.2       BUN/Creatinine Ratio 28.4  24.6  22.9  19.5    Anion Gap 16.4  12.2  10.3  13.7      Patient is critically ill due to sepsis/ams/hypernatremia, possible bzd withdrawl and mult issues as above.  I have spent >34 minutes of critical care time reviewing documentation, pertinent labs, imaging studies, examining the patient, modifying care plan, and discussing patient's condition and care plan with the pulmcrit team/rn, pt's family at bedside at length

## 2023-07-10 LAB
ANION GAP SERPL CALCULATED.3IONS-SCNC: 11.2 MMOL/L (ref 5–15)
BACTERIA SPEC AEROBE CULT: NORMAL
BACTERIA SPEC AEROBE CULT: NORMAL
BASOPHILS # BLD AUTO: 0.05 10*3/MM3 (ref 0–0.2)
BASOPHILS NFR BLD AUTO: 0.4 % (ref 0–1.5)
BUN SERPL-MCNC: 33 MG/DL (ref 8–23)
BUN/CREAT SERPL: 17.1 (ref 7–25)
CALCIUM SPEC-SCNC: 9 MG/DL (ref 8.6–10.5)
CHLORIDE SERPL-SCNC: 111 MMOL/L (ref 98–107)
CO2 SERPL-SCNC: 21.8 MMOL/L (ref 22–29)
CREAT SERPL-MCNC: 1.93 MG/DL (ref 0.76–1.27)
CYTO UR: NORMAL
DEPRECATED RDW RBC AUTO: 41.2 FL (ref 37–54)
EGFRCR SERPLBLD CKD-EPI 2021: 38.9 ML/MIN/1.73
EOSINOPHIL # BLD AUTO: 0.53 10*3/MM3 (ref 0–0.4)
EOSINOPHIL NFR BLD AUTO: 3.7 % (ref 0.3–6.2)
ERYTHROCYTE [DISTWIDTH] IN BLOOD BY AUTOMATED COUNT: 12.8 % (ref 12.3–15.4)
FOLATE SERPL-MCNC: >20 NG/ML (ref 4.78–24.2)
GLUCOSE BLDC GLUCOMTR-MCNC: 180 MG/DL (ref 70–99)
GLUCOSE BLDC GLUCOMTR-MCNC: 188 MG/DL (ref 70–99)
GLUCOSE BLDC GLUCOMTR-MCNC: 197 MG/DL (ref 70–99)
GLUCOSE BLDC GLUCOMTR-MCNC: 213 MG/DL (ref 70–99)
GLUCOSE SERPL-MCNC: 204 MG/DL (ref 65–99)
HCT VFR BLD AUTO: 34.6 % (ref 37.5–51)
HGB BLD-MCNC: 11.3 G/DL (ref 13–17.7)
IMM GRANULOCYTES # BLD AUTO: 0.13 10*3/MM3 (ref 0–0.05)
IMM GRANULOCYTES NFR BLD AUTO: 0.9 % (ref 0–0.5)
LAB AP CASE REPORT: NORMAL
LAB AP CLINICAL INFORMATION: NORMAL
LYMPHOCYTES # BLD AUTO: 1.67 10*3/MM3 (ref 0.7–3.1)
LYMPHOCYTES NFR BLD AUTO: 11.7 % (ref 19.6–45.3)
MAGNESIUM SERPL-MCNC: 2.5 MG/DL (ref 1.6–2.4)
MCH RBC QN AUTO: 28.8 PG (ref 26.6–33)
MCHC RBC AUTO-ENTMCNC: 32.7 G/DL (ref 31.5–35.7)
MCV RBC AUTO: 88.3 FL (ref 79–97)
MONOCYTES # BLD AUTO: 1.19 10*3/MM3 (ref 0.1–0.9)
MONOCYTES NFR BLD AUTO: 8.3 % (ref 5–12)
NEUTROPHILS NFR BLD AUTO: 10.7 10*3/MM3 (ref 1.7–7)
NEUTROPHILS NFR BLD AUTO: 75 % (ref 42.7–76)
NRBC BLD AUTO-RTO: 0 /100 WBC (ref 0–0.2)
PATH REPORT.FINAL DX SPEC: NORMAL
PATH REPORT.GROSS SPEC: NORMAL
PHOSPHATE SERPL-MCNC: 2.5 MG/DL (ref 2.5–4.5)
PLATELET # BLD AUTO: 218 10*3/MM3 (ref 140–450)
PMV BLD AUTO: 10.4 FL (ref 6–12)
POTASSIUM SERPL-SCNC: 4 MMOL/L (ref 3.5–5.2)
RBC # BLD AUTO: 3.92 10*6/MM3 (ref 4.14–5.8)
RICKETTSIA RICKETTSII DNA, RT: NOT DETECTED
SODIUM SERPL-SCNC: 144 MMOL/L (ref 136–145)
VIT B12 BLD-MCNC: 1638 PG/ML (ref 211–946)
WBC NRBC COR # BLD: 14.27 10*3/MM3 (ref 3.4–10.8)

## 2023-07-10 PROCEDURE — 99233 SBSQ HOSP IP/OBS HIGH 50: CPT | Performed by: INTERNAL MEDICINE

## 2023-07-10 PROCEDURE — 25010000002 HALOPERIDOL LACTATE PER 5 MG: Performed by: NURSE PRACTITIONER

## 2023-07-10 PROCEDURE — 63710000001 INSULIN LISPRO (HUMAN) PER 5 UNITS: Performed by: NURSE PRACTITIONER

## 2023-07-10 PROCEDURE — 83735 ASSAY OF MAGNESIUM: CPT | Performed by: INTERNAL MEDICINE

## 2023-07-10 PROCEDURE — 25010000002 HEPARIN (PORCINE) PER 1000 UNITS: Performed by: NURSE PRACTITIONER

## 2023-07-10 PROCEDURE — 25010000002 CEFTRIAXONE PER 250 MG: Performed by: INTERNAL MEDICINE

## 2023-07-10 PROCEDURE — 84100 ASSAY OF PHOSPHORUS: CPT | Performed by: INTERNAL MEDICINE

## 2023-07-10 PROCEDURE — 99291 CRITICAL CARE FIRST HOUR: CPT | Performed by: INTERNAL MEDICINE

## 2023-07-10 PROCEDURE — 82607 VITAMIN B-12: CPT | Performed by: NURSE PRACTITIONER

## 2023-07-10 PROCEDURE — 80048 BASIC METABOLIC PNL TOTAL CA: CPT | Performed by: INTERNAL MEDICINE

## 2023-07-10 PROCEDURE — 82746 ASSAY OF FOLIC ACID SERUM: CPT | Performed by: NURSE PRACTITIONER

## 2023-07-10 PROCEDURE — 84425 ASSAY OF VITAMIN B-1: CPT | Performed by: NURSE PRACTITIONER

## 2023-07-10 PROCEDURE — 82948 REAGENT STRIP/BLOOD GLUCOSE: CPT

## 2023-07-10 PROCEDURE — 25010000002 THIAMINE PER 100 MG: Performed by: INTERNAL MEDICINE

## 2023-07-10 PROCEDURE — 63710000001 INSULIN DETEMIR PER 5 UNITS: Performed by: NURSE PRACTITIONER

## 2023-07-10 PROCEDURE — 85025 COMPLETE CBC W/AUTO DIFF WBC: CPT | Performed by: INTERNAL MEDICINE

## 2023-07-10 RX ORDER — LOSARTAN POTASSIUM 25 MG/1
50 TABLET ORAL 2 TIMES DAILY
Status: DISCONTINUED | OUTPATIENT
Start: 2023-07-10 | End: 2023-07-13

## 2023-07-10 RX ORDER — QUETIAPINE FUMARATE 200 MG/1
600 TABLET, FILM COATED ORAL NIGHTLY
Status: DISCONTINUED | OUTPATIENT
Start: 2023-07-10 | End: 2023-07-13

## 2023-07-10 RX ORDER — DOXYCYCLINE 100 MG/1
100 CAPSULE ORAL EVERY 12 HOURS SCHEDULED
Status: DISCONTINUED | OUTPATIENT
Start: 2023-07-10 | End: 2023-07-13

## 2023-07-10 RX ADMIN — MUPIROCIN 1 APPLICATION: 20 OINTMENT TOPICAL at 21:14

## 2023-07-10 RX ADMIN — INSULIN LISPRO 2 UNITS: 100 INJECTION, SOLUTION INTRAVENOUS; SUBCUTANEOUS at 21:13

## 2023-07-10 RX ADMIN — LAMOTRIGINE 200 MG: 100 TABLET ORAL at 08:39

## 2023-07-10 RX ADMIN — QUETIAPINE FUMARATE 600 MG: 200 TABLET ORAL at 21:05

## 2023-07-10 RX ADMIN — INSULIN DETEMIR 15 UNITS: 100 INJECTION, SOLUTION SUBCUTANEOUS at 21:04

## 2023-07-10 RX ADMIN — Medication 15 ML: at 08:39

## 2023-07-10 RX ADMIN — INSULIN LISPRO 2 UNITS: 100 INJECTION, SOLUTION INTRAVENOUS; SUBCUTANEOUS at 11:21

## 2023-07-10 RX ADMIN — THIAMINE HYDROCHLORIDE 500 MG: 100 INJECTION, SOLUTION INTRAMUSCULAR; INTRAVENOUS at 23:27

## 2023-07-10 RX ADMIN — INSULIN LISPRO 4 UNITS: 100 INJECTION, SOLUTION INTRAVENOUS; SUBCUTANEOUS at 17:36

## 2023-07-10 RX ADMIN — HEPARIN SODIUM 5000 UNITS: 5000 INJECTION INTRAVENOUS; SUBCUTANEOUS at 05:02

## 2023-07-10 RX ADMIN — LAMOTRIGINE 200 MG: 100 TABLET ORAL at 21:05

## 2023-07-10 RX ADMIN — Medication 10 ML: at 21:13

## 2023-07-10 RX ADMIN — ESCITALOPRAM OXALATE 20 MG: 10 TABLET ORAL at 08:39

## 2023-07-10 RX ADMIN — THIAMINE HYDROCHLORIDE 500 MG: 100 INJECTION, SOLUTION INTRAMUSCULAR; INTRAVENOUS at 13:30

## 2023-07-10 RX ADMIN — THIAMINE HYDROCHLORIDE 500 MG: 100 INJECTION, SOLUTION INTRAMUSCULAR; INTRAVENOUS at 05:31

## 2023-07-10 RX ADMIN — ATENOLOL 25 MG: 25 TABLET ORAL at 08:39

## 2023-07-10 RX ADMIN — HEPARIN SODIUM 5000 UNITS: 5000 INJECTION INTRAVENOUS; SUBCUTANEOUS at 13:30

## 2023-07-10 RX ADMIN — DOXYCYCLINE 100 MG: 100 INJECTION, POWDER, LYOPHILIZED, FOR SOLUTION INTRAVENOUS at 08:38

## 2023-07-10 RX ADMIN — AMLODIPINE BESYLATE 10 MG: 10 TABLET ORAL at 08:39

## 2023-07-10 RX ADMIN — MUPIROCIN 1 APPLICATION: 20 OINTMENT TOPICAL at 10:30

## 2023-07-10 RX ADMIN — Medication 10 ML: at 08:40

## 2023-07-10 RX ADMIN — HALOPERIDOL LACTATE 4 MG: 5 INJECTION, SOLUTION INTRAMUSCULAR at 08:39

## 2023-07-10 RX ADMIN — PANTOPRAZOLE SODIUM 40 MG: 40 INJECTION, POWDER, FOR SOLUTION INTRAVENOUS at 21:05

## 2023-07-10 RX ADMIN — DOXYCYCLINE 100 MG: 100 CAPSULE ORAL at 21:05

## 2023-07-10 RX ADMIN — LOSARTAN POTASSIUM 25 MG: 25 TABLET, FILM COATED ORAL at 08:39

## 2023-07-10 RX ADMIN — Medication 250 MG: at 08:39

## 2023-07-10 RX ADMIN — Medication 1 MG: at 08:39

## 2023-07-10 RX ADMIN — CEFTRIAXONE SODIUM 2 G: 2 INJECTION, POWDER, FOR SOLUTION INTRAMUSCULAR; INTRAVENOUS at 00:18

## 2023-07-10 RX ADMIN — HEPARIN SODIUM 5000 UNITS: 5000 INJECTION INTRAVENOUS; SUBCUTANEOUS at 21:05

## 2023-07-10 RX ADMIN — CEFTRIAXONE SODIUM 2 G: 2 INJECTION, POWDER, FOR SOLUTION INTRAMUSCULAR; INTRAVENOUS at 11:21

## 2023-07-10 RX ADMIN — LABETALOL HYDROCHLORIDE 10 MG: 5 INJECTION, SOLUTION INTRAVENOUS at 08:12

## 2023-07-10 RX ADMIN — Medication 250 MG: at 21:05

## 2023-07-10 RX ADMIN — INSULIN DETEMIR 15 UNITS: 100 INJECTION, SOLUTION SUBCUTANEOUS at 08:39

## 2023-07-10 RX ADMIN — INSULIN LISPRO 2 UNITS: 100 INJECTION, SOLUTION INTRAVENOUS; SUBCUTANEOUS at 08:12

## 2023-07-10 RX ADMIN — PANTOPRAZOLE SODIUM 40 MG: 40 INJECTION, POWDER, FOR SOLUTION INTRAVENOUS at 08:39

## 2023-07-10 RX ADMIN — LOSARTAN POTASSIUM 50 MG: 50 TABLET, FILM COATED ORAL at 21:04

## 2023-07-10 NOTE — CONSULTS
" [facility]   PSYCHIATRIC CONSULTATION    Patient Name: Carlos Murphy Jr.  : 1960  MRN: [id number]  Primary Care Physician:  Felix Atkinson MD  Date of admission: 2023    Referring Provider: Dr. Roach  Reason for Consultation: Altered mental status, sedation    Subjective   Subjective     Chief Complaint: Patient does not voice why he is in the hospital    HPI:     Carlos Murphy Jr. is a 62 y.o. male admitted for altered mental status and confusion.  He has a history of COPD, seizure disorder, hypertension, hyperlipidemia, type 2 diabetes, as well as bipolar disorder.    Patient today is lying in bed and is in some slight discomfort.  He is restless, and is in bilateral wrist restraints.  He had been pulling out lines and tubes.  Patient also appears to be breathing heavy.  Nurse reports his presentation is stable and has been that way for some time.    Patient unable to fully participate in the exam.  Patient does not answer questions and is easily distracted.  Asked the patient if he is having why he is here in the hospital day and he responds, \"yep.\"  Other than that aliases \"whew\" or \"oh man.\"  Cannot provide any significant history.    Patient is on detox protocol and assume there is a history of alcohol misuse, but alcohol level was not obtained on admission.  He is on the detox protocol.  His social history indicates that he was a sometimes drinker.  He received multiple doses of lorazepam based on detox protocol, but this medication has since been discontinued and his last dose was 2 days ago on .  He had received 3 mg on 2012 milligrams on 2013 milligrams on 2010 milligrams on .  His increasing confusion and delirium could be due to lorazepam and possible active metabolites.  Since been discontinued in agreement that plan.    Patient cannot provide any history but it appears she seen an outpatient [facility].  It is " noted that he has a history of bipolar disorder.  He has previously been prescribed escitalopram, quetiapine, lamotrigine, and is recently receiving zolpidem.  There is concern about overuse of these medications, but does not appear he is gotten early prescriptions for them.      Review of Systems   All systems were reviewed and negative except for: Unable to participate    Personal History     Past Medical History:   Diagnosis Date    Acute kidney injury     2021 POST SEIZURES    Astigmatism of both eyes     eyes twitch left and right    Bipolar disorder     Charcot ankle     Closed nondisplaced fracture of medial malleolus of left tibia     COPD (chronic obstructive pulmonary disease)     USES INHALERS    Diabetes     BG RUNS AROUND 90'S IN AM    Hx of schizophrenia     Hyperlipidemia     Hypertension     SEEN DR ROD IN THE PAST, HAD APPT WITH DR MICHAUD IN 5-2022 CX APPT, DENIED CP/SOB    Neuropathy     Seizures     LAST ONE 4/21/22    Sleep apnea     DOES NOT USE CPAP    Varicose vein of leg        Past Surgical History:   Procedure Laterality Date    ANKLE OPEN REDUCTION INTERNAL FIXATION Left 04/25/2022    Procedure: LEFT OPEN REDUCTION INTERNAL FIXATION DISTAL TIBIA FRACTURE ;  Surgeon: Tha Parry MD;  [place] [facility] [place] [facility] [place]  Service: [place]opedics;  Laterality: Left;    ANKLE OPEN REDUCTION INTERNAL FIXATION Left 06/01/2022    Procedure: LEFT ANKLE OPEN REDUCTION INTERNAL FIXATION WITH SYNDESMOSIS FIXATION;  Surgeon: Tha Parry MD;  Location: [facility] OR;  Service: Orthopedics;  Laterality: Left;    CHOLECYSTECTOMY      COLONOSCOPY      JOINT REPLACEMENT      RTKR    LUMBAR DISC SURGERY      SHOULDER SURGERY Right        Past Psychiatric History:.  She has been under the care of [facility]    Psychiatric Hospitalizations: None known    Suicide Attempts: None known    Prior Treatment and Medications Tried: Quetiapine, lamotrigine, escitalopram, zolpidem, no other known  previous psychiatric medications prescribed        Family History: family history includes COPD in his father and mother; Heart disease in his father; Kidney disease in his mother. Otherwise pertinent FHx was reviewed and not pertinent to current issue.    Social History:     Social History     Socioeconomic History    Marital status:    Tobacco Use    Smoking status: Former     Packs/day: 0.50     Years: 35.00     Pack years: 17.50     Types: Cigarettes     Quit date:      Years since quittin.5    Smokeless tobacco: Never   Vaping Use    Vaping Use: Never used   Substance and Sexual Activity    Alcohol use: Yes     Comment: OCCASIONAL    Drug use: Never    Sexual activity: Defer       Substance Abuse History: reports that he quit smoking about 16 years ago. His smoking use included cigarettes. He has a 17.50 pack-year smoking history. He has never used smokeless tobacco. He reports current alcohol use. He reports that he does not use drugs.    Home Medications:  Finerenone, Plecanatide, QUEtiapine, Semaglutide(0.25 or 0.5MG/DOS), albuterol sulfate HFA, amLODIPine, aspirin, atenolol, atorvastatin, cyclobenzaprine, empagliflozin, escitalopram, furosemide, glipizide, hydrOXYzine, insulin aspart prot & aspart, lamoTRIgine, lisinopril, lubiprostone, multivitamin with minerals, oxyCODONE-acetaminophen, pantoprazole, pregabalin, tiotropium bromide-olodaterol, and zolpidem      Allergies:  No Known Allergies    Objective   Objective     Vitals:   Temp:  [97.9 °F (36.6 °C)-99.2 °F (37.3 °C)] 98.9 °F (37.2 °C)  Heart Rate:  [58-75] 72  Resp:  [23-28] 23  BP: (147-181)/() 168/79  Physical Exam       Mental Status Exam:     Patient is awake and alert but unable to participate meaningfully in interview.  He is restless and appears rather confused.    Hygiene:   good  Cooperation:   Inattentive, unable to participate in exam  Eye Contact:   Limited  Psychomotor Behavior:  Restless  Mood: Dysphoric  Affect:   Restricted and anxious  Speech:  Minimal and says very few words  Language: Few words  Thought Process:  Disorganized  Thought Content:   Unable to assess  Suicidal:  None  Homicidal:  None  Hallucinations:   Unable to fully assess but may be responding to internal stimuli  Delusion:  Other unable to assess  Memory:  Unable to evaluate  Orientation:  Unable to evaluate  Reliability:  poor  Insight:  Poor  Judgement:  Impaired  Impulse Control:  Impaired    Result Review    Result Review:  I have personally reviewed the results from the time of this admission to 7/10/2023 15:41 EDT and agree with these findings:  []  Laboratory  []  Microbiology  []  Radiology  []  EKG/Telemetry   []  Cardiology/Vascular   []  Pathology  []  Old records  []  Other:  Most notable findings include:     Assessment & Plan   Assessment / Plan     Brief Patient Summary:  Carlos Murphy Jr. is a 62 y.o. male who admitted for altered mental status    Active Hospital Problems:  Active Hospital Problems    Diagnosis     **Acute renal failure, unspecified acute renal failure type     Onychomycosis     Onychocryptosis     Foreign body in left foot     Peripheral neuropathy     Charcot foot due to diabetes mellitus     Foot pain, bilateral          Plan:   1) patient has a history of bipolar disorder we will discontinue Lexapro to simplify his regimen and if he does have bipolar disorder and this could be exacerbating the symptoms  2) give more time away from the lorazepam and not restart any benzodiazepines as it could be contributing to his delirium  3) we will increase quetiapine as it appears he has been stable on that for a long time  4) continue lamotrigine as ordered  5) no other recommendations at this time we will follow make treatment recommendations as indicated  6) this appears to be more metabolic not psychiatric in nature        Part of this note may be an electronic transcription/translation of spoken language to printed text using  the Dragon Dictation System.    Electronically signed by Wallace Murillo MD, 07/10/23, 3:41 PM EDT.

## 2023-07-10 NOTE — PROGRESS NOTES
" LOS: 10 days   Patient Care Team:  Felix Atkinson MD as PCP - General  Felix Atkinson MD as PCP - Family Medicine    Chief Complaint: MK/CKD    Subjective     Weakness - Generalized  Events noted.  Patient is more responsive,   Fluctuating blood pressure, excellent urine output.    Seen this am.     Subjective:  Symptoms:  Improved.      History taken from: patient chart    Objective     Vital Sign Min/Max for last 24 hours  Temp  Min: 97.9 °F (36.6 °C)  Max: 99.2 °F (37.3 °C)   BP  Min: 147/75  Max: 181/87   Pulse  Min: 58  Max: 75   Resp  Min: 22  Max: 28   SpO2  Min: 90 %  Max: 98 %   No data recorded   Weight  Min: 134 kg (295 lb 6.7 oz)  Max: 134 kg (295 lb 6.7 oz)     Flowsheet Rows      Flowsheet Row First Filed Value   Admission Height 182.9 cm (72\") Documented at 06/30/2023 1800   Admission Weight 133 kg (294 lb 1.5 oz) Documented at 06/30/2023 2300            No intake/output data recorded.  I/O last 3 completed shifts:  In: 2624 [I.V.:864; Other:800; NG/GT:960]  Out: 5250 [Urine:5250]    Objective:  General Appearance:  Comfortable, not in pain and in no acute distress.    Vital signs: (most recent): Blood pressure 153/71, pulse 69, temperature 98.1 °F (36.7 °C), temperature source Oral, resp. rate 22, height 182.9 cm (72\"), weight 134 kg (295 lb 6.7 oz), SpO2 90 %.  Vital signs are normal.    Output: Producing urine.    HEENT: Normal HEENT exam.    Lungs:  Normal effort and normal respiratory rate.    Heart: Normal rate.  Regular rhythm.    Abdomen: Abdomen is soft.  Bowel sounds are normal.   There is no abdominal tenderness.     Extremities: Normal range of motion.  There is no dependent edema.  (Marley in place.)  Pulses: Distal pulses are intact.    Neurological: Patient is alert.    Skin:  Warm and dry.  There is ecchymosis.  (Scabs over knees and toes.)        Results Review:     I reviewed the patient's new clinical results.    WBC WBC   Date Value Ref Range Status   07/10/2023 14.27 (H) " 3.40 - 10.80 10*3/mm3 Final   07/09/2023 13.62 (H) 3.40 - 10.80 10*3/mm3 Final   07/08/2023 18.68 (H) 3.40 - 10.80 10*3/mm3 Final      HGB Hemoglobin   Date Value Ref Range Status   07/10/2023 11.3 (L) 13.0 - 17.7 g/dL Final   07/09/2023 13.3 13.0 - 17.7 g/dL Final   07/08/2023 11.2 (L) 13.0 - 17.7 g/dL Final      HCT Hematocrit   Date Value Ref Range Status   07/10/2023 34.6 (L) 37.5 - 51.0 % Final   07/09/2023 45.4 37.5 - 51.0 % Final   07/08/2023 34.3 (L) 37.5 - 51.0 % Final      Platlets No results found for: LABPLAT   MCV MCV   Date Value Ref Range Status   07/10/2023 88.3 79.0 - 97.0 fL Final   07/09/2023 100.4 (H) 79.0 - 97.0 fL Final   07/08/2023 89.8 79.0 - 97.0 fL Final          Sodium Sodium   Date Value Ref Range Status   07/10/2023 144 136 - 145 mmol/L Final   07/09/2023 140 136 - 145 mmol/L Final   07/08/2023 147 (H) 136 - 145 mmol/L Final      Potassium Potassium   Date Value Ref Range Status   07/10/2023 4.0 3.5 - 5.2 mmol/L Final   07/09/2023 4.3 3.5 - 5.2 mmol/L Final   07/08/2023 3.4 (L) 3.5 - 5.2 mmol/L Final      Chloride Chloride   Date Value Ref Range Status   07/10/2023 111 (H) 98 - 107 mmol/L Final   07/09/2023 105 98 - 107 mmol/L Final   07/08/2023 112 (H) 98 - 107 mmol/L Final      CO2 CO2   Date Value Ref Range Status   07/10/2023 21.8 (L) 22.0 - 29.0 mmol/L Final   07/09/2023 21.3 (L) 22.0 - 29.0 mmol/L Final   07/08/2023 24.7 22.0 - 29.0 mmol/L Final      BUN BUN   Date Value Ref Range Status   07/10/2023 33 (H) 8 - 23 mg/dL Final   07/09/2023 36 (H) 8 - 23 mg/dL Final   07/08/2023 50 (H) 8 - 23 mg/dL Final      Creatinine Creatinine   Date Value Ref Range Status   07/10/2023 1.93 (H) 0.76 - 1.27 mg/dL Final   07/09/2023 1.85 (H) 0.76 - 1.27 mg/dL Final   07/08/2023 2.18 (H) 0.76 - 1.27 mg/dL Final      Calcium Calcium   Date Value Ref Range Status   07/10/2023 9.0 8.6 - 10.5 mg/dL Final   07/09/2023 9.3 8.6 - 10.5 mg/dL Final   07/08/2023 8.7 8.6 - 10.5 mg/dL Final      PO4 No results  found for: CAPO4   Albumin Albumin   Date Value Ref Range Status   07/08/2023 3.1 (L) 3.5 - 5.2 g/dL Final      Magnesium Magnesium   Date Value Ref Range Status   07/10/2023 2.5 (H) 1.6 - 2.4 mg/dL Final   07/09/2023 2.2 1.6 - 2.4 mg/dL Final   07/08/2023 2.1 1.6 - 2.4 mg/dL Final      Uric Acid No results found for: URICACID     Medication Review:   amLODIPine, 10 mg, Nasogastric, Q24H  atenolol, 25 mg, Nasogastric, Daily  doxycycline, 100 mg, Nasogastric, Q12H  folic acid, 1 mg, Nasogastric, Daily  heparin (porcine), 5,000 Units, Subcutaneous, Q8H  insulin detemir, 15 Units, Subcutaneous, Q12H  insulin lispro, 2-9 Units, Subcutaneous, 4x Daily AC & at Bedtime  lamoTRIgine, 200 mg, Nasogastric, Q12H  losartan, 50 mg, Nasogastric, BID  mupirocin, 1 application , Topical, 2 times per day  pantoprazole, 40 mg, Intravenous, BID  QUEtiapine, 600 mg, Nasogastric, Nightly  saccharomyces boulardii, 250 mg, Nasogastric, BID  senna-docusate sodium, 2 tablet, Nasogastric, BID  sodium chloride, 10 mL, Intravenous, Q12H  sodium chloride, 10 mL, Intravenous, Q12H  thiamine (B-1) IV, 500 mg, Intravenous, Q8H  Tropical Liquid Nutrition, 15 mL, Nasogastric, Daily        Assessment & Plan       Acute renal failure, unspecified acute renal failure type    Onychomycosis    Onychocryptosis    Foreign body in left foot    Peripheral neuropathy    Charcot foot due to diabetes mellitus    Foot pain, bilateral      Assessment & Plan  MK/CKD3b-  Due to diabetic nephropathy.  He has had worsened renal function recently.  Had been on lisinopril and SGLT2 and kerendia.  Creatinine is better than baseline. Volume status adequate.  Off bicarb drip and Lasix.  Baseline creatinine around 2.0-2.5.  Monitor.  Hypernatremia-  Na is up to 144. Peak was 158. Increase free water to 200cc q 3 hrs. (Was on 150cc q 4hr).   Hypokalemia, replaced.  Met Acidosis-  improved.  Off  bicarb.  Proteinuria-  diabetic nephropathy  DMII-  treated.  Was On  jardiance, hold with MK.  HTN-  BP is better, continue losartan.  Chronic edema-  Amlodipine may be contributing.  on hold.  H/o bipolar-  previous lithium use.  H/o sz disorder-neurology evaluating.  Anemia-  tsat at 14%, ferritin 425. Received some IV iron.   Rhabdomyolysis-  had fall at home, statin on hold. improved now.  Weakness-  would avoid muscle relaxer in CKD(flexeril).  Likely polypharmacy contributing.  Discussed with nursing and family.  We will follow.      Caroline Mckenzie MD  07/10/23  19:44 EDT

## 2023-07-10 NOTE — PROGRESS NOTES
[facility]   Hospitalist Progress Note    Date of admission: 6/30/2023  Patient Name: Carlos Murphy Jr.  1960  Date: 7/10/2023      Subjective     Chief Complaint   Patient presents with    Weakness - Generalized       Summary: 62 y.o. with history of seizure disorder (on lamotrigine 200 bid), bipolar, schizophrenia, who presented with altered mentation/unresponsiveness on 6/28.  Patient apparently had called his sister initially had slurred speech/somnolent and received Narcan with notable initial improvement.  Patient apparently reported taking additional medications at home but was not trying to harm himself history is very limited given patient poor historian.  Patient underwent stroke work-up without acute findings suggest etiology of patient's AMS.  Did have hyperkalemia initially improved with fluids.  Nephrology assisting given MK and rhabdomyolysis.  Podiatry assisted given for infection/glasses feet and required several pieces being removed.      Hospital course completed by worsening mentation/agitation with concern initially for seizures multiple EEGs have been negative, neurology consulted for assistance, concern for possible medication withdrawal (outpt is on ambien tox screen was positive oxycodone only)/metabolic encephalopathy and was placed on CIWA monitoring and required ativan. LP obtained negative for acute meningitis, tick panel pending and treating empirically for now.  Mentation starting to improve, gradually titrating medications, psychiatry consulted for additional assistance. Transferring out of the icu and trying to come out of restraints given current improvements.     Interval Followup: still having some agitation overnight, 1x haldol was given, doing much better at time of my evaluation.  Not agitated, able to talk with me today, somewhat limited, but pleasant.  Denies any acute pain apart from some discomfort from ng.  Notes dry mouth.  Pt has horizontal nystagmus (per  family this is chronic) - denies any vision complaints.  Denies soa/fevers      Objective     Vitals:   Temp:  [97.9 °F (36.6 °C)-99.2 °F (37.3 °C)] 98.1 °F (36.7 °C)  Heart Rate:  [58-75] 69  Resp:  [22-28] 22  BP: (147-181)/() 153/71    Physical Exam  Awake, conversant, not acutely agitated, answering basic questions appropriately, alert to hospital, self, year  Horizontal nystagmus to the left, (fairly frequent but sister states this is his baseline), perrl, no scl icterus  Ctab, no wrr  Rrr  Abd soft nt  Le wounds/foot dressings with betadine appear improving      Result Review:  Vital signs, labs and recent relevant imaging reviewed.        acetaminophen    acetaminophen    senna-docusate sodium **AND** polyethylene glycol **AND** [DISCONTINUED] bisacodyl **AND** bisacodyl    dextrose    dextrose    glucagon (human recombinant)    haloperidol lactate    labetalol    nitroglycerin    ondansetron    sodium chloride    sodium chloride    amLODIPine, 10 mg, Nasogastric, Q24H  atenolol, 25 mg, Nasogastric, Daily  folic acid, 1 mg, Nasogastric, Daily  heparin (porcine), 5,000 Units, Subcutaneous, Q8H  insulin detemir, 15 Units, Subcutaneous, Q12H  insulin lispro, 2-9 Units, Subcutaneous, 4x Daily AC & at Bedtime  lamoTRIgine, 200 mg, Nasogastric, Q12H  losartan, 50 mg, Nasogastric, BID  mupirocin, 1 application , Topical, 2 times per day  pantoprazole, 40 mg, Intravenous, BID  QUEtiapine, 600 mg, Nasogastric, Nightly  saccharomyces boulardii, 250 mg, Nasogastric, BID  senna-docusate sodium, 2 tablet, Nasogastric, BID  sodium chloride, 10 mL, Intravenous, Q12H  sodium chloride, 10 mL, Intravenous, Q12H  thiamine (B-1) IV, 500 mg, Intravenous, Q8H  Tropical Liquid Nutrition, 15 mL, Nasogastric, Daily    7/3 eeg  Impression:   Abnormal EEG because of:  1.  Intermittent medium voltage focal slow activity noted over the left frontotemporal region suggestive of neuronal dysfunction in this region.  This abnormalities  have been reported in individuals with vascular insufficiency, migraine, or postictal state.  2.  Slow background activity mixed with slower waves compatible with moderate and diffuse encephalopathy.  3.  There were no epileptiform discharges noted today.    7/6 EEG moderate encephalopathy and no acute epileptic discharges noted    Assessment / Plan     Assessment/Plan:  Sepsis from undetermined etiology, possible tickborne illness   Acute metabolic encephalopathy  MK on CKD  Severe hyperkalemia  Seizure disorder (on lamictal), with concern for subclinical seizure - negative on EEGs  Concern for withdrawal from outpatient ambien (tox + oxycodone only on admission)  Rhabdomyolysis  SSTI in setting of Foreign body in left foot/glass  Initial presentation with hyponatremia  Significant Hypernatremia  Anion gap metabolic acidosis  CVA ruled out  History of COPD  Type 2 diabetes mellitus  HTN  Bipolar disorder  Hyperlipidemia  Polypharmacy  Horizontal nystagmus - reportedly chronic/question congenital nystagmus    Negative LP/meningitis/csf cx/blood culture, lyme and rickettsia  Dc ceftriaxone, has had adequate course to also cover initial celluitis/ssti of his feet   Continue doxycycline for now until remainder of tick studies result: ehrlichia, rmsf, babeisa pending  Monitor for fevers/symptoms repeat cbc in am, wbc slight increase  Psychiatry consulted appreciate assistance - discontinuing citalopram and increasing seroquel   Continue home lamotrigine (takes for seizures)   Avoid benzos, will not resume home ambien or flexeril, minimizing polypharmacy   Speech re-evaluation, try to remove NG and advance diet,   Agitation improving, remove restraints later if remains calm, did require haldol earlier   Completing iv thiamine course  Voiding trial/remove sanches  Cont atenolol, losartan, amlodipine.  BP variable, suspect agitation contributing, monitor, prn iv labetalol with parameters   Home lasix held while monitoring  hypernatremia   Appreciate nephrology assistance with ambar/ckd/hypernatremia  Insulin drip --> levemir, ssi  Monitor closely once tube feeds stopped depending on po intake  Continue local wound care  Delirium precautions/reoritentation  Continue gi ppx   Transfer out of icu   Check a.m. CBC, BMP, magnesium, phosphorus and additional testing as above       DVT prophylaxis:  Medical and mechanical DVT prophylaxis orders are present.    Level Of Support Discussed With: Patient  Code Status (Patient has no pulse and is not breathing): CPR (Attempt to Resuscitate)  Medical Interventions (Patient has pulse or is breathing): Full Support        CBC          7/8/2023    02:56 7/9/2023    03:24 7/10/2023    02:47   CBC   WBC 18.68  13.62  14.27    RBC 3.82  4.52  3.92    Hemoglobin 11.2  13.3  11.3    Hematocrit 34.3  45.4  34.6    MCV 89.8  100.4  88.3    MCH 29.3  29.4  28.8    MCHC 32.7  29.3  32.7    RDW 12.9  13.1  12.8    Platelets 220  171  218      CMP          7/8/2023    02:56 7/9/2023    11:51 7/10/2023    02:47   CMP   Glucose 157  299  204    BUN 50  36  33    Creatinine 2.18  1.85  1.93    EGFR 33.6  40.9  38.9    Sodium 147  140  144    Potassium 3.4  4.3  4.0    Chloride 112  105  111    Calcium 8.7  9.3  9.0    Albumin 3.1      BUN/Creatinine Ratio 22.9  19.5  17.1    Anion Gap 10.3  13.7  11.2

## 2023-07-10 NOTE — PROGRESS NOTES
Pulmonary / Critical Care Progress Note      Patient Name: Carlos Murphy Jr.  : 1960  MRN: [id number]  Attending:  Ye Roach MD   Date of admission: 2023    Subjective   Subjective   Patient critically ill with hypernatremia, altered mental status, bipolar/schizophrenia    Patient overall mental status improving  Hypernatremia improved, free water per NG tube  Renal function improving  Received Haldol overnight x1 for agitation  This morning patient with notable horizontal nystagmus  Family at bedside, reports he has nystagmus at baseline  Blood pressure slightly elevated, received as needed labetalol    Review of Systems  Constitutional symptoms:  Denied complaints   Cardiovascular:  Denied complaints  Respiratory: Denied complaints  Gastrointestinal: Denied complaints  Musculoskeletal: Denied complaints  Genitourinary: Denied complaints  Neurologic: Denied complaints        Objective   Objective     Vitals:   Vital signs for last 24 hours:  Temp:  [97.9 °F (36.6 °C)-99.9 °F (37.7 °C)] 98.9 °F (37.2 °C)  Heart Rate:  [57-78] 69  Resp:  [23-28] 23  BP: ()/() 170/79    Intake/Output last 3 shifts:  I/O last 3 completed shifts:  In: 864 [I.V.:864]  Out: 5200 [Urine:5200]  Intake/Output this shift:  I/O this shift:  In: -   Out: 1450 [Urine:1450]    Vent settings for last 24 hours:       Hemodynamic parameters for last 24 hours:       Physical Exam   Vital Signs Reviewed   Patient awake, no acute distress, on room air  HEENT:  PERRL, EOMI. core track in place  Chest:  good aeration, clear to auscultation bilaterally, tympanic to percussion bilaterally, no work of breathing noted  CV: RRR, no MGR, pulses 2+, equal.  Abd:  Soft, NT, ND, + BS, no HSM  EXT:  no clubbing, no cyanosis, no edema  Neuro:  A&Ox2, patient awake, does follow some simple commands, moves all 4 extremities, horizontal nystagmus  Skin: No rashes or lesions noted      Result Review    Result Review:  I have personally  reviewed the results from the time of this admission to 7/10/2023 12:40 EDT and agree with these findings:  [x]  Laboratory  [x]  Microbiology  [x]  Radiology  []  EKG/Telemetry   [x]  Cardiology/Vascular   []  Pathology  []  Old records  []  Other:  Most notable findings include:       Lab 07/10/23  0247 07/09/23  1151 07/09/23  0324 07/08/23  0256 07/07/23  1302 07/07/23  0308 07/06/23  1015 07/06/23  0516 07/05/23  0440 07/04/23  0401   WBC 14.27*  --  13.62* 18.68*  --  16.65*  --  14.77* 12.38* 12.58*   HEMOGLOBIN 11.3*  --  13.3 11.2*  --  12.9*  --  12.4* 12.1* 11.3*   HEMATOCRIT 34.6*  --  45.4 34.3*  --  39.1  --  36.0* 38.0 33.5*   PLATELETS 218  --  171 220  --  285  --  325 293 273   SODIUM 144 140  --  147* 155* 158* 155* 151* 144 140   POTASSIUM 4.0 4.3  --  3.4* 3.1* 2.9* 3.0* 3.0* 3.2* 3.5   CHLORIDE 111* 105  --  112* 116* 117* 111* 107 97* 94*   CO2 21.8* 21.3*  --  24.7 26.8 24.6 24.6 24.6 22.1 28.0   BUN 33* 36*  --  50* 63* 75* 76* 82* 69* 65*   CREATININE 1.93* 1.85*  --  2.18* 2.56* 2.64* 2.93* 2.98* 2.74* 2.75*   GLUCOSE 204* 299*  --  157* 168* 209* 345* 351* 272* 197*   CALCIUM 9.0 9.3  --  8.7 9.0 9.4 9.7 9.6 9.6 9.6   PHOSPHORUS 2.5  --   --  2.3*  --  3.6  --  1.8* 4.0 4.3   TOTAL PROTEIN  --   --   --   --   --  6.7  --   --  7.2  --    ALBUMIN  --   --   --  3.1*  --  3.6  --   --  3.8 3.9   GLOBULIN  --   --   --   --   --  3.1  --   --  3.4  --            Assessment & Plan   Assessment / Plan     Active Hospital Problems:  Active Hospital Problems    Diagnosis     **Acute renal failure, unspecified acute renal failure type     Onychomycosis     Onychocryptosis     Foreign body in left foot     Peripheral neuropathy     Charcot foot due to diabetes mellitus     Foot pain, bilateral        Impression:  Altered mental status  Metabolic encephalopathy  Seizure disorder  History of bipolar and schizo affective disorder  Acute kidney injury, on CKD stage III  Type 2 diabetes with  hyperglycemia  Iron deficiency anemia  Rhabdomyolysis  Hypokalemia  Hypophosphatemia  Hypertension  Hypernatremia, clinically significant       Plan:    -Patient on room air  -Core track in place, receiving tube feeds and free water  -Have speech therapy evaluate patient, advance diet as tolerated  -Would like to discontinue Marley catheter, and take patient out of restraints today  -We will increase losartan 50 twice daily  -Continue COLLADO/SSI  -Cultures negative thus far, de-escalate antibiotics at this time  -Neurology following  -Continue Lexapro, Seroquel, and Lamictal  -Consider having psychiatry evaluate patient as he has known psychiatric illness  Will defer to primary  -Continue thiamine, and multivitamin and folic acid  -Nephrology following    Okay for patient to move out of ICU  DVT prophylaxis:  Medical and mechanical DVT prophylaxis orders are present.    CODE STATUS:   Level Of Support Discussed With: Patient  Code Status (Patient has no pulse and is not breathing): CPR (Attempt to Resuscitate)  Medical Interventions (Patient has pulse or is breathing): Full Support    IDiana APRN, spent 15 minutes critical care time in accordance to [employer] billing.    Electronically signed by IMAN Bullard, 07/10/23, 12:45 PM EDT.    Patient is critically ill in intensive care unit with altered mental status, metabolic encephalopathy, acute on chronic kidney disease, hyponatremia, rhabdomyolysis, history of seizure disorder, schizoaffective disorder, on psychiatric medications. Multidisciplinary bedside critical care rounds were performed with nursing staff, respiratory therapy, pharmacy, nutritional services, social work. I have personally reviewed the chart, labs and any pertinent imaging available. We have spent 35 minutes of critical care time, excluding procedures, in the care of this patient.  I, Dr. Dr. Hemant Jenkins, spent 19 mins of critical care time according to [employer]  billing guidelines.     Electronically signed by Hemant Jenkins DO, 07/10/23, 2:29 PM EDT.

## 2023-07-10 NOTE — CONSULTS
"Nutrition Services    Patient Name: Carlos Murphy Jr.  YOB: 1960  MRN: [id number]  Admission date: 6/30/2023      CLINICAL NUTRITION ASSESSMENT      Reason for Assessment  Follow-up protocol, EN     H&P:    Past Medical History:   Diagnosis Date    Acute kidney injury     2021 POST SEIZURES    Astigmatism of both eyes     eyes twitch left and right    Bipolar disorder     Charcot ankle     Closed nondisplaced fracture of medial malleolus of left tibia     COPD (chronic obstructive pulmonary disease)     USES INHALERS    Diabetes     BG RUNS AROUND 90'S IN AM    Hx of schizophrenia     Hyperlipidemia     Hypertension     SEEN DR ROD IN THE PAST, HAD APPT WITH DR MICHAUD IN 5-2022 CX APPT, DENIED CP/SOB    Neuropathy     Seizures     LAST ONE 4/21/22    Sleep apnea     DOES NOT USE CPAP    Varicose vein of leg         Current Problems:   Active Hospital Problems    Diagnosis     **Acute renal failure, unspecified acute renal failure type     Onychomycosis     Onychocryptosis     Foreign body in left foot     Peripheral neuropathy     Charcot foot due to diabetes mellitus     Foot pain, bilateral         Nutrition/Diet History         Narrative     Patient admitted with ARF.  Mental status is starting to improve.  More alert.   Tolerating enteral  nutrition well at this time.      Na+ 144 during this morning.  Down from 155 yesterday.  D5W has been discontinued.  Free water flushes via Cortrak have been decreased to 200 ml every 3 hours.       Anthropometrics        Current Height, Weight Height: 182.9 cm (72\")  Weight: 133 kg (294 lb 1.5 oz)   Current BMI Body mass index is 39.89 kg/m².       Weight Hx  Wt Readings from Last 30 Encounters:   06/30/23 2300 133 kg (294 lb 1.5 oz)   04/30/23 1344 134 kg (296 lb 1.2 oz)   09/19/22 1532 136 kg (299 lb 6.4 oz)   08/18/22 1337 132 kg (290 lb)   06/30/22 1407 131 kg (288 lb)   06/14/22 1309 131 kg (288 lb)   06/01/22 0920 136 kg (299 lb 2.6 oz)   05/31/22 0852 " 132 kg (290 lb)   05/27/22 0945 132 kg (290 lb)   05/24/22 1338 131 kg (289 lb)   05/10/22 1316 131 kg (289 lb)   04/25/22 0800 123 kg (271 lb)   04/22/22 1154 129 kg (284 lb 6.3 oz)   04/22/22 0941 129 kg (284 lb)   04/21/22 0917 129 kg (284 lb 9.8 oz)   04/13/22 1407 125 kg (276 lb)   07/13/21 1334 132 kg (290 lb)   03/15/21 0000 135 kg (297 lb)   02/18/21 0000 133 kg (293 lb 5 oz)   01/05/21 0000 135 kg (297 lb 4 oz)   12/03/20 0000 130 kg (287 lb)   11/06/20 0000 129 kg (285 lb)   09/11/20 0000 129 kg (285 lb)   05/20/19 0000 (!) 136 kg (300 lb 4 oz)   09/11/18 0000 136 kg (300 lb)   07/06/18 0000 (!) 137 kg (303 lb)   05/29/18 0000 (!) 139 kg (307 lb)   02/27/18 0000 136 kg (300 lb)            Wt Change Observation stable     Estimated/Assessed Needs       Energy Requirements 25-30 kcal/kg adj BW (91.4 kg)   EST Needs (kcal/day) 0507-4994 kcal        Protein Requirements 1.2-1.5 g/kg adj BW    EST Daily Needs (g/day) 110-137 g       Fluid Requirements 25 ml/kg adj BW    Estimated Needs (mL/day) 2285 ml      Labs/Medications         Pertinent Labs Reviewed.   Results from last 7 days   Lab Units 07/10/23  0247 07/09/23  1151 07/08/23  0256 07/07/23  1302 07/07/23  0308 07/06/23  0516 07/05/23  0440   SODIUM mmol/L 144 140 147*   < > 158*   < > 144   POTASSIUM mmol/L 4.0 4.3 3.4*   < > 2.9*   < > 3.2*   CHLORIDE mmol/L 111* 105 112*   < > 117*   < > 97*   CO2 mmol/L 21.8* 21.3* 24.7   < > 24.6   < > 22.1   BUN mg/dL 33* 36* 50*   < > 75*   < > 69*   CREATININE mg/dL 1.93* 1.85* 2.18*   < > 2.64*   < > 2.74*   CALCIUM mg/dL 9.0 9.3 8.7   < > 9.4   < > 9.6   BILIRUBIN mg/dL  --   --   --   --  0.3  --  0.4   ALK PHOS U/L  --   --   --   --  132*  --  150*   ALT (SGPT) U/L  --   --   --   --  12  --  25   AST (SGOT) U/L  --   --   --   --  27  --  19   GLUCOSE mg/dL 204* 299* 157*   < > 209*   < > 272*    < > = values in this interval not displayed.       Results from last 7 days   Lab Units 07/10/23  0247  07/09/23  1151 07/09/23  0324 07/08/23  0256   MAGNESIUM mg/dL 2.5* 2.2  --  2.1   PHOSPHORUS mg/dL 2.5  --   --  2.3*   HEMOGLOBIN g/dL 11.3*  --    < > 11.2*   HEMATOCRIT % 34.6*  --    < > 34.3*    < > = values in this interval not displayed.       Coronavirus (COVID-19)   Date Value Ref Range Status   03/25/2021 NOT DETECTED NA Final     Comment:     The SARS-CoV-2 assay is a real-time, RT-PCR test intended  for the qualitative detection of nucleic acid from the  SARS-CoV-2 in respiratory specimens from individuals,  testing performed at [facility].       Lab Results   Component Value Date    HGBA1C 8.3 (H) 11/10/2020         Pertinent Medications Reviewed.     Current Nutrition Orders & Evaluation of Intake       Oral Nutrition     Current PO Diet NPO Diet NPO Type: Strict NPO   Supplement Orders Placed This Encounter      Place Feeding Tube Per [employer] System      Diet, Tube Feeding Tube Feeding Formula: Diabetisource AC; Tube Feeding Type: Continuous; Continuous Tube Feeding Start Rate (mL/hr): 25; Then Advance Rate By (mL/hr): 25; Every __ Hours: 4; To Goal Rate of (mL/hr): 80       Malnutrition Severity Assessment                Nutrition Diagnosis         Nutrition Dx Problem 1 Inadequate energy Intake related to decreased ability to consume sufficient energy as evidenced by NPO       Nutrition Intervention         Diabetisource AC @ 80 ml/hr  Free water flushes 200 ml Q 3 hours   2304 kcal, 115 g pro, 1574 ml fluid     Provide 2509 kcal, 115 g pro, 3174 ml fluid      Medical Nutrition Therapy/Nutrition Education          Learner     Readiness Patient  Education not appropriate at this time     Method     Response N/A  N/A     Monitor/Evaluation        Monitor Per protocol, I&O, Pertinent labs, EN delivery/tolerance, Symptoms       Nutrition Discharge Plan         To be determined       Electronically signed by:  Yeni Pappas RD  07/10/23 12:48 EDT

## 2023-07-10 NOTE — PLAN OF CARE
Goal Outcome Evaluation:  Plan of Care Reviewed With: patient, daughter  Patient admitted this evening from CCU. Wrist restraints in use. Patient tolerating tube feeding well. Daughter at bedside.         Progress: improving

## 2023-07-11 LAB
ANION GAP SERPL CALCULATED.3IONS-SCNC: 11.6 MMOL/L (ref 5–15)
BACTERIA SPEC ANAEROBE CULT: NORMAL
BASOPHILS # BLD AUTO: 0.07 10*3/MM3 (ref 0–0.2)
BASOPHILS NFR BLD AUTO: 0.5 % (ref 0–1.5)
BUN SERPL-MCNC: 33 MG/DL (ref 8–23)
BUN/CREAT SERPL: 17.4 (ref 7–25)
CALCIUM SPEC-SCNC: 9.7 MG/DL (ref 8.6–10.5)
CHLORIDE SERPL-SCNC: 114 MMOL/L (ref 98–107)
CO2 SERPL-SCNC: 20.4 MMOL/L (ref 22–29)
CREAT SERPL-MCNC: 1.9 MG/DL (ref 0.76–1.27)
DEPRECATED RDW RBC AUTO: 43.2 FL (ref 37–54)
EGFRCR SERPLBLD CKD-EPI 2021: 39.6 ML/MIN/1.73
EOSINOPHIL # BLD AUTO: 0.35 10*3/MM3 (ref 0–0.4)
EOSINOPHIL NFR BLD AUTO: 2.5 % (ref 0.3–6.2)
ERYTHROCYTE [DISTWIDTH] IN BLOOD BY AUTOMATED COUNT: 13.1 % (ref 12.3–15.4)
GLUCOSE BLDC GLUCOMTR-MCNC: 180 MG/DL (ref 70–99)
GLUCOSE BLDC GLUCOMTR-MCNC: 229 MG/DL (ref 70–99)
GLUCOSE BLDC GLUCOMTR-MCNC: 264 MG/DL (ref 70–99)
GLUCOSE BLDC GLUCOMTR-MCNC: 272 MG/DL (ref 70–99)
GLUCOSE SERPL-MCNC: 216 MG/DL (ref 65–99)
HCT VFR BLD AUTO: 36.6 % (ref 37.5–51)
HGB BLD-MCNC: 11.5 G/DL (ref 13–17.7)
IMM GRANULOCYTES # BLD AUTO: 0.17 10*3/MM3 (ref 0–0.05)
IMM GRANULOCYTES NFR BLD AUTO: 1.2 % (ref 0–0.5)
LYMPHOCYTES # BLD AUTO: 1.73 10*3/MM3 (ref 0.7–3.1)
LYMPHOCYTES NFR BLD AUTO: 12.3 % (ref 19.6–45.3)
MAGNESIUM SERPL-MCNC: 2.8 MG/DL (ref 1.6–2.4)
MCH RBC QN AUTO: 28.8 PG (ref 26.6–33)
MCHC RBC AUTO-ENTMCNC: 31.4 G/DL (ref 31.5–35.7)
MCV RBC AUTO: 91.5 FL (ref 79–97)
MONOCYTES # BLD AUTO: 1.31 10*3/MM3 (ref 0.1–0.9)
MONOCYTES NFR BLD AUTO: 9.3 % (ref 5–12)
NEUTROPHILS NFR BLD AUTO: 10.39 10*3/MM3 (ref 1.7–7)
NEUTROPHILS NFR BLD AUTO: 74.2 % (ref 42.7–76)
NRBC BLD AUTO-RTO: 0 /100 WBC (ref 0–0.2)
PHOSPHATE SERPL-MCNC: 3.3 MG/DL (ref 2.5–4.5)
PLATELET # BLD AUTO: 255 10*3/MM3 (ref 140–450)
PMV BLD AUTO: 10.8 FL (ref 6–12)
POTASSIUM SERPL-SCNC: 4.3 MMOL/L (ref 3.5–5.2)
RBC # BLD AUTO: 4 10*6/MM3 (ref 4.14–5.8)
REAGIN AB CSF QL: NON REACTIVE
SODIUM SERPL-SCNC: 146 MMOL/L (ref 136–145)
WBC NRBC COR # BLD: 14.02 10*3/MM3 (ref 3.4–10.8)

## 2023-07-11 PROCEDURE — 99232 SBSQ HOSP IP/OBS MODERATE 35: CPT | Performed by: PSYCHIATRY & NEUROLOGY

## 2023-07-11 PROCEDURE — 83735 ASSAY OF MAGNESIUM: CPT | Performed by: INTERNAL MEDICINE

## 2023-07-11 PROCEDURE — 25010000002 THIAMINE PER 100 MG: Performed by: INTERNAL MEDICINE

## 2023-07-11 PROCEDURE — 80048 BASIC METABOLIC PNL TOTAL CA: CPT | Performed by: INTERNAL MEDICINE

## 2023-07-11 PROCEDURE — 82948 REAGENT STRIP/BLOOD GLUCOSE: CPT

## 2023-07-11 PROCEDURE — 63710000001 INSULIN DETEMIR PER 5 UNITS: Performed by: NURSE PRACTITIONER

## 2023-07-11 PROCEDURE — 99233 SBSQ HOSP IP/OBS HIGH 50: CPT | Performed by: INTERNAL MEDICINE

## 2023-07-11 PROCEDURE — 84100 ASSAY OF PHOSPHORUS: CPT | Performed by: INTERNAL MEDICINE

## 2023-07-11 PROCEDURE — 63710000001 INSULIN LISPRO (HUMAN) PER 5 UNITS: Performed by: NURSE PRACTITIONER

## 2023-07-11 PROCEDURE — 25010000002 HEPARIN (PORCINE) PER 1000 UNITS: Performed by: NURSE PRACTITIONER

## 2023-07-11 PROCEDURE — 85025 COMPLETE CBC W/AUTO DIFF WBC: CPT | Performed by: INTERNAL MEDICINE

## 2023-07-11 PROCEDURE — 92610 EVALUATE SWALLOWING FUNCTION: CPT

## 2023-07-11 RX ADMIN — QUETIAPINE FUMARATE 600 MG: 200 TABLET ORAL at 23:27

## 2023-07-11 RX ADMIN — LAMOTRIGINE 200 MG: 100 TABLET ORAL at 09:26

## 2023-07-11 RX ADMIN — AMLODIPINE BESYLATE 10 MG: 10 TABLET ORAL at 09:16

## 2023-07-11 RX ADMIN — INSULIN LISPRO 2 UNITS: 100 INJECTION, SOLUTION INTRAVENOUS; SUBCUTANEOUS at 17:12

## 2023-07-11 RX ADMIN — DOXYCYCLINE 100 MG: 100 CAPSULE ORAL at 23:27

## 2023-07-11 RX ADMIN — HEPARIN SODIUM 5000 UNITS: 5000 INJECTION INTRAVENOUS; SUBCUTANEOUS at 06:15

## 2023-07-11 RX ADMIN — INSULIN DETEMIR 15 UNITS: 100 INJECTION, SOLUTION SUBCUTANEOUS at 09:18

## 2023-07-11 RX ADMIN — LOSARTAN POTASSIUM 50 MG: 50 TABLET, FILM COATED ORAL at 09:17

## 2023-07-11 RX ADMIN — SENNOSIDES AND DOCUSATE SODIUM 2 TABLET: 50; 8.6 TABLET ORAL at 09:16

## 2023-07-11 RX ADMIN — THIAMINE HYDROCHLORIDE 500 MG: 100 INJECTION, SOLUTION INTRAMUSCULAR; INTRAVENOUS at 22:09

## 2023-07-11 RX ADMIN — Medication 15 ML: at 09:29

## 2023-07-11 RX ADMIN — INSULIN LISPRO 6 UNITS: 100 INJECTION, SOLUTION INTRAVENOUS; SUBCUTANEOUS at 12:17

## 2023-07-11 RX ADMIN — INSULIN DETEMIR 15 UNITS: 100 INJECTION, SOLUTION SUBCUTANEOUS at 22:07

## 2023-07-11 RX ADMIN — LAMOTRIGINE 200 MG: 100 TABLET ORAL at 22:07

## 2023-07-11 RX ADMIN — THIAMINE HYDROCHLORIDE 500 MG: 100 INJECTION, SOLUTION INTRAMUSCULAR; INTRAVENOUS at 06:45

## 2023-07-11 RX ADMIN — SENNOSIDES AND DOCUSATE SODIUM 2 TABLET: 50; 8.6 TABLET ORAL at 22:07

## 2023-07-11 RX ADMIN — Medication 10 ML: at 22:08

## 2023-07-11 RX ADMIN — INSULIN LISPRO 4 UNITS: 100 INJECTION, SOLUTION INTRAVENOUS; SUBCUTANEOUS at 09:17

## 2023-07-11 RX ADMIN — INSULIN LISPRO 6 UNITS: 100 INJECTION, SOLUTION INTRAVENOUS; SUBCUTANEOUS at 23:26

## 2023-07-11 RX ADMIN — Medication 250 MG: at 22:08

## 2023-07-11 RX ADMIN — HEPARIN SODIUM 5000 UNITS: 5000 INJECTION INTRAVENOUS; SUBCUTANEOUS at 15:24

## 2023-07-11 RX ADMIN — Medication 1 MG: at 09:17

## 2023-07-11 RX ADMIN — MUPIROCIN 1 APPLICATION: 20 OINTMENT TOPICAL at 22:09

## 2023-07-11 RX ADMIN — PANTOPRAZOLE SODIUM 40 MG: 40 INJECTION, POWDER, FOR SOLUTION INTRAVENOUS at 22:07

## 2023-07-11 RX ADMIN — Medication 250 MG: at 09:44

## 2023-07-11 RX ADMIN — ATENOLOL 25 MG: 25 TABLET ORAL at 09:44

## 2023-07-11 RX ADMIN — HEPARIN SODIUM 5000 UNITS: 5000 INJECTION INTRAVENOUS; SUBCUTANEOUS at 22:07

## 2023-07-11 RX ADMIN — PANTOPRAZOLE SODIUM 40 MG: 40 INJECTION, POWDER, FOR SOLUTION INTRAVENOUS at 09:18

## 2023-07-11 RX ADMIN — Medication 10 ML: at 09:19

## 2023-07-11 RX ADMIN — MUPIROCIN 1 APPLICATION: 20 OINTMENT TOPICAL at 09:27

## 2023-07-11 RX ADMIN — THIAMINE HYDROCHLORIDE 500 MG: 100 INJECTION, SOLUTION INTRAMUSCULAR; INTRAVENOUS at 15:25

## 2023-07-11 RX ADMIN — DOXYCYCLINE 100 MG: 100 CAPSULE ORAL at 09:26

## 2023-07-11 RX ADMIN — LOSARTAN POTASSIUM 50 MG: 50 TABLET, FILM COATED ORAL at 22:07

## 2023-07-11 NOTE — PROGRESS NOTES
"DOS: 2023  NAME: Carlos Murphy Jr.   : 1960  PCP: Felix Atkinson MD  Chief Complaint   Patient presents with    Weakness - Generalized       Chief complaint: Patient still not back to baseline and only answers partially.    He was able to follow only one-step commands.  He did not know where he was any could not tell me the month of the year.  He just answers in monosyllables most of the time.    Subjective: Performs only one-step commands.  Moves all his extremities well.  Has bruises all over as he was found laying on the floor for an unknown period of time.  He still has a nasogastric tube but he has passed his bedside swallow to modified texture diet.  He may not be a candidate for PEG tube placement as he has shown improvement.    Objective:  Vital signs: /82 (BP Location: Left arm, Patient Position: Lying)   Pulse 71   Temp 100.1 °F (37.8 °C) (Rectal)   Resp 18   Ht 182.9 cm (72\")   Wt 134 kg (295 lb 6.7 oz)   SpO2 91%   BMI 40.07 kg/m²    Gen: NAD, vitals reviewed  MS: He is only able to answer questions partially and follow only one-step commands.  CN: Moves all extremities well.  Once the restraints were removed he was able to  his arms well.  Motor: The lower extremities are weaker compared to the upper extremities but he is able to lift his legs off the bed.  Sensory: No sensory loss noted.  Coordination: Normal finger-to-nose coordination noted.  Heel-to-shin testing could not be performed because of the weakness of both lower extremities.  Gait: Could not be tested at this time.    ROS:  General: Pleasant gentleman seems to be more awake and cooperative compared to before.  Neurological: Still has weakness in both lower extremities and only follows one-step commands.    Laboratory results:  Lab Results   Component Value Date    GLUCOSE 216 (H) 2023    CALCIUM 9.7 2023     (H) 2023    K 4.3 2023    CO2 20.4 (L) 2023     (H) " 07/11/2023    BUN 33 (H) 07/11/2023    CREATININE 1.90 (H) 07/11/2023    EGFRIFNONA 73 07/24/2017    BCR 17.4 07/11/2023    ANIONGAP 11.6 07/11/2023     Lab Results   Component Value Date    WBC 14.02 (H) 07/11/2023    HGB 11.5 (L) 07/11/2023    HCT 36.6 (L) 07/11/2023    MCV 91.5 07/11/2023     07/11/2023     Lab Results   Component Value Date    LDL 40 (L) 03/26/2021    LDL 27 02/26/2016            Review of labs: White cell count is elevated at 14,200 the GFR is low at 39.6.    The CSF studies are as follows:     Latest Reference Range & Units Most Recent   Appearance, CSF Clear   Clear  Slightly Hazy !  7/6/23 13:18  Clear  7/6/23 13:18   Color, CSF Colorless   Colorless  Pink !  7/6/23 13:18  Pink !  7/6/23 13:18   Tube Number, CSF  1  7/6/23 13:18  4  7/6/23 13:18   RBC, CSF /mm3  /mm3 17,250.00  7/6/23 13:18  9,150.00  7/6/23 13:18   Neutrophils, CSF 0 - 5 %  0 - 5 % 54 (H)  7/6/23 13:18  10 (H)  7/6/23 13:18   Lymphocytes, CSF 40 - 80 %  40 - 80 % 37 (L)  7/6/23 13:18  58  7/6/23 13:18   Monocytes, CSF 15 - 45 %  15 - 45 % 9 (L)  7/6/23 13:18  32  7/6/23 13:18   NRBC 0.00 - 0.70 % 0.00  3/28/21 05:02   Total Nucleated Cells 0.00 - 5.00 /mm3  0.00 - 5.00 /mm3 10.00 (H)  7/6/23 13:18  10.00 (H)  7/6/23 13:18   Glucose, CSF 40 - 70 mg/dL 212 (H)  7/6/23 13:18   Oligoclonal Bands, CSF  Comment  7/6/23 13:18   Protein, Total (CSF) 15.0 - 45.0 mg/dL 58.1 (H)  7/6/23 13:18   !: Data is abnormal  (H): Data is abnormally high  (L): Data is abnormally low    The meningitis/encephalitis panel is as follows:    Component  Ref Range & Units    ESCHERICHIA COLI K1, PCR  Not Detected Not Detected   HAEMOPHILUS INFLUENZAE, PCR  Not Detected Not Detected   LISTERIA MONOCYTOGENES, PCR  Not Detected Not Detected   NEISSERIA MENINGITIDIS, PCR  Not Detected Not Detected   STREPTOCOCCUS AGALACTIAE, PCR  Not Detected Not Detected   STREPTOCOCCUS PNEUMONIAE, PCR  Not Detected Not Detected   CYTOMEGALOVIRUS (CMV), PCR  Not  Detected Not Detected   ENTEROVIRUS, PCR  Not Detected Not Detected   HERPES SIMPLEX VIRUS 1 (HSV-1), PCR  Not Detected Not Detected   HERPES SIMPLEX VIRUS 2 (HSV-2), PCR  Not Detected Not Detected   HUMAN PARECHOVIRUS, PCR  Not Detected Not Detected   VARICELLA ZOSTER VIRUS (VZV), PCR  Not Detected Not Detected   CRYPTOCOCCUS NEOFORMANS / GATTII, PCR  Not Detected Not Detected   HUMAN HERPES VIRUS 6 PCR  Not Detected Not Detected   Resulting Agency [facility]                           CMP:        Lab 07/11/23  0500 07/10/23  0247 07/09/23  1151 07/08/23  0256 07/07/23  1302 07/07/23  0308 07/06/23  1015 07/06/23  0516 07/05/23  0440   SODIUM 146* 144 140 147* 155* 158*   < > 151* 144   POTASSIUM 4.3 4.0 4.3 3.4* 3.1* 2.9*   < > 3.0* 3.2*   CHLORIDE 114* 111* 105 112* 116* 117*   < > 107 97*   CO2 20.4* 21.8* 21.3* 24.7 26.8 24.6   < > 24.6 22.1   ANION GAP 11.6 11.2 13.7 10.3 12.2 16.4*   < > 19.4* 24.9*   BUN 33* 33* 36* 50* 63* 75*   < > 82* 69*   CREATININE 1.90* 1.93* 1.85* 2.18* 2.56* 2.64*   < > 2.98* 2.74*   EGFR 39.6* 38.9* 40.9* 33.6* 27.7* 26.7*   < > 23.1* 25.5*   GLUCOSE 216* 204* 299* 157* 168* 209*   < > 351* 272*   CALCIUM 9.7 9.0 9.3 8.7 9.0 9.4   < > 9.6 9.6   MAGNESIUM 2.8* 2.5* 2.2 2.1  --  2.3  --  2.4 2.2   PHOSPHORUS 3.3 2.5  --  2.3*  --  3.6  --  1.8* 4.0   TOTAL PROTEIN  --   --   --   --   --  6.7  --   --  7.2   ALBUMIN  --   --   --  3.1*  --  3.6  --   --  3.8   GLOBULIN  --   --   --   --   --  3.1  --   --  3.4   ALT (SGPT)  --   --   --   --   --  12  --   --  25   AST (SGOT)  --   --   --   --   --  27  --   --  19   BILIRUBIN  --   --   --   --   --  0.3  --   --  0.4   ALK PHOS  --   --   --   --   --  132*  --   --  150*    < > = values in this interval not displayed.                  Lab Results   Component Value Date    LOFTKXKC90 1,638 (H) 07/10/2023       Lab Results   Component Value Date    FOLATE >20.00 07/10/2023       Lab Results   Component Value Date    HGBA1C 8.3 (H)  11/10/2020           Review and interpretation of imaging: The CT angiogram of the head and neck with contrast showed the following:    FINDINGS:          The aortic arch is unremarkable.  There is 2 vessel arch anatomy.  The right brachiocephalic and   bilateral subclavian arteries appear widely patent.  There is some motion artifact.  Bilateral   common carotid arteries, external carotid arteries, carotid bifurcations and cervical internal   carotid arteries appear within normal limits.  Intracranial ICA segments appear normal.  There is   significant venous contamination intracranially.  The A1 and M1 segments and distal SULTANA and MCA   branches appear patent.  There is a patent anterior communicating artery.  There are patent   bilateral posterior communicating arteries.  Vertebral arteries appear normal.  Vertebral arteries   are codominant.  The basilar artery, superior cerebellar and posterior cerebral arteries appear   patent.     There are no acute intracranial findings.  Orbits appear unremarkable.  There is mild multifocal   paranasal sinus mucosal disease.  Mastoid air cells appear well-aerated.  There is no evidence of a   neck mass or adenopathy.  The lungs are hypoinflated and there are scattered areas of AC lung   density.  Superior mediastinal structures are unremarkable.  The thyroid is largely obscured by   motion.  Salivary glands appear symmetric.  No focal soft tissue inflammation.  There are cervical   degenerative changes.  No acute osseous abnormality.     IMPRESSION:                 1. No significant vascular abnormality in the head or the neck.  2. Cervical degenerative changes.        CT Head Without Contrast    Result Date: 7/5/2023  PROCEDURE: CT HEAD WO CONTRAST  COMPARISON:  [facility], MR, MRI BRAIN WO CONTRAST, 7/01/2023, 10:11.  CT, CT ANGIOGRAM HEAD W AI ANALYSIS OF LVO, 6/30/2023, 18:58.  [facility], CT, CT HEAD WO CONTRAST STROKE PROTOCOL, 6/30/2023,  18:33.  [facility], CT, CT HEAD WO CONTRAST, 4/30/2023, 15:12. INDICATIONS: ams, lethargic  PROTOCOL:   Standard imaging protocol performed    RADIATION:   DLP: 1897mGy*cm   MA and/or KV was adjusted to minimize radiation dose.     TECHNIQUE: CT images of the head were obtained without contrast material.   FINDINGS:  No midline shift. Ventricles and sulci are normal in size. Basal cisterns are patent. No extra-axial fluid collection. No evidence of acute intracranial hemorrhage, mass lesion, or edema. No definite CT findings of acute infarction.  Paranasal sinuses and mastoid air cells are clear. No soft tissue or calvarial abnormality is identified.      Impression: No evidence for acute intracranial abnormality.     JOYCE ROSE MD       Electronically Signed and Approved By: JOYCE ROSE MD on 7/05/2023 at 8:20             CT Head Without Contrast Stroke Protocol    Result Date: 6/30/2023  PROCEDURE: CT HEAD WO CONTRAST STROKE PROTOCOL  COMPARISON:  [facility], CT, CT HEAD WO CONTRAST, 4/30/2023, 15:12. INDICATIONS: Neuro deficit, acute, stroke suspected  PROTOCOL:   Standard imaging protocol performed    RADIATION:   DLP: 1145.2 mGy*cm   MA and/or KV was adjusted to minimize radiation dose.     TECHNIQUE: After obtaining the patient's consent, CT images were obtained without non-ionic intravenous contrast material.  FINDINGS:  Superficial soft tissues appear unremarkable.  The calvarium is intact.  There is a small amount of mucus in the right maxillary sinus.  The ventricles appear normal in size and configuration for patient's age. There is no evidence of herniation, hydrocephalus or mass effect. Gray-white differentiation appears intact. There are no focal areas of hypoattenuation within the brain parenchyma. There is no evidence of acute intracranial hemorrhage. The orbits appear unremarkable.       Impression:  No acute intracranial abnormality.     MARIZA KIRAN MD        Electronically Signed and Approved By: MARIZA KIRAN MD on 6/30/2023 at 19:14              The CT perfusion scan showed the following:    FINDINGS:        Cerebral blood flow less than 30% is 0 mL and T-max greater than 6 seconds is 157 mL with   a mismatch volume of 157 mm.  This appears to be artifactual.  There is also significant artifact   on the inferior set of cerebral blood volume imaging.  There is no definite core infarct or   definite brain at risk.     IMPRESSION:               Artifactual perfusion abnormalities in the brain without definite core infarct or true   brain at risk.    MRI Brain Without Contrast    Result Date: 7/1/2023  PROCEDURE: MRI BRAIN WO CONTRAST  COMPARISON: [facility], CT, CT HEAD WO CONTRAST STROKE PROTOCOL, 6/30/2023, 18:33.  [facility], MR, BRAIN W/O CONTRAST, 3/26/2021, 7:25.  INDICATIONS: LEFT SIDED WEAKNESS  TECHNIQUE: Multiplanar images of the brain were obtained in a high field strength magnet.  FINDINGS:  The ventricles are normal in size, position, and configuration.  Sulci are not abnormally prominent.  Scattered tiny foci of nonspecific T2 bright signal in cerebral white matter appear stable.  No abnormal bright signal is seen on diffusion weighted images.  No restricted diffusion is evident.  No abnormal dark signal is seen on magnetic susceptibility sequence sensitive for blood products.  No abnormality of the pituitary or orbits is evident.  The paranasal sinuses are well aerated.  No abnormal mastoid signal is seen.      Impression:   MR examination of the brain without IV contrast demonstrating no acute intracranial abnormality.      JON LOPEZ MD       Electronically Signed and Approved By: JON LOPEZ MD on 7/01/2023 at 11:03               Workup to date: The EEG showed the following:    Reading:   The EEG was obtained portable in his room at which time he was unresponsive with video monitoring.  We do not know how much sleep  he has had the night before the testing.     The dominant background activity consists of symmetric and irregular 20 to 50 µV 5 cps which were prominent on both parieto-occipital regions and were mixed with moderate diffuse symmetric irregular 20 to 65 µV 2 to 3 cps waves which were prominent on both frontocentral regions.  There were symmetric vertex activities during the light stages of sleep.  There were no discernible responses on photic stimulation advised frequencies.  There was no amplitude asymmetry.  No paroxysmal discharges.     Impression:   Abnormal EEG because of slow background activity mixed with slower waves compatible with mild to moderate and diffuse encephalopathy.  There were no epileptiform discharges noted today.    Results for orders placed during the hospital encounter of 06/30/23    Adult Transthoracic Echo Limited W/ Cont if Necessary Per Protocol    Interpretation Summary    Left ventricular systolic function is normal. Left ventricular ejection fraction appears to be 61 - 65%.    Left ventricular diastolic function was normal.    No regional wall motion abnormality    No obvious source of emboli       Diagnoses: Patient with metabolic encephalopathy who was found down and out and might of had a seizure.      Comment: Patient currently on lamotrigine 200 mg twice daily with food which is an anticonvulsant as well.    Plan:  1.  Continue the medication as recommended by the psychiatrist which is also an anticonvulsant.  2.  Continue the Seroquel as recommended.  3.  Patient will be able to tolerate modified texture diet.  4.  Patient will be able to be off the nasogastric feeding tube.    Discussed with patient and the nursing staff and continue physical therapy and Occupational Therapy to ambulate him and to mobilize him as much as possible.    Spent a total of 30 minutes in face-to-face evaluation and management of the patient using the dedicated telemedicine device without any  interruption with the help of Kristi the rounding nurse with the patient located at the [facility] and myself at a remote location.    Electronically signed by Beth Hairston MD, 07/11/23, 11:19 AM EDT.

## 2023-07-11 NOTE — PROGRESS NOTES
" [facility]   Hospitalist Progress Note    Date of admission: 6/30/2023  Patient Name: Carlos Murphy Jr.  1960  Date: 7/11/2023      Subjective     Chief Complaint   Patient presents with    Weakness - Generalized       Summary: 62 y.o. with history of seizure disorder (on lamotrigine 200 bid), bipolar, schizophrenia, who presented with altered mentation/unresponsiveness on 6/28.  Patient apparently had called his sister initially had slurred speech/somnolent and received Narcan with notable initial improvement.  Patient apparently reported taking additional medications at home but was not trying to harm himself history is very limited given patient poor historian.  Patient underwent stroke work-up without acute findings suggest etiology of patient's AMS.  Did have hyperkalemia initially improved with fluids.  Nephrology assisting given MK and rhabdomyolysis.  Podiatry assisted given for infection/glasses feet and required several pieces being removed.      Hospital course completed by worsening mentation/agitation with concern initially for seizures multiple EEGs have been negative, neurology consulted for assistance, concern for possible medication withdrawal (outpt is on ambien tox screen was positive oxycodone only)/metabolic encephalopathy and was placed on CIWA monitoring and required ativan. LP obtained negative for acute meningitis, tick panel pending and treating empirically for now.  Mentation starting to improve, gradually titrating medications, psychiatry consulted for additional assistance. Transferring out of the icu and trying to come out of restraints given current improvements.     Interval Followup:   Patient remains in restraints.  Patient is calm.  Patient remains confused he is unable to tell me where he is what year it is what month it is who the president is.  All he says is \"yeah \"    ROS:   Not able to obtain due to confusion       Objective     Vitals:   Temp:  [97.7 °F (36.5 " °C)-98.9 °F (37.2 °C)] 98.6 °F (37 °C)  Heart Rate:  [65-74] 70  Resp:  [18-22] 18  BP: (153-180)/() 180/75    Physical Exam        Constitutional: Awake, alert, no acute distress, remains in restraints               Eyes: Pupils equal, sclerae anicteric, no conjunctival injection              HENT: NCAT, mucous membranes moist              Neck: Supple, no thyromegaly, no lymphadenopathy, trachea midline              Respiratory: Clear to auscultation bilaterally, nonlabored respirations no w/r/r               Cardiovascular: RRR, no murmurs, rubs, or gallops, palpable pedal pulses bilaterally              Gastrointestinal: Positive bowel sounds, soft, nontender, nondistended              Musculoskeletal: not able to lift his left arm due to shoulder pain, full rom in other extremities               Psychiatric: Appropriate affect, cooperative              Neurologic: Oriented x 1 strength symmetric in all extremities, No focal deficits noted              Skin: bilateral feet have sores on them from stepping on glass, wounds are healing          Result Review:  Vital signs, labs and recent relevant imaging reviewed.        acetaminophen    acetaminophen    senna-docusate sodium **AND** polyethylene glycol **AND** [DISCONTINUED] bisacodyl **AND** bisacodyl    dextrose    dextrose    glucagon (human recombinant)    haloperidol lactate    labetalol    nitroglycerin    ondansetron    sodium chloride    sodium chloride    amLODIPine, 10 mg, Nasogastric, Q24H  atenolol, 25 mg, Nasogastric, Daily  doxycycline, 100 mg, Nasogastric, Q12H  folic acid, 1 mg, Nasogastric, Daily  heparin (porcine), 5,000 Units, Subcutaneous, Q8H  insulin detemir, 15 Units, Subcutaneous, Q12H  insulin lispro, 2-9 Units, Subcutaneous, 4x Daily AC & at Bedtime  lamoTRIgine, 200 mg, Nasogastric, Q12H  losartan, 50 mg, Nasogastric, BID  mupirocin, 1 application , Topical, 2 times per day  pantoprazole, 40 mg, Intravenous, BID  QUEtiapine, 600  mg, Nasogastric, Nightly  saccharomyces boulardii, 250 mg, Nasogastric, BID  senna-docusate sodium, 2 tablet, Nasogastric, BID  sodium chloride, 10 mL, Intravenous, Q12H  sodium chloride, 10 mL, Intravenous, Q12H  thiamine (B-1) IV, 500 mg, Intravenous, Q8H  Tropical Liquid Nutrition, 15 mL, Nasogastric, Daily    7/3 eeg  Impression:   Abnormal EEG because of:  1.  Intermittent medium voltage focal slow activity noted over the left frontotemporal region suggestive of neuronal dysfunction in this region.  This abnormalities have been reported in individuals with vascular insufficiency, migraine, or postictal state.  2.  Slow background activity mixed with slower waves compatible with moderate and diffuse encephalopathy.  3.  There were no epileptiform discharges noted today.    7/6 EEG moderate encephalopathy and no acute epileptic discharges noted    Assessment / Plan     Assessment/Plan:  Sepsis from undetermined etiology, possible tickborne illness   Acute metabolic encephalopathy  MK on CKD  Severe hyperkalemia  Seizure disorder (on lamictal), with concern for subclinical seizure - negative on EEGs  Concern for withdrawal from outpatient ambien (tox + oxycodone only on admission)  Rhabdomyolysis  SSTI in setting of Foreign body in left foot/glass  Initial presentation with hyponatremia  Significant Hypernatremia  Anion gap metabolic acidosis  CVA ruled out  History of COPD  Type 2 diabetes mellitus  HTN  Bipolar disorder  Hyperlipidemia  Polypharmacy  Horizontal nystagmus - reportedly chronic/question congenital nystagmus    PLAN   Negative LP/meningitis/csf cx/blood culture, lyme and rickettsia  Off ceftriaxone, has had adequate course to also cover initial celluitis/ssti of his feet   Continue doxycycline for now until remainder of tick studies result: ehrlichia,  babeisa pending  Psychiatry consulted appreciate assistance - discontinuing citalopram and increasing seroquel   Continue home lamotrigine (takes  for seizures)   Avoid benzos, will not resume home ambien or flexeril, minimizing polypharmacy   Speech re-evaluation, try to remove NG and advance diet,   Agitation improving, remove restraints later if remains calm, did require haldol earlier   Completing iv thiamine course  Voiding trial/remove sanches  Cont atenolol, losartan, amlodipine.  BP variable, suspect agitation contributing, monitor, prn iv labetalol with parameters   Home lasix held while monitoring hypernatremia   Appreciate nephrology assistance with ambar/ckd/hypernatremia  Insulin drip --> levemir, ssi  Monitor closely once tube feeds stopped depending on po intake  Continue local wound care  Delirium precautions/reoritentation  Continue gi ppx   Check a.m. CBC, BMP, magnesium, phosphorus and additional testing as above       DVT prophylaxis:  Medical and mechanical DVT prophylaxis orders are present.    Level Of Support Discussed With: Patient  Code Status (Patient has no pulse and is not breathing): CPR (Attempt to Resuscitate)  Medical Interventions (Patient has pulse or is breathing): Full Support        CBC          7/9/2023    03:24 7/10/2023    02:47 7/11/2023    05:00   CBC   WBC 13.62  14.27  14.02    RBC 4.52  3.92  4.00    Hemoglobin 13.3  11.3  11.5    Hematocrit 45.4  34.6  36.6    .4  88.3  91.5    MCH 29.4  28.8  28.8    MCHC 29.3  32.7  31.4    RDW 13.1  12.8  13.1    Platelets 171  218  255      CMP          7/9/2023    11:51 7/10/2023    02:47 7/11/2023    05:00   CMP   Glucose 299  204  216    BUN 36  33  33    Creatinine 1.85  1.93  1.90    EGFR 40.9  38.9  39.6    Sodium 140  144  146    Potassium 4.3  4.0  4.3    Chloride 105  111  114    Calcium 9.3  9.0  9.7    BUN/Creatinine Ratio 19.5  17.1  17.4    Anion Gap 13.7  11.2  11.6

## 2023-07-11 NOTE — PROGRESS NOTES
Pulmonary / Critical Care Progress Note      Patient Name: Carlos Murphy Jr.  : 1960  MRN: [id number]  Attending:  Clif Oconnor DO   Date of admission: 2023    Subjective   Subjective   Follow-up for hypernatremia, altered mental status, bipolar/schizophrenia    Transfer out of ICU  Sodium improving.  On free water per NG tube  Renal function improving  Still with intermittent agitation  Still intermittently confused and not answering questions appropriately    Review of Systems  Cannot obtain due to confusion with altered mental status      Objective   Objective     Vitals:   Vitals:    07/10/23 2346 23 0339 23 0713 23 1029   BP: 156/72 161/73 180/75 175/82   BP Location: Right arm Right arm Right arm Left arm   Patient Position: Lying Lying Lying Lying   Pulse: 74 69 70 71   Resp: 18 18 18 18   Temp: 97.7 °F (36.5 °C) 98.2 °F (36.8 °C) 98.6 °F (37 °C) 100.1 °F (37.8 °C)   TempSrc: Oral Oral Oral Rectal   SpO2: 92% 90% 91% 91%   Weight:       Height:             Physical Exam   Vital Signs Reviewed   Patient awake, no acute distress, on room air  HEENT:  PERRL, EOMI. core track in place  Chest:  good aeration, clear to auscultation bilaterally, tympanic to percussion bilaterally, no work of breathing noted  CV: RRR, no MGR, pulses 2+, equal.  Abd:  Soft, NT, ND, + BS, no HSM  EXT:  no clubbing, no cyanosis, no edema  Neuro:  A&Ox2, patient awake, does follow some simple commands, moves all 4 extremities, horizontal nystagmus  Skin: No rashes or lesions noted      Result Review    Result Review:  I have personally reviewed the results from the time of this admission to 23   and agree with these findings:  [x]  Laboratory  [x]  Microbiology  [x]  Radiology  []  EKG/Telemetry   [x]  Cardiology/Vascular   []  Pathology  []  Old records  []  Other:  Most notable findings include:       Lab 23  0500 07/10/23  0247 23  1151 23  0324 23  0256 23  1302  07/07/23  0308 07/06/23  1015 07/06/23  0516 07/05/23  0440   WBC 14.02* 14.27*  --  13.62* 18.68*  --  16.65*  --  14.77* 12.38*   HEMOGLOBIN 11.5* 11.3*  --  13.3 11.2*  --  12.9*  --  12.4* 12.1*   HEMATOCRIT 36.6* 34.6*  --  45.4 34.3*  --  39.1  --  36.0* 38.0   PLATELETS 255 218  --  171 220  --  285  --  325 293   SODIUM 146* 144 140  --  147* 155* 158* 155* 151* 144   POTASSIUM 4.3 4.0 4.3  --  3.4* 3.1* 2.9* 3.0* 3.0* 3.2*   CHLORIDE 114* 111* 105  --  112* 116* 117* 111* 107 97*   CO2 20.4* 21.8* 21.3*  --  24.7 26.8 24.6 24.6 24.6 22.1   BUN 33* 33* 36*  --  50* 63* 75* 76* 82* 69*   CREATININE 1.90* 1.93* 1.85*  --  2.18* 2.56* 2.64* 2.93* 2.98* 2.74*   GLUCOSE 216* 204* 299*  --  157* 168* 209* 345* 351* 272*   CALCIUM 9.7 9.0 9.3  --  8.7 9.0 9.4 9.7 9.6 9.6   PHOSPHORUS 3.3 2.5  --   --  2.3*  --  3.6  --  1.8* 4.0   TOTAL PROTEIN  --   --   --   --   --   --  6.7  --   --  7.2   ALBUMIN  --   --   --   --  3.1*  --  3.6  --   --  3.8   GLOBULIN  --   --   --   --   --   --  3.1  --   --  3.4         Assessment & Plan   Assessment / Plan     Active Hospital Problems:  Active Hospital Problems    Diagnosis     **Acute renal failure, unspecified acute renal failure type     Onychomycosis     Onychocryptosis     Foreign body in left foot     Peripheral neuropathy     Charcot foot due to diabetes mellitus     Foot pain, bilateral        Impression:  Altered mental status  Metabolic encephalopathy  Seizure disorder  History of bipolar and schizo affective disorder  Acute kidney injury, on CKD stage III  Type 2 diabetes with hyperglycemia  Iron deficiency anemia  Rhabdomyolysis  Hypokalemia  Hypophosphatemia  Hypertension  Hypernatremia, clinically significant       Plan:  On room air  Tube feeds per dietitian  Continue free water at current dose.  Sodium improving  Trend renal panel and electrolytes  Continue doxycycline.  Can discontinue if ehrlichiosis profile comes back negative  Continue current dose  of losartan  Continue current insulin regimen  Appreciate neurology assistance  Continue Lexapro, Seroquel, and Lamictal  Continue thiamine, and multivitamin and folic acid  Nephrology following and renal function slowly improving    DVT prophylaxis:  Medical and mechanical DVT prophylaxis orders are present.    CODE STATUS:   Level Of Support Discussed With: Patient  Code Status (Patient has no pulse and is not breathing): CPR (Attempt to Resuscitate)  Medical Interventions (Patient has pulse or is breathing): Full Support      Labs, imaging, microbiology, notes and medications personally reviewed  Discussed with primary    Electronically signed by Phillip Yanez MD, 07/11/23, 1:51 PM EDT.       Adult

## 2023-07-11 NOTE — CONSULTS
"Nutrition Services    Patient Name: Carlos Murphy Jr.  YOB: 1960  MRN: [id number]  Admission date: 6/30/2023      CLINICAL NUTRITION ASSESSMENT      Reason for Assessment  Follow-up protocol, EN     H&P:    Past Medical History:   Diagnosis Date    Acute kidney injury     2021 POST SEIZURES    Astigmatism of both eyes     eyes twitch left and right    Bipolar disorder     Charcot ankle     Closed nondisplaced fracture of medial malleolus of left tibia     COPD (chronic obstructive pulmonary disease)     USES INHALERS    Diabetes     BG RUNS AROUND 90'S IN AM    Hx of schizophrenia     Hyperlipidemia     Hypertension     SEEN DR ROD IN THE PAST, HAD APPT WITH DR MICHAUD IN 5-2022 CX APPT, DENIED CP/SOB    Neuropathy     Seizures     LAST ONE 4/21/22    Sleep apnea     DOES NOT USE CPAP    Varicose vein of leg         Current Problems:   Active Hospital Problems    Diagnosis     **Acute renal failure, unspecified acute renal failure type     Onychomycosis     Onychocryptosis     Foreign body in left foot     Peripheral neuropathy     Charcot foot due to diabetes mellitus     Foot pain, bilateral         Nutrition/Diet History         Narrative     Patient admitted with ARF.  Cortrak in place.  EN at goal rate.  No documented or reported intolerances.     Na+ increased to 146.  Up from 144 yesterday.  Free water flushes via Cortrak were decreased to 200 ml every 3 hours yesterday. 3.4 L deficit noted.  Will cover with increase in free water flushes today.      Anthropometrics        Current Height, Weight Height: 182.9 cm (72\")  Weight: 134 kg (295 lb 6.7 oz)   Current BMI Body mass index is 40.07 kg/m².       Weight Hx  Wt Readings from Last 30 Encounters:   07/10/23 1617 134 kg (295 lb 6.7 oz)   06/30/23 2300 133 kg (294 lb 1.5 oz)   04/30/23 1344 134 kg (296 lb 1.2 oz)   09/19/22 1532 136 kg (299 lb 6.4 oz)   08/18/22 1337 132 kg (290 lb)   06/30/22 1407 131 kg (288 lb)   06/14/22 1309 131 kg (288 " lb)   06/01/22 0920 136 kg (299 lb 2.6 oz)   05/31/22 0852 132 kg (290 lb)   05/27/22 0945 132 kg (290 lb)   05/24/22 1338 131 kg (289 lb)   05/10/22 1316 131 kg (289 lb)   04/25/22 0800 123 kg (271 lb)   04/22/22 1154 129 kg (284 lb 6.3 oz)   04/22/22 0941 129 kg (284 lb)   04/21/22 0917 129 kg (284 lb 9.8 oz)   04/13/22 1407 125 kg (276 lb)   07/13/21 1334 132 kg (290 lb)   03/15/21 0000 135 kg (297 lb)   02/18/21 0000 133 kg (293 lb 5 oz)   01/05/21 0000 135 kg (297 lb 4 oz)   12/03/20 0000 130 kg (287 lb)   11/06/20 0000 129 kg (285 lb)   09/11/20 0000 129 kg (285 lb)   05/20/19 0000 (!) 136 kg (300 lb 4 oz)   09/11/18 0000 136 kg (300 lb)   07/06/18 0000 (!) 137 kg (303 lb)   05/29/18 0000 (!) 139 kg (307 lb)   02/27/18 0000 136 kg (300 lb)            Wt Change Observation Stable     Estimated/Assessed Needs       Energy Requirements 25-30 kcal/kg adj BW (91.4 kg)   EST Needs (kcal/day) 4190-2713 kcal        Protein Requirements 1.2-1.5 g/kg adj BW    EST Daily Needs (g/day) 110-137 g       Fluid Requirements 25 ml/kg adj BW    Estimated Needs (mL/day) 2285 ml      Labs/Medications         Pertinent Labs Reviewed.   Results from last 7 days   Lab Units 07/11/23  0500 07/10/23  0247 07/09/23  1151 07/07/23  1302 07/07/23  0308 07/06/23  0516 07/05/23  0440   SODIUM mmol/L 146* 144 140   < > 158*   < > 144   POTASSIUM mmol/L 4.3 4.0 4.3   < > 2.9*   < > 3.2*   CHLORIDE mmol/L 114* 111* 105   < > 117*   < > 97*   CO2 mmol/L 20.4* 21.8* 21.3*   < > 24.6   < > 22.1   BUN mg/dL 33* 33* 36*   < > 75*   < > 69*   CREATININE mg/dL 1.90* 1.93* 1.85*   < > 2.64*   < > 2.74*   CALCIUM mg/dL 9.7 9.0 9.3   < > 9.4   < > 9.6   BILIRUBIN mg/dL  --   --   --   --  0.3  --  0.4   ALK PHOS U/L  --   --   --   --  132*  --  150*   ALT (SGPT) U/L  --   --   --   --  12  --  25   AST (SGOT) U/L  --   --   --   --  27  --  19   GLUCOSE mg/dL 216* 204* 299*   < > 209*   < > 272*    < > = values in this interval not displayed.        Results from last 7 days   Lab Units 07/11/23  0500 07/10/23  0247 07/09/23  1151   MAGNESIUM mg/dL 2.8* 2.5* 2.2   PHOSPHORUS mg/dL 3.3 2.5  --    HEMOGLOBIN g/dL 11.5* 11.3*  --    HEMATOCRIT % 36.6* 34.6*  --        Coronavirus (COVID-19)   Date Value Ref Range Status   03/25/2021 NOT DETECTED NA Final     Comment:     The SARS-CoV-2 assay is a real-time, RT-PCR test intended  for the qualitative detection of nucleic acid from the  SARS-CoV-2 in respiratory specimens from individuals,  testing performed at [facility].       Lab Results   Component Value Date    HGBA1C 8.3 (H) 11/10/2020         Pertinent Medications Reviewed.     Current Nutrition Orders & Evaluation of Intake       Oral Nutrition     Current PO Diet NPO Diet NPO Type: Strict NPO   Supplement Orders Placed This Encounter      Place Feeding Tube Per [place] [facility]      Diet, Tube Feeding Tube Feeding Formula: Diabetisource AC; Tube Feeding Type: Continuous; Continuous Tube Feeding Start Rate (mL/hr): 25; Then Advance Rate By (mL/hr): 25; Every __ Hours: 4; To Goal Rate of (mL/hr): 80       Malnutrition Severity Assessment                Nutrition Diagnosis         Nutrition Dx Problem 1 Inadequate energy Intake related to decreased ability to consume sufficient energy as evidenced by NPO       Nutrition Intervention         Diabetisource AC @ 80 ml/hr  Free water flushes 80 ml QH   2304 kcal, 115 g pro, 1574 ml fluid     Provides: 2509 kcal, 115 g pro, 3494 ml fluid      Medical Nutrition Therapy/Nutrition Education          Learner     Readiness Patient  Education not appropriate at this time     Method     Response N/A  N/A     Monitor/Evaluation        Monitor Per protocol, I&O, Pertinent labs, EN delivery/tolerance, Symptoms       Nutrition Discharge Plan         To be determined       Electronically signed by:  Fransisco Stanley RD  07/11/23 09:28 EDT

## 2023-07-11 NOTE — PLAN OF CARE
Goal Outcome Evaluation:  Plan of Care Reviewed With: patient        Progress: improving  Outcome Evaluation: pt remains in 2 point restraints.pt still lethargic, but is able to give his name and is able to tell staff that he is in the hospital. pt has not been combative with staff when restraints removed while doing care, but does continue to slide his way down in bed despite best efforts to keep him up/hob 30 degrees for tube feed. no residuals on ng tube. pt denies pain. incontinent of urine, no bm. bp continues to trend elevated, but is improved from previous bps.

## 2023-07-11 NOTE — THERAPY EVALUATION
Acute Care - Speech Language Pathology   Swallow Initial Evaluation  [place]     Patient Name: Carlos Murphy Jr.  : 1960  MRN: [id number]  Today's Date: 2023               Admit Date: 2023    Visit Dx:     ICD-10-CM ICD-9-CM   1. Acute renal failure, unspecified acute renal failure type  N17.9 584.9   2. Altered mental status, unspecified altered mental status type  R41.82 780.97   3. At risk for polypharmacy  Z91.89 V49.89   4. Non-traumatic rhabdomyolysis  M62.82 728.88   5. Oropharyngeal dysphagia  R13.12 787.22   6. Impaired mobility and ADLs  Z74.09 V49.89    Z78.9      Patient Active Problem List   Diagnosis    Ulcer of toe due to type 2 diabetes mellitus    Centrilobular emphysema    Tobacco abuse, in remission    Obesity (BMI 30-39.9)    Closed fracture of shaft of left fibula    Closed nondisplaced fracture of medial malleolus of left tibia    Aftercare following surgery of ORIF left distal tibia fracture 2022    Colon cancer screening    Colitis    Acute renal failure, unspecified acute renal failure type    Onychomycosis    Onychocryptosis    Foreign body in left foot    Peripheral neuropathy    Charcot foot due to diabetes mellitus    Foot pain, bilateral     Past Medical History:   Diagnosis Date    Acute kidney injury      POST SEIZURES    Astigmatism of both eyes     eyes twitch left and right    Bipolar disorder     Charcot ankle     Closed nondisplaced fracture of medial malleolus of left tibia     COPD (chronic obstructive pulmonary disease)     USES INHALERS    Diabetes     BG RUNS AROUND 90'S IN AM    Hx of schizophrenia     Hyperlipidemia     Hypertension     SEEN DR ROD IN THE PAST, HAD APPT WITH DR MICHAUD IN  CX APPT, DENIED CP/SOB    Neuropathy     Seizures     LAST ONE 22    Sleep apnea     DOES NOT USE CPAP    Varicose vein of leg      Past Surgical History:   Procedure Laterality Date    ANKLE OPEN REDUCTION INTERNAL FIXATION Left 2022     Procedure: LEFT OPEN REDUCTION INTERNAL FIXATION DISTAL TIBIA FRACTURE ;  Surgeon: Tha Parry MD;  Location: [facility] [place] [facility] [place];  Service: Orthopedics;  Laterality: Left;    ANKLE OPEN REDUCTION INTERNAL FIXATION Left 06/01/2022    Procedure: LEFT ANKLE OPEN REDUCTION INTERNAL FIXATION WITH SYNDESMOSIS FIXATION;  Surgeon: Tha Parry MD;  Location: [facility] MAIN OR;  Service: Orthopedics;  Laterality: Left;    CHOLECYSTECTOMY      COLONOSCOPY      JOINT REPLACEMENT      RTKR    LUMBAR DISC SURGERY      SHOULDER SURGERY Right        Inpatient Speech Pathology Dysphagia Evaluation        PAIN SCALE: None indicated    PRECAUTIONS/CONTRAINDICATIONS: Standard, fall, seizure, safety    SUSPECTED ABUSE/NEGLECT/EXPLOITATION: None identified    SOCIAL/PSYCHOLOGICAL NEEDS/BARRIERS: None identified    PAST SOCIAL HISTORY: 62-year-old male, lives at home alone    PRIOR FUNCTION: Previously on a regular diet, currently requiring tube feeding    PATIENT GOALS/EXPECTATIONS: Patient wants something to drink    HISTORY: Patient is a 62-year-old male initially admitted to [facility] on 6/30/2023 secondary to acute renal failure.  Patient was evaluated by speech pathology services on 7/1/2023.  At that time patient demonstrated swallow function adequate for tolerance of regular solids and thin liquids.  No further speech therapy was indicated.  Patient however with change/decline in medical and mental status requiring transfer to critical care unit. Cortrak tube was placed for nutritional support.  Patient's mental status gradually improving therefore speech pathology services were reconsulted to determine if p.o. diet can be reinitiated.    CURRENT DIET LEVEL: Tube feeding    OBJECTIVE:    TEST ADMINISTERED: Clinical dysphagia evaluation    COGNITION/SAFETY AWARENESS: Not thoroughly evaluated    BEHAVIORAL OBSERVATIONS: Patient is awake, oriented to self and environment, follows simple commands    ORAL MOTOR  EXAM: Dry oral mucosa    VOICE QUALITY: Mild hoarseness    REFLEX EXAM: Nonproductive    POSTURE: Sitting fully upright, assisted with proper positioning    FEEDING/SWALLOWING FUNCTION: Assessed with thin liquids, nectar thick liquids, purée solids, regular solids    CLINICAL OBSERVATIONS: Nectar thick liquids by spoon and by control cup drink.  Swallows completed with mild delay.  Vocal quality clear and laryngeal sounds clear with cervical auscultation.  Thin liquids by spoon and by controlled cup drink.  Swallows completed with mild delay.  Vocal quality clear laryngeal sounds clear with cervical auscultation.  Purée solids by spoon.  Swallow completed with mild delay.  Double swallow observed.  Delayed throat clear however laryngeal sounds clear to cervical auscultation.  Regular solids with extended anterior chewing.  Mildly decreased oral transit and manipulation.  Swallow completed however diffuse oral residue persists.  Patient requiring moderate cues for lingual sweep to completely clear.  Thin liquids via straw with controlled straw drink.  Swallow completed with mild delay.  Vocal quality clear laryngeal sounds clear with cervical auscultation.  Laryngeal elevation was noted to palpation.  Patient exhibits mild swallow delay throughout evaluation.  Some difficulty with chewing and oral clearance of regular solids.  Patient however did not demonstrate any overt signs or symptoms of aspiration at bedside however cannot rule out silent aspiration.    DYSPHAGIA CRITERIA: Risk of aspiration, mild swallow delay, decreased mental status from baseline    FUNCTIONAL ASSESSMENT INSTRUMENT: Patient currently scored a level 6 of 7 on Functional Communication Measures for swallowing indicating a 1-19% limitation in function.    ASSESSMENT/ PLAN OF CARE:  Pt presents with limitations, noted below, that impede patient's ability to independently tolerate regular solids and thin liquids. The skills of a therapist will be  required to safely and effectively implement the following treatment plan to restore maximal level of function.    PROBLEMS:  1.  Risk of aspiration, mild swallow delay, decreased mental status from baseline   LTG 1: 30 days.  Patient will increase functional communication measures for swallowing to level 7 of 7, indicating a 0% limitation in function.   STG 1a: 14 days.  Patient will tolerate least restrictive diet of regular soft solids, thin liquids with minimal to no signs or symptoms of aspiration.   STG 1b: 14 days.  Patient will tolerate least restrictive diet of regular soft solids, thin liquids utilizing strategies with minimal cueing.   TREATMENT: Speech therapy for dysphagia, education of strategies and tolerance of least restrictive diet.    FREQUENCY/DURATION: Daily, 5 days a week    REHAB POTENTIAL:  Pt has good rehab potential.  The following limitations may influence improvement/ length of tx medical status.    RECOMMENDATIONS:   1.   DIET: Regular soft to chew solids, thin liquids    2.  POSITION: Fully upright for all p.o. intake, 30 minutes following    3.  COMPENSATORY STRATEGIES: Assist with self-feeding, small bites and sips, cues for lingual sweep to clear any oral residue.    Pt/responsible party agrees with plan of care and has been informed of all alternatives, risks and benefits.    SLP Recommendation and Plan                          Anticipated Discharge Disposition (SLP): anticipate therapy at next level of care (07/11/23 0844)                                                            EDUCATION  The patient has been educated in the following areas:   Dysphagia (Swallowing Impairment).              Time Calculation:    Time Calculation- SLP       Row Name 07/11/23 0844             Time Calculation- SLP    SLP Start Time 0630  -SN      SLP Stop Time 0730  -SN      SLP Time Calculation (min) 60 min  -SN      SLP Received On 07/11/23  -SN         Untimed Charges    86577-[id number] Eval Oral  Pharyng Swallow Minutes 60  -SN         Total Minutes    Untimed Charges Total Minutes 60  -SN       Total Minutes 60  -SN                User Key  (r) = Recorded By, (t) = Taken By, (c) = Cosigned By      Initials Name Provider Type    Nidhi Preciado MS-CCC/SLP, TAVO Speech and Language Pathologist                    Therapy Charges for Today       Code Description Service Date Service Provider Modifiers Qty    [id number] HC ST EVAL ORAL PHARYNG SWALLOW 4 7/11/2023 Nidhi Soliman MS-CCC/SLP, CNT GN 1                 OCTAVIO Vaughn/TAVO COFFEY  7/11/2023

## 2023-07-11 NOTE — PROGRESS NOTES
" LOS: 11 days   Patient Care Team:  Felix Atkinson MD as PCP - General  Felix Atkinson MD as PCP - Family Medicine    Chief Complaint: MK/CKD    Subjective     Weakness - Generalized  Events noted.  He is feeling okay.  No distress, Marley catheter removed.   Fluctuating blood pressure, excellent urine output.    Low-grade fever this morning.  Subjective:  Symptoms:  Improved.      History taken from: patient chart    Objective     Vital Sign Min/Max for last 24 hours  Temp  Min: 97.6 °F (36.4 °C)  Max: 100.1 °F (37.8 °C)   BP  Min: 156/72  Max: 180/78   Pulse  Min: 65  Max: 74   Resp  Min: 18  Max: 18   SpO2  Min: 90 %  Max: 93 %   Flow (L/min)  Min: 4  Max: 4   No data recorded     Flowsheet Rows      Flowsheet Row First Filed Value   Admission Height 182.9 cm (72\") Documented at 06/30/2023 1800   Admission Weight 133 kg (294 lb 1.5 oz) Documented at 06/30/2023 2300            I/O this shift:  In: 566 [I.V.:506; Other:60]  Out: -   I/O last 3 completed shifts:  In: 4664 [I.V.:904; Other:1750; NG/GT:1810; IV Piggyback:200]  Out: 3250 [Urine:3250]    Objective:  General Appearance:  Comfortable, not in pain and in no acute distress (Seems better.).    Vital signs: (most recent): Blood pressure 166/69, pulse 68, temperature 97.6 °F (36.4 °C), temperature source Axillary, resp. rate 18, height 182.9 cm (72\"), weight 134 kg (295 lb 6.7 oz), SpO2 93 %.  Vital signs are normal.    Output: Producing urine.    HEENT: Normal HEENT exam.    Lungs:  Normal effort and normal respiratory rate.    Heart: Normal rate.  Regular rhythm.    Abdomen: Abdomen is soft.  Bowel sounds are normal.   There is no abdominal tenderness.     Extremities: Normal range of motion.  There is no dependent edema.  (Marley in place.)  Pulses: Distal pulses are intact.    Neurological: Patient is alert.    Skin:  Warm and dry.  There is ecchymosis.  (Scabs over knees and toes.)        Results Review:     I reviewed the patient's new clinical " results.    WBC WBC   Date Value Ref Range Status   07/11/2023 14.02 (H) 3.40 - 10.80 10*3/mm3 Final   07/10/2023 14.27 (H) 3.40 - 10.80 10*3/mm3 Final   07/09/2023 13.62 (H) 3.40 - 10.80 10*3/mm3 Final      HGB Hemoglobin   Date Value Ref Range Status   07/11/2023 11.5 (L) 13.0 - 17.7 g/dL Final   07/10/2023 11.3 (L) 13.0 - 17.7 g/dL Final   07/09/2023 13.3 13.0 - 17.7 g/dL Final      HCT Hematocrit   Date Value Ref Range Status   07/11/2023 36.6 (L) 37.5 - 51.0 % Final   07/10/2023 34.6 (L) 37.5 - 51.0 % Final   07/09/2023 45.4 37.5 - 51.0 % Final      Platlets No results found for: LABPLAT   MCV MCV   Date Value Ref Range Status   07/11/2023 91.5 79.0 - 97.0 fL Final   07/10/2023 88.3 79.0 - 97.0 fL Final   07/09/2023 100.4 (H) 79.0 - 97.0 fL Final          Sodium Sodium   Date Value Ref Range Status   07/11/2023 146 (H) 136 - 145 mmol/L Final   07/10/2023 144 136 - 145 mmol/L Final   07/09/2023 140 136 - 145 mmol/L Final      Potassium Potassium   Date Value Ref Range Status   07/11/2023 4.3 3.5 - 5.2 mmol/L Final   07/10/2023 4.0 3.5 - 5.2 mmol/L Final   07/09/2023 4.3 3.5 - 5.2 mmol/L Final      Chloride Chloride   Date Value Ref Range Status   07/11/2023 114 (H) 98 - 107 mmol/L Final   07/10/2023 111 (H) 98 - 107 mmol/L Final   07/09/2023 105 98 - 107 mmol/L Final      CO2 CO2   Date Value Ref Range Status   07/11/2023 20.4 (L) 22.0 - 29.0 mmol/L Final   07/10/2023 21.8 (L) 22.0 - 29.0 mmol/L Final   07/09/2023 21.3 (L) 22.0 - 29.0 mmol/L Final      BUN BUN   Date Value Ref Range Status   07/11/2023 33 (H) 8 - 23 mg/dL Final   07/10/2023 33 (H) 8 - 23 mg/dL Final   07/09/2023 36 (H) 8 - 23 mg/dL Final      Creatinine Creatinine   Date Value Ref Range Status   07/11/2023 1.90 (H) 0.76 - 1.27 mg/dL Final   07/10/2023 1.93 (H) 0.76 - 1.27 mg/dL Final   07/09/2023 1.85 (H) 0.76 - 1.27 mg/dL Final      Calcium Calcium   Date Value Ref Range Status   07/11/2023 9.7 8.6 - 10.5 mg/dL Final   07/10/2023 9.0 8.6 - 10.5  mg/dL Final   07/09/2023 9.3 8.6 - 10.5 mg/dL Final      PO4 No results found for: CAPO4   Albumin No results found for: ALBUMIN     Magnesium Magnesium   Date Value Ref Range Status   07/11/2023 2.8 (H) 1.6 - 2.4 mg/dL Final   07/10/2023 2.5 (H) 1.6 - 2.4 mg/dL Final   07/09/2023 2.2 1.6 - 2.4 mg/dL Final      Uric Acid No results found for: URICACID     Medication Review:   amLODIPine, 10 mg, Nasogastric, Q24H  atenolol, 25 mg, Nasogastric, Daily  doxycycline, 100 mg, Nasogastric, Q12H  folic acid, 1 mg, Nasogastric, Daily  heparin (porcine), 5,000 Units, Subcutaneous, Q8H  insulin detemir, 15 Units, Subcutaneous, Q12H  insulin lispro, 2-9 Units, Subcutaneous, 4x Daily AC & at Bedtime  lamoTRIgine, 200 mg, Nasogastric, Q12H  losartan, 50 mg, Nasogastric, BID  mupirocin, 1 application , Topical, 2 times per day  pantoprazole, 40 mg, Intravenous, BID  QUEtiapine, 600 mg, Nasogastric, Nightly  saccharomyces boulardii, 250 mg, Nasogastric, BID  senna-docusate sodium, 2 tablet, Nasogastric, BID  sodium chloride, 10 mL, Intravenous, Q12H  sodium chloride, 10 mL, Intravenous, Q12H  thiamine (B-1) IV, 500 mg, Intravenous, Q8H  Tropical Liquid Nutrition, 15 mL, Nasogastric, Daily        Assessment & Plan       Acute renal failure, unspecified acute renal failure type    Onychomycosis    Onychocryptosis    Foreign body in left foot    Peripheral neuropathy    Charcot foot due to diabetes mellitus    Foot pain, bilateral      Assessment & Plan  MK/CKD3b-  Due to diabetic nephropathy.  He has had worsened renal function recently.  Had been on lisinopril and SGLT2 and kerendia.  Creatinine is better than baseline. Volume status adequate.  Off bicarb drip and Lasix.  Baseline creatinine around 2.0-2.5.  Monitor.  Hypernatremia-  Na is up to 146. Peak was 158.  Now receiving free water 80 cc every 1 hour.  (Was on 200cc q 3hr).   Hypokalemia, replaced.  Met Acidosis-  improved.  Off  bicarb.  Proteinuria-  diabetic  nephropathy  DMII-  treated.  Was On jardiance, hold with MK.  HTN-  BP is better, continue losartan.  Chronic edema-  Amlodipine may be contributing.  on hold.  H/o bipolar-  previous lithium use.  H/o sz disorder-neurology evaluating.  Anemia-  tsat at 14%, ferritin 425. Received some IV iron.   Rhabdomyolysis-  had fall at home, statin on hold. improved now.  Weakness-  would avoid muscle relaxer in CKD(flexeril).  Likely polypharmacy contributing.    We will follow.      Caroline Mckenzie MD  07/11/23  16:48 EDT

## 2023-07-11 NOTE — PLAN OF CARE
Goal Outcome Evaluation:         ASSESSMENT/ PLAN OF CARE:  Pt presents with limitations, noted below, that impede patient's ability to independently tolerate regular solids and thin liquids. The skills of a therapist will be required to safely and effectively implement the following treatment plan to restore maximal level of function.    PROBLEMS:  1.  Risk of aspiration, mild swallow delay, decreased mental status from baseline  TREATMENT: Speech therapy for dysphagia, education of strategies and tolerance of least restrictive diet.    FREQUENCY/DURATION: Daily, 5 days a week    REHAB POTENTIAL:  Pt has good rehab potential.  The following limitations may influence improvement/ length of tx medical status.    RECOMMENDATIONS:   1.   DIET: Regular soft to chew solids, thin liquids    2.  POSITION: Fully upright for all p.o. intake, 30 minutes following    3.  COMPENSATORY STRATEGIES: Assist with self-feeding, small bites and sips, cues for lingual sweep to clear any oral residue.

## 2023-07-11 NOTE — SIGNIFICANT NOTE
Wound Eval / Progress Noted     Litzy     Patient Name: Carlos Murphy Jr.  : 1960  MRN: [id number]  Today's Date: 2023                 Admit Date: 2023    Visit Dx:    ICD-10-CM ICD-9-CM   1. Acute renal failure, unspecified acute renal failure type  N17.9 584.9   2. Altered mental status, unspecified altered mental status type  R41.82 780.97   3. At risk for polypharmacy  Z91.89 V49.89   4. Non-traumatic rhabdomyolysis  M62.82 728.88   5. Oropharyngeal dysphagia  R13.12 787.22   6. Impaired mobility and ADLs  Z74.09 V49.89    Z78.9          Acute renal failure, unspecified acute renal failure type    Onychomycosis    Onychocryptosis    Foreign body in left foot    Peripheral neuropathy    Charcot foot due to diabetes mellitus    Foot pain, bilateral        Past Medical History:   Diagnosis Date    Acute kidney injury      POST SEIZURES    Astigmatism of both eyes     eyes twitch left and right    Bipolar disorder     Charcot ankle     Closed nondisplaced fracture of medial malleolus of left tibia     COPD (chronic obstructive pulmonary disease)     USES INHALERS    Diabetes     BG RUNS AROUND 90'S IN AM    Hx of schizophrenia     Hyperlipidemia     Hypertension     SEEN DR ROD IN THE PAST, HAD APPT WITH DR MICHAUD IN  [facility] APPT, DENIED CP/SOB    Neuropathy     Seizures     LAST ONE 22    Sleep apnea     DOES NOT USE CPAP    Varicose vein of leg         Past Surgical History:   Procedure Laterality Date    ANKLE OPEN REDUCTION INTERNAL FIXATION Left 2022    Procedure: LEFT OPEN REDUCTION INTERNAL FIXATION DISTAL TIBIA FRACTURE ;  Surgeon: Tha Parry MD;  [place] [facility] [place] [facility] [place]  Service: [place]opedics;  Laterality: Left;    ANKLE OPEN REDUCTION INTERNAL FIXATION Left 2022    Procedure: LEFT ANKLE OPEN REDUCTION INTERNAL FIXATION WITH SYNDESMOSIS FIXATION;  Surgeon: Tha Parry MD;  Location: [facility] OR;  Service: Orthopedics;  Laterality: Left;     CHOLECYSTECTOMY      COLONOSCOPY      JOINT REPLACEMENT      RTKR    LUMBAR DISC SURGERY      SHOULDER SURGERY Right          Physical Assessment:     07/11/23 1155   Wound 06/30/23 2321 Right anterior knee Abrasion   Placement Date/Time: 06/30/23 2321   Present on Hospital Admission: Yes  Side: Right  Orientation: anterior  Location: knee  Primary Wound Type: Abrasion   Wound Image    Dressing Appearance open to air   Closure None   Base scab;red;purple;dry   Periwound intact;pink   Periwound Temperature warm   Periwound Skin Turgor soft   Edges open   Drainage Amount none   Care, Wound cleansed with;sterile normal saline   Dressing Care open to air   Wound 06/30/23 2322 Left anterior knee Abrasion   Placement Date/Time: 06/30/23 2322   Side: Left  Orientation: anterior  Location: knee  Primary Wound Type: Abrasion   Wound Image    Dressing Appearance open to air   Closure None   Base red;scab;purple;dry   Periwound intact;pink   Periwound Temperature warm   Periwound Skin Turgor soft   Edges open   Drainage Amount none   Care, Wound cleansed with;sterile normal saline   Dressing Care open to air   Wound 06/30/23 2323 Bilateral anterior foot Abrasion   Placement Date/Time: 06/30/23 2323   Side: Bilateral  Orientation: anterior  Location: foot  Primary Wound Type: Abrasion   Wound Image     Dressing Appearance open to air   Closure None   Base scab;red;dry   Periwound intact;pink   Periwound Temperature warm   Periwound Skin Turgor soft   Edges rolled/closed   Drainage Amount none   Care, Wound cleansed with;sterile normal saline   Wound 07/03/23 1420 Bilateral posterior plantar Traumatic   Placement Date/Time: 07/03/23 1420   Side: Bilateral  Orientation: posterior  Location: plantar  Primary Wound Type: Traumatic   Wound Image     Dressing Appearance open to air   Closure None   Base red;scab   Periwound intact;dry;pink   Periwound Temperature warm   Periwound Skin Turgor soft   Edges rolled/closed   Drainage  Amount none   Care, Wound cleansed with;sterile normal saline   Wound 07/10/23 1556 Bilateral coccyx Pressure Injury   Placement Date/Time: 07/10/23 1556   Present on Hospital Admission: No  Side: Bilateral  Location: coccyx  Primary Wound Type: Pressure Injury   Wound Image    Dressing Appearance dressing loose   Closure None   Base moist;red   Periwound blanchable;redness   Periwound Temperature warm   Periwound Skin Turgor soft   Edges open   Wound Length (cm) 1.6 cm   Wound Width (cm) 0.8 cm   Wound Depth (cm) 0.1 cm   Wound Surface Area (cm^2) 1.28 cm^2   Wound Volume (cm^3) 0.128 cm^3   Drainage Characteristics/Odor serous   Drainage Amount scant   Care, Wound cleansed with;sterile normal saline   Dressing Care dressing applied;border dressing;hydrofiber;silver impregnated;silicone   Periwound Care absorptive dressing applied          Wound Check / Follow-up:  Patient seen today for wound follow-up. Patient with altered mentation and currently in soft wrist restraints bilaterally. He is minimally responsive to conversation and is unable to follow commands. He has a cortrak for tube feeding. He is incontinent of bowel and bladder.   Patient with overall improvement to wounds on bilateral feet. Crusting and closure to wounds on bilateral plantar and dorsal aspects of feet. Thicker crusting also noted to left and right great toe and 2nd right toe. Will recommend transition to mupirocin BID   Patient continues to have thicker crusting to knees bilaterally. Recommending to continue topical treatment BID.   Patient with a pressure injury to left gluteal aspect. Stage II with pink moist tissue noted. There is some tissue discoloration to right gluteal crease right within the crease. It is blanchable but tissue is soft and rolling, likely due to moisture from incontinence. Recommending daily dressing as well as skin protection and moisture prevention.       Impression: Scattered abrasions, crusted areas to knees and  bilateral feet. Stage II pressure injury to left gluteal aspect. Incontinence     Short term goals:  Regain skin integrity. Topical treatment BID, daily dressing changes, skin protection, moisture prevention and pressure reduction.    Mojgan Gunn RN    7/11/2023    17:59 EDT

## 2023-07-12 ENCOUNTER — APPOINTMENT (OUTPATIENT)
Dept: MRI IMAGING | Facility: HOSPITAL | Age: 63
DRG: 917 | End: 2023-07-12
Payer: MEDICARE

## 2023-07-12 LAB
A PHAGOCYTOPH DNA BLD QL NAA+PROBE: NEGATIVE
ANION GAP SERPL CALCULATED.3IONS-SCNC: 9.6 MMOL/L (ref 5–15)
B MICROTI DNA BLD QL NAA+PROBE: NEGATIVE
BASOPHILS # BLD AUTO: 0.05 10*3/MM3 (ref 0–0.2)
BASOPHILS NFR BLD AUTO: 0.3 % (ref 0–1.5)
BUN SERPL-MCNC: 39 MG/DL (ref 8–23)
BUN/CREAT SERPL: 18.5 (ref 7–25)
CALCIUM SPEC-SCNC: 9 MG/DL (ref 8.6–10.5)
CHLORIDE SERPL-SCNC: 111 MMOL/L (ref 98–107)
CO2 SERPL-SCNC: 22.4 MMOL/L (ref 22–29)
CREAT SERPL-MCNC: 2.11 MG/DL (ref 0.76–1.27)
DEPRECATED RDW RBC AUTO: 43.7 FL (ref 37–54)
E CHAFFEENSIS DNA BLD QL NAA+PROBE: NEGATIVE
EGFRCR SERPLBLD CKD-EPI 2021: 35 ML/MIN/1.73
EOSINOPHIL # BLD AUTO: 0.23 10*3/MM3 (ref 0–0.4)
EOSINOPHIL NFR BLD AUTO: 1.3 % (ref 0.3–6.2)
ERYTHROCYTE [DISTWIDTH] IN BLOOD BY AUTOMATED COUNT: 13.5 % (ref 12.3–15.4)
GLUCOSE BLDC GLUCOMTR-MCNC: 235 MG/DL (ref 70–99)
GLUCOSE BLDC GLUCOMTR-MCNC: 244 MG/DL (ref 70–99)
GLUCOSE BLDC GLUCOMTR-MCNC: 288 MG/DL (ref 70–99)
GLUCOSE BLDC GLUCOMTR-MCNC: 289 MG/DL (ref 70–99)
GLUCOSE SERPL-MCNC: 294 MG/DL (ref 65–99)
HCT VFR BLD AUTO: 36.7 % (ref 37.5–51)
HGB BLD-MCNC: 11.8 G/DL (ref 13–17.7)
IMM GRANULOCYTES # BLD AUTO: 0.13 10*3/MM3 (ref 0–0.05)
IMM GRANULOCYTES NFR BLD AUTO: 0.8 % (ref 0–0.5)
LYMPHOCYTES # BLD AUTO: 1.8 10*3/MM3 (ref 0.7–3.1)
LYMPHOCYTES NFR BLD AUTO: 10.5 % (ref 19.6–45.3)
MAGNESIUM SERPL-MCNC: 3 MG/DL (ref 1.6–2.4)
MCH RBC QN AUTO: 28.8 PG (ref 26.6–33)
MCHC RBC AUTO-ENTMCNC: 32.2 G/DL (ref 31.5–35.7)
MCV RBC AUTO: 89.5 FL (ref 79–97)
MONOCYTES # BLD AUTO: 1.64 10*3/MM3 (ref 0.1–0.9)
MONOCYTES NFR BLD AUTO: 9.6 % (ref 5–12)
NEUTROPHILS NFR BLD AUTO: 13.25 10*3/MM3 (ref 1.7–7)
NEUTROPHILS NFR BLD AUTO: 77.5 % (ref 42.7–76)
NRBC BLD AUTO-RTO: 0 /100 WBC (ref 0–0.2)
PHOSPHATE SERPL-MCNC: 3.5 MG/DL (ref 2.5–4.5)
PLATELET # BLD AUTO: 311 10*3/MM3 (ref 140–450)
PMV BLD AUTO: 10.5 FL (ref 6–12)
POTASSIUM SERPL-SCNC: 4.2 MMOL/L (ref 3.5–5.2)
R RICKETTSI IGG SER QL IA: POSITIVE
R RICKETTSI IGG TITR SER IF: ABNORMAL {TITER}
R RICKETTSI IGM SER-ACNC: 0.17 INDEX (ref 0–0.89)
RBC # BLD AUTO: 4.1 10*6/MM3 (ref 4.14–5.8)
SODIUM SERPL-SCNC: 143 MMOL/L (ref 136–145)
WBC NRBC COR # BLD: 17.1 10*3/MM3 (ref 3.4–10.8)

## 2023-07-12 PROCEDURE — 25010000002 THIAMINE PER 100 MG: Performed by: INTERNAL MEDICINE

## 2023-07-12 PROCEDURE — 63710000001 INSULIN LISPRO (HUMAN) PER 5 UNITS: Performed by: NURSE PRACTITIONER

## 2023-07-12 PROCEDURE — 84100 ASSAY OF PHOSPHORUS: CPT | Performed by: INTERNAL MEDICINE

## 2023-07-12 PROCEDURE — 63710000001 INSULIN DETEMIR PER 5 UNITS: Performed by: NURSE PRACTITIONER

## 2023-07-12 PROCEDURE — 83735 ASSAY OF MAGNESIUM: CPT | Performed by: INTERNAL MEDICINE

## 2023-07-12 PROCEDURE — 80048 BASIC METABOLIC PNL TOTAL CA: CPT | Performed by: INTERNAL MEDICINE

## 2023-07-12 PROCEDURE — 85025 COMPLETE CBC W/AUTO DIFF WBC: CPT | Performed by: INTERNAL MEDICINE

## 2023-07-12 PROCEDURE — 25010000002 HEPARIN (PORCINE) PER 1000 UNITS: Performed by: NURSE PRACTITIONER

## 2023-07-12 PROCEDURE — 72148 MRI LUMBAR SPINE W/O DYE: CPT

## 2023-07-12 PROCEDURE — 99232 SBSQ HOSP IP/OBS MODERATE 35: CPT | Performed by: INTERNAL MEDICINE

## 2023-07-12 PROCEDURE — 99233 SBSQ HOSP IP/OBS HIGH 50: CPT | Performed by: INTERNAL MEDICINE

## 2023-07-12 PROCEDURE — 25010000002 HALOPERIDOL LACTATE PER 5 MG: Performed by: NURSE PRACTITIONER

## 2023-07-12 PROCEDURE — 99232 SBSQ HOSP IP/OBS MODERATE 35: CPT | Performed by: PSYCHIATRY & NEUROLOGY

## 2023-07-12 PROCEDURE — 82948 REAGENT STRIP/BLOOD GLUCOSE: CPT

## 2023-07-12 RX ADMIN — INSULIN DETEMIR 15 UNITS: 100 INJECTION, SOLUTION SUBCUTANEOUS at 08:08

## 2023-07-12 RX ADMIN — PANTOPRAZOLE SODIUM 40 MG: 40 INJECTION, POWDER, FOR SOLUTION INTRAVENOUS at 08:08

## 2023-07-12 RX ADMIN — SENNOSIDES AND DOCUSATE SODIUM 2 TABLET: 50; 8.6 TABLET ORAL at 22:03

## 2023-07-12 RX ADMIN — Medication 15 ML: at 08:08

## 2023-07-12 RX ADMIN — QUETIAPINE FUMARATE 600 MG: 200 TABLET ORAL at 22:04

## 2023-07-12 RX ADMIN — HEPARIN SODIUM 5000 UNITS: 5000 INJECTION INTRAVENOUS; SUBCUTANEOUS at 22:04

## 2023-07-12 RX ADMIN — LAMOTRIGINE 200 MG: 100 TABLET ORAL at 08:08

## 2023-07-12 RX ADMIN — Medication 250 MG: at 22:19

## 2023-07-12 RX ADMIN — Medication 1 MG: at 08:07

## 2023-07-12 RX ADMIN — Medication 10 ML: at 08:09

## 2023-07-12 RX ADMIN — HALOPERIDOL LACTATE 4 MG: 5 INJECTION, SOLUTION INTRAMUSCULAR at 20:04

## 2023-07-12 RX ADMIN — LOSARTAN POTASSIUM 50 MG: 50 TABLET, FILM COATED ORAL at 08:07

## 2023-07-12 RX ADMIN — INSULIN DETEMIR 15 UNITS: 100 INJECTION, SOLUTION SUBCUTANEOUS at 22:12

## 2023-07-12 RX ADMIN — ATENOLOL 25 MG: 25 TABLET ORAL at 08:08

## 2023-07-12 RX ADMIN — MUPIROCIN 1 APPLICATION: 20 OINTMENT TOPICAL at 22:06

## 2023-07-12 RX ADMIN — LAMOTRIGINE 200 MG: 100 TABLET ORAL at 22:03

## 2023-07-12 RX ADMIN — HEPARIN SODIUM 5000 UNITS: 5000 INJECTION INTRAVENOUS; SUBCUTANEOUS at 05:34

## 2023-07-12 RX ADMIN — THIAMINE HYDROCHLORIDE 500 MG: 100 INJECTION, SOLUTION INTRAMUSCULAR; INTRAVENOUS at 06:41

## 2023-07-12 RX ADMIN — HEPARIN SODIUM 5000 UNITS: 5000 INJECTION INTRAVENOUS; SUBCUTANEOUS at 13:26

## 2023-07-12 RX ADMIN — LOSARTAN POTASSIUM 50 MG: 50 TABLET, FILM COATED ORAL at 22:03

## 2023-07-12 RX ADMIN — AMLODIPINE BESYLATE 10 MG: 10 TABLET ORAL at 08:07

## 2023-07-12 RX ADMIN — INSULIN LISPRO 6 UNITS: 100 INJECTION, SOLUTION INTRAVENOUS; SUBCUTANEOUS at 08:08

## 2023-07-12 RX ADMIN — INSULIN LISPRO 4 UNITS: 100 INJECTION, SOLUTION INTRAVENOUS; SUBCUTANEOUS at 17:41

## 2023-07-12 RX ADMIN — SENNOSIDES AND DOCUSATE SODIUM 2 TABLET: 50; 8.6 TABLET ORAL at 08:07

## 2023-07-12 RX ADMIN — INSULIN LISPRO 6 UNITS: 100 INJECTION, SOLUTION INTRAVENOUS; SUBCUTANEOUS at 12:11

## 2023-07-12 RX ADMIN — DOXYCYCLINE 100 MG: 100 CAPSULE ORAL at 08:08

## 2023-07-12 RX ADMIN — Medication 250 MG: at 08:08

## 2023-07-12 RX ADMIN — DOXYCYCLINE 100 MG: 100 CAPSULE ORAL at 22:03

## 2023-07-12 RX ADMIN — INSULIN LISPRO 4 UNITS: 100 INJECTION, SOLUTION INTRAVENOUS; SUBCUTANEOUS at 22:13

## 2023-07-12 RX ADMIN — Medication 10 ML: at 22:05

## 2023-07-12 RX ADMIN — Medication 10 ML: at 08:07

## 2023-07-12 RX ADMIN — MUPIROCIN 1 APPLICATION: 20 OINTMENT TOPICAL at 12:11

## 2023-07-12 RX ADMIN — PANTOPRAZOLE SODIUM 40 MG: 40 INJECTION, POWDER, FOR SOLUTION INTRAVENOUS at 22:03

## 2023-07-12 RX ADMIN — Medication 10 ML: at 22:04

## 2023-07-12 NOTE — PROGRESS NOTES
" LOS: 12 days   Patient Care Team:  Felix Atkinson MD as PCP - General  Felix Atkinson MD as PCP - Family Medicine    Chief Complaint: MK/CKD    Subjective     Weakness - Generalized  Events noted.  No new issues overnight.  Feeling better.  NG tube in place, voiding    Fluctuating blood pressure.   afebrile this morning    Subjective:  Symptoms:  Improved.      History taken from: patient chart    Objective     Vital Sign Min/Max for last 24 hours  Temp  Min: 98.2 °F (36.8 °C)  Max: 100.3 °F (37.9 °C)   BP  Min: 150/65  Max: 174/77   Pulse  Min: 66  Max: 95   Resp  Min: 16  Max: 18   SpO2  Min: 91 %  Max: 95 %   No data recorded   No data recorded     Flowsheet Rows      Flowsheet Row First Filed Value   Admission Height 182.9 cm (72\") Documented at 06/30/2023 1800   Admission Weight 133 kg (294 lb 1.5 oz) Documented at 06/30/2023 2300            I/O this shift:  In: 240 [P.O.:240]  Out: -   I/O last 3 completed shifts:  In: 6104 [I.V.:586; Other:2636; NG/GT:2582; IV Piggyback:300]  Out: 0     Objective:  General Appearance:  Comfortable, not in pain and in no acute distress (Seems better.).    Vital signs: (most recent): Blood pressure 150/65, pulse 95, temperature 98.4 °F (36.9 °C), temperature source Oral, resp. rate 17, height 182.9 cm (72\"), weight 134 kg (295 lb 6.7 oz), SpO2 95 %.  Vital signs are normal.    Output: Producing urine.    HEENT: Normal HEENT exam.    Lungs:  Normal effort and normal respiratory rate.    Heart: Normal rate.  Regular rhythm.    Abdomen: Abdomen is soft.  Bowel sounds are normal.   There is no abdominal tenderness.     Extremities: Normal range of motion.  There is no dependent edema.    Pulses: Distal pulses are intact.    Neurological: Patient is alert.    Skin:  Warm and dry.  There is ecchymosis.  (Scabs over knees and toes.)        Exam unchanged from yesterday.    Results Review:     I reviewed the patient's new clinical results.    WBC WBC   Date Value Ref Range " Status   07/12/2023 17.10 (H) 3.40 - 10.80 10*3/mm3 Final   07/11/2023 14.02 (H) 3.40 - 10.80 10*3/mm3 Final   07/10/2023 14.27 (H) 3.40 - 10.80 10*3/mm3 Final      HGB Hemoglobin   Date Value Ref Range Status   07/12/2023 11.8 (L) 13.0 - 17.7 g/dL Final   07/11/2023 11.5 (L) 13.0 - 17.7 g/dL Final   07/10/2023 11.3 (L) 13.0 - 17.7 g/dL Final      HCT Hematocrit   Date Value Ref Range Status   07/12/2023 36.7 (L) 37.5 - 51.0 % Final   07/11/2023 36.6 (L) 37.5 - 51.0 % Final   07/10/2023 34.6 (L) 37.5 - 51.0 % Final      Platlets No results found for: LABPLAT   MCV MCV   Date Value Ref Range Status   07/12/2023 89.5 79.0 - 97.0 fL Final   07/11/2023 91.5 79.0 - 97.0 fL Final   07/10/2023 88.3 79.0 - 97.0 fL Final          Sodium Sodium   Date Value Ref Range Status   07/12/2023 143 136 - 145 mmol/L Final   07/11/2023 146 (H) 136 - 145 mmol/L Final   07/10/2023 144 136 - 145 mmol/L Final      Potassium Potassium   Date Value Ref Range Status   07/12/2023 4.2 3.5 - 5.2 mmol/L Final   07/11/2023 4.3 3.5 - 5.2 mmol/L Final   07/10/2023 4.0 3.5 - 5.2 mmol/L Final      Chloride Chloride   Date Value Ref Range Status   07/12/2023 111 (H) 98 - 107 mmol/L Final   07/11/2023 114 (H) 98 - 107 mmol/L Final   07/10/2023 111 (H) 98 - 107 mmol/L Final      CO2 CO2   Date Value Ref Range Status   07/12/2023 22.4 22.0 - 29.0 mmol/L Final   07/11/2023 20.4 (L) 22.0 - 29.0 mmol/L Final   07/10/2023 21.8 (L) 22.0 - 29.0 mmol/L Final      BUN BUN   Date Value Ref Range Status   07/12/2023 39 (H) 8 - 23 mg/dL Final   07/11/2023 33 (H) 8 - 23 mg/dL Final   07/10/2023 33 (H) 8 - 23 mg/dL Final      Creatinine Creatinine   Date Value Ref Range Status   07/12/2023 2.11 (H) 0.76 - 1.27 mg/dL Final   07/11/2023 1.90 (H) 0.76 - 1.27 mg/dL Final   07/10/2023 1.93 (H) 0.76 - 1.27 mg/dL Final      Calcium Calcium   Date Value Ref Range Status   07/12/2023 9.0 8.6 - 10.5 mg/dL Final   07/11/2023 9.7 8.6 - 10.5 mg/dL Final   07/10/2023 9.0 8.6 - 10.5  mg/dL Final      PO4 No results found for: CAPO4   Albumin No results found for: ALBUMIN     Magnesium Magnesium   Date Value Ref Range Status   07/12/2023 3.0 (H) 1.6 - 2.4 mg/dL Final   07/11/2023 2.8 (H) 1.6 - 2.4 mg/dL Final   07/10/2023 2.5 (H) 1.6 - 2.4 mg/dL Final      Uric Acid No results found for: URICACID     Medication Review:   amLODIPine, 10 mg, Nasogastric, Q24H  atenolol, 25 mg, Nasogastric, Daily  doxycycline, 100 mg, Nasogastric, Q12H  folic acid, 1 mg, Nasogastric, Daily  heparin (porcine), 5,000 Units, Subcutaneous, Q8H  insulin detemir, 15 Units, Subcutaneous, Q12H  insulin lispro, 2-9 Units, Subcutaneous, 4x Daily AC & at Bedtime  lamoTRIgine, 200 mg, Nasogastric, Q12H  losartan, 50 mg, Nasogastric, BID  mupirocin, 1 application , Topical, 2 times per day  pantoprazole, 40 mg, Intravenous, BID  QUEtiapine, 600 mg, Nasogastric, Nightly  saccharomyces boulardii, 250 mg, Nasogastric, BID  senna-docusate sodium, 2 tablet, Nasogastric, BID  sodium chloride, 10 mL, Intravenous, Q12H  sodium chloride, 10 mL, Intravenous, Q12H  Tropical Liquid Nutrition, 15 mL, Nasogastric, Daily        Assessment & Plan       Acute renal failure, unspecified acute renal failure type    Onychomycosis    Onychocryptosis    Foreign body in left foot    Peripheral neuropathy    Charcot foot due to diabetes mellitus    Foot pain, bilateral      Assessment & Plan  MK/CKD3b-  Due to diabetic nephropathy.  He has had worsened renal function recently.  Had been on lisinopril and SGLT2 and kerendia.  Creatinine is better than baseline. Volume status adequate.  Off bicarb drip and Lasix.  Baseline creatinine around 2.0-2.5.  Monitor.  Hypernatremia-  Na is better with current free water  80 cc every 1 hour.  Continue the same and monitor .   Hypokalemia, replaced.  Met Acidosis-  improved.  Off  bicarb.  Proteinuria-  diabetic nephropathy  DMII-  treated.  Was On jardiance, hold with MK.  HTN-  BP is better, continue  losartan.  Chronic edema-  Amlodipine may be contributing.  on hold.  H/o bipolar-  previous lithium use.  H/o sz disorder-neurology evaluating.  Anemia-  tsat at 14%, ferritin 425. Received some IV iron.   Rhabdomyolysis-  had fall at home, statin on hold. improved now.  Weakness-  would avoid muscle relaxer in CKD(flexeril).  Likely polypharmacy contributing.    We will follow.      Caroline Mckenzie MD  07/12/23  15:37 EDT

## 2023-07-12 NOTE — PROGRESS NOTES
Pulmonary / Critical Care Progress Note      Patient Name: Carlos Murphy Jr.  : 1960  MRN: [id number]  Attending:  Clif Oconnor DO   Date of admission: 2023    Subjective   Subjective   Follow-up for hypernatremia, altered mental status, bipolar/schizophrenia    Sodium improving  Renal function improving  Knows name.  Still with episodes of intermittently confusion and not answering questions    Review of Systems  Cannot obtain due to confusion with altered mental status      Objective   Objective     Vitals:   Vitals:    23 1732 23 2044 23 2355 23 0308   BP:  174/77 150/73 165/78   BP Location:  Left arm Left arm Left arm   Patient Position:  Lying Lying Lying   Pulse:  66 69 66   Resp:  18 18 18   Temp: 100.3 °F (37.9 °C) 98.3 °F (36.8 °C) 98.4 °F (36.9 °C) 99.3 °F (37.4 °C)   TempSrc:  Oral Oral Rectal   SpO2:  91% 94% 95%   Weight:       Height:             Physical Exam   Vital Signs Reviewed   Patient awake, no acute distress, on room air  HEENT:  PERRL, EOMI. core track in place  Chest:  good aeration, clear to auscultation bilaterally, tympanic to percussion bilaterally, no work of breathing noted  CV: RRR, no MGR, pulses 2+, equal.  Abd:  Soft, NT, ND, + BS, no HSM  EXT:  no clubbing, no cyanosis, no edema  Neuro:  A&Ox2, patient awake, does follow some simple commands, moves all 4 extremities, horizontal nystagmus  Skin: No rashes or lesions noted      Result Review    Result Review:  I have personally reviewed the results from the time of this admission to 23   and agree with these findings:  [x]  Laboratory  [x]  Microbiology  [x]  Radiology  []  EKG/Telemetry   [x]  Cardiology/Vascular   []  Pathology  []  Old records  []  Other:  Most notable findings include:       Lab 23  0515 23  0500 07/10/23  0247 23  1151 23  0324 23  0256 23  1302 23  0308 23  1015 23  0516   WBC 17.10* 14.02* 14.27*  --  13.62*  18.68*  --  16.65*  --  14.77*   HEMOGLOBIN 11.8* 11.5* 11.3*  --  13.3 11.2*  --  12.9*  --  12.4*   HEMATOCRIT 36.7* 36.6* 34.6*  --  45.4 34.3*  --  39.1  --  36.0*   PLATELETS 311 255 218  --  171 220  --  285  --  325   SODIUM 143 146* 144 140  --  147* 155* 158*   < > 151*   POTASSIUM 4.2 4.3 4.0 4.3  --  3.4* 3.1* 2.9*   < > 3.0*   CHLORIDE 111* 114* 111* 105  --  112* 116* 117*   < > 107   CO2 22.4 20.4* 21.8* 21.3*  --  24.7 26.8 24.6   < > 24.6   BUN 39* 33* 33* 36*  --  50* 63* 75*   < > 82*   CREATININE 2.11* 1.90* 1.93* 1.85*  --  2.18* 2.56* 2.64*   < > 2.98*   GLUCOSE 294* 216* 204* 299*  --  157* 168* 209*   < > 351*   CALCIUM 9.0 9.7 9.0 9.3  --  8.7 9.0 9.4   < > 9.6   PHOSPHORUS 3.5 3.3 2.5  --   --  2.3*  --  3.6  --  1.8*   TOTAL PROTEIN  --   --   --   --   --   --   --  6.7  --   --    ALBUMIN  --   --   --   --   --  3.1*  --  3.6  --   --    GLOBULIN  --   --   --   --   --   --   --  3.1  --   --     < > = values in this interval not displayed.           Assessment & Plan   Assessment / Plan     Impression:  Altered mental status  Metabolic encephalopathy  Seizure disorder  History of bipolar and schizo affective disorder  Acute kidney injury, on CKD stage III  Type 2 diabetes with hyperglycemia  Iron deficiency anemia  Rhabdomyolysis  Hypokalemia  Hypophosphatemia  Hypertension  Hypernatremia, clinically significant     Plan:  On room air  Tube feeds per dietitian  Continue free water at current dose.  Sodium improving  Trend renal panel and electrolytes  Continue doxycycline.  Can discontinue if ehrlichiosis profile comes back negative  Continue current dose of losartan  Continue current insulin regimen  Appreciate neurology assistance  Continue Lexapro, Seroquel, and Lamictal  Continue thiamine, and multivitamin and folic acid  Nephrology following and renal function slowly improving    DVT prophylaxis:  Medical and mechanical DVT prophylaxis orders are present.    CODE STATUS:   Level  Of Support Discussed With: Patient  Code Status (Patient has no pulse and is not breathing): CPR (Attempt to Resuscitate)  Medical Interventions (Patient has pulse or is breathing): Full Support      Labs, imaging, microbiology, notes and medications personally reviewed  Discussed with primary    Electronically signed by Phillip Yanez MD, 07/12/23, 12:22 PM EDT.

## 2023-07-12 NOTE — PROGRESS NOTES
Nutrition Services    Patient Name: Carlos Murphy Jr.  YOB: 1960  MRN: [id number]  Admission date: 6/30/2023    PROGRESS NOTE      Encounter Information: EN follow up        PO Diet: NPO Diet NPO Type: Strict NPO   PO Supplements: N/A   PO Intake:  N/A       Current nutrition support: Diabetisource AC @ 80 ml/hr  Free water flushes 80 ml QH   2304 kcal, 115 g pro, 1574 ml fluid      Provides: 2509 kcal, 115 g pro, 3494 ml fluid      Nutrition support review: Tolerating EN at goal rate. No residuals per nursing note.       Labs (reviewed below): Reviewed.        GI Function:  Last BM documented 7/10/23.       Nutrition Intervention Updates: Continue current nutrition plan of care.       Results from last 7 days   Lab Units 07/12/23  0515 07/11/23  0500 07/10/23  0247 07/07/23  1302 07/07/23  0308   SODIUM mmol/L 143 146* 144   < > 158*   POTASSIUM mmol/L 4.2 4.3 4.0   < > 2.9*   CHLORIDE mmol/L 111* 114* 111*   < > 117*   CO2 mmol/L 22.4 20.4* 21.8*   < > 24.6   BUN mg/dL 39* 33* 33*   < > 75*   CREATININE mg/dL 2.11* 1.90* 1.93*   < > 2.64*   CALCIUM mg/dL 9.0 9.7 9.0   < > 9.4   BILIRUBIN mg/dL  --   --   --   --  0.3   ALK PHOS U/L  --   --   --   --  132*   ALT (SGPT) U/L  --   --   --   --  12   AST (SGOT) U/L  --   --   --   --  27   GLUCOSE mg/dL 294* 216* 204*   < > 209*    < > = values in this interval not displayed.     Results from last 7 days   Lab Units 07/12/23  0515 07/11/23  0500 07/10/23  0247   MAGNESIUM mg/dL 3.0* 2.8* 2.5*   PHOSPHORUS mg/dL 3.5 3.3 2.5   HEMOGLOBIN g/dL 11.8* 11.5* 11.3*   HEMATOCRIT % 36.7* 36.6* 34.6*     Coronavirus (COVID-19)   Date Value Ref Range Status   03/25/2021 NOT DETECTED NA Final     Comment:     The SARS-CoV-2 assay is a real-time, RT-PCR test intended  for the qualitative detection of nucleic acid from the  SARS-CoV-2 in respiratory specimens from individuals,  testing performed at [facility].       Lab Results   Component Value Date     HGBA1C 8.3 (H) 11/10/2020       RD to follow up per protocol.    Electronically signed by:  Fransisco Stanley RD  07/12/23 10:26 EDT

## 2023-07-12 NOTE — PROGRESS NOTES
"DOS: 2023  NAME: Carlos Murphy Jr.   : 1960  PCP: Felix Atkinson MD  Chief Complaint   Patient presents with    Weakness - Generalized       Chief complaint: He is currently laying in bed in no distress.  Subjective: Still has a nasogastric tube in place.  Has cognitive issues including not knowing where he is or the month of the year but able to tell me his name correctly and following some simple commands.  He was able to appreciate the hairdo of our stroke nurse coordinator.    Objective:  Vital signs: /76 (BP Location: Right arm, Patient Position: Lying)   Pulse 93   Temp 98.2 °F (36.8 °C) (Oral)   Resp 16   Ht 182.9 cm (72\")   Wt 134 kg (295 lb 6.7 oz)   SpO2 93%   BMI 40.07 kg/m²    Gen: NAD, vitals reviewed  MS: Still impaired like yesterday, oriented to person and able to follow simple commands but not complex commands and was able to appreciate that hairdo of our stroke nurse coordinator and complement her in proper language.  CN: Cranials 2-12: No focal deficits noted.  Motor: Muscles of both upper and lower extremities show good bulk power and tone.  However still has mild weakness of the right lower extremity.  Sensory: No sensory loss noted.  Coordination: Normal finger-to-nose coordination noted.  Gait: Not weightbearing at this time.    ROS:  General: Pleasant gentleman currently laying in bed still acting confused but interacting well.  Neurological: Has weakness in the right lower extremity and still has issues with cognition.    Laboratory results:  Lab Results   Component Value Date    GLUCOSE 294 (H) 2023    CALCIUM 9.0 2023     2023    K 4.2 2023    CO2 22.4 2023     (H) 2023    BUN 39 (H) 2023    CREATININE 2.11 (H) 2023    EGFRIFNONA 73 2017    BCR 18.5 2023    ANIONGAP 9.6 2023     Lab Results   Component Value Date    WBC 17.10 (H) 2023    HGB 11.8 (L) 2023    HCT 36.7 (L) " 07/12/2023    MCV 89.5 07/12/2023     07/12/2023     Lab Results   Component Value Date    LDL 40 (L) 03/26/2021    LDL 27 02/26/2016            Review of labs: The white cell count is elevated at 17,100.  The hemoglobin and hematocrit are low at 11.8 and 36.7.  The creatinine is elevated 2.11.     CMP:        Lab 07/12/23  0515 07/11/23  0500 07/10/23  0247 07/09/23  1151 07/08/23  0256 07/07/23  1302 07/07/23  0308   SODIUM 143 146* 144 140 147*   < > 158*   POTASSIUM 4.2 4.3 4.0 4.3 3.4*   < > 2.9*   CHLORIDE 111* 114* 111* 105 112*   < > 117*   CO2 22.4 20.4* 21.8* 21.3* 24.7   < > 24.6   ANION GAP 9.6 11.6 11.2 13.7 10.3   < > 16.4*   BUN 39* 33* 33* 36* 50*   < > 75*   CREATININE 2.11* 1.90* 1.93* 1.85* 2.18*   < > 2.64*   EGFR 35.0* 39.6* 38.9* 40.9* 33.6*   < > 26.7*   GLUCOSE 294* 216* 204* 299* 157*   < > 209*   CALCIUM 9.0 9.7 9.0 9.3 8.7   < > 9.4   MAGNESIUM 3.0* 2.8* 2.5* 2.2 2.1  --  2.3   PHOSPHORUS 3.5 3.3 2.5  --  2.3*  --  3.6   TOTAL PROTEIN  --   --   --   --   --   --  6.7   ALBUMIN  --   --   --   --  3.1*  --  3.6   GLOBULIN  --   --   --   --   --   --  3.1   ALT (SGPT)  --   --   --   --   --   --  12   AST (SGOT)  --   --   --   --   --   --  27   BILIRUBIN  --   --   --   --   --   --  0.3   ALK PHOS  --   --   --   --   --   --  132*    < > = values in this interval not displayed.                  Lab Results   Component Value Date    [id number] 1,638 (H) 07/10/2023       Lab Results   Component Value Date    FOLATE >20.00 07/10/2023       Lab Results   Component Value Date    HGBA1C 8.3 (H) 11/10/2020           Review and interpretation of imaging: Portable chest x-ray showed the following:    Narrative & Impression   PROCEDURE:  XR CHEST 1 VW     COMPARISON: [facility], SHERRY, XR CHEST 1 VW, 7/06/2023, 13:11.     INDICATIONS:  PT PULLED COR-TRAK OUT, CHOKING AND CRACKLING AFTERWARDS     FINDINGS:          It looks like there may be some atelectasis or scarring in the  right mid chest.  There is no melissa   consolidation or obvious pleural effusions.  Granuloma suggested in the right lower chest.     IMPRESSION:                 1. Atelectasis or scarring right mid chest       CT Head Without Contrast    Result Date: 7/5/2023  PROCEDURE: CT HEAD WO CONTRAST  COMPARISON:  [facility], MR, MRI BRAIN WO CONTRAST, 7/01/2023, 10:11.  CT, CT ANGIOGRAM HEAD W AI ANALYSIS OF LVO, 6/30/2023, 18:58.  [facility], CT, CT HEAD WO CONTRAST STROKE PROTOCOL, 6/30/2023, 18:33.  [facility], CT, CT HEAD WO CONTRAST, 4/30/2023, 15:12. INDICATIONS: ams, lethargic  PROTOCOL:   Standard imaging protocol performed    RADIATION:   DLP: 1897mGy*cm   MA and/or KV was adjusted to minimize radiation dose.     TECHNIQUE: CT images of the head were obtained without contrast material.   FINDINGS:  No midline shift. Ventricles and sulci are normal in size. Basal cisterns are patent. No extra-axial fluid collection. No evidence of acute intracranial hemorrhage, mass lesion, or edema. No definite CT findings of acute infarction.  Paranasal sinuses and mastoid air cells are clear. No soft tissue or calvarial abnormality is identified.      Impression: No evidence for acute intracranial abnormality.     JOYCE ROSE MD       Electronically Signed and Approved By: JOYCE ROSE MD on 7/05/2023 at 8:20             CT Head Without Contrast Stroke Protocol    Result Date: 6/30/2023  PROCEDURE: CT HEAD WO CONTRAST STROKE PROTOCOL  COMPARISON:  [facility], CT, CT HEAD WO CONTRAST, 4/30/2023, 15:12. INDICATIONS: Neuro deficit, acute, stroke suspected  PROTOCOL:   Standard imaging protocol performed    RADIATION:   DLP: 1145.2 mGy*cm   MA and/or KV was adjusted to minimize radiation dose.     TECHNIQUE: After obtaining the patient's consent, CT images were obtained without non-ionic intravenous contrast material.  FINDINGS:  Superficial soft tissues appear unremarkable.   The calvarium is intact.  There is a small amount of mucus in the right maxillary sinus.  The ventricles appear normal in size and configuration for patient's age. There is no evidence of herniation, hydrocephalus or mass effect. Gray-white differentiation appears intact. There are no focal areas of hypoattenuation within the brain parenchyma. There is no evidence of acute intracranial hemorrhage. The orbits appear unremarkable.       Impression:  No acute intracranial abnormality.     MARIZA KIRAN MD       Electronically Signed and Approved By: MARIZA KIRAN MD on 6/30/2023 at 19:14                  MRI Brain Without Contrast    Result Date: 7/1/2023  PROCEDURE: MRI BRAIN WO CONTRAST  COMPARISON: [facility], CT, CT HEAD WO CONTRAST STROKE PROTOCOL, 6/30/2023, 18:33.  [facility], MR, BRAIN W/O CONTRAST, 3/26/2021, 7:25.  INDICATIONS: LEFT SIDED WEAKNESS  TECHNIQUE: Multiplanar images of the brain were obtained in a high field strength magnet.  FINDINGS:  The ventricles are normal in size, position, and configuration.  Sulci are not abnormally prominent.  Scattered tiny foci of nonspecific T2 bright signal in cerebral white matter appear stable.  No abnormal bright signal is seen on diffusion weighted images.  No restricted diffusion is evident.  No abnormal dark signal is seen on magnetic susceptibility sequence sensitive for blood products.  No abnormality of the pituitary or orbits is evident.  The paranasal sinuses are well aerated.  No abnormal mastoid signal is seen.      Impression:   MR examination of the brain without IV contrast demonstrating no acute intracranial abnormality.      JON LOPEZ MD       Electronically Signed and Approved By: JON LOPEZ MD on 7/01/2023 at 11:03               Workup to date: EEG was performed that shows the following:    Reading:   The EEG was obtained portable in his room at which time he was unresponsive with video monitoring.  We do not  know how much sleep he has had the night before the testing.     The dominant background activity consists of symmetric and irregular 20 to 50 µV 5 cps which were prominent on both parieto-occipital regions and were mixed with moderate diffuse symmetric irregular 20 to 65 µV 2 to 3 cps waves which were prominent on both frontocentral regions.  There were symmetric vertex activities during the light stages of sleep.  There were no discernible responses on photic stimulation advised frequencies.  There was no amplitude asymmetry.  No paroxysmal discharges.     Impression:   Abnormal EEG because of slow background activity mixed with slower waves compatible with mild to moderate and diffuse encephalopathy.  There were no epileptiform discharges noted today.    Results for orders placed during the hospital encounter of 06/30/23    Adult Transthoracic Echo Limited W/ Cont if Necessary Per Protocol    Interpretation Summary    Left ventricular systolic function is normal. Left ventricular ejection fraction appears to be 61 - 65%.    Left ventricular diastolic function was normal.    No regional wall motion abnormality    No obvious source of emboli       Diagnoses: Patient with metabolic encephalopathy.      Comment: Patient also has weakness of the lower extremity of the right.    Plan:  1.  MRI of the lumbosacral spine without contrast.  This is to rule out any L4-L5 S1 radiculopathy.  2.  CSF studies have been unremarkable so far.  3.  Physical therapy and Occupational Therapy currently working with the patient for rehab potential.  4.  To see if the patient can take medications and food by mouth and to look at the calorie intake and if it is possible to discontinue the nasogastric tube.    Discussed with the care team and will follow-up.    Spent a total of 30 minutes in face-to-face evaluation and management of the patient using the dedicated telemedicine device without any interruption with the help of Edie Lauren  the nurse coordinator with the patient located at the [facility] and myself at a remote location.    Electronically signed by Beth Hairston MD, 07/12/23, 11:40 AM EDT.

## 2023-07-12 NOTE — PROGRESS NOTES
[facility]   Hospitalist Progress Note    Date of admission: 6/30/2023  Patient Name: Carlos Murphy Jr.  1960  Date: 7/12/2023      Subjective     Chief Complaint   Patient presents with   • Weakness - Generalized       Summary: 62 y.o. with history of seizure disorder (on lamotrigine 200 bid), bipolar, schizophrenia, who presented with altered mentation/unresponsiveness on 6/28.  Patient apparently had called his sister initially had slurred speech/somnolent and received Narcan with notable initial improvement.  Patient apparently reported taking additional medications at home but was not trying to harm himself history is very limited given patient poor historian.  Patient underwent stroke work-up without acute findings suggest etiology of patient's AMS.  Did have hyperkalemia initially improved with fluids.  Nephrology assisting given MK and rhabdomyolysis.  Podiatry assisted given for infection/glasses feet and required several pieces being removed.      Hospital course completed by worsening mentation/agitation with concern initially for seizures multiple EEGs have been negative, neurology consulted for assistance, concern for possible medication withdrawal (outpt is on ambien tox screen was positive oxycodone only)/metabolic encephalopathy and was placed on CIWA monitoring and required ativan. LP obtained negative for acute meningitis, tick panel pending and treating empirically for now.  Mentation starting to improve, gradually titrating medications, psychiatry consulted for additional assistance. Transferring out of the icu and trying to come out of restraints given current improvements.     Interval Followup:   Patient remains in restraints.  Patient is calm.  Patient remains confused he is unable to tell me where he is what year it is what month it is who the president is.    Patient has been cleared for a diet today    ROS:   Not able to obtain due to confusion       Objective     Vitals:    Temp:  [97.6 °F (36.4 °C)-100.3 °F (37.9 °C)] 99.3 °F (37.4 °C)  Heart Rate:  [66-71] 66  Resp:  [18] 18  BP: (150-180)/(69-82) 165/78  Flow (L/min):  [4] 4    Physical Exam        Constitutional: Awake, alert, no acute distress, remains in restraints               Eyes: Pupils equal, sclerae anicteric, no conjunctival injection              HENT: NCAT, mucous membranes moist              Neck: Supple, no thyromegaly, no lymphadenopathy, trachea midline              Respiratory: Clear to auscultation bilaterally, nonlabored respirations no w/r/r               Cardiovascular: RRR, no murmurs, rubs, or gallops, palpable pedal pulses bilaterally              Gastrointestinal: Positive bowel sounds, soft, nontender, nondistended              Musculoskeletal: not able to lift his left arm due to shoulder pain, full rom in other extremities               Psychiatric: Appropriate affect, cooperative              Neurologic: Oriented x 1 strength symmetric in all extremities, No focal deficits noted              Skin: bilateral feet have sores on them from stepping on glass, wounds are healing          Result Review:  Vital signs, labs and recent relevant imaging reviewed.      •  acetaminophen  •  acetaminophen  •  senna-docusate sodium **AND** polyethylene glycol **AND** [DISCONTINUED] bisacodyl **AND** bisacodyl  •  dextrose  •  dextrose  •  glucagon (human recombinant)  •  haloperidol lactate  •  labetalol  •  nitroglycerin  •  ondansetron  •  sodium chloride  •  sodium chloride    amLODIPine, 10 mg, Nasogastric, Q24H  atenolol, 25 mg, Nasogastric, Daily  doxycycline, 100 mg, Nasogastric, Q12H  folic acid, 1 mg, Nasogastric, Daily  heparin (porcine), 5,000 Units, Subcutaneous, Q8H  insulin detemir, 15 Units, Subcutaneous, Q12H  insulin lispro, 2-9 Units, Subcutaneous, 4x Daily AC & at Bedtime  lamoTRIgine, 200 mg, Nasogastric, Q12H  losartan, 50 mg, Nasogastric, BID  mupirocin, 1 application , Topical, 2 times per  day  pantoprazole, 40 mg, Intravenous, BID  QUEtiapine, 600 mg, Nasogastric, Nightly  saccharomyces boulardii, 250 mg, Nasogastric, BID  senna-docusate sodium, 2 tablet, Nasogastric, BID  sodium chloride, 10 mL, Intravenous, Q12H  sodium chloride, 10 mL, Intravenous, Q12H  thiamine (B-1) IV, 500 mg, Intravenous, Q8H  Tropical Liquid Nutrition, 15 mL, Nasogastric, Daily    7/3 eeg  Impression:   Abnormal EEG because of:  1.  Intermittent medium voltage focal slow activity noted over the left frontotemporal region suggestive of neuronal dysfunction in this region.  This abnormalities have been reported in individuals with vascular insufficiency, migraine, or postictal state.  2.  Slow background activity mixed with slower waves compatible with moderate and diffuse encephalopathy.  3.  There were no epileptiform discharges noted today.    7/6 EEG moderate encephalopathy and no acute epileptic discharges noted    Assessment / Plan     Assessment/Plan:  Sepsis from undetermined etiology, possible tickborne illness   Acute metabolic encephalopathy  MK on CKD  Severe hyperkalemia  Seizure disorder (on lamictal), with concern for subclinical seizure - negative on EEGs  Concern for withdrawal from outpatient ambien (tox + oxycodone only on admission)  Rhabdomyolysis  SSTI in setting of Foreign body in left foot/glass  Initial presentation with hyponatremia  Significant Hypernatremia  Anion gap metabolic acidosis  CVA ruled out  History of COPD  Type 2 diabetes mellitus  HTN  Bipolar disorder  Hyperlipidemia  Polypharmacy  Horizontal nystagmus - reportedly chronic/question congenital nystagmus    PLAN   Negative LP/meningitis/csf cx/blood culture, lyme and rickettsia  Off ceftriaxone, has had adequate course to also cover initial celluitis if sadia feet   Continue doxycycline for now until remainder of tick studies result: ehrlichia,  babeisa pending  Psychiatry consulted appreciate assistance - discontinued citalopram and  increased seroquel   Continue home lamotrigine (takes for seizures)   Speech following. Will advance diet today   Agitation improving, remove restraints later if remains calm, did require haldol earlier   Completing course of IV thiamine   Cont atenolol, losartan, amlodipine.    Appreciate nephrology assistance with ambar/ckd/hypernatremia  Continue Levemir, ssi  Continue local wound care  Delirium precautions/reoritentation  Continue gi ppx   Check a.m. CBC, BMP, magnesium, phosphorus      DVT prophylaxis:  Medical and mechanical DVT prophylaxis orders are present.    Level Of Support Discussed With: Patient  Code Status (Patient has no pulse and is not breathing): CPR (Attempt to Resuscitate)  Medical Interventions (Patient has pulse or is breathing): Full Support        CBC          7/10/2023    02:47 7/11/2023    05:00 7/12/2023    05:15   CBC   WBC 14.27  14.02  17.10    RBC 3.92  4.00  4.10    Hemoglobin 11.3  11.5  11.8    Hematocrit 34.6  36.6  36.7    MCV 88.3  91.5  89.5    MCH 28.8  28.8  28.8    MCHC 32.7  31.4  32.2    RDW 12.8  13.1  13.5    Platelets 218  255  311      CMP          7/10/2023    02:47 7/11/2023    05:00 7/12/2023    05:15   CMP   Glucose 204  216  294    BUN 33  33  39    Creatinine 1.93  1.90  2.11    EGFR 38.9  39.6  35.0    Sodium 144  146  143    Potassium 4.0  4.3  4.2    Chloride 111  114  111    Calcium 9.0  9.7  9.0    BUN/Creatinine Ratio 17.1  17.4  18.5    Anion Gap 11.2  11.6  9.6

## 2023-07-12 NOTE — PLAN OF CARE
Goal Outcome Evaluation:              Outcome Evaluation: Pt vss. Pt resting well. Pt in 2 point soft wrist restraints. Pt has NG tube at 60 with tube feeds going. No residuals. No other complaints. Continue plan of care.

## 2023-07-13 LAB
ANION GAP SERPL CALCULATED.3IONS-SCNC: 11.7 MMOL/L (ref 5–15)
BASOPHILS # BLD AUTO: 0.08 10*3/MM3 (ref 0–0.2)
BASOPHILS NFR BLD AUTO: 0.4 % (ref 0–1.5)
BUN SERPL-MCNC: 42 MG/DL (ref 8–23)
BUN/CREAT SERPL: 21.1 (ref 7–25)
CALCIUM SPEC-SCNC: 8.9 MG/DL (ref 8.6–10.5)
CHLORIDE SERPL-SCNC: 109 MMOL/L (ref 98–107)
CO2 SERPL-SCNC: 16.3 MMOL/L (ref 22–29)
CREAT SERPL-MCNC: 1.99 MG/DL (ref 0.76–1.27)
DEPRECATED RDW RBC AUTO: 43.4 FL (ref 37–54)
EGFRCR SERPLBLD CKD-EPI 2021: 37.5 ML/MIN/1.73
EOSINOPHIL # BLD AUTO: 0.56 10*3/MM3 (ref 0–0.4)
EOSINOPHIL NFR BLD AUTO: 2.5 % (ref 0.3–6.2)
ERYTHROCYTE [DISTWIDTH] IN BLOOD BY AUTOMATED COUNT: 13.7 % (ref 12.3–15.4)
GLUCOSE BLDC GLUCOMTR-MCNC: 143 MG/DL (ref 70–99)
GLUCOSE BLDC GLUCOMTR-MCNC: 245 MG/DL (ref 70–99)
GLUCOSE BLDC GLUCOMTR-MCNC: 277 MG/DL (ref 70–99)
GLUCOSE BLDC GLUCOMTR-MCNC: 283 MG/DL (ref 70–99)
GLUCOSE SERPL-MCNC: 266 MG/DL (ref 65–99)
HCT VFR BLD AUTO: 37.2 % (ref 37.5–51)
HGB BLD-MCNC: 12.2 G/DL (ref 13–17.7)
IMM GRANULOCYTES # BLD AUTO: 0.13 10*3/MM3 (ref 0–0.05)
IMM GRANULOCYTES NFR BLD AUTO: 0.6 % (ref 0–0.5)
LYMPHOCYTES # BLD AUTO: 1.75 10*3/MM3 (ref 0.7–3.1)
LYMPHOCYTES NFR BLD AUTO: 7.9 % (ref 19.6–45.3)
MAGNESIUM SERPL-MCNC: 3.2 MG/DL (ref 1.6–2.4)
MCH RBC QN AUTO: 29.4 PG (ref 26.6–33)
MCHC RBC AUTO-ENTMCNC: 32.8 G/DL (ref 31.5–35.7)
MCV RBC AUTO: 89.6 FL (ref 79–97)
MONOCYTES # BLD AUTO: 1.95 10*3/MM3 (ref 0.1–0.9)
MONOCYTES NFR BLD AUTO: 8.8 % (ref 5–12)
NEUTROPHILS NFR BLD AUTO: 17.67 10*3/MM3 (ref 1.7–7)
NEUTROPHILS NFR BLD AUTO: 79.8 % (ref 42.7–76)
NRBC BLD AUTO-RTO: 0 /100 WBC (ref 0–0.2)
PHOSPHATE SERPL-MCNC: 4.4 MG/DL (ref 2.5–4.5)
PLATELET # BLD AUTO: 342 10*3/MM3 (ref 140–450)
PMV BLD AUTO: 10.5 FL (ref 6–12)
POTASSIUM SERPL-SCNC: 4.7 MMOL/L (ref 3.5–5.2)
RBC # BLD AUTO: 4.15 10*6/MM3 (ref 4.14–5.8)
SODIUM SERPL-SCNC: 137 MMOL/L (ref 136–145)
WBC NRBC COR # BLD: 22.14 10*3/MM3 (ref 3.4–10.8)

## 2023-07-13 PROCEDURE — 63710000001 INSULIN LISPRO (HUMAN) PER 5 UNITS: Performed by: NURSE PRACTITIONER

## 2023-07-13 PROCEDURE — 83735 ASSAY OF MAGNESIUM: CPT | Performed by: INTERNAL MEDICINE

## 2023-07-13 PROCEDURE — 99233 SBSQ HOSP IP/OBS HIGH 50: CPT | Performed by: INTERNAL MEDICINE

## 2023-07-13 PROCEDURE — 99232 SBSQ HOSP IP/OBS MODERATE 35: CPT | Performed by: PSYCHIATRY & NEUROLOGY

## 2023-07-13 PROCEDURE — 85025 COMPLETE CBC W/AUTO DIFF WBC: CPT | Performed by: INTERNAL MEDICINE

## 2023-07-13 PROCEDURE — 99232 SBSQ HOSP IP/OBS MODERATE 35: CPT | Performed by: INTERNAL MEDICINE

## 2023-07-13 PROCEDURE — 25010000002 HEPARIN (PORCINE) PER 1000 UNITS: Performed by: NURSE PRACTITIONER

## 2023-07-13 PROCEDURE — 84100 ASSAY OF PHOSPHORUS: CPT | Performed by: INTERNAL MEDICINE

## 2023-07-13 PROCEDURE — 80048 BASIC METABOLIC PNL TOTAL CA: CPT | Performed by: INTERNAL MEDICINE

## 2023-07-13 PROCEDURE — 82948 REAGENT STRIP/BLOOD GLUCOSE: CPT

## 2023-07-13 PROCEDURE — 97110 THERAPEUTIC EXERCISES: CPT

## 2023-07-13 PROCEDURE — 63710000001 INSULIN DETEMIR PER 5 UNITS: Performed by: NURSE PRACTITIONER

## 2023-07-13 PROCEDURE — 92526 ORAL FUNCTION THERAPY: CPT

## 2023-07-13 RX ORDER — ATENOLOL 25 MG/1
25 TABLET ORAL DAILY
Status: DISCONTINUED | OUTPATIENT
Start: 2023-07-14 | End: 2023-07-19 | Stop reason: HOSPADM

## 2023-07-13 RX ORDER — LOSARTAN POTASSIUM 25 MG/1
50 TABLET ORAL 2 TIMES DAILY
Status: DISCONTINUED | OUTPATIENT
Start: 2023-07-13 | End: 2023-07-19 | Stop reason: HOSPADM

## 2023-07-13 RX ORDER — POLYETHYLENE GLYCOL 3350 17 G/17G
17 POWDER, FOR SOLUTION ORAL DAILY PRN
Status: DISCONTINUED | OUTPATIENT
Start: 2023-07-13 | End: 2023-07-19 | Stop reason: HOSPADM

## 2023-07-13 RX ORDER — ACETAMINOPHEN 325 MG/1
650 TABLET ORAL EVERY 4 HOURS PRN
Status: DISCONTINUED | OUTPATIENT
Start: 2023-07-13 | End: 2023-07-19 | Stop reason: HOSPADM

## 2023-07-13 RX ORDER — MULTIPLE VITAMINS W/ MINERALS TAB 9MG-400MCG
1 TAB ORAL DAILY
Status: DISCONTINUED | OUTPATIENT
Start: 2023-07-14 | End: 2023-07-19 | Stop reason: HOSPADM

## 2023-07-13 RX ORDER — MULTIPLE VITAMINS W/ MINERALS TAB 9MG-400MCG
1 TAB ORAL DAILY
Status: DISCONTINUED | OUTPATIENT
Start: 2023-07-14 | End: 2023-07-13

## 2023-07-13 RX ORDER — FOLIC ACID 1 MG/1
1 TABLET ORAL DAILY
Status: DISCONTINUED | OUTPATIENT
Start: 2023-07-14 | End: 2023-07-19 | Stop reason: HOSPADM

## 2023-07-13 RX ORDER — AMLODIPINE BESYLATE 5 MG/1
10 TABLET ORAL
Status: DISCONTINUED | OUTPATIENT
Start: 2023-07-14 | End: 2023-07-19 | Stop reason: HOSPADM

## 2023-07-13 RX ORDER — QUETIAPINE FUMARATE 200 MG/1
600 TABLET, FILM COATED ORAL NIGHTLY
Status: DISCONTINUED | OUTPATIENT
Start: 2023-07-13 | End: 2023-07-19 | Stop reason: HOSPADM

## 2023-07-13 RX ORDER — BISACODYL 10 MG
10 SUPPOSITORY, RECTAL RECTAL DAILY PRN
Status: DISCONTINUED | OUTPATIENT
Start: 2023-07-13 | End: 2023-07-19 | Stop reason: HOSPADM

## 2023-07-13 RX ORDER — AMOXICILLIN 250 MG
2 CAPSULE ORAL 2 TIMES DAILY
Status: DISCONTINUED | OUTPATIENT
Start: 2023-07-13 | End: 2023-07-19 | Stop reason: HOSPADM

## 2023-07-13 RX ORDER — SACCHAROMYCES BOULARDII 250 MG
250 CAPSULE ORAL 2 TIMES DAILY
Status: COMPLETED | OUTPATIENT
Start: 2023-07-13 | End: 2023-07-13

## 2023-07-13 RX ORDER — DOXYCYCLINE 100 MG/1
100 CAPSULE ORAL EVERY 12 HOURS SCHEDULED
Status: COMPLETED | OUTPATIENT
Start: 2023-07-13 | End: 2023-07-17

## 2023-07-13 RX ORDER — LAMOTRIGINE 100 MG/1
200 TABLET ORAL EVERY 12 HOURS SCHEDULED
Status: DISCONTINUED | OUTPATIENT
Start: 2023-07-13 | End: 2023-07-19 | Stop reason: HOSPADM

## 2023-07-13 RX ORDER — MULTIPLE VITAMINS W/ MINERALS TAB 9MG-400MCG
1 TAB ORAL EVERY MORNING
Status: DISCONTINUED | OUTPATIENT
Start: 2023-07-14 | End: 2023-07-13

## 2023-07-13 RX ADMIN — INSULIN LISPRO 4 UNITS: 100 INJECTION, SOLUTION INTRAVENOUS; SUBCUTANEOUS at 22:13

## 2023-07-13 RX ADMIN — ATENOLOL 25 MG: 25 TABLET ORAL at 09:29

## 2023-07-13 RX ADMIN — Medication 10 ML: at 09:28

## 2023-07-13 RX ADMIN — MUPIROCIN 1 APPLICATION: 20 OINTMENT TOPICAL at 22:18

## 2023-07-13 RX ADMIN — LAMOTRIGINE 200 MG: 100 TABLET ORAL at 09:29

## 2023-07-13 RX ADMIN — PANTOPRAZOLE SODIUM 40 MG: 40 INJECTION, POWDER, FOR SOLUTION INTRAVENOUS at 22:12

## 2023-07-13 RX ADMIN — SENNOSIDES AND DOCUSATE SODIUM 2 TABLET: 50; 8.6 TABLET ORAL at 09:29

## 2023-07-13 RX ADMIN — LOSARTAN POTASSIUM 50 MG: 25 TABLET, FILM COATED ORAL at 22:15

## 2023-07-13 RX ADMIN — DOXYCYCLINE 100 MG: 100 CAPSULE ORAL at 09:29

## 2023-07-13 RX ADMIN — INSULIN LISPRO 6 UNITS: 100 INJECTION, SOLUTION INTRAVENOUS; SUBCUTANEOUS at 09:29

## 2023-07-13 RX ADMIN — Medication 10 ML: at 22:14

## 2023-07-13 RX ADMIN — Medication 1 MG: at 09:29

## 2023-07-13 RX ADMIN — LAMOTRIGINE 200 MG: 100 TABLET ORAL at 22:14

## 2023-07-13 RX ADMIN — HEPARIN SODIUM 5000 UNITS: 5000 INJECTION INTRAVENOUS; SUBCUTANEOUS at 06:03

## 2023-07-13 RX ADMIN — Medication 10 ML: at 09:30

## 2023-07-13 RX ADMIN — DOXYCYCLINE 100 MG: 100 CAPSULE ORAL at 22:19

## 2023-07-13 RX ADMIN — HEPARIN SODIUM 5000 UNITS: 5000 INJECTION INTRAVENOUS; SUBCUTANEOUS at 22:18

## 2023-07-13 RX ADMIN — HEPARIN SODIUM 5000 UNITS: 5000 INJECTION INTRAVENOUS; SUBCUTANEOUS at 13:56

## 2023-07-13 RX ADMIN — Medication 250 MG: at 09:31

## 2023-07-13 RX ADMIN — INSULIN DETEMIR 15 UNITS: 100 INJECTION, SOLUTION SUBCUTANEOUS at 09:28

## 2023-07-13 RX ADMIN — Medication 250 MG: at 22:19

## 2023-07-13 RX ADMIN — AMLODIPINE BESYLATE 10 MG: 10 TABLET ORAL at 09:29

## 2023-07-13 RX ADMIN — INSULIN LISPRO 6 UNITS: 100 INJECTION, SOLUTION INTRAVENOUS; SUBCUTANEOUS at 12:27

## 2023-07-13 RX ADMIN — PANTOPRAZOLE SODIUM 40 MG: 40 INJECTION, POWDER, FOR SOLUTION INTRAVENOUS at 09:29

## 2023-07-13 RX ADMIN — QUETIAPINE FUMARATE 600 MG: 200 TABLET ORAL at 22:19

## 2023-07-13 RX ADMIN — Medication 10 ML: at 22:13

## 2023-07-13 RX ADMIN — MUPIROCIN 1 APPLICATION: 20 OINTMENT TOPICAL at 12:27

## 2023-07-13 RX ADMIN — Medication 15 ML: at 09:28

## 2023-07-13 RX ADMIN — LOSARTAN POTASSIUM 50 MG: 50 TABLET, FILM COATED ORAL at 09:29

## 2023-07-13 RX ADMIN — INSULIN DETEMIR 15 UNITS: 100 INJECTION, SOLUTION SUBCUTANEOUS at 22:13

## 2023-07-13 NOTE — PROGRESS NOTES
"DOS: 2023  NAME: Carlos Murphy Jr.   : 1960  PCP: Felix Atkinson MD  Chief Complaint   Patient presents with    Weakness - Generalized       Chief complaint: He is lot more awake and alert and answering questions appropriately.  Subjective: No clinical seizures have been noted and the patient still has a nasogastric tube in place with continuous tube feeding.  Most of the time he feels bloated and for that reason his oral intake has been very poor.    Objective:  Vital signs: /66 (BP Location: Left arm, Patient Position: Lying)   Pulse 64   Temp 98.5 °F (36.9 °C) (Oral)   Resp 18   Ht 182.9 cm (72\")   Wt 134 kg (295 lb 6.7 oz)   SpO2 95%   BMI 40.07 kg/m²    Gen: NAD, vitals reviewed  MS: Seems to be improving compared to yesterday.  CN: Cranials 2-12: No focal deficits.  He has passed the bedside swallow to modified texture diet.  Motor: Moving all extremities but still has weakness of the right lower extremity.  Sensory: Decreased sensation in the lower extremities.  Coordination: Normal finger-to-nose coordination noted.  Gait: Not weightbearing because of the lower extremity weakness.    ROS:  General: Pleasant gentleman currently improving mentally.  Still has a nasogastric tube in place and he is getting a total of 80 cc/h tube feeding throughout the whole day and so he does not feel hungry.  Neurological: Has weakness in both lower extremities worse on the right compared to the left.    Laboratory results:  Lab Results   Component Value Date    GLUCOSE 266 (H) 2023    CALCIUM 8.9 2023     2023    K 4.7 2023    CO2 16.3 (L) 2023     (H) 2023    BUN 42 (H) 2023    CREATININE 1.99 (H) 2023    EGFRIFNONA 73 2017    BCR 21.1 2023    ANIONGAP 11.7 2023     Lab Results   Component Value Date    WBC 22.14 (H) 2023    HGB 12.2 (L) 2023    HCT 37.2 (L) 2023    MCV 89.6 2023     " 07/13/2023     Lab Results   Component Value Date    LDL 40 (L) 03/26/2021    LDL 27 02/26/2016            Review of labs: Creatinine is elevated 1.99 with EGFR at 73.  The white cell count is elevated at 22,140.  Hemoglobin is low at 12.2 and hematocrit is low at 37.2.  CSF studies have been unremarkable so far.     CMP:        Lab 07/13/23  0516 07/12/23  0515 07/11/23  0500 07/10/23  0247 07/09/23  1151 07/08/23  0256 07/07/23  1302 07/07/23  0308   SODIUM 137 143 146* 144 140 147*   < > 158*   POTASSIUM 4.7 4.2 4.3 4.0 4.3 3.4*   < > 2.9*   CHLORIDE 109* 111* 114* 111* 105 112*   < > 117*   CO2 16.3* 22.4 20.4* 21.8* 21.3* 24.7   < > 24.6   ANION GAP 11.7 9.6 11.6 11.2 13.7 10.3   < > 16.4*   BUN 42* 39* 33* 33* 36* 50*   < > 75*   CREATININE 1.99* 2.11* 1.90* 1.93* 1.85* 2.18*   < > 2.64*   EGFR 37.5* 35.0* 39.6* 38.9* 40.9* 33.6*   < > 26.7*   GLUCOSE 266* 294* 216* 204* 299* 157*   < > 209*   CALCIUM 8.9 9.0 9.7 9.0 9.3 8.7   < > 9.4   MAGNESIUM 3.2* 3.0* 2.8* 2.5* 2.2 2.1  --  2.3   PHOSPHORUS 4.4 3.5 3.3 2.5  --  2.3*  --  3.6   TOTAL PROTEIN  --   --   --   --   --   --   --  6.7   ALBUMIN  --   --   --   --   --  3.1*  --  3.6   GLOBULIN  --   --   --   --   --   --   --  3.1   ALT (SGPT)  --   --   --   --   --   --   --  12   AST (SGOT)  --   --   --   --   --   --   --  27   BILIRUBIN  --   --   --   --   --   --   --  0.3   ALK PHOS  --   --   --   --   --   --   --  132*    < > = values in this interval not displayed.                  Lab Results   Component Value Date    IQVUNEIV53 1,638 (H) 07/10/2023       Lab Results   Component Value Date    FOLATE >20.00 07/10/2023       Lab Results   Component Value Date    HGBA1C 8.3 (H) 11/10/2020           Review and interpretation of imaging: MRI of the lumbar spine showed the following:    FINDINGS:          PARASPINAL AREA:     Except for disc and endplate degenerative changes, no significant paraspinal mass   is suggested.    BONES:             No acute  fracture, pars defect, or significant osseous lesion.  Probably benign intraosseous   hemangiomas (or venous malformations) are seen, such as at the L1 vertebral body in a right   paramidline location, seen on prior studies.  CORD/CAUDA EQUINA:            Normal caliber, contour, and signal intensity.  The conus medullaris terminates   at about the L1-2 level, which is within normal limits.  No cauda equina mass is suggested.  OTHER:             There is mild lateral curvature of the lumbar spine with the convexity to the left, which   may be fixed or positional.  Extensive ossification of the anterior longitudinal ligament is seen   and may represent diffuse idiopathic skeletal hyperostosis (DISH).  There may be incidental small   renal cysts.  They are not fully characterized by this study.  Baastrups disease is possible at   several levels, especially at L2-3, L3-4, and L4-5.  For the purpose of interpreting the current   MRI study, the same convention for designation of vertebral levels used in the 9/11/2019 exam   report interpretation is maintained.     LUMBAR DISC INTERSPACE LEVELS ON AXIAL MR IMAGING:  T12-L1: There is mild facet osteoarthritis.  No disc herniation.  No significant foraminal   narrowing.  No significant spinal canal narrowing.  L1-L2:   Mild-to-moderate facet osteoarthritis is suspected with facet joint effusions.  Mild diffuse   annular disc bulge is seen.  No significant foraminal narrowing.  No disc herniation.  No   significant spinal canal narrowing.  L2-L3:   Moderate facet osteoarthritis is seen.  There is posterior ligamentous thickening.  Diffuse   annular disc bulge is identified.  There is moderate spinal stenosis.  Probably no significant   neural foraminal narrowing.  No disc herniation.  L3-L4:   Severe disc and endplate degenerative changes are seen.  Endplate osteophytes are present.    There is diffuse annular disc bulge.  There may be a central/right central/right  subarticular disc   herniation, which measures approximately 1.5 x 0.6 x 1.3 cm in transverse (TR), anteroposterior   (AP), and craniocaudal (CC) extent, respectively, as seen on image 28 of series 10 and image 10 of   series 6.  There is moderate spinal stenosis.  Moderate bilateral neural foraminal narrowing is   seen, greater on the right.  Moderate-to-severe facet osteoarthritis is identified, greater on the   right than the left.  There may be a left-sided facet joint effusion.  Subarticular narrowing is   seen, greater on the right than the left.  There is possible minimal (less than 5 mm) chronic   retrolisthesis at L3-4.  These findings are well seen on image 28 of series 10 and are probably   similar in degree to the 9/11/2019 MRI study.  L4-L5:   There is a suspected left-sided laminotomy defect.  Severe disc and endplate degenerative   changes are seen.  Endplate osteophytes are present.  There is moderate-to-severe spinal stenosis.    There are probably bilateral disc herniations present.  The right-sided disc herniation measures   1.7 (TR) x 0.7 (AP) x 1 (CC) cm, as seen on image 22 of series 10.  The left-sided disc herniation   measures about 1.5 (TR) x 0.8 (AP) x 1.1 (CC) cm, as seen on image 22 of series 10.  There is   associated effacement of the ventral thecal sac, greater on the left, with these findings.  Severe   facet osteoarthritis is seen bilaterally.  There is a left-sided facet joint effusion.  It is   possible that these disc herniations may be contiguous.  There is severe left-sided foraminal   narrowing and moderate-to-severe right-sided foraminal narrowing.  There is possible minimal (less   than 5 mm) chronic anterolisthesis at L4-5.  The findings at this level have progressed since the   9/11/2019 study.  L5-S1:   Moderate-to-severe facet osteoarthritis is seen.  There is diffuse annular disc bulge.  No   disc herniation is suspected.  There is mild-to-moderate bilateral neural  foraminal narrowing.  No   significant spinal canal narrowing.     IMPRESSION:                 1. No acute findings.  No acute vertebral compression fracture.    2. Degenerative changes are present at several levels, greatest at L3-4 and L4-5.  The findings at   L4-5 have progressed since the 9/11/2019 MRI study.  The findings at L3-4 are probably similar in   degree.    3. Except for endplate degenerative changes, no significant intraosseous lesions are seen.    4. Except for suspected DISH, no significant paraspinal masses are appreciated.    5. Mild levoscoliosis of the lumbar spine is possible.    6. No distal cord signal abnormality.  No cauda equina mass.    7. Please see above comments for further detail.          CT Head Without Contrast    Result Date: 7/5/2023  PROCEDURE: CT HEAD WO CONTRAST  COMPARISON:  [facility], MR, MRI BRAIN WO CONTRAST, 7/01/2023, 10:11.  CT, CT ANGIOGRAM HEAD W AI ANALYSIS OF LVO, 6/30/2023, 18:58.  [facility], CT, CT HEAD WO CONTRAST STROKE PROTOCOL, 6/30/2023, 18:33.  [facility], CT, CT HEAD WO CONTRAST, 4/30/2023, 15:12. INDICATIONS: ams, lethargic  PROTOCOL:   Standard imaging protocol performed    RADIATION:   DLP: 1897mGy*cm   MA and/or KV was adjusted to minimize radiation dose.     TECHNIQUE: CT images of the head were obtained without contrast material.   FINDINGS:  No midline shift. Ventricles and sulci are normal in size. Basal cisterns are patent. No extra-axial fluid collection. No evidence of acute intracranial hemorrhage, mass lesion, or edema. No definite CT findings of acute infarction.  Paranasal sinuses and mastoid air cells are clear. No soft tissue or calvarial abnormality is identified.      Impression: No evidence for acute intracranial abnormality.     JOYCE ROSE MD       Electronically Signed and Approved By: JOYCE ROSE MD on 7/05/2023 at 8:20             CT Head Without Contrast Stroke Protocol    Result  Date: 6/30/2023  PROCEDURE: CT HEAD WO CONTRAST STROKE PROTOCOL  COMPARISON:  [facility], CT, CT HEAD WO CONTRAST, 4/30/2023, 15:12. INDICATIONS: Neuro deficit, acute, stroke suspected  PROTOCOL:   Standard imaging protocol performed    RADIATION:   DLP: 1145.2 mGy*cm   MA and/or KV was adjusted to minimize radiation dose.     TECHNIQUE: After obtaining the patient's consent, CT images were obtained without non-ionic intravenous contrast material.  FINDINGS:  Superficial soft tissues appear unremarkable.  The calvarium is intact.  There is a small amount of mucus in the right maxillary sinus.  The ventricles appear normal in size and configuration for patient's age. There is no evidence of herniation, hydrocephalus or mass effect. Gray-white differentiation appears intact. There are no focal areas of hypoattenuation within the brain parenchyma. There is no evidence of acute intracranial hemorrhage. The orbits appear unremarkable.       Impression:  No acute intracranial abnormality.     MARIZA KIRAN MD       Electronically Signed and Approved By: MARIZA KIRAN MD on 6/30/2023 at 19:14                MRI Brain Without Contrast    Result Date: 7/1/2023  PROCEDURE: MRI BRAIN WO CONTRAST  COMPARISON: [facility], CT, CT HEAD WO CONTRAST STROKE PROTOCOL, 6/30/2023, 18:33.  [facility], MR, BRAIN W/O CONTRAST, 3/26/2021, 7:25.  INDICATIONS: LEFT SIDED WEAKNESS  TECHNIQUE: Multiplanar images of the brain were obtained in a high field strength magnet.  FINDINGS:  The ventricles are normal in size, position, and configuration.  Sulci are not abnormally prominent.  Scattered tiny foci of nonspecific T2 bright signal in cerebral white matter appear stable.  No abnormal bright signal is seen on diffusion weighted images.  No restricted diffusion is evident.  No abnormal dark signal is seen on magnetic susceptibility sequence sensitive for blood products.  No abnormality of the  pituitary or orbits is evident.  The paranasal sinuses are well aerated.  No abnormal mastoid signal is seen.      Impression:   MR examination of the brain without IV contrast demonstrating no acute intracranial abnormality.      JON LOPEZ MD       Electronically Signed and Approved By: JON LOPEZ MD on 7/01/2023 at 11:03               Workup to date: The EEG performed on July 6, 2023 showed the following:    Reading:   The EEG was obtained portable in his room at which time he was unresponsive with video monitoring.  We do not know how much sleep he has had the night before the testing.     The dominant background activity consists of symmetric and irregular 20 to 50 µV 5 cps which were prominent on both parieto-occipital regions and were mixed with moderate diffuse symmetric irregular 20 to 65 µV 2 to 3 cps waves which were prominent on both frontocentral regions.  There were symmetric vertex activities during the light stages of sleep.  There were no discernible responses on photic stimulation advised frequencies.  There was no amplitude asymmetry.  No paroxysmal discharges.     Impression:   Abnormal EEG because of slow background activity mixed with slower waves compatible with mild to moderate and diffuse encephalopathy.  There were no epileptiform discharges noted today.    Results for orders placed during the hospital encounter of 06/30/23    Adult Transthoracic Echo Limited W/ Cont if Necessary Per Protocol    Interpretation Summary    Left ventricular systolic function is normal. Left ventricular ejection fraction appears to be 61 - 65%.    Left ventricular diastolic function was normal.    No regional wall motion abnormality    No obvious source of emboli         Diagnoses: Patient with improving neurological status mentally.      Comment: Discussed with the nursing staff about holding the tube feeding during the daytime and to resume it at 10 PM every evening until 6 AM in the morning and then  to allow him to have oral intakes to see his calorie intake.    Plan:  1.  Requested neurosurgical evaluation for the lumbar spine  2.  Requested physical therapy and Occupational Therapy to mobilize him.  3.  To change the tube feeding schedule.  4.  Get him sitting up as much as possible.    Discussed with the patient and the nursing staff and will follow-up.    Spent a total of 30 minutes in face-to-face evaluation and management of the patient using the dedicated telemedicine device without any interruption with the help of Edie Lauren the rounding nurse with the patient located at the [facility] and myself at a remote location.    Electronically signed by Beth Hairston MD, 07/13/23, 12:33 PM EDT.

## 2023-07-13 NOTE — PROGRESS NOTES
Nutrition Services    Patient Name: Carlos Murphy Jr.  YOB: 1960  MRN: [id number]  Admission date: 6/30/2023    PROGRESS NOTE      Encounter Information: EN follow up        PO Diet: Diet: Regular/House Diet; Texture: Mechanical Ground (NDD 2); Fluid Consistency: Thin (IDDSI 0)   PO Supplements: N/A   PO Intake:  25%        Current nutrition support: Diabetisource AC @ 80 ml/hr  Free water flushes 80 ml QH   2304 kcal, 115 g pro, 1574 ml fluid      Provides: 2509 kcal, 115 g pro, 3494 ml fluid        Nutrition support review: Tolerating EN at goal rate. SLP cleared patient for mechanical soft diet with thin liquids. Plan to DC NG tube and tube feeds today per Dr. Oconnor's note.        Labs (reviewed below): Reviewed.        GI Function:  Last BM documented 7/13/23.       Nutrition Intervention Updates: Will follow plan of care.        Results from last 7 days   Lab Units 07/13/23  0516 07/12/23  0515 07/11/23  0500 07/07/23  1302 07/07/23  0308   SODIUM mmol/L 137 143 146*   < > 158*   POTASSIUM mmol/L 4.7 4.2 4.3   < > 2.9*   CHLORIDE mmol/L 109* 111* 114*   < > 117*   CO2 mmol/L 16.3* 22.4 20.4*   < > 24.6   BUN mg/dL 42* 39* 33*   < > 75*   CREATININE mg/dL 1.99* 2.11* 1.90*   < > 2.64*   CALCIUM mg/dL 8.9 9.0 9.7   < > 9.4   BILIRUBIN mg/dL  --   --   --   --  0.3   ALK PHOS U/L  --   --   --   --  132*   ALT (SGPT) U/L  --   --   --   --  12   AST (SGOT) U/L  --   --   --   --  27   GLUCOSE mg/dL 266* 294* 216*   < > 209*    < > = values in this interval not displayed.       Results from last 7 days   Lab Units 07/13/23  0805 07/13/23  0516 07/12/23  0515 07/11/23  0500   MAGNESIUM mg/dL  --  3.2* 3.0* 2.8*   PHOSPHORUS mg/dL  --  4.4 3.5 3.3   HEMOGLOBIN g/dL 12.2*  --  11.8* 11.5*   HEMATOCRIT % 37.2*  --  36.7* 36.6*       Coronavirus (COVID-19)   Date Value Ref Range Status   03/25/2021 NOT DETECTED NA Final     Comment:     The SARS-CoV-2 assay is a real-time, RT-PCR test intended  for the  qualitative detection of nucleic acid from the  SARS-CoV-2 in respiratory specimens from individuals,  testing performed at [facility].       Lab Results   Component Value Date    HGBA1C 8.3 (H) 11/10/2020       RD to follow up per protocol.    Electronically signed by:  Fransisco Stanley RD  07/13/23 13:49 EDT

## 2023-07-13 NOTE — THERAPY TREATMENT NOTE
Acute Care - Speech Language Pathology   Swallow Treatment Note  Lit[place]     Patient Name: Carlos Murphy Jr.  : 1960  MRN: [id number]  Today's Date: 2023               Admit Date: 2023    Visit Dx:     ICD-10-CM ICD-9-CM   1. Acute renal failure, unspecified acute renal failure type  N17.9 584.9   2. Altered mental status, unspecified altered mental status type  R41.82 780.97   3. At risk for polypharmacy  Z91.89 V49.89   4. Non-traumatic rhabdomyolysis  M62.82 728.88   5. Oropharyngeal dysphagia  R13.12 787.22   6. Impaired mobility and ADLs  Z74.09 V49.89    Z78.9      Patient Active Problem List   Diagnosis    Ulcer of toe due to type 2 diabetes mellitus    Centrilobular emphysema    Tobacco abuse, in remission    Obesity (BMI 30-39.9)    Closed fracture of shaft of left fibula    Closed nondisplaced fracture of medial malleolus of left tibia    Aftercare following surgery of ORIF left distal tibia fracture 2022    Colon cancer screening    Colitis    Acute renal failure, unspecified acute renal failure type    Onychomycosis    Onychocryptosis    Foreign body in left foot    Peripheral neuropathy    Charcot foot due to diabetes mellitus    Foot pain, bilateral     Past Medical History:   Diagnosis Date    Acute kidney injury      POST SEIZURES    Astigmatism of both eyes     eyes twitch left and right    Bipolar disorder     Charcot ankle     Closed nondisplaced fracture of medial malleolus of left tibia     COPD (chronic obstructive pulmonary disease)     USES INHALERS    Diabetes     BG RUNS AROUND 90'S IN AM    Hx of schizophrenia     Hyperlipidemia     Hypertension     SEEN DR ROD IN THE PAST, HAD APPT WITH DR MICHAUD IN  [facility] APPT, DENIED CP/SOB    Neuropathy     Seizures     LAST ONE 22    Sleep apnea     DOES NOT USE CPAP    Varicose vein of leg      Past Surgical History:   Procedure Laterality Date    ANKLE OPEN REDUCTION INTERNAL FIXATION Left 2022     Procedure: LEFT OPEN REDUCTION INTERNAL FIXATION DISTAL TIBIA FRACTURE ;  Surgeon: Tha Parry MD;  [place] [facility] [place] [facility] [place];  Service: Orthopedics;  Laterality: Left;    ANKLE OPEN REDUCTION INTERNAL FIXATION Left 06/01/2022    Procedure: LEFT ANKLE OPEN REDUCTION INTERNAL FIXATION WITH SYNDESMOSIS FIXATION;  Surgeon: Tha Parry MD;  Location: formerly [facility] MAIN OR;  Service: Orthopedics;  Laterality: Left;    CHOLECYSTECTOMY      COLONOSCOPY      JOINT REPLACEMENT      RTKR    LUMBAR DISC SURGERY      SHOULDER SURGERY Right        SLP Recommendation and Plan         SPEECH PATHOLOGY DYSPHAGIA TREATMENT    Subjective/Behavioral Observations: Awake, NG tube in place.  P.o. diet started on 7/12/2023.  Mental status still not at baseline.  Patient however is calm and cooperative during treatment.        Day/time of Treatment: 7/13/2023        Current Diet: Mechanical soft, thin liquids        Treatment received: Focused on tolerance of current diet recommendations and use of compensatory strategies to decrease aspiration risk.        Results of treatment: Patient required total assist for self-feeding.  Offered no assist holding of cup.  Concerned that NG is interfering with clearance of solids as patient demonstrating intermittent throat clearing and double swallow with soft solids.  Single sips of thin liquid via straw without any overt signs or symptoms of aspiration.        Progress toward goals: Adequate         Barriers to Achieving goals: Medical status        Plan of care:/changes in plan: Continuation of current diet.  If physician wishes to continue tube feeding as nutritional support recommend replace NG with cortrak.  Concern that larger NG is interfering with swallow function and mental clearance of solid consistencies.                                                                                          EDUCATION  The patient has been educated in the following areas:   Dysphagia (Swallowing  Impairment).              Time Calculation:    Time Calculation- SLP       Row Name 07/13/23 1118             Time Calculation- SLP    SLP Stop Time 1100  -SN      SLP Received On 07/13/23  -SN         Untimed Charges    12734-CJ Treatment Swallow Minutes 45  -SN         Total Minutes    Untimed Charges Total Minutes 45  -SN       Total Minutes 45  -SN                User Key  (r) = Recorded By, (t) = Taken By, (c) = Cosigned By      Initials Name Provider Type    SN Nidhi Soliman MS-CCC/SLP, TAVO Speech and Language Pathologist                    Therapy Charges for Today       Code Description Service Date Service Provider Modifiers Qty    [id number]  ST TREATMENT SWALLOW 3 7/13/2023 Nidhi Soliman MS-CCC/SLP, TAVO GN 1                 OCTAVIO Vaughn/TAVO COFFEY  7/13/2023

## 2023-07-13 NOTE — THERAPY RE-EVALUATION
Patient Name: Carlos Murphy Jr.  : 1960    MRN: [id number]                              Today's Date: 2023       Admit Date: 2023    Visit Dx:     ICD-10-CM ICD-9-CM   1. Acute renal failure, unspecified acute renal failure type  N17.9 584.9   2. Altered mental status, unspecified altered mental status type  R41.82 780.97   3. At risk for polypharmacy  Z91.89 V49.89   4. Non-traumatic rhabdomyolysis  M62.82 728.88   5. Oropharyngeal dysphagia  R13.12 787.22   6. Impaired mobility and ADLs  Z74.09 V49.89    Z78.9      Patient Active Problem List   Diagnosis    Ulcer of toe due to type 2 diabetes mellitus    Centrilobular emphysema    Tobacco abuse, in remission    Obesity (BMI 30-39.9)    Closed fracture of shaft of left fibula    Closed nondisplaced fracture of medial malleolus of left tibia    Aftercare following surgery of ORIF left distal tibia fracture 2022    Colon cancer screening    Colitis    Acute renal failure, unspecified acute renal failure type    Onychomycosis    Onychocryptosis    Foreign body in left foot    Peripheral neuropathy    Charcot foot due to diabetes mellitus    Foot pain, bilateral     Past Medical History:   Diagnosis Date    Acute kidney injury      POST SEIZURES    Astigmatism of both eyes     eyes twitch left and right    Bipolar disorder     Charcot ankle     Closed nondisplaced fracture of medial malleolus of left tibia     COPD (chronic obstructive pulmonary disease)     USES INHALERS    Diabetes     BG RUNS AROUND 90'S IN AM    Hx of schizophrenia     Hyperlipidemia     Hypertension     SEEN DR ROD IN THE PAST, HAD APPT WITH DR MICHAUD IN  [facility] APPT, DENIED CP/SOB    Neuropathy     Seizures     LAST ONE 22    Sleep apnea     DOES NOT USE CPAP    Varicose vein of leg      Past Surgical History:   Procedure Laterality Date    ANKLE OPEN REDUCTION INTERNAL FIXATION Left 2022    Procedure: LEFT OPEN REDUCTION INTERNAL FIXATION DISTAL TIBIA  FRACTURE ;  Surgeon: Tha Parry MD;  Location: [facility] OR [facility] [place];  Service: Orthopedics;  Laterality: Left;    ANKLE OPEN REDUCTION INTERNAL FIXATION Left 06/01/2022    Procedure: LEFT ANKLE OPEN REDUCTION INTERNAL FIXATION WITH SYNDESMOSIS FIXATION;  Surgeon: Tha Parry MD;  Location: [facility] MAIN OR;  Service: Orthopedics;  Laterality: Left;    CHOLECYSTECTOMY      COLONOSCOPY      JOINT REPLACEMENT      RTKR    LUMBAR DISC SURGERY      SHOULDER SURGERY Right       General Information       Row Name 07/13/23 1123 07/13/23 1121       OT Time and Intention    Document Type re-evaluation  -PG therapy note (daily note)  -PG    Mode of Treatment -- individual therapy;occupational therapy  -PG              User Key  (r) = Recorded By, (t) = Taken By, (c) = Cosigned By      Initials Name Provider Type    PG Frank Angulo OT Occupational Therapist                     Mobility/ADL's    No documentation.                  Obj/Interventions       Row Name 07/13/23 1121          Shoulder (Therapeutic Exercise)    Shoulder (Therapeutic Exercise) strengthening exercise  -PG     Shoulder Strengthening (Therapeutic Exercise) 15 repititions;1 lb free weight  -PG       Row Name 07/13/23 1121          Elbow/Forearm (Therapeutic Exercise)    Elbow/Forearm (Therapeutic Exercise) strengthening exercise  -PG     Elbow/Forearm Strengthening (Therapeutic Exercise) 15 repititions;1 lb free weight  -PG       Row Name 07/13/23 1121          Motor Skills    Therapeutic Exercise shoulder;elbow/forearm  -PG               User Key  (r) = Recorded By, (t) = Taken By, (c) = Cosigned By      Initials Name Provider Type    PG Frank Angulo OT Occupational Therapist                   Goals/Plan       Row Name 07/13/23 1123          Transfer Goal 1 (OT)    Progress/Outcome (Transfer Goal 1, OT) progress slower than expected  -PG       Row Name 07/13/23 1123          Bathing Goal 1 (OT)    Progress/Outcomes (Bathing Goal 1, OT) progress  slower than expected  -PG       Row Name 07/13/23 1123          Dressing Goal 1 (OT)    Progress/Outcome (Dressing Goal 1, OT) progress slower than expected  -PG       [facility] Name 07/13/23 1123          Toileting Goal 1 (OT)    Progress/Outcome (Toileting Goal 1, OT) progress slower than expected  -PG       [facility] Name 07/13/23 1123          Grooming Goal 1 (OT)    Progress/Outcome (Grooming Goal 1, OT) progress slower than expected  -PG       Row Name 07/13/23 1123          Self-Feeding Goal 1 (OT)    Progress/Outcomes (Self-Feeding Goal 1, OT) progress slower than expected  -PG       [facility] Name 07/13/23 1123          Strength Goal 1 (OT)    Progress/Outcome (Strength Goal 1, OT) progress slower than expected  -PG       [facility] Name 07/13/23 1123          Problem Specific Goal 1 (OT)    Progress/Outcome (Problem Specific Goal 1, OT) progress slower than expected  -PG               User Key  (r) = Recorded By, (t) = Taken By, (c) = Cosigned By      Initials Name Provider Type    PG Frank Angulo OT Occupational Therapist                   Clinical Impression       Row Name 07/13/23 1121          Plan of Care Review    Progress no change  -PG               User Key  (r) = Recorded By, (t) = Taken By, (c) = Cosigned By      Initials Name Provider Type    PG Frank Angulo OT Occupational Therapist                   Outcome Measures       Row Name 07/13/23 1121          How much help from another is currently needed...    Putting on and taking off regular lower body clothing? 1  -PG     Bathing (including washing, rinsing, and drying) 1  -PG     Toileting (which includes using toilet bed pan or urinal) 1  -PG     Putting on and taking off regular upper body clothing 1  -PG     Taking care of personal grooming (such as brushing teeth) 1  -PG     Eating meals 1  -PG     AM-PAC 6 Clicks Score (OT) 6  -PG       [facility] Name 07/13/23 1121          Functional Assessment    Outcome Measure Options AM-PAC 6 Clicks Daily Activity (OT);Optimal  Instrument  -PG       Row Name 07/13/23 1121          Optimal Instrument    Optimal Instrument Optimal - 3  -PG     Bending/Stooping 5  -PG     Standing 5  -PG     Reaching 2  -PG               User Key  (r) = Recorded By, (t) = Taken By, (c) = Cosigned By      Initials Name Provider Type    PG Frank Angulo OT Occupational Therapist                    Occupational Therapy Education       Title: PT OT SLP Therapies (Done)       Topic: Occupational Therapy (Done)       Point: ADL training (Done)       Description:   Instruct learner(s) on proper safety adaptation and remediation techniques during self care or transfers.   Instruct in proper use of assistive devices.                  Learning Progress Summary             Patient Acceptance, E, VU by SV at 7/9/2023 1750    Acceptance, E, VU by AC at 7/3/2023 1144   Family Acceptance, E, VU by SV at 7/9/2023 1750                         Point: Home exercise program (Done)       Description:   Instruct learner(s) on appropriate technique for monitoring, assisting and/or progressing therapeutic exercises/activities.                  Learning Progress Summary             Patient Acceptance, E, VU by SV at 7/9/2023 1750    Acceptance, E, VU by AC at 7/3/2023 1144   Family Acceptance, E, VU by SV at 7/9/2023 1750                         Point: Precautions (Done)       Description:   Instruct learner(s) on prescribed precautions during self-care and functional transfers.                  Learning Progress Summary             Patient Acceptance, E, VU by SV at 7/9/2023 1750    Acceptance, E, VU by AC at 7/3/2023 1144   Family Acceptance, E, VU by SV at 7/9/2023 1750                         Point: Body mechanics (Done)       Description:   Instruct learner(s) on proper positioning and spine alignment during self-care, functional mobility activities and/or exercises.                  Learning Progress Summary             Patient Acceptance, E, VU by SV at 7/9/2023 1750     Acceptance, E, VU by  at 7/3/2023 1144   Family Acceptance, E, VU by  at 7/9/2023 1750                                         User Key       Initials Effective Dates Name Provider Type Discipline     06/16/21 -  Kristi Renner OT Occupational Therapist OT     05/04/23 -  Jenny Reveles RN Registered Nurse Nurse                  OT Recommendation and Plan     Plan of Care Review  Progress: no change     Time Calculation:    Time Calculation- OT       Row Name 07/13/23 1122             Time Calculation- OT    OT Received On 07/13/23  -PG      OT Goal Re-Cert Due Date 07/22/23  -PG         Timed Charges    40389 - OT Therapeutic Exercise Minutes 10  -PG         Total Minutes    Timed Charges Total Minutes 10  -PG       Total Minutes 10  -PG                User Key  (r) = Recorded By, (t) = Taken By, (c) = Cosigned By      Initials Name Provider Type    PG Frank Angulo OT Occupational Therapist                  Therapy Charges for Today       Code Description Service Date Service Provider Modifiers Qty    86907779181 HC OT THER PROC EA 15 MIN 7/13/2023 Frank Angluo OT GO 1                 Frank Angulo OT  7/13/2023

## 2023-07-13 NOTE — PROGRESS NOTES
[facility]   Hospitalist Progress Note    Date of admission: 6/30/2023  Patient Name: Carlos Murphy Jr.  1960  Date: 7/13/2023      Subjective     Chief Complaint   Patient presents with   • Weakness - Generalized       Summary: 62 y.o. with history of seizure disorder (on lamotrigine 200 bid), bipolar, schizophrenia, who presented with altered mentation/unresponsiveness on 6/28.  Patient apparently had called his sister initially had slurred speech/somnolent and received Narcan with notable initial improvement.  Patient apparently reported taking additional medications at home but was not trying to harm himself history is very limited given patient poor historian.  Patient underwent stroke work-up without acute findings suggest etiology of patient's AMS.  Did have hyperkalemia initially improved with fluids.  Nephrology assisting given MK and rhabdomyolysis.  Podiatry assisted given for infection/glasses feet and required several pieces being removed.      Hospital course completed by worsening mentation/agitation with concern initially for seizures multiple EEGs have been negative, neurology consulted for assistance, concern for possible medication withdrawal (outpt is on ambien tox screen was positive oxycodone only)/metabolic encephalopathy and was placed on CIWA monitoring and required ativan. LP obtained negative for acute meningitis, tick panel pending and treating empirically for now.  Mentation starting to improve, gradually titrating medications, psychiatry consulted for additional assistance. Transferring out of the icu and trying to come out of restraints given current improvements.     Interval Followup:       Patient remains in restraints.  Patient is calm.  Patient remains confused he is unable to tell me where he is what year it is what month it is who the president is.    Patient has been cleared for a diet.Will remove n/g today and see how he does.     ROS:   Limited due to confusion.  Patient denies any pain.       Objective     Vitals:   Temp:  [98.1 °F (36.7 °C)-99.6 °F (37.6 °C)] 98.2 °F (36.8 °C)  Heart Rate:  [63-95] 68  Resp:  [17-18] 18  BP: (134-178)/(65-81) 154/72    Physical Exam        Constitutional: Awake, alert, no acute distress, remains in restraints. Currently getting a bath               Eyes: Pupils equal, sclerae anicteric, no conjunctival injection              HENT: NCAT, mucous membranes moist              Neck: Supple, no thyromegaly, no lymphadenopathy, trachea midline              Respiratory: Clear to auscultation bilaterally, nonlabored respirations no w/r/r               Cardiovascular: RRR, no murmurs, rubs, or gallops, palpable pedal pulses bilaterally              Gastrointestinal: Positive bowel sounds, soft, nontender, nondistended              Musculoskeletal: not able to lift his left arm due to shoulder pain, full rom in other extremities               Psychiatric: Appropriate affect, cooperative              Neurologic: Oriented x 1 strength symmetric in all extremities, No focal deficits noted              Skin: bilateral feet have sores on them from stepping on glass, wounds are healing          Result Review:  Vital signs, labs and recent relevant imaging reviewed.      •  acetaminophen  •  acetaminophen  •  senna-docusate sodium **AND** polyethylene glycol **AND** [DISCONTINUED] bisacodyl **AND** bisacodyl  •  dextrose  •  dextrose  •  glucagon (human recombinant)  •  haloperidol lactate  •  labetalol  •  nitroglycerin  •  ondansetron  •  sodium chloride  •  sodium chloride    amLODIPine, 10 mg, Nasogastric, Q24H  atenolol, 25 mg, Nasogastric, Daily  doxycycline, 100 mg, Nasogastric, Q12H  folic acid, 1 mg, Nasogastric, Daily  heparin (porcine), 5,000 Units, Subcutaneous, Q8H  insulin detemir, 15 Units, Subcutaneous, Q12H  insulin lispro, 2-9 Units, Subcutaneous, 4x Daily AC & at Bedtime  lamoTRIgine, 200 mg, Nasogastric, Q12H  losartan, 50 mg,  Nasogastric, BID  mupirocin, 1 application , Topical, 2 times per day  pantoprazole, 40 mg, Intravenous, BID  QUEtiapine, 600 mg, Nasogastric, Nightly  saccharomyces boulardii, 250 mg, Nasogastric, BID  senna-docusate sodium, 2 tablet, Nasogastric, BID  sodium chloride, 10 mL, Intravenous, Q12H  sodium chloride, 10 mL, Intravenous, Q12H  Tropical Liquid Nutrition, 15 mL, Nasogastric, Daily    7/3 eeg  Impression:   Abnormal EEG because of:  1.  Intermittent medium voltage focal slow activity noted over the left frontotemporal region suggestive of neuronal dysfunction in this region.  This abnormalities have been reported in individuals with vascular insufficiency, migraine, or postictal state.  2.  Slow background activity mixed with slower waves compatible with moderate and diffuse encephalopathy.  3.  There were no epileptiform discharges noted today.    7/6 EEG moderate encephalopathy and no acute epileptic discharges noted    Assessment / Plan     Assessment/Plan:  Sepsis from undetermined etiology, possible tickborne illness   Acute metabolic encephalopathy  MK on CKD  Severe hyperkalemia  Seizure disorder (on lamictal), with concern for subclinical seizure - negative on EEGs  Concern for withdrawal from outpatient ambien (tox + oxycodone only on admission)  Rhabdomyolysis  SSTI in setting of Foreign body in left foot/glass  Initial presentation with hyponatremia  Significant Hypernatremia  Anion gap metabolic acidosis  CVA ruled out  History of COPD  Type 2 diabetes mellitus  HTN  Bipolar disorder  Hyperlipidemia  Polypharmacy  Horizontal nystagmus - reportedly chronic/question congenital nystagmus    PLAN   Negative LP/meningitis/csf cx/blood culture, lyme and rickettsia  Off ceftriaxone, has had adequate course to also cover initial celluitis of feet   Continue doxycycline for now until remainder of tick studies result: ehrlichia,  babeisa pending  Psychiatry consulted appreciate assistance - discontinued  citalopram and increased seroquel   Continue home lamotrigine (takes for seizures)   Speech following. Remove n/g today and see how patient tolerates diet   Completed course of IV thiamine   Cont atenolol, losartan, amlodipine.    Appreciate nephrology assistance with ambar/ckd/hypernatremia  Continue Levemir, ssi  Continue local wound care  Delirium precautions/reoritentation  Continue gi ppx   Check a.m. CBC, BMP, magnesium, phosphorus      DVT prophylaxis:  Medical and mechanical DVT prophylaxis orders are present.    Level Of Support Discussed With: Patient  Code Status (Patient has no pulse and is not breathing): CPR (Attempt to Resuscitate)  Medical Interventions (Patient has pulse or is breathing): Full Support        CBC          7/11/2023    05:00 7/12/2023    05:15 7/13/2023    08:05   CBC   WBC 14.02  17.10  22.14    RBC 4.00  4.10  4.15    Hemoglobin 11.5  11.8  12.2    Hematocrit 36.6  36.7  37.2    MCV 91.5  89.5  89.6    MCH 28.8  28.8  29.4    MCHC 31.4  32.2  32.8    RDW 13.1  13.5  13.7    Platelets 255  311  342      CMP          7/11/2023    05:00 7/12/2023    05:15 7/13/2023    05:16   CMP   Glucose 216  294  266    BUN 33  39  42    Creatinine 1.90  2.11  1.99    EGFR 39.6  35.0  37.5    Sodium 146  143  137    Potassium 4.3  4.2  4.7    Chloride 114  111  109    Calcium 9.7  9.0  8.9    BUN/Creatinine Ratio 17.4  18.5  21.1    Anion Gap 11.6  9.6  11.7

## 2023-07-13 NOTE — PLAN OF CARE
Goal Outcome Evaluation: Pt to MRI this shift.  Pt in 2 point soft wrist restraints. Pt has NG tube at goal rate, tube feeds going.  One noted residual of 5 ml noted.  No other complaints. Patient has rested this shift.  Continue plan of care.

## 2023-07-13 NOTE — PLAN OF CARE
Goal Outcome Evaluation:  Plan of Care Reviewed With: patient        Progress: no change  Outcome Evaluation: Pt vss. Pt has had intermittention confusion today. Pt NG tube removed today, and restriants discontinued, sitter is at bedside. Continue plan of care

## 2023-07-13 NOTE — PROGRESS NOTES
Pulmonary / Critical Care Progress Note      Patient Name: Carlos Murphy Jr.  : 1960  MRN: [id number]  Attending:  Clif Oconnor DO   Date of admission: 2023    Subjective   Subjective   Follow-up for hypernatremia, altered mental status, bipolar/schizophrenia    More awake and alert and resting well  NG tube is out  Denies complaints  On room air  Ehrlichiosis panel pending    Objective   Objective     Vitals:   Vitals:    23 1500 23 1959 23 2252 23 0427   BP: 171/81 178/79 134/67 161/71   BP Location: Left arm Left arm Left arm Left arm   Patient Position: Lying Lying Lying Lying   Pulse: 64 65 67 63   Resp: 17 18 18 18   Temp: 98.3 °F (36.8 °C) 99.5 °F (37.5 °C) 99.6 °F (37.6 °C) 98.1 °F (36.7 °C)   TempSrc: Oral Oral Oral Oral   SpO2:  93% 94% 100%   Weight:       Height:             Physical Exam   Vital Signs Reviewed   Patient awake, no acute distress, on room air  HEENT:  PERRL, EOMI. core track in place  Chest:  good aeration, clear to auscultation bilaterally, tympanic to percussion bilaterally, no work of breathing noted  CV: RRR, no MGR, pulses 2+, equal.  Abd:  Soft, NT, ND, + BS, no HSM  EXT:  no clubbing, no cyanosis, no edema  Neuro:  A&Ox2, patient awake, does follow some simple commands, moves all 4 extremities, horizontal nystagmus  Skin: No rashes or lesions noted      Result Review    Result Review:  I have personally reviewed the results from the time of this admission to 23   and agree with these findings:  [x]  Laboratory  [x]  Microbiology  [x]  Radiology  []  EKG/Telemetry   [x]  Cardiology/Vascular   []  Pathology  []  Old records  []  Other:  Most notable findings include:       Lab 23  0516 23  0515 23  0500 07/10/23  0247 23  1151 23  0324 23  0256 23  1302 23  0308   WBC  --  17.10* 14.02* 14.27*  --  13.62* 18.68*  --  16.65*   HEMOGLOBIN  --  11.8* 11.5* 11.3*  --  13.3 11.2*  --  12.9*    HEMATOCRIT  --  36.7* 36.6* 34.6*  --  45.4 34.3*  --  39.1   PLATELETS  --  311 255 218  --  171 220  --  285   SODIUM 137 143 146* 144 140  --  147* 155* 158*   POTASSIUM 4.7 4.2 4.3 4.0 4.3  --  3.4* 3.1* 2.9*   CHLORIDE 109* 111* 114* 111* 105  --  112* 116* 117*   CO2 16.3* 22.4 20.4* 21.8* 21.3*  --  24.7 26.8 24.6   BUN 42* 39* 33* 33* 36*  --  50* 63* 75*   CREATININE 1.99* 2.11* 1.90* 1.93* 1.85*  --  2.18* 2.56* 2.64*   GLUCOSE 266* 294* 216* 204* 299*  --  157* 168* 209*   CALCIUM 8.9 9.0 9.7 9.0 9.3  --  8.7 9.0 9.4   PHOSPHORUS 4.4 3.5 3.3 2.5  --   --  2.3*  --  3.6   TOTAL PROTEIN  --   --   --   --   --   --   --   --  6.7   ALBUMIN  --   --   --   --   --   --  3.1*  --  3.6   GLOBULIN  --   --   --   --   --   --   --   --  3.1           Assessment & Plan   Assessment / Plan     Impression:  Altered mental status  Metabolic encephalopathy  Seizure disorder  History of bipolar and schizo affective disorder  Acute kidney injury, on CKD stage III  Type 2 diabetes with hyperglycemia  Iron deficiency anemia  Rhabdomyolysis  Hypokalemia  Hypophosphatemia  Hypertension  Hypernatremia, clinically significant     Plan:  On room air  Diet per speech therapy.  Appreciate assistance  Appreciate nephrology assistance.  Sodium improving  Trend renal panel and electrolytes  Continue doxycycline.  Can discontinue if ehrlichiosis profile comes back negative  Continue current dose of losartan  Continue current insulin regimen  Appreciate neurology assistance  Continue Lexapro, Seroquel, and Lamictal  Continue thiamine, and multivitamin and folic acid    DVT prophylaxis:  Medical and mechanical DVT prophylaxis orders are present.    CODE STATUS:   Level Of Support Discussed With: Patient  Code Status (Patient has no pulse and is not breathing): CPR (Attempt to Resuscitate)  Medical Interventions (Patient has pulse or is breathing): Full Support      Labs, imaging, microbiology, notes and medications personally  reviewed  Discussed with primary    Electronically signed by Phillip Yanez MD, 07/13/23, 3:40 PM EDT.

## 2023-07-13 NOTE — PROGRESS NOTES
" LOS: 13 days   Patient Care Team:  Felix Atkinson MD as PCP - General  Felix Atkinson MD as PCP - Family Medicine    Chief Complaint: MK/CKD    Subjective     Weakness - Generalized  Events noted.  No new issues overnight.  More alert, and restrained, NG tube is out.      Subjective:  Symptoms:  Improved.      History taken from: patient chart    Objective     Vital Sign Min/Max for last 24 hours  Temp  Min: 98.1 °F (36.7 °C)  Max: 99.6 °F (37.6 °C)   BP  Min: 134/67  Max: 178/79   Pulse  Min: 63  Max: 68   Resp  Min: 18  Max: 18   SpO2  Min: 93 %  Max: 100 %   No data recorded   No data recorded     Flowsheet Rows      Flowsheet Row First Filed Value   Admission Height 182.9 cm (72\") Documented at 06/30/2023 1800   Admission Weight 133 kg (294 lb 1.5 oz) Documented at 06/30/2023 2300            I/O this shift:  In: 240 [P.O.:240]  Out: -   I/O last 3 completed shifts:  In: 6230 [P.O.:600; I.V.:40; Other:1200; NG/GT:4290; IV Piggyback:100]  Out: 500 [Urine:500]    Objective:  General Appearance:  Comfortable, not in pain and in no acute distress (Seems better.).    Vital signs: (most recent): Blood pressure 169/66, pulse 64, temperature 98.5 °F (36.9 °C), temperature source Oral, resp. rate 18, height 182.9 cm (72\"), weight 134 kg (295 lb 6.7 oz), SpO2 95 %.  Vital signs are normal.    Output: Producing urine.    HEENT: Normal HEENT exam.    Lungs:  Normal effort and normal respiratory rate.    Heart: Normal rate.  Regular rhythm.    Abdomen: Abdomen is soft.  Bowel sounds are normal.   There is no abdominal tenderness.     Extremities: Normal range of motion.  There is no dependent edema.    Pulses: Distal pulses are intact.    Neurological: Patient is alert.    Skin:  Warm and dry.  There is ecchymosis.  (Scabs over knees and toes.)        Exam unchanged today.    Results Review:     I reviewed the patient's new clinical results.    WBC WBC   Date Value Ref Range Status   07/13/2023 22.14 (H) 3.40 - 10.80 " 10*3/mm3 Final   07/12/2023 17.10 (H) 3.40 - 10.80 10*3/mm3 Final   07/11/2023 14.02 (H) 3.40 - 10.80 10*3/mm3 Final      HGB Hemoglobin   Date Value Ref Range Status   07/13/2023 12.2 (L) 13.0 - 17.7 g/dL Final   07/12/2023 11.8 (L) 13.0 - 17.7 g/dL Final   07/11/2023 11.5 (L) 13.0 - 17.7 g/dL Final      HCT Hematocrit   Date Value Ref Range Status   07/13/2023 37.2 (L) 37.5 - 51.0 % Final   07/12/2023 36.7 (L) 37.5 - 51.0 % Final   07/11/2023 36.6 (L) 37.5 - 51.0 % Final      Platlets No results found for: LABPLAT   MCV MCV   Date Value Ref Range Status   07/13/2023 89.6 79.0 - 97.0 fL Final   07/12/2023 89.5 79.0 - 97.0 fL Final   07/11/2023 91.5 79.0 - 97.0 fL Final          Sodium Sodium   Date Value Ref Range Status   07/13/2023 137 136 - 145 mmol/L Final   07/12/2023 143 136 - 145 mmol/L Final   07/11/2023 146 (H) 136 - 145 mmol/L Final      Potassium Potassium   Date Value Ref Range Status   07/13/2023 4.7 3.5 - 5.2 mmol/L Final     Comment:     Slight hemolysis detected by analyzer. Results may be affected.   07/12/2023 4.2 3.5 - 5.2 mmol/L Final   07/11/2023 4.3 3.5 - 5.2 mmol/L Final      Chloride Chloride   Date Value Ref Range Status   07/13/2023 109 (H) 98 - 107 mmol/L Final   07/12/2023 111 (H) 98 - 107 mmol/L Final   07/11/2023 114 (H) 98 - 107 mmol/L Final      CO2 CO2   Date Value Ref Range Status   07/13/2023 16.3 (L) 22.0 - 29.0 mmol/L Final   07/12/2023 22.4 22.0 - 29.0 mmol/L Final   07/11/2023 20.4 (L) 22.0 - 29.0 mmol/L Final      BUN BUN   Date Value Ref Range Status   07/13/2023 42 (H) 8 - 23 mg/dL Final   07/12/2023 39 (H) 8 - 23 mg/dL Final   07/11/2023 33 (H) 8 - 23 mg/dL Final      Creatinine Creatinine   Date Value Ref Range Status   07/13/2023 1.99 (H) 0.76 - 1.27 mg/dL Final   07/12/2023 2.11 (H) 0.76 - 1.27 mg/dL Final   07/11/2023 1.90 (H) 0.76 - 1.27 mg/dL Final      Calcium Calcium   Date Value Ref Range Status   07/13/2023 8.9 8.6 - 10.5 mg/dL Final   07/12/2023 9.0 8.6 - 10.5  mg/dL Final   07/11/2023 9.7 8.6 - 10.5 mg/dL Final      PO4 No results found for: CAPO4   Albumin No results found for: ALBUMIN     Magnesium Magnesium   Date Value Ref Range Status   07/13/2023 3.2 (H) 1.6 - 2.4 mg/dL Final   07/12/2023 3.0 (H) 1.6 - 2.4 mg/dL Final   07/11/2023 2.8 (H) 1.6 - 2.4 mg/dL Final      Uric Acid No results found for: URICACID     Medication Review:   amLODIPine, 10 mg, Nasogastric, Q24H  atenolol, 25 mg, Nasogastric, Daily  doxycycline, 100 mg, Nasogastric, Q12H  folic acid, 1 mg, Nasogastric, Daily  heparin (porcine), 5,000 Units, Subcutaneous, Q8H  insulin detemir, 15 Units, Subcutaneous, Q12H  insulin lispro, 2-9 Units, Subcutaneous, 4x Daily AC & at Bedtime  lamoTRIgine, 200 mg, Nasogastric, Q12H  losartan, 50 mg, Nasogastric, BID  mupirocin, 1 application , Topical, 2 times per day  pantoprazole, 40 mg, Intravenous, BID  QUEtiapine, 600 mg, Nasogastric, Nightly  saccharomyces boulardii, 250 mg, Nasogastric, BID  senna-docusate sodium, 2 tablet, Nasogastric, BID  sodium chloride, 10 mL, Intravenous, Q12H  sodium chloride, 10 mL, Intravenous, Q12H  Tropical Liquid Nutrition, 15 mL, Nasogastric, Daily        Assessment & Plan       Acute renal failure, unspecified acute renal failure type    Onychomycosis    Onychocryptosis    Foreign body in left foot    Peripheral neuropathy    Charcot foot due to diabetes mellitus    Foot pain, bilateral      Assessment & Plan  MK/CKD3b-  Due to diabetic nephropathy.  He has had worsened renal function recently.  Had been on lisinopril and SGLT2 and kerendia.  Creatinine is better than baseline. Volume status adequate.  Off bicarb drip and Lasix.  Baseline creatinine around 2.0-2.5.  Monitor.  Hypernatremia-  Na is better with current free water  80 cc every 1 hour.  Continue the same and monitor .   Hypokalemia, replaced.  Met Acidosis-recurrent, off  bicarb.  Recheck in AM.  Proteinuria-  diabetic nephropathy  DMII-  treated.  Was On jardiance,  hold with MK.  HTN-  BP is better, continue losartan.  Chronic edema-  Amlodipine may be contributing.  on hold.  H/o bipolar-  previous lithium use.  H/o sz disorder-neurology evaluating.  Anemia-  tsat at 14%, ferritin 425. Received some IV iron.   Rhabdomyolysis-  had fall at home, statin on hold. improved now.  Weakness-  would avoid muscle relaxer in CKD(flexeril).  Likely polypharmacy contributing.    We will follow.      Caroline Mckenzie MD  07/13/23  15:15 EDT

## 2023-07-14 LAB
A PHAGOCYTOPH DNA BLD QL NAA+PROBE: NEGATIVE
ANION GAP SERPL CALCULATED.3IONS-SCNC: 8.9 MMOL/L (ref 5–15)
BASOPHILS # BLD AUTO: 0.07 10*3/MM3 (ref 0–0.2)
BASOPHILS NFR BLD AUTO: 0.5 % (ref 0–1.5)
BUN SERPL-MCNC: 38 MG/DL (ref 8–23)
BUN/CREAT SERPL: 18.3 (ref 7–25)
CALCIUM SPEC-SCNC: 9.4 MG/DL (ref 8.6–10.5)
CHLORIDE SERPL-SCNC: 109 MMOL/L (ref 98–107)
CO2 SERPL-SCNC: 23.1 MMOL/L (ref 22–29)
CREAT SERPL-MCNC: 2.08 MG/DL (ref 0.76–1.27)
DEPRECATED RDW RBC AUTO: 44.3 FL (ref 37–54)
E CHAFFEENSIS DNA BLD QL NAA+PROBE: NEGATIVE
EGFRCR SERPLBLD CKD-EPI 2021: 35.6 ML/MIN/1.73
EOSINOPHIL # BLD AUTO: 0.36 10*3/MM3 (ref 0–0.4)
EOSINOPHIL NFR BLD AUTO: 2.5 % (ref 0.3–6.2)
ERYTHROCYTE [DISTWIDTH] IN BLOOD BY AUTOMATED COUNT: 13.6 % (ref 12.3–15.4)
GLUCOSE BLDC GLUCOMTR-MCNC: 155 MG/DL (ref 70–99)
GLUCOSE BLDC GLUCOMTR-MCNC: 213 MG/DL (ref 70–99)
GLUCOSE BLDC GLUCOMTR-MCNC: 240 MG/DL (ref 70–99)
GLUCOSE BLDC GLUCOMTR-MCNC: 246 MG/DL (ref 70–99)
GLUCOSE BLDC GLUCOMTR-MCNC: 265 MG/DL (ref 70–99)
GLUCOSE SERPL-MCNC: 160 MG/DL (ref 65–99)
HCT VFR BLD AUTO: 39.6 % (ref 37.5–51)
HGB BLD-MCNC: 12.5 G/DL (ref 13–17.7)
IMM GRANULOCYTES # BLD AUTO: 0.09 10*3/MM3 (ref 0–0.05)
IMM GRANULOCYTES NFR BLD AUTO: 0.6 % (ref 0–0.5)
LYMPHOCYTES # BLD AUTO: 1.78 10*3/MM3 (ref 0.7–3.1)
LYMPHOCYTES NFR BLD AUTO: 12.5 % (ref 19.6–45.3)
MAGNESIUM SERPL-MCNC: 2.8 MG/DL (ref 1.6–2.4)
MCH RBC QN AUTO: 28.9 PG (ref 26.6–33)
MCHC RBC AUTO-ENTMCNC: 31.6 G/DL (ref 31.5–35.7)
MCV RBC AUTO: 91.7 FL (ref 79–97)
MONOCYTES # BLD AUTO: 1.62 10*3/MM3 (ref 0.1–0.9)
MONOCYTES NFR BLD AUTO: 11.4 % (ref 5–12)
NEUTROPHILS NFR BLD AUTO: 10.32 10*3/MM3 (ref 1.7–7)
NEUTROPHILS NFR BLD AUTO: 72.5 % (ref 42.7–76)
NRBC BLD AUTO-RTO: 0 /100 WBC (ref 0–0.2)
PHOSPHATE SERPL-MCNC: 5.2 MG/DL (ref 2.5–4.5)
PLATELET # BLD AUTO: 339 10*3/MM3 (ref 140–450)
PMV BLD AUTO: 10.2 FL (ref 6–12)
POTASSIUM SERPL-SCNC: 4.5 MMOL/L (ref 3.5–5.2)
RBC # BLD AUTO: 4.32 10*6/MM3 (ref 4.14–5.8)
SODIUM SERPL-SCNC: 141 MMOL/L (ref 136–145)
WBC NRBC COR # BLD: 14.24 10*3/MM3 (ref 3.4–10.8)

## 2023-07-14 PROCEDURE — 84100 ASSAY OF PHOSPHORUS: CPT | Performed by: INTERNAL MEDICINE

## 2023-07-14 PROCEDURE — 99232 SBSQ HOSP IP/OBS MODERATE 35: CPT | Performed by: INTERNAL MEDICINE

## 2023-07-14 PROCEDURE — 63710000001 INSULIN DETEMIR PER 5 UNITS: Performed by: NURSE PRACTITIONER

## 2023-07-14 PROCEDURE — 82948 REAGENT STRIP/BLOOD GLUCOSE: CPT

## 2023-07-14 PROCEDURE — 63710000001 INSULIN LISPRO (HUMAN) PER 5 UNITS: Performed by: NURSE PRACTITIONER

## 2023-07-14 PROCEDURE — 97110 THERAPEUTIC EXERCISES: CPT

## 2023-07-14 PROCEDURE — 25010000002 HEPARIN (PORCINE) PER 1000 UNITS: Performed by: NURSE PRACTITIONER

## 2023-07-14 PROCEDURE — 97161 PT EVAL LOW COMPLEX 20 MIN: CPT

## 2023-07-14 PROCEDURE — 99233 SBSQ HOSP IP/OBS HIGH 50: CPT | Performed by: INTERNAL MEDICINE

## 2023-07-14 PROCEDURE — 83735 ASSAY OF MAGNESIUM: CPT | Performed by: INTERNAL MEDICINE

## 2023-07-14 PROCEDURE — 80048 BASIC METABOLIC PNL TOTAL CA: CPT | Performed by: INTERNAL MEDICINE

## 2023-07-14 PROCEDURE — 97530 THERAPEUTIC ACTIVITIES: CPT

## 2023-07-14 PROCEDURE — 85025 COMPLETE CBC W/AUTO DIFF WBC: CPT | Performed by: INTERNAL MEDICINE

## 2023-07-14 PROCEDURE — 99232 SBSQ HOSP IP/OBS MODERATE 35: CPT | Performed by: PSYCHIATRY & NEUROLOGY

## 2023-07-14 RX ADMIN — MUPIROCIN 1 APPLICATION: 20 OINTMENT TOPICAL at 21:19

## 2023-07-14 RX ADMIN — INSULIN DETEMIR 15 UNITS: 100 INJECTION, SOLUTION SUBCUTANEOUS at 21:17

## 2023-07-14 RX ADMIN — INSULIN LISPRO 4 UNITS: 100 INJECTION, SOLUTION INTRAVENOUS; SUBCUTANEOUS at 17:39

## 2023-07-14 RX ADMIN — Medication 10 ML: at 21:17

## 2023-07-14 RX ADMIN — AMLODIPINE BESYLATE 10 MG: 5 TABLET ORAL at 09:17

## 2023-07-14 RX ADMIN — MULTIPLE VITAMINS W/ MINERALS TAB 1 TABLET: TAB at 15:44

## 2023-07-14 RX ADMIN — INSULIN LISPRO 4 UNITS: 100 INJECTION, SOLUTION INTRAVENOUS; SUBCUTANEOUS at 21:17

## 2023-07-14 RX ADMIN — DOXYCYCLINE 100 MG: 100 CAPSULE ORAL at 21:19

## 2023-07-14 RX ADMIN — QUETIAPINE FUMARATE 600 MG: 200 TABLET ORAL at 21:19

## 2023-07-14 RX ADMIN — PANTOPRAZOLE SODIUM 40 MG: 40 INJECTION, POWDER, FOR SOLUTION INTRAVENOUS at 21:19

## 2023-07-14 RX ADMIN — MUPIROCIN 1 APPLICATION: 20 OINTMENT TOPICAL at 15:45

## 2023-07-14 RX ADMIN — INSULIN LISPRO 2 UNITS: 100 INJECTION, SOLUTION INTRAVENOUS; SUBCUTANEOUS at 09:17

## 2023-07-14 RX ADMIN — HEPARIN SODIUM 5000 UNITS: 5000 INJECTION INTRAVENOUS; SUBCUTANEOUS at 05:34

## 2023-07-14 RX ADMIN — INSULIN DETEMIR 15 UNITS: 100 INJECTION, SOLUTION SUBCUTANEOUS at 09:17

## 2023-07-14 RX ADMIN — Medication 1 MG: at 09:17

## 2023-07-14 RX ADMIN — LAMOTRIGINE 200 MG: 100 TABLET ORAL at 09:17

## 2023-07-14 RX ADMIN — LAMOTRIGINE 200 MG: 100 TABLET ORAL at 21:19

## 2023-07-14 RX ADMIN — LOSARTAN POTASSIUM 50 MG: 25 TABLET, FILM COATED ORAL at 09:17

## 2023-07-14 RX ADMIN — ATENOLOL 25 MG: 25 TABLET ORAL at 09:17

## 2023-07-14 RX ADMIN — PANTOPRAZOLE SODIUM 40 MG: 40 INJECTION, POWDER, FOR SOLUTION INTRAVENOUS at 09:17

## 2023-07-14 RX ADMIN — Medication 10 ML: at 09:17

## 2023-07-14 RX ADMIN — Medication 10 ML: at 09:16

## 2023-07-14 RX ADMIN — HEPARIN SODIUM 5000 UNITS: 5000 INJECTION INTRAVENOUS; SUBCUTANEOUS at 13:38

## 2023-07-14 RX ADMIN — DOXYCYCLINE 100 MG: 100 CAPSULE ORAL at 09:17

## 2023-07-14 RX ADMIN — INSULIN LISPRO 6 UNITS: 100 INJECTION, SOLUTION INTRAVENOUS; SUBCUTANEOUS at 12:30

## 2023-07-14 RX ADMIN — LOSARTAN POTASSIUM 50 MG: 25 TABLET, FILM COATED ORAL at 21:19

## 2023-07-14 RX ADMIN — HEPARIN SODIUM 5000 UNITS: 5000 INJECTION INTRAVENOUS; SUBCUTANEOUS at 21:18

## 2023-07-14 NOTE — PROGRESS NOTES
" LOS: 14 days   Patient Care Team:  Felix Atkinson MD as PCP - General  Felix Atkinson MD as PCP - Family Medicine    Chief Complaint: MK/CKD    Subjective     Weakness - Generalized  Pertinent negatives include no fever. Events noted.  No new issues overnight.  More alert, off restraints.      Subjective:  Symptoms:  Improved.      History taken from: patient chart    Objective     Vital Sign Min/Max for last 24 hours  Temp  Min: 97.5 °F (36.4 °C)  Max: 98.5 °F (36.9 °C)   BP  Min: 116/48  Max: 161/63   Pulse  Min: 59  Max: 69   Resp  Min: 18  Max: 20   SpO2  Min: 94 %  Max: 99 %   No data recorded   No data recorded     Flowsheet Rows      Flowsheet Row First Filed Value   Admission Height 182.9 cm (72\") Documented at 06/30/2023 1800   Admission Weight 133 kg (294 lb 1.5 oz) Documented at 06/30/2023 2300            I/O this shift:  In: 240 [P.O.:240]  Out: -   I/O last 3 completed shifts:  In: 4786 [P.O.:795; Other:250; NG/GT:3741]  Out: -     Objective:  General Appearance:  Comfortable, not in pain and in no acute distress (Seems better.).    Vital signs: (most recent): Blood pressure 135/51, pulse 59, temperature 97.5 °F (36.4 °C), temperature source Oral, resp. rate 18, height 182.9 cm (72\"), weight 134 kg (295 lb 6.7 oz), SpO2 97 %.  Vital signs are normal.  No fever.    Output: Producing urine.    HEENT: Normal HEENT exam.    Lungs:  Normal effort and normal respiratory rate.    Heart: Normal rate.  Regular rhythm.    Abdomen: Abdomen is soft.  Bowel sounds are normal.   There is no abdominal tenderness.     Extremities: Normal range of motion.  There is no dependent edema.    Pulses: Distal pulses are intact.    Neurological: Patient is alert.    Skin:  Warm and dry.  There is ecchymosis.  (Scabs over knees and toes.)            Results Review:     I reviewed the patient's new clinical results.    WBC WBC   Date Value Ref Range Status   07/14/2023 14.24 (H) 3.40 - 10.80 10*3/mm3 Final   07/13/2023 " 22.14 (H) 3.40 - 10.80 10*3/mm3 Final   07/12/2023 17.10 (H) 3.40 - 10.80 10*3/mm3 Final      HGB Hemoglobin   Date Value Ref Range Status   07/14/2023 12.5 (L) 13.0 - 17.7 g/dL Final   07/13/2023 12.2 (L) 13.0 - 17.7 g/dL Final   07/12/2023 11.8 (L) 13.0 - 17.7 g/dL Final      HCT Hematocrit   Date Value Ref Range Status   07/14/2023 39.6 37.5 - 51.0 % Final   07/13/2023 37.2 (L) 37.5 - 51.0 % Final   07/12/2023 36.7 (L) 37.5 - 51.0 % Final      Platlets No results found for: LABPLAT   MCV MCV   Date Value Ref Range Status   07/14/2023 91.7 79.0 - 97.0 fL Final   07/13/2023 89.6 79.0 - 97.0 fL Final   07/12/2023 89.5 79.0 - 97.0 fL Final          Sodium Sodium   Date Value Ref Range Status   07/14/2023 141 136 - 145 mmol/L Final   07/13/2023 137 136 - 145 mmol/L Final   07/12/2023 143 136 - 145 mmol/L Final      Potassium Potassium   Date Value Ref Range Status   07/14/2023 4.5 3.5 - 5.2 mmol/L Final   07/13/2023 4.7 3.5 - 5.2 mmol/L Final     Comment:     Slight hemolysis detected by analyzer. Results may be affected.   07/12/2023 4.2 3.5 - 5.2 mmol/L Final      Chloride Chloride   Date Value Ref Range Status   07/14/2023 109 (H) 98 - 107 mmol/L Final   07/13/2023 109 (H) 98 - 107 mmol/L Final   07/12/2023 111 (H) 98 - 107 mmol/L Final      CO2 CO2   Date Value Ref Range Status   07/14/2023 23.1 22.0 - 29.0 mmol/L Final   07/13/2023 16.3 (L) 22.0 - 29.0 mmol/L Final   07/12/2023 22.4 22.0 - 29.0 mmol/L Final      BUN BUN   Date Value Ref Range Status   07/14/2023 38 (H) 8 - 23 mg/dL Final   07/13/2023 42 (H) 8 - 23 mg/dL Final   07/12/2023 39 (H) 8 - 23 mg/dL Final      Creatinine Creatinine   Date Value Ref Range Status   07/14/2023 2.08 (H) 0.76 - 1.27 mg/dL Final   07/13/2023 1.99 (H) 0.76 - 1.27 mg/dL Final   07/12/2023 2.11 (H) 0.76 - 1.27 mg/dL Final      Calcium Calcium   Date Value Ref Range Status   07/14/2023 9.4 8.6 - 10.5 mg/dL Final   07/13/2023 8.9 8.6 - 10.5 mg/dL Final   07/12/2023 9.0 8.6 - 10.5  mg/dL Final      PO4 No results found for: CAPO4   Albumin No results found for: ALBUMIN     Magnesium Magnesium   Date Value Ref Range Status   07/14/2023 2.8 (H) 1.6 - 2.4 mg/dL Final   07/13/2023 3.2 (H) 1.6 - 2.4 mg/dL Final   07/12/2023 3.0 (H) 1.6 - 2.4 mg/dL Final      Uric Acid No results found for: URICACID     Medication Review:   amLODIPine, 10 mg, Oral, Q24H  atenolol, 25 mg, Oral, Daily  doxycycline, 100 mg, Oral, Q12H  folic acid, 1 mg, Oral, Daily  heparin (porcine), 5,000 Units, Subcutaneous, Q8H  insulin detemir, 15 Units, Subcutaneous, Q12H  insulin lispro, 2-9 Units, Subcutaneous, 4x Daily AC & at Bedtime  lamoTRIgine, 200 mg, Oral, Q12H  losartan, 50 mg, Oral, BID  multivitamin with minerals, 1 tablet, Oral, Daily  mupirocin, 1 application , Topical, 2 times per day  pantoprazole, 40 mg, Intravenous, BID  QUEtiapine, 600 mg, Oral, Nightly  senna-docusate sodium, 2 tablet, Oral, BID  sodium chloride, 10 mL, Intravenous, Q12H  sodium chloride, 10 mL, Intravenous, Q12H        Assessment & Plan       Acute renal failure, unspecified acute renal failure type    Onychomycosis    Onychocryptosis    Foreign body in left foot    Peripheral neuropathy    Charcot foot due to diabetes mellitus    Foot pain, bilateral      Assessment & Plan  MK/CKD3b-  Due to diabetic nephropathy.  He has had worsened renal function recently.  Had been on lisinopril and SGLT2 and kerendia.  Creatinine is better than baseline. Volume status adequate.  Off bicarb drip and Lasix.  Baseline creatinine around 2.0-2.5.  Monitor.  Hypernatremia-  Na is better monitor.     Hypokalemia, replaced.  Met Acidosis-recurrent, off  bicarb.  Recheck in AM.  Proteinuria-  diabetic nephropathy  DMII-  treated.  Was On jardiance, hold with MK.  HTN-  BP is better, continue losartan.  Chronic edema-  Amlodipine may be contributing.  on hold.  H/o bipolar-  previous lithium use.  H/o sz disorder-neurology evaluating.  Anemia-  tsat at 14%,  ferritin 425. Received some IV iron.   Rhabdomyolysis-  had fall at home, statin on hold. improved now.  Weakness-  would avoid muscle relaxer in CKD (flexeril).  Likely polypharmacy contributing.    We will follow.      Caroline Mckenzie MD  07/14/23  16:54 EDT

## 2023-07-14 NOTE — PROGRESS NOTES
" [facility]     Psychiatric Progress Note    Patient Name: Carlos Murphy Jr.  : 1960  MRN: [id number]  Primary Care Physician:  Felix Atkinson MD  Date of admission: 2023    Subjective   Subjective     Patient seen and chart reviewed, discussed with staff.    Chief Complaint: Confusion      HPI:     Patient is improved.  He is now in restraints.  He has been moved out of the unit.  The patient today is sitting up in chair cooperates in interview.  He is engaging.  Patient however continues to be confused and disorganized in his thoughts.  Thought processes are easily derailed.  As the patient lies in the hospital day begins rambling about being self dependent, and his answers are somewhat illogical minutes.  He is not talking about himself and states he is talking about his dog.    When asked to tell me where we are today he rambles and does not come up with an answer and when he does it is nonsensical, when redirected to the question he is able to state, [facility].\"  When asked to provide the day of the week he repeats the phrase, \"it is…\" Multiple times and again gets distracted and does not recall the question.  When redirected to the question he is unable to come up with an answer.  He is confused.    There is no acute agitation.    Appears to be delirious.  He is being followed by neurology and had an abnormal EEG.      Objective   Objective     Vitals:   Temp:  [97.6 °F (36.4 °C)-98.5 °F (36.9 °C)] 97.6 °F (36.4 °C)  Heart Rate:  [62-69] 62  Resp:  [18-20] 18  BP: (116-165)/(48-84) 135/59          Mental Status Exam:      Appearance:   Well-developed, well-nourished, sitting up in chair, cooperative  Reliability:   Poor  Eye Contact:   Limited, easily distracted  Concentration/Focus:    Inattentive and questions asked  Behaviors:    No restlessness or agitation  Memory :    Unable to fully assess, limited  Speech:    Rambling, normal rate and volume  Language:   No cursing, " appropriate, not directed to questions asked  Mood :    Dysthymic  Affect:    Appropriate, euthymic  Thought process:    Disorganized, tangential, forgetfulness and confusion present  Thought Content:    No agitation and no attempts at self-harm denies any suicidal or homicidal ideation, denies hallucinations but may be responding to internal stimuli and gets distracted and looks away often  Insight:   Limited  Judgement:    Limited      Result Review    Result Review:  I have personally reviewed the results from the time of this admission to 7/14/2023 12:32 EDT and agree with these findings:  []  Laboratory  []  Microbiology  []  Radiology  []  EKG/Telemetry   []  Cardiology/Vascular   []  Pathology  []  Old records  []  Other:  Most notable findings include:     Lab Results (last 24 hours)       Procedure Component Value Units Date/Time    POC Glucose Once [[id number]]  (Abnormal) Collected: 07/14/23 1145    Specimen: Blood Updated: 07/14/23 1152     Glucose 265 mg/dL      Comment: Serial Number: [id number]:  [id number]       POC Glucose Once [[id number]]  (Abnormal) Collected: 07/14/23 1024    Specimen: Blood Updated: 07/14/23 1026     Glucose 213 mg/dL      Comment: Serial Number: [id number]:  [id number]       Ehrlichia Profile DNA PCR [[id number]] Collected: 07/05/23 2046    Specimen: Blood Updated: 07/14/23 0908     A. phagocytophilum PCR Negative     Comment: No Anaplasma phagocytophilum DNA detected.  A. phagocytophilum has been  characterized as the causative agent of Human Granulocytic  Ehrlichiosis (HGE).  This test was developed and its performance characteristics  determined by [facility]. It has not been cleared or approved  by the Food and Drug Administration.        Ehrlichia chaffeensis PCR Negative     Comment: No Ehrlichia chaffeensis DNA detected.  E. chaffeensis has been  characterized as the causative agent of Human Monocytic Ehrlichiosis  (HME).  This test was developed and its  performance characteristics  determined by [facility]. It has not been cleared or approved  by the Food and Drug Administration.       Narrative:      Performed at:  [address]  [id number]  : Nia Thomas MD, Phone:  [phone]    Magnesium [[id number]]  (Abnormal) Collected: 07/14/23 0750    Specimen: Blood Updated: 07/14/23 0830     Magnesium 2.8 mg/dL     Basic Metabolic Panel [[id number]]  (Abnormal) Collected: 07/14/23 0750    Specimen: Blood Updated: 07/14/23 0830     Glucose 160 mg/dL      BUN 38 mg/dL      Creatinine 2.08 mg/dL      Sodium 141 mmol/L      Potassium 4.5 mmol/L      Chloride 109 mmol/L      CO2 23.1 mmol/L      Calcium 9.4 mg/dL      BUN/Creatinine Ratio 18.3     Anion Gap 8.9 mmol/L      eGFR 35.6 mL/min/1.73     Narrative:      GFR Normal >60  Chronic Kidney Disease <60  Kidney Failure <15      Phosphorus [[id number]]  (Abnormal) Collected: 07/14/23 0750    Specimen: Blood Updated: 07/14/23 0830     Phosphorus 5.2 mg/dL     CBC & Differential [[id number]]  (Abnormal) Collected: 07/14/23 0527    Specimen: Blood Updated: 07/14/23 0811    Narrative:      The following orders were created for panel order CBC & Differential.  Procedure                               Abnormality         Status                     ---------                               -----------         ------                     CBC Auto Differential[[id number]]        Abnormal            Final result               Scan Slide[[id number]]                                                                    Please view results for these tests on the individual orders.    CBC Auto Differential [[id number]]  (Abnormal) Collected: 07/14/23 0750    Specimen: Blood Updated: 07/14/23 0811     WBC 14.24 10*3/mm3      RBC 4.32 10*6/mm3      Hemoglobin 12.5 g/dL      Hematocrit 39.6 %      MCV 91.7 fL      MCH 28.9 pg      MCHC 31.6 g/dL      RDW 13.6 %      RDW-SD 44.3 fl      MPV 10.2 fL       Platelets 339 10*3/mm3      Neutrophil % 72.5 %      Lymphocyte % 12.5 %      Monocyte % 11.4 %      Eosinophil % 2.5 %      Basophil % 0.5 %      Immature Grans % 0.6 %      Neutrophils, Absolute 10.32 10*3/mm3      Lymphocytes, Absolute 1.78 10*3/mm3      Monocytes, Absolute 1.62 10*3/mm3      Eosinophils, Absolute 0.36 10*3/mm3      Basophils, Absolute 0.07 10*3/mm3      Immature Grans, Absolute 0.09 10*3/mm3      nRBC 0.0 /100 WBC     POC Glucose Once [[id number]]  (Abnormal) Collected: 07/14/23 0729    Specimen: Blood Updated: 07/14/23 0731     Glucose 155 mg/dL      Comment: Serial Number: [id number]:  [id number]       POC Glucose Once [[id number]]  (Abnormal) Collected: 07/13/23 2121    Specimen: Blood Updated: 07/13/23 2123     Glucose 245 mg/dL      Comment: Serial Number: [id number]:  [id number]       POC Glucose Once [[id number]]  (Abnormal) Collected: 07/13/23 1632    Specimen: Blood Updated: 07/13/23 1638     Glucose 143 mg/dL      Comment: Serial Number: [id number]:  [id number]       AFB Culture - Cerebrospinal Fluid, Lumbar Puncture [[id number]] Collected: 07/06/23 1318    Specimen: Cerebrospinal Fluid from Lumbar Puncture Updated: 07/13/23 1345     AFB Culture No AFB isolated at 1 week     AFB Stain No acid fast bacilli seen on direct smear    Fungus Culture - Cerebrospinal Fluid, Lumbar Puncture [[id number]]  (Normal) Collected: 07/06/23 1318    Specimen: Cerebrospinal Fluid from Lumbar Puncture Updated: 07/13/23 1345     Fungus Culture No fungus isolated at 1 week                Medications:   amLODIPine, 10 mg, Oral, Q24H  atenolol, 25 mg, Oral, Daily  doxycycline, 100 mg, Oral, Q12H  folic acid, 1 mg, Oral, Daily  heparin (porcine), 5,000 Units, Subcutaneous, Q8H  insulin detemir, 15 Units, Subcutaneous, Q12H  insulin lispro, 2-9 Units, Subcutaneous, 4x Daily AC & at Bedtime  lamoTRIgine, 200 mg, Oral, Q12H  losartan, 50 mg, Oral, BID  multivitamin with minerals, 1 tablet, Oral,  Daily  mupirocin, 1 application , Topical, 2 times per day  pantoprazole, 40 mg, Intravenous, BID  QUEtiapine, 600 mg, Oral, Nightly  senna-docusate sodium, 2 tablet, Oral, BID  sodium chloride, 10 mL, Intravenous, Q12H  sodium chloride, 10 mL, Intravenous, Q12H          Assessment / Plan       Active Hospital Problems:  Active Hospital Problems    Diagnosis    • **Acute renal failure, unspecified acute renal failure type    • Onychomycosis    • Onychocryptosis    • Foreign body in left foot    • Peripheral neuropathy    • Charcot foot due to diabetes mellitus    • Foot pain, bilateral        Plan:     Continue current dose of quetiapine  Patient appears to have an underlying delirium that should resolve over time  We will follow make treatment recommendations as indicated      Disposition:  I expect patient to be discharged .    Part of this note may be an electronic transcription/translation of spoken language to printed text using the Dragon dictation system.         Electronically signed by Wallace Murillo MD, 07/14/23, 12:32 PM EDT.

## 2023-07-14 NOTE — PROGRESS NOTES
[facility]   Hospitalist Progress Note    Date of admission: 6/30/2023  Patient Name: Carlos Murphy Jr.  1960  Date: 7/14/2023      Subjective     Chief Complaint   Patient presents with   • Weakness - Generalized       Summary: 62 y.o. with history of seizure disorder (on lamotrigine 200 bid), bipolar, schizophrenia, who presented with altered mentation/unresponsiveness on 6/28.  Patient apparently had called his sister initially had slurred speech/somnolent and received Narcan with notable initial improvement.  Patient apparently reported taking additional medications at home but was not trying to harm himself history is very limited given patient poor historian.  Patient underwent stroke work-up without acute findings suggest etiology of patient's AMS.  Did have hyperkalemia initially improved with fluids.  Nephrology assisting given MK and rhabdomyolysis.  Podiatry assisted given for infection/glasses feet and required several pieces being removed.      Hospital course completed by worsening mentation/agitation with concern initially for seizures multiple EEGs have been negative, neurology consulted for assistance, concern for possible medication withdrawal (outpt is on ambien tox screen was positive oxycodone only)/metabolic encephalopathy and was placed on CIWA monitoring and required ativan. LP obtained negative for acute meningitis, tick panel pending and treating empirically for now.  Mentation starting to improve, gradually titrating medications, psychiatry consulted for additional assistance. Transferring out of the icu and trying to come out of restraints given current improvements.     Interval Followup:   Patient is now out of restraints  Patient has his nasogastric tube removed  Patient remains somewhat confused but overall improving .     ROS:   Limited due to confusion. Patient denies any pain.       Objective     Vitals:   Temp:  [97.9 °F (36.6 °C)-98.5 °F (36.9 °C)] 98.5 °F (36.9  °C)  Heart Rate:  [62-69] 62  Resp:  [18-20] 18  BP: (116-169)/(48-84) 136/58    Physical Exam        Constitutional: Awake, alert, no acute distress, sitting in bedside chair                Eyes: Pupils equal, sclerae anicteric, no conjunctival injection              HENT: NCAT, mucous membranes moist              Neck: Supple, no thyromegaly, no lymphadenopathy, trachea midline              Respiratory: Clear to auscultation bilaterally, nonlabored respirations no w/r/r               Cardiovascular: RRR, no murmurs, rubs, or gallops, palpable pedal pulses bilaterally              Gastrointestinal: Positive bowel sounds, soft, nontender, nondistended              Musculoskeletal: not able to lift his left arm due to shoulder pain, full rom in other extremities               Psychiatric: Appropriate affect, cooperative              Neurologic: Oriented x 1 strength symmetric in all extremities, No focal deficits noted              Skin: bilateral feet wounds are healing          Result Review:  Vital signs, labs and recent relevant imaging reviewed.      •  acetaminophen  •  acetaminophen  •  senna-docusate sodium **AND** polyethylene glycol **AND** [DISCONTINUED] bisacodyl **AND** bisacodyl  •  dextrose  •  dextrose  •  glucagon (human recombinant)  •  haloperidol lactate  •  labetalol  •  nitroglycerin  •  ondansetron  •  sodium chloride  •  sodium chloride    amLODIPine, 10 mg, Oral, Q24H  atenolol, 25 mg, Oral, Daily  doxycycline, 100 mg, Oral, Q12H  folic acid, 1 mg, Oral, Daily  heparin (porcine), 5,000 Units, Subcutaneous, Q8H  insulin detemir, 15 Units, Subcutaneous, Q12H  insulin lispro, 2-9 Units, Subcutaneous, 4x Daily AC & at Bedtime  lamoTRIgine, 200 mg, Oral, Q12H  losartan, 50 mg, Oral, BID  multivitamin with minerals, 1 tablet, Oral, Daily  mupirocin, 1 application , Topical, 2 times per day  pantoprazole, 40 mg, Intravenous, BID  QUEtiapine, 600 mg, Oral, Nightly  senna-docusate sodium, 2 tablet,  Oral, BID  sodium chloride, 10 mL, Intravenous, Q12H  sodium chloride, 10 mL, Intravenous, Q12H    7/3 eeg  Impression:   Abnormal EEG because of:  1.  Intermittent medium voltage focal slow activity noted over the left frontotemporal region suggestive of neuronal dysfunction in this region.  This abnormalities have been reported in individuals with vascular insufficiency, migraine, or postictal state.  2.  Slow background activity mixed with slower waves compatible with moderate and diffuse encephalopathy.  3.  There were no epileptiform discharges noted today.    7/6 EEG moderate encephalopathy and no acute epileptic discharges noted    Assessment / Plan     Assessment/Plan:  Sepsis from undetermined etiology, possible tickborne illness   Acute metabolic encephalopathy  MK on CKD  Severe hyperkalemia  Seizure disorder (on lamictal), with concern for subclinical seizure - negative on EEGs  Concern for withdrawal from outpatient ambien (tox + oxycodone only on admission)  Rhabdomyolysis  SSTI in setting of Foreign body in left foot/glass  Initial presentation with hyponatremia  Significant Hypernatremia  Anion gap metabolic acidosis  CVA ruled out  History of COPD  Type 2 diabetes mellitus  HTN  Bipolar disorder  Hyperlipidemia  Polypharmacy  Horizontal nystagmus - reportedly chronic/question congenital nystagmus    PLAN   Negative LP/meningitis/csf cx/blood culture, lyme and rickettsia  Off ceftriaxone, has had adequate course to also cover initial celluitis of feet   Continue doxycycline.  Patient's Ehrlichia was negative.  Lyme was negative.  [place] spotted fever IgG was positive however IgM was negative  Psychiatry consulted appreciate assistance - discontinued citalopram and increased seroquel   Continue home lamotrigine (takes for seizures)   Speech following. Continue diet   Completed course of IV thiamine   Cont atenolol, losartan, amlodipine.    Appreciate nephrology assistance with  ambar/ckd/hypernatremia  Continue Levemir, ssi  Continue local wound care  Delirium precautions/reoritentation  Continue gi ppx   Check a.m. CBC, BMP, magnesium, phosphorus      DVT prophylaxis:  Medical and mechanical DVT prophylaxis orders are present.    Level Of Support Discussed With: Patient  Code Status (Patient has no pulse and is not breathing): CPR (Attempt to Resuscitate)  Medical Interventions (Patient has pulse or is breathing): Full Support        CBC          7/12/2023    05:15 7/13/2023    08:05 7/14/2023    07:50   CBC   WBC 17.10  22.14  14.24    RBC 4.10  4.15  4.32    Hemoglobin 11.8  12.2  12.5    Hematocrit 36.7  37.2  39.6    MCV 89.5  89.6  91.7    MCH 28.8  29.4  28.9    MCHC 32.2  32.8  31.6    RDW 13.5  13.7  13.6    Platelets 311  342  339      CMP          7/12/2023    05:15 7/13/2023    05:16 7/14/2023    07:50   CMP   Glucose 294  266  160    BUN 39  42  38    Creatinine 2.11  1.99  2.08    EGFR 35.0  37.5  35.6    Sodium 143  137  141    Potassium 4.2  4.7  4.5    Chloride 111  109  109    Calcium 9.0  8.9  9.4    BUN/Creatinine Ratio 18.5  21.1  18.3    Anion Gap 9.6  11.7  8.9

## 2023-07-14 NOTE — THERAPY EVALUATION
Acute Care - Physical Therapy Initial Evaluation   [facility]     Patient Name: Carlos Murphy Jr.  : 1960  MRN: [id number]  Today's Date: 2023      Visit Dx:     ICD-10-CM ICD-9-CM   1. Acute renal failure, unspecified acute renal failure type  N17.9 584.9   2. Altered mental status, unspecified altered mental status type  R41.82 780.97   3. At risk for polypharmacy  Z91.89 V49.89   4. Non-traumatic rhabdomyolysis  M62.82 728.88   5. Oropharyngeal dysphagia  R13.12 787.22   6. Impaired mobility and ADLs  Z74.09 V49.89    Z78.9    7. Difficulty walking  R26.2 719.7     Patient Active Problem List   Diagnosis    Ulcer of toe due to type 2 diabetes mellitus    Centrilobular emphysema    Tobacco abuse, in remission    Obesity (BMI 30-39.9)    Closed fracture of shaft of left fibula    Closed nondisplaced fracture of medial malleolus of left tibia    Aftercare following surgery of ORIF left distal tibia fracture 2022    Colon cancer screening    Colitis    Acute renal failure, unspecified acute renal failure type    Onychomycosis    Onychocryptosis    Foreign body in left foot    Peripheral neuropathy    Charcot foot due to diabetes mellitus    Foot pain, bilateral     Past Medical History:   Diagnosis Date    Acute kidney injury      POST SEIZURES    Astigmatism of both eyes     eyes twitch left and right    Bipolar disorder     Charcot ankle     Closed nondisplaced fracture of medial malleolus of left tibia     COPD (chronic obstructive pulmonary disease)     USES INHALERS    Diabetes     BG RUNS AROUND 90'S IN AM    Hx of schizophrenia     Hyperlipidemia     Hypertension     SEEN DR ROD IN THE PAST, HAD APPT WITH DR MICHAUD IN  CX APPT, DENIED CP/SOB    Neuropathy     Seizures     LAST ONE 22    Sleep apnea     DOES NOT USE CPAP    Varicose vein of leg      Past Surgical History:   Procedure Laterality Date    ANKLE OPEN REDUCTION INTERNAL FIXATION Left 2022    Procedure: LEFT  OPEN REDUCTION INTERNAL FIXATION DISTAL TIBIA FRACTURE ;  Surgeon: Tha Parry MD;  Location: [facility] OR [facility];  Service: Orthopedics;  Laterality: Left;    ANKLE OPEN REDUCTION INTERNAL FIXATION Left 06/01/2022    Procedure: LEFT ANKLE OPEN REDUCTION INTERNAL FIXATION WITH SYNDESMOSIS FIXATION;  Surgeon: Tha Parry MD;  Location: [facility] MAIN OR;  Service: Orthopedics;  Laterality: Left;    CHOLECYSTECTOMY      COLONOSCOPY      JOINT REPLACEMENT      RTKR    LUMBAR DISC SURGERY      SHOULDER SURGERY Right      PT Assessment (last 12 hours)       PT Evaluation and Treatment       Row Name 07/14/23 1339          Physical Therapy Time and Intention    Document Type evaluation  -AV     Mode of Treatment individual therapy;physical therapy  -AV       Row Name 07/14/23 6179          General Information    Patient Profile Reviewed yes  -AV     Patient Observations alert;cooperative;agree to therapy  -AV     Prior Level of Function --  Reports (I) with ADLs. Ambulated with STC. No home O2. Patient confused at time of evaluation. No family at bedside to verify prior level.  -AV     Equipment Currently Used at Home cane, straight  -AV     Existing Precautions/Restrictions fall  -AV       Row Name 07/14/23 6859          Living Environment    Current Living Arrangements home  -AV     Home Accessibility stairs to enter home;stairs within home  -AV     People in Home alone  -AV       Row Name 07/14/23 1339          Home Main Entrance    Number of Stairs, Main Entrance four  -AV     Stair Railings, Main Entrance railings on both sides of stairs  -AV       Row Name 07/14/23 1339          Stairs Within Home, Primary    Stairs, Within Home, Primary Flight to second level bedroom  -AV       Row Name 07/14/23 1179          Cognition    Orientation Status (Cognition) oriented to;person;place;verbal cues/prompts needed for orientation  -AV       Row Name 07/14/23 6661          Range of Motion (ROM)    Range of Motion  bilateral lower extremities;ROM is WFL  -AV       Row Name 07/14/23 1339          Strength (Manual Muscle Testing)    Strength (Manual Muscle Testing) bilateral lower extremities;strength is [facility]  -AV       Row Name 07/14/23 1339          Bed Mobility    Comment, (Bed Mobility) Patient seated upright in recliner upon therapist entry.  -AV       Row Name 07/14/23 1339          Transfers    Transfers sit-stand transfer;stand-sit transfer  -AV       Row Name 07/14/23 1339          Sit-Stand Transfer    Sit-Stand [place] (Transfers) minimum assist (75% patient effort)  -AV     Assistive Device (Sit-Stand Transfers) walker, front-wheeled  -AV       Row Name 07/14/23 1339          Stand-Sit Transfer    Stand-Sit [place] (Transfers) minimum assist (75% patient effort)  -AV     Assistive Device (Stand-Sit Transfers) walker, front-wheeled  -AV       Row Name 07/14/23 1339          Gait/Stairs (Locomotion)    Gait/Stairs Locomotion gait/ambulation independence;gait/ambulation assistive device;distance ambulated  -AV     [place] Level (Gait) minimum assist (75% patient effort)  -AV     Assistive Device (Gait) walker, front-wheeled  -AV     Distance in Feet (Gait) 50  -AV     Deviations/Abnormal Patterns (Gait) base of support, narrow;gait speed decreased;stride length decreased;festinating/shuffling  -AV     Comment, (Gait/Stairs) Patient demonstrates decreased spatial awareness and ability to maneuver RW. Moderate cues and assist given from therapist to maintain safety during ambulation.  -AV       Row Name 07/14/23 1339          Safety Issues, Functional Mobility    Safety Issues Affecting Function (Mobility) insight into deficits/self-awareness;awareness of need for assistance;impulsivity;judgment;problem-solving;positioning of assistive device  -AV     Impairments Affecting Function (Mobility) balance;cognition;endurance/activity tolerance;strength  -AV       Row Name 07/14/23 1339          Balance    Balance  Assessment standing dynamic balance  -AV     Dynamic Standing Balance minimal assist  -AV     Position/Device Used, Standing Balance supported;walker, front-wheeled  -AV       Row Name             Wound 06/30/23 2321 Right anterior knee Abrasion    Wound - Properties Group Placement Date: 06/30/23 -TH Placement Time: 2321 -TH Present on Hospital Admission: Y  -TH Side: Right  -TH Orientation: anterior  -TH Location: knee  -TH Primary Wound Type: Abrasion  -TH    Retired Wound - Properties Group Placement Date: 06/30/23 -TH Placement Time: 2321 -TH Present on Hospital Admission: Y  -TH Side: Right  -TH Orientation: anterior  -TH Location: knee  -TH Primary Wound Type: Abrasion  -TH    Retired Wound - Properties Group Date first assessed: 06/30/23 -TH Time first assessed: 2321 -TH Present on Hospital Admission: Y  -TH Side: Right  -TH Location: knee  -TH Primary Wound Type: Abrasion  -TH      Row Name             Wound 06/30/23 2322 Left anterior knee Abrasion    Wound - Properties Group Placement Date: 06/30/23 -TH Placement Time: 2322 -TH Side: Left  -TH Orientation: anterior  -TH Location: knee  -TH Primary Wound Type: Abrasion  -TH    Retired Wound - Properties Group Placement Date: 06/30/23 -TH Placement Time: 2322 -TH Side: Left  -TH Orientation: anterior  -TH Location: knee  -TH Primary Wound Type: Abrasion  -TH    Retired Wound - Properties Group Date first assessed: 06/30/23 -TH Time first assessed: 2322 -TH Side: Left  -TH Location: knee  -TH Primary Wound Type: Abrasion  -TH      Row Name             Wound 06/30/23 2323 Bilateral anterior foot Abrasion    Wound - Properties Group Placement Date: 06/30/23 -TH Placement Time: 2323 -TH Side: Bilateral  -TH Orientation: anterior  -TH Location: foot  -TH Primary Wound Type: Abrasion  -TH    Retired Wound - Properties Group Placement Date: 06/30/23 -TH Placement Time: 2323 -TH Side: Bilateral  -TH Orientation: anterior  -TH Location: foot  -TH  Primary Wound Type: Abrasion  -TH    Retired Wound - Properties Group Date first assessed: 06/30/23  -TH Time first assessed: 2323  -TH Side: Bilateral  -TH Location: foot  -TH Primary Wound Type: Abrasion  -TH      Row Name             Wound 07/03/23 1420 Bilateral posterior plantar Traumatic    Wound - Properties Group Placement Date: 07/03/23  -FL Placement Time: 1420  -FL Side: Bilateral  -FL Orientation: posterior  -FL Location: plantar  -FL Primary Wound Type: Traumatic  -FL    Retired Wound - Properties Group Placement Date: 07/03/23  -FL Placement Time: 1420  -FL Side: Bilateral  -FL Orientation: posterior  -FL Location: plantar  -FL Primary Wound Type: Traumatic  -FL    Retired Wound - Properties Group Date first assessed: 07/03/23  -FL Time first assessed: 1420  -FL Side: Bilateral  -FL Location: plantar  -FL Primary Wound Type: Traumatic  -FL      Row Name             Wound 07/10/23 1556 Bilateral coccyx Pressure Injury    Wound - Properties Group Placement Date: 07/10/23  -MJ Placement Time: 1556  -MJ Present on Hospital Admission: N  -MJ Side: Bilateral  -MJ Location: coccyx  -MJ Primary Wound Type: Pressure inj  -MJ    Retired Wound - Properties Group Placement Date: 07/10/23  -MJ Placement Time: 1556  -MJ Present on Hospital Admission: N  -MJ Side: Bilateral  -MJ Location: coccyx  -MJ Primary Wound Type: Pressure inj  -MJ    Retired Wound - Properties Group Date first assessed: 07/10/23  -MJ Time first assessed: 1556  -MJ Present on Hospital Admission: N  -MJ Side: Bilateral  -MJ Location: coccyx  -MJ Primary Wound Type: Pressure inj  -MJ      Row Name 07/14/23 1339          Plan of Care Review    Plan of Care Reviewed With patient  -AV     Progress no change  -AV     Outcome Evaluation Patient presents with deficits in balance, endurance, transfers, and ambulation. Patient will benefit from skilled PT services to address these mobility deficits and decrease risk of falls.  -AV       Row Name 07/14/23  1338          Positioning and Restraints    Pre-Treatment Position sitting in chair/recliner  -AV     Post Treatment Position chair  -AV     In Chair reclined;call light within reach;encouraged to call for assist;exit alarm on  -AV       Row Name 07/14/23 2415          Therapy Assessment/Plan (PT)    Rehab Potential (PT) good, to achieve stated therapy goals  -AV     Criteria for Skilled Interventions Met (PT) yes;meets criteria  -AV     Therapy Frequency (PT) daily  -AV     Predicted Duration of Therapy Intervention (PT) 10 days  -AV     Problem List (PT) problems related to;balance;cognition;mobility;strength  -AV     Activity Limitations Related to Problem List (PT) unable to transfer safely;unable to ambulate safely  -AV       Row Name 07/14/23 6016          PT Evaluation Complexity    History, PT Evaluation Complexity 1-2 personal factors and/or comorbidities  -AV     Examination of Body Systems (PT Eval Complexity) total of 4 or more elements  -AV     Clinical Presentation (PT Evaluation Complexity) stable  -AV     Clinical Decision Making (PT Evaluation Complexity) low complexity  -AV     Overall Complexity (PT Evaluation Complexity) low complexity  -AV       Row Name 07/14/23 5173          Therapy Plan Review/Discharge Plan (PT)    Therapy Plan Review (PT) evaluation/treatment results reviewed;patient  -AV       Row Name 07/14/23 2259          Physical Therapy Goals    Bed Mobility Goal Selection (PT) bed mobility, PT goal 1  -AV     Transfer Goal Selection (PT) transfer, PT goal 1  -AV     Gait Training Goal Selection (PT) gait training, PT goal 1  -AV       Row Name 07/14/23 6233          Bed Mobility Goal 1 (PT)    Activity/Assistive Device (Bed Mobility Goal 1, PT) sit to supine/supine to sit  -AV     [place] Level/Cues Needed (Bed Mobility Goal 1, PT) standby assist  -AV     Time Frame (Bed Mobility Goal 1, PT) 10 days  -AV       Row Name 07/14/23 2569          Transfer Goal 1 (PT)     Activity/Assistive Device (Transfer Goal 1, PT) sit-to-stand/stand-to-sit;bed-to-chair/chair-to-bed;walker, rolling  -AV     [place] Level/Cues Needed (Transfer Goal 1, PT) standby assist  -AV     Time Frame (Transfer Goal 1, PT) 10 days  -AV       Row Name 07/14/23 1339          Gait Training Goal 1 (PT)    Activity/Assistive Device (Gait Training Goal 1, PT) gait (walking locomotion);assistive device use;walker, rolling  -AV     [place] Level (Gait Training Goal 1, PT) standby assist  -AV     Distance (Gait Training Goal 1, PT) 150  -AV     Time Frame (Gait Training Goal 1, PT) 10 days  -AV               User Key  (r) = Recorded By, (t) = Taken By, (c) = Cosigned By      Initials Name Provider Type    [place] Mojgan Gunn, RN Registered Nurse    Yumiko Rosario RN Registered Nurse    Cesario Baca, PT Physical Therapist    Ambar Martinez RN Registered Nurse                    Physical Therapy Education       Title: PT OT SLP Therapies (In Progress)       Topic: Physical Therapy (In Progress)       Point: Mobility training (In Progress)       Learning Progress Summary             Patient Acceptance, E,TB, NR by AV at 7/14/2023 1352                         Point: Home exercise program (Not Started)       Learner Progress:  Not documented in this visit.              Point: Body mechanics (In Progress)       Learning Progress Summary             Patient Acceptance, E,TB, NR by AV at 7/14/2023 1352                         Point: Precautions (In Progress)       Learning Progress Summary             Patient Acceptance, E,TB, NR by AV at 7/14/2023 1352                                         User Key       Initials Effective Dates Name Provider Type Discipline     06/11/21 -  Cesario Clements, PT Physical Therapist PT                  PT Recommendation and Plan  Anticipated Discharge Disposition (PT): inpatient rehabilitation facility  Planned Therapy Interventions (PT): balance training, bed  mobility training, gait training, neuromuscular re-education, strengthening, transfer training, home exercise program  Therapy Frequency (PT): daily  Plan of Care Reviewed With: patient  Progress: no change  Outcome Evaluation: Patient presents with deficits in balance, endurance, transfers, and ambulation. Patient will benefit from skilled PT services to address these mobility deficits and decrease risk of falls.   Outcome Measures       Row Name 07/14/23 1351             How much help from another person do you currently need...    Turning from your back to your side while in flat bed without using bedrails? 3  -AV      Moving from lying on back to sitting on the side of a flat bed without bedrails? 3  -AV      Moving to and from a bed to a chair (including a wheelchair)? 3  -AV      Standing up from a chair using your arms (e.g., wheelchair, bedside chair)? 3  -AV      Climbing 3-5 steps with a railing? 2  -AV      To walk in hospital room? 3  -AV      AM-PAC 6 Clicks Score (PT) 17  -AV         Functional Assessment    Outcome Measure Options AM-PAC 6 Clicks Basic Mobility (PT)  -AV                User Key  (r) = Recorded By, (t) = Taken By, (c) = Cosigned By      Initials Name Provider Type    AV Cesario Clements, PT Physical Therapist                     Time Calculation:    PT Charges       Row Name 07/14/23 1351             Time Calculation    PT Received On 07/14/23  -AV      PT Goal Re-Cert Due Date 07/23/23  -AV         Untimed Charges    PT Eval/Re-eval Minutes 40  -AV         Total Minutes    Untimed Charges Total Minutes 40  -AV       Total Minutes 40  -AV                User Key  (r) = Recorded By, (t) = Taken By, (c) = Cosigned By      Initials Name Provider Type    AV Cesario Clements, PT Physical Therapist                  Therapy Charges for Today       Code Description Service Date Service Provider Modifiers Qty    04001324887 HC PT EVAL LOW COMPLEXITY 3 7/14/2023 Cesario Clements, PT GP 1             PT G-Codes  Outcome Measure Options: AM-PAC 6 Clicks Basic Mobility (PT)  AM-PAC 6 Clicks Score (PT): 17  AM-PAC 6 Clicks Score (OT): 9    Cesario Clements, PT  7/14/2023

## 2023-07-14 NOTE — PROGRESS NOTES
Pulmonary / Critical Care Progress Note      Patient Name: Carlos Murphy Jr.  : 1960  MRN: [id number]  Attending:  Clif Oconnor DO   Date of admission: 2023    Subjective   Subjective   Follow-up for hypernatremia, altered mental status, bipolar/schizophrenia    More awake and alert and resting well  Does have bouts of intermittent confusion  Denies complaints  On room air  Ehrlichiosis panel pending    Objective   Objective     Vitals:   Vitals:    23 1604 23 1949 23 2315 23 0352   BP: 165/84 161/63 116/48 129/68   BP Location: Left arm Left arm Left arm Left arm   Patient Position: Lying Lying Lying Lying   Pulse: 64 65 69 66   Resp: 20 20 20 18   Temp: 97.9 °F (36.6 °C) 98.5 °F (36.9 °C) 98.2 °F (36.8 °C) 97.9 °F (36.6 °C)   TempSrc: Oral Oral Oral Oral   SpO2: 99% 95% 94% 96%   Weight:       Height:             Physical Exam   Vital Signs Reviewed   Patient awake, no acute distress, on room air  HEENT:  PERRL, EOMI. core track in place  Chest:  good aeration, clear to auscultation bilaterally, tympanic to percussion bilaterally, no work of breathing noted  CV: RRR, no MGR, pulses 2+, equal.  Abd:  Soft, NT, ND, + BS, no HSM  EXT:  no clubbing, no cyanosis, no edema  Neuro:  A&Ox2, patient awake, does follow some simple commands, moves all 4 extremities, horizontal nystagmus  Skin: No rashes or lesions noted      Result Review    Result Review:  I have personally reviewed the results from the time of this admission to 23   and agree with these findings:  [x]  Laboratory  [x]  Microbiology  [x]  Radiology  []  EKG/Telemetry   [x]  Cardiology/Vascular   []  Pathology  []  Old records  []  Other:  Most notable findings include:       Lab 23  0805 23  0516 23  0515 23  0500 07/10/23  0247 23  1151 23  0324 23  0256 23  1302   WBC 22.14*  --  17.10* 14.02* 14.27*  --  13.62* 18.68*  --    HEMOGLOBIN 12.2*  --  11.8* 11.5*  11.3*  --  13.3 11.2*  --    HEMATOCRIT 37.2*  --  36.7* 36.6* 34.6*  --  45.4 34.3*  --    PLATELETS 342  --  311 255 218  --  171 220  --    SODIUM  --  137 143 146* 144 140  --  147* 155*   POTASSIUM  --  4.7 4.2 4.3 4.0 4.3  --  3.4* 3.1*   CHLORIDE  --  109* 111* 114* 111* 105  --  112* 116*   CO2  --  16.3* 22.4 20.4* 21.8* 21.3*  --  24.7 26.8   BUN  --  42* 39* 33* 33* 36*  --  50* 63*   CREATININE  --  1.99* 2.11* 1.90* 1.93* 1.85*  --  2.18* 2.56*   GLUCOSE  --  266* 294* 216* 204* 299*  --  157* 168*   CALCIUM  --  8.9 9.0 9.7 9.0 9.3  --  8.7 9.0   PHOSPHORUS  --  4.4 3.5 3.3 2.5  --   --  2.3*  --    ALBUMIN  --   --   --   --   --   --   --  3.1*  --            Assessment & Plan   Assessment / Plan     Impression:  Altered mental status  Metabolic encephalopathy  Seizure disorder  History of bipolar and schizo affective disorder  Acute kidney injury, on CKD stage III  Type 2 diabetes with hyperglycemia  Iron deficiency anemia  Rhabdomyolysis  Hypokalemia  Hypophosphatemia  Hypertension  Hypernatremia, clinically significant     Plan:  On room air  Diet per speech therapy.  Appreciate assistance  Trend renal panel and electrolytes  Continue doxycycline.  Can discontinue if ehrlichiosis profile comes back negative  Continue Lexapro, Seroquel, and Lamictal  Continue thiamine, and multivitamin and folic acid  Rest per primary    DVT prophylaxis:  Medical and mechanical DVT prophylaxis orders are present.    CODE STATUS:   Level Of Support Discussed With: Patient  Code Status (Patient has no pulse and is not breathing): CPR (Attempt to Resuscitate)  Medical Interventions (Patient has pulse or is breathing): Full Support      Labs, imaging, microbiology, notes and medications personally reviewed  Discussed with primary    I will sign off.  Please call with questions.    Electronically signed by Phillip Yanez MD, 07/14/23, 9:04 AM EDT.

## 2023-07-14 NOTE — NURSING NOTE
Staff heard a noise from out in the hallway, when entering the room the patient was found sitting on the floor with his feet out by the sink. When asking patient where he was going he said to the bathroom. Bed alert on in place and plugged in and the green light was illuminated, bed alert did not sound. Md, family and house notified

## 2023-07-14 NOTE — PLAN OF CARE
Goal Outcome Evaluation:  Plan of Care Reviewed With: patient        Progress: no change  Outcome Evaluation: Pt vss. Pt safety nelda roberts today. Pt is A/o x3. No c/o pain today. Will continue to monitor

## 2023-07-14 NOTE — DISCHARGE PLACEMENT REQUEST
"Kirti Lincoln Jr. (62 y.o. Male)       Date of Birth   1960    Social Security Number       Address   [address] [place] [address]    Home Phone   [phone]    MRN   [id number]       Restorationism   None    Marital Status                               Admission Date   6/30/23    Admission Type   Emergency    Admitting Provider   Clif Oconnor DO    Attending Provider   Clif Oconnor DO    Department, Room/Bed   [address], 3009/1       Discharge Date       Discharge Disposition       Discharge Destination                                 Attending Provider: Clif Oconnor DO    Allergies: No Known Allergies    Isolation: None   Infection: None   Code Status: CPR    Ht: 182.9 cm (72\")   Wt: 134 kg (295 lb 6.7 oz)    Admission Cmt: None   Principal Problem: Acute renal failure, unspecified acute renal failure type [N17.9]                   Active Insurance as of 6/30/2023       Primary Coverage       Payor Plan Insurance Group Employer/Plan Group    ANTHEM MEDICARE REPLACEMENT ANTHEM MEDICARE ADVANTAGE [id number]       Payor Plan Address Payor Plan Phone Number Payor Plan Fax Number Effective Dates    [address] [phone]  1/1/2022 - None Entered    [facility] [address]         Subscriber Name Subscriber Birth Date Member ID       KIRTI LINCOLN JR. 1960 [id number]                     Emergency Contacts        (Rel.) Home Phone Work Phone Mobile Phone    Mihaela Garnett (Daughter) -- -- [phone]    JOSE FRANCISCO PIERRE (Daughter) -- -- [phone]              Insurance Information                  ANTHEM MEDICARE REPLACEMENT/ANTHEM MEDICARE ADVANTAGE Phone: [phone]    Subscriber: Kirti Lincoln Jr. Subscriber#: [id number]    Group#: [id number] Precert#: --             History & Physical        Hazel Stephens MD at 06/30/23 2034           [facility]  [facility]: 6/30/2023   Patient Name: Kirti" NISHANT Murphy Jr.  : 1960  MRN: [id number]  Primary Care Physician:  Felix Atkinson MD  Date of admission: 2023    Subjective    Subjective     Chief Complaint: Altered mental status, confusion    HPI:    Carlos Murphy Jr. is a 62 y.o. male with a past medical history of COPD, seizure disorder, bipolar, hypertension, hyperlipidemia, type 2 diabetes mellitus presented to the ED for evaluation of altered mental status.  As per the patient's sister, she received a call from patient around 1530 hrs, and complaint of difficulty walking and has slurred speech, so she informed family member to call the EMS.  After coming to the ED, patient found to be very somnolent with pinpoint pupils, received Narcan with significant improvement in his mental status and he became very awake after receiving it.  As per the sister patient was normal yesterday night when she met him.  Patient is a poor historian but said that he took more medications than usual, denies any intentions to harm himself.  Was not sure if she had any episode of seizure.  Did not answer when asked whether he is taking all his medications regularly at home or not.  Stays by himself.  Denies any fevers, chills, nausea, vomiting.  Unable to to do the complete review of systems as the patient is still not oriented completely and is a poor historian.    Upon arrival to the ED, vital signs temperature 98.6, pulse 64, respiratory 20, blood pressure 82/38 on room air saturating around 95%.  Labs showed CK4 203, troponin 60, proBNP 1532, sodium 128, potassium 6.6, chloride 96, bicarb 11.9, anion gap 20.1, creatinine 4.97, 1-month ago creatinine was 1.91, magnesium 3.2, , AST 76, rest of the CMP within normal limits, WBC elevated 12.79, hemoglobin 10.8, rest of the CBC within normal limits, chest x-ray showed hazy lung densities that could be related to hyperinflation or pneumonitis or edema.  CT head without contrast stroke protocol showed no acute  intracranial abnormality.  CT cerebral perfusion with and without contrast showed artifactual perfusion abnormalities in the brain without definite core infarct or to brain at risk, CTA neck showed no significant vascular abnormality in the head and neck.  EKG showed no changes concerning for hyperkalemia.  Received 100 cc bolus in the ED, receiving treatment for hyperkalemia with dextrose, calcium gluconate, insulin regular IV.  Patient has been evaluated by tele neurologist concerning for altered mental status and stroke, recommended MRI head without contrast, TTE for further work-up for stroke and also recommended that this encephalopathy could be likely multifactorial.  Case has been discussed by the ED physician with nephrologist on-call Dr. Lee, recommended starting on bicarb drip.    Patient has been admitted for further evaluation and management of acute metabolic encephalopathy, rhabdomyolysis, concern for seizure, MK on CKD, severe hyperkalemia, moderate hyponatremia, anion gap metabolic acidosis      Personal History     Past Medical History:   Diagnosis Date    Acute kidney injury     2021 POST SEIZURES    Astigmatism of both eyes     eyes twitch left and right    Bipolar disorder     Charcot ankle     Closed nondisplaced fracture of medial malleolus of left tibia     COPD (chronic obstructive pulmonary disease)     USES INHALERS    Diabetes     BG RUNS AROUND 90'S IN AM    Hx of schizophrenia     Hyperlipidemia     Hypertension     SEEN DR ROD IN THE PAST, HAD APPT WITH DR MICHAUD IN 5-2022 CX APPT, DENIED CP/SOB    Neuropathy     Seizures     LAST ONE 4/21/22    Sleep apnea     DOES NOT USE CPAP    Varicose vein of leg          Past Surgical History:   Procedure Laterality Date    ANKLE OPEN REDUCTION INTERNAL FIXATION Left 04/25/2022    Procedure: LEFT OPEN REDUCTION INTERNAL FIXATION DISTAL TIBIA FRACTURE ;  Surgeon: Tha Parry MD;  [place] [facility] [place] [facility]  Service: Orthopedics;   Laterality: Left;    ANKLE OPEN REDUCTION INTERNAL FIXATION Left 2022    Procedure: LEFT ANKLE OPEN REDUCTION INTERNAL FIXATION WITH SYNDESMOSIS FIXATION;  Surgeon: Tha Parry MD;  Location: [facility] MA[facility];  Service: Orthopedics;  Laterality: Left;    CHOLECYSTECTOMY      COLONOSCOPY      JOINT REPLACEMENT      RTKR    LUMBAR DISC SURGERY      SHOULDER SURGERY Right          Family History   Problem Relation Age of Onset    Kidney disease Mother     COPD Mother     COPD Father     Heart disease Father     Malig Hyperthermia Neg Hx          Social History     Socioeconomic History    Marital status:    Tobacco Use    Smoking status: Former     Packs/day: 0.50     Years: 35.00     Pack years: 17.50     Types: Cigarettes     Quit date:      Years since quittin.5    Smokeless tobacco: Never   Vaping Use    Vaping Use: Never used   Substance and Sexual Activity    Alcohol use: Yes     Comment: OCCASIONAL    Drug use: Never    Sexual activity: Defer         Home Medications:  Finerenone, Plecanatide, QUEtiapine, Semaglutide(0.25 or 0.5MG/DOS), albuterol sulfate HFA, amLODIPine, aspirin, atenolol, atorvastatin, cyclobenzaprine, empagliflozin, escitalopram, furosemide, glipizide, hydrOXYzine, insulin aspart prot & aspart, lamoTRIgine, lisinopril, lubiprostone, multivitamin with minerals, oxyCODONE-acetaminophen, pantoprazole, pregabalin, tiotropium bromide-olodaterol, and zolpidem    Allergies:  No Known Allergies    Review of Systems   Unable to obtain complete review of systems    Objective    Objective     Vitals:   Temp:  [98.6 °F (37 °C)] 98.6 °F (37 °C)  Heart Rate:  [63-65] 63  Resp:  [20] 20  BP: (80-82)/(38-46) 82/38    Physical Exam    Constitutional: Awake, oriented x1 to self   Eyes: Pupils equal, sclerae anicteric, no conjunctival injection   HENT: NCAT, mucous membranes moist   Respiratory: Clear to auscultation bilaterally, nonlabored respirations    Cardiovascular: RRR, no  murmurs, rubs, or gallops   Gastrointestinal: Positive bowel sounds, soft, nontender, nondistended   Musculoskeletal: Trace bilateral lower extremity edema, no clubbing or cyanosis to extremities   Psychiatric: Appropriate affect, cooperative   Neurologic: Oriented x 3, strength symmetric in all extremities, Cranial Nerves grossly intact to confrontation, speech clear   Skin: Noted to have small skin ulcers on his toes, skin on the feet had tried blood but does not have any wounds    Result Review    Result Review:  I have personally reviewed the results from the time of this admission to 6/30/2023 23:50 EDT and agree with these findings:  [x]  Laboratory  [x]  Microbiology  [x]  Radiology  [x]  EKG/Telemetry   []  Cardiology/Vascular   []  Pathology  []  Old records   []  Other:        Imaging Results (Last 24 Hours)       Procedure Component Value Units Date/Time    CT Angiogram Head w AI Analysis of LVO [[id number]] Collected: 06/30/23 2022     Updated: 06/30/23 2025    Narrative:      PROCEDURE: CT ANGIOGRAM NECK, 6/30/2023, 18:58  CT ANGIOGRAM HEAD W AI ANALYSIS OF LVO, 6/30/2023, 18:58     COMPARISON: None     INDICATIONS: Neuro deficit, acute, stroke suspected     PROTOCOL:   Standard imaging protocol performed      RADIATION:   DLP: 968.5 mGy*cm    Automated exposure control was utilized to minimize radiation dose.   CONTRAST: 100 cc Isovue 370 I.V.     TECHNIQUE: After obtaining the patient's consent, CT images of the neck were obtained without and   with non-ionic intravenous contrast material. Multi-planar reformatted/3-D images were created to   optimize visualization of vascular anatomy. Unless otherwise stated in this report, all vascular   stenoses involving the internal carotid arteries reported for this examination are derived by   dividing the lesion diameter by the diameter of the normal internal carotid artery more distally.     FINDINGS:   The aortic arch is unremarkable.  There is 2 vessel arch  anatomy.  The right brachiocephalic and   bilateral subclavian arteries appear widely patent.  There is some motion artifact.  Bilateral   common carotid arteries, external carotid arteries, carotid bifurcations and cervical internal   carotid arteries appear within normal limits.  Intracranial ICA segments appear normal.  There is   significant venous contamination intracranially.  The A1 and M1 segments and distal SULTANA and MCA   branches appear patent.  There is a patent anterior communicating artery.  There are patent   bilateral posterior communicating arteries.  Vertebral arteries appear normal.  Vertebral arteries   are codominant.  The basilar artery, superior cerebellar and posterior cerebral arteries appear   patent.     There are no acute intracranial findings.  Orbits appear unremarkable.  There is mild multifocal   paranasal sinus mucosal disease.  Mastoid air cells appear well-aerated.  There is no evidence of a   neck mass or adenopathy.  The lungs are hypoinflated and there are scattered areas of AC lung   density.  Superior mediastinal structures are unremarkable.  The thyroid is largely obscured by   motion.  Salivary glands appear symmetric.  No focal soft tissue inflammation.  There are cervical   degenerative changes.  No acute osseous abnormality.       Impression:         1. No significant vascular abnormality in the head or the neck.  2. Cervical degenerative changes.            MARIZA KIRAN MD         Electronically Signed and Approved By: MARIZA KIRAN MD on 6/30/2023 at 20:22                     CT Angiogram Neck [[id number]] Collected: 06/30/23 2022     Updated: 06/30/23 2025    Narrative:      PROCEDURE: CT ANGIOGRAM NECK, 6/30/2023, 18:58  CT ANGIOGRAM HEAD W AI ANALYSIS OF LVO, 6/30/2023, 18:58     COMPARISON: None     INDICATIONS: Neuro deficit, acute, stroke suspected     PROTOCOL:   Standard imaging protocol performed      RADIATION:   DLP: 968.5 mGy*cm    Automated exposure control  was utilized to minimize radiation dose.   CONTRAST: 100 cc Isovue 370 I.V.     TECHNIQUE: After obtaining the patient's consent, CT images of the neck were obtained without and   with non-ionic intravenous contrast material. Multi-planar reformatted/3-D images were created to   optimize visualization of vascular anatomy. Unless otherwise stated in this report, all vascular   stenoses involving the internal carotid arteries reported for this examination are derived by   dividing the lesion diameter by the diameter of the normal internal carotid artery more distally.     FINDINGS:   The aortic arch is unremarkable.  There is 2 vessel arch anatomy.  The right brachiocephalic and   bilateral subclavian arteries appear widely patent.  There is some motion artifact.  Bilateral   common carotid arteries, external carotid arteries, carotid bifurcations and cervical internal   carotid arteries appear within normal limits.  Intracranial ICA segments appear normal.  There is   significant venous contamination intracranially.  The A1 and M1 segments and distal SULTANA and MCA   branches appear patent.  There is a patent anterior communicating artery.  There are patent   bilateral posterior communicating arteries.  Vertebral arteries appear normal.  Vertebral arteries   are codominant.  The basilar artery, superior cerebellar and posterior cerebral arteries appear   patent.     There are no acute intracranial findings.  Orbits appear unremarkable.  There is mild multifocal   paranasal sinus mucosal disease.  Mastoid air cells appear well-aerated.  There is no evidence of a   neck mass or adenopathy.  The lungs are hypoinflated and there are scattered areas of AC lung   density.  Superior mediastinal structures are unremarkable.  The thyroid is largely obscured by   motion.  Salivary glands appear symmetric.  No focal soft tissue inflammation.  There are cervical   degenerative changes.  No acute osseous abnormality.        Impression:         1. No significant vascular abnormality in the head or the neck.  2. Cervical degenerative changes.            MARIZA KIRAN MD         Electronically Signed and Approved By: MARIZA KIRAN MD on 6/30/2023 at 20:22                     XR Chest 1 View [[id number]] Collected: 06/30/23 2018     Updated: 06/30/23 2022    Narrative:      PROCEDURE: XR CHEST 1 VW     COMPARISON: [facility], CT, CT ANGIOGRAM NECK, 6/30/2023, 18:58.  [facility], CR, XR CHEST 1 VW, 4/30/2023, 14:04.     INDICATIONS: Acute Stroke Protocol (Onset < 12 hrs)     FINDINGS:   There are new hazy lung densities.  No focal consolidation.  No pneumothorax or pleural effusion.    Lung volumes are low.  Heart size is normal.  Pulmonary vasculature is partially obscured.       Impression:       Hazy lung densities that could be related to hypoinflation or pneumonitis or edema.                  MARIZA KIRAN MD         Electronically Signed and Approved By: MARIZA KIRAN MD on 6/30/2023 at 20:18                     CT CEREBRAL PERFUSION WITH & WITHOUT CONTRAST [[id number]] Collected: 06/30/23 1930     Updated: 06/30/23 1933    Narrative:      PROCEDURE: CT CEREBRAL PERFUSION W WO CONTRAST     COMPARISON: CT, CT ANGIOGRAM HEAD W AI ANALYSIS OF LVO, 6/30/2023, 18:58.     INDICATIONS: Neuro deficit, acute, stroke suspected     PROTOCOL:   Standard imaging protocol performed      RADIATION:   DLP: 1949 mGy*cm    Automated exposure control was utilized to minimize radiation dose.   CONTRAST: 80 cc Isovue 370 I.V.        FINDINGS: Cerebral blood flow less than 30% is 0 mL and T-max greater than 6 seconds is 157 mL with   a mismatch volume of 157 mm.  This appears to be artifactual.  There is also significant artifact   on the inferior set of cerebral blood volume imaging.  There is no definite core infarct or   definite brain at risk.       Impression:       Artifactual perfusion abnormalities in the brain  without definite core infarct or true   brain at risk.               MARIZA KIRAN MD         Electronically Signed and Approved By: MARIZA KIRAN MD on 6/30/2023 at 19:29                     CT Head Without Contrast Stroke Protocol [[id number]] Collected: 06/30/23 1915     Updated: 06/30/23 1918    Narrative:      PROCEDURE: CT HEAD WO CONTRAST STROKE PROTOCOL     COMPARISON:  [facility], CT, CT HEAD WO CONTRAST, 4/30/2023, 15:12.  INDICATIONS: Neuro deficit, acute, stroke suspected     PROTOCOL:   Standard imaging protocol performed      RADIATION:   DLP: 1145.2 mGy*cm    MA and/or KV was adjusted to minimize radiation dose.          TECHNIQUE: After obtaining the patient's consent, CT images were obtained without non-ionic   intravenous contrast material.      FINDINGS:   Superficial soft tissues appear unremarkable.  The calvarium is intact.  There is a small amount of   mucus in the right maxillary sinus.  The ventricles appear normal in size and configuration for   patient's age. There is no evidence of herniation, hydrocephalus or mass effect. Gray-white   differentiation appears intact. There are no focal areas of hypoattenuation within the brain   parenchyma. There is no evidence of acute intracranial hemorrhage. The orbits appear unremarkable.          Impression:       No acute intracranial abnormality.            MARIZA KIRAN MD         Electronically Signed and Approved By: MARIZA KIRAN MD on 6/30/2023 at 19:14                              [START ON 7/1/2023] atenolol, 25 mg, Oral, Daily  [START ON 7/1/2023] escitalopram, 20 mg, Oral, Daily  [START ON 7/1/2023] heparin (porcine), 5,000 Units, Subcutaneous, Q8H  [START ON 7/1/2023] insulin lispro, 2-7 Units, Subcutaneous, 4x Daily AC & at Bedtime  [START ON 7/1/2023] lamoTRIgine, 200 mg, Oral, BID  [START ON 7/1/2023] pantoprazole, 40 mg, Oral, BID  [START ON 7/1/2023] senna-docusate sodium, 2 tablet, Oral, BID  [START ON 7/1/2023]  sodium chloride, 10 mL, Intravenous, Q12H         Assessment & Plan   Assessment / Plan     Assessment/Plan:   Acute metabolic encephalopathy  MK on CKD  Severe hyperkalemia-improving  Possible seizure episode  Rhabdomyolysis  Moderate hyponatremia-clinically significant  Anion gap metabolic acidosis  Concern for ischemic stroke  History of COPD  Type 2 diabetes mellitus  Seizure disorder  Bipolar disorder  Hyperlipidemia    Plan  Admit to inpatient, telemetry  Receiving treatment for hyperkalemia with calcium gluconate, insulin regular, dextrose  Ordered 1 amp of bicarb, continue on bicarb drip  Monitor urine output  Monitor electrolytes, kidney function with a.m. labs  MRI of the brain without contrast  TTE with bubble study  Neurochecks every 4 hours  Cerebell in the ED did not show any seizure burden  EEG  Neurology consult in the a.m.  Low-dose sliding scale insulin  Continue home Lamictal, citalopram, atenolol  Resume other home medications once reconciled and appropriate  Avoid nephrotoxic agents  Nephrology consult in the a.m. Dr. Lee is aware of the patient    DVT prophylaxis:  Medical DVT prophylaxis orders are present.    CODE STATUS:    Level Of Support Discussed With: Patient  Code Status (Patient has no pulse and is not breathing): CPR (Attempt to Resuscitate)  Medical Interventions (Patient has pulse or is breathing): Full Support      Admission Status:  I believe this patient meets inpatient status.    Part of this note may be an electronic transcription/translation of spoken language to printed text using the Dragon Dictation System    Hazel Stephens MD               Electronically signed by Hazel Stephens MD at 06/30/23 7876       Current Facility-Administered Medications   Medication Dose Route Frequency Provider Last Rate Last Admin    acetaminophen (TYLENOL) suppository 650 mg  650 mg Rectal Q6H PRN Ye Roach MD   650 mg at 07/05/23 1144    acetaminophen (TYLENOL) tablet 650 mg   650 mg Oral Q4H PRN Felix Trudy PA        amLODIPine (NORVASC) tablet 10 mg  10 mg Oral Q24H Ye Roach MD        atenolol (TENORMIN) tablet 25 mg  25 mg Oral Daily Hazel Stephens MD        sennosides-docusate (PERICOLACE) 8.6-50 MG per tablet 2 tablet  2 tablet Oral BID Hazel Stephens MD        And    polyethylene glycol (MIRALAX) packet 17 g  17 g Oral Daily PRN Hazel Stephens MD        And    bisacodyl (DULCOLAX) suppository 10 mg  10 mg Rectal Daily PRN Hazel Stephens MD        dextrose (D50W) (25 g/50 mL) IV injection 25 g  25 g Intravenous Q15 Min PRN Diana Hess APRN        dextrose (GLUTOSE) oral gel 15 g  15 g Oral Q15 Min PRN Diana Hess APRN        doxycycline (MONODOX) capsule 100 mg  100 mg Oral Q12H Ye Roach MD   100 mg at 07/13/23 2219    folic acid (FOLVITE) tablet 1 mg  1 mg Oral Daily Ye Roach MD        glucagon (GLUCAGEN) injection 1 mg  1 mg Intramuscular Q15 Min PRN Hazel Stephens MD        haloperidol lactate (HALDOL) injection 4 mg  4 mg Intravenous Q6H PRN Diana Hess APRN   4 mg at 07/12/23 2004    heparin (porcine) 5000 UNIT/ML injection 5,000 Units  5,000 Units Subcutaneous Q8H Diana Hess APRN   5,000 Units at 07/14/23 0534    insulin detemir (LEVEMIR) injection 15 Units  15 Units Subcutaneous Q12H Diana Hess APRN   15 Units at 07/13/23 2213    Insulin Lispro (humaLOG) injection 2-9 Units  2-9 Units Subcutaneous 4x Daily AC & at Bedtime Diana Hess APRN   4 Units at 07/13/23 2213    labetalol (NORMODYNE,TRANDATE) injection 10 mg  10 mg Intravenous Q4H PRN Ye Roach MD   10 mg at 07/10/23 0812    lamoTRIgine (LaMICtal) tablet 200 mg  200 mg Oral Q12H Ye Roach MD   200 mg at 07/13/23 2214    losartan (COZAAR) tablet 50 mg  50 mg Oral BID Diana Hess APRN   50 mg at 07/13/23 2215    multivitamin with minerals 1 tablet  1 tablet Oral Daily Clif Oconnor DO         mupirocin (BACTROBAN) 2 % ointment 1 application  1 application  Topical 2 times per day Clif Oconnor DO   1 application  at 07/13/23 2218    nitroglycerin (NITROSTAT) SL tablet 0.4 mg  0.4 mg Sublingual Q5 Min PRN Hazel Stephens MD        ondansetron (ZOFRAN) injection 4 mg  4 mg Intravenous Q6H PRN Clif Oconnor DO   4 mg at 07/03/23 1253    pantoprazole (PROTONIX) injection 40 mg  40 mg Intravenous BID Diana Hess APRN   40 mg at 07/13/23 2212    QUEtiapine (SEROquel) tablet 600 mg  600 mg Oral Nightly Wallace Murillo MD   600 mg at 07/13/23 2219    sodium chloride 0.9 % flush 10 mL  10 mL Intravenous PRN Hazel Stephens MD        sodium chloride 0.9 % flush 10 mL  10 mL Intravenous Q12H Hazel Stephens MD   10 mL at 07/13/23 2213    sodium chloride 0.9 % flush 10 mL  10 mL Intravenous Q12H Diana Hess APRN   10 mL at 07/13/23 2214    sodium chloride 0.9 % infusion 40 mL  40 mL Intravenous PRN Diana Hess APRN         Lab Results (most recent)       Procedure Component Value Units Date/Time    Ehrlichia Profile DNA PCR [[id number]] Collected: 07/05/23 2046    Specimen: Blood Updated: 07/14/23 0908     A. phagocytophilum PCR Negative     Comment: No Anaplasma phagocytophilum DNA detected.  A. phagocytophilum has been  characterized as the causative agent of Human Granulocytic  Ehrlichiosis (HGE).  This test was developed and its performance characteristics  determined by [facility]. It has not been cleared or approved  by the Food and Drug Administration.        Ehrlichia chaffeensis PCR Negative     Comment: No Ehrlichia chaffeensis DNA detected.  E. chaffeensis has been  characterized as the causative agent of Human Monocytic Ehrlichiosis  (HME).  This test was developed and its performance characteristics  determined by [facility]. It has not been cleared or approved  by the Food and Drug Administration.       Narrative:      Performed at:  [address]  [place]  [id number]  : Nia Thomas MD, Phone:  [phone]    Magnesium [[id number]]  (Abnormal) Collected: 07/14/23 0750    Specimen: Blood Updated: 07/14/23 0830     Magnesium 2.8 mg/dL     Basic Metabolic Panel [[id number]]  (Abnormal) Collected: 07/14/23 0750    Specimen: Blood Updated: 07/14/23 0830     Glucose 160 mg/dL      BUN 38 mg/dL      Creatinine 2.08 mg/dL      Sodium 141 mmol/L      Potassium 4.5 mmol/L      Chloride 109 mmol/L      CO2 23.1 mmol/L      Calcium 9.4 mg/dL      BUN/Creatinine Ratio 18.3     Anion Gap 8.9 mmol/L      eGFR 35.6 mL/min/1.73     Narrative:      GFR Normal >60  Chronic Kidney Disease <60  Kidney Failure <15      Phosphorus [[id number]]  (Abnormal) Collected: 07/14/23 0750    Specimen: Blood Updated: 07/14/23 0830     Phosphorus 5.2 mg/dL     CBC & Differential [[id number]]  (Abnormal) Collected: 07/14/23 0527    Specimen: Blood Updated: 07/14/23 0811    Narrative:      The following orders were created for panel order CBC & Differential.  Procedure                               Abnormality         Status                     ---------                               -----------         ------                     CBC Auto Differential[[id number]]        Abnormal            Final result               Scan Slide[[id number]]                                                                    Please view results for these tests on the individual orders.    CBC Auto Differential [[id number]]  (Abnormal) Collected: 07/14/23 0750    Specimen: Blood Updated: 07/14/23 0811     WBC 14.24 10*3/mm3      RBC 4.32 10*6/mm3      Hemoglobin 12.5 g/dL      Hematocrit 39.6 %      MCV 91.7 fL      MCH 28.9 pg      MCHC 31.6 g/dL      RDW 13.6 %      RDW-SD 44.3 fl      MPV 10.2 fL      Platelets 339 10*3/mm3      Neutrophil % 72.5 %      Lymphocyte % 12.5 %      Monocyte % 11.4 %      Eosinophil % 2.5 %      Basophil % 0.5 %      Immature Grans % 0.6 %      Neutrophils,  Absolute 10.32 10*3/mm3      Lymphocytes, Absolute 1.78 10*3/mm3      Monocytes, Absolute 1.62 10*3/mm3      Eosinophils, Absolute 0.36 10*3/mm3      Basophils, Absolute 0.07 10*3/mm3      Immature Grans, Absolute 0.09 10*3/mm3      nRBC 0.0 /100 WBC     POC Glucose Once [[id number]]  (Abnormal) Collected: 07/14/23 0729    Specimen: Blood Updated: 07/14/23 0731     Glucose 155 mg/dL      Comment: Serial Number: [id number]:  [id number]       POC Glucose Once [[id number]]  (Abnormal) Collected: 07/13/23 2121    Specimen: Blood Updated: 07/13/23 2123     Glucose 245 mg/dL      Comment: Serial Number: [id number]:  [id number]       AFB Culture - Cerebrospinal Fluid, Lumbar Puncture [[id number]] Collected: 07/06/23 1318    Specimen: Cerebrospinal Fluid from Lumbar Puncture Updated: 07/13/23 1345     AFB Culture No AFB isolated at 1 week     AFB Stain No acid fast bacilli seen on direct smear    Fungus Culture - Cerebrospinal Fluid, Lumbar Puncture [[id number]]  (Normal) Collected: 07/06/23 1318    Specimen: Cerebrospinal Fluid from Lumbar Puncture Updated: 07/13/23 1345     Fungus Culture No fungus isolated at 1 week    CBC & Differential [[id number]]  (Abnormal) Collected: 07/13/23 0631    Specimen: Blood Updated: 07/13/23 0819    Narrative:      The following orders were created for panel order CBC & Differential.  Procedure                               Abnormality         Status                     ---------                               -----------         ------                     CBC Auto Differential[[id number]]        Abnormal            Final result               Scan Slide[[id number]]                                                                    Please view results for these tests on the individual orders.    CBC Auto Differential [[id number]]  (Abnormal) Collected: 07/13/23 0805    Specimen: Blood Updated: 07/13/23 0819     WBC 22.14 10*3/mm3      RBC 4.15 10*6/mm3      Hemoglobin 12.2 g/dL       Hematocrit 37.2 %      MCV 89.6 fL      MCH 29.4 pg      MCHC 32.8 g/dL      RDW 13.7 %      RDW-SD 43.4 fl      MPV 10.5 fL      Platelets 342 10*3/mm3      Neutrophil % 79.8 %      Lymphocyte % 7.9 %      Monocyte % 8.8 %      Eosinophil % 2.5 %      Basophil % 0.4 %      Immature Grans % 0.6 %      Neutrophils, Absolute 17.67 10*3/mm3      Lymphocytes, Absolute 1.75 10*3/mm3      Monocytes, Absolute 1.95 10*3/mm3      Eosinophils, Absolute 0.56 10*3/mm3      Basophils, Absolute 0.08 10*3/mm3      Immature Grans, Absolute 0.13 10*3/mm3      nRBC 0.0 /100 WBC     Phosphorus [[id number]]  (Normal) Collected: 07/13/23 0516    Specimen: Blood Updated: 07/13/23 0610     Phosphorus 4.4 mg/dL     Magnesium [[id number]]  (Abnormal) Collected: 07/13/23 0516    Specimen: Blood Updated: 07/13/23 0609     Magnesium 3.2 mg/dL     Basic Metabolic Panel [[id number]]  (Abnormal) Collected: 07/13/23 0516    Specimen: Blood Updated: 07/13/23 0609     Glucose 266 mg/dL      BUN 42 mg/dL      Creatinine 1.99 mg/dL      Sodium 137 mmol/L      Potassium 4.7 mmol/L      Comment: Slight hemolysis detected by analyzer. Results may be affected.        Chloride 109 mmol/L      CO2 16.3 mmol/L      Calcium 8.9 mg/dL      BUN/Creatinine Ratio 21.1     Anion Gap 11.7 mmol/L      eGFR 37.5 mL/min/1.73     Narrative:      GFR Normal >60  Chronic Kidney Disease <60  Kidney Failure <15      Reflexed RMSF, IgG, IFA [[id number]]  (Abnormal) Collected: 07/06/23 1024    Specimen: Blood Updated: 07/12/23 1706     RMSF IgG 1:64     Comment:                              Negative           <1:64                               Positive            1:64                               Recent/Active      >1:64  Titers of 1:64 are suggestive of past or possible  current infection. Titers >1:64 are suggestive of  recent or active infection. Approximately 9% of  specimens positive by EIA screen are negative by IFA.       Narrative:      Performed at:  [address] [place] [address]  [address]  [id number]  : Nia Thomas MD, Phone:  [phone]    [facility] (IgG / M) [[id number]]  (Abnormal) Collected: 07/06/23 1024    Specimen: Blood Updated: 07/12/23 1706     RMSF IgG Positive     RMSF IgM 0.17 index      Comment:                                  Negative        <0.90                                   Equivocal 0.90 - 1.10                                   Positive        >1.10       Narrative:      Performed at:  [address]  [id number]  : Nia Thomas MD, Phone:  [phone]    Babeisa microtic+Ehrlichia/PCR [[id number]] Collected: 07/06/23 1024    Specimen: Blood Updated: 07/12/23 0707     Babesia microti PCR Negative     Comment: No Babesia DNA detected.  This test was developed and its performance characteristics determined  by [facility].  It has not been cleared or approved by the Food and Drug  Administration.  The FDA has determined that such clearance or  approval is not necessary.        A. phagocytophilum PCR Negative     Comment: No Anaplasma phagocytophilum DNA detected.  A. phagocytophilum has been  characterized as the causative agent of Human Granulocytic  Ehrlichiosis (HGE).  This test was developed and its performance characteristics  determined by [facility]. It has not been cleared or approved  by the Food and Drug Administration.        Ehrlichia chaffeensis PCR Negative     Comment: No Ehrlichia chaffeensis DNA detected.  E. chaffeensis has been  characterized as the causative agent of Human Monocytic Ehrlichiosis  (HME).  This test was developed and its performance characteristics  determined by [facility]. It has not been cleared or approved  by the Food and Drug Administration.       Narrative:      Performed at:  [address]  [id number]  : Nia Thomas MD, Phone:  [phone]    VDRL, CSF - Cerebrospinal  Fluid, Lumbar Puncture [[id number]] Collected: 07/06/23 1318    Specimen: Cerebrospinal Fluid from Lumbar Puncture Updated: 07/11/23 1608     VDRL, Quantitative, CSF Non Reactive    Narrative:      Performed at:  [address]  [id number]  : Nia Thomas MD, Phone:  [phone]    Anaerobic Culture - Cerebrospinal Fluid, Spine, Lumbar [[id number]]  (Normal) Collected: 07/06/23 1318    Specimen: Cerebrospinal Fluid from Spine, Lumbar Updated: 07/11/23 0639     Anaerobic Culture No anaerobes isolated at 5 days    Folate [[id number]]  (Normal) Collected: 07/10/23 1201    Specimen: Blood Updated: 07/10/23 1854     Folate >20.00 ng/mL     Narrative:      Results may be falsely increased if patient taking Biotin.      Vitamin B12 [[id number]]  (Abnormal) Collected: 07/10/23 1201    Specimen: Blood Updated: 07/10/23 1854     Vitamin B-12 1,638 pg/mL     Narrative:      Results may be falsely increased if patient taking Biotin.      Blood Culture - Blood, Hand, Left [[id number]]  (Normal) Collected: 07/05/23 1122    Specimen: Blood from Hand, Left Updated: 07/10/23 1445     Blood Culture No growth at 5 days    Rickettsia Species DNA, Real-Time PCR [[id number]] Collected: 07/05/23 2046    Specimen: Blood Updated: 07/10/23 1306     Rickettsia rickettsii DNA, RT Not Detected     Comment: REFERENCE RANGE: NOT DETECTED  This test was developed and its analytical performance  characteristics have been determined by [facility].  It has not been cleared or approved by FDA. This assay has  been validated pursuant to the CLIA regulations and is  used for clinical purposes.       Narrative:      Performed at:  01 - [facility]  [address] [place], [place]  [id number]  : Olga Lidia Frost MD, Phone:  [phone]    Vitamin B1, Whole Blood [[id number]] Collected: 07/10/23 1201    Specimen: Blood Updated: 07/10/23 1207    Blood  Culture - Blood, Hand, Right [[id number]]  (Normal) Collected: 07/05/23 1122    Specimen: Blood from Hand, Right Updated: 07/10/23 1200     Blood Culture No growth at 5 days    Non-gynecologic Cytology [[id number]] Collected: 07/06/23 1318    Specimen: Cerebrospinal Fluid from Lumbar Puncture Updated: 07/10/23 0915     Case Report --     Medical Cytology Report                           Case: [id number]                                  Authorizing Provider:  Benedict Goss MD         Collected:           07/06/2023 01:18 PM          Ordering Location:     [facility]      Received:            07/07/2023 09:29 AM                                 [facility]                                                           Pathologist:           Asuncion Sands MD                                                     Specimen:    Lumbar Puncture                                                                             Final Diagnosis --     Cerebrospinal fluid, lumbar puncture:   -Negative for malignant cells   -Chronic inflammatory cells       Clinical Information --     Altered mental status, fever.       Gross Description --     1. Lumbar Puncture.  Cerebrospinal Fluid       1.8 cc thin, faint tan fluid received (1 cytospin prep prepared).         Microscopic Description --     Microscopic examination performed.        Culture, CSF - Cerebrospinal Fluid, Lumbar Puncture [[id number]] Collected: 07/06/23 1318    Specimen: Cerebrospinal Fluid from Lumbar Puncture Updated: 07/09/23 1030     CSF Culture No growth at 3 days     Gram Stain No WBCs or organisms seen    Herpes Simplex Virus (HSV) Types 1/2, Cerebrospinal Fluid (CSF), DNA PCR - Cerebrospinal Fluid, Lumbar Puncture [[id number]] Collected: 07/06/23 1318    Specimen: Cerebrospinal Fluid from Lumbar Puncture Updated: 07/08/23 1409     HSV-1 DNA Negative     HSV-2 DNA Negative    Narrative:      Performed at:  [address] [place] [address],  "[place], [place]  [id number]  : Nia Thomas MD, Phone:  [phone]    Renal Function Panel [[id number]]  (Abnormal) Collected: 07/08/23 0256    Specimen: Blood Updated: 07/08/23 0407     Glucose 157 mg/dL      BUN 50 mg/dL      Creatinine 2.18 mg/dL      Sodium 147 mmol/L      Potassium 3.4 mmol/L      Chloride 112 mmol/L      CO2 24.7 mmol/L      Calcium 8.7 mg/dL      Albumin 3.1 g/dL      Phosphorus 2.3 mg/dL      Anion Gap 10.3 mmol/L      BUN/Creatinine Ratio 22.9     eGFR 33.6 mL/min/1.73     Narrative:      GFR Normal >60  Chronic Kidney Disease <60  Kidney Failure <15      Procalcitonin [[id number]]  (Normal) Collected: 07/08/23 0256    Specimen: Blood Updated: 07/08/23 0405     Procalcitonin 0.07 ng/mL     Narrative:      As a Marker for Sepsis (Non-Neonates):    1. <0.5 ng/mL represents a low risk of severe sepsis and/or septic shock.  2. >2 ng/mL represents a high risk of severe sepsis and/or septic shock.    As a Marker for Lower Respiratory Tract Infections that require antibiotic therapy:    PCT on Admission    Antibiotic Therapy       6-12 Hrs later    >0.5                Strongly Recommended  >0.25 - <0.5        Recommended  0.1 - 0.25          Discouraged              Remeasure/reassess PCT  <0.1                Strongly Discouraged     Remeasure/reassess PCT    As 28 day mortality risk marker: \"Change in Procalcitonin Result\" (>80% or <=80%) if Day 0 (or Day 1) and Day 4 values are available. Refer to http://[email or web address]-pct-calculator.com    Change in PCT <=80%  A decrease of PCT levels below or equal to 80% defines a positive change in PCT test result representing a higher risk for 28-day all-cause mortality of patients diagnosed with severe sepsis for septic shock.    Change in PCT >80%  A decrease of PCT levels of more than 80% defines a negative change in PCT result representing a lower risk for 28-day all-cause mortality of patients diagnosed with severe sepsis or septic " shock.    This test is Prognostic not Diagnostic, if elevated correlate with clinical findings before administering antibiotic treatment.        Oligoclonal Banding (COLLECT RED TUBE) [[id number]] Collected: 07/06/23 1315    Specimen: Cerebrospinal Fluid from Lumbar Puncture Updated: 07/07/23 1508    Narrative:      The following orders were created for panel order Oligoclonal Banding (COLLECT RED TUBE).  Procedure                               Abnormality         Status                     ---------                               -----------         ------                     Oligoclonal Banding - Ce...[[id number]]                      Final result               Red Top[[id number]]                                          Final result                 Please view results for these tests on the individual orders.    Oligoclonal Banding - Cerebrospinal Fluid, Lumbar Puncture [[id number]] Collected: 07/06/23 1318    Specimen: Cerebrospinal Fluid from Lumbar Puncture Updated: 07/07/23 1508     Oligoclonal Bands, CSF Comment     Comment: Zero (0) oligoclonal bands were observed in the CSF.  Interpretation:   Criteria for Positivity: Four (4) or more oligoclonal bands observed   only in the CSF have been shown to be most consistent with MS   using our method. [Will AS, Girish EL, Korey WILSON, and   Kimber JA: Cerebrospinal Fluid Oligoclonal Bands in the Diagnosis   of Multiple Sclerosis. Am J Clin Pathol 120(5):672-675, 2003].   Oligoclonal bands that are present only in the CSF have been   associated with a variety of inflammatory brain diseases such as   multiple sclerosis (MS), subacute encephalitis, neurosyphilis, etc.   Increased IgG in the CSF is not specific for MS, but is an indication   of chronic neural inflammation. Clinical correlation indicated.   Approximately 2-3% of clinically confirmed MS patients show little   or no evidence of oligoclonal bands in the CSF; however oligoclonal   bands may develop as  the disease progresses.   Oligoclonal Banding testing performed using Isoelectric Focusing   (IEF) and immunoblotting methodology.       Narrative:      Performed at:  [address]  [id number]  : Emmanuel Gotti PhD, Phone:  [phone]    Lyme Disease Total Antibody With Reflex to Immunoassay [[id number]] Collected: 07/05/23 2046    Specimen: Blood Updated: 07/07/23 1107     Lyme Total Antibody EIA Negative     Comment: Lyme antibodies not detected. Reflex testing is not indicated.  No laboratory evidence of infection with B. burgdorferi (Lyme disease).  Negative results may occur in patients recently infected (less than  or equal to 14 days) with B. burgdorferi.  If recent infection is  suspected, repeat testing on a new sample collected in 7 to 14 days is  recommended.       Narrative:      Performed at:  [address]  [id number]  : Emmanuel Gotti PhD, Phone:  [phone]    Vancomycin, Random [[id number]]  (Normal) Collected: 07/07/23 0308    Specimen: Blood Updated: 07/07/23 0424     Vancomycin Random 25.00 mcg/mL     Narrative:      Therapeutic Ranges for Vancomycin    Vancomycin Random   5.0-40.0 mcg/mL  Vancomycin Trough   5.0-20.0 mcg/mL  Vancomycin Peak     20.0-40.0 mcg/mL    Comprehensive Metabolic Panel [[id number]]  (Abnormal) Collected: 07/07/23 0308    Specimen: Blood Updated: 07/07/23 0408     Glucose 209 mg/dL      BUN 75 mg/dL      Creatinine 2.64 mg/dL      Sodium 158 mmol/L      Potassium 2.9 mmol/L      Chloride 117 mmol/L      CO2 24.6 mmol/L      Calcium 9.4 mg/dL      Total Protein 6.7 g/dL      Albumin 3.6 g/dL      ALT (SGPT) 12 U/L      AST (SGOT) 27 U/L      Alkaline Phosphatase 132 U/L      Total Bilirubin 0.3 mg/dL      Globulin 3.1 gm/dL      A/G Ratio 1.2 g/dL      BUN/Creatinine Ratio 28.4     Anion Gap 16.4 mmol/L      eGFR 26.7 mL/min/1.73     Narrative:      GFR Normal >60  Chronic  Kidney Disease <60  Kidney Failure <15      Meningitis / Encephalitis Panel, PCR - Cerebrospinal Fluid, Lumbar Puncture [[id number]]  (Normal) Collected: 07/06/23 1318    Specimen: Cerebrospinal Fluid from Lumbar Puncture Updated: 07/06/23 2348     ESCHERICHIA COLI K1, PCR Not Detected     HAEMOPHILUS INFLUENZAE, PCR Not Detected     LISTERIA MONOCYTOGENES, PCR Not Detected     NEISSERIA MENINGITIDIS, PCR Not Detected     STREPTOCOCCUS AGALACTIAE, PCR Not Detected     STREPTOCOCCUS PNEUMONIAE, PCR Not Detected     CYTOMEGALOVIRUS (CMV), PCR Not Detected     ENTEROVIRUS, PCR Not Detected     HERPES SIMPLEX VIRUS 1 (HSV-1), PCR Not Detected     HERPES SIMPLEX VIRUS 2 (HSV-2), PCR Not Detected     HUMAN PARECHOVIRUS, PCR Not Detected     VARICELLA ZOSTER VIRUS (VZV), PCR Not Detected     CRYPTOCOCCUS NEOFORMANS / GATTII, PCR Not Detected     HUMAN HERPES VIRUS 6 PCR Not Detected    Cell Count With Differential, CSF Use CSF Tube: 1 [[id number]]  (Abnormal) Collected: 07/06/23 1318    Specimen: Cerebrospinal Fluid from Lumbar Puncture Updated: 07/06/23 1653    Narrative:      The following orders were created for panel order Cell Count With Differential, CSF Use CSF Tube: 1.  Procedure                               Abnormality         Status                     ---------                               -----------         ------                     Cell Count, CSF - Cerebr...[[id number]]  Abnormal            Final result               Spinal fluid differentia...[[id number]]  Abnormal            Final result                 Please view results for these tests on the individual orders.    Spinal fluid differential - Cerebrospinal Fluid, Lumbar Puncture [[id number]]  (Abnormal) Collected: 07/06/23 1318    Specimen: Cerebrospinal Fluid from Lumbar Puncture Updated: 07/06/23 1653     Neutrophils, CSF 54 %      Lymphocytes, CSF 37 %      Monocytes, CSF 9 %     Cell Count With Differential, CSF Use CSF Tube: 4 [[id number]]  (Abnormal)  Collected: 07/06/23 1318    Specimen: Cerebrospinal Fluid from Lumbar Puncture Updated: 07/06/23 1624    Narrative:      The following orders were created for panel order Cell Count With Differential, CSF Use CSF Tube: 4.  Procedure                               Abnormality         Status                     ---------                               -----------         ------                     Cell Count, CSF - Cerebr...[[id number]]  Abnormal            Final result               Spinal fluid differentia...[[id number]]  Abnormal            Final result                 Please view results for these tests on the individual orders.    Spinal fluid differential - Cerebrospinal Fluid, Lumbar Puncture [[id number]]  (Abnormal) Collected: 07/06/23 1318    Specimen: Cerebrospinal Fluid from Lumbar Puncture Updated: 07/06/23 1624     Neutrophils, CSF 10 %      Lymphocytes, CSF 58 %      Monocytes, CSF 32 %     Cell Count, CSF - Cerebrospinal Fluid, Lumbar Puncture [[id number]]  (Abnormal) Collected: 07/06/23 1318    Specimen: Cerebrospinal Fluid from Lumbar Puncture Updated: 07/06/23 1529     Color, CSF Pink     Appearance, CSF Slightly Hazy     Nucleated Cells, CSF 10.00 /mm3      RBC, CSF 17,250.00 /mm3      Tube Number, CSF 1     Xanthochromia Absent    Protein, CSF - Cerebrospinal Fluid, Lumbar Puncture [[id number]]  (Abnormal) Collected: 07/06/23 1318    Specimen: Cerebrospinal Fluid from Lumbar Puncture Updated: 07/06/23 1519     Protein, Total (CSF) 58.1 mg/dL     Glucose, CSF - Cerebrospinal Fluid, Lumbar Puncture [[id number]]  (Abnormal) Collected: 07/06/23 1318    Specimen: Cerebrospinal Fluid from Lumbar Puncture Updated: 07/06/23 1519     Glucose,  mg/dL     Cell Count, CSF - Cerebrospinal Fluid, Lumbar Puncture [[id number]]  (Abnormal) Collected: 07/06/23 1318    Specimen: Cerebrospinal Fluid from Lumbar Puncture Updated: 07/06/23 1457     Color, CSF Pink     Appearance, CSF Clear     Nucleated Cells, CSF 10.00  /mm3      RBC, CSF 9,150.00 /mm3      Tube Number, CSF 4     Xanthochromia Absent    Cryptococcal AG, CSF - Cerebrospinal Fluid, Lumbar Puncture [[id number]]  (Normal) Collected: 07/06/23 1318    Specimen: Cerebrospinal Fluid from Lumbar Puncture Updated: 07/06/23 1414     Cryptococcal Antigen, CSF Negative    Red Top [[id number]] Collected: 07/06/23 1315    Specimen: Blood Updated: 07/06/23 1327     Extra Tube Hold for add-ons.     Comment: Auto resulted.       Ammonia [[id number]]  (Normal) Collected: 07/06/23 1015    Specimen: Blood Updated: 07/06/23 1045     Ammonia 20 umol/L     Vancomycin, Random [[id number]]  (Normal) Collected: 07/06/23 0516    Specimen: Blood Updated: 07/06/23 0615     Vancomycin Random 23.08 mcg/mL     Narrative:      Therapeutic Ranges for Vancomycin    Vancomycin Random   5.0-40.0 mcg/mL  Vancomycin Trough   5.0-20.0 mcg/mL  Vancomycin Peak     20.0-40.0 mcg/mL    Comprehensive Metabolic Panel [[id number]]  (Abnormal) Collected: 07/05/23 0440    Specimen: Blood Updated: 07/05/23 0510     Glucose 272 mg/dL      BUN 69 mg/dL      Creatinine 2.74 mg/dL      Sodium 144 mmol/L      Potassium 3.2 mmol/L      Chloride 97 mmol/L      CO2 22.1 mmol/L      Calcium 9.6 mg/dL      Total Protein 7.2 g/dL      Albumin 3.8 g/dL      ALT (SGPT) 25 U/L      AST (SGOT) 19 U/L      Alkaline Phosphatase 150 U/L      Total Bilirubin 0.4 mg/dL      Globulin 3.4 gm/dL      A/G Ratio 1.1 g/dL      BUN/Creatinine Ratio 25.2     Anion Gap 24.9 mmol/L      eGFR 25.5 mL/min/1.73     Narrative:      GFR Normal >60  Chronic Kidney Disease <60  Kidney Failure <15      CK [[id number]]  (Normal) Collected: 07/05/23 0440    Specimen: Blood Updated: 07/05/23 0510     Creatine Kinase 161 U/L     Renal Function Panel [[id number]]  (Abnormal) Collected: 07/04/23 0401    Specimen: Blood Updated: 07/04/23 0511     Glucose 197 mg/dL      BUN 65 mg/dL      Creatinine 2.75 mg/dL      Sodium 140 mmol/L      Potassium 3.5 mmol/L       Chloride 94 mmol/L      CO2 28.0 mmol/L      Calcium 9.6 mg/dL      Albumin 3.9 g/dL      Phosphorus 4.3 mg/dL      Anion Gap 18.0 mmol/L      BUN/Creatinine Ratio 23.6     eGFR 25.4 mL/min/1.73     Narrative:      GFR Normal >60  Chronic Kidney Disease <60  Kidney Failure <15      CBC (No Diff) [[id number]]  (Abnormal) Collected: 07/04/23 0401    Specimen: Blood Updated: 07/04/23 0448     WBC 12.58 10*3/mm3      RBC 3.91 10*6/mm3      Hemoglobin 11.3 g/dL      Hematocrit 33.5 %      MCV 85.7 fL      MCH 28.9 pg      MCHC 33.7 g/dL      RDW 12.7 %      RDW-SD 39.7 fl      MPV 10.3 fL      Platelets 273 10*3/mm3     High Sensitivity Troponin T 2Hr [[id number]]  (Abnormal) Collected: 07/03/23 0613    Specimen: Blood Updated: 07/03/23 0655     HS Troponin T 52 ng/L      Troponin T Delta -6 ng/L     Narrative:      High Sensitive Troponin T Reference Range:  <10.0 ng/L- Negative Female for AMI  <15.0 ng/L- Negative Male for AMI  >=10 - Abnormal Female indicating possible myocardial injury.  >=15 - Abnormal Male indicating possible myocardial injury.   Clinicians would have to utilize clinical acumen, EKG, Troponin, and serial changes to determine if it is an Acute Myocardial Infarction or myocardial injury due to an underlying chronic condition.         Ammonia [[id number]]  (Normal) Collected: 07/03/23 0613    Specimen: Blood Updated: 07/03/23 0643     Ammonia 29 umol/L     Lactic Acid, Plasma [[id number]]  (Normal) Collected: 07/03/23 0613    Specimen: Blood Updated: 07/03/23 0642     Lactate 1.1 mmol/L     High Sensitivity Troponin T [[id number]]  (Abnormal) Collected: 07/03/23 0433    Specimen: Blood Updated: 07/03/23 0627     HS Troponin T 58 ng/L     Narrative:      High Sensitive Troponin T Reference Range:  <10.0 ng/L- Negative Female for AMI  <15.0 ng/L- Negative Male for AMI  >=10 - Abnormal Female indicating possible myocardial injury.  >=15 - Abnormal Male indicating possible myocardial injury.   Clinicians would  "have to utilize clinical acumen, EKG, Troponin, and serial changes to determine if it is an Acute Myocardial Infarction or myocardial injury due to an underlying chronic condition.         Procalcitonin [[id number]]  (Abnormal) Collected: 07/03/23 0433    Specimen: Blood Updated: 07/03/23 0616     Procalcitonin 0.39 ng/mL     Narrative:      As a Marker for Sepsis (Non-Neonates):    1. <0.5 ng/mL represents a low risk of severe sepsis and/or septic shock.  2. >2 ng/mL represents a high risk of severe sepsis and/or septic shock.    As a Marker for Lower Respiratory Tract Infections that require antibiotic therapy:    PCT on Admission    Antibiotic Therapy       6-12 Hrs later    >0.5                Strongly Recommended  >0.25 - <0.5        Recommended  0.1 - 0.25          Discouraged              Remeasure/reassess PCT  <0.1                Strongly Discouraged     Remeasure/reassess PCT    As 28 day mortality risk marker: \"Change in Procalcitonin Result\" (>80% or <=80%) if Day 0 (or Day 1) and Day 4 values are available. Refer to http://www.[email or web address]-pct-calculator.[email or web address]    Change in PCT <=80%  A decrease of PCT levels below or equal to 80% defines a positive change in PCT test result representing a higher risk for 28-day all-cause mortality of patients diagnosed with severe sepsis for septic shock.    Change in PCT >80%  A decrease of PCT levels of more than 80% defines a negative change in PCT result representing a lower risk for 28-day all-cause mortality of patients diagnosed with severe sepsis or septic shock.    This test is Prognostic not Diagnostic, if elevated correlate with clinical findings before administering antibiotic treatment.        CK [[id number]]  (Abnormal) Collected: 07/03/23 0433    Specimen: Blood Updated: 07/03/23 0610     Creatine Kinase 982 U/L     Hepatic Function Panel [[id number]]  (Abnormal) Collected: 07/03/23 0433    Specimen: Blood Updated: 07/03/23 0610     Total Protein 6.6 g/dL      " Albumin 3.8 g/dL      ALT (SGPT) 35 U/L      AST (SGOT) 36 U/L      Alkaline Phosphatase 144 U/L      Total Bilirubin 0.6 mg/dL      Bilirubin, Direct 0.2 mg/dL      Bilirubin, Indirect 0.4 mg/dL     Ferritin [[id number]]  (Abnormal) Collected: 07/02/23 0619    Specimen: Blood Updated: 07/02/23 0704     Ferritin 425.10 ng/mL     Narrative:      <12 ng/mL usually associated with Iron Deficiency Anemia. Above normal range levels may be due to Hepatic and/or Chronic Inflammatory Disease.  Results may be falsely decreased if patient taking Biotin.      Iron Profile [[id number]]  (Abnormal) Collected: 07/02/23 0619    Specimen: Blood Updated: 07/02/23 0700     Iron 34 mcg/dL      Iron Saturation (TSAT) 14 %      Transferrin 161 mg/dL      TIBC 240 mcg/dL     CBC (No Diff) [[id number]]  (Abnormal) Collected: 07/02/23 0619    Specimen: Blood Updated: 07/02/23 0636     WBC 10.06 10*3/mm3      RBC 3.45 10*6/mm3      Hemoglobin 10.2 g/dL      Hematocrit 29.7 %      MCV 86.1 fL      MCH 29.6 pg      MCHC 34.3 g/dL      RDW 13.2 %      RDW-SD 41.4 fl      MPV 10.1 fL      Platelets 198 10*3/mm3     Lactic Acid, Plasma [[id number]]  (Normal) Collected: 07/01/23 0948    Specimen: Blood Updated: 07/01/23 1007     Lactate 0.8 mmol/L     Urinalysis, Microscopic Only - Indwelling Urethral Catheter [[id number]]  (Abnormal) Collected: 07/01/23 0709    Specimen: Urine from Indwelling Urethral Catheter Updated: 07/01/23 0817     RBC, UA 3-5 /HPF      WBC, UA None Seen /HPF      Bacteria, UA None Seen /HPF      Squamous Epithelial Cells, UA 0-2 /HPF      Hyaline Casts, UA 0-2 /LPF      Methodology Manual Light Microscopy    Urine Drug Screen - Indwelling Urethral Catheter [[id number]]  (Abnormal) Collected: 07/01/23 0709    Specimen: Urine from Indwelling Urethral Catheter Updated: 07/01/23 0804     Amphet/Methamphet, Screen Negative     Barbiturates Screen, Urine Negative     Benzodiazepine Screen, Urine Negative     Cocaine Screen, Urine  Negative     Opiate Screen Negative     THC, Screen, Urine Negative     Methadone Screen, Urine Negative     Oxycodone Screen, Urine Positive     Fentanyl, Urine Negative    Narrative:      Negative Thresholds Per Drugs Screened:    Amphetamines                 500 ng/ml  Barbiturates                 200 ng/ml  Benzodiazepines              100 ng/ml  Cocaine                      300 ng/ml  Methadone                    300 ng/ml  Opiates                      300 ng/ml  Oxycodone                    100 ng/ml  THC                           50 ng/ml  Fentanyl                       5 ng/ml      The Normal Value for all drugs tested is negative. This report includes final unconfirmed screening results to be used for medical treatment purposes only. Unconfirmed results must not be used for non-medical purposes such as employment or legal testing. Clinical consideration should be applied to any drug of abuse test, particularly when unconfirmed results are used.            Urinalysis With Microscopic If Indicated (No Culture) - Indwelling Urethral Catheter [[id number]]  (Abnormal) Collected: 07/01/23 0709    Specimen: Urine from Indwelling Urethral Catheter Updated: 07/01/23 0759     Color, UA Yellow     Appearance, UA Clear     pH, UA <=5.0     Specific Gravity, UA 1.022     Glucose,  mg/dL (1+)     Ketones, UA Negative     Bilirubin, UA Negative     Blood, UA Moderate (2+)     Protein, UA 30 mg/dL (1+)     Leuk Esterase, UA Negative     Nitrite, UA Negative     Urobilinogen, UA 0.2 E.U./dL    High Sensitivity Troponin T 2Hr [[id number]]  (Abnormal) Collected: 06/30/23 2137    Specimen: Blood Updated: 06/30/23 2234     HS Troponin T 58 ng/L      Troponin T Delta -2 ng/L     Narrative:      High Sensitive Troponin T Reference Range:  <10.0 ng/L- Negative Female for AMI  <15.0 ng/L- Negative Male for AMI  >=10 - Abnormal Female indicating possible myocardial injury.  >=15 - Abnormal Male indicating possible myocardial  injury.   Clinicians would have to utilize clinical acumen, EKG, Troponin, and serial changes to determine if it is an Acute Myocardial Infarction or myocardial injury due to an underlying chronic condition.         VBG with Co-Ox and Electrolytes [[id number]]  (Abnormal) Collected: 06/30/23 2136    Specimen: Venous Blood Updated: 06/30/23 2141     pH, Venous 7.170 pH Units      pCO2, Venous 31.1 mm Hg      pO2, Venous 38.1 mm Hg      HCO3, Venous 11.1 mmol/L      Base Excess, Venous -16.1 mmol/L      O2 Saturation, Venous 64.8 %      Hemoglobin, Blood Gas 9.9 g/dL      Carboxyhemoglobin 1.7 %      Methemoglobin 0.20 %      Oxyhemoglobin 63.6 %      FHHB 34.5 %      Note --     Site Venous     Modality Room Air     FIO2 21 %      Flow Rate --     Sodium, Venous 131.0 mmol/L      Potassium, Venous 4.9 mmol/L      Ionized Calcium, Arterial 1.09 mmol/L      Chloride, Venous  101 mmol/L      Glucose, Arterial 218 mg/dL      Lactate, Arterial 0.88 mmol/L     BNP [[id number]]  (Abnormal) Collected: 06/30/23 1836    Specimen: Blood from Arm, Left Updated: 06/30/23 2004     proBNP 1,532.0 pg/mL     Narrative:      Among patients with dyspnea, NT-proBNP is highly sensitive for the detection of acute congestive heart failure. In addition NT-proBNP of <300 pg/ml effectively rules out acute congestive heart failure with 99% negative predictive value.    Results may be falsely decreased if patient taking Biotin.      Single High Sensitivity Troponin T [[id number]]  (Abnormal) Collected: 06/30/23 1836    Specimen: Blood from Arm, Left Updated: 06/30/23 1948     HS Troponin T 60 ng/L      Comment: Verified by repeat analysis.       Narrative:      High Sensitive Troponin T Reference Range:  <10.0 ng/L- Negative Female for AMI  <15.0 ng/L- Negative Male for AMI  >=10 - Abnormal Female indicating possible myocardial injury.  >=15 - Abnormal Male indicating possible myocardial injury.   Clinicians would have to utilize clinical acumen,  EKG, Troponin, and serial changes to determine if it is an Acute Myocardial Infarction or myocardial injury due to an underlying chronic condition.         [place] [facility] [[id number]] Collected: 06/30/23 1836    Specimen: Blood from Arm, Left Updated: 06/30/23 1917    Narrative:      The following orders were created for panel order [place] [facility].  Procedure                               Abnormality         Status                     ---------                               -----------         ------                     Green Top (Gel)[[id number]]                                  Final result               Lavender Top[[id number]]                                     Final result               Gold Top - SST[[id number]]                                   Final result               Light Blue Top[[id number]]                                   Final result                 Please view results for these tests on the individual orders.    Light Blue Top [[id number]] Collected: 06/30/23 1836    Specimen: Blood from Arm, Left Updated: 06/30/23 1917     Extra Tube Hold for add-ons.     Comment: Auto resulted       Green Top (Gel) [[id number]] Collected: 06/30/23 1836    Specimen: Blood from Arm, Left Updated: 06/30/23 1917     Extra Tube Hold for add-ons.     Comment: Auto resulted.       Lavender Top [[id number]] Collected: 06/30/23 1836    Specimen: Blood from Arm, Left Updated: 06/30/23 1917     Extra Tube hold for add-on     Comment: Auto resulted       Gold Top - SST [[id number]] Collected: 06/30/23 1836    Specimen: Blood from Arm, Left Updated: 06/30/23 1917     Extra Tube Hold for add-ons.     Comment: Auto resulted.       Scan Slide [[id number]] Collected: 06/30/23 1836    Specimen: Blood from Arm, Left Updated: 06/30/23 1916     RBC Morphology Normal     WBC Morphology Normal     Platelet Estimate Adequate    Protime-INR [[id number]]  (Normal) Collected: 06/30/23 1836    Specimen: Blood from Arm, Left Updated: 06/30/23 1908      Protime 14.6 Seconds      INR 1.14    Narrative:      Suggested Therapeutic Ranges For Oral Anticoagulant Therapy:  Level of Therapy                      INR Target Range  Standard Dose                            2.0-3.0  High Dose                                2.5-3.5  Patients not receiving anticoagulant  Therapy Normal Range                     0.86-1.15    aPTT [[id number]]  (Abnormal) Collected: 23 1836    Specimen: Blood from Arm, Left Updated: 23 190     PTT 20.5 seconds              Physician Progress Notes (most recent note)        Phillip Yanez MD at 23 0704          Pulmonary / Critical Care Progress Note      Patient Name: Carlos Murphy Jr.  : 1960  MRN: [id number]  Attending:  Clif Oconnor DO   Date of admission: 2023    Subjective   Subjective   Follow-up for hypernatremia, altered mental status, bipolar/schizophrenia    More awake and alert and resting well  Does have bouts of intermittent confusion  Denies complaints  On room air  Ehrlichiosis panel pending    Objective   Objective     Vitals:   Vitals:    23 1604 23 1949 23 2315 23 0352   BP: 165/84 161/63 116/48 129/68   BP Location: Left arm Left arm Left arm Left arm   Patient Position: Lying Lying Lying Lying   Pulse: 64 65 69 66   Resp: 20 20 20 18   Temp: 97.9 °F (36.6 °C) 98.5 °F (36.9 °C) 98.2 °F (36.8 °C) 97.9 °F (36.6 °C)   TempSrc: Oral Oral Oral Oral   SpO2: 99% 95% 94% 96%   Weight:       Height:             Physical Exam   Vital Signs Reviewed   Patient awake, no acute distress, on room air  HEENT:  PERRL, EOMI. core track in place  Chest:  good aeration, clear to auscultation bilaterally, tympanic to percussion bilaterally, no work of breathing noted  CV: RRR, no MGR, pulses 2+, equal.  Abd:  Soft, NT, ND, + BS, no HSM  EXT:  no clubbing, no cyanosis, no edema  Neuro:  A&Ox2, patient awake, does follow some simple commands, moves all 4 extremities, horizontal  nystagmus  Skin: No rashes or lesions noted      Result Review    Result Review:  I have personally reviewed the results from the time of this admission to 07/14/23   and agree with these findings:  [x]  Laboratory  [x]  Microbiology  [x]  Radiology  []  EKG/Telemetry   [x]  Cardiology/Vascular   []  Pathology  []  Old records  []  Other:  Most notable findings include:       Lab 07/13/23  0805 07/13/23  0516 07/12/23  0515 07/11/23  0500 07/10/23  0247 07/09/23  1151 07/09/23  0324 07/08/23  0256 07/07/23  1302   WBC 22.14*  --  17.10* 14.02* 14.27*  --  13.62* 18.68*  --    HEMOGLOBIN 12.2*  --  11.8* 11.5* 11.3*  --  13.3 11.2*  --    HEMATOCRIT 37.2*  --  36.7* 36.6* 34.6*  --  45.4 34.3*  --    PLATELETS 342  --  311 255 218  --  171 220  --    SODIUM  --  137 143 146* 144 140  --  147* 155*   POTASSIUM  --  4.7 4.2 4.3 4.0 4.3  --  3.4* 3.1*   CHLORIDE  --  109* 111* 114* 111* 105  --  112* 116*   CO2  --  16.3* 22.4 20.4* 21.8* 21.3*  --  24.7 26.8   BUN  --  42* 39* 33* 33* 36*  --  50* 63*   CREATININE  --  1.99* 2.11* 1.90* 1.93* 1.85*  --  2.18* 2.56*   GLUCOSE  --  266* 294* 216* 204* 299*  --  157* 168*   CALCIUM  --  8.9 9.0 9.7 9.0 9.3  --  8.7 9.0   PHOSPHORUS  --  4.4 3.5 3.3 2.5  --   --  2.3*  --    ALBUMIN  --   --   --   --   --   --   --  3.1*  --            Assessment & Plan   Assessment / Plan     Impression:  Altered mental status  Metabolic encephalopathy  Seizure disorder  History of bipolar and schizo affective disorder  Acute kidney injury, on CKD stage III  Type 2 diabetes with hyperglycemia  Iron deficiency anemia  Rhabdomyolysis  Hypokalemia  Hypophosphatemia  Hypertension  Hypernatremia, clinically significant     Plan:  On room air  Diet per speech therapy.  Appreciate assistance  Trend renal panel and electrolytes  Continue doxycycline.  Can discontinue if ehrlichiosis profile comes back negative  Continue Lexapro, Seroquel, and Lamictal  Continue thiamine, and multivitamin and  "folic acid  Rest per primary    DVT prophylaxis:  Medical and mechanical DVT prophylaxis orders are present.    CODE STATUS:   Level Of Support Discussed With: Patient  Code Status (Patient has no pulse and is not breathing): CPR (Attempt to Resuscitate)  Medical Interventions (Patient has pulse or is breathing): Full Support      Labs, imaging, microbiology, notes and medications personally reviewed  Discussed with primary    I will sign off.  Please call with questions.    Electronically signed by Phillip Yanez MD, 23, 9:04 AM EDT.            Electronically signed by Phillip Yanez MD at 23 0904          Consult Notes (most recent note)        Wallace Murillo MD at 07/10/23 1541        Consult Orders    1. Inpatient Psychiatrist Consult [[id number]] ordered by Ye Roach MD at 23 1718                  [facility]   PSYCHIATRIC CONSULTATION    Patient Name: Carlos Murphy Jr.  : 1960  MRN: [id number]  Primary Care Physician:  Felix Atkinson MD  Date of admission: 2023    Referring Provider: Dr. Roach  Reason for Consultation: Altered mental status, sedation    Subjective   Subjective     Chief Complaint: Patient does not voice why he is in the hospital    HPI:     Carlos Murphy Jr. is a 62 y.o. male admitted for altered mental status and confusion.  He has a history of COPD, seizure disorder, hypertension, hyperlipidemia, type 2 diabetes, as well as bipolar disorder.    Patient today is lying in bed and is in some slight discomfort.  He is restless, and is in bilateral wrist restraints.  He had been pulling out lines and tubes.  Patient also appears to be breathing heavy.  Nurse reports his presentation is stable and has been that way for some time.    Patient unable to fully participate in the exam.  Patient does not answer questions and is easily distracted.  Asked the patient if he is having why he is here in the hospital day and he responds, \"yep.\"  Other than that " "aliases \"whew\" or \"oh man.\"  Cannot provide any significant history.    Patient is on detox protocol and assume there is a history of alcohol misuse, but alcohol level was not obtained on admission.  He is on the detox protocol.  His social history indicates that he was a sometimes drinker.  He received multiple doses of lorazepam based on detox protocol, but this medication has since been discontinued and his last dose was 2 days ago on July 8.  He had received 3 mg on July 5, 2012 milligrams on July 6, 2013 milligrams on July 7, 2010 milligrams on July 8.  His increasing confusion and delirium could be due to lorazepam and possible active metabolites.  Since been discontinued in agreement that plan.    Patient cannot provide any history but it appears she [facility].  It is noted that he has a history of bipolar disorder.  He has previously been prescribed escitalopram, quetiapine, lamotrigine, and is recently receiving zolpidem.  There is concern about overuse of these medications, but does not appear he is gotten early prescriptions for them.      Review of Systems   All systems were reviewed and negative except for: Unable to participate    Personal History     Past Medical History:   Diagnosis Date    Acute kidney injury     2021 POST SEIZURES    Astigmatism of both eyes     eyes twitch left and right    Bipolar disorder     Charcot ankle     Closed nondisplaced fracture of medial malleolus of left tibia     COPD (chronic obstructive pulmonary disease)     USES INHALERS    Diabetes     BG RUNS AROUND 90'S IN AM    Hx of schizophrenia     Hyperlipidemia     Hypertension     SEEN DR ROD IN THE PAST, HAD APPT WITH DR MICHAUD IN 5-2022 CX APPT, DENIED CP/SOB    Neuropathy     Seizures     LAST ONE 4/21/22    Sleep apnea     DOES NOT USE CPAP    Varicose vein of leg        Past Surgical History:   Procedure Laterality Date    ANKLE OPEN REDUCTION INTERNAL FIXATION " Left 2022    Procedure: LEFT OPEN REDUCTION INTERNAL FIXATION DISTAL TIBIA FRACTURE ;  Surgeon: Tha Parry MD;  [facility] [place] [facility]  Service: Orthopedics;  Laterality: Left;    ANKLE OPEN REDUCTION INTERNAL FIXATION Left 2022    Procedure: LEFT ANKLE OPEN REDUCTION INTERNAL FIXATION WITH SYNDESMOSIS FIXATION;  Surgeon: Tha Parry MD;  Location: [facility] MAIN OR;  Service: Orthopedics;  Laterality: Left;    CHOLECYSTECTOMY      COLONOSCOPY      JOINT REPLACEMENT      RTKR    LUMBAR DISC SURGERY      SHOULDER SURGERY Right        Past Psychiatric History:.  She has been under the care of [facility]    Psychiatric Hospitalizations: None known    Suicide Attempts: None known    Prior Treatment and Medications Tried: Quetiapine, lamotrigine, escitalopram, zolpidem, no other known previous psychiatric medications prescribed        Family History: family history includes COPD in his father and mother; Heart disease in his father; Kidney disease in his mother. Otherwise pertinent FHx was reviewed and not pertinent to current issue.    Social History:     Social History     Socioeconomic History    Marital status:    Tobacco Use    Smoking status: Former     Packs/day: 0.50     Years: 35.00     Pack years: 17.50     Types: Cigarettes     Quit date:      Years since quittin.5    Smokeless tobacco: Never   Vaping Use    Vaping Use: Never used   Substance and Sexual Activity    Alcohol use: Yes     Comment: OCCASIONAL    Drug use: Never    Sexual activity: Defer       Substance Abuse History: reports that he quit smoking about 16 years ago. His smoking use included cigarettes. He has a 17.50 pack-year smoking history. He has never used smokeless tobacco. He reports current alcohol use. He reports that he does not use drugs.    Home Medications:  Finerenone, Plecanatide, QUEtiapine, Semaglutide(0.25 or 0.5MG/DOS), albuterol sulfate HFA, amLODIPine, aspirin,  atenolol, atorvastatin, cyclobenzaprine, empagliflozin, escitalopram, furosemide, glipizide, hydrOXYzine, insulin aspart prot & aspart, lamoTRIgine, lisinopril, lubiprostone, multivitamin with minerals, oxyCODONE-acetaminophen, pantoprazole, pregabalin, tiotropium bromide-olodaterol, and zolpidem      Allergies:  No Known Allergies    Objective   Objective     Vitals:   Temp:  [97.9 °F (36.6 °C)-99.2 °F (37.3 °C)] 98.9 °F (37.2 °C)  Heart Rate:  [58-75] 72  Resp:  [23-28] 23  BP: (147-181)/() 168/79  Physical Exam       Mental Status Exam:     Patient is awake and alert but unable to participate meaningfully in interview.  He is restless and appears rather confused.    Hygiene:   good  Cooperation:   Inattentive, unable to participate in exam  Eye Contact:   Limited  Psychomotor Behavior:  Restless  Mood: Dysphoric  Affect:  Restricted and anxious  Speech:  Minimal and says very few words  Language: Few words  Thought Process:  Disorganized  Thought Content:   Unable to assess  Suicidal:  None  Homicidal:  None  Hallucinations:   Unable to fully assess but may be responding to internal stimuli  Delusion:  Other unable to assess  Memory:  Unable to evaluate  Orientation:  Unable to evaluate  Reliability:  poor  Insight:  Poor  Judgement:  Impaired  Impulse Control:  Impaired    Result Review    Result Review:  I have personally reviewed the results from the time of this admission to 7/10/2023 15:41 EDT and agree with these findings:  []  Laboratory  []  Microbiology  []  Radiology  []  EKG/Telemetry   []  Cardiology/Vascular   []  Pathology  []  Old records  []  Other:  Most notable findings include:     Assessment & Plan   Assessment / Plan     Brief Patient Summary:  Carlos Murphy JrAngel is a 62 y.o. male who admitted for altered mental status    Active Hospital Problems:  Active Hospital Problems    Diagnosis     **Acute renal failure, unspecified acute renal failure type     Onychomycosis     Onychocryptosis      Foreign body in left foot     Peripheral neuropathy     Charcot foot due to diabetes mellitus     Foot pain, bilateral          Plan:   1) patient has a history of bipolar disorder we will discontinue Lexapro to simplify his regimen and if he does have bipolar disorder and this could be exacerbating the symptoms  2) give more time away from the lorazepam and not restart any benzodiazepines as it could be contributing to his delirium  3) we will increase quetiapine as it appears he has been stable on that for a long time  4) continue lamotrigine as ordered  5) no other recommendations at this time we will follow make treatment recommendations as indicated  6) this appears to be more metabolic not psychiatric in nature        Part of this note may be an electronic transcription/translation of spoken language to printed text using the Dragon Dictation System.    Electronically signed by Wallace Murillo MD, 07/10/23, 3:41 PM EDT.    Electronically signed by Wallace Murillo MD at 07/10/23 1603       Physical Therapy Notes (most recent note)    No notes exist for this encounter.          Occupational Therapy Notes (most recent note)        Frank Angulo, REGULO at 23 1124          Patient Name: Carlos Murphy Jr.  : 1960    MRN: [id number]                              Today's Date: 2023       Admit Date: 2023    Visit Dx:     ICD-10-CM ICD-9-CM   1. Acute renal failure, unspecified acute renal failure type  N17.9 584.9   2. Altered mental status, unspecified altered mental status type  R41.82 780.97   3. At risk for polypharmacy  Z91.89 V49.89   4. Non-traumatic rhabdomyolysis  M62.82 728.88   5. Oropharyngeal dysphagia  R13.12 787.22   6. Impaired mobility and ADLs  Z74.09 V49.89    Z78.9      Patient Active Problem List   Diagnosis    Ulcer of toe due to type 2 diabetes mellitus    Centrilobular emphysema    Tobacco abuse, in remission    Obesity (BMI 30-39.9)    Closed fracture of shaft of left  fibula    Closed nondisplaced fracture of medial malleolus of left tibia    Aftercare following surgery of ORIF left distal tibia fracture 4/25/2022    Colon cancer screening    Colitis    Acute renal failure, unspecified acute renal failure type    Onychomycosis    Onychocryptosis    Foreign body in left foot    Peripheral neuropathy    Charcot foot due to diabetes mellitus    Foot pain, bilateral     Past Medical History:   Diagnosis Date    Acute kidney injury     2021 POST SEIZURES    Astigmatism of both eyes     eyes twitch left and right    Bipolar disorder     Charcot ankle     Closed nondisplaced fracture of medial malleolus of left tibia     COPD (chronic obstructive pulmonary disease)     USES INHALERS    Diabetes     BG RUNS AROUND 90'S IN AM    Hx of schizophrenia     Hyperlipidemia     Hypertension     SEEN DR ROD IN THE PAST, HAD APPT WITH DR MICHAUD IN 5-2022 CX APPT, DENIED CP/SOB    Neuropathy     Seizures     LAST ONE 4/21/22    Sleep apnea     DOES NOT USE CPAP    Varicose vein of leg      Past Surgical History:   Procedure Laterality Date    ANKLE OPEN REDUCTION INTERNAL FIXATION Left 04/25/2022    Procedure: LEFT OPEN REDUCTION INTERNAL FIXATION DISTAL TIBIA FRACTURE ;  Surgeon: Tha Parry MD;  Location: [facility] [place] [facility] [place]  Service: Orthopedics;  Laterality: Left;    ANKLE OPEN REDUCTION INTERNAL FIXATION Left 06/01/2022    Procedure: LEFT ANKLE OPEN REDUCTION INTERNAL FIXATION WITH SYNDESMOSIS FIXATION;  Surgeon: Tha Parry MD;  Location: [facility] OR;  Service: Orthopedics;  Laterality: Left;    CHOLECYSTECTOMY      COLONOSCOPY      JOINT REPLACEMENT      RTKR    LUMBAR DISC SURGERY      SHOULDER SURGERY Right       General Information       Row Name 07/13/23 1123 07/13/23 1121       OT Time and Intention    Document Type re-evaluation  -PG therapy note (daily note)  -PG    Mode of Treatment -- individual therapy;occupational therapy  -PG              User Key  (r) = Recorded  By, (t) = Taken By, (c) = Cosigned By      Initials Name Provider Type    PG Frank Angulo OT Occupational Therapist                     Mobility/ADL's    No documentation.                  Obj/Interventions       Row Name 07/13/23 1121          Shoulder (Therapeutic Exercise)    Shoulder (Therapeutic Exercise) strengthening exercise  -PG     Shoulder Strengthening (Therapeutic Exercise) 15 repititions;1 lb free weight  -PG       Row Name 07/13/23 1121          Elbow/Forearm (Therapeutic Exercise)    Elbow/Forearm (Therapeutic Exercise) strengthening exercise  -PG     Elbow/Forearm Strengthening (Therapeutic Exercise) 15 repititions;1 lb free weight  -PG       Row Name 07/13/23 1121          Motor Skills    Therapeutic Exercise shoulder;elbow/forearm  -PG               User Key  (r) = Recorded By, (t) = Taken By, (c) = Cosigned By      Initials Name Provider Type    PG Frank Angulo OT Occupational Therapist                   Goals/Plan       [facility] Name 07/13/23 1123          Transfer Goal 1 (OT)    Progress/Outcome (Transfer Goal 1, OT) progress slower than expected  -PG       [facility] Name 07/13/23 1123          Bathing Goal 1 (OT)    Progress/Outcomes (Bathing Goal 1, OT) progress slower than expected  -PG       [facility] Name 07/13/23 1123          Dressing Goal 1 (OT)    Progress/Outcome (Dressing Goal 1, OT) progress slower than expected  -PG       [facility] Name 07/13/23 1123          Toileting Goal 1 (OT)    Progress/Outcome (Toileting Goal 1, OT) progress slower than expected  -PG       [facility] Name 07/13/23 1123          Grooming Goal 1 (OT)    Progress/Outcome (Grooming Goal 1, OT) progress slower than expected  -PG       [facility] Name 07/13/23 1123          Self-Feeding Goal 1 (OT)    Progress/Outcomes (Self-Feeding Goal 1, OT) progress slower than expected  -PG       Row Name 07/13/23 1123          Strength Goal 1 (OT)    Progress/Outcome (Strength Goal 1, OT) progress slower than expected  -PG       [facility] Name 07/13/23 1123           Problem Specific Goal 1 (OT)    Progress/Outcome (Problem Specific Goal 1, OT) progress slower than expected  -PG               User Key  (r) = Recorded By, (t) = Taken By, (c) = Cosigned By      Initials Name Provider Type    Frank Carson OT Occupational Therapist                   Clinical Impression       Row Name 07/13/23 1121          Plan of Care Review    Progress no change  -PG               User Key  (r) = Recorded By, (t) = Taken By, (c) = Cosigned By      Initials Name Provider Type    Frank Carson OT Occupational Therapist                   Outcome Measures       Row Name 07/13/23 1121          How much help from another is currently needed...    Putting on and taking off regular lower body clothing? 1  -PG     Bathing (including washing, rinsing, and drying) 1  -PG     Toileting (which includes using toilet bed pan or urinal) 1  -PG     Putting on and taking off regular upper body clothing 1  -PG     Taking care of personal grooming (such as brushing teeth) 1  -PG     Eating meals 1  -PG     AM-PAC 6 Clicks Score (OT) 6  -PG       [facility] Name 07/13/23 [facility]1          Functional Assessment    Outcome Measure Options AM-PAC 6 Clicks Daily Activity (OT);Optimal Instrument  -PG       [facility] Name 07/13/23 1121          Optimal Instrument    Optimal Instrument Optimal - 3  -PG     Bending/Stooping 5  -PG     Standing 5  -PG     Reaching 2  -PG               User Key  (r) = Recorded By, (t) = Taken By, (c) = Cosigned By      Initials Name Provider Type    Frank Carson OT Occupational Therapist                    Occupational Therapy Education       Title: PT OT SLP Therapies (Done)       Topic: Occupational Therapy (Done)       Point: ADL training (Done)       Description:   Instruct learner(s) on proper safety adaptation and remediation techniques during self care or transfers.   Instruct in proper use of assistive devices.                  Learning Progress Summary             Patient Acceptance,  E, VU by SV at 7/9/2023 1750    Acceptance, E, VU by AC at 7/3/2023 1144   Family Acceptance, E, VU by SV at 7/9/2023 1750                         Point: Home exercise program (Done)       Description:   Instruct learner(s) on appropriate technique for monitoring, assisting and/or progressing therapeutic exercises/activities.                  Learning Progress Summary             Patient Acceptance, E, VU by SV at 7/9/2023 1750    Acceptance, E, VU by AC at 7/3/2023 1144   Family Acceptance, E, VU by SV at 7/9/2023 1750                         Point: Precautions (Done)       Description:   Instruct learner(s) on prescribed precautions during self-care and functional transfers.                  Learning Progress Summary             Patient Acceptance, E, VU by SV at 7/9/2023 1750    Acceptance, E, VU by AC at 7/3/2023 1144   Family Acceptance, E, VU by SV at 7/9/2023 1750                         Point: Body mechanics (Done)       Description:   Instruct learner(s) on proper positioning and spine alignment during self-care, functional mobility activities and/or exercises.                  Learning Progress Summary             Patient Acceptance, E, VU by SV at 7/9/2023 1750    Acceptance, E, VU by AC at 7/3/2023 1144   Family Acceptance, E, VU by SV at 7/9/2023 1750                                         User Key       Initials Effective Dates Name Provider Type Discipline     06/16/21 -  Kristi Renner OT Occupational Therapist OT     05/04/23 -  Jenny Reveles RN Registered Nurse Nurse                  OT Recommendation and Plan     Plan of Care Review  Progress: no change     Time Calculation:    Time Calculation- OT       Row Name 07/13/23 1122             Time Calculation- OT    OT Received On 07/13/23  -PG      OT Goal Re-Cert Due Date 07/22/23  -PG         Timed Charges    50263 - OT Therapeutic Exercise Minutes 10  -PG         Total Minutes    Timed Charges Total Minutes 10  -PG       Total Minutes 10   -PG                User Key  (r) = Recorded By, (t) = Taken By, (c) = Cosigned By      Initials Name Provider Type    PG Frank Angulo OT Occupational Therapist                  Therapy Charges for Today       Code Description Service Date Service Provider Modifiers Qty    09894951459  OT THER PROC EA 15 MIN 2023 Frank Angulo OT GO 1                 Frank Angulo OT  2023    Electronically signed by Frank Angulo OT at 23 1124          Speech Language Pathology Notes (most recent note)        Nidhi Soliman, MS-CCC/SLP, CNT at 23 1118          Acute Care - Speech Language Pathology   Swallow Treatment Note  [place]     Patient Name: Carlos Murphy Jr.  : 1960  MRN: [id number]  Today's Date: 2023               Admit Date: 2023    Visit Dx:     ICD-10-CM ICD-9-CM   1. Acute renal failure, unspecified acute renal failure type  N17.9 584.9   2. Altered mental status, unspecified altered mental status type  R41.82 780.97   3. At risk for polypharmacy  Z91.89 V49.89   4. Non-traumatic rhabdomyolysis  M62.82 728.88   5. Oropharyngeal dysphagia  R13.12 787.22   6. Impaired mobility and ADLs  Z74.09 V49.89    Z78.9      Patient Active Problem List   Diagnosis    Ulcer of toe due to type 2 diabetes mellitus    Centrilobular emphysema    Tobacco abuse, in remission    Obesity (BMI 30-39.9)    Closed fracture of shaft of left fibula    Closed nondisplaced fracture of medial malleolus of left tibia    Aftercare following surgery of ORIF left distal tibia fracture 2022    Colon cancer screening    Colitis    Acute renal failure, unspecified acute renal failure type    Onychomycosis    Onychocryptosis    Foreign body in left foot    Peripheral neuropathy    Charcot foot due to diabetes mellitus    Foot pain, bilateral     Past Medical History:   Diagnosis Date    Acute kidney injury      POST SEIZURES    Astigmatism of both eyes     eyes twitch left and right    Bipolar  disorder     Charcot ankle     Closed nondisplaced fracture of medial malleolus of left tibia     COPD (chronic obstructive pulmonary disease)     USES INHALERS    Diabetes     BG RUNS AROUND 90'S IN AM    Hx of schizophrenia     Hyperlipidemia     Hypertension     SEEN DR ROD IN THE PAST, HAD APPT WITH DR MICHAUD IN 5-2022 CX APPT, DENIED CP/SOB    Neuropathy     Seizures     LAST ONE 4/21/22    Sleep apnea     DOES NOT USE CPAP    Varicose vein of leg      Past Surgical History:   Procedure Laterality Date    ANKLE OPEN REDUCTION INTERNAL FIXATION Left 04/25/2022    Procedure: LEFT OPEN REDUCTION INTERNAL FIXATION DISTAL TIBIA FRACTURE ;  Surgeon: Tha Parry MD;  Location: [facility] [place];  Service: Orthopedics;  Laterality: Left;    ANKLE OPEN REDUCTION INTERNAL FIXATION Left 06/01/2022    Procedure: LEFT ANKLE OPEN REDUCTION INTERNAL FIXATION WITH SYNDESMOSIS FIXATION;  Surgeon: Tha Parry MD;  Location: [facility] MAIN OR;  Service: Orthopedics;  Laterality: Left;    CHOLECYSTECTOMY      COLONOSCOPY      JOINT REPLACEMENT      RTKR    LUMBAR DISC SURGERY      SHOULDER SURGERY Right        SLP Recommendation and Plan         SPEECH PATHOLOGY DYSPHAGIA TREATMENT    Subjective/Behavioral Observations: Awake, NG tube in place.  P.o. diet started on 7/12/2023.  Mental status still not at baseline.  Patient however is calm and cooperative during treatment.        Day/time of Treatment: 7/13/2023        Current Diet: Mechanical soft, thin liquids        Treatment received: Focused on tolerance of current diet recommendations and use of compensatory strategies to decrease aspiration risk.        Results of treatment: Patient required total assist for self-feeding.  Offered no assist holding of cup.  Concerned that NG is interfering with clearance of solids as patient demonstrating intermittent throat clearing and double swallow with soft solids.  Single sips of thin liquid via straw without any overt signs  or symptoms of aspiration.        Progress toward goals: Adequate         Barriers to Achieving goals: Medical status        Plan of care:/changes in plan: Continuation of current diet.  If physician wishes to continue tube feeding as nutritional support recommend replace NG with cortrak.  Concern that larger NG is interfering with swallow function and mental clearance of solid consistencies.                                                                                          EDUCATION  The patient has been educated in the following areas:   Dysphagia (Swallowing Impairment).              Time Calculation:    Time Calculation- SLP       Row Name 07/13/23 1118             Time Calculation- SLP    SLP Stop Time 1100  -SN      SLP Received On 07/13/23  -SN         Untimed Charges    44771-NK Treatment Swallow Minutes 45  -SN         Total Minutes    Untimed Charges Total Minutes 45  -SN       Total Minutes 45  -SN                User Key  (r) = Recorded By, (t) = Taken By, (c) = Cosigned By      Initials Name Provider Type    Nidhi Preciado MS-CCC/SLP, TAVO Speech and Language Pathologist                    Therapy Charges for Today       Code Description Service Date Service Provider Modifiers Qty    84290236513 HC ST TREATMENT SWALLOW 3 7/13/2023 Nidhi Soliman MS-CCC/SLP, TAVO GN 1                 OCTAVIO Vaughn/SLP, CNT  7/13/2023    Electronically signed by Nidhi Soliman MS-CCC/SLP, CNT at 07/13/23 1148

## 2023-07-14 NOTE — THERAPY TREATMENT NOTE
Patient Name: Carlos Murphy Jr.  : 1960    MRN: [id number]                              Today's Date: 2023       Admit Date: 2023    Visit Dx:     ICD-10-CM ICD-9-CM   1. Acute renal failure, unspecified acute renal failure type  N17.9 584.9   2. Altered mental status, unspecified altered mental status type  R41.82 780.97   3. At risk for polypharmacy  Z91.89 V49.89   4. Non-traumatic rhabdomyolysis  M62.82 728.88   5. Oropharyngeal dysphagia  R13.12 787.22   6. Impaired mobility and ADLs  Z74.09 V49.89    Z78.9      Patient Active Problem List   Diagnosis    Ulcer of toe due to type 2 diabetes mellitus    Centrilobular emphysema    Tobacco abuse, in remission    Obesity (BMI 30-39.9)    Closed fracture of shaft of left fibula    Closed nondisplaced fracture of medial malleolus of left tibia    Aftercare following surgery of ORIF left distal tibia fracture 2022    Colon cancer screening    Colitis    Acute renal failure, unspecified acute renal failure type    Onychomycosis    Onychocryptosis    Foreign body in left foot    Peripheral neuropathy    Charcot foot due to diabetes mellitus    Foot pain, bilateral     Past Medical History:   Diagnosis Date    Acute kidney injury      POST SEIZURES    Astigmatism of both eyes     eyes twitch left and right    Bipolar disorder     Charcot ankle     Closed nondisplaced fracture of medial malleolus of left tibia     COPD (chronic obstructive pulmonary disease)     USES INHALERS    Diabetes     BG RUNS AROUND 90'S IN AM    Hx of schizophrenia     Hyperlipidemia     Hypertension     SEEN DR ROD IN THE PAST, HAD APPT WITH DR MICHAUD IN  CX APPT, DENIED CP/SOB    Neuropathy     Seizures     LAST ONE 22    Sleep apnea     DOES NOT USE CPAP    Varicose vein of leg      Past Surgical History:   Procedure Laterality Date    ANKLE OPEN REDUCTION INTERNAL FIXATION Left 2022    Procedure: LEFT OPEN REDUCTION INTERNAL FIXATION DISTAL TIBIA  FRACTURE ;  Surgeon: Tha Parry MD;  Location: [facility];  Service: Orthopedics;  Laterality: Left;    ANKLE OPEN REDUCTION INTERNAL FIXATION Left 06/01/2022    Procedure: LEFT ANKLE OPEN REDUCTION INTERNAL FIXATION WITH SYNDESMOSIS FIXATION;  Surgeon: Tha Parry MD;  Location: [facility] MAIN OR;  Service: Orthopedics;  Laterality: Left;    CHOLECYSTECTOMY      COLONOSCOPY      JOINT REPLACEMENT      RTKR    LUMBAR DISC SURGERY      SHOULDER SURGERY Right       General Information       Row Name 07/14/23 1259          OT Time and Intention    Document Type therapy note (daily note)  -PG     Mode of Treatment individual therapy;occupational therapy  -PG               User Key  (r) = Recorded By, (t) = Taken By, (c) = Cosigned By      Initials Name Provider Type    PG Frank Angulo OT Occupational Therapist                     Mobility/ADL's       Row Name 07/14/23 1300          Bed Mobility    Bed Mobility supine-sit  -PG     All Activities, Sac (Bed Mobility) minimum assist (75% patient effort)  -PG       Row Name 07/14/23 1300          Transfers    Transfers bed-chair transfer  -PG       Row Name 07/14/23 1300          Bed-Chair Transfer    Bed-Chair Sac (Transfers) contact guard;minimum assist (75% patient effort);verbal cues  -PG     Assistive Device (Bed-Chair Transfers) walker, front-wheeled  -PG               User Key  (r) = Recorded By, (t) = Taken By, (c) = Cosigned By      Initials Name Provider Type    PG Frank Angulo OT Occupational Therapist                   Obj/Interventions       Row Name 07/14/23 1300          Shoulder (Therapeutic Exercise)    Shoulder (Therapeutic Exercise) strengthening exercise  -PG     Shoulder Strengthening (Therapeutic Exercise) 1 lb free weight;15 repititions  -PG       Row Name 07/14/23 1300          Elbow/Forearm (Therapeutic Exercise)    Elbow/Forearm (Therapeutic Exercise) strengthening exercise  -PG     Elbow/Forearm  Strengthening (Therapeutic Exercise) 1 lb free weight;15 repititions  -PG       Row Name 07/14/23 1300          Motor Skills    Therapeutic Exercise shoulder;elbow/forearm  -PG               User Key  (r) = Recorded By, (t) = Taken By, (c) = Cosigned By      Initials Name Provider Type    PG Frank Angulo OT Occupational Therapist                   Goals/Plan    No documentation.                  Clinical Impression       Row Name 07/14/23 1300          Plan of Care Review    Progress improving  -PG     Outcome Evaluation Patient able to complete recliner transfer with contact-guard/minimal assist.  Patient walked to the door with contact-guard assist and rolling walker with chair behind and assistance needed with cueing to sit.  Patient completed upper body strengthening exercises with 1 pound weights to improve endurance and strength for ADL independence.  Recommend continued skilled OT services to return patient to his previous level of function  -PG               User Key  (r) = Recorded By, (t) = Taken By, (c) = Cosigned By      Initials Name Provider Type    PG Frank Angulo OT Occupational Therapist                   Outcome Measures       Row Name 07/14/23 1303          How much help from another is currently needed...    Putting on and taking off regular lower body clothing? 1  -PG     Bathing (including washing, rinsing, and drying) 1  -PG     Toileting (which includes using toilet bed pan or urinal) 1  -PG     Putting on and taking off regular upper body clothing 1  -PG     Taking care of personal grooming (such as brushing teeth) 2  -PG     Eating meals 3  -PG     AM-PAC 6 Clicks Score (OT) 9  -PG       Row Name 07/14/23 1303          Functional Assessment    Outcome Measure Options AM-PAC 6 Clicks Daily Activity (OT);Optimal Instrument  -PG       Row Name 07/14/23 1303          Optimal Instrument    Optimal Instrument Optimal - 3  -PG     Bending/Stooping 3  -PG     Standing 2  -PG     Reaching 2  -PG                User Key  (r) = Recorded By, (t) = Taken By, (c) = Cosigned By      Initials Name Provider Type    Frank Carson OT Occupational Therapist                    Occupational Therapy Education       Title: PT OT SLP Therapies (Done)       Topic: Occupational Therapy (Done)       Point: ADL training (Done)       Description:   Instruct learner(s) on proper safety adaptation and remediation techniques during self care or transfers.   Instruct in proper use of assistive devices.                  Learning Progress Summary             Patient Acceptance, E, VU by SV at 7/9/2023 1750    Acceptance, E, VU by AC at 7/3/2023 1144   Family Acceptance, E, VU by SV at 7/9/2023 1750                         Point: Home exercise program (Done)       Description:   Instruct learner(s) on appropriate technique for monitoring, assisting and/or progressing therapeutic exercises/activities.                  Learning Progress Summary             Patient Acceptance, E, VU by SV at 7/9/2023 1750    Acceptance, E, VU by AC at 7/3/2023 1144   Family Acceptance, E, VU by SV at 7/9/2023 1750                         Point: Precautions (Done)       Description:   Instruct learner(s) on prescribed precautions during self-care and functional transfers.                  Learning Progress Summary             Patient Acceptance, E, VU by SV at 7/9/2023 1750    Acceptance, E, VU by AC at 7/3/2023 1144   Family Acceptance, E, VU by SV at 7/9/2023 1750                         Point: Body mechanics (Done)       Description:   Instruct learner(s) on proper positioning and spine alignment during self-care, functional mobility activities and/or exercises.                  Learning Progress Summary             Patient Acceptance, E, VU by SV at 7/9/2023 1750    Acceptance, E, VU by AC at 7/3/2023 1144   Family Acceptance, E, VU by SV at 7/9/2023 1750                                         User Key       Initials Effective Dates Name Provider  Type Discipline    AC 06/16/21 -  Kristi Renner OT Occupational Therapist OT    SV 05/04/23 -  Jenny Reveles RN Registered Nurse Nurse                  OT Recommendation and Plan     Plan of Care Review  Progress: improving  Outcome Evaluation: Patient able to complete recliner transfer with contact-guard/minimal assist.  Patient walked to the door with contact-guard assist and rolling walker with chair behind and assistance needed with cueing to sit.  Patient completed upper body strengthening exercises with 1 pound weights to improve endurance and strength for ADL independence.  Recommend continued skilled OT services to return patient to his previous level of function     Time Calculation:    Time Calculation- OT       Row Name 07/14/23 1304             Time Calculation- OT    OT Received On 07/14/23  -PG      OT Goal Re-Cert Due Date 07/22/23  -PG         Timed Charges    43003 - OT Therapeutic Exercise Minutes 10  -PG      33359 - OT Therapeutic Activity Minutes 15  -PG         Total Minutes    Timed Charges Total Minutes 25  -PG       Total Minutes 25  -PG                User Key  (r) = Recorded By, (t) = Taken By, (c) = Cosigned By      Initials Name Provider Type    PG Frank Angulo OT Occupational Therapist                  Therapy Charges for Today       Code Description Service Date Service Provider Modifiers Qty    43507391263 HC OT THER PROC EA 15 MIN 7/13/2023 Frank Angulo OT GO 1    69976006671 HC OT THER PROC EA 15 MIN 7/14/2023 Frank Angulo OT GO 1    90020600864 HC OT THERAPEUTIC ACT EA 15 MIN 7/14/2023 Frank Angulo OT GO 1                 Frank Angulo OT  7/14/2023

## 2023-07-14 NOTE — PROGRESS NOTES
"DOS: 2023  NAME: Carlos Murphy Jr.   : 1960  PCP: Felix Atkinson MD  Chief Complaint   Patient presents with    Weakness - Generalized       Chief complaint: Very much awake and alert and smiling.  Subjective: He currently took his medications with the help of the registered nurse with sips of water.  In fact he was still continuing to perform the active taking a medication although there was nothing in his hand.  He is moving all extremities well.  Still has some issues with the right leg.    Objective:  Vital signs: /59 (BP Location: Left arm, Patient Position: Sitting)   Pulse 62   Temp 97.6 °F (36.4 °C) (Oral)   Resp 18   Ht 182.9 cm (72\")   Wt 134 kg (295 lb 6.7 oz)   SpO2 96%   BMI 40.07 kg/m²    Gen: NAD, vitals reviewed  MS: Has improved considerably since initial visit.  CN: Cranials 2-12: No focal deficits.  He has passed his bedside swallow to modified texture diet.  Motor: Mild weakness of the right lower extremity could be seen.  This is making it difficult for him to bear weight.  Sensory: No deficits noted.  Coordination: Normal finger-to-nose coordination noted.  Gait: As per the physical therapist assessment from today  Gait Training Goal 1 (PT)      Activity/Assistive Device (Gait Training Goal 1, PT) gait (walking locomotion);assistive device use;walker, rolling  -AV       [place] Level (Gait Training Goal 1, PT) standby assist  -AV       Distance (Gait Training Goal 1, PT) 150  -AV       Time Frame (Gait Training Goal 1, PT) 1        ROS:  General: Pleasant gentleman currently more interactive with staff.  Neurological: Has been participating with physical therapy at this point.    Laboratory results:  Lab Results   Component Value Date    GLUCOSE 160 (H) 2023    CALCIUM 9.4 2023     2023    K 4.5 2023    CO2 23.1 2023     (H) 2023    BUN 38 (H) 2023    CREATININE 2.08 (H) 2023    EGFRIFNONA 73 " 07/24/2017    BCR 18.3 07/14/2023    ANIONGAP 8.9 07/14/2023     Lab Results   Component Value Date    WBC 14.24 (H) 07/14/2023    HGB 12.5 (L) 07/14/2023    HCT 39.6 07/14/2023    MCV 91.7 07/14/2023     07/14/2023     Lab Results   Component Value Date    LDL 40 (L) 03/26/2021    LDL 27 02/26/2016            Review of labs: The EGFR is low at 36.  Hemoglobin A1c is elevated 8.3.     CMP:        Lab 07/14/23  0750 07/13/23  0516 07/12/23  0515 07/11/23  0500 07/10/23  0247 07/09/23  1151 07/08/23  0256   SODIUM 141 137 143 146* 144   < > 147*   POTASSIUM 4.5 4.7 4.2 4.3 4.0   < > 3.4*   CHLORIDE 109* 109* 111* 114* 111*   < > 112*   CO2 23.1 16.3* 22.4 20.4* 21.8*   < > 24.7   ANION GAP 8.9 11.7 9.6 11.6 11.2   < > 10.3   BUN 38* 42* 39* 33* 33*   < > 50*   CREATININE 2.08* 1.99* 2.11* 1.90* 1.93*   < > 2.18*   EGFR 35.6* 37.5* 35.0* 39.6* 38.9*   < > 33.6*   GLUCOSE 160* 266* 294* 216* 204*   < > 157*   CALCIUM 9.4 8.9 9.0 9.7 9.0   < > 8.7   MAGNESIUM 2.8* 3.2* 3.0* 2.8* 2.5*   < > 2.1   PHOSPHORUS 5.2* 4.4 3.5 3.3 2.5  --  2.3*   ALBUMIN  --   --   --   --   --   --  3.1*    < > = values in this interval not displayed.                  Lab Results   Component Value Date    DSMTWKSI77 1,638 (H) 07/10/2023       Lab Results   Component Value Date    FOLATE >20.00 07/10/2023       Lab Results   Component Value Date    HGBA1C 8.3 (H) 11/10/2020           Review and interpretation of imaging: The MRI of the lumbar spine without contrast from July 12, 2023 was reviewed that shows the following:    FINDINGS:          PARASPINAL AREA:     Except for disc and endplate degenerative changes, no significant paraspinal mass   is suggested.    BONES:             No acute fracture, pars defect, or significant osseous lesion.  Probably benign intraosseous   hemangiomas (or venous malformations) are seen, such as at the L1 vertebral body in a right   paramidline location, seen on prior studies.  CORD/CAUDA EQUINA:             Normal caliber, contour, and signal intensity.  The conus medullaris terminates   at about the L1-2 level, which is within normal limits.  No cauda equina mass is suggested.  OTHER:             There is mild lateral curvature of the lumbar spine with the convexity to the left, which   may be fixed or positional.  Extensive ossification of the anterior longitudinal ligament is seen   and may represent diffuse idiopathic skeletal hyperostosis (DISH).  There may be incidental small   renal cysts.  They are not fully characterized by this study.  Baastrups disease is possible at   several levels, especially at L2-3, L3-4, and L4-5.  For the purpose of interpreting the current   MRI study, the same convention for designation of vertebral levels used in the 9/11/2019 exam   report interpretation is maintained.     LUMBAR DISC INTERSPACE LEVELS ON AXIAL MR IMAGING:  T12-L1: There is mild facet osteoarthritis.  No disc herniation.  No significant foraminal   narrowing.  No significant spinal canal narrowing.  L1-L2:   Mild-to-moderate facet osteoarthritis is suspected with facet joint effusions.  Mild diffuse   annular disc bulge is seen.  No significant foraminal narrowing.  No disc herniation.  No   significant spinal canal narrowing.  L2-L3:   Moderate facet osteoarthritis is seen.  There is posterior ligamentous thickening.  Diffuse   annular disc bulge is identified.  There is moderate spinal stenosis.  Probably no significant   neural foraminal narrowing.  No disc herniation.  L3-L4:   Severe disc and endplate degenerative changes are seen.  Endplate osteophytes are present.    There is diffuse annular disc bulge.  There may be a central/right central/right subarticular disc   herniation, which measures approximately 1.5 x 0.6 x 1.3 cm in transverse (TR), anteroposterior   (AP), and craniocaudal (CC) extent, respectively, as seen on image 28 of series 10 and image 10 of   series 6.  There is moderate spinal  stenosis.  Moderate bilateral neural foraminal narrowing is   seen, greater on the right.  Moderate-to-severe facet osteoarthritis is identified, greater on the   right than the left.  There may be a left-sided facet joint effusion.  Subarticular narrowing is   seen, greater on the right than the left.  There is possible minimal (less than 5 mm) chronic   retrolisthesis at L3-4.  These findings are well seen on image 28 of series 10 and are probably   similar in degree to the 9/11/2019 MRI study.  L4-L5:   There is a suspected left-sided laminotomy defect.  Severe disc and endplate degenerative   changes are seen.  Endplate osteophytes are present.  There is moderate-to-severe spinal stenosis.    There are probably bilateral disc herniations present.  The right-sided disc herniation measures   1.7 (TR) x 0.7 (AP) x 1 (CC) cm, as seen on image 22 of series 10.  The left-sided disc herniation   measures about 1.5 (TR) x 0.8 (AP) x 1.1 (CC) cm, as seen on image 22 of series 10.  There is   associated effacement of the ventral thecal sac, greater on the left, with these findings.  Severe   facet osteoarthritis is seen bilaterally.  There is a left-sided facet joint effusion.  It is   possible that these disc herniations may be contiguous.  There is severe left-sided foraminal   narrowing and moderate-to-severe right-sided foraminal narrowing.  There is possible minimal (less   than 5 mm) chronic anterolisthesis at L4-5.  The findings at this level have progressed since the   9/11/2019 study.  L5-S1:   Moderate-to-severe facet osteoarthritis is seen.  There is diffuse annular disc bulge.  No   disc herniation is suspected.  There is mild-to-moderate bilateral neural foraminal narrowing.  No   significant spinal canal narrowing.     IMPRESSION:                 1. No acute findings.  No acute vertebral compression fracture.    2. Degenerative changes are present at several levels, greatest at L3-4 and L4-5.  The findings  at   L4-5 have progressed since the 9/11/2019 MRI study.  The findings at L3-4 are probably similar in   degree.    3. Except for endplate degenerative changes, no significant intraosseous lesions are seen.    4. Except for suspected DISH, no significant paraspinal masses are appreciated.    5. Mild levoscoliosis of the lumbar spine is possible.    6. No distal cord signal abnormality.  No cauda equina mass.     CT Head Without Contrast    Result Date: 7/5/2023  PROCEDURE: CT HEAD WO CONTRAST  COMPARISON:  [facility], MR, MRI BRAIN WO CONTRAST, 7/01/2023, 10:11.  CT, CT ANGIOGRAM HEAD W AI ANALYSIS OF LVO, 6/30/2023, 18:58.  [facility], CT, CT HEAD WO CONTRAST STROKE PROTOCOL, 6/30/2023, 18:33.  [facility], CT, CT HEAD WO CONTRAST, 4/30/2023, 15:12. INDICATIONS: ams, lethargic  PROTOCOL:   Standard imaging protocol performed    RADIATION:   DLP: 1897mGy*cm   MA and/or KV was adjusted to minimize radiation dose.     TECHNIQUE: CT images of the head were obtained without contrast material.   FINDINGS:  No midline shift. Ventricles and sulci are normal in size. Basal cisterns are patent. No extra-axial fluid collection. No evidence of acute intracranial hemorrhage, mass lesion, or edema. No definite CT findings of acute infarction.  Paranasal sinuses and mastoid air cells are clear. No soft tissue or calvarial abnormality is identified.      Impression: No evidence for acute intracranial abnormality.     JOYCE ROSE MD       Electronically Signed and Approved By: JOYCE ROSE MD on 7/05/2023 at 8:20             CT Head Without Contrast Stroke Protocol    Result Date: 6/30/2023  PROCEDURE: CT HEAD WO CONTRAST STROKE PROTOCOL  COMPARISON:  [facility], CT, CT HEAD WO CONTRAST, 4/30/2023, 15:12. INDICATIONS: Neuro deficit, acute, stroke suspected  PROTOCOL:   Standard imaging protocol performed    RADIATION:   DLP: 1145.2 mGy*cm   MA and/or KV was adjusted to  minimize radiation dose.     TECHNIQUE: After obtaining the patient's consent, CT images were obtained without non-ionic intravenous contrast material.  FINDINGS:  Superficial soft tissues appear unremarkable.  The calvarium is intact.  There is a small amount of mucus in the right maxillary sinus.  The ventricles appear normal in size and configuration for patient's age. There is no evidence of herniation, hydrocephalus or mass effect. Gray-white differentiation appears intact. There are no focal areas of hypoattenuation within the brain parenchyma. There is no evidence of acute intracranial hemorrhage. The orbits appear unremarkable.       Impression:  No acute intracranial abnormality.     MARIZA KIRAN MD       Electronically Signed and Approved By: MARIZA KIRAN MD on 6/30/2023 at 19:14                MRI Brain Without Contrast    Result Date: 7/1/2023  PROCEDURE: MRI BRAIN WO CONTRAST  COMPARISON: [facility], CT, CT HEAD WO CONTRAST STROKE PROTOCOL, 6/30/2023, 18:33.  [facility], MR, BRAIN W/O CONTRAST, 3/26/2021, 7:25.  INDICATIONS: LEFT SIDED WEAKNESS  TECHNIQUE: Multiplanar images of the brain were obtained in a high field strength magnet.  FINDINGS:  The ventricles are normal in size, position, and configuration.  Sulci are not abnormally prominent.  Scattered tiny foci of nonspecific T2 bright signal in cerebral white matter appear stable.  No abnormal bright signal is seen on diffusion weighted images.  No restricted diffusion is evident.  No abnormal dark signal is seen on magnetic susceptibility sequence sensitive for blood products.  No abnormality of the pituitary or orbits is evident.  The paranasal sinuses are well aerated.  No abnormal mastoid signal is seen.      Impression:   MR examination of the brain without IV contrast demonstrating no acute intracranial abnormality.      JON LOPEZ MD       Electronically Signed and Approved By: JON LOPEZ MD on  7/01/2023 at 11:03               Workup to date: The recent EEG from July 6, 2023 shows the following:    History:    Suspected to have seizures     Medications:  Finerenone, plecanatide, Seroquel, semaglutide, albuterol, amlodipine, aspirin, atenolol, atorvastatin, cyclobenzaprine, empagliflozin, escitalopram, furosemide, glipizide, hydroxyzine, insulin, lamotrigine, lisinopril, lubiprostone, multivitamin with minerals, oxycodone-acetaminophen, pantoprazole, pregabalin, Genotropin, bromide-olodaterol, zolpidem.     Reading:   The EEG was obtained portable in his room at which time he was unresponsive with video monitoring.  We do not know how much sleep he has had the night before the testing.     The dominant background activity consists of symmetric and irregular 20 to 50 µV 5 cps which were prominent on both parieto-occipital regions and were mixed with moderate diffuse symmetric irregular 20 to 65 µV 2 to 3 cps waves which were prominent on both frontocentral regions.  There were symmetric vertex activities during the light stages of sleep.  There were no discernible responses on photic stimulation advised frequencies.  There was no amplitude asymmetry.  No paroxysmal discharges.     Impression:   Abnormal EEG because of slow background activity mixed with slower waves compatible with mild to moderate and diffuse encephalopathy.  There were no epileptiform discharges noted today.      Diagnoses: Patient with metabolic encephalopathy and behavioral issues improving well.      Comment: Has chronic degenerative disc disease of the back with involvement of the lumbosacral spine as follows:    Degenerative changes are present at several levels, greatest at L3-4 and L4-5.  The findings at   L4-5 have progressed since the 9/11/2019 MRI study.      Plan:  1.  Continue physical therapy and Occupational Therapy as before.  2.  He will need electrodiagnostic studies of both lower extremities to look for any acute on  chronic changes of the right L3-L4 L4-L5 radiculopathy.  3.  Inpatient rehabilitation will be best.  4.  Neurosurgery has also been requested to see the patient as well.    Discussed with the patient and his care team and he will be followed up by the telemetry medicine team.    Spent a total of 30 minutes in face-to-face evaluation and management of the patient using the dedicated telemedicine device without any interruption with the help of Michael the rounding nurse with the patient located at the [facility] and myself at a remote location.    Electronically signed by Beth Hairston MD, 07/14/23, 2:52 PM EDT.

## 2023-07-14 NOTE — PLAN OF CARE
Goal Outcome Evaluation:  Patient with no acute events this shift.  Patient with mild confusion at times.  Patient pleasant, calm and follows directions.  Patient with no needs or concerns voiced at this time.  Continue plan of care.

## 2023-07-14 NOTE — PLAN OF CARE
Goal Outcome Evaluation:  Plan of Care Reviewed With: patient        Progress: no change  Outcome Evaluation: Patient presents with deficits in balance, endurance, transfers, and ambulation. Patient will benefit from skilled PT services to address these mobility deficits and decrease risk of falls.      Anticipated Discharge Disposition (PT): inpatient rehabilitation facility

## 2023-07-14 NOTE — CONSULTS
"Nutrition Services    Patient Name: Carlos Murphy Jr.  YOB: 1960  MRN: [id number]  Admission date: 6/30/2023      CLINICAL NUTRITION ASSESSMENT      Reason for Assessment  Follow-up protocol     H&P:    Past Medical History:   Diagnosis Date    Acute kidney injury     2021 POST SEIZURES    Astigmatism of both eyes     eyes twitch left and right    Bipolar disorder     Charcot ankle     Closed nondisplaced fracture of medial malleolus of left tibia     COPD (chronic obstructive pulmonary disease)     USES INHALERS    Diabetes     BG RUNS AROUND 90'S IN AM    Hx of schizophrenia     Hyperlipidemia     Hypertension     SEEN DR ROD IN THE PAST, HAD APPT WITH DR MICHAUD IN 5-2022 CX APPT, DENIED CP/SOB    Neuropathy     Seizures     LAST ONE 4/21/22    Sleep apnea     DOES NOT USE CPAP    Varicose vein of leg         Current Problems:   Active Hospital Problems    Diagnosis     **Acute renal failure, unspecified acute renal failure type     Onychomycosis     Onychocryptosis     Foreign body in left foot     Peripheral neuropathy     Charcot foot due to diabetes mellitus     Foot pain, bilateral         Nutrition/Diet History         Narrative     Patient admitted with ARF. Patient continues to have AMS. Cortrak removed 7/9, NG tube removed 7/13. Now on mechanical soft diet with thin liquids and continuing to work with speech therapy. Will DC enteral nutrition orders.     Patient consumed ~25% today at breakfast and Magic Cup was added to all trays to promote calorie and protein intake. RD will CTM intake as mental status improves and follow up to provide further nutrition intervention if indicated.      Anthropometrics        Current Height, Weight Height: 182.9 cm (72\")  Weight: 134 kg (295 lb 6.7 oz)   Current BMI Body mass index is 40.07 kg/m².       Weight Hx  Wt Readings from Last 30 Encounters:   07/10/23 1617 134 kg (295 lb 6.7 oz)   06/30/23 2300 133 kg (294 lb 1.5 oz)   04/30/23 1344 134 kg (296 " lb 1.2 oz)   09/19/22 1532 136 kg (299 lb 6.4 oz)   08/18/22 1337 132 kg (290 lb)   06/30/22 1407 131 kg (288 lb)   06/14/22 1309 131 kg (288 lb)   06/01/22 0920 136 kg (299 lb 2.6 oz)   05/31/22 0852 132 kg (290 lb)   05/27/22 0945 132 kg (290 lb)   05/24/22 1338 131 kg (289 lb)   05/10/22 1316 131 kg (289 lb)   04/25/22 0800 123 kg (271 lb)   04/22/22 1154 129 kg (284 lb 6.3 oz)   04/22/22 0941 129 kg (284 lb)   04/21/22 0917 129 kg (284 lb 9.8 oz)   04/13/22 1407 125 kg (276 lb)   07/13/21 1334 132 kg (290 lb)   03/15/21 0000 135 kg (297 lb)   02/18/21 0000 133 kg (293 lb 5 oz)   01/05/21 0000 135 kg (297 lb 4 oz)   12/03/20 0000 130 kg (287 lb)   11/06/20 0000 129 kg (285 lb)   09/11/20 0000 129 kg (285 lb)   05/20/19 0000 (!) 136 kg (300 lb 4 oz)   09/11/18 0000 136 kg (300 lb)   07/06/18 0000 (!) 137 kg (303 lb)   05/29/18 0000 (!) 139 kg (307 lb)   02/27/18 0000 136 kg (300 lb)            Wt Change Observation Stable     Estimated/Assessed Needs       Energy Requirements 25-30 kcal/kg adj BW (91.4 kg)   EST Needs (kcal/day) 7245-2543 kcal        Protein Requirements 1.2-1.5 g/kg adj BW    EST Daily Needs (g/day) 110-137 g       Fluid Requirements 25 ml/kg adj BW    Estimated Needs (mL/day) 2285 ml      Labs/Medications         Pertinent Labs Reviewed.   Results from last 7 days   Lab Units 07/14/23  0750 07/13/23  0516 07/12/23  0515   SODIUM mmol/L 141 137 143   POTASSIUM mmol/L 4.5 4.7 4.2   CHLORIDE mmol/L 109* 109* 111*   CO2 mmol/L 23.1 16.3* 22.4   BUN mg/dL 38* 42* 39*   CREATININE mg/dL 2.08* 1.99* 2.11*   CALCIUM mg/dL 9.4 8.9 9.0   GLUCOSE mg/dL 160* 266* 294*       Results from last 7 days   Lab Units 07/14/23  0750 07/13/23  0805 07/13/23  0516 07/12/23  0515   MAGNESIUM mg/dL 2.8*  --  3.2* 3.0*   PHOSPHORUS mg/dL 5.2*  --  4.4 3.5   HEMOGLOBIN g/dL 12.5*   < >  --  11.8*   HEMATOCRIT % 39.6   < >  --  36.7*    < > = values in this interval not displayed.       Coronavirus (COVID-19)   Date Value  Ref Range Status   03/25/2021 NOT DETECTED NA Final     Comment:     The SARS-CoV-2 assay is a real-time, RT-PCR test intended  for the qualitative detection of nucleic acid from the  SARS-CoV-2 in respiratory specimens from individuals,  testing performed at [facility].       Lab Results   Component Value Date    HGBA1C 8.3 (H) 11/10/2020         Pertinent Medications Reviewed.     Current Nutrition Orders & Evaluation of Intake       Oral Nutrition     Current PO Diet Diet: Regular/House Diet; Texture: Mechanical Ground (NDD 2); Fluid Consistency: Thin (IDDSI 0)   Supplement Orders Placed This Encounter      Place Feeding Tube Per [employer] System      Diet, Tube Feeding Tube Feeding Formula: Diabetisource AC; Tube Feeding Type: Cyclic / Intermittent; Feeding Start Time: 10:00 PM; Feeding End Time: 6:00 AM; Cyclic Feeding Start Rate (mL/hr): 25; To Goal Rate of (mL/hr): 80; Supplemental Bolus Fe...      Dietary Nutrition Supplements Magic Cup; berry       Malnutrition Severity Assessment                Nutrition Diagnosis         Nutrition Dx Problem 1 Inadequate energy Intake related to decreased ability to consume sufficient energy as evidenced by per nursing documentation. and AMS.        Nutrition Intervention         DC EN orders  Magic Cup TID (+870 kcal, 27 g protein daily)     Medical Nutrition Therapy/Nutrition Education          Learner     Readiness Patient  Education not appropriate at this time     Method     Response N/A  N/A     Monitor/Evaluation        Monitor Per protocol, I&O, PO intake, Supplement intake, Pertinent labs, Weight, Symptoms, POC/GOC, Diet advancement       Nutrition Discharge Plan         To be determined       Electronically signed by:  Fransisco Stanley RD  07/14/23 12:17 EDT

## 2023-07-15 LAB
ANION GAP SERPL CALCULATED.3IONS-SCNC: 12 MMOL/L (ref 5–15)
BASOPHILS # BLD AUTO: 0.09 10*3/MM3 (ref 0–0.2)
BASOPHILS NFR BLD AUTO: 0.7 % (ref 0–1.5)
BUN SERPL-MCNC: 36 MG/DL (ref 8–23)
BUN/CREAT SERPL: 17.9 (ref 7–25)
CALCIUM SPEC-SCNC: 9.3 MG/DL (ref 8.6–10.5)
CHLORIDE SERPL-SCNC: 108 MMOL/L (ref 98–107)
CO2 SERPL-SCNC: 21 MMOL/L (ref 22–29)
CREAT SERPL-MCNC: 2.01 MG/DL (ref 0.76–1.27)
DEPRECATED RDW RBC AUTO: 43.6 FL (ref 37–54)
EGFRCR SERPLBLD CKD-EPI 2021: 37 ML/MIN/1.73
EOSINOPHIL # BLD AUTO: 0.43 10*3/MM3 (ref 0–0.4)
EOSINOPHIL NFR BLD AUTO: 3.4 % (ref 0.3–6.2)
ERYTHROCYTE [DISTWIDTH] IN BLOOD BY AUTOMATED COUNT: 13.9 % (ref 12.3–15.4)
GLUCOSE BLDC GLUCOMTR-MCNC: 125 MG/DL (ref 70–99)
GLUCOSE BLDC GLUCOMTR-MCNC: 204 MG/DL (ref 70–99)
GLUCOSE BLDC GLUCOMTR-MCNC: 228 MG/DL (ref 70–99)
GLUCOSE BLDC GLUCOMTR-MCNC: 234 MG/DL (ref 70–99)
GLUCOSE SERPL-MCNC: 119 MG/DL (ref 65–99)
HCT VFR BLD AUTO: 38.7 % (ref 37.5–51)
HGB BLD-MCNC: 12.2 G/DL (ref 13–17.7)
IMM GRANULOCYTES # BLD AUTO: 0.1 10*3/MM3 (ref 0–0.05)
IMM GRANULOCYTES NFR BLD AUTO: 0.8 % (ref 0–0.5)
LYMPHOCYTES # BLD AUTO: 1.86 10*3/MM3 (ref 0.7–3.1)
LYMPHOCYTES NFR BLD AUTO: 14.7 % (ref 19.6–45.3)
MAGNESIUM SERPL-MCNC: 2.7 MG/DL (ref 1.6–2.4)
MCH RBC QN AUTO: 29 PG (ref 26.6–33)
MCHC RBC AUTO-ENTMCNC: 31.5 G/DL (ref 31.5–35.7)
MCV RBC AUTO: 92.1 FL (ref 79–97)
MONOCYTES # BLD AUTO: 1.5 10*3/MM3 (ref 0.1–0.9)
MONOCYTES NFR BLD AUTO: 11.8 % (ref 5–12)
NEUTROPHILS NFR BLD AUTO: 68.6 % (ref 42.7–76)
NEUTROPHILS NFR BLD AUTO: 8.69 10*3/MM3 (ref 1.7–7)
NRBC BLD AUTO-RTO: 0 /100 WBC (ref 0–0.2)
PHOSPHATE SERPL-MCNC: 4.7 MG/DL (ref 2.5–4.5)
PLATELET # BLD AUTO: 361 10*3/MM3 (ref 140–450)
PMV BLD AUTO: 10.2 FL (ref 6–12)
POTASSIUM SERPL-SCNC: 4.2 MMOL/L (ref 3.5–5.2)
RBC # BLD AUTO: 4.2 10*6/MM3 (ref 4.14–5.8)
SODIUM SERPL-SCNC: 141 MMOL/L (ref 136–145)
WBC NRBC COR # BLD: 12.67 10*3/MM3 (ref 3.4–10.8)

## 2023-07-15 PROCEDURE — 99232 SBSQ HOSP IP/OBS MODERATE 35: CPT | Performed by: STUDENT IN AN ORGANIZED HEALTH CARE EDUCATION/TRAINING PROGRAM

## 2023-07-15 PROCEDURE — 99232 SBSQ HOSP IP/OBS MODERATE 35: CPT | Performed by: INTERNAL MEDICINE

## 2023-07-15 PROCEDURE — 63710000001 INSULIN LISPRO (HUMAN) PER 5 UNITS: Performed by: NURSE PRACTITIONER

## 2023-07-15 PROCEDURE — 82948 REAGENT STRIP/BLOOD GLUCOSE: CPT

## 2023-07-15 PROCEDURE — 84100 ASSAY OF PHOSPHORUS: CPT | Performed by: INTERNAL MEDICINE

## 2023-07-15 PROCEDURE — 83735 ASSAY OF MAGNESIUM: CPT | Performed by: INTERNAL MEDICINE

## 2023-07-15 PROCEDURE — 63710000001 INSULIN DETEMIR PER 5 UNITS: Performed by: NURSE PRACTITIONER

## 2023-07-15 PROCEDURE — 25010000002 HEPARIN (PORCINE) PER 1000 UNITS: Performed by: NURSE PRACTITIONER

## 2023-07-15 PROCEDURE — 80048 BASIC METABOLIC PNL TOTAL CA: CPT | Performed by: INTERNAL MEDICINE

## 2023-07-15 PROCEDURE — 85025 COMPLETE CBC W/AUTO DIFF WBC: CPT | Performed by: INTERNAL MEDICINE

## 2023-07-15 RX ADMIN — DOXYCYCLINE 100 MG: 100 CAPSULE ORAL at 09:46

## 2023-07-15 RX ADMIN — LAMOTRIGINE 200 MG: 100 TABLET ORAL at 09:45

## 2023-07-15 RX ADMIN — INSULIN LISPRO 4 UNITS: 100 INJECTION, SOLUTION INTRAVENOUS; SUBCUTANEOUS at 11:44

## 2023-07-15 RX ADMIN — INSULIN LISPRO 4 UNITS: 100 INJECTION, SOLUTION INTRAVENOUS; SUBCUTANEOUS at 21:38

## 2023-07-15 RX ADMIN — Medication 10 ML: at 21:37

## 2023-07-15 RX ADMIN — INSULIN LISPRO 4 UNITS: 100 INJECTION, SOLUTION INTRAVENOUS; SUBCUTANEOUS at 18:14

## 2023-07-15 RX ADMIN — ATENOLOL 25 MG: 25 TABLET ORAL at 09:45

## 2023-07-15 RX ADMIN — DOCUSATE SODIUM 50MG AND SENNOSIDES 8.6MG 2 TABLET: 8.6; 5 TABLET, FILM COATED ORAL at 09:46

## 2023-07-15 RX ADMIN — DOCUSATE SODIUM 50MG AND SENNOSIDES 8.6MG 2 TABLET: 8.6; 5 TABLET, FILM COATED ORAL at 21:39

## 2023-07-15 RX ADMIN — LAMOTRIGINE 200 MG: 100 TABLET ORAL at 21:39

## 2023-07-15 RX ADMIN — DOXYCYCLINE 100 MG: 100 CAPSULE ORAL at 21:39

## 2023-07-15 RX ADMIN — Medication 10 ML: at 09:45

## 2023-07-15 RX ADMIN — MUPIROCIN 1 APPLICATION: 20 OINTMENT TOPICAL at 21:39

## 2023-07-15 RX ADMIN — HEPARIN SODIUM 5000 UNITS: 5000 INJECTION INTRAVENOUS; SUBCUTANEOUS at 06:29

## 2023-07-15 RX ADMIN — MULTIPLE VITAMINS W/ MINERALS TAB 1 TABLET: TAB at 15:14

## 2023-07-15 RX ADMIN — PANTOPRAZOLE SODIUM 40 MG: 40 INJECTION, POWDER, FOR SOLUTION INTRAVENOUS at 21:37

## 2023-07-15 RX ADMIN — LOSARTAN POTASSIUM 50 MG: 25 TABLET, FILM COATED ORAL at 21:39

## 2023-07-15 RX ADMIN — INSULIN DETEMIR 15 UNITS: 100 INJECTION, SOLUTION SUBCUTANEOUS at 21:38

## 2023-07-15 RX ADMIN — HEPARIN SODIUM 5000 UNITS: 5000 INJECTION INTRAVENOUS; SUBCUTANEOUS at 21:38

## 2023-07-15 RX ADMIN — HEPARIN SODIUM 5000 UNITS: 5000 INJECTION INTRAVENOUS; SUBCUTANEOUS at 15:14

## 2023-07-15 RX ADMIN — INSULIN DETEMIR 15 UNITS: 100 INJECTION, SOLUTION SUBCUTANEOUS at 09:45

## 2023-07-15 RX ADMIN — AMLODIPINE BESYLATE 10 MG: 5 TABLET ORAL at 09:45

## 2023-07-15 RX ADMIN — PHENOL 1 SPRAY: 1.5 LIQUID ORAL at 21:37

## 2023-07-15 RX ADMIN — LOSARTAN POTASSIUM 50 MG: 25 TABLET, FILM COATED ORAL at 09:45

## 2023-07-15 RX ADMIN — MUPIROCIN 1 APPLICATION: 20 OINTMENT TOPICAL at 09:46

## 2023-07-15 RX ADMIN — Medication 10 ML: at 09:46

## 2023-07-15 RX ADMIN — QUETIAPINE FUMARATE 600 MG: 200 TABLET ORAL at 21:39

## 2023-07-15 RX ADMIN — PANTOPRAZOLE SODIUM 40 MG: 40 INJECTION, POWDER, FOR SOLUTION INTRAVENOUS at 10:00

## 2023-07-15 RX ADMIN — Medication 1 MG: at 09:45

## 2023-07-15 NOTE — PROGRESS NOTES
" LOS: 15 days   Patient Care Team:  Felix Atkinson MD as PCP - General  Felix Atkinson MD as PCP - Family Medicine    Chief Complaint: MK/CKD    Subjective : Patient is alert denies any chest pain shortness of breath denies any nausea or vomiting.    Objective     Vital Sign Min/Max for last 24 hours  Temp  Min: 97.5 °F (36.4 °C)  Max: 98 °F (36.7 °C)   BP  Min: 108/54  Max: 152/74   Pulse  Min: 59  Max: 66   Resp  Min: 16  Max: 18   SpO2  Min: 97 %  Max: 99 %   No data recorded   No data recorded     Flowsheet Rows      Flowsheet Row First Filed Value   Admission Height 182.9 cm (72\") Documented at 06/30/2023 1800   Admission Weight 133 kg (294 lb 1.5 oz) Documented at 06/30/2023 2300            No intake/output data recorded.  I/O last 3 completed shifts:  In: 1035 [P.O.:1035]  Out: 1000 [Urine:1000]    Objective:  General Appearance:  Comfortable, not in pain and in no acute distress (Seems better.).    Vital signs: (most recent): Blood pressure 110/58, pulse 65, temperature 98 °F (36.7 °C), temperature source Oral, resp. rate 18, height 182.9 cm (72\"), weight 134 kg (295 lb 6.7 oz), SpO2 99 %.  Vital signs are normal.  No fever.    Output: Producing urine.    HEENT: Normal HEENT exam.    Lungs:  Normal effort and normal respiratory rate.    Heart: Normal rate.  Regular rhythm.    Abdomen: Abdomen is soft.  Bowel sounds are normal.   There is no abdominal tenderness.     Extremities: Normal range of motion.  There is no dependent edema.    Pulses: Distal pulses are intact.    Neurological: Patient is alert.    Skin:  Warm and dry.  There is ecchymosis.  (Scabs over knees and toes.)            Results Review:     I reviewed the patient's new clinical results.    WBC WBC   Date Value Ref Range Status   07/15/2023 12.67 (H) 3.40 - 10.80 10*3/mm3 Final   07/14/2023 14.24 (H) 3.40 - 10.80 10*3/mm3 Final   07/13/2023 22.14 (H) 3.40 - 10.80 10*3/mm3 Final      HGB Hemoglobin   Date Value Ref Range Status "   07/15/2023 12.2 (L) 13.0 - 17.7 g/dL Final   07/14/2023 12.5 (L) 13.0 - 17.7 g/dL Final   07/13/2023 12.2 (L) 13.0 - 17.7 g/dL Final      HCT Hematocrit   Date Value Ref Range Status   07/15/2023 38.7 37.5 - 51.0 % Final   07/14/2023 39.6 37.5 - 51.0 % Final   07/13/2023 37.2 (L) 37.5 - 51.0 % Final      Platlets No results found for: LABPLAT   MCV MCV   Date Value Ref Range Status   07/15/2023 92.1 79.0 - 97.0 fL Final   07/14/2023 91.7 79.0 - 97.0 fL Final   07/13/2023 89.6 79.0 - 97.0 fL Final          Sodium Sodium   Date Value Ref Range Status   07/15/2023 141 136 - 145 mmol/L Final   07/14/2023 141 136 - 145 mmol/L Final   07/13/2023 137 136 - 145 mmol/L Final      Potassium Potassium   Date Value Ref Range Status   07/15/2023 4.2 3.5 - 5.2 mmol/L Final   07/14/2023 4.5 3.5 - 5.2 mmol/L Final   07/13/2023 4.7 3.5 - 5.2 mmol/L Final     Comment:     Slight hemolysis detected by analyzer. Results may be affected.      Chloride Chloride   Date Value Ref Range Status   07/15/2023 108 (H) 98 - 107 mmol/L Final   07/14/2023 109 (H) 98 - 107 mmol/L Final   07/13/2023 109 (H) 98 - 107 mmol/L Final      CO2 CO2   Date Value Ref Range Status   07/15/2023 21.0 (L) 22.0 - 29.0 mmol/L Final   07/14/2023 23.1 22.0 - 29.0 mmol/L Final   07/13/2023 16.3 (L) 22.0 - 29.0 mmol/L Final      BUN BUN   Date Value Ref Range Status   07/15/2023 36 (H) 8 - 23 mg/dL Final   07/14/2023 38 (H) 8 - 23 mg/dL Final   07/13/2023 42 (H) 8 - 23 mg/dL Final      Creatinine Creatinine   Date Value Ref Range Status   07/15/2023 2.01 (H) 0.76 - 1.27 mg/dL Final   07/14/2023 2.08 (H) 0.76 - 1.27 mg/dL Final   07/13/2023 1.99 (H) 0.76 - 1.27 mg/dL Final      Calcium Calcium   Date Value Ref Range Status   07/15/2023 9.3 8.6 - 10.5 mg/dL Final   07/14/2023 9.4 8.6 - 10.5 mg/dL Final   07/13/2023 8.9 8.6 - 10.5 mg/dL Final      PO4 No results found for: CAPO4   Albumin No results found for: ALBUMIN     Magnesium Magnesium   Date Value Ref Range  Status   07/15/2023 2.7 (H) 1.6 - 2.4 mg/dL Final   07/14/2023 2.8 (H) 1.6 - 2.4 mg/dL Final   07/13/2023 3.2 (H) 1.6 - 2.4 mg/dL Final      Uric Acid No results found for: URICACID     Medication Review:   amLODIPine, 10 mg, Oral, Q24H  atenolol, 25 mg, Oral, Daily  doxycycline, 100 mg, Oral, Q12H  folic acid, 1 mg, Oral, Daily  heparin (porcine), 5,000 Units, Subcutaneous, Q8H  insulin detemir, 15 Units, Subcutaneous, Q12H  insulin lispro, 2-9 Units, Subcutaneous, 4x Daily AC & at Bedtime  lamoTRIgine, 200 mg, Oral, Q12H  losartan, 50 mg, Oral, BID  multivitamin with minerals, 1 tablet, Oral, Daily  mupirocin, 1 application , Topical, 2 times per day  pantoprazole, 40 mg, Intravenous, BID  QUEtiapine, 600 mg, Oral, Nightly  senna-docusate sodium, 2 tablet, Oral, BID  sodium chloride, 10 mL, Intravenous, Q12H  sodium chloride, 10 mL, Intravenous, Q12H        Assessment & Plan       Acute renal failure, unspecified acute renal failure type    Onychomycosis    Onychocryptosis    Foreign body in left foot    Peripheral neuropathy    Charcot foot due to diabetes mellitus    Foot pain, bilateral      Assessment & Plan    1.  MK/CKD3b-  Due to diabetic nephropathy.  He has had worsened renal function recently.  Had been on lisinopril and SGLT2 and kerendia.  Creatinine improved now and stable. Volume status adequate.  .  Baseline creatinine around 2.0-2.5.  Monitor.    2.  Met Acidosis-recurrent, slightly decreased but hold off on bicarb for now if worsens will restart oral bicarbonate   3.  Proteinuria-  diabetic nephropathy, continue angiotensin receptor blocker  4.  DMII-  treated.  Was On jardiance, hold with MK.  May want to reintroduce it at some point  5.  HTN-  BP is better, continue losartan.  6.  Chronic edema-edema seems to be improved although now back on amlodipine certainly if develops significant edema as an outpatient we will have to reconsider.  7.  H/o bipolar-  previous lithium use.  8.  Anemia-   tsat at 14%, ferritin 425. Received some IV iron.  Hemoglobin stable  9.  Weakness-  would avoid muscle relaxer in CKD (flexeril).  Likely polypharmacy contributing.    We will follow.      Hemant Kaplan MD  07/15/23  14:33 EDT

## 2023-07-15 NOTE — PLAN OF CARE
Goal Outcome Evaluation:   Patient is alert with confusion noted. Vitals are stable. Patient denies any pain or discomfort at this time. Patient is resting well in bed.        Progress: no change

## 2023-07-15 NOTE — PROGRESS NOTES
[facility]  [facility]    Routine Consult Follow-Up    Patient Name:   Carlos Barber  YOB: 1960  Identification Number:   MRN - [id number]  Date of Service:   07/15/2023 09:20:18    Diagnosis        G93.49 - Encephalopathy Multifactorial    Impression  62 year old male with history of COPD, schizophrenia, seizures, bipolar disorder, HTN, HLD, DM2 who presented on 6/30 due to AMS. MRI, EEG, and LP obtained which were generally reassuring. Has improved over course of admission, though does not appear to be at baseline at this time. Advise supportive care for now.    Recommendations:  -f/u NSGY recommendations  -Continue home Lamictal  -PT/OT/SLP  -Toxic/metabolic/infectious workup/management per primary  -Delirium precautions: Blinds open during the day, closed at night; frequent reorientation; minimize nighttime interruptions; when possible avoid benzodiazepines, opioid pain medications, anticholinergic medications, and other sedative medications. If patient gets agitated, try verbal de-escalation first.    Disposition :  Outpatient Neurology follow up in 3-6 weeks    Subjective  Patient is well, has some difficulty with some orientation questions and some occasional non-sensical speech.    Imaging  MRI Brain 7/01: No acute abnormalities  CTA H&N: No significant focal vessel pathology  MRI L-spine: Moderate-severe spinal stenosis at L4-5. Likely DISH noted.  EEG: Mild-moderate diffuse encephalopathy.      Examination  BP(114/56), Pulse(66), Temp(36.7), Resp(18),  1A: Level of Consciousness - Alert; keenly responsive + 0  1B: Ask Month and Age - Could Not Answer Either Question Correctly + 2  1C: Blink Eyes & Squeeze Hands - Performs Both Tasks + 0  2: Test Horizontal Extraocular Movements - Normal + 0  3: Test Visual Fields - No Visual Loss + 0  4: Test Facial Palsy (Use Grimace if Obtunded) - Normal symmetry + 0  5A: Test Left Arm Motor Drift - No Drift for  10 Seconds + 0  5B: Test Right Arm Motor Drift - No Drift for 10 Seconds + 0  6A: Test Left Leg Motor Drift - No Drift for 5 Seconds + 0  6B: Test Right Leg Motor Drift - No Drift for 5 Seconds + 0  7: Test Limb Ataxia (FNF/Heel-Shin) - No Ataxia + 0  8: Test Sensation - Normal; No sensory loss + 0  9: Test Language/Aphasia - Mild-Moderate Aphasia: Some Obvious Changes, Without Significant Limitation + 1  10: Test Dysarthria - Mild-Moderate Dysarthria: Slurring but can be understood + 1  11: Test Extinction/Inattention - No abnormality + 0    NIHSS Score: 4  NIHSS Free Text : Says month is June, not oriented to age. Oriented to hospital.          Patient/Family was informed the Neurology Consult would happen via TeleHealth consult by way of interactive audio and video telecommunications and consented to receiving care in this manner.    Telehealth Neurology consultation was provided. I spent 22 minutes providing telehealth care. This includes time spent for face to face visit via telemedicine, review of medical records, imaging studies and discussion of findings with providers, the patient and/or family.      Dr Hilario Patel      [facility]  For Inpatient follow-up with [facility] physician please call [facility] [phone]. This is not an outpatient service. Post hospital discharge, please contact hospital directly.

## 2023-07-15 NOTE — PROGRESS NOTES
[facility]   Hospitalist Progress Note    Date of admission: 6/30/2023  Patient Name: Carlos Murphy Jr.  1960  Date: 7/15/2023      Subjective     Chief Complaint   Patient presents with   • Weakness - Generalized       Summary: 62 y.o. with history of seizure disorder (on lamotrigine 200 bid), bipolar, schizophrenia, who presented with altered mentation/unresponsiveness on 6/28.  Patient apparently had called his sister initially had slurred speech/somnolent and received Narcan with notable initial improvement.  Patient apparently reported taking additional medications at home but was not trying to harm himself history is very limited given patient poor historian.  Patient underwent stroke work-up without acute findings suggest etiology of patient's AMS.  Did have hyperkalemia initially improved with fluids.  Nephrology assisting given MK and rhabdomyolysis.  Podiatry assisted given for infection/glasses feet and required several pieces being removed.      Hospital course completed by worsening mentation/agitation with concern initially for seizures multiple EEGs have been negative, neurology consulted for assistance, concern for possible medication withdrawal (outpt is on ambien tox screen was positive oxycodone only)/metabolic encephalopathy and was placed on CIWA monitoring and required ativan. LP obtained negative for acute meningitis, tick panel pending and treating empirically for now.  Mentation starting to improve, gradually titrating medications, psychiatry consulted for additional assistance. Transferring out of the icu and trying to come out of restraints given current improvements.     Interval Followup:   Patient is now out of restraints  Patient has his nasogastric tube removed  Patient remains somewhat confused but overall improving .     ROS:   Limited due to confusion. Patient denies any pain.       Objective     Vitals:   Temp:  [97.5 °F (36.4 °C)-98 °F (36.7 °C)] 98 °F (36.7  °C)  Heart Rate:  [59-66] 65  Resp:  [16-18] 18  BP: (108-152)/(51-99) 110/58    Physical Exam        Constitutional: Awake, alert, no acute distress, resting in bed               Eyes: Pupils equal, sclerae anicteric, no conjunctival injection              HENT: NCAT, mucous membranes moist              Neck: Supple, no thyromegaly, no lymphadenopathy, trachea midline              Respiratory: Clear to auscultation bilaterally, nonlabored respirations no w/r/r               Cardiovascular: RRR, no murmurs, rubs, or gallops, palpable pedal pulses bilaterally              Gastrointestinal: Positive bowel sounds, soft, nontender, nondistended              Musculoskeletal: not able to lift his left arm due to shoulder pain, full rom in other extremities               Psychiatric: Appropriate affect, cooperative              Neurologic: Oriented x 1 strength symmetric in all extremities, No focal deficits noted              Skin: bilateral feet wounds are healing          Result Review:  Vital signs, labs and recent relevant imaging reviewed.      •  acetaminophen  •  acetaminophen  •  senna-docusate sodium **AND** polyethylene glycol **AND** [DISCONTINUED] bisacodyl **AND** bisacodyl  •  dextrose  •  dextrose  •  glucagon (human recombinant)  •  haloperidol lactate  •  labetalol  •  nitroglycerin  •  ondansetron  •  sodium chloride  •  sodium chloride    amLODIPine, 10 mg, Oral, Q24H  atenolol, 25 mg, Oral, Daily  doxycycline, 100 mg, Oral, Q12H  folic acid, 1 mg, Oral, Daily  heparin (porcine), 5,000 Units, Subcutaneous, Q8H  insulin detemir, 15 Units, Subcutaneous, Q12H  insulin lispro, 2-9 Units, Subcutaneous, 4x Daily AC & at Bedtime  lamoTRIgine, 200 mg, Oral, Q12H  losartan, 50 mg, Oral, BID  multivitamin with minerals, 1 tablet, Oral, Daily  mupirocin, 1 application , Topical, 2 times per day  pantoprazole, 40 mg, Intravenous, BID  QUEtiapine, 600 mg, Oral, Nightly  senna-docusate sodium, 2 tablet, Oral,  BID  sodium chloride, 10 mL, Intravenous, Q12H  sodium chloride, 10 mL, Intravenous, Q12H    7/3 eeg  Impression:   Abnormal EEG because of:  1.  Intermittent medium voltage focal slow activity noted over the left frontotemporal region suggestive of neuronal dysfunction in this region.  This abnormalities have been reported in individuals with vascular insufficiency, migraine, or postictal state.  2.  Slow background activity mixed with slower waves compatible with moderate and diffuse encephalopathy.  3.  There were no epileptiform discharges noted today.    7/6 EEG moderate encephalopathy and no acute epileptic discharges noted    Assessment / Plan     Assessment/Plan:  Sepsis from undetermined etiology, possible tickborne illness   Acute metabolic encephalopathy  MK on CKD  Severe hyperkalemia  Seizure disorder (on lamictal), with concern for subclinical seizure - negative on EEGs  Concern for withdrawal from outpatient ambien (tox + oxycodone only on admission)  Rhabdomyolysis  SSTI in setting of Foreign body in left foot/glass  Initial presentation with hyponatremia  Significant Hypernatremia  Anion gap metabolic acidosis  CVA ruled out  History of COPD  Type 2 diabetes mellitus  HTN  Bipolar disorder  Hyperlipidemia  Polypharmacy  Horizontal nystagmus - reportedly chronic/question congenital nystagmus    PLAN   Negative LP/meningitis/csf cx/blood culture, lyme and rickettsia  Off ceftriaxone, has had adequate course to also cover initial celluitis of feet   Continue doxycycline.  Patient's Ehrlichia was negative.  Lyme was negative.  [place] spotted fever IgG was positive however IgM was negative  Psychiatry consulted appreciate assistance - discontinued citalopram and increased seroquel   Continue home lamotrigine (takes for seizures)   Speech following. Continue diet   Completed course of IV thiamine   Cont atenolol, losartan, amlodipine.    Appreciate nephrology assistance with  ambar/ckd/hypernatremia  Continue Levemir, ssi  Continue local wound care  Delirium precautions/reoritentation  Continue gi ppx   Check a.m. CBC, BMP, magnesium, phosphorus      DVT prophylaxis:  Medical and mechanical DVT prophylaxis orders are present.    Level Of Support Discussed With: Patient  Code Status (Patient has no pulse and is not breathing): CPR (Attempt to Resuscitate)  Medical Interventions (Patient has pulse or is breathing): Full Support        CBC          7/13/2023    08:05 7/14/2023    07:50 7/15/2023    05:31   CBC   WBC 22.14  14.24  12.67    RBC 4.15  4.32  4.20    Hemoglobin 12.2  12.5  12.2    Hematocrit 37.2  39.6  38.7    MCV 89.6  91.7  92.1    MCH 29.4  28.9  29.0    MCHC 32.8  31.6  31.5    RDW 13.7  13.6  13.9    Platelets 342  339  361      CMP          7/13/2023    05:16 7/14/2023    07:50 7/15/2023    05:31   CMP   Glucose 266  160  119    BUN 42  38  36    Creatinine 1.99  2.08  2.01    EGFR 37.5  35.6  37.0    Sodium 137  141  141    Potassium 4.7  4.5  4.2    Chloride 109  109  108    Calcium 8.9  9.4  9.3    BUN/Creatinine Ratio 21.1  18.3  17.9    Anion Gap 11.7  8.9  12.0

## 2023-07-16 LAB
ALBUMIN SERPL-MCNC: 3.3 G/DL (ref 3.5–5.2)
ALBUMIN/GLOB SERPL: 1 G/DL
ALP SERPL-CCNC: 128 U/L (ref 39–117)
ALT SERPL W P-5'-P-CCNC: 26 U/L (ref 1–41)
ANION GAP SERPL CALCULATED.3IONS-SCNC: 12.5 MMOL/L (ref 5–15)
AST SERPL-CCNC: 15 U/L (ref 1–40)
BASOPHILS # BLD AUTO: 0.1 10*3/MM3 (ref 0–0.2)
BASOPHILS NFR BLD AUTO: 1 % (ref 0–1.5)
BILIRUB SERPL-MCNC: 0.4 MG/DL (ref 0–1.2)
BUN SERPL-MCNC: 36 MG/DL (ref 8–23)
BUN/CREAT SERPL: 17.6 (ref 7–25)
CALCIUM SPEC-SCNC: 9.1 MG/DL (ref 8.6–10.5)
CHLORIDE SERPL-SCNC: 107 MMOL/L (ref 98–107)
CO2 SERPL-SCNC: 18.5 MMOL/L (ref 22–29)
CREAT SERPL-MCNC: 2.05 MG/DL (ref 0.76–1.27)
DEPRECATED RDW RBC AUTO: 45.1 FL (ref 37–54)
EGFRCR SERPLBLD CKD-EPI 2021: 36.2 ML/MIN/1.73
EOSINOPHIL # BLD AUTO: 0.32 10*3/MM3 (ref 0–0.4)
EOSINOPHIL NFR BLD AUTO: 3.2 % (ref 0.3–6.2)
ERYTHROCYTE [DISTWIDTH] IN BLOOD BY AUTOMATED COUNT: 14 % (ref 12.3–15.4)
GLOBULIN UR ELPH-MCNC: 3.2 GM/DL
GLUCOSE BLDC GLUCOMTR-MCNC: 119 MG/DL (ref 70–99)
GLUCOSE BLDC GLUCOMTR-MCNC: 146 MG/DL (ref 70–99)
GLUCOSE BLDC GLUCOMTR-MCNC: 228 MG/DL (ref 70–99)
GLUCOSE BLDC GLUCOMTR-MCNC: 260 MG/DL (ref 70–99)
GLUCOSE BLDC GLUCOMTR-MCNC: 269 MG/DL (ref 70–99)
GLUCOSE SERPL-MCNC: 126 MG/DL (ref 65–99)
HCT VFR BLD AUTO: 38.9 % (ref 37.5–51)
HGB BLD-MCNC: 12.3 G/DL (ref 13–17.7)
IMM GRANULOCYTES # BLD AUTO: 0.04 10*3/MM3 (ref 0–0.05)
IMM GRANULOCYTES NFR BLD AUTO: 0.4 % (ref 0–0.5)
LYMPHOCYTES # BLD AUTO: 1.58 10*3/MM3 (ref 0.7–3.1)
LYMPHOCYTES NFR BLD AUTO: 15.6 % (ref 19.6–45.3)
MAGNESIUM SERPL-MCNC: 2.7 MG/DL (ref 1.6–2.4)
MCH RBC QN AUTO: 29.5 PG (ref 26.6–33)
MCHC RBC AUTO-ENTMCNC: 31.6 G/DL (ref 31.5–35.7)
MCV RBC AUTO: 93.3 FL (ref 79–97)
MONOCYTES # BLD AUTO: 1.22 10*3/MM3 (ref 0.1–0.9)
MONOCYTES NFR BLD AUTO: 12.1 % (ref 5–12)
NEUTROPHILS NFR BLD AUTO: 6.84 10*3/MM3 (ref 1.7–7)
NEUTROPHILS NFR BLD AUTO: 67.7 % (ref 42.7–76)
NRBC BLD AUTO-RTO: 0 /100 WBC (ref 0–0.2)
PHOSPHATE SERPL-MCNC: 4.7 MG/DL (ref 2.5–4.5)
PLATELET # BLD AUTO: 337 10*3/MM3 (ref 140–450)
PMV BLD AUTO: 10.2 FL (ref 6–12)
POTASSIUM SERPL-SCNC: 4.1 MMOL/L (ref 3.5–5.2)
PROT SERPL-MCNC: 6.5 G/DL (ref 6–8.5)
RBC # BLD AUTO: 4.17 10*6/MM3 (ref 4.14–5.8)
SODIUM SERPL-SCNC: 138 MMOL/L (ref 136–145)
WBC NRBC COR # BLD: 10.1 10*3/MM3 (ref 3.4–10.8)

## 2023-07-16 PROCEDURE — 84100 ASSAY OF PHOSPHORUS: CPT | Performed by: INTERNAL MEDICINE

## 2023-07-16 PROCEDURE — 63710000001 INSULIN LISPRO (HUMAN) PER 5 UNITS: Performed by: NURSE PRACTITIONER

## 2023-07-16 PROCEDURE — 99232 SBSQ HOSP IP/OBS MODERATE 35: CPT | Performed by: INTERNAL MEDICINE

## 2023-07-16 PROCEDURE — 82948 REAGENT STRIP/BLOOD GLUCOSE: CPT

## 2023-07-16 PROCEDURE — 63710000001 INSULIN DETEMIR PER 5 UNITS: Performed by: NURSE PRACTITIONER

## 2023-07-16 PROCEDURE — 83735 ASSAY OF MAGNESIUM: CPT | Performed by: INTERNAL MEDICINE

## 2023-07-16 PROCEDURE — 85025 COMPLETE CBC W/AUTO DIFF WBC: CPT | Performed by: INTERNAL MEDICINE

## 2023-07-16 PROCEDURE — 25010000002 HEPARIN (PORCINE) PER 1000 UNITS: Performed by: NURSE PRACTITIONER

## 2023-07-16 PROCEDURE — 80053 COMPREHEN METABOLIC PANEL: CPT | Performed by: INTERNAL MEDICINE

## 2023-07-16 PROCEDURE — 97116 GAIT TRAINING THERAPY: CPT

## 2023-07-16 PROCEDURE — 97110 THERAPEUTIC EXERCISES: CPT

## 2023-07-16 RX ORDER — SODIUM BICARBONATE 650 MG/1
650 TABLET ORAL 2 TIMES DAILY
Status: DISCONTINUED | OUTPATIENT
Start: 2023-07-16 | End: 2023-07-19 | Stop reason: HOSPADM

## 2023-07-16 RX ADMIN — MUPIROCIN 1 APPLICATION: 20 OINTMENT TOPICAL at 21:27

## 2023-07-16 RX ADMIN — LOSARTAN POTASSIUM 50 MG: 25 TABLET, FILM COATED ORAL at 21:25

## 2023-07-16 RX ADMIN — Medication 10 ML: at 21:26

## 2023-07-16 RX ADMIN — LOSARTAN POTASSIUM 50 MG: 25 TABLET, FILM COATED ORAL at 09:35

## 2023-07-16 RX ADMIN — MULTIPLE VITAMINS W/ MINERALS TAB 1 TABLET: TAB at 14:05

## 2023-07-16 RX ADMIN — Medication 10 ML: at 09:36

## 2023-07-16 RX ADMIN — ATENOLOL 25 MG: 25 TABLET ORAL at 09:36

## 2023-07-16 RX ADMIN — Medication 1 MG: at 09:35

## 2023-07-16 RX ADMIN — SODIUM BICARBONATE 650 MG TABLET 650 MG: at 21:25

## 2023-07-16 RX ADMIN — LAMOTRIGINE 200 MG: 100 TABLET ORAL at 09:36

## 2023-07-16 RX ADMIN — INSULIN DETEMIR 15 UNITS: 100 INJECTION, SOLUTION SUBCUTANEOUS at 09:35

## 2023-07-16 RX ADMIN — QUETIAPINE FUMARATE 600 MG: 200 TABLET ORAL at 21:25

## 2023-07-16 RX ADMIN — MUPIROCIN 1 APPLICATION: 20 OINTMENT TOPICAL at 09:36

## 2023-07-16 RX ADMIN — INSULIN LISPRO 4 UNITS: 100 INJECTION, SOLUTION INTRAVENOUS; SUBCUTANEOUS at 11:14

## 2023-07-16 RX ADMIN — INSULIN LISPRO 6 UNITS: 100 INJECTION, SOLUTION INTRAVENOUS; SUBCUTANEOUS at 21:25

## 2023-07-16 RX ADMIN — INSULIN DETEMIR 15 UNITS: 100 INJECTION, SOLUTION SUBCUTANEOUS at 21:25

## 2023-07-16 RX ADMIN — LAMOTRIGINE 200 MG: 100 TABLET ORAL at 21:25

## 2023-07-16 RX ADMIN — DOXYCYCLINE 100 MG: 100 CAPSULE ORAL at 21:24

## 2023-07-16 RX ADMIN — DOCUSATE SODIUM 50MG AND SENNOSIDES 8.6MG 2 TABLET: 8.6; 5 TABLET, FILM COATED ORAL at 09:35

## 2023-07-16 RX ADMIN — DOXYCYCLINE 100 MG: 100 CAPSULE ORAL at 09:35

## 2023-07-16 RX ADMIN — HEPARIN SODIUM 5000 UNITS: 5000 INJECTION INTRAVENOUS; SUBCUTANEOUS at 05:30

## 2023-07-16 RX ADMIN — HEPARIN SODIUM 5000 UNITS: 5000 INJECTION INTRAVENOUS; SUBCUTANEOUS at 21:25

## 2023-07-16 RX ADMIN — HEPARIN SODIUM 5000 UNITS: 5000 INJECTION INTRAVENOUS; SUBCUTANEOUS at 14:05

## 2023-07-16 RX ADMIN — INSULIN LISPRO 6 UNITS: 100 INJECTION, SOLUTION INTRAVENOUS; SUBCUTANEOUS at 16:46

## 2023-07-16 RX ADMIN — PANTOPRAZOLE SODIUM 40 MG: 40 INJECTION, POWDER, FOR SOLUTION INTRAVENOUS at 21:25

## 2023-07-16 RX ADMIN — AMLODIPINE BESYLATE 10 MG: 5 TABLET ORAL at 09:35

## 2023-07-16 RX ADMIN — PANTOPRAZOLE SODIUM 40 MG: 40 INJECTION, POWDER, FOR SOLUTION INTRAVENOUS at 11:03

## 2023-07-16 RX ADMIN — Medication 10 ML: at 21:27

## 2023-07-16 NOTE — PROGRESS NOTES
[facility]   Hospitalist Progress Note    Date of admission: 6/30/2023  Patient Name: Carlos Murphy Jr.  1960  Date: 7/16/2023      Subjective     Chief Complaint   Patient presents with    Weakness - Generalized       Summary: 62 y.o. with history of seizure disorder (on lamotrigine 200 bid), bipolar, schizophrenia, who presented with altered mentation/unresponsiveness on 6/28.  Patient apparently had called his sister initially had slurred speech/somnolent and received Narcan with notable initial improvement.  Patient apparently reported taking additional medications at home but was not trying to harm himself history is very limited given patient poor historian.  Patient underwent stroke work-up without acute findings suggest etiology of patient's AMS.  Did have hyperkalemia initially improved with fluids.  Nephrology assisting given MK and rhabdomyolysis.  Podiatry assisted given for infection/glasses feet and required several pieces being removed.  Hospital course completed by worsening mentation/agitation with concern initially for seizures multiple EEGs have been negative, neurology consulted for assistance, concern for possible medication withdrawal (outpt is on ambien tox screen was positive oxycodone only)/metabolic encephalopathy and was placed on CIWA monitoring and required ativan. LP obtained negative for acute meningitis, tick panel pending and treating empirically for now.  Mentation starting to improve, gradually titrating medications, psychiatry consulted for additional assistance.       Interval Followup:   Patient is now out of restraints  Patient has his nasogastric tube removed.  Patient's appetite is improving  Patient remains somewhat confused but overall improving .   I FOUND A BUG IN HIS BED TODAY APPEARS TO BE A BED BUG BUT NURSE WILL INVESTIGATE FURTHER. PATIENTS DAUGHTER AT BEDSIDE AND DENIES ANY HISTORY OF BUGS       ROS:   All systems reviewed and negative except for  generalized weakness      Objective     Vitals:   Temp:  [97.5 °F (36.4 °C)-98.6 °F (37 °C)] 98.2 °F (36.8 °C)  Heart Rate:  [65-70] 65  Resp:  [18-20] 18  BP: (109-139)/(58-84) 109/66    Physical Exam        Constitutional: Awake, alert, no acute distress, resting in bed with daughter at the bedside               Eyes: Pupils equal, sclerae anicteric, no conjunctival injection              HENT: NCAT, mucous membranes moist              Neck: Supple,              Respiratory: Clear to auscultation bilaterally, nonlabored respirations no w/r/r               Cardiovascular: RRR, no murmurs,              Gastrointestinal: Positive bowel sounds, soft, nontender, nondistended              Musculoskeletal: Full rom               Psychiatric: Appropriate affect, cooperative              Neurologic: Oriented x 1 strength symmetric in all extremities, No focal deficits noted              Skin: bilateral feet wounds are healing          Result Review:  Vital signs, labs and recent relevant imaging reviewed.        acetaminophen    acetaminophen    senna-docusate sodium **AND** polyethylene glycol **AND** [DISCONTINUED] bisacodyl **AND** bisacodyl    dextrose    dextrose    glucagon (human recombinant)    haloperidol lactate    labetalol    nitroglycerin    ondansetron    phenol    sodium chloride    sodium chloride    amLODIPine, 10 mg, Oral, Q24H  atenolol, 25 mg, Oral, Daily  doxycycline, 100 mg, Oral, Q12H  folic acid, 1 mg, Oral, Daily  heparin (porcine), 5,000 Units, Subcutaneous, Q8H  insulin detemir, 15 Units, Subcutaneous, Q12H  insulin lispro, 2-9 Units, Subcutaneous, 4x Daily AC & at Bedtime  lamoTRIgine, 200 mg, Oral, Q12H  losartan, 50 mg, Oral, BID  multivitamin with minerals, 1 tablet, Oral, Daily  mupirocin, 1 application , Topical, 2 times per day  pantoprazole, 40 mg, Intravenous, BID  QUEtiapine, 600 mg, Oral, Nightly  senna-docusate sodium, 2 tablet, Oral, BID  sodium chloride, 10 mL, Intravenous,  Q12H  sodium chloride, 10 mL, Intravenous, Q12H    7/3 eeg  Impression:   Abnormal EEG because of:  1.  Intermittent medium voltage focal slow activity noted over the left frontotemporal region suggestive of neuronal dysfunction in this region.  This abnormalities have been reported in individuals with vascular insufficiency, migraine, or postictal state.  2.  Slow background activity mixed with slower waves compatible with moderate and diffuse encephalopathy.  3.  There were no epileptiform discharges noted today.    7/6 EEG moderate encephalopathy and no acute epileptic discharges noted    Assessment / Plan     Assessment/Plan:  Sepsis from undetermined etiology, possible tickborne illness   Acute metabolic encephalopathy  MK on CKD  Severe hyperkalemia  Seizure disorder (on lamictal), with concern for subclinical seizure - negative on EEGs  Concern for withdrawal from outpatient ambien (tox + oxycodone only on admission)  Rhabdomyolysis  SSTI in setting of Foreign body in left foot/glass  Initial presentation with hyponatremia  Significant Hypernatremia  Anion gap metabolic acidosis  CVA ruled out  History of COPD  Type 2 diabetes mellitus  HTN  Bipolar disorder  Hyperlipidemia  Polypharmacy  Horizontal nystagmus - reportedly chronic/question congenital nystagmus    PLAN   Negative LP/meningitis/csf cx/blood culture, lyme and rickettsia  Off ceftriaxone, has had adequate course to also cover initial celluitis of feet   Continue doxycycline.  Patient's Ehrlichia was negative.  Lyme was negative.  [place] spotted fever IgG was positive however IgM was negative  Psychiatry consulted appreciate assistance - discontinued citalopram and increased seroquel   Continue home lamotrigine (takes for seizures)   Speech following. Continue diet   Completed course of IV thiamine   Cont atenolol, losartan, amlodipine.    Appreciate nephrology assistance with mk/ckd/hypernatremia  Continue Levemir, ssi  Continue local wound  care  Delirium precautions/reoritentation  Continue gi ppx   Check a.m. CBC, BMP, magnesium, phosphorus  Nurse notified of bug found in bed       DVT prophylaxis:  Medical and mechanical DVT prophylaxis orders are present.    Level Of Support Discussed With: Patient  Code Status (Patient has no pulse and is not breathing): CPR (Attempt to Resuscitate)  Medical Interventions (Patient has pulse or is breathing): Full Support        CBC          7/14/2023    07:50 7/15/2023    05:31 7/16/2023    04:52   CBC   WBC 14.24  12.67  10.10    RBC 4.32  4.20  4.17    Hemoglobin 12.5  12.2  12.3    Hematocrit 39.6  38.7  38.9    MCV 91.7  92.1  93.3    MCH 28.9  29.0  29.5    MCHC 31.6  31.5  31.6    RDW 13.6  13.9  14.0    Platelets 339  361  337      CMP          7/14/2023    07:50 7/15/2023    05:31 7/16/2023    04:52   CMP   Glucose 160  119  126    BUN 38  36  36    Creatinine 2.08  2.01  2.05    EGFR 35.6  37.0  36.2    Sodium 141  141  138    Potassium 4.5  4.2  4.1    Chloride 109  108  107    Calcium 9.4  9.3  9.1    Total Protein   6.5    Albumin   3.3    Globulin   3.2    Total Bilirubin   0.4    Alkaline Phosphatase   128    AST (SGOT)   15    ALT (SGPT)   26    Albumin/Globulin Ratio   1.0    BUN/Creatinine Ratio 18.3  17.9  17.6    Anion Gap 8.9  12.0  12.5

## 2023-07-16 NOTE — THERAPY TREATMENT NOTE
Acute Care - Physical Therapy Treatment Note   [place]     Patient Name: Carlos Murphy Jr.  : 1960  MRN: [id number]  Today's Date: 2023      Visit Dx:     ICD-10-CM ICD-9-CM   1. Acute renal failure, unspecified acute renal failure type  N17.9 584.9   2. Altered mental status, unspecified altered mental status type  R41.82 780.97   3. At risk for polypharmacy  Z91.89 V49.89   4. Non-traumatic rhabdomyolysis  M62.82 728.88   5. Oropharyngeal dysphagia  R13.12 787.22   6. Impaired mobility and ADLs  Z74.09 V49.89    Z78.9    7. Difficulty walking  R26.2 719.7     Patient Active Problem List   Diagnosis    Ulcer of toe due to type 2 diabetes mellitus    Centrilobular emphysema    Tobacco abuse, in remission    Obesity (BMI 30-39.9)    Closed fracture of shaft of left fibula    Closed nondisplaced fracture of medial malleolus of left tibia    Aftercare following surgery of ORIF left distal tibia fracture 2022    Colon cancer screening    Colitis    Acute renal failure, unspecified acute renal failure type    Onychomycosis    Onychocryptosis    Foreign body in left foot    Peripheral neuropathy    Charcot foot due to diabetes mellitus    Foot pain, bilateral     Past Medical History:   Diagnosis Date    Acute kidney injury      POST SEIZURES    Astigmatism of both eyes     eyes twitch left and right    Bipolar disorder     Charcot ankle     Closed nondisplaced fracture of medial malleolus of left tibia     COPD (chronic obstructive pulmonary disease)     USES INHALERS    Diabetes     BG RUNS AROUND 90'S IN AM    Hx of schizophrenia     Hyperlipidemia     Hypertension     SEEN DR ROD IN THE PAST, HAD APPT WITH DR MICHAUD IN  [facility] APPT, DENIED CP/SOB    Neuropathy     Seizures     LAST ONE 22    Sleep apnea     DOES NOT USE CPAP    Varicose vein of leg      Past Surgical History:   Procedure Laterality Date    ANKLE OPEN REDUCTION INTERNAL FIXATION Left 2022    Procedure: LEFT OPEN  REDUCTION INTERNAL FIXATION DISTAL TIBIA FRACTURE ;  Surgeon: Tah Parry MD;  Location: [facility] [place];  Service: Orthopedics;  Laterality: Left;    ANKLE OPEN REDUCTION INTERNAL FIXATION Left 06/01/2022    Procedure: LEFT ANKLE OPEN REDUCTION INTERNAL FIXATION WITH SYNDESMOSIS FIXATION;  Surgeon: Tha Parry MD;  Location: [facility]IN OR;  Service: Orthopedics;  Laterality: Left;    CHOLECYSTECTOMY      COLONOSCOPY      JOINT REPLACEMENT      RTKR    LUMBAR DISC SURGERY      SHOULDER SURGERY Right      PT Assessment (last 12 hours)       PT Evaluation and Treatment       Row Name 07/16/23 1037          Physical Therapy Time and Intention    Subjective Information complains of;weakness;fatigue  -DK     Document Type therapy note (daily note)  -DK     Mode of Treatment individual therapy;physical therapy  -DK     Patient Effort good  -DK     Symptoms Noted During/After Treatment none  -DK     Comment Pt is mildly [place], but is rather impulsive, not following cues well.  -       Row Name 07/16/23 1037          Pain    Pretreatment Pain Rating 0/10 - no pain  -DK     Posttreatment Pain Rating 0/10 - no pain  -DK       Row Name 07/16/23 1037          Cognition    Affect/Mental Status (Cognition) confused;flat/blunted affect;confabulatory  -DK     Orientation Status (Cognition) oriented to;person  -DK     Follows Commands (Cognition) physical/tactile prompts required;repetition of directions required;verbal cues/prompting required  -DK     Cognitive Function WFL  -DK     Personal Safety Interventions gait belt;nonskid shoes/slippers when out of bed;supervised activity  -       Row Name 07/16/23 1037          Bed Mobility    Bed Mobility supine-sit-supine  -DK     All Activities, [place] (Bed Mobility) standby assist  -DK     Supine-Sit-Supine [place] (Bed Mobility) standby assist  -DK     Assistive Device (Bed Mobility) bed rails  -       Row Name 07/16/23 1037          Transfers     Transfers sit-stand transfer;stand-sit transfer  -DK       Row Name 07/16/23 1037          Sit-Stand Transfer    Sit-Stand [place] (Transfers) standby assist;contact guard;1 person assist  -DK     Assistive Device (Sit-Stand Transfers) walker, front-wheeled  -DK       Row Name 07/16/23 1037          Stand-Sit Transfer    Stand-Sit [place] (Transfers) standby assist;contact guard;1 person assist  -DK     Assistive Device (Stand-Sit Transfers) walker, front-wheeled  -DK       Row Name 07/16/23 1037          Gait/Stairs (Locomotion)    Gait/Stairs Locomotion gait/ambulation independence;gait/ambulation assistive device;distance ambulated;gait pattern  -DK     [place] Level (Gait) standby assist;contact guard;1 person assist  -DK     Assistive Device (Gait) walker, front-wheeled  -DK     Distance in Feet (Gait) 80  -DK     Pattern (Gait) step-through  -DK     Deviations/Abnormal Patterns (Gait) festinating/shuffling;stride length decreased  -DK     Bilateral Gait Deviations forward flexed posture  -DK     Comment, (Gait/Stairs) Pt ambulated on room air with a rolling walker.  He is somewhat impulsive with transfers and gait.  Pt returned to bed on alert post treatment.  -DK       Row Name 07/16/23 1037          Safety Issues, Functional Mobility    Safety Issues Affecting Function (Mobility) awareness of need for assistance;impulsivity;judgment;safety precaution awareness  -DK     Impairments Affecting Function (Mobility) balance;cognition;endurance/activity tolerance;strength  -DK     Cognitive Impairments, Mobility Safety/Performance attention;awareness, need for assistance;impulsivity;judgment;safety precaution awareness  -DK       Row Name 07/16/23 1037          Balance    Balance Assessment sitting static balance;sitting dynamic balance;standing static balance;standing dynamic balance  -DK     Static Sitting Balance standby assist  -DK     Dynamic Sitting Balance standby assist  -DK     Position,  Sitting Balance unsupported;sitting edge of bed  -     Static Standing Balance standby assist;contact guard;1-person assist  -DK     Dynamic Standing Balance contact guard;1-person assist  -DK     Position/Device Used, Standing Balance walker, front-wheeled  -     Balance Interventions standing;dynamic;tandem gait  -       Row Name 07/16/23 1037          Motor Skills    Motor Skills --  therapeutic exercises  -     Coordination WFL  -DK     Therapeutic Exercise hip;knee;ankle  -       Row Name 07/16/23 1037          Hip (Therapeutic Exercise)    Hip (Therapeutic Exercise) AROM (active range of motion)  -     Hip AROM (Therapeutic Exercise) bilateral;flexion;extension;aBduction;aDduction;supine;20 repititions  -       Row Name 07/16/23 1037          Knee (Therapeutic Exercise)    Knee (Therapeutic Exercise) AROM (active range of motion)  -     Knee AROM (Therapeutic Exercise) bilateral;flexion;extension;LAQ (long arc quad);sitting;20 repititions  -       Row Name 07/16/23 1037          Ankle (Therapeutic Exercise)    Ankle (Therapeutic Exercise) AROM (active range of motion)  -     Ankle AROM (Therapeutic Exercise) bilateral;dorsiflexion;plantarflexion;sitting;20 repititions  -       Row Name             Wound 06/30/23 2321 Right anterior knee Abrasion    Wound - Properties Group Placement Date: 06/30/23 -TH Placement Time: 2321 -TH Present on Hospital Admission: Y  -TH Side: Right  -TH Orientation: anterior  -TH Location: knee  -TH Primary Wound Type: Abrasion  -TH    Retired Wound - Properties Group Placement Date: 06/30/23 -TH Placement Time: 2321 -TH Present on Hospital Admission: Y  -TH Side: Right  -TH Orientation: anterior  -TH Location: knee  -TH Primary Wound Type: Abrasion  -TH    Retired Wound - Properties Group Date first assessed: 06/30/23 -TH Time first assessed: 2321 -TH Present on Hospital Admission: Y  -TH Side: Right  -TH Location: knee  -TH Primary Wound Type: Abrasion   -TH      Row Name             Wound 06/30/23 2322 Left anterior knee Abrasion    Wound - Properties Group Placement Date: 06/30/23 -TH Placement Time: 2322  -TH Side: Left  -TH Orientation: anterior  -TH Location: knee  -TH Primary Wound Type: Abrasion  -TH    Retired Wound - Properties Group Placement Date: 06/30/23 -TH Placement Time: 2322  -TH Side: Left  -TH Orientation: anterior  -TH Location: knee  -TH Primary Wound Type: Abrasion  -TH    Retired Wound - Properties Group Date first assessed: 06/30/23 -TH Time first assessed: 2322  -TH Side: Left  -TH Location: knee  -TH Primary Wound Type: Abrasion  -TH      Row Name             Wound 06/30/23 2323 Bilateral anterior foot Abrasion    Wound - Properties Group Placement Date: 06/30/23 -TH Placement Time: 2323 -TH Side: Bilateral  -TH Orientation: anterior  -TH Location: foot  -TH Primary Wound Type: Abrasion  -TH    Retired Wound - Properties Group Placement Date: 06/30/23 -TH Placement Time: 2323  -TH Side: Bilateral  -TH Orientation: anterior  -TH Location: foot  -TH Primary Wound Type: Abrasion  -TH    Retired Wound - Properties Group Date first assessed: 06/30/23 -TH Time first assessed: 2323  -TH Side: Bilateral  -TH Location: foot  -TH Primary Wound Type: Abrasion  -TH      Row Name             Wound 07/03/23 1420 Bilateral posterior plantar Traumatic    Wound - Properties Group Placement Date: 07/03/23  -FL Placement Time: 1420  -FL Side: Bilateral  -FL Orientation: posterior  -FL Location: plantar  -FL Primary Wound Type: Traumatic  -FL    Retired Wound - Properties Group Placement Date: 07/03/23  -FL Placement Time: 1420  -FL Side: Bilateral  -FL Orientation: posterior  -FL Location: plantar  -FL Primary Wound Type: Traumatic  -FL    Retired Wound - Properties Group Date first assessed: 07/03/23  -FL Time first assessed: 1420  -FL Side: Bilateral  -FL Location: plantar  -FL Primary Wound Type: Traumatic  -FL      Row Name             Wound  07/10/23 1556 Bilateral coccyx Pressure Injury    Wound - Properties Group Placement Date: 07/10/23  -MJ Placement Time: 1556  -MJ Present on Hospital Admission: N  -MJ Side: Bilateral  -MJ Location: coccyx  -MJ Primary Wound Type: Pressure inj  -MJ    Retired Wound - Properties Group Placement Date: 07/10/23  -MJ Placement Time: 1556  -MJ Present on Hospital Admission: N  -MJ Side: Bilateral  -MJ Location: coccyx  -MJ Primary Wound Type: Pressure inj  -MJ    Retired Wound - Properties Group Date first assessed: 07/10/23  -MJ Time first assessed: 1556  -MJ Present on Hospital Admission: N  -MJ Side: Bilateral  -MJ Location: coccyx  -MJ Primary Wound Type: Pressure inj  -MJ      Row Name 07/16/23 1037          Plan of Care Review    Plan of Care Reviewed With patient  -DK     Progress improving  -       Row Name 07/16/23 1037          Positioning and Restraints    Pre-Treatment Position in bed  -DK     Post Treatment Position bed  -DK     In Bed supine;call light within reach;encouraged to call for assist;exit alarm on;side rails up x2;legs elevated  -       Row Name 07/16/23 1037          Therapy Assessment/Plan (PT)    Rehab Potential (PT) good, to achieve stated therapy goals  -     Criteria for Skilled Interventions Met (PT) skilled treatment is necessary  -     Therapy Frequency (PT) daily  -     Problem List (PT) problems related to;balance;cognition;mobility;strength;hearing  -     Activity Limitations Related to Problem List (PT) unable to ambulate safely;unable to transfer safely  -       Row Name 07/16/23 1037          Progress Summary (PT)    Progress Toward Functional Goals (PT) progress toward functional goals is good  -               User Key  (r) = Recorded By, (t) = Taken By, (c) = Cosigned By      Initials Name Provider Type    Mojgan Higuera, RN Registered Nurse    Marah Mcpherson PTA Physical Therapist Assistant    Yumiko Rosario, RN Registered Nurse    Ambar Martinez  ZEUS RN Registered Nurse                    Physical Therapy Education       Title: PT OT SLP Therapies (In Progress)       Topic: Physical Therapy (In Progress)       Point: Mobility training (In Progress)       Learning Progress Summary             Patient Acceptance, E,TB, NR by AV at 7/14/2023 1352                         Point: Home exercise program (Not Started)       Learner Progress:  Not documented in this visit.              Point: Body mechanics (In Progress)       Learning Progress Summary             Patient Acceptance, E,TB, NR by AV at 7/14/2023 1352                         Point: Precautions (In Progress)       Learning Progress Summary             Patient Acceptance, E,TB, NR by AV at 7/14/2023 1352                                         User Key       Initials Effective Dates Name Provider Type Discipline    AV 06/11/21 -  Cesario Clements, PT Physical Therapist PT                  PT Recommendation and Plan  Planned Therapy Interventions (PT): balance training, bed mobility training, gait training, home exercise program, strengthening, transfer training  Therapy Frequency (PT): daily  Progress Summary (PT)  Progress Toward Functional Goals (PT): progress toward functional goals is good  Plan of Care Reviewed With: patient  Progress: improving   Outcome Measures       Row Name 07/16/23 1037 07/14/23 1351          How much help from another person do you currently need...    Turning from your back to your side while in flat bed without using bedrails? 4  -DK 3  -AV     Moving from lying on back to sitting on the side of a flat bed without bedrails? 4  -DK 3  -AV     Moving to and from a bed to a chair (including a wheelchair)? 3  -DK 3  -AV     Standing up from a chair using your arms (e.g., wheelchair, bedside chair)? 3  -DK 3  -AV     Climbing 3-5 steps with a railing? 3  -DK 2  -AV     To walk in hospital room? 3  -DK 3  -AV     AM-PAC 6 Clicks Score (PT) 20  -DK 17  -AV        Functional  Assessment    Outcome Measure Options AM-PAC 6 Clicks Basic Mobility (PT)  -DK AM-PAC 6 Clicks Basic Mobility (PT)  -AV               User Key  (r) = Recorded By, (t) = Taken By, (c) = Cosigned By      Initials Name Provider Type    Marah Mcpherson PTA Physical Therapist Assistant    AV Cesario Clements, PT Physical Therapist                     Time Calculation:    PT Charges       Row Name 07/16/23 1042             Time Calculation    PT Received On 07/16/23  -DK      PT Goal Re-Cert Due Date 07/23/23  -DK         Timed Charges    24408 - PT Therapeutic Exercise Minutes 14  -DK      48188 - Gait Training Minutes  8  -DK      10865 - PT Therapeutic Activity Minutes 8  -DK         Total Minutes    Timed Charges Total Minutes 30  -DK       Total Minutes 30  -DK                User Key  (r) = Recorded By, (t) = Taken By, (c) = Cosigned By      Initials Name Provider Type    Marah Mcpherson PTA Physical Therapist Assistant                  Therapy Charges for Today       Code Description Service Date Service Provider Modifiers Qty    66174836682 HC PT THER PROC EA 15 MIN 7/16/2023 Marah Gomez PTA GP 1    42138547746 HC GAIT TRAINING EA 15 MIN 7/16/2023 Marah Gomez PTA GP 1            PT G-Codes  Outcome Measure Options: AM-PAC 6 Clicks Basic Mobility (PT)  AM-PAC 6 Clicks Score (PT): 20  AM-PAC 6 Clicks Score (OT): 9    Marah Gomez PTA  7/16/2023

## 2023-07-16 NOTE — PLAN OF CARE
Goal Outcome Evaluation:  Plan of Care Reviewed With: patient   Patient is alert and orientated x3, still confused when it comes to situation. Vitals are stable. Denies pain or discomfort.      Progress: improving

## 2023-07-16 NOTE — PLAN OF CARE
Goal Outcome Evaluation:               Pt mobility and cognition improving. Difficulties sleeping. No acute events this shift.

## 2023-07-16 NOTE — PROGRESS NOTES
" LOS: 16 days   Patient Care Team:  Felix Atkinson MD as PCP - General  Felix Atkinson MD as PCP - Family Medicine    Chief Complaint: MK/CKD    Subjective : Patient is alert, seems a little confused, no chest pain no shortness of breath    Objective     Vital Sign Min/Max for last 24 hours  Temp  Min: 97.5 °F (36.4 °C)  Max: 98.6 °F (37 °C)   BP  Min: 109/66  Max: 139/78   Pulse  Min: 62  Max: 70   Resp  Min: 18  Max: 18   SpO2  Min: 94 %  Max: 98 %   No data recorded   No data recorded    General Appearance:  Comfortable    HEENT: Normal HEENT exam.    Lungs:  Normal effort and normal respiratory rate.    Heart: Normal rate.  Regular rhythm.    Abdomen: Abdomen is soft.  Bowel sounds are normal.   There is no abdominal tenderness.     Extremities: Normal range of motion.  Very trace lower extremity swelling pulses: Distal pulses are intact.    Neurological: Patient is alert.    Skin:  Warm and dry.  There is ecchymosis.  (Scabs over knees and toes.)       Flowsheet Rows      Flowsheet Row First Filed Value   Admission Height 182.9 cm (72\") Documented at 06/30/2023 1800   Admission Weight 133 kg (294 lb 1.5 oz) Documented at 06/30/2023 2300            I/O this shift:  In: 240 [P.O.:240]  Out: -   I/O last 3 completed shifts:  In: 480 [P.O.:480]  Out: -         Results Review:     I reviewed the patient's new clinical results.    WBC WBC   Date Value Ref Range Status   07/16/2023 10.10 3.40 - 10.80 10*3/mm3 Final   07/15/2023 12.67 (H) 3.40 - 10.80 10*3/mm3 Final   07/14/2023 14.24 (H) 3.40 - 10.80 10*3/mm3 Final      HGB Hemoglobin   Date Value Ref Range Status   07/16/2023 12.3 (L) 13.0 - 17.7 g/dL Final   07/15/2023 12.2 (L) 13.0 - 17.7 g/dL Final   07/14/2023 12.5 (L) 13.0 - 17.7 g/dL Final      HCT Hematocrit   Date Value Ref Range Status   07/16/2023 38.9 37.5 - 51.0 % Final   07/15/2023 38.7 37.5 - 51.0 % Final   07/14/2023 39.6 37.5 - 51.0 % Final      Platlets No results found for: LABPLAT   MCV MCV "   Date Value Ref Range Status   07/16/2023 93.3 79.0 - 97.0 fL Final   07/15/2023 92.1 79.0 - 97.0 fL Final   07/14/2023 91.7 79.0 - 97.0 fL Final          Sodium Sodium   Date Value Ref Range Status   07/16/2023 138 136 - 145 mmol/L Final   07/15/2023 141 136 - 145 mmol/L Final   07/14/2023 141 136 - 145 mmol/L Final      Potassium Potassium   Date Value Ref Range Status   07/16/2023 4.1 3.5 - 5.2 mmol/L Final   07/15/2023 4.2 3.5 - 5.2 mmol/L Final   07/14/2023 4.5 3.5 - 5.2 mmol/L Final      Chloride Chloride   Date Value Ref Range Status   07/16/2023 107 98 - 107 mmol/L Final   07/15/2023 108 (H) 98 - 107 mmol/L Final   07/14/2023 109 (H) 98 - 107 mmol/L Final      CO2 CO2   Date Value Ref Range Status   07/16/2023 18.5 (L) 22.0 - 29.0 mmol/L Final   07/15/2023 21.0 (L) 22.0 - 29.0 mmol/L Final   07/14/2023 23.1 22.0 - 29.0 mmol/L Final      BUN BUN   Date Value Ref Range Status   07/16/2023 36 (H) 8 - 23 mg/dL Final   07/15/2023 36 (H) 8 - 23 mg/dL Final   07/14/2023 38 (H) 8 - 23 mg/dL Final      Creatinine Creatinine   Date Value Ref Range Status   07/16/2023 2.05 (H) 0.76 - 1.27 mg/dL Final   07/15/2023 2.01 (H) 0.76 - 1.27 mg/dL Final   07/14/2023 2.08 (H) 0.76 - 1.27 mg/dL Final      Calcium Calcium   Date Value Ref Range Status   07/16/2023 9.1 8.6 - 10.5 mg/dL Final   07/15/2023 9.3 8.6 - 10.5 mg/dL Final   07/14/2023 9.4 8.6 - 10.5 mg/dL Final      PO4 No results found for: CAPO4   Albumin Albumin   Date Value Ref Range Status   07/16/2023 3.3 (L) 3.5 - 5.2 g/dL Final        Magnesium Magnesium   Date Value Ref Range Status   07/16/2023 2.7 (H) 1.6 - 2.4 mg/dL Final   07/15/2023 2.7 (H) 1.6 - 2.4 mg/dL Final   07/14/2023 2.8 (H) 1.6 - 2.4 mg/dL Final      Uric Acid No results found for: URICACID     Medication Review:   amLODIPine, 10 mg, Oral, Q24H  atenolol, 25 mg, Oral, Daily  doxycycline, 100 mg, Oral, Q12H  folic acid, 1 mg, Oral, Daily  heparin (porcine), 5,000 Units, Subcutaneous, Q8H  insulin  detemir, 15 Units, Subcutaneous, Q12H  insulin lispro, 2-9 Units, Subcutaneous, 4x Daily AC & at Bedtime  lamoTRIgine, 200 mg, Oral, Q12H  losartan, 50 mg, Oral, BID  multivitamin with minerals, 1 tablet, Oral, Daily  mupirocin, 1 application , Topical, 2 times per day  pantoprazole, 40 mg, Intravenous, BID  QUEtiapine, 600 mg, Oral, Nightly  senna-docusate sodium, 2 tablet, Oral, BID  sodium chloride, 10 mL, Intravenous, Q12H  sodium chloride, 10 mL, Intravenous, Q12H              Assessment & Plan       Acute renal failure, unspecified acute renal failure type    Onychomycosis    Onychocryptosis    Foreign body in left foot    Peripheral neuropathy    Charcot foot due to diabetes mellitus    Foot pain, bilateral      Assessment & Plan    1.  MK/CKD3b-  Due to diabetic nephropathy.  Renal function is stabilized.  Had been on lisinopril and SGLT2 and kerendia.  Volume status adequate.  .  Baseline creatinine around 2.0-2.5.  Monitor.    2.  Met Acidosis-recurrent, worse today, will start oral bicarbonate   3.  Proteinuria-  diabetic nephropathy, continue angiotensin receptor blocker  4.  DMII-  treated.  Was On jardiance, hold with MK.  May want to reintroduce it at some point   5  HTN-  BP is better, continue losartan.  6.  Chronic edema-edema seems to be improved although now back on amlodipine certainly if develops significant edema as an outpatient we will have to reconsider.  7.  H/o bipolar-  previous lithium use.  8.  Anemia-  tsat at 14%, ferritin 425. Received some IV iron.  Hemoglobin stable  9.  Weakness-  would avoid muscle relaxer in CKD (flexeril).  Likely polypharmacy contributing.    We will follow.      Hemant Kaplan MD  07/16/23  15:13 EDT

## 2023-07-17 LAB
ANION GAP SERPL CALCULATED.3IONS-SCNC: 10.6 MMOL/L (ref 5–15)
BASOPHILS # BLD AUTO: 0.08 10*3/MM3 (ref 0–0.2)
BASOPHILS NFR BLD AUTO: 0.9 % (ref 0–1.5)
BUN SERPL-MCNC: 36 MG/DL (ref 8–23)
BUN/CREAT SERPL: 17 (ref 7–25)
CALCIUM SPEC-SCNC: 9.1 MG/DL (ref 8.6–10.5)
CHLORIDE SERPL-SCNC: 106 MMOL/L (ref 98–107)
CO2 SERPL-SCNC: 21.4 MMOL/L (ref 22–29)
CREAT SERPL-MCNC: 2.12 MG/DL (ref 0.76–1.27)
DEPRECATED RDW RBC AUTO: 42.3 FL (ref 37–54)
EGFRCR SERPLBLD CKD-EPI 2021: 34.8 ML/MIN/1.73
EOSINOPHIL # BLD AUTO: 0.34 10*3/MM3 (ref 0–0.4)
EOSINOPHIL NFR BLD AUTO: 3.7 % (ref 0.3–6.2)
ERYTHROCYTE [DISTWIDTH] IN BLOOD BY AUTOMATED COUNT: 14 % (ref 12.3–15.4)
GLUCOSE BLDC GLUCOMTR-MCNC: 165 MG/DL (ref 70–99)
GLUCOSE BLDC GLUCOMTR-MCNC: 221 MG/DL (ref 70–99)
GLUCOSE BLDC GLUCOMTR-MCNC: 267 MG/DL (ref 70–99)
GLUCOSE BLDC GLUCOMTR-MCNC: 292 MG/DL (ref 70–99)
GLUCOSE SERPL-MCNC: 160 MG/DL (ref 65–99)
HCT VFR BLD AUTO: 37 % (ref 37.5–51)
HGB BLD-MCNC: 12.2 G/DL (ref 13–17.7)
IMM GRANULOCYTES # BLD AUTO: 0.04 10*3/MM3 (ref 0–0.05)
IMM GRANULOCYTES NFR BLD AUTO: 0.4 % (ref 0–0.5)
LYMPHOCYTES # BLD AUTO: 2.01 10*3/MM3 (ref 0.7–3.1)
LYMPHOCYTES NFR BLD AUTO: 22.2 % (ref 19.6–45.3)
MAGNESIUM SERPL-MCNC: 2.6 MG/DL (ref 1.6–2.4)
MCH RBC QN AUTO: 29 PG (ref 26.6–33)
MCHC RBC AUTO-ENTMCNC: 33 G/DL (ref 31.5–35.7)
MCV RBC AUTO: 88.1 FL (ref 79–97)
MONOCYTES # BLD AUTO: 1.16 10*3/MM3 (ref 0.1–0.9)
MONOCYTES NFR BLD AUTO: 12.8 % (ref 5–12)
NEUTROPHILS NFR BLD AUTO: 5.44 10*3/MM3 (ref 1.7–7)
NEUTROPHILS NFR BLD AUTO: 60 % (ref 42.7–76)
NRBC BLD AUTO-RTO: 0 /100 WBC (ref 0–0.2)
PHOSPHATE SERPL-MCNC: 4.3 MG/DL (ref 2.5–4.5)
PLATELET # BLD AUTO: 364 10*3/MM3 (ref 140–450)
PMV BLD AUTO: 10.8 FL (ref 6–12)
POTASSIUM SERPL-SCNC: 4.4 MMOL/L (ref 3.5–5.2)
RBC # BLD AUTO: 4.2 10*6/MM3 (ref 4.14–5.8)
SODIUM SERPL-SCNC: 138 MMOL/L (ref 136–145)
WBC NRBC COR # BLD: 9.07 10*3/MM3 (ref 3.4–10.8)

## 2023-07-17 PROCEDURE — 85025 COMPLETE CBC W/AUTO DIFF WBC: CPT | Performed by: INTERNAL MEDICINE

## 2023-07-17 PROCEDURE — 80048 BASIC METABOLIC PNL TOTAL CA: CPT | Performed by: INTERNAL MEDICINE

## 2023-07-17 PROCEDURE — 97116 GAIT TRAINING THERAPY: CPT

## 2023-07-17 PROCEDURE — 63710000001 INSULIN DETEMIR PER 5 UNITS: Performed by: NURSE PRACTITIONER

## 2023-07-17 PROCEDURE — 83735 ASSAY OF MAGNESIUM: CPT | Performed by: INTERNAL MEDICINE

## 2023-07-17 PROCEDURE — 99232 SBSQ HOSP IP/OBS MODERATE 35: CPT | Performed by: INTERNAL MEDICINE

## 2023-07-17 PROCEDURE — 97110 THERAPEUTIC EXERCISES: CPT

## 2023-07-17 PROCEDURE — 97535 SELF CARE MNGMENT TRAINING: CPT

## 2023-07-17 PROCEDURE — 63710000001 INSULIN LISPRO (HUMAN) PER 5 UNITS: Performed by: NURSE PRACTITIONER

## 2023-07-17 PROCEDURE — 25010000002 HEPARIN (PORCINE) PER 1000 UNITS: Performed by: NURSE PRACTITIONER

## 2023-07-17 PROCEDURE — 84100 ASSAY OF PHOSPHORUS: CPT | Performed by: INTERNAL MEDICINE

## 2023-07-17 PROCEDURE — 82948 REAGENT STRIP/BLOOD GLUCOSE: CPT

## 2023-07-17 RX ADMIN — INSULIN LISPRO 6 UNITS: 100 INJECTION, SOLUTION INTRAVENOUS; SUBCUTANEOUS at 12:27

## 2023-07-17 RX ADMIN — SODIUM BICARBONATE 650 MG TABLET 650 MG: at 22:04

## 2023-07-17 RX ADMIN — MUPIROCIN 1 APPLICATION: 20 OINTMENT TOPICAL at 12:27

## 2023-07-17 RX ADMIN — Medication 10 ML: at 09:15

## 2023-07-17 RX ADMIN — PANTOPRAZOLE SODIUM 40 MG: 40 INJECTION, POWDER, FOR SOLUTION INTRAVENOUS at 09:15

## 2023-07-17 RX ADMIN — LOSARTAN POTASSIUM 50 MG: 25 TABLET, FILM COATED ORAL at 22:01

## 2023-07-17 RX ADMIN — HEPARIN SODIUM 5000 UNITS: 5000 INJECTION INTRAVENOUS; SUBCUTANEOUS at 22:00

## 2023-07-17 RX ADMIN — INSULIN LISPRO 6 UNITS: 100 INJECTION, SOLUTION INTRAVENOUS; SUBCUTANEOUS at 22:02

## 2023-07-17 RX ADMIN — DOXYCYCLINE 100 MG: 100 CAPSULE ORAL at 09:15

## 2023-07-17 RX ADMIN — Medication 1 MG: at 09:15

## 2023-07-17 RX ADMIN — QUETIAPINE FUMARATE 600 MG: 200 TABLET ORAL at 22:05

## 2023-07-17 RX ADMIN — MUPIROCIN 1 APPLICATION: 20 OINTMENT TOPICAL at 22:03

## 2023-07-17 RX ADMIN — AMLODIPINE BESYLATE 10 MG: 5 TABLET ORAL at 09:15

## 2023-07-17 RX ADMIN — MULTIPLE VITAMINS W/ MINERALS TAB 1 TABLET: TAB at 14:54

## 2023-07-17 RX ADMIN — ATENOLOL 25 MG: 25 TABLET ORAL at 10:56

## 2023-07-17 RX ADMIN — DOCUSATE SODIUM 50MG AND SENNOSIDES 8.6MG 2 TABLET: 8.6; 5 TABLET, FILM COATED ORAL at 22:01

## 2023-07-17 RX ADMIN — Medication 10 ML: at 22:02

## 2023-07-17 RX ADMIN — HEPARIN SODIUM 5000 UNITS: 5000 INJECTION INTRAVENOUS; SUBCUTANEOUS at 14:54

## 2023-07-17 RX ADMIN — LAMOTRIGINE 200 MG: 100 TABLET ORAL at 22:05

## 2023-07-17 RX ADMIN — INSULIN DETEMIR 15 UNITS: 100 INJECTION, SOLUTION SUBCUTANEOUS at 22:01

## 2023-07-17 RX ADMIN — HEPARIN SODIUM 5000 UNITS: 5000 INJECTION INTRAVENOUS; SUBCUTANEOUS at 06:31

## 2023-07-17 RX ADMIN — INSULIN LISPRO 4 UNITS: 100 INJECTION, SOLUTION INTRAVENOUS; SUBCUTANEOUS at 18:18

## 2023-07-17 RX ADMIN — INSULIN DETEMIR 15 UNITS: 100 INJECTION, SOLUTION SUBCUTANEOUS at 09:15

## 2023-07-17 RX ADMIN — LOSARTAN POTASSIUM 50 MG: 25 TABLET, FILM COATED ORAL at 09:15

## 2023-07-17 RX ADMIN — PANTOPRAZOLE SODIUM 40 MG: 40 INJECTION, POWDER, FOR SOLUTION INTRAVENOUS at 22:00

## 2023-07-17 RX ADMIN — SODIUM BICARBONATE 650 MG TABLET 650 MG: at 09:15

## 2023-07-17 RX ADMIN — LAMOTRIGINE 200 MG: 100 TABLET ORAL at 09:15

## 2023-07-17 RX ADMIN — INSULIN LISPRO 2 UNITS: 100 INJECTION, SOLUTION INTRAVENOUS; SUBCUTANEOUS at 09:15

## 2023-07-17 NOTE — PLAN OF CARE
Goal Outcome Evaluation:  Plan of Care Reviewed With: patient        Progress: no change  Outcome Evaluation: Pt a/ox4 this morning. Pt sat up in the chair for little bit. Pt is still very unsteady on his feet. Pt awaiting placement. Continue plan of care

## 2023-07-17 NOTE — PROGRESS NOTES
[facility]     Nephrology Progress Note      Patient Name: Carlos Murphy Jr.  : 1960  MRN: [id number]  Primary Care Physician:  Felix Atkinson MD  Date of admission: 2023    Subjective   Subjective     Interval History:  Patient Reports feeling better.  Sitting at bedside, much more alert and coherent.  Stated he ambulated earlier today.  Tolerating p.o.    Review of Systems   All systems were reviewed and negative except for: What is mentioned above.    Objective   Objective     Vitals:   Temp:  [97.3 °F (36.3 °C)-98.5 °F (36.9 °C)] 98.1 °F (36.7 °C)  Heart Rate:  [56-66] 56  Resp:  [18] 18  BP: (103-134)/(60-77) 103/70  Physical Exam:   Constitutional: Awake, alert, mildly altered.   Eyes: sclerae anicteric, no conjunctival injection   HENT: mucous membranes moist   Neck: Supple, no thyromegaly, no lymphadenopathy, trachea midline, No JVD   Respiratory: Clear to auscultation bilaterally, nonlabored respirations    Cardiovascular: RRR, no murmurs, rubs, or gallops.   Gastrointestinal: Positive bowel sounds, soft, nontender, nondistended   Musculoskeletal: Trace edema, no clubbing or cyanosis   Psychiatric: Appropriate affect, cooperative   Neurologic: moving all extremities, Cranial Nerves grossly intact.   Skin: warm and dry, no rashes     Result Review    Result Reviewed:  I have personally reviewed the results from the time of this admission to 2023 17:27 EDT and agree with these findings:  [x]  Laboratory  []  Microbiology  [x]  Radiology  []  EKG/Telemetry   []  Cardiology/Vascular   []  Pathology  []  Old records  []  Other:  Lab Results   Component Value Date    GLUCOSE 160 (H) 2023    CALCIUM 9.1 2023     2023    K 4.4 2023    CO2 21.4 (L) 2023     2023    BUN 36 (H) 2023    CREATININE 2.12 (H) 2023    EGFRIFNONA 73 2017    BCR 17.0 2023    ANIONGAP 10.6 2023     Lab Results   Component Value Date     CALCIUM 9.1 07/17/2023    PHOS 4.3 07/17/2023      Results from last 7 days   Lab Units 07/17/23  0454   MAGNESIUM mg/dL 2.6*          Most notable findings include: Creatinine 2.1.    Assessment & Plan   Assessment / Plan       Active Hospital Problems:  Active Hospital Problems    Diagnosis     **Acute renal failure, unspecified acute renal failure type     Onychomycosis     Onychocryptosis     Foreign body in left foot     Peripheral neuropathy     Charcot foot due to diabetes mellitus     Foot pain, bilateral        Assessment and Plan:      1.  MK/CKD3b-  Due to diabetic nephropathy.  Renal function is stabilized.  Had been on lisinopril and SGLT2 and kerendia.  Volume status adequate.  .  Baseline creatinine around 2.0-2.5.  Monitor.    2.  Met Acidosis-recurrent, monitor with oral sodium bicarb.    3.  Proteinuria-  diabetic nephropathy, continue angiotensin receptor blocker  4.  DMII-  treated.  Was On jardiance, hold with MK.  May want to reintroduce it at some point   5.  HTN-  BP is better, continue losartan.  6.  Chronic edema-edema seems to be improved although now back on amlodipine monitor for now.   7.  H/o bipolar-  previous lithium use.  8.  Anemia-  tsat at 14%, ferritin 425. Received some IV iron.  Hemoglobin stable  9.  Weakness-  would avoid muscle relaxer in CKD (flexeril).  Likely polypharmacy contributing.  If discharged, follow-up with me in about 1 month.  We will follow.      Electronically signed by Caroline Mckenzie MD, 07/17/23, 5:27 PM EDT.

## 2023-07-17 NOTE — PROGRESS NOTES
[facility]   Hospitalist Progress Note    Date of admission: 6/30/2023  Patient Name: Carlos Murphy Jr.  1960  Date: 7/17/2023      Subjective     Chief Complaint   Patient presents with    Weakness - Generalized       Summary: 62 y.o. with history of seizure disorder (on lamotrigine 200 bid), bipolar, schizophrenia, who presented with altered mentation/unresponsiveness on 6/28.  Patient apparently had called his sister initially had slurred speech/somnolent and received Narcan with notable initial improvement.  Patient apparently reported taking additional medications at home but was not trying to harm himself history is very limited given patient poor historian.  Patient underwent stroke work-up without acute findings suggest etiology of patient's AMS.  Did have hyperkalemia initially improved with fluids.  Nephrology assisting given MK and rhabdomyolysis.  Podiatry assisted given for infection/glasses feet and required several pieces being removed.  Hospital course completed by worsening mentation/agitation with concern initially for seizures multiple EEGs have been negative, neurology consulted for assistance, concern for possible medication withdrawal (outpt is on ambien tox screen was positive oxycodone only)/metabolic encephalopathy and was placed on CIWA monitoring and required ativan. LP obtained negative for acute meningitis, tick panel pending and treating empirically for now.  Mentation starting to improve, gradually titrating medications, psychiatry consulted for additional assistance.       Interval Followup:   Patient has remained out of restraints for days.  Patient overall is doing significantly better.  His appetite is improving.  Currently rehab placement is pending.  Vital signs stable    ROS:   All systems reviewed and negative except for generalized weakness      Objective     Vitals:   Temp:  [97.3 °F (36.3 °C)-98.5 °F (36.9 °C)] 97.3 °F (36.3 °C)  Heart Rate:  [56-66] 63  Resp:   [18] 18  BP: (111-135)/(60-77) 114/64    Physical Exam        Constitutional: Awake, alert, no acute distress, resting in bed               Eyes: Pupils equal, sclerae anicteric, no conjunctival injection              HENT: NCAT, mucous membranes moist              Neck: Supple,              Respiratory: Clear to auscultation bilaterally, nonlabored respirations no w/r/r               Cardiovascular: RRR, no murmurs,              Gastrointestinal: Positive bowel sounds, soft, nontender, nondistended              Musculoskeletal: Full rom               Psychiatric: Appropriate affect, cooperative              Neurologic: no focal deficits noted. Oriented to person and place               Skin: bilateral feet wounds are healing          Result Review:  Vital signs, labs and recent relevant imaging reviewed.        acetaminophen    acetaminophen    senna-docusate sodium **AND** polyethylene glycol **AND** [DISCONTINUED] bisacodyl **AND** bisacodyl    dextrose    dextrose    glucagon (human recombinant)    nitroglycerin    ondansetron    phenol    sodium chloride    sodium chloride    amLODIPine, 10 mg, Oral, Q24H  atenolol, 25 mg, Oral, Daily  doxycycline, 100 mg, Oral, Q12H  folic acid, 1 mg, Oral, Daily  heparin (porcine), 5,000 Units, Subcutaneous, Q8H  insulin detemir, 15 Units, Subcutaneous, Q12H  insulin lispro, 2-9 Units, Subcutaneous, 4x Daily AC & at Bedtime  lamoTRIgine, 200 mg, Oral, Q12H  losartan, 50 mg, Oral, BID  multivitamin with minerals, 1 tablet, Oral, Daily  mupirocin, 1 application , Topical, 2 times per day  pantoprazole, 40 mg, Intravenous, BID  QUEtiapine, 600 mg, Oral, Nightly  senna-docusate sodium, 2 tablet, Oral, BID  sodium bicarbonate, 650 mg, Oral, BID  sodium chloride, 10 mL, Intravenous, Q12H  sodium chloride, 10 mL, Intravenous, Q12H    7/3 eeg  Impression:   Abnormal EEG because of:  1.  Intermittent medium voltage focal slow activity noted over the left frontotemporal region  suggestive of neuronal dysfunction in this region.  This abnormalities have been reported in individuals with vascular insufficiency, migraine, or postictal state.  2.  Slow background activity mixed with slower waves compatible with moderate and diffuse encephalopathy.  3.  There were no epileptiform discharges noted today.    7/6 EEG moderate encephalopathy and no acute epileptic discharges noted    Assessment / Plan     Assessment/Plan:  Sepsis from undetermined etiology, possible tickborne illness   Acute metabolic encephalopathy  MK on CKD  Severe hyperkalemia  Seizure disorder (on lamictal), with concern for subclinical seizure - negative on EEGs  Concern for withdrawal from outpatient ambien (tox + oxycodone only on admission)  Rhabdomyolysis  SSTI in setting of Foreign body in left foot/glass  Initial presentation with hyponatremia  Significant Hypernatremia  Anion gap metabolic acidosis  CVA ruled out  History of COPD  Type 2 diabetes mellitus  HTN  Bipolar disorder  Hyperlipidemia  Polypharmacy  Horizontal nystagmus - reportedly chronic/question congenital nystagmus    PLAN   Negative LP/meningitis/csf cx/blood culture, lyme and rickettsia  Off ceftriaxone, has had adequate course to also cover initial celluitis of feet   Completed doxycycline.  Patient's Ehrlichia was negative.  Lyme was negative.  [place] spotted fever IgG was positive however IgM was negative  Psychiatry consulted appreciate assistance - discontinued citalopram and increased seroquel   Continue home lamotrigine (takes for seizures)   Speech following. Continue diet   Completed course of IV thiamine   Cont atenolol, losartan, amlodipine.    Appreciate nephrology assistance with mk/ckd/hypernatremia  Continue Levemir, ssi  Continue local wound care  Delirium precautions/reoritentation  Continue gi ppx   Check a.m. CBC, BMP, magnesium, phosphorus    PATIENT CAN DISCHARGE ONCE PRECERT IS APPROVED         DVT prophylaxis:  Medical and  mechanical DVT prophylaxis orders are present.    Level Of Support Discussed With: Patient  Code Status (Patient has no pulse and is not breathing): CPR (Attempt to Resuscitate)  Medical Interventions (Patient has pulse or is breathing): Full Support        CBC          7/15/2023    05:31 7/16/2023    04:52 7/17/2023    04:54   CBC   WBC 12.67  10.10  9.07    RBC 4.20  4.17  4.20    Hemoglobin 12.2  12.3  12.2    Hematocrit 38.7  38.9  37.0    MCV 92.1  93.3  88.1    MCH 29.0  29.5  29.0    MCHC 31.5  31.6  33.0    RDW 13.9  14.0  14.0    Platelets 361  337  364      CMP          7/15/2023    05:31 7/16/2023    04:52 7/17/2023    04:54   CMP   Glucose 119  126  160    BUN 36  36  36    Creatinine 2.01  2.05  2.12    EGFR 37.0  36.2  34.8    Sodium 141  138  138    Potassium 4.2  4.1  4.4    Chloride 108  107  106    Calcium 9.3  9.1  9.1    Total Protein  6.5     Albumin  3.3     Globulin  3.2     Total Bilirubin  0.4     Alkaline Phosphatase  128     AST (SGOT)  15     ALT (SGPT)  26     Albumin/Globulin Ratio  1.0     BUN/Creatinine Ratio 17.9  17.6  17.0    Anion Gap 12.0  12.5  10.6

## 2023-07-17 NOTE — CONSULTS
"Nutrition Services    Patient Name: Carlos Murphy Jr.  YOB: 1960  MRN: [id number]  Admission date: 6/30/2023      CLINICAL NUTRITION ASSESSMENT      Reason for Assessment  Follow-up protocol   H&P:    Past Medical History:   Diagnosis Date    Acute kidney injury     2021 POST SEIZURES    Astigmatism of both eyes     eyes twitch left and right    Bipolar disorder     Charcot ankle     Closed nondisplaced fracture of medial malleolus of left tibia     COPD (chronic obstructive pulmonary disease)     USES INHALERS    Diabetes     BG RUNS AROUND 90'S IN AM    Hx of schizophrenia     Hyperlipidemia     Hypertension     SEEN DR ROD IN THE PAST, HAD APPT WITH DR MICHAUD IN 5-2022 CX APPT, DENIED CP/SOB    Neuropathy     Seizures     LAST ONE 4/21/22    Sleep apnea     DOES NOT USE CPAP    Varicose vein of leg         Current Problems:   Active Hospital Problems    Diagnosis     **Acute renal failure, unspecified acute renal failure type     Onychomycosis     Onychocryptosis     Foreign body in left foot     Peripheral neuropathy     Charcot foot due to diabetes mellitus     Foot pain, bilateral         Nutrition/Diet History         Narrative     Patient admitted with ARF. Cortrak removed 7/09, NG tube removed 7/13. Now on mechanical soft diet with thin liquids and continuing to work with speech therapy. Enteral nutrition orders discontinued.      Nutrition follow up. Nursing notes pt continues to be confused to situation. Per intake documentation, pt consumed % of last 3 meals. Will continue to send Magic Cup TID to promote calorie and protein intake. RD will CTM intake as mental status improves and follow up to provide further nutrition intervention if indicated.      Anthropometrics        Current Height, Weight Height: 182.9 cm (72\")  Weight: 134 kg (295 lb 6.7 oz)   Current BMI Body mass index is 40.07 kg/m².       Weight Hx  Wt Readings from Last 30 Encounters:   07/10/23 1617 134 kg (295 lb 6.7 " oz)   06/30/23 2300 133 kg (294 lb 1.5 oz)   04/30/23 1344 134 kg (296 lb 1.2 oz)   09/19/22 1532 136 kg (299 lb 6.4 oz)   08/18/22 1337 132 kg (290 lb)   06/30/22 1407 131 kg (288 lb)   06/14/22 1309 131 kg (288 lb)   06/01/22 0920 136 kg (299 lb 2.6 oz)   05/31/22 0852 132 kg (290 lb)   05/27/22 0945 132 kg (290 lb)   05/24/22 1338 131 kg (289 lb)   05/10/22 1316 131 kg (289 lb)   04/25/22 0800 123 kg (271 lb)   04/22/22 1154 129 kg (284 lb 6.3 oz)   04/22/22 0941 129 kg (284 lb)   04/21/22 0917 129 kg (284 lb 9.8 oz)   04/13/22 1407 125 kg (276 lb)   07/13/21 1334 132 kg (290 lb)   03/15/21 0000 135 kg (297 lb)   02/18/21 0000 133 kg (293 lb 5 oz)   01/05/21 0000 135 kg (297 lb 4 oz)   12/03/20 0000 130 kg (287 lb)   11/06/20 0000 129 kg (285 lb)   09/11/20 0000 129 kg (285 lb)   05/20/19 0000 (!) 136 kg (300 lb 4 oz)   09/11/18 0000 136 kg (300 lb)   07/06/18 0000 (!) 137 kg (303 lb)   05/29/18 0000 (!) 139 kg (307 lb)   02/27/18 0000 136 kg (300 lb)            Wt Change Observation Stable     Estimated/Assessed Needs       Energy Requirements 25-30 kcal/kg adj BW (91.4 kg)   EST Needs (kcal/day) 5297-2800 kcal        Protein Requirements 1.2-1.5 g/kg adj BW    EST Daily Needs (g/day) 110-137 g       Fluid Requirements 25 ml/kg adj BW    Estimated Needs (mL/day) 2285 ml      Labs/Medications         Pertinent Labs Reviewed.   Results from last 7 days   Lab Units 07/17/23  0454 07/16/23  0452 07/15/23  0531   SODIUM mmol/L 138 138 141   POTASSIUM mmol/L 4.4 4.1 4.2   CHLORIDE mmol/L 106 107 108*   CO2 mmol/L 21.4* 18.5* 21.0*   BUN mg/dL 36* 36* 36*   CREATININE mg/dL 2.12* 2.05* 2.01*   CALCIUM mg/dL 9.1 9.1 9.3   BILIRUBIN mg/dL  --  0.4  --    ALK PHOS U/L  --  128*  --    ALT (SGPT) U/L  --  26  --    AST (SGOT) U/L  --  15  --    GLUCOSE mg/dL 160* 126* 119*       Results from last 7 days   Lab Units 07/17/23  0454 07/16/23  0452 07/15/23  0531   MAGNESIUM mg/dL 2.6* 2.7* 2.7*   PHOSPHORUS mg/dL 4.3 4.7*  4.7*   HEMOGLOBIN g/dL 12.2* 12.3* 12.2*   HEMATOCRIT % 37.0* 38.9 38.7       Coronavirus (COVID-19)   Date Value Ref Range Status   03/25/2021 NOT DETECTED NA Final     Comment:     The SARS-CoV-2 assay is a real-time, RT-PCR test intended  for the qualitative detection of nucleic acid from the  SARS-CoV-2 in respiratory specimens from individuals,  testing performed at [facility].       Lab Results   Component Value Date    HGBA1C 8.3 (H) 11/10/2020         Pertinent Medications Reviewed.     Current Nutrition Orders & Evaluation of Intake       Oral Nutrition     Current PO Diet Diet: Regular/House Diet; Texture: Mechanical Ground (NDD 2); Fluid Consistency: Thin (IDDSI 0)   Supplement Orders Placed This Encounter      Dietary Nutrition Supplements Magic Cup; berry       Malnutrition Severity Assessment                Nutrition Diagnosis         Nutrition Dx Problem 1 Inadequate energy Intake related to decreased ability to consume sufficient energy as evidenced by per nursing documentation. and AMS.      Nutrition Intervention         Magic Cup TID (+870 kcal, 27 g protein daily)     Medical Nutrition Therapy/Nutrition Education          Learner     Readiness Patient  Education not appropriate at this time     Method     Response N/A  N/A     Monitor/Evaluation        Monitor Per protocol, I&O, PO intake, Supplement intake, Pertinent labs, Weight, Symptoms, POC/GOC, Diet advancement     Nutrition Discharge Plan         To be determined     Electronically signed by:  Laura Man RD  07/17/23 10:40 EDT

## 2023-07-17 NOTE — THERAPY TREATMENT NOTE
Acute Care - Physical Therapy Treatment Note   [place]     Patient Name: Carlos Murphy Jr.  : 1960  MRN: [id number]  Today's Date: 2023      Visit Dx:     ICD-10-CM ICD-9-CM   1. Acute renal failure, unspecified acute renal failure type  N17.9 584.9   2. Altered mental status, unspecified altered mental status type  R41.82 780.97   3. At risk for polypharmacy  Z91.89 V49.89   4. Non-traumatic rhabdomyolysis  M62.82 728.88   5. Oropharyngeal dysphagia  R13.12 787.22   6. Impaired mobility and ADLs  Z74.09 V49.89    Z78.9    7. Difficulty walking  R26.2 719.7     Patient Active Problem List   Diagnosis    Ulcer of toe due to type 2 diabetes mellitus    Centrilobular emphysema    Tobacco abuse, in remission    Obesity (BMI 30-39.9)    Closed fracture of shaft of left fibula    Closed nondisplaced fracture of medial malleolus of left tibia    Aftercare following surgery of ORIF left distal tibia fracture 2022    Colon cancer screening    Colitis    Acute renal failure, unspecified acute renal failure type    Onychomycosis    Onychocryptosis    Foreign body in left foot    Peripheral neuropathy    Charcot foot due to diabetes mellitus    Foot pain, bilateral     Past Medical History:   Diagnosis Date    Acute kidney injury      POST SEIZURES    Astigmatism of both eyes     eyes twitch left and right    Bipolar disorder     Charcot ankle     Closed nondisplaced fracture of medial malleolus of left tibia     COPD (chronic obstructive pulmonary disease)     USES INHALERS    Diabetes     BG RUNS AROUND 90'S IN AM    Hx of schizophrenia     Hyperlipidemia     Hypertension     SEEN DR ROD IN THE PAST, HAD APPT WITH DR MICHAUD IN  CX APPT, DENIED CP/SOB    Neuropathy     Seizures     LAST ONE 22    Sleep apnea     DOES NOT USE CPAP    Varicose vein of leg      Past Surgical History:   Procedure Laterality Date    ANKLE OPEN REDUCTION INTERNAL FIXATION Left 2022    Procedure: LEFT OPEN  REDUCTION INTERNAL FIXATION DISTAL TIBIA FRACTURE ;  Surgeon: Tha Parry MD;  [place] [facility] [place] [facility] [place]  Service: Orthopedics;  Laterality: Left;    ANKLE OPEN REDUCTION INTERNAL FIXATION Left 06/01/2022    Procedure: LEFT ANKLE OPEN REDUCTION INTERNAL FIXATION WITH SYNDESMOSIS FIXATION;  Surgeon: Tha Parry MD;  Location: [facility] MAIN OR;  Service: Orthopedics;  Laterality: Left;    CHOLECYSTECTOMY      COLONOSCOPY      JOINT REPLACEMENT      RTKR    LUMBAR DISC SURGERY      SHOULDER SURGERY Right      PT Assessment (last 12 hours)       PT Evaluation and Treatment       Row Name 07/17/23 1348          Physical Therapy Time and Intention    Subjective Information complains of;fatigue  -DK     Document Type therapy note (daily note)  -DK     Mode of Treatment individual therapy;physical therapy  -DK     Patient Effort good  -DK     Symptoms Noted During/After Treatment fatigue  -DK     Comment Pt is rather talkative, mildly impulsive, but improved since last visit.  -       Row Name 07/17/23 1348          Pain    Pretreatment Pain Rating 0/10 - no pain  -DK     Posttreatment Pain Rating 0/10 - no pain  -DK       Row Name 07/17/23 1344          Cognition    Affect/Mental Status (Cognition) confused;flat/blunted affect;confabulatory  -DK     Orientation Status (Cognition) oriented to;person  -DK     Follows Commands (Cognition) physical/tactile prompts required;repetition of directions required;verbal cues/prompting required  -DK     Cognitive Function WFL  -DK     Personal Safety Interventions gait belt;nonskid shoes/slippers when out of bed;supervised activity  -DK       Row Name 07/17/23 1343          Bed Mobility    Bed Mobility supine-sit-supine  -DK     All Activities, Riley (Bed Mobility) standby assist  -DK     Supine-Sit-Supine Riley (Bed Mobility) standby assist  -DK     Assistive Device (Bed Mobility) bed rails  -       Row Name 07/17/23 5774          Transfers     Transfers sit-stand transfer;stand-sit transfer  -DK       Row Name 07/17/23 1348          Sit-Stand Transfer    Sit-Stand [place] (Transfers) standby assist;contact guard;1 person assist  -DK     Assistive Device (Sit-Stand Transfers) walker, front-wheeled  -DK       Row Name 07/17/23 1348          Stand-Sit Transfer    Stand-Sit [place] (Transfers) standby assist;contact guard;1 person assist  -DK     Assistive Device (Stand-Sit Transfers) walker, front-wheeled  -DK       Row Name 07/17/23 1348          Gait/Stairs (Locomotion)    Gait/Stairs Locomotion gait/ambulation independence;gait/ambulation assistive device;distance ambulated;gait pattern  -DK     [place] Level (Gait) standby assist;contact guard;1 person assist  -DK     Assistive Device (Gait) walker, front-wheeled  -DK     Distance in Feet (Gait) 100  -DK     Pattern (Gait) step-through  -DK     Deviations/Abnormal Patterns (Gait) festinating/shuffling;stride length decreased  -DK     Bilateral Gait Deviations forward flexed posture  -DK     Comment, (Gait/Stairs) Pt ambulated on room air with a rolling walker.  He returned to sit EOB on alert post treatment.  -DK       Row Name 07/17/23 1348          Safety Issues, Functional Mobility    Safety Issues Affecting Function (Mobility) impulsivity;safety precaution awareness  -DK     Impairments Affecting Function (Mobility) balance;cognition;endurance/activity tolerance;strength  -DK     Cognitive Impairments, Mobility Safety/Performance impulsivity;safety precaution awareness  -DK       Row Name 07/17/23 1348          Balance    Balance Assessment sitting static balance;sitting dynamic balance;standing static balance;standing dynamic balance  -DK     Static Sitting Balance standby assist  -DK     Dynamic Sitting Balance standby assist  -DK     Position, Sitting Balance unsupported;sitting edge of bed  -DK     Static Standing Balance standby assist;contact guard;1-person assist  -DK      Dynamic Standing Balance standby assist;contact guard;1-person assist  -     Position/Device Used, Standing Balance walker, front-wheeled  -     Balance Interventions standing;dynamic;tandem gait  -       Row Name 07/17/23 1348          Motor Skills    Motor Skills --  therapeutic exercises  -     Coordination WFL  -     Therapeutic Exercise hip;knee;ankle  -       Row Name 07/17/23 1348          Hip (Therapeutic Exercise)    Hip (Therapeutic Exercise) AROM (active range of motion)  -     Hip AROM (Therapeutic Exercise) bilateral;flexion;extension;aBduction;aDduction;sitting;20 repititions  -       Row Name 07/17/23 1348          Knee (Therapeutic Exercise)    Knee (Therapeutic Exercise) AROM (active range of motion)  -     Knee AROM (Therapeutic Exercise) bilateral;flexion;extension;LAQ (long arc quad);sitting;20 repititions  -       Row Name 07/17/23 1348          Ankle (Therapeutic Exercise)    Ankle (Therapeutic Exercise) AROM (active range of motion)  -     Ankle AROM (Therapeutic Exercise) bilateral;dorsiflexion;plantarflexion;sitting;20 repititions  -       Row Name             Wound 06/30/23 2321 Right anterior knee Abrasion    Wound - Properties Group Placement Date: 06/30/23 -TH Placement Time: 2321 -TH Present on Hospital Admission: Y  -TH Side: Right  -TH Orientation: anterior  -TH Location: knee  -TH Primary Wound Type: Abrasion  -TH    Retired Wound - Properties Group Placement Date: 06/30/23 -TH Placement Time: 2321 -TH Present on Hospital Admission: Y  -TH Side: Right  -TH Orientation: anterior  -TH Location: knee  -TH Primary Wound Type: Abrasion  -TH    Retired Wound - Properties Group Date first assessed: 06/30/23 -TH Time first assessed: 2321 -TH Present on Hospital Admission: Y  -TH Side: Right  -TH Location: knee  -TH Primary Wound Type: Abrasion  -TH      Row Name             Wound 06/30/23 2322 Left anterior knee Abrasion    Wound - Properties Group Placement  Date: 06/30/23 -TH Placement Time: 2322 -TH Side: Left  -TH Orientation: anterior  -TH Location: knee  -TH Primary Wound Type: Abrasion  -TH    Retired Wound - Properties Group Placement Date: 06/30/23 -TH Placement Time: 2322 -TH Side: Left  -TH Orientation: anterior  -TH Location: knee  -TH Primary Wound Type: Abrasion  -TH    Retired Wound - Properties Group Date first assessed: 06/30/23 -TH Time first assessed: 2322 -TH Side: Left  -TH Location: knee  -TH Primary Wound Type: Abrasion  -TH      Row Name             Wound 06/30/23 2323 Bilateral anterior foot Abrasion    Wound - Properties Group Placement Date: 06/30/23 -TH Placement Time: 2323 -TH Side: Bilateral  -TH Orientation: anterior  -TH Location: foot  -TH Primary Wound Type: Abrasion  -TH    Retired Wound - Properties Group Placement Date: 06/30/23 -TH Placement Time: 2323 -TH Side: Bilateral  -TH Orientation: anterior  -TH Location: foot  -TH Primary Wound Type: Abrasion  -TH    Retired Wound - Properties Group Date first assessed: 06/30/23 -TH Time first assessed: 2323  -TH Side: Bilateral  -TH Location: foot  -TH Primary Wound Type: Abrasion  -TH      Row Name             Wound 07/03/23 1420 Bilateral posterior plantar Traumatic    Wound - Properties Group Placement Date: 07/03/23  -FL Placement Time: 1420  -FL Side: Bilateral  -FL Orientation: posterior  -FL Location: plantar  -FL Primary Wound Type: Traumatic  -FL    Retired Wound - Properties Group Placement Date: 07/03/23  -FL Placement Time: 1420  -FL Side: Bilateral  -FL Orientation: posterior  -FL Location: plantar  -FL Primary Wound Type: Traumatic  -FL    Retired Wound - Properties Group Date first assessed: 07/03/23  -FL Time first assessed: 1420  -FL Side: Bilateral  -FL Location: plantar  -FL Primary Wound Type: Traumatic  -FL      Row Name             Wound 07/10/23 1556 Bilateral coccyx Pressure Injury    Wound - Properties Group Placement Date: 07/10/23  -MJ Placement Time:  1556  -MJ Present on Hospital Admission: N  -MJ Side: Bilateral  -MJ Location: coccyx  -MJ Primary Wound Type: Pressure inj  -MJ    Retired Wound - Properties Group Placement Date: 07/10/23  -MJ Placement Time: 1556  -MJ Present on Hospital Admission: N  -MJ Side: Bilateral  -MJ Location: coccyx  -MJ Primary Wound Type: Pressure inj  -MJ    Retired Wound - Properties Group Date first assessed: 07/10/23  -MJ Time first assessed: 1556  -MJ Present on Hospital Admission: N  -MJ Side: Bilateral  -MJ Location: coccyx  -MJ Primary Wound Type: Pressure inj  -MJ      Row Name 07/17/23 1348          Plan of Care Review    Plan of Care Reviewed With patient  -DK     Progress improving  -       Row Name 07/17/23 1348          Positioning and Restraints    Pre-Treatment Position in bed  -DK     Post Treatment Position bed  -DK     In Bed supine;sitting EOB;call light within reach;encouraged to call for assist;exit alarm on;side rails up x2  -       Row Name 07/17/23 1348          Therapy Assessment/Plan (PT)    Rehab Potential (PT) good, to achieve stated therapy goals  -     Criteria for Skilled Interventions Met (PT) skilled treatment is necessary  -     Therapy Frequency (PT) daily  -     Problem List (PT) problems related to;balance;cognition;mobility;strength;hearing  -     Activity Limitations Related to Problem List (PT) unable to ambulate safely;unable to transfer safely  -       Row Name 07/17/23 1348          Progress Summary (PT)    Progress Toward Functional Goals (PT) progress toward functional goals is good  -               User Key  (r) = Recorded By, (t) = Taken By, (c) = Cosigned By      Initials Name Provider Type    Mojgan Higuera, RN Registered Nurse    Marah Mcpherson PTA Physical Therapist Assistant    Yumiko Rosario, RN Registered Nurse    Ambar Martinez RN Registered Nurse                    Physical Therapy Education       Title: PT OT SLP Therapies (In Progress)        Topic: Physical Therapy (In Progress)       Point: Mobility training (In Progress)       Learning Progress Summary             Patient Acceptance, E,TB, NR by AV at 7/14/2023 1352                         Point: Home exercise program (Not Started)       Learner Progress:  Not documented in this visit.              Point: Body mechanics (In Progress)       Learning Progress Summary             Patient Acceptance, E,TB, NR by AV at 7/14/2023 1352                         Point: Precautions (In Progress)       Learning Progress Summary             Patient Acceptance, E,TB, NR by AV at 7/14/2023 1352                                         User Key       Initials Effective Dates Name Provider Type Discipline    AV 06/11/21 -  Cesario Clements, PT Physical Therapist PT                  PT Recommendation and Plan  Planned Therapy Interventions (PT): balance training, bed mobility training, gait training, home exercise program, strengthening, transfer training  Therapy Frequency (PT): daily  Progress Summary (PT)  Progress Toward Functional Goals (PT): progress toward functional goals is good  Plan of Care Reviewed With: patient  Progress: improving   Outcome Measures       Row Name 07/17/23 1348 07/16/23 1037          How much help from another person do you currently need...    Turning from your back to your side while in flat bed without using bedrails? 4  -DK 4  -DK     Moving from lying on back to sitting on the side of a flat bed without bedrails? 4  -DK 4  -DK     Moving to and from a bed to a chair (including a wheelchair)? 3  -DK 3  -DK     Standing up from a chair using your arms (e.g., wheelchair, bedside chair)? 3  -DK 3  -DK     Climbing 3-5 steps with a railing? 3  -DK 3  -DK     To walk in hospital room? 3  -DK 3  -DK     AM-PAC 6 Clicks Score (PT) 20  -DK 20  -DK        Functional Assessment    Outcome Measure Options AM-PAC 6 Clicks Basic Mobility (PT)  -DK AM-PAC 6 Clicks Basic Mobility (PT)  -DK                User Key  (r) = Recorded By, (t) = Taken By, (c) = Cosigned By      Initials Name Provider Type    Marah Mcpherson PTA Physical Therapist Assistant                     Time Calculation:    PT Charges       Row Name 07/17/23 1352             Time Calculation    PT Received On 07/17/23  -DK      PT Goal Re-Cert Due Date 07/23/23  -DK         Timed Charges    39451 - PT Therapeutic Exercise Minutes 14  -DK      08725 - Gait Training Minutes  8  -DK      99569 - PT Therapeutic Activity Minutes 6  -DK         Total Minutes    Timed Charges Total Minutes 28  -DK       Total Minutes 28  -DK                User Key  (r) = Recorded By, (t) = Taken By, (c) = Cosigned By      Initials Name Provider Type    Marah Mcpherson PTA Physical Therapist Assistant                  Therapy Charges for Today       Code Description Service Date Service Provider Modifiers Qty    67781911442 HC PT THER PROC EA 15 MIN 7/16/2023 Marah Gomez, PTA GP 1    38903997598 HC GAIT TRAINING EA 15 MIN 7/16/2023 Marah Gomez, PTA GP 1    92158369899 HC PT THER PROC EA 15 MIN 7/17/2023 Marah Gomez, PTA GP 1    35318827492 HC GAIT TRAINING EA 15 MIN 7/17/2023 Marah Gomez, PTA GP 1            PT G-Codes  Outcome Measure Options: AM-PAC 6 Clicks Basic Mobility (PT)  AM-PAC 6 Clicks Score (PT): 20  AM-PAC 6 Clicks Score (OT): 17    Marah Gomez PTA  7/17/2023

## 2023-07-18 LAB
BASOPHILS # BLD AUTO: 0.1 10*3/MM3 (ref 0–0.2)
BASOPHILS NFR BLD AUTO: 1.2 % (ref 0–1.5)
DEPRECATED RDW RBC AUTO: 46.5 FL (ref 37–54)
EOSINOPHIL # BLD AUTO: 0.33 10*3/MM3 (ref 0–0.4)
EOSINOPHIL NFR BLD AUTO: 4 % (ref 0.3–6.2)
ERYTHROCYTE [DISTWIDTH] IN BLOOD BY AUTOMATED COUNT: 13.9 % (ref 12.3–15.4)
GLUCOSE BLDC GLUCOMTR-MCNC: 142 MG/DL (ref 70–99)
GLUCOSE BLDC GLUCOMTR-MCNC: 234 MG/DL (ref 70–99)
GLUCOSE BLDC GLUCOMTR-MCNC: 240 MG/DL (ref 70–99)
GLUCOSE BLDC GLUCOMTR-MCNC: 339 MG/DL (ref 70–99)
HCT VFR BLD AUTO: 38.6 % (ref 37.5–51)
HGB BLD-MCNC: 12 G/DL (ref 13–17.7)
IMM GRANULOCYTES # BLD AUTO: 0.03 10*3/MM3 (ref 0–0.05)
IMM GRANULOCYTES NFR BLD AUTO: 0.4 % (ref 0–0.5)
LYMPHOCYTES # BLD AUTO: 2.03 10*3/MM3 (ref 0.7–3.1)
LYMPHOCYTES NFR BLD AUTO: 24.3 % (ref 19.6–45.3)
MAGNESIUM SERPL-MCNC: 2.6 MG/DL (ref 1.6–2.4)
MCH RBC QN AUTO: 29.6 PG (ref 26.6–33)
MCHC RBC AUTO-ENTMCNC: 31.1 G/DL (ref 31.5–35.7)
MCV RBC AUTO: 95.3 FL (ref 79–97)
MONOCYTES # BLD AUTO: 1.18 10*3/MM3 (ref 0.1–0.9)
MONOCYTES NFR BLD AUTO: 14.1 % (ref 5–12)
NEUTROPHILS NFR BLD AUTO: 4.68 10*3/MM3 (ref 1.7–7)
NEUTROPHILS NFR BLD AUTO: 56 % (ref 42.7–76)
NRBC BLD AUTO-RTO: 0 /100 WBC (ref 0–0.2)
PHOSPHATE SERPL-MCNC: 4.1 MG/DL (ref 2.5–4.5)
PLATELET # BLD AUTO: 303 10*3/MM3 (ref 140–450)
PMV BLD AUTO: 10.1 FL (ref 6–12)
RBC # BLD AUTO: 4.05 10*6/MM3 (ref 4.14–5.8)
VIT B1 BLD-SCNC: 557.2 NMOL/L (ref 66.5–200)
WBC NRBC COR # BLD: 8.35 10*3/MM3 (ref 3.4–10.8)

## 2023-07-18 PROCEDURE — 92526 ORAL FUNCTION THERAPY: CPT

## 2023-07-18 PROCEDURE — 25010000002 HEPARIN (PORCINE) PER 1000 UNITS: Performed by: NURSE PRACTITIONER

## 2023-07-18 PROCEDURE — 63710000001 INSULIN DETEMIR PER 5 UNITS: Performed by: NURSE PRACTITIONER

## 2023-07-18 PROCEDURE — 63710000001 INSULIN DETEMIR PER 5 UNITS: Performed by: FAMILY MEDICINE

## 2023-07-18 PROCEDURE — 82948 REAGENT STRIP/BLOOD GLUCOSE: CPT

## 2023-07-18 PROCEDURE — 63710000001 INSULIN LISPRO (HUMAN) PER 5 UNITS: Performed by: NURSE PRACTITIONER

## 2023-07-18 PROCEDURE — 97110 THERAPEUTIC EXERCISES: CPT

## 2023-07-18 PROCEDURE — 83735 ASSAY OF MAGNESIUM: CPT | Performed by: INTERNAL MEDICINE

## 2023-07-18 PROCEDURE — 85025 COMPLETE CBC W/AUTO DIFF WBC: CPT | Performed by: INTERNAL MEDICINE

## 2023-07-18 PROCEDURE — 99232 SBSQ HOSP IP/OBS MODERATE 35: CPT | Performed by: FAMILY MEDICINE

## 2023-07-18 PROCEDURE — 84100 ASSAY OF PHOSPHORUS: CPT | Performed by: INTERNAL MEDICINE

## 2023-07-18 RX ORDER — PANTOPRAZOLE SODIUM 40 MG/1
40 TABLET, DELAYED RELEASE ORAL 2 TIMES DAILY
Status: DISCONTINUED | OUTPATIENT
Start: 2023-07-18 | End: 2023-07-19 | Stop reason: HOSPADM

## 2023-07-18 RX ADMIN — HEPARIN SODIUM 5000 UNITS: 5000 INJECTION INTRAVENOUS; SUBCUTANEOUS at 05:16

## 2023-07-18 RX ADMIN — Medication 1 MG: at 08:32

## 2023-07-18 RX ADMIN — SODIUM BICARBONATE 650 MG TABLET 650 MG: at 08:34

## 2023-07-18 RX ADMIN — PANTOPRAZOLE SODIUM 40 MG: 40 TABLET, DELAYED RELEASE ORAL at 21:44

## 2023-07-18 RX ADMIN — HEPARIN SODIUM 5000 UNITS: 5000 INJECTION INTRAVENOUS; SUBCUTANEOUS at 21:43

## 2023-07-18 RX ADMIN — ATENOLOL 25 MG: 25 TABLET ORAL at 08:34

## 2023-07-18 RX ADMIN — INSULIN LISPRO 4 UNITS: 100 INJECTION, SOLUTION INTRAVENOUS; SUBCUTANEOUS at 17:44

## 2023-07-18 RX ADMIN — MULTIPLE VITAMINS W/ MINERALS TAB 1 TABLET: TAB at 14:43

## 2023-07-18 RX ADMIN — LAMOTRIGINE 200 MG: 100 TABLET ORAL at 08:34

## 2023-07-18 RX ADMIN — AMLODIPINE BESYLATE 10 MG: 5 TABLET ORAL at 08:32

## 2023-07-18 RX ADMIN — PANTOPRAZOLE SODIUM 40 MG: 40 INJECTION, POWDER, FOR SOLUTION INTRAVENOUS at 08:32

## 2023-07-18 RX ADMIN — MUPIROCIN 1 APPLICATION: 20 OINTMENT TOPICAL at 17:44

## 2023-07-18 RX ADMIN — INSULIN LISPRO 4 UNITS: 100 INJECTION, SOLUTION INTRAVENOUS; SUBCUTANEOUS at 21:44

## 2023-07-18 RX ADMIN — Medication 10 ML: at 21:42

## 2023-07-18 RX ADMIN — MUPIROCIN 1 APPLICATION: 20 OINTMENT TOPICAL at 21:45

## 2023-07-18 RX ADMIN — HEPARIN SODIUM 5000 UNITS: 5000 INJECTION INTRAVENOUS; SUBCUTANEOUS at 14:43

## 2023-07-18 RX ADMIN — DOCUSATE SODIUM 50MG AND SENNOSIDES 8.6MG 2 TABLET: 8.6; 5 TABLET, FILM COATED ORAL at 08:32

## 2023-07-18 RX ADMIN — LOSARTAN POTASSIUM 50 MG: 25 TABLET, FILM COATED ORAL at 21:43

## 2023-07-18 RX ADMIN — INSULIN LISPRO 7 UNITS: 100 INJECTION, SOLUTION INTRAVENOUS; SUBCUTANEOUS at 12:17

## 2023-07-18 RX ADMIN — LOSARTAN POTASSIUM 50 MG: 25 TABLET, FILM COATED ORAL at 08:32

## 2023-07-18 RX ADMIN — DOCUSATE SODIUM 50MG AND SENNOSIDES 8.6MG 2 TABLET: 8.6; 5 TABLET, FILM COATED ORAL at 21:43

## 2023-07-18 RX ADMIN — INSULIN DETEMIR 15 UNITS: 100 INJECTION, SOLUTION SUBCUTANEOUS at 08:32

## 2023-07-18 RX ADMIN — Medication 10 ML: at 08:34

## 2023-07-18 RX ADMIN — INSULIN DETEMIR 15 UNITS: 100 INJECTION, SOLUTION SUBCUTANEOUS at 21:44

## 2023-07-18 RX ADMIN — SODIUM BICARBONATE 650 MG TABLET 650 MG: at 21:43

## 2023-07-18 RX ADMIN — LAMOTRIGINE 200 MG: 100 TABLET ORAL at 21:43

## 2023-07-18 RX ADMIN — QUETIAPINE FUMARATE 600 MG: 200 TABLET ORAL at 21:43

## 2023-07-18 RX ADMIN — Medication 10 ML: at 08:32

## 2023-07-18 NOTE — PROGRESS NOTES
[facility]     Nephrology Progress Note      Patient Name: Carlos Murphy Jr.  : 1960  MRN: [id number]  Primary Care Physician:  Felix Atkinson MD  Date of admission: 2023    Subjective   Subjective     Interval History:  Patient Reports feeling better.  Sitting at bedside, much more alert and coherent.  Stated he ambulated earlier today.  Tolerating p.o.    Review of Systems   All systems were reviewed and negative except for: What is mentioned above.    Objective   Objective     Vitals:   Temp:  [97.4 °F (36.3 °C)-98.7 °F (37.1 °C)] 97.4 °F (36.3 °C)  Heart Rate:  [56-64] 61  Resp:  [18] 18  BP: (103-140)/(62-75) 115/63  Physical Exam:   Constitutional: Awake, alert, mildly altered.   Eyes: sclerae anicteric, no conjunctival injection   HENT: mucous membranes moist   Neck: Supple, no thyromegaly, no lymphadenopathy, trachea midline, No JVD   Respiratory: Clear to auscultation bilaterally, nonlabored respirations    Cardiovascular: RRR, no murmurs, rubs, or gallops.   Gastrointestinal: Positive bowel sounds, soft, nontender, nondistended   Musculoskeletal: Trace edema, no clubbing or cyanosis   Psychiatric: Appropriate affect, cooperative   Neurologic: moving all extremities, Cranial Nerves grossly intact.   Skin: warm and dry, no rashes     Result Review    Result Reviewed:  I have personally reviewed the results from the time of this admission to 2023 13:20 EDT and agree with these findings:  [x]  Laboratory  []  Microbiology  [x]  Radiology  []  EKG/Telemetry   []  Cardiology/Vascular   []  Pathology  []  Old records  []  Other:  Lab Results   Component Value Date    GLUCOSE 160 (H) 2023    CALCIUM 9.1 2023     2023    K 4.4 2023    CO2 21.4 (L) 2023     2023    BUN 36 (H) 2023    CREATININE 2.12 (H) 2023    EGFRIFNONA 73 2017    BCR 17.0 2023    ANIONGAP 10.6 2023     Lab Results   Component Value Date     CALCIUM 9.1 07/17/2023    PHOS 4.1 07/18/2023      Results from last 7 days   Lab Units 07/18/23  0505   MAGNESIUM mg/dL 2.6*              Assessment & Plan   Assessment / Plan       Active Hospital Problems:  Active Hospital Problems    Diagnosis     **Acute renal failure, unspecified acute renal failure type     Onychomycosis     Onychocryptosis     Foreign body in left foot     Peripheral neuropathy     Charcot foot due to diabetes mellitus     Foot pain, bilateral        Assessment and Plan:      1.  MK/CKD3b-  Due to diabetic nephropathy.  Renal function is stabilized.  Had been on lisinopril and SGLT2 and kerendia.  Volume status adequate.  Baseline creatinine around 2.0-2.5.  Monitor.    2.  Met Acidosis-recurrent, monitor with oral sodium bicarb.    3.  Proteinuria-  diabetic nephropathy, continue angiotensin receptor blocker  4.  DMII-  treated.  Was On jardiance, hold with MK.  May want to reintroduce it at some point   5.  HTN-  BP is better, continue losartan.  6.  Chronic edema-edema seems to be improved although now back on amlodipine monitor for now.   7.  H/o bipolar-  previous lithium use.  8.  Anemia-  tsat at 14%, ferritin 425. Received some IV iron.  Hemoglobin stable  9.  Weakness-  would avoid muscle relaxer in CKD (flexeril).  Likely polypharmacy contributing.

## 2023-07-18 NOTE — THERAPY TREATMENT NOTE
Acute Care - Speech Language Pathology   Swallow Treatment Note  [place]     Patient Name: Carlos Murphy Jr.  : 1960  MRN: [id number]  Today's Date: 2023               Admit Date: 2023    Visit Dx:     ICD-10-CM ICD-9-CM   1. Acute renal failure, unspecified acute renal failure type  N17.9 584.9   2. Altered mental status, unspecified altered mental status type  R41.82 780.97   3. At risk for polypharmacy  Z91.89 V49.89   4. Non-traumatic rhabdomyolysis  M62.82 728.88   5. Oropharyngeal dysphagia  R13.12 787.22   6. Impaired mobility and ADLs  Z74.09 V49.89    Z78.9    7. Difficulty walking  R26.2 719.7     Patient Active Problem List   Diagnosis    Ulcer of toe due to type 2 diabetes mellitus    Centrilobular emphysema    Tobacco abuse, in remission    Obesity (BMI 30-39.9)    Closed fracture of shaft of left fibula    Closed nondisplaced fracture of medial malleolus of left tibia    Aftercare following surgery of ORIF left distal tibia fracture 2022    Colon cancer screening    Colitis    Acute renal failure, unspecified acute renal failure type    Onychomycosis    Onychocryptosis    Foreign body in left foot    Peripheral neuropathy    Charcot foot due to diabetes mellitus    Foot pain, bilateral     Past Medical History:   Diagnosis Date    Acute kidney injury      POST SEIZURES    Astigmatism of both eyes     eyes twitch left and right    Bipolar disorder     Charcot ankle     Closed nondisplaced fracture of medial malleolus of left tibia     COPD (chronic obstructive pulmonary disease)     USES INHALERS    Diabetes     BG RUNS AROUND 90'S IN AM    Hx of schizophrenia     Hyperlipidemia     Hypertension     SEEN DR ROD IN THE PAST, HAD APPT WITH DR MICHAUD IN  CX APPT, DENIED CP/SOB    Neuropathy     Seizures     LAST ONE 22    Sleep apnea     DOES NOT USE CPAP    Varicose vein of leg      Past Surgical History:   Procedure Laterality Date    ANKLE OPEN REDUCTION INTERNAL  FIXATION Left 04/25/2022    Procedure: LEFT OPEN REDUCTION INTERNAL FIXATION DISTAL TIBIA FRACTURE ;  Surgeon: Tha Parry MD;  Location: [facility];  Service: Orthopedics;  Laterality: Left;    ANKLE OPEN REDUCTION INTERNAL FIXATION Left 06/01/2022    Procedure: LEFT ANKLE OPEN REDUCTION INTERNAL FIXATION WITH SYNDESMOSIS FIXATION;  Surgeon: Tha Parry MD;  Location: [facility] MAIN OR;  Service: Orthopedics;  Laterality: Left;    CHOLECYSTECTOMY      COLONOSCOPY      JOINT REPLACEMENT      RTKR    LUMBAR DISC SURGERY      SHOULDER SURGERY Right        SLP Recommendation and Plan      SPEECH PATHOLOGY DYSPHAGIA TREATMENT    Subjective/Behavioral Observations: Awake, cooperative.  Sitting upright in a chair.  NG tube removed 7/13/2023.  Reportedly tolerating current diet recommendations of mechanical soft solids and thin liquids.        Day/time of Treatment: 7/18/2023        Current Diet: Mechanical soft, thin liquids        Treatment received: Treatment focused on tolerance of current diet recommendations and use of compensatory strategies.  Also targeted trials of regular solids for possible diet advancement.        Results of treatment: Patient feeding self with assist.  Tolerating thin liquids via straw with no overt signs or symptoms of aspiration.  Consumed approximately 240 mL.  Trials of regular crunchy solids with patient self-feeding.  Clears oral residue independently.  No overt signs or symptoms of aspiration observed.        Progress toward goals: Goals met        Barriers to Achieving goals: N/A        Plan of care:/changes in plan: Recommend diet upgrade to regular solids.  Continue thin liquids.  Patient has met goals.  Patient will be discharged from further skilled speech pathology services regarding dysphagia.  If patient should demonstrate any decline in status, please reconsult speech pathology.                                                                                                    EDUCATION  The patient has been educated in the following areas:   Dysphagia (Swallowing Impairment).              Time Calculation:    Time Calculation- SLP       Row Name 07/18/23 1117             Time Calculation- SLP    SLP Stop Time 1000  -SN      SLP Received On 07/18/23  -SN         Untimed Charges    42224-ZD Treatment Swallow Minutes 45  -SN         Total Minutes    Untimed Charges Total Minutes 45  -SN       Total Minutes 45  -SN                User Key  (r) = Recorded By, (t) = Taken By, (c) = Cosigned By      Initials Name Provider Type    SN Nidhi Soliman MS-CCC/SLP, TAVO Speech and Language Pathologist                    Therapy Charges for Today       Code Description Service Date Service Provider Modifiers Qty    67363096355 HC ST TREATMENT SWALLOW 3 7/18/2023 Nidhi Soliman MS-CCC/SLP, TAVO GN 1                 OCTAVIO Vaughn/TAVO COFFEY  7/18/2023

## 2023-07-18 NOTE — PROGRESS NOTES
[facility]   Hospitalist Progress Note  Date: 2023  Patient Name: Carlos Murphy Jr.  : 1960  MRN: [id number]  Date of admission: 2023      Subjective   Subjective     Chief Complaint: Follow-up generalized weakness    Summary:Carols Murphy Jr. is a 62 y.o. male with history of seizure disorder (on lamotrigine 200 bid), bipolar, schizophrenia, who presented with altered mentation/unresponsiveness on . Patient apparently had called his sister initially had slurred speech/somnolent and received Narcan with notable initial improvement. Patient apparently reported taking additional medications at home but was not trying to harm himself history is very limited given patient poor historian. Patient underwent stroke work-up without acute findings suggest etiology of patient's AMS. Did have hyperkalemia initially improved with fluids. Nephrology assisting given MK and rhabdomyolysis. Podiatry assisted given for infection/glasses feet and required several pieces being removed. Hospital course completed by worsening mentation/agitation with concern initially for seizures multiple EEGs have been negative, neurology consulted for assistance, concern for possible medication withdrawal (outpt is on ambien tox screen was positive oxycodone only)/metabolic encephalopathy and was placed on CIWA monitoring and required ativan. LP obtained negative for acute meningitis, tick panel negative. Mentation starting to improve, gradually titrating medications, psychiatry consulted for additional assistance.  Awaiting placement and [place] [facility]    Interval Followup: Patient lying in bed appears to be resting comfortably.  Patient denies any new issues.  Mental status appears back to baseline.  No new issues per nursing.  Pre-CERT pending.  Blood sugars elevated to lunch.    Review of Systems  Constitutional: Negative for fatigue and fever.   HENT: Negative for sore throat and trouble  swallowing.    Eyes: Negative for pain and discharge.   Respiratory: Negative for cough and shortness of breath.    Cardiovascular: Negative for chest pain and palpitations.   Gastrointestinal: Negative for abdominal pain, nausea and vomiting.   Endocrine: Negative for cold intolerance and heat intolerance.   Genitourinary: Negative for difficulty urinating and dysuria.   Musculoskeletal: Negative for back pain and neck stiffness.   Skin: Negative for color change and rash.   Neurological: Negative for syncope and headaches.   Hematological: Negative for adenopathy.   Psychiatric/Behavioral: Negative for confusion and hallucinations.    Objective   Objective     Vitals:   Temp:  [97.4 °F (36.3 °C)-98.7 °F (37.1 °C)] 97.4 °F (36.3 °C)  Heart Rate:  [60-64] 64  Resp:  [18] 18  BP: (115-140)/(62-75) 126/70  Physical Exam   Gen. well-developed appearing stated age in no acute distress  HEENT: Normocephalic atraumatic moist membranes pupils equal round reactive light, no scleral icterus no conjunctival injection  Cardiovascular: regular rate and rhythm no murmurs rubs or gallops S1-S2, no lower extremity edema appreciated  Pulmonary: Clear to auscultation bilaterally no wheezes rales or rhonchi symmetric chest expansion, unlabored, no conversational dyspnea appreciated  Gastrointestinal: Soft nontender nondistended positive bowel sounds all 4 quadrants no rebound or guarding  Musculoskeletal: No clubbing cyanosis, warm and well-perfused, calves soft symmetric nontender bilaterally  Skin: Clean dry without rashs  Neuro: Cranial nerves II through XII intact grossly no sensorimotor deficits appreciated bilateral upper and lower extremities  Psych: Patient is calm cooperative and appropriate with exam not responding to internal stimuli  : No Marley catheter no bladder distention no suprapubic tenderness    Result Review    Result Review:  I have personally reviewed these results and agree with these findings:  [x]   Laboratory  LAB RESULTS:      Lab 07/18/23  0505 07/17/23  0454 07/16/23  0452 07/15/23  0531 07/14/23  0750   WBC 8.35 9.07 10.10 12.67* 14.24*   HEMOGLOBIN 12.0* 12.2* 12.3* 12.2* 12.5*   HEMATOCRIT 38.6 37.0* 38.9 38.7 39.6   PLATELETS 303 364 337 361 339   NEUTROS ABS 4.68 5.44 6.84 8.69* 10.32*   IMMATURE GRANS (ABS) 0.03 0.04 0.04 0.10* 0.09*   LYMPHS ABS 2.03 2.01 1.58 1.86 1.78   MONOS ABS 1.18* 1.16* 1.22* 1.50* 1.62*   EOS ABS 0.33 0.34 0.32 0.43* 0.36   MCV 95.3 88.1 93.3 92.1 91.7         Lab 07/18/23  0505 07/17/23 0454 07/16/23 0452 07/15/23  0531 07/14/23  0750 07/13/23  0516   SODIUM  --  138 138 141 141 137   POTASSIUM  --  4.4 4.1 4.2 4.5 4.7   CHLORIDE  --  106 107 108* 109* 109*   CO2  --  21.4* 18.5* 21.0* 23.1 16.3*   ANION GAP  --  10.6 12.5 12.0 8.9 11.7   BUN  --  36* 36* 36* 38* 42*   CREATININE  --  2.12* 2.05* 2.01* 2.08* 1.99*   EGFR  --  34.8* 36.2* 37.0* 35.6* 37.5*   GLUCOSE  --  160* 126* 119* 160* 266*   CALCIUM  --  9.1 9.1 9.3 9.4 8.9   MAGNESIUM 2.6* 2.6* 2.7* 2.7* 2.8* 3.2*   PHOSPHORUS 4.1 4.3 4.7* 4.7* 5.2* 4.4         Lab 07/16/23  0452   TOTAL PROTEIN 6.5   ALBUMIN 3.3*   GLOBULIN 3.2   ALT (SGPT) 26   AST (SGOT) 15   BILIRUBIN 0.4   ALK PHOS 128*                     Brief Urine Lab Results  (Last result in the past 365 days)        Color   Clarity   Blood   Leuk Est   Nitrite   Protein   CREAT   Urine HCG        07/01/23 0709 Yellow   Clear   Moderate (2+)   Negative   Negative   30 mg/dL (1+)                 Microbiology Results (last 10 days)       ** No results found for the last 240 hours. **            []  Microbiology  [x]  Radiology  MRI Lumbar Spine Without Contrast    Result Date: 7/13/2023    1. No acute findings.  No acute vertebral compression fracture.  2. Degenerative changes are present at several levels, greatest at L3-4 and L4-5.  The findings at L4-5 have progressed since the 9/11/2019 MRI study.  The findings at L3-4 are probably similar in degree.  3.  Except for endplate degenerative changes, no significant intraosseous lesions are seen.  4. Except for suspected DISH, no significant paraspinal masses are appreciated.  5. Mild levoscoliosis of the lumbar spine is possible.  6. No distal cord signal abnormality.  No cauda equina mass.  7. Please see above comments for further detail.     Please note that portions of this note were completed with a voice recognition program.  DIMITRIS LEMOS JR, MD       Electronically Signed and Approved By: DIMITRIS LEMOS JR, MD on 7/13/2023 at 5:10               []  EKG/Telemetry   []  Cardiology/Vascular   []  Pathology  []  Old records  [x]  Other:  Scheduled Meds:amLODIPine, 10 mg, Oral, Q24H  atenolol, 25 mg, Oral, Daily  folic acid, 1 mg, Oral, Daily  heparin (porcine), 5,000 Units, Subcutaneous, Q8H  insulin detemir, 15 Units, Subcutaneous, Q12H  insulin lispro, 2-9 Units, Subcutaneous, 4x Daily AC & at Bedtime  lamoTRIgine, 200 mg, Oral, Q12H  losartan, 50 mg, Oral, BID  multivitamin with minerals, 1 tablet, Oral, Daily  mupirocin, 1 application , Topical, 2 times per day  pantoprazole, 40 mg, Oral, BID  QUEtiapine, 600 mg, Oral, Nightly  senna-docusate sodium, 2 tablet, Oral, BID  sodium bicarbonate, 650 mg, Oral, BID  sodium chloride, 10 mL, Intravenous, Q12H  sodium chloride, 10 mL, Intravenous, Q12H      Continuous Infusions:   PRN Meds:.  acetaminophen    acetaminophen    senna-docusate sodium **AND** polyethylene glycol **AND** [DISCONTINUED] bisacodyl **AND** bisacodyl    dextrose    dextrose    glucagon (human recombinant)    nitroglycerin    ondansetron    phenol    sodium chloride    sodium chloride      Assessment & Plan   Assessment / Plan     Assessment/Plan:  Sepsis from undetermined etiology, possible tickborne illness   Acute metabolic encephalopathy  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  Severe hyperkalemia  Seizure disorder (on lamictal), with concern for subclinical seizure - negative on  EEGs  Concern for withdrawal from outpatient ambien (tox + oxycodone only on admission)  Rhabdomyolysis  SSTI in setting of Foreign body in left foot/glass  Initial presentation with hyponatremia  Significant Hypernatremia  Anion gap metabolic acidosis  CVA ruled out  History of COPD  Type 2 diabetes mellitus  HTN  Bipolar disorder  Hyperlipidemia  Polypharmacy  Horizontal nystagmus - reportedly chronic/question congenital nystagmus        Patient admitted for further evaluation and treatment  Nephrology, psychiatry, pulm critical care, podiatry and neurology consulted thank you for your assistance  Patient completed empiric course of antibiotics  Mental status appears to be back to baseline.    Renal function stabilizing  Continue to hold Jardiance and Kerendia  Continue oral bicarb  Continue Lamictal  Continue Levemir twice daily and titrate as needed  Continue sliding scale insulin  Continue losartan, atenolol and amlodipine  Continue quetiapine  Continue physical therapy occupational therapy              Discussed plan with bedside RN.    Disposition: [facility] [facility] pending pre-CERT    DVT prophylaxis:  Medical and mechanical DVT prophylaxis orders are present.    CODE STATUS:   Level Of Support Discussed With: Patient  Code Status (Patient has no pulse and is not breathing): CPR (Attempt to Resuscitate)  Medical Interventions (Patient has pulse or is breathing): Full Support

## 2023-07-18 NOTE — PLAN OF CARE
Goal Outcome Evaluation:  Plan of Care Reviewed With: patient        Progress: no change  Outcome Evaluation: Pt a/o x4 this shift. VSS Pt up in the chair for a couple of hours today.  Awaiting rehab placement percert pending.  Continue plan of care

## 2023-07-18 NOTE — SIGNIFICANT NOTE
Wound Eval / Progress Noted     Litzy     Patient Name: Carlos Murphy Jr.  : 1960  MRN: [id number]  Today's Date: 2023                 Admit Date: 2023    Visit Dx:    ICD-10-CM ICD-9-CM   1. Acute renal failure, unspecified acute renal failure type  N17.9 584.9   2. Altered mental status, unspecified altered mental status type  R41.82 780.97   3. At risk for polypharmacy  Z91.89 V49.89   4. Non-traumatic rhabdomyolysis  M62.82 728.88   5. Oropharyngeal dysphagia  R13.12 787.22   6. Impaired mobility and ADLs  Z74.09 V49.89    Z78.9    7. Difficulty walking  R26.2 719.7         Acute renal failure, unspecified acute renal failure type    Onychomycosis    Onychocryptosis    Foreign body in left foot    Peripheral neuropathy    Charcot foot due to diabetes mellitus    Foot pain, bilateral        Past Medical History:   Diagnosis Date    Acute kidney injury      POST SEIZURES    Astigmatism of both eyes     eyes twitch left and right    Bipolar disorder     Charcot ankle     Closed nondisplaced fracture of medial malleolus of left tibia     COPD (chronic obstructive pulmonary disease)     USES INHALERS    Diabetes     BG RUNS AROUND 90'S IN AM    Hx of schizophrenia     Hyperlipidemia     Hypertension     SEEN DR ROD IN THE PAST, HAD APPT WITH DR MICHAUD IN  [facility] APPT, DENIED CP/SOB    Neuropathy     Seizures     LAST ONE 22    Sleep apnea     DOES NOT USE CPAP    Varicose vein of leg         Past Surgical History:   Procedure Laterality Date    ANKLE OPEN REDUCTION INTERNAL FIXATION Left 2022    Procedure: LEFT OPEN REDUCTION INTERNAL FIXATION DISTAL TIBIA FRACTURE ;  Surgeon: Tha Parry MD;  [place] [facility] [place] [facility] [place]  Service: Orthopedics;  Laterality: Left;    ANKLE OPEN REDUCTION INTERNAL FIXATION Left 2022    Procedure: LEFT ANKLE OPEN REDUCTION INTERNAL FIXATION WITH SYNDESMOSIS FIXATION;  Surgeon: Tha Parry MD;  Location: [facility] MAIN OR;   Service: Orthopedics;  Laterality: Left;    CHOLECYSTECTOMY      COLONOSCOPY      JOINT REPLACEMENT      RTKR    LUMBAR DISC SURGERY      SHOULDER SURGERY Right          Physical Assessment:     07/18/23 1505   Wound 06/30/23 2321 Right anterior knee Abrasion   Placement Date/Time: 06/30/23 2321   Present on Hospital Admission: Yes  Side: Right  Orientation: anterior  Location: knee  Primary Wound Type: Abrasion   Wound Image    Dressing Appearance intact;moist drainage   Base moist;red;scab;dry   Periwound intact;pink   Periwound Temperature warm   Periwound Skin Turgor soft   Edges open   Wound Length (cm) 2 cm   Wound Width (cm) 2 cm   Wound Depth (cm) 0.1 cm   Wound Surface Area (cm^2) 4 cm^2   Wound Volume (cm^3) 0.4 cm^3   Drainage Characteristics/Odor serosanguineous   Drainage Amount small   Care, Wound cleansed with;sterile normal saline   Dressing Care dressing applied;border dressing;hydrofiber;silver impregnated;silicone   Periwound Care absorptive dressing applied   Wound 06/30/23 2322 Left anterior knee Abrasion   Placement Date/Time: 06/30/23 2322   Side: Left  Orientation: anterior  Location: knee  Primary Wound Type: Abrasion   Wound Image    Dressing Appearance open to air   Closure None   Base scab;black eschar   Periwound intact;pink   Periwound Temperature warm   Periwound Skin Turgor soft   Edges open   Wound Length (cm) 1 cm   Wound Width (cm) 1.3 cm   Wound Surface Area (cm^2) 1.3 cm^2   Drainage Amount none   Care, Wound cleansed with;sterile normal saline   Dressing Care open to air   Wound 06/30/23 2323 Bilateral anterior foot Abrasion   Placement Date/Time: 06/30/23 2323   Side: Bilateral  Orientation: anterior  Location: foot  Primary Wound Type: Abrasion   Wound Image     Dressing Appearance open to air   Closure None   Base scab;dry   Periwound intact;pink   Periwound Temperature warm   Periwound Skin Turgor soft   Edges open   Drainage Amount none   Care, Wound cleansed with;sterile  normal saline   Dressing Care open to air   Wound 07/03/23 1420 Bilateral posterior plantar Traumatic   Placement Date/Time: 07/03/23 1420   Side: Bilateral  Orientation: posterior  Location: plantar  Primary Wound Type: Traumatic   Wound Image     Dressing Appearance open to air   Closure None   Base dry;red;scab   Periwound intact;pink   Periwound Temperature warm   Periwound Skin Turgor soft   Edges rolled/closed;open   Drainage Amount none   Care, Wound cleansed with;sterile normal saline   Dressing Care open to air   Wound 07/10/23 1556 Bilateral coccyx Pressure Injury   Placement Date/Time: 07/10/23 1556   Present on Hospital Admission: No  Side: Bilateral  Location: coccyx  Primary Wound Type: Pressure Injury   Wound Image    Dressing Appearance intact;no drainage   Base epithelialization;pink;dry   Periwound intact;blanchable;redness   Periwound Temperature warm   Periwound Skin Turgor soft   Edges rolled/closed   Drainage Amount none   Care, Wound cleansed with;sterile normal saline   Dressing Care dressing removed;open to air        Wound Check / Follow-up:  Patient seen today for wound follow-up. Patient much improved overall. He is awake, alert and appropriate, sitting on side of bed.   His wounds to plantar aspects of feet are almost completely resolved. Minor crusting but other wise all areas closed.   He continues to have thick crusting to rt 1st toe measuring 0.6cm x 0.4cm and also to 2nd toe measuring 0.2cm x 0.3cm. Recommending to continue topical treatment.   Thick crusting to left first toe measuring 2cm x 1.7cm and 0.5cm x 1.0cm. Recommending to continue current treatment.   Right knee thick crusting is no longer present and a pink moist wound base is present with drainage. Recommending daily dressing with silver impregnated hydrofiber and silicone border dressing.   To left knee there is new pink epithelium as well as an area of thick crusting below knee Recommending continued topical  treatment.   Left gluteal aspect now with closed wound with visible pink dry epithelium. Not recommending to continue current dressings but instead recommend to diligently apply barriers for continued skin protection.   Remainder of gluteal aspects with blanchable redness. Skin protection recommended.     Impression: Traumatic injuries to bilateral feet and knees; Resolved wound to left gluteal aspect    Short term goals:  Regain skin integrity. Daily dressing changes. BID topical treatments. Skin care / protection     Mojgan Gunn RN    7/18/2023    17:10 EDT

## 2023-07-18 NOTE — THERAPY TREATMENT NOTE
Patient Name: Carlos Murphy Jr.  : 1960    MRN: [id number]                              Today's Date: 2023       Admit Date: 2023    Visit Dx:     ICD-10-CM ICD-9-CM   1. Acute renal failure, unspecified acute renal failure type  N17.9 584.9   2. Altered mental status, unspecified altered mental status type  R41.82 780.97   3. At risk for polypharmacy  Z91.89 V49.89   4. Non-traumatic rhabdomyolysis  M62.82 728.88   5. Oropharyngeal dysphagia  R13.12 787.22   6. Impaired mobility and ADLs  Z74.09 V49.89    Z78.9    7. Difficulty walking  R26.2 719.7     Patient Active Problem List   Diagnosis    Ulcer of toe due to type 2 diabetes mellitus    Centrilobular emphysema    Tobacco abuse, in remission    Obesity (BMI 30-39.9)    Closed fracture of shaft of left fibula    Closed nondisplaced fracture of medial malleolus of left tibia    Aftercare following surgery of ORIF left distal tibia fracture 2022    Colon cancer screening    Colitis    Acute renal failure, unspecified acute renal failure type    Onychomycosis    Onychocryptosis    Foreign body in left foot    Peripheral neuropathy    Charcot foot due to diabetes mellitus    Foot pain, bilateral     Past Medical History:   Diagnosis Date    Acute kidney injury      POST SEIZURES    Astigmatism of both eyes     eyes twitch left and right    Bipolar disorder     Charcot ankle     Closed nondisplaced fracture of medial malleolus of left tibia     COPD (chronic obstructive pulmonary disease)     USES INHALERS    Diabetes     BG RUNS AROUND 90'S IN AM    Hx of schizophrenia     Hyperlipidemia     Hypertension     SEEN DR ROD IN THE PAST, HAD APPT WITH DR MICHAUD IN  CX APPT, DENIED CP/SOB    Neuropathy     Seizures     LAST ONE 22    Sleep apnea     DOES NOT USE CPAP    Varicose vein of leg      Past Surgical History:   Procedure Laterality Date    ANKLE OPEN REDUCTION INTERNAL FIXATION Left 2022    Procedure: LEFT OPEN  REDUCTION INTERNAL FIXATION DISTAL TIBIA FRACTURE ;  Surgeon: Tha Parry MD;  [facility] [place] [facility] [place];  Service: Orthopedics;  Laterality: Left;    ANKLE OPEN REDUCTION INTERNAL FIXATION Left 06/01/2022    Procedure: LEFT ANKLE OPEN REDUCTION INTERNAL FIXATION WITH SYNDESMOSIS FIXATION;  Surgeon: Tha Parry MD;  Location: [facility];  Service: Orthopedics;  Laterality: Left;    CHOLECYSTECTOMY      COLONOSCOPY      JOINT REPLACEMENT      RTKR    LUMBAR DISC SURGERY      SHOULDER SURGERY Right       General Information       Row Name 07/18/23 1247          OT Time and Intention    Document Type therapy note (daily note)  -PG     Mode of Treatment individual therapy;occupational therapy  -PG               User Key  (r) = Recorded By, (t) = Taken By, (c) = Cosigned By      Initials Name Provider Type    PG Frank Angulo OT Occupational Therapist                     Mobility/ADL's    No documentation.                  Obj/Interventions       Row Name 07/18/23 1247          Shoulder (Therapeutic Exercise)    Shoulder (Therapeutic Exercise) strengthening exercise  -PG     Shoulder Strengthening (Therapeutic Exercise) 15 repititions;resistance band;green  -PG       Row Name 07/18/23 1247          Elbow/Forearm (Therapeutic Exercise)    Elbow/Forearm (Therapeutic Exercise) strengthening exercise  -PG     Elbow/Forearm Strengthening (Therapeutic Exercise) resistance band;15 repititions;green  -PG       Row Name 07/18/23 1247          Motor Skills    Therapeutic Exercise shoulder;elbow/forearm  -PG               User Key  (r) = Recorded By, (t) = Taken By, (c) = Cosigned By      Initials Name Provider Type    PG Frank Angulo, OT Occupational Therapist                   Goals/Plan    No documentation.                  Clinical Impression       Row Name 07/18/23 1247          Plan of Care Review    Plan of Care Reviewed With patient  -PG     Progress no change  -PG               User Key  (r) =  Recorded By, (t) = Taken By, (c) = Cosigned By      Initials Name Provider Type    PG Frank Angulo, REGULO Occupational Therapist                   Outcome Measures       Row Name 07/18/23 1247          How much help from another is currently needed...    Putting on and taking off regular lower body clothing? 2  -PG     Bathing (including washing, rinsing, and drying) 3  -PG     Toileting (which includes using toilet bed pan or urinal) 2  -PG     Putting on and taking off regular upper body clothing 3  -PG     Taking care of personal grooming (such as brushing teeth) 3  -PG     Eating meals 4  -PG     AM-PAC 6 Clicks Score (OT) 17  -PG       Row Name 07/18/23 0832          How much help from another person do you currently need...    Turning from your back to your side while in flat bed without using bedrails? 4  -KY     Moving from lying on back to sitting on the side of a flat bed without bedrails? 4  -KY     Moving to and from a bed to a chair (including a wheelchair)? 3  -KY     Standing up from a chair using your arms (e.g., wheelchair, bedside chair)? 3  -KY     Climbing 3-5 steps with a railing? 3  -KY     To walk in hospital room? 3  -KY     AM-PAC 6 Clicks Score (PT) 20  -KY     Highest level of mobility 6 --> Walked 10 steps or more  -KY       Row Name 07/18/23 1247          Functional Assessment    Outcome Measure Options AM-PAC 6 Clicks Daily Activity (OT);Optimal Instrument  -PG       Row Name 07/18/23 1247          Optimal Instrument    Optimal Instrument Optimal - 3  -PG     Bending/Stooping 2  -PG     Standing 2  -PG     Reaching 1  -PG               User Key  (r) = Recorded By, (t) = Taken By, (c) = Cosigned By      Initials Name Provider Type    PG Frank Angulo OT Occupational Therapist    Ivette Barrera RNA Registered Nurse                    Occupational Therapy Education       Title: PT OT SLP Therapies (In Progress)       Topic: Occupational Therapy (Done)       Point: ADL training (Done)        Description:   Instruct learner(s) on proper safety adaptation and remediation techniques during self care or transfers.   Instruct in proper use of assistive devices.                  Learning Progress Summary             Patient Acceptance, E, VU by SV at 7/9/2023 1750    Acceptance, E, VU by AC at 7/3/2023 1144   Family Acceptance, E, VU by SV at 7/9/2023 1750                         Point: Home exercise program (Done)       Description:   Instruct learner(s) on appropriate technique for monitoring, assisting and/or progressing therapeutic exercises/activities.                  Learning Progress Summary             Patient Acceptance, E, VU by SV at 7/9/2023 1750    Acceptance, E, VU by AC at 7/3/2023 1144   Family Acceptance, E, VU by SV at 7/9/2023 1750                         Point: Precautions (Done)       Description:   Instruct learner(s) on prescribed precautions during self-care and functional transfers.                  Learning Progress Summary             Patient Acceptance, E, VU by SV at 7/9/2023 1750    Acceptance, E, VU by AC at 7/3/2023 1144   Family Acceptance, E, VU by SV at 7/9/2023 1750                         Point: Body mechanics (Done)       Description:   Instruct learner(s) on proper positioning and spine alignment during self-care, functional mobility activities and/or exercises.                  Learning Progress Summary             Patient Acceptance, E, VU by SV at 7/9/2023 1750    Acceptance, E, VU by AC at 7/3/2023 1144   Family Acceptance, E, VU by SV at 7/9/2023 1750                                         User Key       Initials Effective Dates Name Provider Type Discipline     06/16/21 -  Kristi Renner OT Occupational Therapist OT     05/04/23 -  Jenny Reveles RN Registered Nurse Nurse                  OT Recommendation and Plan     Plan of Care Review  Plan of Care Reviewed With: patient  Progress: no change  Outcome Evaluation: Patient now walking to the bathroom  and completing grooming activities standing at sink with rolling walker for safety and contact-guard assist.  Overall strength and endurance has improved and patient following commands well.  PT will continue to benefit from skilled OT services to maximize ADL performance and return to his previous level of function     Time Calculation:         Time Calculation- OT       Row Name 07/18/23 1248             Time Calculation- OT    OT Received On 07/18/23  -PG      OT Goal Re-Cert Due Date 07/22/23  -PG         Timed Charges    88728 - OT Therapeutic Exercise Minutes 10  -PG         Total Minutes    Timed Charges Total Minutes 10  -PG       Total Minutes 10  -PG                User Key  (r) = Recorded By, (t) = Taken By, (c) = Cosigned By      Initials Name Provider Type    PG Frank Angulo OT Occupational Therapist                  Therapy Charges for Today       Code Description Service Date Service Provider Modifiers Qty    50211853219 HC OT THER PROC EA 15 MIN 7/17/2023 Frank Angulo OT GO 1    00380916685 HC OT SELF CARE/MGMT/TRAIN EA 15 MIN 7/17/2023 Frank Angulo OT GO 1    60591616339 HC OT THER PROC EA 15 MIN 7/18/2023 Frank Angulo OT GO 1                 Frank Angulo OT  7/18/2023

## 2023-07-19 VITALS
HEIGHT: 72 IN | SYSTOLIC BLOOD PRESSURE: 121 MMHG | BODY MASS INDEX: 40.01 KG/M2 | HEART RATE: 73 BPM | TEMPERATURE: 98.6 F | DIASTOLIC BLOOD PRESSURE: 58 MMHG | WEIGHT: 295.42 LBS | OXYGEN SATURATION: 99 % | RESPIRATION RATE: 17 BRPM

## 2023-07-19 LAB
BASOPHILS # BLD AUTO: 0.09 10*3/MM3 (ref 0–0.2)
BASOPHILS NFR BLD AUTO: 1.2 % (ref 0–1.5)
DEPRECATED RDW RBC AUTO: 41.3 FL (ref 37–54)
EOSINOPHIL # BLD AUTO: 0.26 10*3/MM3 (ref 0–0.4)
EOSINOPHIL NFR BLD AUTO: 3.4 % (ref 0.3–6.2)
ERYTHROCYTE [DISTWIDTH] IN BLOOD BY AUTOMATED COUNT: 14 % (ref 12.3–15.4)
GLUCOSE BLDC GLUCOMTR-MCNC: 196 MG/DL (ref 70–99)
GLUCOSE BLDC GLUCOMTR-MCNC: 281 MG/DL (ref 70–99)
GLUCOSE BLDC GLUCOMTR-MCNC: 346 MG/DL (ref 70–99)
HCT VFR BLD AUTO: 35.7 % (ref 37.5–51)
HGB BLD-MCNC: 11.9 G/DL (ref 13–17.7)
IMM GRANULOCYTES # BLD AUTO: 0.04 10*3/MM3 (ref 0–0.05)
IMM GRANULOCYTES NFR BLD AUTO: 0.5 % (ref 0–0.5)
LYMPHOCYTES # BLD AUTO: 2.11 10*3/MM3 (ref 0.7–3.1)
LYMPHOCYTES NFR BLD AUTO: 27.5 % (ref 19.6–45.3)
MAGNESIUM SERPL-MCNC: 2.3 MG/DL (ref 1.6–2.4)
MCH RBC QN AUTO: 29.1 PG (ref 26.6–33)
MCHC RBC AUTO-ENTMCNC: 33.3 G/DL (ref 31.5–35.7)
MCV RBC AUTO: 87.3 FL (ref 79–97)
MONOCYTES # BLD AUTO: 0.98 10*3/MM3 (ref 0.1–0.9)
MONOCYTES NFR BLD AUTO: 12.8 % (ref 5–12)
NEUTROPHILS NFR BLD AUTO: 4.19 10*3/MM3 (ref 1.7–7)
NEUTROPHILS NFR BLD AUTO: 54.6 % (ref 42.7–76)
NRBC BLD AUTO-RTO: 0 /100 WBC (ref 0–0.2)
PHOSPHATE SERPL-MCNC: 4.2 MG/DL (ref 2.5–4.5)
PLATELET # BLD AUTO: 319 10*3/MM3 (ref 140–450)
PMV BLD AUTO: 10.2 FL (ref 6–12)
RBC # BLD AUTO: 4.09 10*6/MM3 (ref 4.14–5.8)
WBC NRBC COR # BLD: 7.67 10*3/MM3 (ref 3.4–10.8)

## 2023-07-19 PROCEDURE — 63710000001 INSULIN LISPRO (HUMAN) PER 5 UNITS: Performed by: NURSE PRACTITIONER

## 2023-07-19 PROCEDURE — 97110 THERAPEUTIC EXERCISES: CPT

## 2023-07-19 PROCEDURE — 63710000001 INSULIN DETEMIR PER 5 UNITS: Performed by: FAMILY MEDICINE

## 2023-07-19 PROCEDURE — 99239 HOSP IP/OBS DSCHRG MGMT >30: CPT | Performed by: FAMILY MEDICINE

## 2023-07-19 PROCEDURE — 85025 COMPLETE CBC W/AUTO DIFF WBC: CPT | Performed by: INTERNAL MEDICINE

## 2023-07-19 PROCEDURE — 25010000002 HEPARIN (PORCINE) PER 1000 UNITS: Performed by: NURSE PRACTITIONER

## 2023-07-19 PROCEDURE — 97116 GAIT TRAINING THERAPY: CPT

## 2023-07-19 PROCEDURE — 82948 REAGENT STRIP/BLOOD GLUCOSE: CPT

## 2023-07-19 PROCEDURE — 83735 ASSAY OF MAGNESIUM: CPT | Performed by: INTERNAL MEDICINE

## 2023-07-19 PROCEDURE — 84100 ASSAY OF PHOSPHORUS: CPT | Performed by: INTERNAL MEDICINE

## 2023-07-19 RX ORDER — SODIUM BICARBONATE 650 MG/1
650 TABLET ORAL 2 TIMES DAILY
Qty: 60 TABLET | Refills: 0 | Status: SHIPPED | OUTPATIENT
Start: 2023-07-19 | End: 2023-08-18

## 2023-07-19 RX ORDER — AMLODIPINE BESYLATE 10 MG/1
10 TABLET ORAL
Qty: 30 TABLET | Refills: 0 | Status: SHIPPED | OUTPATIENT
Start: 2023-07-20 | End: 2023-08-19

## 2023-07-19 RX ORDER — FOLIC ACID 1 MG/1
1 TABLET ORAL DAILY
Qty: 30 TABLET | Refills: 0 | Status: SHIPPED | OUTPATIENT
Start: 2023-07-20 | End: 2023-08-19

## 2023-07-19 RX ORDER — LOSARTAN POTASSIUM 50 MG/1
50 TABLET ORAL 2 TIMES DAILY
Qty: 60 TABLET | Refills: 0 | Status: SHIPPED | OUTPATIENT
Start: 2023-07-19 | End: 2023-08-18

## 2023-07-19 RX ADMIN — INSULIN DETEMIR 20 UNITS: 100 INJECTION, SOLUTION SUBCUTANEOUS at 10:07

## 2023-07-19 RX ADMIN — PANTOPRAZOLE SODIUM 40 MG: 40 TABLET, DELAYED RELEASE ORAL at 10:07

## 2023-07-19 RX ADMIN — LAMOTRIGINE 200 MG: 100 TABLET ORAL at 10:07

## 2023-07-19 RX ADMIN — INSULIN LISPRO 2 UNITS: 100 INJECTION, SOLUTION INTRAVENOUS; SUBCUTANEOUS at 10:07

## 2023-07-19 RX ADMIN — SODIUM BICARBONATE 650 MG TABLET 650 MG: at 10:07

## 2023-07-19 RX ADMIN — MULTIPLE VITAMINS W/ MINERALS TAB 1 TABLET: TAB at 17:32

## 2023-07-19 RX ADMIN — HEPARIN SODIUM 5000 UNITS: 5000 INJECTION INTRAVENOUS; SUBCUTANEOUS at 17:32

## 2023-07-19 RX ADMIN — AMLODIPINE BESYLATE 10 MG: 5 TABLET ORAL at 10:07

## 2023-07-19 RX ADMIN — INSULIN LISPRO 6 UNITS: 100 INJECTION, SOLUTION INTRAVENOUS; SUBCUTANEOUS at 18:30

## 2023-07-19 RX ADMIN — HEPARIN SODIUM 5000 UNITS: 5000 INJECTION INTRAVENOUS; SUBCUTANEOUS at 05:40

## 2023-07-19 RX ADMIN — INSULIN LISPRO 7 UNITS: 100 INJECTION, SOLUTION INTRAVENOUS; SUBCUTANEOUS at 12:45

## 2023-07-19 RX ADMIN — Medication 1 MG: at 10:07

## 2023-07-19 RX ADMIN — Medication 10 ML: at 10:12

## 2023-07-19 RX ADMIN — ATENOLOL 25 MG: 25 TABLET ORAL at 10:07

## 2023-07-19 RX ADMIN — LOSARTAN POTASSIUM 50 MG: 25 TABLET, FILM COATED ORAL at 10:07

## 2023-07-19 NOTE — THERAPY TREATMENT NOTE
Acute Care - Physical Therapy Treatment Note   [place]     Patient Name: Carlos Murphy Jr.  : 1960  MRN: [id number]  Today's Date: 2023      Visit Dx:     ICD-10-CM ICD-9-CM   1. Acute renal failure, unspecified acute renal failure type  N17.9 584.9   2. Altered mental status, unspecified altered mental status type  R41.82 780.97   3. At risk for polypharmacy  Z91.89 V49.89   4. Non-traumatic rhabdomyolysis  M62.82 728.88   5. Oropharyngeal dysphagia  R13.12 787.22   6. Impaired mobility and ADLs  Z74.09 V49.89    Z78.9    7. Difficulty walking  R26.2 719.7     Patient Active Problem List   Diagnosis    Ulcer of toe due to type 2 diabetes mellitus    Centrilobular emphysema    Tobacco abuse, in remission    Obesity (BMI 30-39.9)    Closed fracture of shaft of left fibula    Closed nondisplaced fracture of medial malleolus of left tibia    Aftercare following surgery of ORIF left distal tibia fracture 2022    Colon cancer screening    Colitis    Acute renal failure, unspecified acute renal failure type    Onychomycosis    Onychocryptosis    Foreign body in left foot    Peripheral neuropathy    Charcot foot due to diabetes mellitus    Foot pain, bilateral     Past Medical History:   Diagnosis Date    Acute kidney injury      POST SEIZURES    Astigmatism of both eyes     eyes twitch left and right    Bipolar disorder     Charcot ankle     Closed nondisplaced fracture of medial malleolus of left tibia     COPD (chronic obstructive pulmonary disease)     USES INHALERS    Diabetes     BG RUNS AROUND 90'S IN AM    Hx of schizophrenia     Hyperlipidemia     Hypertension     SEEN DR ROD IN THE PAST, HAD APPT WITH DR MICHAUD IN  CX APPT, DENIED CP/SOB    Neuropathy     Seizures     LAST ONE 22    Sleep apnea     DOES NOT USE CPAP    Varicose vein of leg      Past Surgical History:   Procedure Laterality Date    ANKLE OPEN REDUCTION INTERNAL FIXATION Left 2022    Procedure: LEFT OPEN  REDUCTION INTERNAL FIXATION DISTAL TIBIA FRACTURE ;  Surgeon: Tha Parry MD;  Location: [facility] [place];  Service: Orthopedics;  Laterality: Left;    ANKLE OPEN REDUCTION INTERNAL FIXATION Left 06/01/2022    Procedure: LEFT ANKLE OPEN REDUCTION INTERNAL FIXATION WITH SYNDESMOSIS FIXATION;  Surgeon: Tha Parry MD;  Location: [facility] MAIN OR;  Service: Orthopedics;  Laterality: Left;    CHOLECYSTECTOMY      COLONOSCOPY      JOINT REPLACEMENT      RTKR    LUMBAR DISC SURGERY      SHOULDER SURGERY Right      PT Assessment (last 12 hours)       PT Evaluation and Treatment       Row Name 07/19/23 1400          Physical Therapy Time and Intention    Subjective Information no complaints (P)   -RF     Document Type therapy note (daily note) (P)   -RF     Mode of Treatment individual therapy;physical therapy (P)   -RF     Patient Effort good (P)   -RF     Symptoms Noted During/After Treatment none (P)   -RF       Row Name 07/19/23 1400          Cognition    Affect/Mental Status (Cognition) confused (P)   Patient demonstrated intermittent confusion during activity today.  -RF     Orientation Status (Cognition) oriented x 3 (P)   -RF       Row Name 07/19/23 1400          Bed Mobility    Bed Mobility rolling left;rolling right;scooting/bridging;supine-sit;sit-supine (P)   -RF     Rolling Left [place] (Bed Mobility) independent (P)   -RF     Rolling Right [place] (Bed Mobility) independent (P)   -RF     Scooting/Bridging [place] (Bed Mobility) independent (P)   -RF     Supine-Sit [place] (Bed Mobility) independent (P)   -RF     Sit-Supine [place] (Bed Mobility) independent (P)   -RF       Row Name 07/19/23 1400          Transfers    Transfers sit-stand transfer;stand-sit transfer (P)   -RF       Row Name 07/19/23 1400          Sit-Stand Transfer    Sit-Stand [place] (Transfers) supervision (P)   -RF     Assistive Device (Sit-Stand Transfers) walker, front-wheeled (P)   -RF       Row  Name 07/19/23 1400          Stand-Sit Transfer    Stand-Sit [place] (Transfers) supervision (P)   -RF     Assistive Device (Stand-Sit Transfers) walker, front-wheeled (P)   -RF       Row Name 07/19/23 1400          Gait/Stairs (Locomotion)    Gait/Stairs Locomotion gait/ambulation assistive device (P)   -RF     [place] Level (Gait) modified independence (P)   -RF     Assistive Device (Gait) walker, front-wheeled (P)   -RF     Distance in Feet (Gait) 400 (P)   -RF     Pattern (Gait) step-through (P)   -RF     Deviations/Abnormal Patterns (Gait) gait speed decreased (P)   -RF     Bilateral Gait Deviations forward flexed posture (P)   -RF       Row Name 07/19/23 1400          Balance    Balance Assessment sitting dynamic balance;sit to stand dynamic balance;standing dynamic balance (P)   -RF     Dynamic Sitting Balance independent (P)   -RF     Position, Sitting Balance unsupported;sitting edge of bed (P)   -RF     Sit to Stand Dynamic Balance supervision (P)   -RF     Dynamic Standing Balance modified independence (P)   -RF     Position/Device Used, Standing Balance supported;walker, front-wheeled (P)   -RF       Row Name             Wound 06/30/23 2321 Right anterior knee Abrasion    Wound - Properties Group Placement Date: 06/30/23 -TH Placement Time: 2321 -TH Present on Hospital Admission: Y  -TH Side: Right  -TH Orientation: anterior  -TH Location: knee  -TH Primary Wound Type: Abrasion  -TH    Retired Wound - Properties Group Placement Date: 06/30/23 -TH Placement Time: 2321 -TH Present on Hospital Admission: Y  -TH Side: Right  -TH Orientation: anterior  -TH Location: knee  -TH Primary Wound Type: Abrasion  -TH    Retired Wound - Properties Group Date first assessed: 06/30/23 -TH Time first assessed: 2321 -TH Present on Hospital Admission: Y  -TH Side: Right  -TH Location: knee  -TH Primary Wound Type: Abrasion  -TH      Row Name             Wound 06/30/23 2322 Left anterior knee Abrasion     Wound - Properties Group Placement Date: 06/30/23 -TH Placement Time: 2322  -TH Side: Left  -TH Orientation: anterior  -TH Location: knee  -TH Primary Wound Type: Abrasion  -TH    Retired Wound - Properties Group Placement Date: 06/30/23 -TH Placement Time: 2322  -TH Side: Left  -TH Orientation: anterior  -TH Location: knee  -TH Primary Wound Type: Abrasion  -TH    Retired Wound - Properties Group Date first assessed: 06/30/23 -TH Time first assessed: 2322  -TH Side: Left  -TH Location: knee  -TH Primary Wound Type: Abrasion  -TH      Row Name             Wound 06/30/23 2323 Bilateral anterior foot Abrasion    Wound - Properties Group Placement Date: 06/30/23 -TH Placement Time: 2323  -TH Side: Bilateral  -TH Orientation: anterior  -TH Location: foot  -TH Primary Wound Type: Abrasion  -TH    Retired Wound - Properties Group Placement Date: 06/30/23 -TH Placement Time: 2323  -TH Side: Bilateral  -TH Orientation: anterior  -TH Location: foot  -TH Primary Wound Type: Abrasion  -TH    Retired Wound - Properties Group Date first assessed: 06/30/23 -TH Time first assessed: 2323  -TH Side: Bilateral  -TH Location: foot  -TH Primary Wound Type: Abrasion  -TH      Row Name             Wound 07/03/23 1420 Bilateral posterior plantar Traumatic    Wound - Properties Group Placement Date: 07/03/23  -FL Placement Time: 1420  -FL Side: Bilateral  -FL Orientation: posterior  -FL Location: plantar  -FL Primary Wound Type: Traumatic  -FL    Retired Wound - Properties Group Placement Date: 07/03/23  -FL Placement Time: 1420  -FL Side: Bilateral  -FL Orientation: posterior  -FL Location: plantar  -FL Primary Wound Type: Traumatic  -FL    Retired Wound - Properties Group Date first assessed: 07/03/23  -FL Time first assessed: 1420  -FL Side: Bilateral  -FL Location: plantar  -FL Primary Wound Type: Traumatic  -FL      Row Name             Wound 07/10/23 1556 Bilateral coccyx Pressure Injury    Wound - Properties Group Placement  Date: 07/10/23  -MJ Placement Time: 1556  -MJ Present on Hospital Admission: N  -MJ Side: Bilateral  -MJ Location: coccyx  -MJ Primary Wound Type: Pressure inj  -MJ    Retired Wound - Properties Group Placement Date: 07/10/23  -MJ Placement Time: 1556  -MJ Present on Hospital Admission: N  -MJ Side: Bilateral  -MJ Location: coccyx  -MJ Primary Wound Type: Pressure inj  -MJ    Retired Wound - Properties Group Date first assessed: 07/10/23  -MJ Time first assessed: 1556  -MJ Present on Hospital Admission: N  -MJ Side: Bilateral  -MJ Location: coccyx  -MJ Primary Wound Type: Pressure inj  -MJ      Row Name 07/19/23 1400          Positioning and Restraints    Pre-Treatment Position in bed (P)   -RF     Post Treatment Position bed (P)   -RF     In Bed side lying left;call light within reach;exit alarm on;patient within staff view (P)   -RF       Row Name 07/19/23 1400          Therapy Assessment/Plan (PT)    Criteria for Skilled Interventions Met (PT) does not meet criteria for skilled intervention (P)   -RF       Row Name 07/19/23 1400          Progress Summary (PT)    Progress Toward Functional Goals (PT) other (see comments) (P)   Patient has met functional goals and is appropriate for discharge from inpatient physical therapy services.  -RF     Daily Progress Summary (PT) The patient tolerated all activities well today with no onset or change in pain or symptoms. The patient has met established functional goals and ambulates at prior level of function. The patient is now appropriate for discharge from inpatient physical therapy services. (P)   -RF       Row Name 07/19/23 1400          Therapy Plan Review/Discharge Plan (PT)    Therapy Plan Review (PT) evaluation/treatment results reviewed;participants included;patient (P)   -RF       Row Name 07/19/23 1400          Physical Therapy Goals    Bed Mobility Goal Selection (PT) bed mobility, PT goal 1 (P)   -RF     Transfer Goal Selection (PT) transfer, PT goal 1 (P)    -RF     Gait Training Goal Selection (PT) gait training, PT goal 1 (P)   -RF       Row Name 07/19/23 1400          Bed Mobility Goal 1 (PT)    Activity/Assistive Device (Bed Mobility Goal 1, PT) sit to supine/supine to sit (P)   -RF     [place] Level/Cues Needed (Bed Mobility Goal 1, PT) standby assist (P)   -RF     Time Frame (Bed Mobility Goal 1, PT) 10 days (P)   -RF     Progress/Outcomes (Bed Mobility Goal 1, PT) goal met (P)   -RF       Row Name 07/19/23 1400          Transfer Goal 1 (PT)    Activity/Assistive Device (Transfer Goal 1, PT) sit-to-stand/stand-to-sit;bed-to-chair/chair-to-bed;walker, rolling (P)   -RF     [place] Level/Cues Needed (Transfer Goal 1, PT) standby assist (P)   -RF     Time Frame (Transfer Goal 1, PT) 10 days (P)   -RF     Progress/Outcome (Transfer Goal 1, PT) goal met (P)   -RF       Row Name 07/19/23 1400          Gait Training Goal 1 (PT)    Activity/Assistive Device (Gait Training Goal 1, PT) gait (walking locomotion);assistive device use;walker, rolling (P)   -RF     [place] Level (Gait Training Goal 1, PT) standby assist (P)   -RF     Distance (Gait Training Goal 1, PT) 150 (P)   -RF     Time Frame (Gait Training Goal 1, PT) 10 days (P)   -RF     Progress/Outcome (Gait Training Goal 1, PT) goal met (P)   -RF               User Key  (r) = Recorded By, (t) = Taken By, (c) = Cosigned By      Initials Name Provider Type    Mojgan Higuera, RN Registered Nurse    Yumiko Rosario RN Registered Nurse    Ambar Martinez RN Registered Nurse    Eliud Alonso, PT Student PT Student                    Physical Therapy Education       Title: PT OT SLP Therapies (In Progress)       Topic: Physical Therapy (In Progress)       Point: Mobility training (In Progress)       Learning Progress Summary             Patient Acceptance, E,TB, NR by AV at 7/14/2023 5946                         Point: Home exercise program (Not Started)       Learner Progress:  Not documented in  this visit.              Point: Body mechanics (In Progress)       Learning Progress Summary             Patient Acceptance, E,TB, NR by AV at 7/14/2023 1352                         Point: Precautions (In Progress)       Learning Progress Summary             Patient Acceptance, E,TB, NR by AV at 7/14/2023 1352                                         User Key       Initials Effective Dates Name Provider Type Discipline     06/11/21 -  Cesario Clements, PT Physical Therapist PT                  PT Recommendation and Plan     Progress Summary (PT)  Progress Toward Functional Goals (PT): (P) other (see comments) (Patient has met functional goals and is appropriate for discharge from inpatient physical therapy services.)  Daily Progress Summary (PT): (P) The patient tolerated all activities well today with no onset or change in pain or symptoms. The patient has met established functional goals and ambulates at prior level of function. The patient is now appropriate for discharge from inpatient physical therapy services.   Outcome Measures       Row Name 07/19/23 1400 07/17/23 1348          How much help from another person do you currently need...    Turning from your back to your side while in flat bed without using bedrails? 4 (P)   -RF 4  -DK     Moving from lying on back to sitting on the side of a flat bed without bedrails? 4 (P)   -RF 4  -DK     Moving to and from a bed to a chair (including a wheelchair)? 4 (P)   -RF 3  -DK     Standing up from a chair using your arms (e.g., wheelchair, bedside chair)? 4 (P)   -RF 3  -DK     Climbing 3-5 steps with a railing? 4 (P)   -RF 3  -DK     To walk in hospital room? 4 (P)   -RF 3  -DK     AM-PAC 6 Clicks Score (PT) 24 (P)   -RF 20  -DK        Functional Assessment    Outcome Measure Options -- AM-PAC 6 Clicks Basic Mobility (PT)  -DK               User Key  (r) = Recorded By, (t) = Taken By, (c) = Cosigned By      Initials Name Provider Type    Marah Mcpherson, CLAY  Physical Therapist Assistant    Eliud Alonso, PT Student PT Student                     Time Calculation:    PT Charges       Row Name 07/19/23 1436             Time Calculation    PT Received On 07/19/23 (P)   -RF         Timed Charges    31343 - Gait Training Minutes  10 (P)   -RF      93562 - PT Therapeutic Activity Minutes 3 (P)   -RF         Total Minutes    Timed Charges Total Minutes 13 (P)   -RF       Total Minutes 13 (P)   -RF                User Key  (r) = Recorded By, (t) = Taken By, (c) = Cosigned By      Initials Name Provider Type    Eliud Alonso PT Student PT Student                  Therapy Charges for Today       Code Description Service Date Service Provider Modifiers Qty    97488826804 HC GAIT TRAINING EA 15 MIN 7/19/2023 Eliud Nelson, PT Student GP 1            PT G-Codes  Outcome Measure Options: AM-PAC 6 Clicks Daily Activity (OT), Optimal Instrument  AM-PAC 6 Clicks Score (PT): (P) 24  AM-PAC 6 Clicks Score (OT): 19    Eliud Nelson PT Student  7/19/2023

## 2023-07-19 NOTE — THERAPY TREATMENT NOTE
Patient Name: Carlos Murphy Jr.  : 1960    MRN: [id number]                              Today's Date: 2023       Admit Date: 2023    Visit Dx:     ICD-10-CM ICD-9-CM   1. Acute renal failure, unspecified acute renal failure type  N17.9 584.9   2. Altered mental status, unspecified altered mental status type  R41.82 780.97   3. At risk for polypharmacy  Z91.89 V49.89   4. Non-traumatic rhabdomyolysis  M62.82 728.88   5. Oropharyngeal dysphagia  R13.12 787.22   6. Impaired mobility and ADLs  Z74.09 V49.89    Z78.9    7. Difficulty walking  R26.2 719.7     Patient Active Problem List   Diagnosis    Ulcer of toe due to type 2 diabetes mellitus    Centrilobular emphysema    Tobacco abuse, in remission    Obesity (BMI 30-39.9)    Closed fracture of shaft of left fibula    Closed nondisplaced fracture of medial malleolus of left tibia    Aftercare following surgery of ORIF left distal tibia fracture 2022    Colon cancer screening    Colitis    Acute renal failure, unspecified acute renal failure type    Onychomycosis    Onychocryptosis    Foreign body in left foot    Peripheral neuropathy    Charcot foot due to diabetes mellitus    Foot pain, bilateral     Past Medical History:   Diagnosis Date    Acute kidney injury      POST SEIZURES    Astigmatism of both eyes     eyes twitch left and right    Bipolar disorder     Charcot ankle     Closed nondisplaced fracture of medial malleolus of left tibia     COPD (chronic obstructive pulmonary disease)     USES INHALERS    Diabetes     BG RUNS AROUND 90'S IN AM    Hx of schizophrenia     Hyperlipidemia     Hypertension     SEEN DR ROD IN THE PAST, HAD APPT WITH DR MICHAUD IN  [facility] APPT, DENIED CP/SOB    Neuropathy     Seizures     LAST ONE 22    Sleep apnea     DOES NOT USE CPAP    Varicose vein of leg      Past Surgical History:   Procedure Laterality Date    ANKLE OPEN REDUCTION INTERNAL FIXATION Left 2022    Procedure: LEFT OPEN  REDUCTION INTERNAL FIXATION DISTAL TIBIA FRACTURE ;  Surgeon: Tha Parry MD;  [place] [facility] [place] [facility] [place]  Service: [place]opedics;  Laterality: Left;    ANKLE OPEN REDUCTION INTERNAL FIXATION Left 06/01/2022    Procedure: LEFT ANKLE OPEN REDUCTION INTERNAL FIXATION WITH SYNDESMOSIS FIXATION;  Surgeon: Tha Parry MD;  Location: [facility] MAIN OR;  Service: Orthopedics;  Laterality: Left;    CHOLECYSTECTOMY      COLONOSCOPY      JOINT REPLACEMENT      RTKR    LUMBAR DISC SURGERY      SHOULDER SURGERY Right       General Information       Row Name 07/19/23 1236          OT Time and Intention    Document Type therapy note (daily note)  -PG     Mode of Treatment individual therapy;occupational therapy  -PG               User Key  (r) = Recorded By, (t) = Taken By, (c) = Cosigned By      Initials Name Provider Type    PG Frank Angulo OT Occupational Therapist                     Mobility/ADL's       Row Name 07/19/23 1237          Activities of Daily Living    BADL Assessment/Intervention grooming  -PG       Row Name 07/19/23 1237          Grooming Assessment/Training    [place] Level (Grooming) grooming skills;oral care regimen  -PG     Position (Grooming) supported standing  -PG               User Key  (r) = Recorded By, (t) = Taken By, (c) = Cosigned By      Initials Name Provider Type    PG Frank Angulo OT Occupational Therapist                   Obj/Interventions       Row Name 07/19/23 1238          Shoulder (Therapeutic Exercise)    Shoulder (Therapeutic Exercise) strengthening exercise  -PG     Shoulder Strengthening (Therapeutic Exercise) 15 repititions;resistance band;green  -PG       Row Name 07/19/23 1238          Elbow/Forearm (Therapeutic Exercise)    Elbow/Forearm (Therapeutic Exercise) strengthening exercise  -PG     Elbow/Forearm Strengthening (Therapeutic Exercise) 15 repititions;resistance band;green  -PG       Row Name 07/19/23 1238          Motor Skills    Therapeutic Exercise  shoulder;elbow/forearm  -PG               User Key  (r) = Recorded By, (t) = Taken By, (c) = Cosigned By      Initials Name Provider Type    Frank Carson OT Occupational Therapist                   Goals/Plan    No documentation.                  Clinical Impression       Row Name 07/19/23 1238          Plan of Care Review    Progress improving  -PG     Outcome Evaluation Patient now completing grooming activities standing at the sink with standby/contact-guard assist and rolling walker for safety and endurance and strength appears to be improving daily.  Recommend continued skilled OT services to maximize ADL performance and return patient to his previous level of function.  -PG               User Key  (r) = Recorded By, (t) = Taken By, (c) = Cosigned By      Initials Name Provider Type    Frank Carson OT Occupational Therapist                   Outcome Measures       Row Name 07/19/23 1239          How much help from another is currently needed...    Putting on and taking off regular lower body clothing? 3  -PG     Bathing (including washing, rinsing, and drying) 3  -PG     Toileting (which includes using toilet bed pan or urinal) 3  -PG     Putting on and taking off regular upper body clothing 3  -PG     Taking care of personal grooming (such as brushing teeth) 3  -PG     Eating meals 4  -PG     AM-PAC 6 Clicks Score (OT) 19  -PG       Row Name 07/19/23 1239          Functional Assessment    Outcome Measure Options AM-PAC 6 Clicks Daily Activity (OT);Optimal Instrument  -PG       Row Name 07/19/23 1239          Optimal Instrument    Optimal Instrument Optimal - 3  -PG     Bending/Stooping 2  -PG     Standing 2  -PG     Reaching 1  -PG               User Key  (r) = Recorded By, (t) = Taken By, (c) = Cosigned By      Initials Name Provider Type    Frank Carson OT Occupational Therapist                    Occupational Therapy Education       Title: PT OT SLP Therapies (In Progress)       Topic:  Occupational Therapy (Done)       Point: ADL training (Done)       Description:   Instruct learner(s) on proper safety adaptation and remediation techniques during self care or transfers.   Instruct in proper use of assistive devices.                  Learning Progress Summary             Patient Acceptance, E, VU by SV at 7/9/2023 1750    Acceptance, E, VU by AC at 7/3/2023 1144   Family Acceptance, E, VU by SV at 7/9/2023 1750                         Point: Home exercise program (Done)       Description:   Instruct learner(s) on appropriate technique for monitoring, assisting and/or progressing therapeutic exercises/activities.                  Learning Progress Summary             Patient Acceptance, E, VU by SV at 7/9/2023 1750    Acceptance, E, VU by AC at 7/3/2023 1144   Family Acceptance, E, VU by SV at 7/9/2023 1750                         Point: Precautions (Done)       Description:   Instruct learner(s) on prescribed precautions during self-care and functional transfers.                  Learning Progress Summary             Patient Acceptance, E, VU by SV at 7/9/2023 1750    Acceptance, E, VU by AC at 7/3/2023 1144   Family Acceptance, E, VU by SV at 7/9/2023 1750                         Point: Body mechanics (Done)       Description:   Instruct learner(s) on proper positioning and spine alignment during self-care, functional mobility activities and/or exercises.                  Learning Progress Summary             Patient Acceptance, E, VU by SV at 7/9/2023 1750    Acceptance, E, VU by AC at 7/3/2023 1144   Family Acceptance, E, VU by SV at 7/9/2023 1750                                         User Key       Initials Effective Dates Name Provider Type Discipline     06/16/21 -  Kristi Renner OT Occupational Therapist OT     05/04/23 -  Jenny Reveles RN Registered Nurse Nurse                  OT Recommendation and Plan     Plan of Care Review  Plan of Care Reviewed With: patient  Progress:  improving  Outcome Evaluation: Patient now completing grooming activities standing at the sink with standby/contact-guard assist and rolling walker for safety and endurance and strength appears to be improving daily.  Recommend continued skilled OT services to maximize ADL performance and return patient to his previous level of function.     Time Calculation:         Time Calculation- OT       Row Name 07/19/23 1239             Time Calculation- OT    OT Received On 07/19/23  -PG      OT Goal Re-Cert Due Date 07/22/23  -PG         Timed Charges    55084 - OT Therapeutic Exercise Minutes 10  -PG      59507 - OT Self Care/Mgmt Minutes 10  -PG         Total Minutes    Timed Charges Total Minutes 20  -PG       Total Minutes 20  -PG                User Key  (r) = Recorded By, (t) = Taken By, (c) = Cosigned By      Initials Name Provider Type    PG Frank Angulo OT Occupational Therapist                  Therapy Charges for Today       Code Description Service Date Service Provider Modifiers Qty    60670024273 HC OT THER PROC EA 15 MIN 7/18/2023 Frank Angulo OT GO 1    22094757392 HC OT THER PROC EA 15 MIN 7/19/2023 Frank Angulo OT GO 1                 Frank Angulo OT  7/19/2023

## 2023-07-19 NOTE — DISCHARGE SUMMARY
[facility]         HOSPITALIST  DISCHARGE SUMMARY    Patient Name: Carlos Murphy Jr.  : 1960  MRN: [id number]    Date of Admission: 2023  Date of Discharge: 2023  Primary Care Physician: Felix Atkinson MD    Consults       Date and Time Order Name Status Description    2023  8:51 AM Inpatient Neurosurgery Consult      2023  5:18 PM Inpatient Psychiatrist Consult Completed     2023  3:02 PM Inpatient Podiatry Consult Completed     2023 11:24 PM Inpatient Neurology Consult General      2023 11:24 PM Inpatient Nephrology Consult      2023  8:19 PM Hospitalist (on-call MD unless specified)      2023  7:53 PM IP General Consult (Use specialty-specific consult if known)              Active and Resolved Hospital Problems:  Sepsis from undetermined etiology, possible tickborne illness   Acute metabolic encephalopathy  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  Severe hyperkalemia  Seizure disorder (on lamictal), with concern for subclinical seizure - negative on EEGs  Concern for withdrawal from outpatient ambien (tox + oxycodone only on admission)  Rhabdomyolysis  SSTI in setting of Foreign body in left foot/glass  Initial presentation with hyponatremia  Significant Hypernatremia  Anion gap metabolic acidosis  CVA ruled out  History of COPD  Type 2 diabetes mellitus  HTN  Bipolar disorder  Hyperlipidemia  Polypharmacy  Horizontal nystagmus - reportedly chronic/question congenital nystagmus  Active Hospital Problems    Diagnosis POA    **Acute renal failure, unspecified acute renal failure type [N17.9] Yes    Onychomycosis [B35.1] Unknown    Onychocryptosis [L60.0] Unknown    Foreign body in left foot [S90.852A] Unknown    Peripheral neuropathy [G62.9] Unknown    Charcot foot due to diabetes mellitus [E11.610] Unknown    Foot pain, bilateral [M79.671, M79.672] Unknown      Resolved Hospital Problems   No resolved problems to display.        Hospital Course     Hospital Course:  Carlos Murphy Jr. is a 62 y.o. male with history of seizure disorder (on lamotrigine 200 bid), bipolar, schizophrenia, who presented with altered mentation/unresponsiveness on 6/28. Patient apparently had called his sister initially had slurred speech/somnolent and received Narcan with notable initial improvement. Patient apparently reported taking additional medications at home but was not trying to harm himself history is very limited given patient poor historian. Patient underwent stroke work-up without acute findings suggest etiology of patient's AMS. Did have hyperkalemia initially improved with fluids. Nephrology assisting given MK and rhabdomyolysis. Podiatry assisted given for infection/glasses feet and required several pieces being removed. Hospital course completed by worsening mentation/agitation with concern initially for seizures multiple EEGs have been negative, neurology consulted for assistance, concern for possible medication withdrawal (outpt is on ambien tox screen was positive oxycodone only)/metabolic encephalopathy and was placed on CIWA monitoring and required ativan. LP obtained negative for acute meningitis, tick panel negative. Mentation starting to improve, gradually titrating medications, psychiatry consulted for additional assistance.  Patient worked well with physical therapy occupational therapy was ambulating more than 100 feet transferring independently.  Patient seen and evaluated on day of discharge and thought stable for discharge home with home health and follow-up with his primary care provider as well as nephrology.        DISCHARGE Follow Up Recommendations for labs and diagnostics: As above      Day of Discharge     Vital Signs:  Temp:  [97.4 °F (36.3 °C)-98.9 °F (37.2 °C)] 97.7 °F (36.5 °C)  Heart Rate:  [63-81] 63  Resp:  [16-18] 16  BP: ()/(58-78) 112/67  Physical Exam:   Gen. well-developed appearing stated age in no  acute distress  HEENT: Normocephalic atraumatic moist membranes pupils equal round reactive light, no scleral icterus no conjunctival injection  Cardiovascular: regular rate and rhythm no murmurs rubs or gallops S1-S2, no lower extremity edema appreciated  Pulmonary: Clear to auscultation bilaterally no wheezes rales or rhonchi symmetric chest expansion, unlabored, no conversational dyspnea appreciated  Gastrointestinal: Soft nontender nondistended positive bowel sounds all 4 quadrants no rebound or guarding  Musculoskeletal: No clubbing cyanosis, warm and well-perfused, calves soft symmetric nontender bilaterally  Skin: Clean dry without rashs  Neuro: Cranial nerves II through XII intact grossly no sensorimotor deficits appreciated bilateral upper and lower extremities  Psych: Patient is calm cooperative and appropriate with exam not responding to internal stimuli  : No Marley catheter no bladder distention no suprapubic tenderness         Discharge Details        Discharge Medications        New Medications        Instructions Start Date   folic acid 1 MG tablet  Commonly known as: FOLVITE   1 mg, Oral, Daily   Start Date: July 20, 2023     losartan 50 MG tablet  Commonly known as: COZAAR   50 mg, Oral, 2 Times Daily      mupirocin 2 % ointment  Commonly known as: BACTROBAN   1 application , Topical, 2 Times Daily, Apply to abrasions on bilateral knees, scabs on feet, toes      sodium bicarbonate 650 MG tablet   650 mg, Oral, 2 Times Daily             Changes to Medications        Instructions Start Date   amLODIPine 10 MG tablet  Commonly known as: NORVASC  What changed:   medication strength  how much to take  when to take this   10 mg, Oral, Every 24 Hours Scheduled   Start Date: July 20, 2023            Continue These Medications        Instructions Start Date   albuterol sulfate  (90 Base) MCG/ACT inhaler  Commonly known as: PROVENTIL HFA;VENTOLIN HFA;PROAIR HFA   2 puffs, Inhalation, Every 4 Hours  PRN      aspirin 325 MG EC tablet  Commonly known as: Ecotrin   325 mg, Oral, Daily      atenolol 25 MG tablet  Commonly known as: TENORMIN   25 mg, Oral, Daily      atorvastatin 40 MG tablet  Commonly known as: LIPITOR   40 mg, Oral, Nightly      glipizide 10 MG 24 hr tablet  Commonly known as: GLUCOTROL XL   10 mg, Oral, 2 Times Daily, INSTRUCTED PER ANESTHESIA PROTOCOL      lamoTRIgine 200 MG tablet  Commonly known as: LaMICtal   200 mg, Oral, 2 Times Daily      multivitamin with minerals tablet tablet   1 tablet, Oral, Daily      NovoLOG Mix 70/30 FlexPen (70-30) 100 UNIT/ML suspension pen-injector injection  Generic drug: insulin aspart prot & aspart   REPORTS SLIDING SCALE [id number]. INSTRUCTED PER ANESTHESIA PROTOCOL IF BS GREATER THEN 140 CAN TAKE 1/2 OF THE DOSE      Ozempic (0.25 or 0.5 MG/DOSE) 2 MG/1.5ML solution pen-injector  Generic drug: Semaglutide(0.25 or 0.5MG/DOS)   5 mg, Subcutaneous, Weekly, INSTRUCTED PER ANESTHESIA STANDING ORDERS      pantoprazole 40 MG EC tablet  Commonly known as: PROTONIX   40 mg, Oral, 2 Times Daily      QUEtiapine 300 MG tablet  Commonly known as: SEROquel   600 mg, Oral, Nightly      Stiolto Respimat 2.5-2.5 MCG/ACT aerosol solution inhaler  Generic drug: tiotropium bromide-olodaterol   1 puff, Inhalation, Daily             Stop These Medications      cyclobenzaprine 5 MG tablet  Commonly known as: FLEXERIL     empagliflozin 25 MG tablet tablet  Commonly known as: JARDIANCE     escitalopram 20 MG tablet  Commonly known as: LEXAPRO     furosemide 20 MG tablet  Commonly known as: LASIX     hydrOXYzine 50 MG tablet  Commonly known as: ATARAX     Kerendia 20 MG tablet  Generic drug: Finerenone     lisinopril 30 MG tablet  Commonly known as: PRINIVIL,ZESTRIL     lubiprostone 24 MCG capsule  Commonly known as: AMITIZA     oxyCODONE-acetaminophen 7.5-325 MG per tablet  Commonly known as: PERCOCET     pregabalin 100 MG capsule  Commonly known as: LYRICA      Trulance 3 MG tablet  Generic drug: Plecanatide     zolpidem 10 MG tablet  Commonly known as: AMBIEN              No Known Allergies    Discharge Disposition:      Diet:  Hospital:  Diet Order   Procedures    Diet: Regular/House Diet, Cardiac Diets, Diabetic Diets; Healthy Heart (2-3 Na+); Consistent Carbohydrate; Texture: Regular Texture (IDDSI 7); Fluid Consistency: Thin (IDDSI 0)       Discharge Activity:   Activity Instructions       Gradually Increase Activity Until at Pre-Hospitalization Level              CODE STATUS:  Code Status and Medical Interventions:   Ordered at: 06/30/23 2229     Level Of Support Discussed With:    Patient     Code Status (Patient has no pulse and is not breathing):    CPR (Attempt to Resuscitate)     Medical Interventions (Patient has pulse or is breathing):    Full Support         No future appointments.    Additional Instructions for the Follow-ups that You Need to Schedule       Discharge Follow-up with PCP   As directed       Currently Documented PCP:    Felix Atkinson MD    PCP Phone Number:    [phone]     Follow Up Details: Hospital discharge follow-up 1 to 2 weeks         Discharge Follow-up with Specialty: Nephrology Jaky Mcmullen; 2 Weeks   As directed      Specialty: Nephrology Jaky Mcmullen    Follow Up: 2 Weeks    Follow Up Details: Hospital discharge follow-up                 Pertinent  and/or Most Recent Results     PROCEDURES:      Benedict Goss MD   Physician  Pulmonology     Procedures     Signed     Date of Service: 07/06/23 1304  Creation Time: 07/06/23 1304  Pre-procedure Diagnoses   Altered mental status, unspecified altered mental status type [R41.82]     Post-procedure Diagnoses   Altered mental status, unspecified altered mental status type [R41.82]     Procedures   BEDSIDE LUMBAR PUNCTURE [PRO88 (Custom)]          Signed         Procedure name: Lumbar puncture     Indication: Altered mental status, fever     Consent: Obtained from  family.     Timeout: Performed at bedside with nurse     Medications: Local lidocaine and Geodon 20 mg IM, Versed 2 mg IV     Under aseptic precautions, patient was placed in left lateral decubitus position, lower spinal level was prepped and draped.  Patient was placed in flexed position with the help of nursing staff.  Local site at L4-L5 spinous process was anesthetized with local lidocaine.  Lumbar puncture needle was inserted with slightly reddish fluid in return.  Successful spinal tap was done with 3 cc fluid on each of the 4 tubes collected and sent for test.  The reddish fluid was clearing with third and fourth tube.  Site was taped with Band-Aid.  All the fluid sample was sent for tests including cultures, chemistries and cell counts as well as cytology.     Opening pressure was 21 cm of water.     Complications: None.                         LAB RESULTS:      Lab 07/19/23  0551 07/18/23  0505 07/17/23  0454 07/16/23  0452 07/15/23  0531   WBC 7.67 8.35 9.07 10.10 12.67*   HEMOGLOBIN 11.9* 12.0* 12.2* 12.3* 12.2*   HEMATOCRIT 35.7* 38.6 37.0* 38.9 38.7   PLATELETS 319 303 364 337 361   NEUTROS ABS 4.19 4.68 5.44 6.84 8.69*   IMMATURE GRANS (ABS) 0.04 0.03 0.04 0.04 0.10*   LYMPHS ABS 2.11 2.03 2.01 1.58 1.86   MONOS ABS 0.98* 1.18* 1.16* 1.22* 1.50*   EOS ABS 0.26 0.33 0.34 0.32 0.43*   MCV 87.3 95.3 88.1 93.3 92.1         Lab 07/19/23  0551 07/18/23  0505 07/17/23  0454 07/16/23  0452 07/15/23  0531 07/14/23  0750 07/13/23  0516   SODIUM  --   --  138 138 141 141 137   POTASSIUM  --   --  4.4 4.1 4.2 4.5 4.7   CHLORIDE  --   --  106 107 108* 109* 109*   CO2  --   --  21.4* 18.5* 21.0* 23.1 16.3*   ANION GAP  --   --  10.6 12.5 12.0 8.9 11.7   BUN  --   --  36* 36* 36* 38* 42*   CREATININE  --   --  2.12* 2.05* 2.01* 2.08* 1.99*   EGFR  --   --  34.8* 36.2* 37.0* 35.6* 37.5*   GLUCOSE  --   --  160* 126* 119* 160* 266*   CALCIUM  --   --  9.1 9.1 9.3 9.4 8.9   MAGNESIUM 2.3 2.6* 2.6* 2.7* 2.7* 2.8* 3.2*    PHOSPHORUS 4.2 4.1 4.3 4.7* 4.7* 5.2* 4.4         Lab 07/16/23  0452   TOTAL PROTEIN 6.5   ALBUMIN 3.3*   GLOBULIN 3.2   ALT (SGPT) 26   AST (SGOT) 15   BILIRUBIN 0.4   ALK PHOS 128*                     Brief Urine Lab Results  (Last result in the past 365 days)        Color   Clarity   Blood   Leuk Est   Nitrite   Protein   CREAT   Urine HCG        07/01/23 0709 Yellow   Clear   Moderate (2+)   Negative   Negative   30 mg/dL (1+)                 Microbiology Results (last 10 days)       ** No results found for the last 240 hours. **            XR Shoulder 2+ View Left    Result Date: 7/1/2023  Impression:   Left shoulder series demonstrating degenerative changes, as above.      JON LOPEZ MD       Electronically Signed and Approved By: JON LOPEZ MD on 7/01/2023 at 15:32             XR Foot 2 View Left    Result Date: 7/1/2023  Impression:   Left foot series demonstrating degenerative and postoperative changes, as above.  Pes planus.  Tiny 3 mm density in the plantar aspect of the heel on the lateral view, as above.      JON LOPEZ MD       Electronically Signed and Approved By: JON LOPEZ MD on 7/01/2023 at 10:58             CT Head Without Contrast    Result Date: 7/5/2023  Impression: No evidence for acute intracranial abnormality.     JYOCE ROSE MD       Electronically Signed and Approved By: JOYCE ROSE MD on 7/05/2023 at 8:20             CT Angiogram Neck    Result Date: 6/30/2023  Impression:   1. No significant vascular abnormality in the head or the neck. 2. Cervical degenerative changes.     MARIZA KIRAN MD       Electronically Signed and Approved By: MARIZA KIRAN MD on 6/30/2023 at 20:22             MRI Brain Without Contrast    Result Date: 7/1/2023  Impression:   MR examination of the brain without IV contrast demonstrating no acute intracranial abnormality.      JON LOPEZ MD       Electronically Signed and Approved By: JON LOPEZ MD on 7/01/2023 at 11:03              MRI Lumbar Spine Without Contrast    Result Date: 7/13/2023  Impression:   1. No acute findings.  No acute vertebral compression fracture.  2. Degenerative changes are present at several levels, greatest at L3-4 and L4-5.  The findings at L4-5 have progressed since the 9/11/2019 MRI study.  The findings at L3-4 are probably similar in degree.  3. Except for endplate degenerative changes, no significant intraosseous lesions are seen.  4. Except for suspected DISH, no significant paraspinal masses are appreciated.  5. Mild levoscoliosis of the lumbar spine is possible.  6. No distal cord signal abnormality.  No cauda equina mass.  7. Please see above comments for further detail.     Please note that portions of this note were completed with a voice recognition program.  DIMITRIS LEMOS JR, MD       Electronically Signed and Approved By: DIMITRIS LEMOS JR, MD on 7/13/2023 at 5:10              XR Chest 1 View    Result Date: 7/9/2023  Impression:   1. Atelectasis or scarring right mid chest       CAROL MARMOLEJO MD       Electronically Signed and Approved By: CAROL MARMOLEJO MD on 7/09/2023 at 9:31             XR Chest 1 View    Result Date: 7/6/2023  Impression:   1. Enteric tube extends into the upper abdomen.  Please see separate KUB report. 2. Basilar predominant interstitial and airspace opacities, slightly decreased compared to the recent prior study.  Findings may indicate persistent but improved edema or pneumonia. 3. Stable cardiomegaly.       JOYCE ROSE MD       Electronically Signed and Approved By: JOYCE ROSE MD on 7/06/2023 at 13:49             XR Chest 1 View    Result Date: 6/30/2023  Impression:  Hazy lung densities that could be related to hypoinflation or pneumonitis or edema.       MARIZA KIRAN MD       Electronically Signed and Approved By: MARIZA KIRAN MD on 6/30/2023 at 20:18             MRI Foot Left Without Contrast    Result Date: 7/3/2023  Impression:   1. Focal edema within the  subcutaneous soft tissues at the plantar medial aspect of the hindfoot in the region of the small foreign body as observed on the prior radiographs.  Given the presence of a foreign body, the findings suggest changes of soft tissue cellulitis.  There is no definitive abscess formation. 2. Focal edema is noted within the anterior process of the calcaneus and region of the middle subtalar facet.  The edema is felt to most likely be related to chronic stress related changes and/or arthropathy.  Changes of developing osteomyelitis are felt less likely but are not entirely excluded.  No definitive cortical erosion or destruction is identified. 3. Marked changes of tendinopathy and tenosynovitis with superimposed partial thickness tear involving the posterior tibialis tendon.  4. There may be mild changes of tendinopathy involving the peroneus longus and brevis tendons. 5. Mild distal Achilles tendinopathy.  There is also mild-to-moderate plantar fasciitis. 6. Evidence for prior disruption of the anterior talofibular ligament with likely associated injury of the calcaneofibular ligament. 7. Moderate changes of arthropathy associated with the articulations of the ankle, hindfoot, and midfoot.  There is also flattening of the arch of the foot.  The findings may be related to posttraumatic osteoarthritis.  Changes secondary to developing neuropathic arthropathy or Charcot foot could also have this appearance. 8. Findings suggesting changes of remote Lisfranc injury.      RUDY BEAN MD       Electronically Signed and Approved By: RUDY BEAN MD on 7/03/2023 at 9:09              Nonvascular Extremity Limited    Result Date: 7/4/2023  Impression:  Negative study.      AKUA DOOLEY MD       Electronically Signed and Approved By: AKUA DOOLEY MD on 7/04/2023 at 11:07             CT Head Without Contrast Stroke Protocol    Result Date: 6/30/2023  Impression:  No acute intracranial abnormality.     MARIZA KIRAN MD        Electronically Signed and Approved By: MARIZA KIRAN MD on 6/30/2023 at 19:14             XR Abdomen KUB    Result Date: 7/9/2023  Impression:   1. Nasogastric tube tip is within the stomach.     CAROL MARMOLEJO MD       Electronically Signed and Approved By: CAROL MARMOLEJO MD on 7/09/2023 at 11:52             XR Abdomen KUB    Result Date: 7/6/2023  Impression:   Enteric tube terminates in the right mid abdomen, with tip likely in the distal 2nd portion of the duodenum.     JOYCE ROSE MD       Electronically Signed and Approved By: JOYCE ROSE MD on 7/06/2023 at 13:51             CT Angiogram Head w AI Analysis of LVO    Result Date: 6/30/2023  Impression:   1. No significant vascular abnormality in the head or the neck. 2. Cervical degenerative changes.     MARIZA KIRAN MD       Electronically Signed and Approved By: MARIZA KIRAN MD on 6/30/2023 at 20:22             CT CEREBRAL PERFUSION WITH & WITHOUT CONTRAST    Result Date: 6/30/2023  Impression:  Artifactual perfusion abnormalities in the brain without definite core infarct or true brain at risk.      MARIZA KIRAN MD       Electronically Signed and Approved By: MARIZA KIRAN MD on 6/30/2023 at 19:29                      Results for orders placed during the hospital encounter of 06/30/23    Adult Transthoracic Echo Limited W/ Cont if Necessary Per Protocol    Interpretation Summary    Left ventricular systolic function is normal. Left ventricular ejection fraction appears to be 61 - 65%.    Left ventricular diastolic function was normal.    No regional wall motion abnormality    No obvious source of emboli      Labs Pending at Discharge:  Pending Labs       Order Current Status    AFB Culture - Cerebrospinal Fluid, Lumbar Puncture Preliminary result    Fungus Culture - Cerebrospinal Fluid, Lumbar Puncture Preliminary result              Time spent on Discharge including face to face service: Greater than 35 minutes    Electronically signed by Mariza  MD Jason, 07/19/23, 2:53 PM EDT.

## 2023-07-19 NOTE — PROGRESS NOTES
[facility]     Nephrology Progress Note      Patient Name: Carlos Murphy Jr.  : 1960  MRN: [id number]  Primary Care Physician:  Felix Atkinson MD  Date of admission: 2023    Subjective   Subjective     Interval History:  Patient Reports feeling better.  Sitting at bedside, much more alert and coherent.  Stated he ambulated earlier today. Tolerating p.o. may be going home instead of rehab.    Review of Systems   All systems were reviewed and negative except for: What is mentioned above.    Objective   Objective     Vitals:   Temp:  [97.7 °F (36.5 °C)-98.9 °F (37.2 °C)] 98.6 °F (37 °C)  Heart Rate:  [63-81] 73  Resp:  [16-18] 17  BP: ()/(58-78) 121/58  Physical Exam:   Constitutional: Awake, alert, mildly altered.   Eyes: sclerae anicteric, no conjunctival injection   HENT: mucous membranes moist   Neck: Supple, no thyromegaly, no lymphadenopathy, trachea midline, No JVD   Respiratory: Clear to auscultation bilaterally, nonlabored respirations    Cardiovascular: RRR, no murmurs, rubs, or gallops.   Gastrointestinal: Positive bowel sounds, soft, nontender, nondistended   Musculoskeletal: Trace edema, no clubbing or cyanosis   Psychiatric: Appropriate affect, cooperative   Neurologic: moving all extremities, Cranial Nerves grossly intact.   Skin: warm and dry, no rashes    Unchanged from yesterday.    Result Review    Result Reviewed:  I have personally reviewed the results from the time of this admission to 2023 17:39 EDT and agree with these findings:  [x]  Laboratory  []  Microbiology  [x]  Radiology  []  EKG/Telemetry   []  Cardiology/Vascular   []  Pathology  []  Old records  []  Other:  Lab Results   Component Value Date    GLUCOSE 160 (H) 2023    CALCIUM 9.1 2023     2023    K 4.4 2023    CO2 21.4 (L) 2023     2023    BUN 36 (H) 2023    CREATININE 2.12 (H) 2023    EGFRIFNONA 73 2017    BCR 17.0 2023     ANIONGAP 10.6 07/17/2023     Lab Results   Component Value Date    CALCIUM 9.1 07/17/2023    PHOS 4.2 07/19/2023      Results from last 7 days   Lab Units 07/19/23  0551   MAGNESIUM mg/dL 2.3            Assessment & Plan   Assessment / Plan       Active Hospital Problems:  Active Hospital Problems    Diagnosis     **Acute renal failure, unspecified acute renal failure type     Onychomycosis     Onychocryptosis     Foreign body in left foot     Peripheral neuropathy     Charcot foot due to diabetes mellitus     Foot pain, bilateral        Assessment and Plan:      1.  MK/CKD3b-  Due to diabetic nephropathy.  Renal function is stabilized.  Had been on lisinopril and SGLT2 and kerendia.  Volume status adequate.  Baseline creatinine around 2.0-2.5.  Monitor labs in a.m.    2.  Met Acidosis-recurrent, monitor with oral sodium bicarb.    3.  Proteinuria-  diabetic nephropathy, continue angiotensin receptor blocker  4.  DMII-  treated.  Was On jardiance, hold with MK.  May want to reintroduce it at some point   5.  HTN-  BP is better, continue losartan.  6.  Chronic edema-edema seems to be improved although now back on amlodipine monitor for now.   7.  H/o bipolar-  previous lithium use.  8.  Anemia-  tsat at 14%, ferritin 425. Received some IV iron.  Hemoglobin stable  9.  Weakness-  would avoid muscle relaxer in CKD (flexeril).  Likely polypharmacy contributing.  If discharged, follow-up with me in office in 2 to 3 weeks.

## 2023-07-19 NOTE — SIGNIFICANT NOTE
07/19/23 1412   Plan   Plan Insurance denied Gabrielle Perez. P2P offered, MD does not think pt qualifies for rehab due to pt ambulating 100 ft. SW discussed with daughter and provided update. Daughter is agreeable to take pt home at discharge. Daughter lives next door. Daughter is agreeable to [facility], no preference. Referral has been sent to A.

## 2023-07-19 NOTE — PLAN OF CARE
Goal Outcome Evaluation:               No acute events this shift. Hoping to discharge to rehab soon.

## 2023-07-27 ENCOUNTER — READMISSION MANAGEMENT (OUTPATIENT)
Dept: CALL CENTER | Facility: HOSPITAL | Age: 63
End: 2023-07-27
Payer: MEDICARE

## 2023-07-27 LAB
FUNGUS WND CULT: NORMAL
MYCOBACTERIUM SPEC CULT: NORMAL
NIGHT BLUE STAIN TISS: NORMAL

## 2023-07-27 NOTE — OUTREACH NOTE
Medical Week 2 Survey      Flowsheet Row Responses   [facility] patient discharged from? [facility]   Does the patient have one of the following disease processes/diagnoses(primary or secondary)? Other   Week 2 attempt successful? Yes   Call start time 1622   Discharge diagnosis Acute renal failure, unspecified acute renal failure type   Call end time 1630   Meds reviewed with patient/caregiver? Yes   Is the patient taking all medications as directed (includes completed medication regime)? Yes   Comments regarding appointments PCP and pain management apts in the future   Does the patient have a primary care provider?  Yes   Has the patient kept scheduled appointments due by today? N/A   What is the Home health agency?  [facility] comments  has visited   Psychosocial issues? No   Did the patient receive a copy of their discharge instructions? Yes   Nursing interventions Reviewed instructions with patient   What is the patient's perception of their health status since discharge? Improving   Is the patient/caregiver able to teach back signs and symptoms related to disease process for when to call PCP? Yes   Is the patient/caregiver able to teach back signs and symptoms related to disease process for when to call 911? Yes   Is the patient/caregiver able to teach back the hierarchy of who to call/visit for symptoms/problems? PCP, Specialist, Home health nurse, Urgent Care, ED, 911 Yes   If the patient is a current smoker, are they able to teach back resources for cessation? Not a smoker   Week 2 Call Completed? Yes   Call end time 1630            Chelsea H - Registered Nurse

## 2023-07-29 NOTE — PROGRESS NOTES
Stroke Progress Note       Chief Complaint:  AMS    Subjective    Subjective     Subjective:  Lethargic today morning  Intermittently agitated         Review of Systems   uTO     Objective      Temp:  [97.6 °F (36.4 °C)-100.2 °F (37.9 °C)] 100.2 °F (37.9 °C)  Heart Rate:  [57-64] 64  Resp:  [16-20] 20  BP: (165-179)/(69-78) 174/71      Drowsy   Slow to respond   Pupils reactive   Left hand tremors.       Results Review:    I reviewed the patient's new clinical results.    Lab Results (last 24 hours)       Procedure Component Value Units Date/Time    POC Glucose Once [[id number]]  (Abnormal) Collected: 07/05/23 0702    Specimen: Blood Updated: 07/05/23 0703     Glucose 302 mg/dL      Comment: Serial Number: [id number]:  [id number]       Magnesium [[id number]]  (Normal) Collected: 07/05/23 0440    Specimen: Blood Updated: 07/05/23 0510     Magnesium 2.2 mg/dL     Comprehensive Metabolic Panel [[id number]]  (Abnormal) Collected: 07/05/23 0440    Specimen: Blood Updated: 07/05/23 0510     Glucose 272 mg/dL      BUN 69 mg/dL      Creatinine 2.74 mg/dL      Sodium 144 mmol/L      Potassium 3.2 mmol/L      Chloride 97 mmol/L      CO2 22.1 mmol/L      Calcium 9.6 mg/dL      Total Protein 7.2 g/dL      Albumin 3.8 g/dL      ALT (SGPT) 25 U/L      AST (SGOT) 19 U/L      Alkaline Phosphatase 150 U/L      Total Bilirubin 0.4 mg/dL      Globulin 3.4 gm/dL      A/G Ratio 1.1 g/dL      BUN/Creatinine Ratio 25.2     Anion Gap 24.9 mmol/L      eGFR 25.5 mL/min/1.73     Narrative:      GFR Normal >60  Chronic Kidney Disease <60  Kidney Failure <15      CK [[id number]]  (Normal) Collected: 07/05/23 0440    Specimen: Blood Updated: 07/05/23 0510     Creatine Kinase 161 U/L     Phosphorus [[id number]]  (Normal) Collected: 07/05/23 0440    Specimen: Blood Updated: 07/05/23 0510     Phosphorus 4.0 mg/dL     CBC & Differential [[id number]]  (Abnormal) Collected: 07/05/23 0440    Specimen: Blood Updated: 07/05/23 0451    Narrative:      The  following orders were created for panel order CBC & Differential.  Procedure                               Abnormality         Status                     ---------                               -----------         ------                     CBC Auto Differential[[id number]]        Abnormal            Final result                 Please view results for these tests on the individual orders.    CBC Auto Differential [[id number]]  (Abnormal) Collected: 07/05/23 0440    Specimen: Blood Updated: 07/05/23 0451     WBC 12.38 10*3/mm3      RBC 4.16 10*6/mm3      Hemoglobin 12.1 g/dL      Hematocrit 38.0 %      MCV 91.3 fL      MCH 29.1 pg      MCHC 31.8 g/dL      RDW 12.7 %      RDW-SD 42.1 fl      MPV 9.7 fL      Platelets 293 10*3/mm3      Neutrophil % 88.1 %      Lymphocyte % 5.7 %      Monocyte % 5.3 %      Eosinophil % 0.0 %      Basophil % 0.2 %      Immature Grans % 0.7 %      Neutrophils, Absolute 10.89 10*3/mm3      Lymphocytes, Absolute 0.71 10*3/mm3      Monocytes, Absolute 0.66 10*3/mm3      Eosinophils, Absolute 0.00 10*3/mm3      Basophils, Absolute 0.03 10*3/mm3      Immature Grans, Absolute 0.09 10*3/mm3      nRBC 0.0 /100 WBC     POC Glucose Once [[id number]]  (Abnormal) Collected: 07/04/23 2038    Specimen: Blood Updated: 07/04/23 2039     Glucose 189 mg/dL      Comment: Serial Number: [id number]:  [id number]       POC Glucose Once [[id number]]  (Abnormal) Collected: 07/04/23 1636    Specimen: Blood Updated: 07/04/23 1637     Glucose 205 mg/dL      Comment: Serial Number: [id number]:  [id number]       POC Glucose Once [[id number]]  (Abnormal) Collected: 07/04/23 1148    Specimen: Blood Updated: 07/04/23 1149     Glucose 207 mg/dL      Comment: Serial Number: [id number]:  [id number]             CT Head Without Contrast    Result Date: 7/5/2023  No evidence for acute intracranial abnormality.     JOYCE ROSE MD       Electronically Signed and Approved By: JOYCE ROSE MD on 7/05/2023 at 8:20              US Nonvascular Extremity Limited    Result Date: 7/4/2023   Negative study.      AKUA DOOLEY MD       Electronically Signed and Approved By: AKUA DOOLEY MD on 7/04/2023 at 11:07            Results for orders placed during the hospital encounter of 06/30/23    Adult Transthoracic Echo Limited W/ Cont if Necessary Per Protocol    Interpretation Summary    Left ventricular systolic function is normal. Left ventricular ejection fraction appears to be 61 - 65%.    Left ventricular diastolic function was normal.    No regional wall motion abnormality    No obvious source of emboli            Assessment/Plan     Assessment/Plan:  62 y.o. male PMH DM, CKD, schizophrenia, HTN, HLD, seizure disorder on lamotrigine 200 mg BID who presented to the ED on 06/28 altered and unresponsive.    Based on the presentation and symptoms likely this is metabolic encephalopathy vs drug withdrawal     Encephalopathy, unspecified  -Switch to IV depakote 1 gm IV followed by 500 BID  -DC Keppra-  as it the only new medication which was started could cause agitation  -Repeat EEG again today   -Agree with LP to rule out CNS infectious process.   -recommend High dose of thiamine        Neurology will continue to follow.     Visit- audio/video interface.     Roman Sorenson MD  07/05/23  10:04 EDT      28-Jul-2023 21:03

## 2023-08-03 LAB
FUNGUS WND CULT: NORMAL
MYCOBACTERIUM SPEC CULT: NORMAL
NIGHT BLUE STAIN TISS: NORMAL

## 2023-08-07 ENCOUNTER — TELEPHONE (OUTPATIENT)
Dept: ORTHOPEDIC SURGERY | Facility: CLINIC | Age: 63
End: 2023-08-07
Payer: MEDICARE

## 2023-08-07 NOTE — TELEPHONE ENCOUNTER
Caller: NICK    Relationship: [facility]    Best call back number: [phone] (PATIENT)    Who is your current provider: DR LANCE    Who would you like your new provider to be: ANY OTHER PROVIDER    What are your reasons for transferring care: DID NOT HAVE GOOD RELATIONSHIP WITH PROVIDER    Additional notes: NEEDS TO BE SEEN FOR LEFT SHOULDER ISSUE

## 2023-08-17 LAB
MYCOBACTERIUM SPEC CULT: NORMAL
NIGHT BLUE STAIN TISS: NORMAL

## 2023-08-28 NOTE — TELEPHONE ENCOUNTER
Patient called and left a  requesting bowel prep resent to [facility].   Prep pended.

## 2023-09-05 ENCOUNTER — TELEPHONE (OUTPATIENT)
Dept: GASTROENTEROLOGY | Facility: CLINIC | Age: 63
End: 2023-09-05
Payer: MEDICARE

## 2023-09-05 NOTE — TELEPHONE ENCOUNTER
From: Wero Waterman  To: Dominique Gaitan PA-C  Sent: 3/17/2020 12:18 PM CDT  Subject: Other    Thank you so much. I have literally been on hold since sending you that previous message. And I'm not kidding.    Wero Waterman   Patient left voicemail with answering service stating he needed to reschedule procedure for today 9/5/2023 as he had issues with transportation. I attempted to contact pt to reschedule, left voicemail with call back number to reach our office to r/s . Canceled todays procedure with Shivnai

## 2023-09-05 NOTE — TELEPHONE ENCOUNTER
Pt contacted office I advised due to the timing of procedure/last nurse/MA visit we would need to complete another nurse/ma visit before he could be rescheduled. Pt states understanding and was scheduled for a nurse/ma visit on 1/8/2023 @ 9:00 AM. Patient is aware this is only a telephone visit.

## 2023-09-06 ENCOUNTER — OFFICE VISIT (OUTPATIENT)
Dept: ORTHOPEDIC SURGERY | Facility: CLINIC | Age: 63
End: 2023-09-06
Payer: MEDICARE

## 2023-09-06 VITALS
BODY MASS INDEX: 39.96 KG/M2 | OXYGEN SATURATION: 94 % | HEIGHT: 72 IN | WEIGHT: 295 LBS | DIASTOLIC BLOOD PRESSURE: 70 MMHG | HEART RATE: 67 BPM | SYSTOLIC BLOOD PRESSURE: 124 MMHG

## 2023-09-06 DIAGNOSIS — M19.012 OSTEOARTHRITIS OF LEFT SHOULDER, UNSPECIFIED OSTEOARTHRITIS TYPE: ICD-10-CM

## 2023-09-06 DIAGNOSIS — M25.512 LEFT SHOULDER PAIN, UNSPECIFIED CHRONICITY: Primary | ICD-10-CM

## 2023-09-06 RX ORDER — TRIAMCINOLONE ACETONIDE 40 MG/ML
40 INJECTION, SUSPENSION INTRA-ARTICULAR; INTRAMUSCULAR
Status: COMPLETED | OUTPATIENT
Start: 2023-09-06 | End: 2023-09-06

## 2023-09-06 RX ORDER — BUPIVACAINE HYDROCHLORIDE 5 MG/ML
5 INJECTION, SOLUTION EPIDURAL; INTRACAUDAL
Status: COMPLETED | OUTPATIENT
Start: 2023-09-06 | End: 2023-09-06

## 2023-09-06 RX ADMIN — TRIAMCINOLONE ACETONIDE 40 MG: 40 INJECTION, SUSPENSION INTRA-ARTICULAR; INTRAMUSCULAR at 14:27

## 2023-09-06 RX ADMIN — BUPIVACAINE HYDROCHLORIDE 5 ML: 5 INJECTION, SOLUTION EPIDURAL; INTRACAUDAL at 14:27

## 2023-09-06 NOTE — PROGRESS NOTES
"Chief Complaint  Initial Evaluation and Pain of the Left Shoulder     Subjective      Carlos Murphy Jr. presents to [facility] for initial evaluation of the left shoulder.  He has had pain in the shoulder for years.  He was in the hospital a few months ago and has had increase pain since then.  He had an X ray on 23.  He is having pain with end range of motion.  He has difficulty with upward and forward reaching.  He has had no recent injury or fall.     No Known Allergies     Social History     Socioeconomic History    Marital status:    Tobacco Use    Smoking status: Former     Packs/day: 0.50     Years: 35.00     Pack years: 17.50     Types: Cigarettes     Quit date:      Years since quittin.6    Smokeless tobacco: Never   Vaping Use    Vaping Use: Never used   Substance and Sexual Activity    Alcohol use: Yes     Comment: OCCASIONAL    Drug use: Never    Sexual activity: Defer        I reviewed the patient's chief complaint, history of present illness, review of systems, past medical history, surgical history, family history, social history, medications, and allergy list.     Review of Systems     Constitutional: Denies fevers, chills, weight loss  Cardiovascular: Denies chest pain, shortness of breath  Skin: Denies rashes, acute skin changes  Neurologic: Denies headache, loss of consciousness      Vital Signs:   /70   Pulse 67   Ht 182.9 cm (72\")   Wt 134 kg (295 lb)   SpO2 94%   BMI 40.01 kg/m²          Physical Exam  General: Alert. No acute distress    Ortho Exam        LEFT SHOULDER Forward flexion 160. Abduction 100. External rotation 50. Internal rotation back pocket. Positive Cross body adduction. Supraspinatus strength 4/5. Infraspinatus Strength 4/5. Infrared subscap 4/5. Positive Benz. Positive Neer. Negative Apprehension. Negative Lift off. (Negative Obriens. Sensation intact to light touch, median, radial, ulnar nerve. Positive AIN, " PIN, ulnar nerve motor. Positive pulses. Positive Impingement signs. Good strength in triceps, biceps, deltoid, wrist extensors and wrist flexors.        Large Joint LEFT SHOULDER  Date/Time: 9/6/2023 2:27 PM  Consent given by: patient  Site marked: site marked  Timeout: Immediately prior to procedure a time out was called to verify the correct patient, procedure, equipment, support staff and site/side marked as required   Supporting Documentation  Indications: pain   Procedure Details  Location: shoulder -   Preparation: Patient was prepped and draped in the usual sterile fashion  Needle size: 22 G  Medications administered: 40 mg triamcinolone acetonide 40 MG/ML; 5 mL bupivacaine (PF) 0.5 %  Patient tolerance: patient tolerated the procedure well with no immediate complications          Imaging Results (Most Recent)       None             Result Review :             Assessment and Plan     Diagnoses and all orders for this visit:    1. Left shoulder pain, unspecified chronicity (Primary)    2. Osteoarthritis of left shoulder, unspecified osteoarthritis type    Other orders  -     Large Joint LEFT SHOULDER        Discussed the treatment plan with the patient.     Discussed the risks and benefits of conservative measures.  The patient expressed understanding and wished to proceed with a left shoulder steroid injection.  He tolerated the injection well.         Call or return if worsening symptoms.    Follow Up     PRN      Patient was given instructions and counseling regarding his condition or for health maintenance advice. Please see specific information pulled into the AVS if appropriate.     Scribed for Jose Griffith MD by Cecilia Handley MA.  09/06/23   13:38 EDT      I have personally performed the services described in this document as scribed by the above individual and it is both accurate and complete. Jose Griffith MD 09/06/23

## 2024-01-08 ENCOUNTER — PREP FOR SURGERY (OUTPATIENT)
Dept: OTHER | Facility: HOSPITAL | Age: 64
End: 2024-01-08
Payer: MEDICARE

## 2024-01-08 ENCOUNTER — CLINICAL SUPPORT (OUTPATIENT)
Dept: GASTROENTEROLOGY | Facility: CLINIC | Age: 64
End: 2024-01-08
Payer: MEDICARE

## 2024-01-08 DIAGNOSIS — Z12.11 COLON CANCER SCREENING: Primary | ICD-10-CM

## 2024-01-08 RX ORDER — PREGABALIN 100 MG/1
CAPSULE ORAL
COMMUNITY
Start: 2024-01-02

## 2024-01-08 RX ORDER — LANCETS
EACH MISCELLANEOUS
COMMUNITY
Start: 2024-01-02

## 2024-01-08 RX ORDER — HYDROXYZINE 50 MG/1
TABLET, FILM COATED ORAL
COMMUNITY
Start: 2024-01-04

## 2024-01-08 RX ORDER — BLOOD SUGAR DIAGNOSTIC
STRIP MISCELLANEOUS
COMMUNITY
Start: 2023-11-27

## 2024-01-08 RX ORDER — ZOLPIDEM TARTRATE 10 MG/1
TABLET ORAL
COMMUNITY
Start: 2023-12-13

## 2024-01-08 RX ORDER — SODIUM BICARBONATE 650 MG/1
650 TABLET ORAL
COMMUNITY

## 2024-01-08 RX ORDER — AMLODIPINE BESYLATE 5 MG/1
TABLET ORAL
COMMUNITY
Start: 2023-10-24

## 2024-01-08 RX ORDER — PLECANATIDE 3 MG/1
TABLET ORAL
COMMUNITY
Start: 2023-10-25

## 2024-01-08 RX ORDER — DICLOFENAC SODIUM 75 MG/1
TABLET, DELAYED RELEASE ORAL
COMMUNITY
Start: 2023-12-13

## 2024-01-08 RX ORDER — ESCITALOPRAM OXALATE 20 MG/1
1 TABLET ORAL
COMMUNITY
Start: 2023-10-20

## 2024-01-08 RX ORDER — FUROSEMIDE 20 MG/1
TABLET ORAL
COMMUNITY
Start: 2023-11-13

## 2024-01-08 NOTE — PROGRESS NOTES
Carlos NISHANT Murphy Jr.  1960  63 y.o.    Reason for call: Screening Colonoscopy  Prep prescribed: Suprep- pt states he already has prep from previously scheduled scope   Prep instructions reviewed with patient and sent to patient via regular mail to the home address on file  Is the patient currently on any injectable medications for weight loss or diabetes? Yes- Ozempic pt is aware to suspend for 7 days   Clearance needed? Yes  If yes, what clearance is needed? Blood thinner  Clearance has been requested from    The patient has been scheduled for: Colonoscopy  After your procedure, you will be contacted with results. Please confirm the best phone # to reach the patient: [phone]  Family history of colon cancer? No  If yes, indicate relative: N/A   Family History   Problem Relation Age of Onset    Kidney disease Mother     COPD Mother     COPD Father     Heart disease Father     Malig Hyperthermia Neg Hx      Past Medical History:   Diagnosis Date    Acute kidney injury     2021 POST SEIZURES    Astigmatism of both eyes     eyes twitch left and right    Bipolar disorder     Charcot ankle     Closed nondisplaced fracture of medial malleolus of left tibia     COPD (chronic obstructive pulmonary disease)     USES INHALERS    Diabetes     BG RUNS AROUND 90'S IN AM    Hx of schizophrenia     Hyperlipidemia     Hypertension     SEEN DR ROD IN THE PAST, HAD APPT WITH DR MICHAUD IN 5-2022 CX APPT, DENIED CP/SOB    Neuropathy     Seizures     LAST ONE 4/21/22    Sleep apnea     DOES NOT USE CPAP    Varicose vein of leg      No Known Allergies  Past Surgical History:   Procedure Laterality Date    ANKLE OPEN REDUCTION INTERNAL FIXATION Left 04/25/2022    Procedure: LEFT OPEN REDUCTION INTERNAL FIXATION DISTAL TIBIA FRACTURE ;  Surgeon: Tha Parry MD;  [place] [facility] [place] [facility] [place]  Service: Orthopedics;  Laterality: Left;    ANKLE OPEN REDUCTION INTERNAL FIXATION Left 06/01/2022    Procedure: LEFT ANKLE  OPEN REDUCTION INTERNAL FIXATION WITH SYNDESMOSIS FIXATION;  Surgeon: Tha Parry MD;  [facility];  Service: Orthopedics;  Laterality: Left;    CHOLECYSTECTOMY      COLONOSCOPY      JOINT REPLACEMENT      RTKR    LUMBAR DISC SURGERY      SHOULDER SURGERY Right      Social History     Socioeconomic History    Marital status:    Tobacco Use    Smoking status: Former     Packs/day: 0.50     Years: 35.00     Additional pack years: 0.00     Total pack years: 17.50     Types: Cigarettes     Quit date:      Years since quittin.0    Smokeless tobacco: Never   Vaping Use    Vaping Use: Never used   Substance and Sexual Activity    Alcohol use: Yes     Comment: OCCASIONAL    Drug use: Never    Sexual activity: Defer       Current Outpatient Medications:     Accu-Chek Queta Plus test strip, , Disp: , Rfl:     Accu-Chek Softclix Lancets lancets, , Disp: , Rfl:     albuterol sulfate  (90 Base) MCG/ACT inhaler, Inhale 2 puffs Every 4 (Four) Hours As Needed., Disp: , Rfl:     amLODIPine (NORVASC) 5 MG tablet, , Disp: , Rfl:     aspirin EC (Ecotrin) 325 MG tablet, Take 1 tablet by mouth Daily., Disp: 14 tablet, Rfl: 0    atenolol (TENORMIN) 25 MG tablet, Take 1 tablet by mouth Daily., Disp: , Rfl:     atorvastatin (LIPITOR) 40 MG tablet, Take 1 tablet by mouth Every Night., Disp: , Rfl:     diclofenac (VOLTAREN) 75 MG EC tablet, , Disp: , Rfl:     escitalopram (LEXAPRO) 20 MG tablet, Take 1 tablet by mouth., Disp: , Rfl:     furosemide (LASIX) 20 MG tablet, , Disp: , Rfl:     glipizide (GLUCOTROL XL) 10 MG 24 hr tablet, Take 1 tablet by mouth 2 (Two) Times a Day. INSTRUCTED PER ANESTHESIA PROTOCOL, Disp: , Rfl:     hydrOXYzine (ATARAX) 50 MG tablet, , Disp: , Rfl:     lamoTRIgine (LaMICtal) 200 MG tablet, Take 1 tablet by mouth 2 (Two) Times a Day., Disp: , Rfl:     multivitamin with minerals tablet tablet, Take 1 tablet by mouth Daily., Disp: , Rfl:     NovoLOG Mix 70/30 FlexPen  (70-30) 100 UNIT/ML suspension pen-injector injection, REPORTS SLIDING SCALE [id number]. INSTRUCTED PER ANESTHESIA PROTOCOL IF BS GREATER THEN 140 CAN TAKE 1/2 OF THE DOSE, Disp: , Rfl:     pantoprazole (PROTONIX) 40 MG EC tablet, Take 1 tablet by mouth 2 (Two) Times a Day., Disp: , Rfl:     polyethylene glycol (GoLYTELY) 236 g solution, Starting at noon on day prior to procedure, drink 8 ounces every 30 minutes until all gone or stools are clear. May add flavor packet., Disp: 4000 mL, Rfl: 0    pregabalin (LYRICA) 100 MG capsule, , Disp: , Rfl:     QUEtiapine (SEROquel) 300 MG tablet, Take 2 tablets by mouth Every Night., Disp: , Rfl:     Semaglutide,0.25 or 0.5MG/DOS, (Ozempic, 0.25 or 0.5 MG/DOSE,) 2 MG/1.5ML solution pen-injector, Inject 5 mg under the skin into the appropriate area as directed 1 (One) Time Per Week. INSTRUCTED PER ANESTHESIA STANDING ORDERS, Disp: , Rfl:     sodium bicarbonate 650 MG tablet, Take 1 tablet by mouth., Disp: , Rfl:     Stiolto Respimat 2.5-2.5 MCG/ACT aerosol solution inhaler, Inhale 1 puff Daily., Disp: , Rfl:     Trulance 3 MG tablet, , Disp: , Rfl:     zolpidem (AMBIEN) 10 MG tablet, , Disp: , Rfl:     losartan (COZAAR) 50 MG tablet, Take 1 tablet by mouth 2 (Two) Times a Day for 30 days., Disp: 60 tablet, Rfl: 0

## 2024-01-23 ENCOUNTER — TELEPHONE (OUTPATIENT)
Dept: GASTROENTEROLOGY | Facility: CLINIC | Age: 64
End: 2024-01-23
Payer: MEDICARE

## 2024-01-25 NOTE — TELEPHONE ENCOUNTER
S/w Alem at  office advised we are requesting a response to the clearance request she asked for this to be refaxed to [phone]. Refaxed via rightx

## 2024-02-01 ENCOUNTER — TELEPHONE (OUTPATIENT)
Dept: GASTROENTEROLOGY | Facility: CLINIC | Age: 64
End: 2024-02-01
Payer: MEDICARE

## 2024-02-01 NOTE — TELEPHONE ENCOUNTER
Pt contacted office stating he was returning my call I advised he listened to an old voicemail no further action needed

## 2024-02-12 ENCOUNTER — ANESTHESIA EVENT (OUTPATIENT)
Dept: GASTROENTEROLOGY | Facility: HOSPITAL | Age: 64
End: 2024-02-12
Payer: MEDICARE

## 2024-02-13 ENCOUNTER — HOSPITAL ENCOUNTER (OUTPATIENT)
Facility: HOSPITAL | Age: 64
Setting detail: HOSPITAL OUTPATIENT SURGERY
Discharge: HOME | End: 2024-02-13
Attending: INTERNAL MEDICINE | Admitting: INTERNAL MEDICINE
Payer: MEDICARE

## 2024-02-13 ENCOUNTER — ANESTHESIA (OUTPATIENT)
Dept: GASTROENTEROLOGY | Facility: HOSPITAL | Age: 64
End: 2024-02-13
Payer: MEDICARE

## 2024-02-13 VITALS
BODY MASS INDEX: 38.45 KG/M2 | DIASTOLIC BLOOD PRESSURE: 57 MMHG | SYSTOLIC BLOOD PRESSURE: 115 MMHG | WEIGHT: 283.51 LBS | HEART RATE: 68 BPM | OXYGEN SATURATION: 97 % | RESPIRATION RATE: 20 BRPM | TEMPERATURE: 99 F

## 2024-02-13 DIAGNOSIS — Z12.11 COLON CANCER SCREENING: ICD-10-CM

## 2024-02-13 LAB — GLUCOSE BLDC GLUCOMTR-MCNC: 163 MG/DL (ref 70–99)

## 2024-02-13 PROCEDURE — 25810000003 LACTATED RINGERS PER 1000 ML

## 2024-02-13 PROCEDURE — 25010000002 PROPOFOL 10 MG/ML EMULSION: Performed by: NURSE ANESTHETIST, CERTIFIED REGISTERED

## 2024-02-13 PROCEDURE — 82948 REAGENT STRIP/BLOOD GLUCOSE: CPT | Performed by: NURSE ANESTHETIST, CERTIFIED REGISTERED

## 2024-02-13 PROCEDURE — 88305 TISSUE EXAM BY PATHOLOGIST: CPT | Performed by: INTERNAL MEDICINE

## 2024-02-13 RX ORDER — PROPOFOL 10 MG/ML
VIAL (ML) INTRAVENOUS AS NEEDED
Status: DISCONTINUED | OUTPATIENT
Start: 2024-02-13 | End: 2024-02-13 | Stop reason: SURG

## 2024-02-13 RX ORDER — LIDOCAINE HYDROCHLORIDE 20 MG/ML
INJECTION, SOLUTION EPIDURAL; INFILTRATION; INTRACAUDAL; PERINEURAL AS NEEDED
Status: DISCONTINUED | OUTPATIENT
Start: 2024-02-13 | End: 2024-02-13 | Stop reason: SURG

## 2024-02-13 RX ORDER — SODIUM CHLORIDE 9 MG/ML
9 INJECTION, SOLUTION INTRAVENOUS CONTINUOUS
Status: DISCONTINUED | OUTPATIENT
Start: 2024-02-13 | End: 2024-02-13 | Stop reason: HOSPADM

## 2024-02-13 RX ORDER — SODIUM CHLORIDE, SODIUM LACTATE, POTASSIUM CHLORIDE, CALCIUM CHLORIDE 600; 310; 30; 20 MG/100ML; MG/100ML; MG/100ML; MG/100ML
30 INJECTION, SOLUTION INTRAVENOUS CONTINUOUS
Status: DISCONTINUED | OUTPATIENT
Start: 2024-02-13 | End: 2024-02-13 | Stop reason: HOSPADM

## 2024-02-13 RX ADMIN — PROPOFOL 250 MCG/KG/MIN: 10 INJECTION, EMULSION INTRAVENOUS at 08:14

## 2024-02-13 RX ADMIN — SODIUM CHLORIDE, POTASSIUM CHLORIDE, SODIUM LACTATE AND CALCIUM CHLORIDE 30 ML/HR: 600; 310; 30; 20 INJECTION, SOLUTION INTRAVENOUS at 07:20

## 2024-02-13 RX ADMIN — PROPOFOL 100 MG: 10 INJECTION, EMULSION INTRAVENOUS at 08:14

## 2024-02-13 RX ADMIN — LIDOCAINE HYDROCHLORIDE 50 MG: 20 INJECTION, SOLUTION EPIDURAL; INFILTRATION; INTRACAUDAL; PERINEURAL at 08:14

## 2024-02-14 LAB
CYTO UR: NORMAL
LAB AP CASE REPORT: NORMAL
LAB AP CLINICAL INFORMATION: NORMAL
PATH REPORT.FINAL DX SPEC: NORMAL
PATH REPORT.GROSS SPEC: NORMAL

## 2024-02-16 ENCOUNTER — TELEPHONE (OUTPATIENT)
Dept: GASTROENTEROLOGY | Facility: CLINIC | Age: 64
End: 2024-02-16
Payer: MEDICARE

## 2024-05-29 ENCOUNTER — OFFICE VISIT (OUTPATIENT)
Dept: ORTHOPEDIC SURGERY | Facility: CLINIC | Age: 64
End: 2024-05-29
Payer: MEDICARE

## 2024-05-29 VITALS — WEIGHT: 292 LBS | HEART RATE: 74 BPM | OXYGEN SATURATION: 96 % | BODY MASS INDEX: 39.55 KG/M2 | HEIGHT: 72 IN

## 2024-05-29 DIAGNOSIS — M17.12 OSTEOARTHRITIS OF LEFT KNEE, UNSPECIFIED OSTEOARTHRITIS TYPE: ICD-10-CM

## 2024-05-29 DIAGNOSIS — M25.562 LEFT KNEE PAIN, UNSPECIFIED CHRONICITY: Primary | ICD-10-CM

## 2024-05-29 RX ORDER — LIDOCAINE HYDROCHLORIDE 10 MG/ML
5 INJECTION, SOLUTION INFILTRATION; PERINEURAL
Status: COMPLETED | OUTPATIENT
Start: 2024-05-29 | End: 2024-05-29

## 2024-05-29 RX ORDER — TRIAMCINOLONE ACETONIDE 40 MG/ML
40 INJECTION, SUSPENSION INTRA-ARTICULAR; INTRAMUSCULAR
Status: COMPLETED | OUTPATIENT
Start: 2024-05-29 | End: 2024-05-29

## 2024-05-29 RX ADMIN — LIDOCAINE HYDROCHLORIDE 5 ML: 10 INJECTION, SOLUTION INFILTRATION; PERINEURAL at 15:09

## 2024-05-29 RX ADMIN — TRIAMCINOLONE ACETONIDE 40 MG: 40 INJECTION, SUSPENSION INTRA-ARTICULAR; INTRAMUSCULAR at 15:09

## 2024-05-29 NOTE — PROGRESS NOTES
"Chief Complaint  Initial Evaluation of the Left Knee     Subjective      Carlos Murphy Jr. presents to [facility] for initial of the left knee. He has Charcot-Zoë-Tooth-Diease. He has pain on the lateral aspect of the knee.  He has difficulty with ambulation, stairs and prolonged standing.   He ambulates with a cane for support.  He has some varus deformity. He has pain with prolonged standing, ambulation and stairs.     No Known Allergies     Social History     Socioeconomic History    Marital status:    Tobacco Use    Smoking status: Former     Current packs/day: 0.00     Average packs/day: 0.5 packs/day for 35.0 years (17.5 ttl pk-yrs)     Types: Cigarettes     Start date:      Quit date:      Years since quittin.4    Smokeless tobacco: Never   Vaping Use    Vaping status: Never Used   Substance and Sexual Activity    Alcohol use: Yes     Comment: OCCASIONAL    Drug use: Never    Sexual activity: Defer        I reviewed the patient's chief complaint, history of present illness, review of systems, past medical history, surgical history, family history, social history, medications, and allergy list.     Review of Systems     Constitutional: Denies fevers, chills, weight loss  Cardiovascular: Denies chest pain, shortness of breath  Skin: Denies rashes, acute skin changes  Neurologic: Denies headache, loss of consciousness        Vital Signs:   Pulse 74   Ht 182.9 cm (72\")   Wt 132 kg (292 lb)   SpO2 96%   BMI 39.60 kg/m²          Physical Exam  General: Alert. No acute distress    Ortho Exam        LEFT KNEE Flexion 105. Extension -5. Stable to varus/valgus stress. Stable to anterior/posterior drawer. Neurovascularly intact. Negative Daniel. Negative Lachman. Positive EHL, FHL, HS and TA. Sensation intact to light touch all 5 nerves of the foot. Ambulates with Antalgic gait. Patella is well tracking. Calf supple, non-tender. Negative tenderness to the medial " joint line. Positive tenderness to the lateral joint line. Positive Crepitus. Good strength to hamstrings, quadriceps, dorsiflexors, and plantar flexors.  Knee Extensor Mechanism intact Varus deformity.  Mild to moderate swelling.       Large Joint Arthrocentesis: L knee  Date/Time: 5/29/2024 3:09 PM  Consent given by: patient  Site marked: site marked  Timeout: Immediately prior to procedure a time out was called to verify the correct patient, procedure, equipment, support staff and site/side marked as required   Supporting Documentation  Indications: pain   Procedure Details  Location: knee - L knee  Needle gauge: 21 G.  Medications administered: 5 mL lidocaine 1 %; 40 mg triamcinolone acetonide 40 MG/ML  Patient tolerance: patient tolerated the procedure well with no immediate complications      This injection documentation was Scribed for Jose Griffith MD by Matthew Sanchez.  05/29/24   15:10 EDT      Imaging Results (Most Recent)       Procedure Component Value Units Date/Time    XR Knee 3 View Left [[id number]] Resulted: 05/29/24 1442     Updated: 05/29/24 1444             Result Review :     X-Ray Report:  Left knee X-Ray  Indication: Evaluation of the left knee  AP/Lateral and Justice view(s)  Findings: Moderately advanced arthritis of the lateral compartment.    Prior studies available for comparison: no             Assessment and Plan     Diagnoses and all orders for this visit:    1. Left knee pain, unspecified chronicity (Primary)  -     XR Knee 3 View Left    2. Osteoarthritis of left knee, unspecified osteoarthritis type        Discussed the treatment plan with the patient. I reviewed the X-rays that were obtained today with the patient.     Discussed the risks and benefits of conservative measures. The patient expressed understanding and wished to proceed with a left knee steroid injection.  He tolerated the injection well.       Call or return if worsening symptoms.    Follow Up     PRN      Patient was  given instructions and counseling regarding his condition or for health maintenance advice. Please see specific information pulled into the AVS if appropriate.     Scribed for Jose Griffith MD by Cecilia Handley MA.  05/29/24   14:39 EDT      I have personally performed the services described in this document as scribed by the above individual and it is both accurate and complete. Jose Griffith MD 05/29/24

## 2024-12-15 NOTE — PLAN OF CARE
Goal Outcome Evaluation:                      Alert to self and place, following all commands. No acute events this shift.    Statement Selected
